# Patient Record
Sex: MALE | Race: WHITE | Employment: OTHER | URBAN - NONMETROPOLITAN AREA
[De-identification: names, ages, dates, MRNs, and addresses within clinical notes are randomized per-mention and may not be internally consistent; named-entity substitution may affect disease eponyms.]

---

## 2017-01-03 ENCOUNTER — OFFICE VISIT (OUTPATIENT)
Dept: NEUROLOGY | Age: 72
End: 2017-01-03

## 2017-01-03 VITALS
BODY MASS INDEX: 44.1 KG/M2 | DIASTOLIC BLOOD PRESSURE: 76 MMHG | WEIGHT: 281 LBS | HEART RATE: 68 BPM | SYSTOLIC BLOOD PRESSURE: 118 MMHG | HEIGHT: 67 IN

## 2017-01-03 DIAGNOSIS — N18.4 CHRONIC KIDNEY DISEASE, STAGE 4 (SEVERE) (HCC): ICD-10-CM

## 2017-01-03 DIAGNOSIS — F32.A ANXIETY AND DEPRESSION: ICD-10-CM

## 2017-01-03 DIAGNOSIS — E11.9 TYPE 2 DIABETES MELLITUS WITHOUT COMPLICATION, WITH LONG-TERM CURRENT USE OF INSULIN (HCC): ICD-10-CM

## 2017-01-03 DIAGNOSIS — F03.90 DEMENTIA WITHOUT BEHAVIORAL DISTURBANCE, UNSPECIFIED DEMENTIA TYPE: ICD-10-CM

## 2017-01-03 DIAGNOSIS — I05.0 SEVERE MITRAL VALVE STENOSIS: ICD-10-CM

## 2017-01-03 DIAGNOSIS — E11.9 DIABETES MELLITUS WITH INSULIN THERAPY (HCC): Chronic | ICD-10-CM

## 2017-01-03 DIAGNOSIS — I35.0 SEVERE AORTIC STENOSIS: ICD-10-CM

## 2017-01-03 DIAGNOSIS — D64.9 ANEMIA, UNSPECIFIED TYPE: ICD-10-CM

## 2017-01-03 DIAGNOSIS — Z79.4 DIABETES MELLITUS WITH INSULIN THERAPY (HCC): Chronic | ICD-10-CM

## 2017-01-03 DIAGNOSIS — E78.2 MIXED HYPERLIPIDEMIA: ICD-10-CM

## 2017-01-03 DIAGNOSIS — I87.2 VENOUS STASIS DERMATITIS, UNSPECIFIED LATERALITY: ICD-10-CM

## 2017-01-03 DIAGNOSIS — R41.3 MEMORY LOSS: ICD-10-CM

## 2017-01-03 DIAGNOSIS — A41.9 SEVERE SEPSIS (HCC): ICD-10-CM

## 2017-01-03 DIAGNOSIS — N40.0 BENIGN PROSTATIC HYPERPLASIA WITHOUT LOWER URINARY TRACT SYMPTOMS, UNSPECIFIED MORPHOLOGY: ICD-10-CM

## 2017-01-03 DIAGNOSIS — R26.2 DIFFICULTY WALKING: ICD-10-CM

## 2017-01-03 DIAGNOSIS — G62.9 PERIPHERAL POLYNEUROPATHY: ICD-10-CM

## 2017-01-03 DIAGNOSIS — Z99.2 DIALYSIS PATIENT (HCC): ICD-10-CM

## 2017-01-03 DIAGNOSIS — S68.012S: ICD-10-CM

## 2017-01-03 DIAGNOSIS — I67.82 CHRONIC CEREBRAL ISCHEMIA: ICD-10-CM

## 2017-01-03 DIAGNOSIS — R65.20 SEVERE SEPSIS (HCC): ICD-10-CM

## 2017-01-03 DIAGNOSIS — N19 UREMIA: ICD-10-CM

## 2017-01-03 DIAGNOSIS — Z99.2 HEMODIALYSIS PATIENT (HCC): Primary | Chronic | ICD-10-CM

## 2017-01-03 DIAGNOSIS — G93.40 ENCEPHALOPATHY: ICD-10-CM

## 2017-01-03 DIAGNOSIS — Z79.4 TYPE 2 DIABETES MELLITUS WITHOUT COMPLICATION, WITH LONG-TERM CURRENT USE OF INSULIN (HCC): ICD-10-CM

## 2017-01-03 DIAGNOSIS — S52.92XS: ICD-10-CM

## 2017-01-03 DIAGNOSIS — F41.9 ANXIETY AND DEPRESSION: ICD-10-CM

## 2017-01-03 DIAGNOSIS — G47.33 OBSTRUCTIVE SLEEP APNEA SYNDROME: ICD-10-CM

## 2017-01-03 DIAGNOSIS — G31.9 DIFFUSE CEREBRAL ATROPHY (HCC): ICD-10-CM

## 2017-01-03 DIAGNOSIS — E66.01 MORBID OBESITY, UNSPECIFIED OBESITY TYPE (HCC): ICD-10-CM

## 2017-01-03 DIAGNOSIS — N19 UREMIA OF RENAL ORIGIN: ICD-10-CM

## 2017-01-03 DIAGNOSIS — I10 ESSENTIAL HYPERTENSION: ICD-10-CM

## 2017-01-03 PROCEDURE — 99205 OFFICE O/P NEW HI 60 MIN: CPT | Performed by: PSYCHIATRY & NEUROLOGY

## 2017-01-03 RX ORDER — TRIAMCINOLONE ACETONIDE 0.25 MG/G
CREAM TOPICAL
COMMUNITY
Start: 2016-11-03 | End: 2017-08-28 | Stop reason: ALTCHOICE

## 2017-01-03 ASSESSMENT — ENCOUNTER SYMPTOMS
CONSTIPATION: 0
EYE PAIN: 0
VOMITING: 0
DIARRHEA: 0
NAUSEA: 0
CHOKING: 0
FACIAL SWELLING: 0
SHORTNESS OF BREATH: 0
BACK PAIN: 0
TROUBLE SWALLOWING: 0
COUGH: 0
SORE THROAT: 0
EYE ITCHING: 0
EYE REDNESS: 0
APNEA: 0
BLOOD IN STOOL: 0
VOICE CHANGE: 0
WHEEZING: 0
COLOR CHANGE: 0
CHEST TIGHTNESS: 0
ABDOMINAL DISTENTION: 0
ABDOMINAL PAIN: 0
EYE DISCHARGE: 0
SINUS PRESSURE: 0
PHOTOPHOBIA: 0

## 2017-01-04 ENCOUNTER — TELEPHONE (OUTPATIENT)
Dept: INTERNAL MEDICINE | Age: 72
End: 2017-01-04

## 2017-01-04 LAB
ABSOLUTE EOS #: 0.5 K/UL (ref 0–0.4)
ABSOLUTE LYMPH #: 2.1 K/UL (ref 1–4.8)
ABSOLUTE MONO #: 0.9 K/UL (ref 0.1–1.2)
ANION GAP SERPL CALCULATED.3IONS-SCNC: 21 MMOL/L (ref 9–17)
BASOPHILS # BLD: 1 % (ref 0–2)
BASOPHILS ABSOLUTE: 0.1 K/UL (ref 0–0.2)
BUN BLDV-MCNC: 47 MG/DL (ref 8–23)
BUN/CREAT BLD: 9 (ref 9–20)
CALCIUM SERPL-MCNC: 9.4 MG/DL (ref 8.6–10.4)
CHLORIDE BLD-SCNC: 93 MMOL/L (ref 98–107)
CO2: 21 MMOL/L (ref 20–31)
CREAT SERPL-MCNC: 5.26 MG/DL (ref 0.7–1.2)
DIFFERENTIAL TYPE: ABNORMAL
EOSINOPHILS RELATIVE PERCENT: 5 % (ref 1–4)
GFR AFRICAN AMERICAN: 13 ML/MIN
GFR NON-AFRICAN AMERICAN: 11 ML/MIN
GFR SERPL CREATININE-BSD FRML MDRD: ABNORMAL ML/MIN/{1.73_M2}
GFR SERPL CREATININE-BSD FRML MDRD: ABNORMAL ML/MIN/{1.73_M2}
GLUCOSE BLD-MCNC: 128 MG/DL (ref 70–99)
HCT VFR BLD CALC: 35 % (ref 41–53)
HEMOGLOBIN: 11.4 G/DL (ref 13.5–17.5)
LYMPHOCYTES # BLD: 21 % (ref 24–44)
MCH RBC QN AUTO: 31.1 PG (ref 26–34)
MCHC RBC AUTO-ENTMCNC: 32.5 G/DL (ref 31–37)
MCV RBC AUTO: 95.6 FL (ref 80–100)
MONOCYTES # BLD: 9 % (ref 1–7)
PDW BLD-RTO: 16.8 % (ref 11–14.5)
PLATELET # BLD: 173 K/UL (ref 140–450)
PLATELET ESTIMATE: ABNORMAL
PMV BLD AUTO: 7.3 FL (ref 6–12)
POTASSIUM SERPL-SCNC: 5.5 MMOL/L (ref 3.7–5.3)
RBC # BLD: 3.66 M/UL (ref 4.5–5.9)
RBC # BLD: ABNORMAL 10*6/UL
SEG NEUTROPHILS: 64 % (ref 36–66)
SEGMENTED NEUTROPHILS ABSOLUTE COUNT: 6.4 K/UL (ref 1.8–7.7)
SODIUM BLD-SCNC: 135 MMOL/L (ref 135–144)
WBC # BLD: 10 K/UL (ref 3.5–11)
WBC # BLD: ABNORMAL 10*3/UL

## 2017-01-16 ENCOUNTER — PROCEDURE VISIT (OUTPATIENT)
Dept: NEUROLOGY | Age: 72
End: 2017-01-16

## 2017-01-16 DIAGNOSIS — G40.409 AKINETIC SEIZURES (HCC): ICD-10-CM

## 2017-01-16 DIAGNOSIS — F03.90 DEMENTIA WITHOUT BEHAVIORAL DISTURBANCE, UNSPECIFIED DEMENTIA TYPE: ICD-10-CM

## 2017-01-16 DIAGNOSIS — R41.3 MEMORY LOSS: ICD-10-CM

## 2017-01-16 DIAGNOSIS — I67.82 CHRONIC CEREBRAL ISCHEMIA: ICD-10-CM

## 2017-01-16 DIAGNOSIS — R56.9 CONVULSIONS, UNSPECIFIED CONVULSION TYPE (HCC): Primary | ICD-10-CM

## 2017-01-16 DIAGNOSIS — R41.0 CONFUSION: ICD-10-CM

## 2017-01-16 DIAGNOSIS — G31.9 DIFFUSE CEREBRAL ATROPHY (HCC): ICD-10-CM

## 2017-01-16 PROCEDURE — 95957 EEG DIGITAL ANALYSIS: CPT | Performed by: PSYCHIATRY & NEUROLOGY

## 2017-01-16 PROCEDURE — 95813 EEG EXTND MNTR 61-119 MIN: CPT | Performed by: PSYCHIATRY & NEUROLOGY

## 2017-01-17 PROBLEM — E11.42 CONTROLLED TYPE 2 DIABETES MELLITUS WITH DIABETIC POLYNEUROPATHY, WITH LONG-TERM CURRENT USE OF INSULIN (HCC): Status: ACTIVE | Noted: 2017-01-03

## 2017-01-17 PROBLEM — N18.6 ESRD (END STAGE RENAL DISEASE) ON DIALYSIS (HCC): Status: ACTIVE | Noted: 2017-01-17

## 2017-01-17 PROBLEM — Z99.2 ESRD (END STAGE RENAL DISEASE) ON DIALYSIS (HCC): Status: ACTIVE | Noted: 2017-01-17

## 2017-01-17 PROBLEM — Z79.4 CONTROLLED TYPE 2 DIABETES MELLITUS WITH DIABETIC POLYNEUROPATHY, WITH LONG-TERM CURRENT USE OF INSULIN (HCC): Status: ACTIVE | Noted: 2017-01-03

## 2017-01-17 PROBLEM — Z79.4 LONG-TERM INSULIN USE (HCC): Status: ACTIVE | Noted: 2017-01-17

## 2017-01-17 PROBLEM — F32.4 MAJOR DEPRESSIVE DISORDER WITH SINGLE EPISODE, IN PARTIAL REMISSION (HCC): Status: ACTIVE | Noted: 2017-01-03

## 2017-01-18 ENCOUNTER — OUTSIDE SERVICES (OUTPATIENT)
Dept: INTERNAL MEDICINE | Age: 72
End: 2017-01-18

## 2017-01-18 DIAGNOSIS — Z79.4 CONTROLLED TYPE 2 DIABETES MELLITUS WITH DIABETIC POLYNEUROPATHY, WITH LONG-TERM CURRENT USE OF INSULIN (HCC): Primary | ICD-10-CM

## 2017-01-18 DIAGNOSIS — I05.0 SEVERE MITRAL VALVE STENOSIS: ICD-10-CM

## 2017-01-18 DIAGNOSIS — I10 ESSENTIAL HYPERTENSION: ICD-10-CM

## 2017-01-18 DIAGNOSIS — N18.9 ANEMIA IN CHRONIC KIDNEY DISEASE (CKD): ICD-10-CM

## 2017-01-18 DIAGNOSIS — Z99.2 ESRD (END STAGE RENAL DISEASE) ON DIALYSIS (HCC): ICD-10-CM

## 2017-01-18 DIAGNOSIS — Z99.2 DIALYSIS PATIENT (HCC): ICD-10-CM

## 2017-01-18 DIAGNOSIS — G47.33 OBSTRUCTIVE SLEEP APNEA SYNDROME: ICD-10-CM

## 2017-01-18 DIAGNOSIS — N18.6 CONTROLLED TYPE 2 DIABETES MELLITUS WITH CHRONIC KIDNEY DISEASE ON CHRONIC DIALYSIS, WITH LONG-TERM CURRENT USE OF INSULIN (HCC): ICD-10-CM

## 2017-01-18 DIAGNOSIS — E11.22 CONTROLLED TYPE 2 DIABETES MELLITUS WITH CHRONIC KIDNEY DISEASE ON CHRONIC DIALYSIS, WITH LONG-TERM CURRENT USE OF INSULIN (HCC): ICD-10-CM

## 2017-01-18 DIAGNOSIS — Z79.4 LONG-TERM INSULIN USE (HCC): ICD-10-CM

## 2017-01-18 DIAGNOSIS — E11.42 CONTROLLED TYPE 2 DIABETES MELLITUS WITH DIABETIC POLYNEUROPATHY, WITH LONG-TERM CURRENT USE OF INSULIN (HCC): Primary | ICD-10-CM

## 2017-01-18 DIAGNOSIS — E66.01 MORBID OBESITY, UNSPECIFIED OBESITY TYPE (HCC): ICD-10-CM

## 2017-01-18 DIAGNOSIS — D63.1 ANEMIA IN CHRONIC KIDNEY DISEASE (CKD): ICD-10-CM

## 2017-01-18 DIAGNOSIS — F32.4 MAJOR DEPRESSIVE DISORDER WITH SINGLE EPISODE, IN PARTIAL REMISSION (HCC): ICD-10-CM

## 2017-01-18 DIAGNOSIS — Z79.4 CONTROLLED TYPE 2 DIABETES MELLITUS WITH CHRONIC KIDNEY DISEASE ON CHRONIC DIALYSIS, WITH LONG-TERM CURRENT USE OF INSULIN (HCC): ICD-10-CM

## 2017-01-18 DIAGNOSIS — N18.6 ESRD (END STAGE RENAL DISEASE) ON DIALYSIS (HCC): ICD-10-CM

## 2017-01-18 DIAGNOSIS — Z99.2 CONTROLLED TYPE 2 DIABETES MELLITUS WITH CHRONIC KIDNEY DISEASE ON CHRONIC DIALYSIS, WITH LONG-TERM CURRENT USE OF INSULIN (HCC): ICD-10-CM

## 2017-01-18 DIAGNOSIS — I35.0 SEVERE AORTIC STENOSIS: ICD-10-CM

## 2017-01-18 DIAGNOSIS — E78.2 MIXED HYPERLIPIDEMIA: ICD-10-CM

## 2017-01-18 PROCEDURE — 99309 SBSQ NF CARE MODERATE MDM 30: CPT | Performed by: INTERNAL MEDICINE

## 2017-01-20 ENCOUNTER — OFFICE VISIT (OUTPATIENT)
Dept: PRIMARY CARE CLINIC | Age: 72
End: 2017-01-20

## 2017-01-20 ENCOUNTER — OFFICE VISIT (OUTPATIENT)
Dept: CARDIOLOGY | Age: 72
End: 2017-01-20

## 2017-01-20 VITALS
OXYGEN SATURATION: 95 % | SYSTOLIC BLOOD PRESSURE: 118 MMHG | WEIGHT: 286 LBS | HEART RATE: 72 BPM | TEMPERATURE: 98.1 F | BODY MASS INDEX: 44.89 KG/M2 | HEIGHT: 67 IN | RESPIRATION RATE: 18 BRPM | DIASTOLIC BLOOD PRESSURE: 60 MMHG

## 2017-01-20 VITALS
HEIGHT: 67 IN | SYSTOLIC BLOOD PRESSURE: 118 MMHG | WEIGHT: 286 LBS | BODY MASS INDEX: 44.89 KG/M2 | HEART RATE: 74 BPM | DIASTOLIC BLOOD PRESSURE: 60 MMHG

## 2017-01-20 DIAGNOSIS — Z95.2 S/P MVR (MITRAL VALVE REPLACEMENT): ICD-10-CM

## 2017-01-20 DIAGNOSIS — L03.115 CELLULITIS OF RIGHT LOWER EXTREMITY: Primary | ICD-10-CM

## 2017-01-20 DIAGNOSIS — Z95.2 S/P AVR: ICD-10-CM

## 2017-01-20 DIAGNOSIS — I25.10 CORONARY ARTERY DISEASE INVOLVING NATIVE CORONARY ARTERY OF NATIVE HEART WITHOUT ANGINA PECTORIS: Primary | ICD-10-CM

## 2017-01-20 PROCEDURE — G8427 DOCREV CUR MEDS BY ELIG CLIN: HCPCS | Performed by: NURSE PRACTITIONER

## 2017-01-20 PROCEDURE — G8427 DOCREV CUR MEDS BY ELIG CLIN: HCPCS | Performed by: INTERNAL MEDICINE

## 2017-01-20 PROCEDURE — 1036F TOBACCO NON-USER: CPT | Performed by: INTERNAL MEDICINE

## 2017-01-20 PROCEDURE — G8599 NO ASA/ANTIPLAT THER USE RNG: HCPCS | Performed by: INTERNAL MEDICINE

## 2017-01-20 PROCEDURE — G8598 ASA/ANTIPLAT THER USED: HCPCS | Performed by: NURSE PRACTITIONER

## 2017-01-20 PROCEDURE — G8419 CALC BMI OUT NRM PARAM NOF/U: HCPCS | Performed by: INTERNAL MEDICINE

## 2017-01-20 PROCEDURE — 3017F COLORECTAL CA SCREEN DOC REV: CPT | Performed by: INTERNAL MEDICINE

## 2017-01-20 PROCEDURE — 3017F COLORECTAL CA SCREEN DOC REV: CPT | Performed by: NURSE PRACTITIONER

## 2017-01-20 PROCEDURE — G8484 FLU IMMUNIZE NO ADMIN: HCPCS | Performed by: NURSE PRACTITIONER

## 2017-01-20 PROCEDURE — 4040F PNEUMOC VAC/ADMIN/RCVD: CPT | Performed by: NURSE PRACTITIONER

## 2017-01-20 PROCEDURE — G8484 FLU IMMUNIZE NO ADMIN: HCPCS | Performed by: INTERNAL MEDICINE

## 2017-01-20 PROCEDURE — 1123F ACP DISCUSS/DSCN MKR DOCD: CPT | Performed by: NURSE PRACTITIONER

## 2017-01-20 PROCEDURE — 1036F TOBACCO NON-USER: CPT | Performed by: NURSE PRACTITIONER

## 2017-01-20 PROCEDURE — 99213 OFFICE O/P EST LOW 20 MIN: CPT | Performed by: NURSE PRACTITIONER

## 2017-01-20 PROCEDURE — 1123F ACP DISCUSS/DSCN MKR DOCD: CPT | Performed by: INTERNAL MEDICINE

## 2017-01-20 PROCEDURE — G8419 CALC BMI OUT NRM PARAM NOF/U: HCPCS | Performed by: NURSE PRACTITIONER

## 2017-01-20 PROCEDURE — 99213 OFFICE O/P EST LOW 20 MIN: CPT | Performed by: INTERNAL MEDICINE

## 2017-01-20 PROCEDURE — 4040F PNEUMOC VAC/ADMIN/RCVD: CPT | Performed by: INTERNAL MEDICINE

## 2017-01-20 RX ORDER — HYDROCODONE BITARTRATE AND ACETAMINOPHEN 5; 325 MG/1; MG/1
1 TABLET ORAL EVERY 6 HOURS PRN
COMMUNITY
End: 2017-02-01

## 2017-01-20 RX ORDER — DOXYCYCLINE HYCLATE 100 MG/1
100 CAPSULE ORAL 2 TIMES DAILY
Qty: 20 CAPSULE | Refills: 0 | Status: SHIPPED | OUTPATIENT
Start: 2017-01-20 | End: 2017-01-30

## 2017-01-21 ASSESSMENT — ENCOUNTER SYMPTOMS
SHORTNESS OF BREATH: 0
VOMITING: 0
EYE PAIN: 0
RHINORRHEA: 0
NAIL CHANGES: 0
SORE THROAT: 0
DIARRHEA: 0
COUGH: 0

## 2017-02-02 ENCOUNTER — OFFICE VISIT (OUTPATIENT)
Dept: VASCULAR SURGERY | Age: 72
End: 2017-02-02

## 2017-02-02 VITALS — HEART RATE: 76 BPM | SYSTOLIC BLOOD PRESSURE: 128 MMHG | DIASTOLIC BLOOD PRESSURE: 78 MMHG

## 2017-02-02 DIAGNOSIS — N18.6 ENCOUNTER REGARDING VASCULAR ACCESS FOR DIALYSIS FOR ESRD (HCC): Primary | ICD-10-CM

## 2017-02-02 DIAGNOSIS — Z99.2 ENCOUNTER REGARDING VASCULAR ACCESS FOR DIALYSIS FOR ESRD (HCC): Primary | ICD-10-CM

## 2017-02-02 PROCEDURE — 99024 POSTOP FOLLOW-UP VISIT: CPT | Performed by: SURGERY

## 2017-02-13 RX ORDER — NYSTATIN 10B UNIT
POWDER (EA) MISCELLANEOUS DAILY
COMMUNITY
Start: 2017-02-13 | End: 2019-03-21 | Stop reason: ALTCHOICE

## 2017-02-15 ENCOUNTER — OFFICE VISIT (OUTPATIENT)
Dept: PODIATRY | Age: 72
End: 2017-02-15

## 2017-02-15 VITALS
SYSTOLIC BLOOD PRESSURE: 124 MMHG | DIASTOLIC BLOOD PRESSURE: 60 MMHG | WEIGHT: 286.2 LBS | BODY MASS INDEX: 44.92 KG/M2 | HEIGHT: 67 IN | HEART RATE: 76 BPM

## 2017-02-15 DIAGNOSIS — M21.371 RIGHT FOOT DROP: ICD-10-CM

## 2017-02-15 DIAGNOSIS — E11.42 CONTROLLED TYPE 2 DIABETES MELLITUS WITH DIABETIC POLYNEUROPATHY, WITH LONG-TERM CURRENT USE OF INSULIN (HCC): Primary | ICD-10-CM

## 2017-02-15 DIAGNOSIS — Z79.4 CONTROLLED TYPE 2 DIABETES MELLITUS WITH DIABETIC POLYNEUROPATHY, WITH LONG-TERM CURRENT USE OF INSULIN (HCC): Primary | ICD-10-CM

## 2017-02-15 DIAGNOSIS — B35.1 DERMATOPHYTOSIS OF NAIL: ICD-10-CM

## 2017-02-15 DIAGNOSIS — M79.2 NEUROPATHIC PAIN: ICD-10-CM

## 2017-02-15 PROCEDURE — G8427 DOCREV CUR MEDS BY ELIG CLIN: HCPCS | Performed by: PODIATRIST

## 2017-02-15 PROCEDURE — G8417 CALC BMI ABV UP PARAM F/U: HCPCS | Performed by: PODIATRIST

## 2017-02-15 PROCEDURE — 1036F TOBACCO NON-USER: CPT | Performed by: PODIATRIST

## 2017-02-15 PROCEDURE — 1123F ACP DISCUSS/DSCN MKR DOCD: CPT | Performed by: PODIATRIST

## 2017-02-15 PROCEDURE — 3044F HG A1C LEVEL LT 7.0%: CPT | Performed by: PODIATRIST

## 2017-02-15 PROCEDURE — G8598 ASA/ANTIPLAT THER USED: HCPCS | Performed by: PODIATRIST

## 2017-02-15 PROCEDURE — 99202 OFFICE O/P NEW SF 15 MIN: CPT | Performed by: PODIATRIST

## 2017-02-15 PROCEDURE — G8484 FLU IMMUNIZE NO ADMIN: HCPCS | Performed by: PODIATRIST

## 2017-02-15 PROCEDURE — 4040F PNEUMOC VAC/ADMIN/RCVD: CPT | Performed by: PODIATRIST

## 2017-02-15 PROCEDURE — 3017F COLORECTAL CA SCREEN DOC REV: CPT | Performed by: PODIATRIST

## 2017-02-15 PROCEDURE — 11721 DEBRIDE NAIL 6 OR MORE: CPT | Performed by: PODIATRIST

## 2017-02-15 RX ORDER — GABAPENTIN 300 MG/1
300 CAPSULE ORAL 3 TIMES DAILY
Qty: 90 CAPSULE | Refills: 0 | Status: SHIPPED | OUTPATIENT
Start: 2017-02-15 | End: 2017-08-28 | Stop reason: ALTCHOICE

## 2017-02-21 PROCEDURE — 99309 SBSQ NF CARE MODERATE MDM 30: CPT | Performed by: INTERNAL MEDICINE

## 2017-02-22 ENCOUNTER — OUTSIDE SERVICES (OUTPATIENT)
Dept: INTERNAL MEDICINE | Age: 72
End: 2017-02-22

## 2017-02-22 DIAGNOSIS — N18.9 ANEMIA IN CHRONIC KIDNEY DISEASE (CKD): ICD-10-CM

## 2017-02-22 DIAGNOSIS — E78.2 MIXED HYPERLIPIDEMIA: ICD-10-CM

## 2017-02-22 DIAGNOSIS — Z99.2 DIALYSIS PATIENT (HCC): ICD-10-CM

## 2017-02-22 DIAGNOSIS — I05.0 SEVERE MITRAL VALVE STENOSIS: ICD-10-CM

## 2017-02-22 DIAGNOSIS — E11.42 CONTROLLED TYPE 2 DIABETES MELLITUS WITH DIABETIC POLYNEUROPATHY, WITH LONG-TERM CURRENT USE OF INSULIN (HCC): ICD-10-CM

## 2017-02-22 DIAGNOSIS — Z99.2 ESRD (END STAGE RENAL DISEASE) ON DIALYSIS (HCC): ICD-10-CM

## 2017-02-22 DIAGNOSIS — F32.4 MAJOR DEPRESSIVE DISORDER WITH SINGLE EPISODE, IN PARTIAL REMISSION (HCC): ICD-10-CM

## 2017-02-22 DIAGNOSIS — D63.1 ANEMIA IN CHRONIC KIDNEY DISEASE (CKD): ICD-10-CM

## 2017-02-22 DIAGNOSIS — Z79.4 LONG-TERM INSULIN USE (HCC): ICD-10-CM

## 2017-02-22 DIAGNOSIS — N18.6 CONTROLLED TYPE 2 DIABETES MELLITUS WITH CHRONIC KIDNEY DISEASE ON CHRONIC DIALYSIS, WITH LONG-TERM CURRENT USE OF INSULIN (HCC): ICD-10-CM

## 2017-02-22 DIAGNOSIS — N18.6 ESRD (END STAGE RENAL DISEASE) ON DIALYSIS (HCC): ICD-10-CM

## 2017-02-22 DIAGNOSIS — Z79.4 CONTROLLED TYPE 2 DIABETES MELLITUS WITH CHRONIC KIDNEY DISEASE ON CHRONIC DIALYSIS, WITH LONG-TERM CURRENT USE OF INSULIN (HCC): ICD-10-CM

## 2017-02-22 DIAGNOSIS — E66.01 MORBID OBESITY, UNSPECIFIED OBESITY TYPE (HCC): ICD-10-CM

## 2017-02-22 DIAGNOSIS — E11.22 CONTROLLED TYPE 2 DIABETES MELLITUS WITH CHRONIC KIDNEY DISEASE ON CHRONIC DIALYSIS, WITH LONG-TERM CURRENT USE OF INSULIN (HCC): ICD-10-CM

## 2017-02-22 DIAGNOSIS — Z79.4 CONTROLLED TYPE 2 DIABETES MELLITUS WITH DIABETIC POLYNEUROPATHY, WITH LONG-TERM CURRENT USE OF INSULIN (HCC): ICD-10-CM

## 2017-02-22 DIAGNOSIS — J06.9 UPPER RESPIRATORY TRACT INFECTION, UNSPECIFIED TYPE: Primary | ICD-10-CM

## 2017-02-22 DIAGNOSIS — Z99.2 CONTROLLED TYPE 2 DIABETES MELLITUS WITH CHRONIC KIDNEY DISEASE ON CHRONIC DIALYSIS, WITH LONG-TERM CURRENT USE OF INSULIN (HCC): ICD-10-CM

## 2017-02-22 DIAGNOSIS — G47.33 OBSTRUCTIVE SLEEP APNEA SYNDROME: ICD-10-CM

## 2017-02-22 DIAGNOSIS — I35.0 SEVERE AORTIC STENOSIS: ICD-10-CM

## 2017-02-22 DIAGNOSIS — I10 ESSENTIAL HYPERTENSION: ICD-10-CM

## 2017-03-07 LAB
ESTIMATED AVERAGE GLUCOSE: 174 MG/DL
HBA1C MFR BLD: 7.7 % (ref 4.8–5.9)

## 2017-03-27 ENCOUNTER — OUTSIDE SERVICES (OUTPATIENT)
Dept: INTERNAL MEDICINE | Age: 72
End: 2017-03-27
Payer: MEDICARE

## 2017-03-27 ENCOUNTER — OFFICE VISIT (OUTPATIENT)
Dept: NEUROLOGY | Age: 72
End: 2017-03-27
Payer: MEDICARE

## 2017-03-27 VITALS
WEIGHT: 286 LBS | SYSTOLIC BLOOD PRESSURE: 140 MMHG | DIASTOLIC BLOOD PRESSURE: 68 MMHG | HEART RATE: 81 BPM | HEIGHT: 67 IN | OXYGEN SATURATION: 95 % | BODY MASS INDEX: 44.89 KG/M2

## 2017-03-27 DIAGNOSIS — Z79.4 CONTROLLED TYPE 2 DIABETES MELLITUS WITH DIABETIC POLYNEUROPATHY, WITH LONG-TERM CURRENT USE OF INSULIN (HCC): ICD-10-CM

## 2017-03-27 DIAGNOSIS — G93.40 ENCEPHALOPATHY: ICD-10-CM

## 2017-03-27 DIAGNOSIS — I05.0 SEVERE MITRAL VALVE STENOSIS: ICD-10-CM

## 2017-03-27 DIAGNOSIS — F32.4 MAJOR DEPRESSIVE DISORDER WITH SINGLE EPISODE, IN PARTIAL REMISSION (HCC): ICD-10-CM

## 2017-03-27 DIAGNOSIS — G62.9 PERIPHERAL POLYNEUROPATHY: ICD-10-CM

## 2017-03-27 DIAGNOSIS — N18.4 CHRONIC KIDNEY DISEASE, STAGE 4 (SEVERE) (HCC): ICD-10-CM

## 2017-03-27 DIAGNOSIS — G47.33 OBSTRUCTIVE SLEEP APNEA SYNDROME: ICD-10-CM

## 2017-03-27 DIAGNOSIS — S68.012S: ICD-10-CM

## 2017-03-27 DIAGNOSIS — Z99.2 ESRD (END STAGE RENAL DISEASE) ON DIALYSIS (HCC): ICD-10-CM

## 2017-03-27 DIAGNOSIS — N18.9 ANEMIA IN CHRONIC KIDNEY DISEASE (CKD): ICD-10-CM

## 2017-03-27 DIAGNOSIS — R26.2 DIFFICULTY WALKING: ICD-10-CM

## 2017-03-27 DIAGNOSIS — I10 ESSENTIAL HYPERTENSION: ICD-10-CM

## 2017-03-27 DIAGNOSIS — E11.42 CONTROLLED TYPE 2 DIABETES MELLITUS WITH DIABETIC POLYNEUROPATHY, WITH LONG-TERM CURRENT USE OF INSULIN (HCC): ICD-10-CM

## 2017-03-27 DIAGNOSIS — R56.9 CONVULSIONS, UNSPECIFIED CONVULSION TYPE (HCC): ICD-10-CM

## 2017-03-27 DIAGNOSIS — E66.01 MORBID OBESITY, UNSPECIFIED OBESITY TYPE (HCC): ICD-10-CM

## 2017-03-27 DIAGNOSIS — E78.2 MIXED HYPERLIPIDEMIA: ICD-10-CM

## 2017-03-27 DIAGNOSIS — D64.9 ANEMIA, UNSPECIFIED TYPE: ICD-10-CM

## 2017-03-27 DIAGNOSIS — F41.9 ANXIETY AND DEPRESSION: ICD-10-CM

## 2017-03-27 DIAGNOSIS — G31.9 DIFFUSE CEREBRAL ATROPHY (HCC): ICD-10-CM

## 2017-03-27 DIAGNOSIS — D63.1 ANEMIA IN CHRONIC KIDNEY DISEASE (CKD): ICD-10-CM

## 2017-03-27 DIAGNOSIS — F32.A ANXIETY AND DEPRESSION: ICD-10-CM

## 2017-03-27 DIAGNOSIS — I35.0 SEVERE AORTIC STENOSIS: ICD-10-CM

## 2017-03-27 DIAGNOSIS — Z79.4 CONTROLLED TYPE 2 DIABETES MELLITUS WITH CHRONIC KIDNEY DISEASE ON CHRONIC DIALYSIS, WITH LONG-TERM CURRENT USE OF INSULIN (HCC): Primary | ICD-10-CM

## 2017-03-27 DIAGNOSIS — N18.6 ESRD (END STAGE RENAL DISEASE) ON DIALYSIS (HCC): ICD-10-CM

## 2017-03-27 DIAGNOSIS — N18.6 CONTROLLED TYPE 2 DIABETES MELLITUS WITH CHRONIC KIDNEY DISEASE ON CHRONIC DIALYSIS, WITH LONG-TERM CURRENT USE OF INSULIN (HCC): Primary | ICD-10-CM

## 2017-03-27 DIAGNOSIS — G40.409 AKINETIC SEIZURES (HCC): ICD-10-CM

## 2017-03-27 DIAGNOSIS — F03.90 DEMENTIA WITHOUT BEHAVIORAL DISTURBANCE, UNSPECIFIED DEMENTIA TYPE: Primary | ICD-10-CM

## 2017-03-27 DIAGNOSIS — Z99.2 CONTROLLED TYPE 2 DIABETES MELLITUS WITH CHRONIC KIDNEY DISEASE ON CHRONIC DIALYSIS, WITH LONG-TERM CURRENT USE OF INSULIN (HCC): Primary | ICD-10-CM

## 2017-03-27 DIAGNOSIS — E11.22 CONTROLLED TYPE 2 DIABETES MELLITUS WITH CHRONIC KIDNEY DISEASE ON CHRONIC DIALYSIS, WITH LONG-TERM CURRENT USE OF INSULIN (HCC): Primary | ICD-10-CM

## 2017-03-27 DIAGNOSIS — E11.9 TYPE 2 DIABETES MELLITUS WITHOUT COMPLICATION, WITH LONG-TERM CURRENT USE OF INSULIN (HCC): ICD-10-CM

## 2017-03-27 DIAGNOSIS — Z99.2 DIALYSIS PATIENT (HCC): ICD-10-CM

## 2017-03-27 DIAGNOSIS — R41.3 MEMORY LOSS: ICD-10-CM

## 2017-03-27 DIAGNOSIS — Z79.4 LONG-TERM INSULIN USE (HCC): ICD-10-CM

## 2017-03-27 DIAGNOSIS — I67.82 CHRONIC CEREBRAL ISCHEMIA: ICD-10-CM

## 2017-03-27 DIAGNOSIS — Z79.4 TYPE 2 DIABETES MELLITUS WITHOUT COMPLICATION, WITH LONG-TERM CURRENT USE OF INSULIN (HCC): ICD-10-CM

## 2017-03-27 PROCEDURE — 1036F TOBACCO NON-USER: CPT | Performed by: PSYCHIATRY & NEUROLOGY

## 2017-03-27 PROCEDURE — G8417 CALC BMI ABV UP PARAM F/U: HCPCS | Performed by: PSYCHIATRY & NEUROLOGY

## 2017-03-27 PROCEDURE — 99214 OFFICE O/P EST MOD 30 MIN: CPT | Performed by: PSYCHIATRY & NEUROLOGY

## 2017-03-27 PROCEDURE — 3045F PR MOST RECENT HEMOGLOBIN A1C LEVEL 7.0-9.0%: CPT | Performed by: PSYCHIATRY & NEUROLOGY

## 2017-03-27 PROCEDURE — G8598 ASA/ANTIPLAT THER USED: HCPCS | Performed by: PSYCHIATRY & NEUROLOGY

## 2017-03-27 PROCEDURE — 1123F ACP DISCUSS/DSCN MKR DOCD: CPT | Performed by: PSYCHIATRY & NEUROLOGY

## 2017-03-27 PROCEDURE — 3017F COLORECTAL CA SCREEN DOC REV: CPT | Performed by: PSYCHIATRY & NEUROLOGY

## 2017-03-27 PROCEDURE — 99308 SBSQ NF CARE LOW MDM 20: CPT | Performed by: INTERNAL MEDICINE

## 2017-03-27 PROCEDURE — 4040F PNEUMOC VAC/ADMIN/RCVD: CPT | Performed by: PSYCHIATRY & NEUROLOGY

## 2017-03-27 PROCEDURE — G8427 DOCREV CUR MEDS BY ELIG CLIN: HCPCS | Performed by: PSYCHIATRY & NEUROLOGY

## 2017-03-27 PROCEDURE — G8484 FLU IMMUNIZE NO ADMIN: HCPCS | Performed by: PSYCHIATRY & NEUROLOGY

## 2017-03-27 ASSESSMENT — ENCOUNTER SYMPTOMS
ABDOMINAL DISTENTION: 0
BACK PAIN: 0
FACIAL SWELLING: 0
COUGH: 0
SHORTNESS OF BREATH: 0
CHEST TIGHTNESS: 0
WHEEZING: 0
PHOTOPHOBIA: 0
DIARRHEA: 0
CHOKING: 0
VOICE CHANGE: 0
CONSTIPATION: 0
EYE ITCHING: 0
EYE PAIN: 0
ABDOMINAL PAIN: 0
COLOR CHANGE: 0
VOMITING: 0
SINUS PRESSURE: 0
EYE REDNESS: 0
NAUSEA: 0
EYE DISCHARGE: 0
TROUBLE SWALLOWING: 0
SORE THROAT: 0
APNEA: 0
BLOOD IN STOOL: 0

## 2017-03-29 ENCOUNTER — OFFICE VISIT (OUTPATIENT)
Dept: PULMONOLOGY | Age: 72
End: 2017-03-29
Payer: MEDICARE

## 2017-03-29 VITALS
OXYGEN SATURATION: 96 % | RESPIRATION RATE: 16 BRPM | BODY MASS INDEX: 44.57 KG/M2 | WEIGHT: 284 LBS | HEIGHT: 67 IN | HEART RATE: 80 BPM | DIASTOLIC BLOOD PRESSURE: 70 MMHG | SYSTOLIC BLOOD PRESSURE: 122 MMHG | TEMPERATURE: 97.7 F

## 2017-03-29 DIAGNOSIS — G47.33 OSA ON CPAP: ICD-10-CM

## 2017-03-29 DIAGNOSIS — N18.6 ESRD (END STAGE RENAL DISEASE) ON DIALYSIS (HCC): ICD-10-CM

## 2017-03-29 DIAGNOSIS — Z99.89 OSA ON CPAP: ICD-10-CM

## 2017-03-29 DIAGNOSIS — E66.01 MORBID OBESITY DUE TO EXCESS CALORIES (HCC): ICD-10-CM

## 2017-03-29 DIAGNOSIS — Z99.2 ESRD (END STAGE RENAL DISEASE) ON DIALYSIS (HCC): ICD-10-CM

## 2017-03-29 DIAGNOSIS — J20.9 ACUTE BRONCHITIS, UNSPECIFIED ORGANISM: Primary | ICD-10-CM

## 2017-03-29 PROCEDURE — G8427 DOCREV CUR MEDS BY ELIG CLIN: HCPCS | Performed by: INTERNAL MEDICINE

## 2017-03-29 PROCEDURE — 4040F PNEUMOC VAC/ADMIN/RCVD: CPT | Performed by: INTERNAL MEDICINE

## 2017-03-29 PROCEDURE — G8598 ASA/ANTIPLAT THER USED: HCPCS | Performed by: INTERNAL MEDICINE

## 2017-03-29 PROCEDURE — 99214 OFFICE O/P EST MOD 30 MIN: CPT | Performed by: INTERNAL MEDICINE

## 2017-03-29 PROCEDURE — G8484 FLU IMMUNIZE NO ADMIN: HCPCS | Performed by: INTERNAL MEDICINE

## 2017-03-29 PROCEDURE — 1123F ACP DISCUSS/DSCN MKR DOCD: CPT | Performed by: INTERNAL MEDICINE

## 2017-03-29 PROCEDURE — 1036F TOBACCO NON-USER: CPT | Performed by: INTERNAL MEDICINE

## 2017-03-29 PROCEDURE — G8417 CALC BMI ABV UP PARAM F/U: HCPCS | Performed by: INTERNAL MEDICINE

## 2017-03-29 PROCEDURE — 3017F COLORECTAL CA SCREEN DOC REV: CPT | Performed by: INTERNAL MEDICINE

## 2017-03-29 RX ORDER — IPRATROPIUM BROMIDE AND ALBUTEROL SULFATE 2.5; .5 MG/3ML; MG/3ML
1 SOLUTION RESPIRATORY (INHALATION) EVERY 6 HOURS PRN
COMMUNITY
End: 2020-03-13 | Stop reason: ALTCHOICE

## 2017-04-03 ENCOUNTER — TELEPHONE (OUTPATIENT)
Dept: PODIATRY | Age: 72
End: 2017-04-03

## 2017-04-06 DIAGNOSIS — R05.3 PERSISTENT COUGH FOR 3 WEEKS OR LONGER: Primary | ICD-10-CM

## 2017-04-26 LAB
ESTIMATED AVERAGE GLUCOSE: 186 MG/DL
HBA1C MFR BLD: 8.1 % (ref 4.8–5.9)
HCT VFR BLD CALC: 33.7 % (ref 41–53)
HEMOGLOBIN: 11.3 G/DL (ref 13.5–17.5)
MCH RBC QN AUTO: 32.6 PG (ref 26–34)
MCHC RBC AUTO-ENTMCNC: 33.4 G/DL (ref 31–37)
MCV RBC AUTO: 97.6 FL (ref 80–100)
PDW BLD-RTO: 17.3 % (ref 11–14.5)
PLATELET # BLD: 154 K/UL (ref 140–450)
PMV BLD AUTO: 7.1 FL (ref 6–12)
RBC # BLD: 3.46 M/UL (ref 4.5–5.9)
WBC # BLD: 9.1 K/UL (ref 3.5–11)

## 2017-04-30 PROCEDURE — 99309 SBSQ NF CARE MODERATE MDM 30: CPT | Performed by: INTERNAL MEDICINE

## 2017-05-04 ENCOUNTER — OUTSIDE SERVICES (OUTPATIENT)
Dept: INTERNAL MEDICINE | Age: 72
End: 2017-05-04
Payer: MEDICARE

## 2017-05-04 ENCOUNTER — TELEPHONE (OUTPATIENT)
Dept: INTERNAL MEDICINE | Age: 72
End: 2017-05-04

## 2017-05-04 DIAGNOSIS — Z99.2 ESRD (END STAGE RENAL DISEASE) ON DIALYSIS (HCC): ICD-10-CM

## 2017-05-04 DIAGNOSIS — E66.01 MORBID OBESITY, UNSPECIFIED OBESITY TYPE (HCC): ICD-10-CM

## 2017-05-04 DIAGNOSIS — Z99.2 CONTROLLED TYPE 2 DIABETES MELLITUS WITH CHRONIC KIDNEY DISEASE ON CHRONIC DIALYSIS, WITH LONG-TERM CURRENT USE OF INSULIN (HCC): Primary | ICD-10-CM

## 2017-05-04 DIAGNOSIS — Z79.4 CONTROLLED TYPE 2 DIABETES MELLITUS WITH DIABETIC POLYNEUROPATHY, WITH LONG-TERM CURRENT USE OF INSULIN (HCC): ICD-10-CM

## 2017-05-04 DIAGNOSIS — N18.6 CONTROLLED TYPE 2 DIABETES MELLITUS WITH CHRONIC KIDNEY DISEASE ON CHRONIC DIALYSIS, WITH LONG-TERM CURRENT USE OF INSULIN (HCC): Primary | ICD-10-CM

## 2017-05-04 DIAGNOSIS — I10 ESSENTIAL HYPERTENSION: ICD-10-CM

## 2017-05-04 DIAGNOSIS — E78.2 MIXED HYPERLIPIDEMIA: ICD-10-CM

## 2017-05-04 DIAGNOSIS — I05.0 SEVERE MITRAL VALVE STENOSIS: ICD-10-CM

## 2017-05-04 DIAGNOSIS — D63.1 ANEMIA IN CHRONIC KIDNEY DISEASE (CKD): ICD-10-CM

## 2017-05-04 DIAGNOSIS — E11.22 CONTROLLED TYPE 2 DIABETES MELLITUS WITH CHRONIC KIDNEY DISEASE ON CHRONIC DIALYSIS, WITH LONG-TERM CURRENT USE OF INSULIN (HCC): Primary | ICD-10-CM

## 2017-05-04 DIAGNOSIS — Z79.4 LONG-TERM INSULIN USE (HCC): ICD-10-CM

## 2017-05-04 DIAGNOSIS — Z99.2 DIALYSIS PATIENT (HCC): ICD-10-CM

## 2017-05-04 DIAGNOSIS — Z79.4 CONTROLLED TYPE 2 DIABETES MELLITUS WITH CHRONIC KIDNEY DISEASE ON CHRONIC DIALYSIS, WITH LONG-TERM CURRENT USE OF INSULIN (HCC): Primary | ICD-10-CM

## 2017-05-04 DIAGNOSIS — N18.6 ESRD (END STAGE RENAL DISEASE) ON DIALYSIS (HCC): ICD-10-CM

## 2017-05-04 DIAGNOSIS — N18.9 ANEMIA IN CHRONIC KIDNEY DISEASE (CKD): ICD-10-CM

## 2017-05-04 DIAGNOSIS — F32.4 MAJOR DEPRESSIVE DISORDER WITH SINGLE EPISODE, IN PARTIAL REMISSION (HCC): ICD-10-CM

## 2017-05-04 DIAGNOSIS — G47.33 OBSTRUCTIVE SLEEP APNEA SYNDROME: ICD-10-CM

## 2017-05-04 DIAGNOSIS — I35.0 SEVERE AORTIC STENOSIS: ICD-10-CM

## 2017-05-04 DIAGNOSIS — E11.42 CONTROLLED TYPE 2 DIABETES MELLITUS WITH DIABETIC POLYNEUROPATHY, WITH LONG-TERM CURRENT USE OF INSULIN (HCC): ICD-10-CM

## 2017-05-08 ENCOUNTER — TELEPHONE (OUTPATIENT)
Dept: INTERNAL MEDICINE | Age: 72
End: 2017-05-08

## 2017-05-08 DIAGNOSIS — G47.33 OSA (OBSTRUCTIVE SLEEP APNEA): Primary | ICD-10-CM

## 2017-05-30 ENCOUNTER — OUTSIDE SERVICES (OUTPATIENT)
Dept: INTERNAL MEDICINE | Age: 72
End: 2017-05-30
Payer: MEDICARE

## 2017-05-30 DIAGNOSIS — R10.11 RIGHT UPPER QUADRANT PAIN: ICD-10-CM

## 2017-05-30 DIAGNOSIS — Z99.2 CONTROLLED TYPE 2 DIABETES MELLITUS WITH CHRONIC KIDNEY DISEASE ON CHRONIC DIALYSIS, WITH LONG-TERM CURRENT USE OF INSULIN (HCC): ICD-10-CM

## 2017-05-30 DIAGNOSIS — N18.6 CONTROLLED TYPE 2 DIABETES MELLITUS WITH CHRONIC KIDNEY DISEASE ON CHRONIC DIALYSIS, WITH LONG-TERM CURRENT USE OF INSULIN (HCC): ICD-10-CM

## 2017-05-30 DIAGNOSIS — E11.22 CONTROLLED TYPE 2 DIABETES MELLITUS WITH CHRONIC KIDNEY DISEASE ON CHRONIC DIALYSIS, WITH LONG-TERM CURRENT USE OF INSULIN (HCC): ICD-10-CM

## 2017-05-30 DIAGNOSIS — E11.42 CONTROLLED TYPE 2 DIABETES MELLITUS WITH DIABETIC POLYNEUROPATHY, WITH LONG-TERM CURRENT USE OF INSULIN (HCC): ICD-10-CM

## 2017-05-30 DIAGNOSIS — I05.0 SEVERE MITRAL VALVE STENOSIS: ICD-10-CM

## 2017-05-30 DIAGNOSIS — G47.33 OBSTRUCTIVE SLEEP APNEA SYNDROME: ICD-10-CM

## 2017-05-30 DIAGNOSIS — R11.0 NAUSEA: Primary | ICD-10-CM

## 2017-05-30 DIAGNOSIS — Z79.4 CONTROLLED TYPE 2 DIABETES MELLITUS WITH DIABETIC POLYNEUROPATHY, WITH LONG-TERM CURRENT USE OF INSULIN (HCC): ICD-10-CM

## 2017-05-30 DIAGNOSIS — I35.0 SEVERE AORTIC STENOSIS: ICD-10-CM

## 2017-05-30 DIAGNOSIS — N18.9 ANEMIA IN CHRONIC KIDNEY DISEASE (CKD): ICD-10-CM

## 2017-05-30 DIAGNOSIS — Z99.2 ESRD (END STAGE RENAL DISEASE) ON DIALYSIS (HCC): ICD-10-CM

## 2017-05-30 DIAGNOSIS — E66.01 MORBID OBESITY, UNSPECIFIED OBESITY TYPE (HCC): ICD-10-CM

## 2017-05-30 DIAGNOSIS — I10 ESSENTIAL HYPERTENSION: ICD-10-CM

## 2017-05-30 DIAGNOSIS — F32.4 MAJOR DEPRESSIVE DISORDER WITH SINGLE EPISODE, IN PARTIAL REMISSION (HCC): ICD-10-CM

## 2017-05-30 DIAGNOSIS — N18.6 ESRD (END STAGE RENAL DISEASE) ON DIALYSIS (HCC): ICD-10-CM

## 2017-05-30 DIAGNOSIS — Z79.4 CONTROLLED TYPE 2 DIABETES MELLITUS WITH CHRONIC KIDNEY DISEASE ON CHRONIC DIALYSIS, WITH LONG-TERM CURRENT USE OF INSULIN (HCC): ICD-10-CM

## 2017-05-30 DIAGNOSIS — D63.1 ANEMIA IN CHRONIC KIDNEY DISEASE (CKD): ICD-10-CM

## 2017-05-30 DIAGNOSIS — Z79.4 LONG-TERM INSULIN USE (HCC): ICD-10-CM

## 2017-05-30 DIAGNOSIS — E78.2 MIXED HYPERLIPIDEMIA: ICD-10-CM

## 2017-05-30 DIAGNOSIS — Z99.2 DIALYSIS PATIENT (HCC): ICD-10-CM

## 2017-05-30 PROCEDURE — 99309 SBSQ NF CARE MODERATE MDM 30: CPT | Performed by: INTERNAL MEDICINE

## 2017-06-02 ENCOUNTER — HOSPITAL ENCOUNTER (OUTPATIENT)
Dept: INTERVENTIONAL RADIOLOGY/VASCULAR | Age: 72
Discharge: HOME | End: 2017-06-02
Payer: MEDICARE

## 2017-06-02 VITALS
BODY MASS INDEX: 43.95 KG/M2 | SYSTOLIC BLOOD PRESSURE: 116 MMHG | RESPIRATION RATE: 18 BRPM | HEIGHT: 67 IN | HEART RATE: 89 BPM | WEIGHT: 280 LBS | TEMPERATURE: 97.3 F | OXYGEN SATURATION: 100 % | DIASTOLIC BLOOD PRESSURE: 64 MMHG

## 2017-06-02 DIAGNOSIS — N18.6 END STAGE RENAL DISEASE (HCC): ICD-10-CM

## 2017-06-02 LAB
INR BLD: 1
PARTIAL THROMBOPLASTIN TIME: 20.5 SEC (ref 21.3–31.3)
PROTHROMBIN TIME: 10.3 SEC (ref 9.4–12.6)

## 2017-06-02 PROCEDURE — 6360000002 HC RX W HCPCS: Performed by: RADIOLOGY

## 2017-06-02 PROCEDURE — C1750 CATH, HEMODIALYSIS,LONG-TERM: HCPCS

## 2017-06-02 PROCEDURE — 7100000010 HC PHASE II RECOVERY - FIRST 15 MIN

## 2017-06-02 PROCEDURE — 2580000003 HC RX 258: Performed by: RADIOLOGY

## 2017-06-02 PROCEDURE — 85610 PROTHROMBIN TIME: CPT

## 2017-06-02 PROCEDURE — 7100000011 HC PHASE II RECOVERY - ADDTL 15 MIN

## 2017-06-02 PROCEDURE — 85730 THROMBOPLASTIN TIME PARTIAL: CPT

## 2017-06-02 PROCEDURE — 36581 REPLACE TUNNELED CV CATH: CPT | Performed by: RADIOLOGY

## 2017-06-02 PROCEDURE — 6360000004 HC RX CONTRAST MEDICATION: Performed by: RADIOLOGY

## 2017-06-02 RX ORDER — SODIUM CHLORIDE 9 MG/ML
INJECTION, SOLUTION INTRAVENOUS CONTINUOUS
Status: DISCONTINUED | OUTPATIENT
Start: 2017-06-02 | End: 2017-06-05 | Stop reason: HOSPADM

## 2017-06-02 RX ADMIN — CEFAZOLIN SODIUM 1 G: 1 INJECTION, SOLUTION INTRAVENOUS at 13:53

## 2017-06-02 RX ADMIN — IOVERSOL 20 ML: 509 INJECTION INTRA-ARTERIAL; INTRAVENOUS at 15:02

## 2017-06-02 RX ADMIN — SODIUM CHLORIDE: 9 INJECTION, SOLUTION INTRAVENOUS at 14:00

## 2017-06-02 RX ADMIN — SODIUM CHLORIDE, PRESERVATIVE FREE 1.6 ML: 5 INJECTION INTRAVENOUS at 15:01

## 2017-06-02 RX ADMIN — SODIUM CHLORIDE, PRESERVATIVE FREE 1.6 ML: 5 INJECTION INTRAVENOUS at 15:02

## 2017-06-02 ASSESSMENT — PAIN - FUNCTIONAL ASSESSMENT
PAIN_FUNCTIONAL_ASSESSMENT: 0-10
PAIN_FUNCTIONAL_ASSESSMENT: 0-10

## 2017-06-02 ASSESSMENT — PAIN DESCRIPTION - LOCATION: LOCATION: CHEST

## 2017-06-02 ASSESSMENT — PAIN SCALES - GENERAL
PAINLEVEL_OUTOF10: 3
PAINLEVEL_OUTOF10: 3

## 2017-06-02 ASSESSMENT — PAIN DESCRIPTION - PAIN TYPE: TYPE: ACUTE PAIN

## 2017-06-02 ASSESSMENT — PAIN DESCRIPTION - ORIENTATION: ORIENTATION: RIGHT

## 2017-06-05 ENCOUNTER — TELEPHONE (OUTPATIENT)
Dept: INTERNAL MEDICINE | Age: 72
End: 2017-06-05

## 2017-06-09 ENCOUNTER — TELEPHONE (OUTPATIENT)
Dept: INTERNAL MEDICINE | Age: 72
End: 2017-06-09

## 2017-06-10 ENCOUNTER — TELEPHONE (OUTPATIENT)
Dept: GENERAL RADIOLOGY | Age: 72
End: 2017-06-10

## 2017-06-10 DIAGNOSIS — R10.11 RUQ PAIN: Primary | ICD-10-CM

## 2017-06-21 ENCOUNTER — TELEPHONE (OUTPATIENT)
Dept: INTERNAL MEDICINE | Age: 72
End: 2017-06-21

## 2017-06-25 PROCEDURE — 99309 SBSQ NF CARE MODERATE MDM 30: CPT | Performed by: INTERNAL MEDICINE

## 2017-06-26 ENCOUNTER — TELEPHONE (OUTPATIENT)
Dept: INTERNAL MEDICINE | Age: 72
End: 2017-06-26

## 2017-06-30 ENCOUNTER — HOSPITAL ENCOUNTER (OUTPATIENT)
Dept: DIALYSIS | Age: 72
Setting detail: DIALYSIS SERIES
Discharge: HOME | End: 2017-06-30
Payer: MEDICARE

## 2017-06-30 ENCOUNTER — HOSPITAL ENCOUNTER (OUTPATIENT)
Dept: INTERVENTIONAL RADIOLOGY/VASCULAR | Age: 72
Discharge: HOME | End: 2017-06-30
Payer: MEDICARE

## 2017-06-30 VITALS
BODY MASS INDEX: 42.44 KG/M2 | TEMPERATURE: 98.9 F | HEART RATE: 99 BPM | WEIGHT: 280 LBS | DIASTOLIC BLOOD PRESSURE: 70 MMHG | HEIGHT: 68 IN | SYSTOLIC BLOOD PRESSURE: 128 MMHG | RESPIRATION RATE: 18 BRPM | OXYGEN SATURATION: 100 %

## 2017-06-30 DIAGNOSIS — N18.6 END STAGE RENAL DISEASE (HCC): ICD-10-CM

## 2017-06-30 LAB
INR BLD: 1
PARTIAL THROMBOPLASTIN TIME: 20.1 SEC (ref 21.3–31.3)
PLATELET # BLD: 223 K/UL (ref 140–450)
POTASSIUM SERPL-SCNC: 6.3 MMOL/L (ref 3.7–5.3)
PROTHROMBIN TIME: 10.6 SEC (ref 9.4–12.6)

## 2017-06-30 PROCEDURE — 90999 UNLISTED DIALYSIS PROCEDURE: CPT

## 2017-06-30 PROCEDURE — 90937 HEMODIALYSIS REPEATED EVAL: CPT

## 2017-06-30 PROCEDURE — 84132 ASSAY OF SERUM POTASSIUM: CPT

## 2017-06-30 PROCEDURE — 2580000003 HC RX 258: Performed by: RADIOLOGY

## 2017-06-30 PROCEDURE — 85610 PROTHROMBIN TIME: CPT

## 2017-06-30 PROCEDURE — 85730 THROMBOPLASTIN TIME PARTIAL: CPT

## 2017-06-30 PROCEDURE — 85049 AUTOMATED PLATELET COUNT: CPT

## 2017-06-30 RX ORDER — 0.9 % SODIUM CHLORIDE 0.9 %
150 INTRAVENOUS SOLUTION INTRAVENOUS PRN
Status: DISCONTINUED | OUTPATIENT
Start: 2017-06-30 | End: 2017-07-01 | Stop reason: HOSPADM

## 2017-06-30 RX ORDER — 0.9 % SODIUM CHLORIDE 0.9 %
250 INTRAVENOUS SOLUTION INTRAVENOUS PRN
Status: DISCONTINUED | OUTPATIENT
Start: 2017-06-30 | End: 2017-07-01 | Stop reason: HOSPADM

## 2017-06-30 RX ORDER — SODIUM CHLORIDE 9 MG/ML
INJECTION, SOLUTION INTRAVENOUS CONTINUOUS
Status: DISCONTINUED | OUTPATIENT
Start: 2017-06-30 | End: 2017-07-03 | Stop reason: HOSPADM

## 2017-06-30 RX ADMIN — SODIUM CHLORIDE: 9 INJECTION, SOLUTION INTRAVENOUS at 10:01

## 2017-06-30 ASSESSMENT — PAIN - FUNCTIONAL ASSESSMENT: PAIN_FUNCTIONAL_ASSESSMENT: 0-10

## 2017-07-07 ENCOUNTER — HOSPITAL ENCOUNTER (OUTPATIENT)
Dept: INTERVENTIONAL RADIOLOGY/VASCULAR | Age: 72
Discharge: HOME | End: 2017-07-07
Payer: MEDICARE

## 2017-07-07 VITALS
OXYGEN SATURATION: 100 % | DIASTOLIC BLOOD PRESSURE: 49 MMHG | HEIGHT: 67 IN | HEART RATE: 81 BPM | SYSTOLIC BLOOD PRESSURE: 134 MMHG | WEIGHT: 284.5 LBS | RESPIRATION RATE: 20 BRPM | TEMPERATURE: 97.7 F | BODY MASS INDEX: 44.65 KG/M2

## 2017-07-07 DIAGNOSIS — N18.6 END STAGE RENAL DISEASE (HCC): ICD-10-CM

## 2017-07-07 LAB
INR BLD: 1
PARTIAL THROMBOPLASTIN TIME: 20.8 SEC (ref 21.3–31.3)
PLATELET # BLD: 210 K/UL (ref 140–450)
POTASSIUM SERPL-SCNC: 5 MMOL/L (ref 3.7–5.3)
PROTHROMBIN TIME: 10.6 SEC (ref 9.4–12.6)

## 2017-07-07 PROCEDURE — 36901 INTRO CATH DIALYSIS CIRCUIT: CPT | Performed by: RADIOLOGY

## 2017-07-07 PROCEDURE — C1894 INTRO/SHEATH, NON-LASER: HCPCS

## 2017-07-07 PROCEDURE — 7100000010 HC PHASE II RECOVERY - FIRST 15 MIN

## 2017-07-07 PROCEDURE — 85610 PROTHROMBIN TIME: CPT

## 2017-07-07 PROCEDURE — 6360000004 HC RX CONTRAST MEDICATION: Performed by: RADIOLOGY

## 2017-07-07 PROCEDURE — 76937 US GUIDE VASCULAR ACCESS: CPT

## 2017-07-07 PROCEDURE — 84132 ASSAY OF SERUM POTASSIUM: CPT

## 2017-07-07 PROCEDURE — 85730 THROMBOPLASTIN TIME PARTIAL: CPT

## 2017-07-07 PROCEDURE — 85049 AUTOMATED PLATELET COUNT: CPT

## 2017-07-07 RX ORDER — SODIUM CHLORIDE 9 MG/ML
INJECTION, SOLUTION INTRAVENOUS CONTINUOUS
Status: DISCONTINUED | OUTPATIENT
Start: 2017-07-07 | End: 2017-07-10 | Stop reason: HOSPADM

## 2017-07-07 RX ADMIN — IOVERSOL 44 ML: 509 INJECTION INTRA-ARTERIAL; INTRAVENOUS at 13:11

## 2017-07-07 ASSESSMENT — PAIN SCALES - GENERAL
PAINLEVEL_OUTOF10: 0
PAINLEVEL_OUTOF10: 0

## 2017-07-07 ASSESSMENT — PAIN - FUNCTIONAL ASSESSMENT: PAIN_FUNCTIONAL_ASSESSMENT: 0-10

## 2017-07-13 ENCOUNTER — OFFICE VISIT (OUTPATIENT)
Dept: CARDIOLOGY | Age: 72
End: 2017-07-13
Payer: MEDICARE

## 2017-07-13 ENCOUNTER — OUTSIDE SERVICES (OUTPATIENT)
Dept: INTERNAL MEDICINE | Age: 72
End: 2017-07-13
Payer: MEDICARE

## 2017-07-13 VITALS
SYSTOLIC BLOOD PRESSURE: 124 MMHG | DIASTOLIC BLOOD PRESSURE: 78 MMHG | WEIGHT: 292.6 LBS | HEIGHT: 67 IN | BODY MASS INDEX: 45.92 KG/M2 | HEART RATE: 86 BPM

## 2017-07-13 DIAGNOSIS — Z79.4 LONG-TERM INSULIN USE (HCC): ICD-10-CM

## 2017-07-13 DIAGNOSIS — F32.4 MAJOR DEPRESSIVE DISORDER WITH SINGLE EPISODE, IN PARTIAL REMISSION (HCC): ICD-10-CM

## 2017-07-13 DIAGNOSIS — E78.2 MIXED HYPERLIPIDEMIA: ICD-10-CM

## 2017-07-13 DIAGNOSIS — I10 ESSENTIAL HYPERTENSION: ICD-10-CM

## 2017-07-13 DIAGNOSIS — G47.33 OBSTRUCTIVE SLEEP APNEA SYNDROME: ICD-10-CM

## 2017-07-13 DIAGNOSIS — I35.0 SEVERE AORTIC STENOSIS: ICD-10-CM

## 2017-07-13 DIAGNOSIS — N18.9 ANEMIA IN CHRONIC KIDNEY DISEASE (CKD): ICD-10-CM

## 2017-07-13 DIAGNOSIS — I10 ESSENTIAL HYPERTENSION: Primary | ICD-10-CM

## 2017-07-13 DIAGNOSIS — D63.1 ANEMIA IN CHRONIC KIDNEY DISEASE (CKD): ICD-10-CM

## 2017-07-13 DIAGNOSIS — Z99.2 DIALYSIS PATIENT (HCC): ICD-10-CM

## 2017-07-13 DIAGNOSIS — N18.6 ESRD (END STAGE RENAL DISEASE) ON DIALYSIS (HCC): ICD-10-CM

## 2017-07-13 DIAGNOSIS — I05.0 SEVERE MITRAL VALVE STENOSIS: ICD-10-CM

## 2017-07-13 DIAGNOSIS — E66.01 MORBID OBESITY, UNSPECIFIED OBESITY TYPE (HCC): ICD-10-CM

## 2017-07-13 DIAGNOSIS — Z99.2 ESRD (END STAGE RENAL DISEASE) ON DIALYSIS (HCC): ICD-10-CM

## 2017-07-13 PROCEDURE — 99213 OFFICE O/P EST LOW 20 MIN: CPT | Performed by: INTERNAL MEDICINE

## 2017-07-13 PROCEDURE — 4040F PNEUMOC VAC/ADMIN/RCVD: CPT | Performed by: INTERNAL MEDICINE

## 2017-07-13 PROCEDURE — 3017F COLORECTAL CA SCREEN DOC REV: CPT | Performed by: INTERNAL MEDICINE

## 2017-07-13 PROCEDURE — G8428 CUR MEDS NOT DOCUMENT: HCPCS | Performed by: INTERNAL MEDICINE

## 2017-07-13 PROCEDURE — G8417 CALC BMI ABV UP PARAM F/U: HCPCS | Performed by: INTERNAL MEDICINE

## 2017-07-13 PROCEDURE — G8598 ASA/ANTIPLAT THER USED: HCPCS | Performed by: INTERNAL MEDICINE

## 2017-07-13 PROCEDURE — 93000 ELECTROCARDIOGRAM COMPLETE: CPT | Performed by: INTERNAL MEDICINE

## 2017-07-13 PROCEDURE — 1036F TOBACCO NON-USER: CPT | Performed by: INTERNAL MEDICINE

## 2017-07-13 PROCEDURE — 1123F ACP DISCUSS/DSCN MKR DOCD: CPT | Performed by: INTERNAL MEDICINE

## 2017-07-24 PROCEDURE — 99309 SBSQ NF CARE MODERATE MDM 30: CPT | Performed by: INTERNAL MEDICINE

## 2017-08-15 ENCOUNTER — OUTSIDE SERVICES (OUTPATIENT)
Dept: INTERNAL MEDICINE | Age: 72
End: 2017-08-15
Payer: MEDICARE

## 2017-08-15 DIAGNOSIS — Z99.2 DIALYSIS PATIENT (HCC): ICD-10-CM

## 2017-08-15 DIAGNOSIS — I05.0 SEVERE MITRAL VALVE STENOSIS: ICD-10-CM

## 2017-08-15 DIAGNOSIS — I10 ESSENTIAL HYPERTENSION: ICD-10-CM

## 2017-08-15 DIAGNOSIS — F32.4 MAJOR DEPRESSIVE DISORDER WITH SINGLE EPISODE, IN PARTIAL REMISSION (HCC): ICD-10-CM

## 2017-08-15 DIAGNOSIS — N18.9 ANEMIA IN CHRONIC KIDNEY DISEASE (CKD): ICD-10-CM

## 2017-08-15 DIAGNOSIS — Z99.2 ESRD (END STAGE RENAL DISEASE) ON DIALYSIS (HCC): ICD-10-CM

## 2017-08-15 DIAGNOSIS — E66.01 MORBID OBESITY, UNSPECIFIED OBESITY TYPE (HCC): ICD-10-CM

## 2017-08-15 DIAGNOSIS — G47.33 OBSTRUCTIVE SLEEP APNEA SYNDROME: ICD-10-CM

## 2017-08-15 DIAGNOSIS — D63.1 ANEMIA IN CHRONIC KIDNEY DISEASE (CKD): ICD-10-CM

## 2017-08-15 DIAGNOSIS — Z79.4 LONG-TERM INSULIN USE (HCC): ICD-10-CM

## 2017-08-15 DIAGNOSIS — I35.0 SEVERE AORTIC STENOSIS: ICD-10-CM

## 2017-08-15 DIAGNOSIS — N18.6 ESRD (END STAGE RENAL DISEASE) ON DIALYSIS (HCC): ICD-10-CM

## 2017-08-15 DIAGNOSIS — E78.2 MIXED HYPERLIPIDEMIA: ICD-10-CM

## 2017-08-20 PROCEDURE — 99309 SBSQ NF CARE MODERATE MDM 30: CPT | Performed by: INTERNAL MEDICINE

## 2017-08-28 RX ORDER — VENLAFAXINE 75 MG/1
75 TABLET ORAL DAILY
COMMUNITY
End: 2018-03-15 | Stop reason: ALTCHOICE

## 2017-08-28 RX ORDER — GUAIFENESIN 100 MG/5ML
200 SYRUP ORAL 4 TIMES DAILY PRN
Status: ON HOLD | COMMUNITY
End: 2019-04-04 | Stop reason: HOSPADM

## 2017-08-28 RX ORDER — LIDOCAINE 50 MG/G
OINTMENT TOPICAL
COMMUNITY
End: 2020-06-16

## 2017-08-29 ENCOUNTER — ANESTHESIA EVENT (OUTPATIENT)
Dept: OPERATING ROOM | Age: 72
End: 2017-08-29
Payer: MEDICARE

## 2017-08-29 ENCOUNTER — HOSPITAL ENCOUNTER (OUTPATIENT)
Age: 72
Setting detail: OUTPATIENT SURGERY
Discharge: NURSING, REHAB OR LONG-TERM CARE | End: 2017-08-29
Attending: SURGERY | Admitting: SURGERY
Payer: MEDICARE

## 2017-08-29 ENCOUNTER — ANESTHESIA (OUTPATIENT)
Dept: OPERATING ROOM | Age: 72
End: 2017-08-29
Payer: MEDICARE

## 2017-08-29 ENCOUNTER — TELEPHONE (OUTPATIENT)
Dept: INTERNAL MEDICINE | Age: 72
End: 2017-08-29

## 2017-08-29 VITALS
WEIGHT: 286 LBS | HEIGHT: 68 IN | HEART RATE: 94 BPM | BODY MASS INDEX: 43.35 KG/M2 | SYSTOLIC BLOOD PRESSURE: 134 MMHG | RESPIRATION RATE: 18 BRPM | OXYGEN SATURATION: 98 % | DIASTOLIC BLOOD PRESSURE: 70 MMHG | TEMPERATURE: 97.7 F

## 2017-08-29 VITALS — DIASTOLIC BLOOD PRESSURE: 64 MMHG | SYSTOLIC BLOOD PRESSURE: 112 MMHG | OXYGEN SATURATION: 95 %

## 2017-08-29 DIAGNOSIS — I48.0 PAROXYSMAL ATRIAL FIBRILLATION (HCC): Primary | ICD-10-CM

## 2017-08-29 LAB
EKG ATRIAL RATE: 326 BPM
EKG Q-T INTERVAL: 400 MS
EKG QRS DURATION: 84 MS
EKG QTC CALCULATION (BAZETT): 492 MS
EKG R AXIS: -24 DEGREES
EKG T AXIS: 55 DEGREES
EKG VENTRICULAR RATE: 91 BPM
GLUCOSE BLD-MCNC: 181 MG/DL (ref 75–110)
GLUCOSE BLD-MCNC: 219 MG/DL (ref 74–100)
POC POTASSIUM: 4.3 MMOL/L (ref 3.5–4.5)

## 2017-08-29 PROCEDURE — 2580000003 HC RX 258: Performed by: ANESTHESIOLOGY

## 2017-08-29 PROCEDURE — 3600000015 HC SURGERY LEVEL 5 ADDTL 15MIN: Performed by: SURGERY

## 2017-08-29 PROCEDURE — 82947 ASSAY GLUCOSE BLOOD QUANT: CPT

## 2017-08-29 PROCEDURE — 2580000003 HC RX 258: Performed by: NURSE ANESTHETIST, CERTIFIED REGISTERED

## 2017-08-29 PROCEDURE — 6360000002 HC RX W HCPCS: Performed by: NURSE ANESTHETIST, CERTIFIED REGISTERED

## 2017-08-29 PROCEDURE — 3700000001 HC ADD 15 MINUTES (ANESTHESIA): Performed by: SURGERY

## 2017-08-29 PROCEDURE — 3600000005 HC SURGERY LEVEL 5 BASE: Performed by: SURGERY

## 2017-08-29 PROCEDURE — 7100000011 HC PHASE II RECOVERY - ADDTL 15 MIN: Performed by: SURGERY

## 2017-08-29 PROCEDURE — 2500000003 HC RX 250 WO HCPCS: Performed by: SURGERY

## 2017-08-29 PROCEDURE — 84132 ASSAY OF SERUM POTASSIUM: CPT

## 2017-08-29 PROCEDURE — 7100000010 HC PHASE II RECOVERY - FIRST 15 MIN: Performed by: SURGERY

## 2017-08-29 PROCEDURE — 3700000000 HC ANESTHESIA ATTENDED CARE: Performed by: SURGERY

## 2017-08-29 PROCEDURE — 2720000010 HC SURG SUPPLY STERILE: Performed by: SURGERY

## 2017-08-29 PROCEDURE — 93005 ELECTROCARDIOGRAM TRACING: CPT

## 2017-08-29 PROCEDURE — 2500000003 HC RX 250 WO HCPCS: Performed by: NURSE ANESTHETIST, CERTIFIED REGISTERED

## 2017-08-29 RX ORDER — LIDOCAINE HYDROCHLORIDE 10 MG/ML
INJECTION, SOLUTION EPIDURAL; INFILTRATION; INTRACAUDAL; PERINEURAL PRN
Status: DISCONTINUED | OUTPATIENT
Start: 2017-08-29 | End: 2017-08-29 | Stop reason: HOSPADM

## 2017-08-29 RX ORDER — LIDOCAINE HYDROCHLORIDE 10 MG/ML
INJECTION, SOLUTION EPIDURAL; INFILTRATION; INTRACAUDAL; PERINEURAL PRN
Status: DISCONTINUED | OUTPATIENT
Start: 2017-08-29 | End: 2017-08-29 | Stop reason: SDUPTHER

## 2017-08-29 RX ORDER — FENTANYL CITRATE 50 UG/ML
INJECTION, SOLUTION INTRAMUSCULAR; INTRAVENOUS PRN
Status: DISCONTINUED | OUTPATIENT
Start: 2017-08-29 | End: 2017-08-29 | Stop reason: SDUPTHER

## 2017-08-29 RX ORDER — SODIUM CHLORIDE 9 MG/ML
INJECTION, SOLUTION INTRAVENOUS CONTINUOUS PRN
Status: DISCONTINUED | OUTPATIENT
Start: 2017-08-29 | End: 2017-08-29 | Stop reason: SDUPTHER

## 2017-08-29 RX ORDER — MIDAZOLAM HYDROCHLORIDE 1 MG/ML
INJECTION INTRAMUSCULAR; INTRAVENOUS PRN
Status: DISCONTINUED | OUTPATIENT
Start: 2017-08-29 | End: 2017-08-29 | Stop reason: SDUPTHER

## 2017-08-29 RX ORDER — WARFARIN SODIUM 5 MG/1
5 TABLET ORAL DAILY
Qty: 30 TABLET | Refills: 3 | OUTPATIENT
Start: 2017-08-29 | End: 2018-07-05 | Stop reason: DRUGHIGH

## 2017-08-29 RX ORDER — WARFARIN SODIUM 5 MG/1
5 TABLET ORAL DAILY
Qty: 30 TABLET | Refills: 3 | OUTPATIENT
Start: 2017-08-29 | End: 2017-08-29

## 2017-08-29 RX ORDER — SODIUM CHLORIDE 9 MG/ML
INJECTION, SOLUTION INTRAVENOUS CONTINUOUS
Status: DISCONTINUED | OUTPATIENT
Start: 2017-08-29 | End: 2017-08-29 | Stop reason: HOSPADM

## 2017-08-29 RX ORDER — PROPOFOL 10 MG/ML
INJECTION, EMULSION INTRAVENOUS CONTINUOUS PRN
Status: DISCONTINUED | OUTPATIENT
Start: 2017-08-29 | End: 2017-08-29 | Stop reason: SDUPTHER

## 2017-08-29 RX ADMIN — PROPOFOL 50 MCG/KG/MIN: 10 INJECTION, EMULSION INTRAVENOUS at 09:02

## 2017-08-29 RX ADMIN — MIDAZOLAM HYDROCHLORIDE 1 MG: 1 INJECTION, SOLUTION INTRAMUSCULAR; INTRAVENOUS at 08:57

## 2017-08-29 RX ADMIN — LIDOCAINE HYDROCHLORIDE 30 MG: 10 INJECTION, SOLUTION EPIDURAL; INFILTRATION; INTRACAUDAL; PERINEURAL at 09:02

## 2017-08-29 RX ADMIN — SODIUM CHLORIDE: 900 INJECTION, SOLUTION INTRAVENOUS at 08:50

## 2017-08-29 RX ADMIN — SODIUM CHLORIDE: 9 INJECTION, SOLUTION INTRAVENOUS at 07:35

## 2017-08-29 RX ADMIN — Medication 900 MG: at 09:03

## 2017-08-29 RX ADMIN — MIDAZOLAM HYDROCHLORIDE 1 MG: 1 INJECTION, SOLUTION INTRAMUSCULAR; INTRAVENOUS at 09:02

## 2017-08-29 RX ADMIN — FENTANYL CITRATE 50 MCG: 50 INJECTION INTRAMUSCULAR; INTRAVENOUS at 08:58

## 2017-08-29 ASSESSMENT — PAIN SCALES - GENERAL
PAINLEVEL_OUTOF10: 0

## 2017-08-29 ASSESSMENT — PAIN - FUNCTIONAL ASSESSMENT: PAIN_FUNCTIONAL_ASSESSMENT: 0-10

## 2017-08-30 ENCOUNTER — HOSPITAL ENCOUNTER (OUTPATIENT)
Age: 72
Setting detail: SPECIMEN
Discharge: HOME | End: 2017-08-30
Payer: MEDICARE

## 2017-08-30 LAB
DATE, STOOL #1: NORMAL
DATE, STOOL #2: NORMAL
DATE, STOOL #3: NORMAL
HEMOCCULT SP1 STL QL: NEGATIVE
HEMOCCULT SP2 STL QL: NORMAL
HEMOCCULT SP3 STL QL: NORMAL
TIME, STOOL #1: 150
TIME, STOOL #2: NORMAL
TIME, STOOL #3: NORMAL

## 2017-08-30 PROCEDURE — G0328 FECAL BLOOD SCRN IMMUNOASSAY: HCPCS

## 2017-08-31 ENCOUNTER — HOSPITAL ENCOUNTER (OUTPATIENT)
Age: 72
Discharge: HOME | End: 2017-08-31
Payer: MEDICARE

## 2017-08-31 ENCOUNTER — ANTI-COAG VISIT (OUTPATIENT)
Dept: INTERNAL MEDICINE | Age: 72
End: 2017-08-31

## 2017-08-31 ENCOUNTER — TELEPHONE (OUTPATIENT)
Dept: INTERNAL MEDICINE | Age: 72
End: 2017-08-31

## 2017-08-31 DIAGNOSIS — I48.91 ATRIAL FIBRILLATION, UNSPECIFIED TYPE (HCC): ICD-10-CM

## 2017-08-31 DIAGNOSIS — I48.91 ATRIAL FIBRILLATION, UNSPECIFIED TYPE (HCC): Primary | ICD-10-CM

## 2017-08-31 LAB
INR BLD: 1.4
PROTHROMBIN TIME: 14.4 SEC (ref 9.4–11.3)

## 2017-08-31 PROCEDURE — 99308 SBSQ NF CARE LOW MDM 20: CPT | Performed by: NURSE PRACTITIONER

## 2017-08-31 PROCEDURE — 85610 PROTHROMBIN TIME: CPT

## 2017-09-02 ENCOUNTER — HOSPITAL ENCOUNTER (OUTPATIENT)
Age: 72
Setting detail: SPECIMEN
Discharge: HOME | End: 2017-09-02
Payer: MEDICARE

## 2017-09-02 DIAGNOSIS — I48.91 ATRIAL FIBRILLATION, UNSPECIFIED TYPE (HCC): ICD-10-CM

## 2017-09-02 LAB
EKG ATRIAL RATE: 68 BPM
EKG P AXIS: 31 DEGREES
EKG P-R INTERVAL: 118 MS
EKG Q-T INTERVAL: 400 MS
EKG QRS DURATION: 88 MS
EKG QTC CALCULATION (BAZETT): 425 MS
EKG R AXIS: 58 DEGREES
EKG T AXIS: 56 DEGREES
EKG VENTRICULAR RATE: 68 BPM
INR BLD: 3.3
PROTHROMBIN TIME: 35.4 SEC (ref 9.4–11.3)

## 2017-09-02 PROCEDURE — 85610 PROTHROMBIN TIME: CPT

## 2017-09-02 PROCEDURE — 36415 COLL VENOUS BLD VENIPUNCTURE: CPT

## 2017-09-05 ENCOUNTER — HOSPITAL ENCOUNTER (OUTPATIENT)
Age: 72
Setting detail: SPECIMEN
Discharge: HOME | End: 2017-09-05
Payer: MEDICARE

## 2017-09-05 ENCOUNTER — ANTI-COAG VISIT (OUTPATIENT)
Dept: INTERNAL MEDICINE | Age: 72
End: 2017-09-05

## 2017-09-05 DIAGNOSIS — I48.91 ATRIAL FIBRILLATION, UNSPECIFIED TYPE (HCC): ICD-10-CM

## 2017-09-05 LAB
INR BLD: 6.9
PROTHROMBIN TIME: 74.2 SEC (ref 9.4–11.3)

## 2017-09-05 PROCEDURE — 36415 COLL VENOUS BLD VENIPUNCTURE: CPT

## 2017-09-05 PROCEDURE — 85610 PROTHROMBIN TIME: CPT

## 2017-09-06 ENCOUNTER — ANTI-COAG VISIT (OUTPATIENT)
Dept: INTERNAL MEDICINE | Age: 72
End: 2017-09-06

## 2017-09-06 ENCOUNTER — HOSPITAL ENCOUNTER (OUTPATIENT)
Age: 72
Setting detail: SPECIMEN
Discharge: HOME | End: 2017-09-06
Payer: MEDICARE

## 2017-09-06 DIAGNOSIS — I48.91 ATRIAL FIBRILLATION, UNSPECIFIED TYPE (HCC): ICD-10-CM

## 2017-09-06 LAB
INR BLD: 5.5
PROTHROMBIN TIME: 58.9 SEC (ref 9.4–11.3)

## 2017-09-06 PROCEDURE — 85610 PROTHROMBIN TIME: CPT

## 2017-09-06 PROCEDURE — 36415 COLL VENOUS BLD VENIPUNCTURE: CPT

## 2017-09-07 ENCOUNTER — TELEPHONE (OUTPATIENT)
Dept: INTERNAL MEDICINE | Age: 72
End: 2017-09-07

## 2017-09-07 ENCOUNTER — HOSPITAL ENCOUNTER (OUTPATIENT)
Age: 72
Setting detail: SPECIMEN
Discharge: HOME | End: 2017-09-07
Payer: MEDICARE

## 2017-09-07 ENCOUNTER — ANTI-COAG VISIT (OUTPATIENT)
Dept: INTERNAL MEDICINE | Age: 72
End: 2017-09-07

## 2017-09-07 LAB
INR BLD: 4.1
PROTHROMBIN TIME: 44.1 SEC (ref 9.4–11.3)

## 2017-09-07 PROCEDURE — 85610 PROTHROMBIN TIME: CPT

## 2017-09-07 PROCEDURE — 36415 COLL VENOUS BLD VENIPUNCTURE: CPT

## 2017-09-08 ENCOUNTER — HOSPITAL ENCOUNTER (OUTPATIENT)
Age: 72
Discharge: HOME | End: 2017-09-08
Payer: MEDICARE

## 2017-09-08 ENCOUNTER — HOSPITAL ENCOUNTER (OUTPATIENT)
Age: 72
Setting detail: SPECIMEN
Discharge: HOME | End: 2017-09-08
Payer: MEDICARE

## 2017-09-08 ENCOUNTER — ANTI-COAG VISIT (OUTPATIENT)
Dept: INTERNAL MEDICINE | Age: 72
End: 2017-09-08

## 2017-09-08 ENCOUNTER — OUTSIDE SERVICES (OUTPATIENT)
Dept: INTERNAL MEDICINE | Age: 72
End: 2017-09-08
Payer: MEDICARE

## 2017-09-08 DIAGNOSIS — I48.91 ATRIAL FIBRILLATION, UNSPECIFIED TYPE (HCC): ICD-10-CM

## 2017-09-08 DIAGNOSIS — R21 RASH: Primary | ICD-10-CM

## 2017-09-08 LAB
INR BLD: 3.1
PROTHROMBIN TIME: 33.4 SEC (ref 9.4–11.3)

## 2017-09-08 PROCEDURE — 85610 PROTHROMBIN TIME: CPT

## 2017-09-08 PROCEDURE — 36415 COLL VENOUS BLD VENIPUNCTURE: CPT

## 2017-09-08 ASSESSMENT — ENCOUNTER SYMPTOMS
ANAL BLEEDING: 0
BACK PAIN: 0
COUGH: 0
COLOR CHANGE: 0
WHEEZING: 0
SHORTNESS OF BREATH: 0
CHEST TIGHTNESS: 0
BLOOD IN STOOL: 0
CHOKING: 0
ABDOMINAL DISTENTION: 0

## 2017-09-11 ENCOUNTER — HOSPITAL ENCOUNTER (OUTPATIENT)
Age: 72
Setting detail: SPECIMEN
Discharge: HOME | End: 2017-09-11
Payer: MEDICARE

## 2017-09-12 ENCOUNTER — HOSPITAL ENCOUNTER (OUTPATIENT)
Age: 72
Setting detail: SPECIMEN
Discharge: HOME | End: 2017-09-12
Payer: MEDICARE

## 2017-09-12 ENCOUNTER — ANTI-COAG VISIT (OUTPATIENT)
Dept: INTERNAL MEDICINE | Age: 72
End: 2017-09-12

## 2017-09-12 DIAGNOSIS — I48.91 ATRIAL FIBRILLATION, UNSPECIFIED TYPE (HCC): ICD-10-CM

## 2017-09-12 LAB
INR BLD: 1.7
PROTHROMBIN TIME: 18.6 SEC (ref 9.4–11.3)

## 2017-09-12 PROCEDURE — 85610 PROTHROMBIN TIME: CPT

## 2017-09-12 PROCEDURE — 36415 COLL VENOUS BLD VENIPUNCTURE: CPT

## 2017-09-14 ENCOUNTER — OFFICE VISIT (OUTPATIENT)
Dept: CARDIOLOGY | Age: 72
End: 2017-09-14
Payer: MEDICARE

## 2017-09-14 VITALS
HEART RATE: 97 BPM | HEIGHT: 68 IN | BODY MASS INDEX: 43.65 KG/M2 | SYSTOLIC BLOOD PRESSURE: 130 MMHG | DIASTOLIC BLOOD PRESSURE: 70 MMHG | WEIGHT: 288 LBS

## 2017-09-14 DIAGNOSIS — I10 ESSENTIAL HYPERTENSION: Primary | ICD-10-CM

## 2017-09-14 PROCEDURE — G8417 CALC BMI ABV UP PARAM F/U: HCPCS | Performed by: INTERNAL MEDICINE

## 2017-09-14 PROCEDURE — 1123F ACP DISCUSS/DSCN MKR DOCD: CPT | Performed by: INTERNAL MEDICINE

## 2017-09-14 PROCEDURE — 93000 ELECTROCARDIOGRAM COMPLETE: CPT | Performed by: INTERNAL MEDICINE

## 2017-09-14 PROCEDURE — G8427 DOCREV CUR MEDS BY ELIG CLIN: HCPCS | Performed by: INTERNAL MEDICINE

## 2017-09-14 PROCEDURE — G8598 ASA/ANTIPLAT THER USED: HCPCS | Performed by: INTERNAL MEDICINE

## 2017-09-14 PROCEDURE — 1036F TOBACCO NON-USER: CPT | Performed by: INTERNAL MEDICINE

## 2017-09-14 PROCEDURE — 3017F COLORECTAL CA SCREEN DOC REV: CPT | Performed by: INTERNAL MEDICINE

## 2017-09-14 PROCEDURE — 4040F PNEUMOC VAC/ADMIN/RCVD: CPT | Performed by: INTERNAL MEDICINE

## 2017-09-14 PROCEDURE — 99213 OFFICE O/P EST LOW 20 MIN: CPT | Performed by: INTERNAL MEDICINE

## 2017-09-15 ENCOUNTER — HOSPITAL ENCOUNTER (OUTPATIENT)
Age: 72
Setting detail: SPECIMEN
Discharge: HOME | End: 2017-09-15
Payer: MEDICARE

## 2017-09-15 ENCOUNTER — ANTI-COAG VISIT (OUTPATIENT)
Dept: INTERNAL MEDICINE | Age: 72
End: 2017-09-15

## 2017-09-15 DIAGNOSIS — I48.91 ATRIAL FIBRILLATION, UNSPECIFIED TYPE (HCC): ICD-10-CM

## 2017-09-15 LAB
INR BLD: 2.3
PROTHROMBIN TIME: 24.2 SEC (ref 9.4–11.3)

## 2017-09-15 PROCEDURE — 85610 PROTHROMBIN TIME: CPT

## 2017-09-18 ENCOUNTER — OUTSIDE SERVICES (OUTPATIENT)
Dept: INTERNAL MEDICINE | Age: 72
End: 2017-09-18
Payer: MEDICARE

## 2017-09-18 DIAGNOSIS — E78.2 MIXED HYPERLIPIDEMIA: ICD-10-CM

## 2017-09-18 DIAGNOSIS — I10 ESSENTIAL HYPERTENSION: ICD-10-CM

## 2017-09-18 DIAGNOSIS — N18.6 ESRD (END STAGE RENAL DISEASE) ON DIALYSIS (HCC): ICD-10-CM

## 2017-09-18 DIAGNOSIS — Z79.4 LONG-TERM INSULIN USE (HCC): ICD-10-CM

## 2017-09-18 DIAGNOSIS — Z99.2 ESRD (END STAGE RENAL DISEASE) ON DIALYSIS (HCC): ICD-10-CM

## 2017-09-18 DIAGNOSIS — G47.33 OBSTRUCTIVE SLEEP APNEA SYNDROME: ICD-10-CM

## 2017-09-18 DIAGNOSIS — I05.0 SEVERE MITRAL VALVE STENOSIS: ICD-10-CM

## 2017-09-18 DIAGNOSIS — Z99.2 DIALYSIS PATIENT (HCC): ICD-10-CM

## 2017-09-18 DIAGNOSIS — F32.4 MAJOR DEPRESSIVE DISORDER WITH SINGLE EPISODE, IN PARTIAL REMISSION (HCC): ICD-10-CM

## 2017-09-18 DIAGNOSIS — E66.01 MORBID OBESITY, UNSPECIFIED OBESITY TYPE (HCC): ICD-10-CM

## 2017-09-18 DIAGNOSIS — I35.0 SEVERE AORTIC STENOSIS: ICD-10-CM

## 2017-09-21 ENCOUNTER — TELEPHONE (OUTPATIENT)
Dept: INTERNAL MEDICINE | Age: 72
End: 2017-09-21

## 2017-09-24 PROCEDURE — 99309 SBSQ NF CARE MODERATE MDM 30: CPT | Performed by: INTERNAL MEDICINE

## 2017-09-26 ENCOUNTER — HOSPITAL ENCOUNTER (OUTPATIENT)
Age: 72
Setting detail: SPECIMEN
Discharge: HOME | End: 2017-09-26
Payer: MEDICARE

## 2017-09-26 ENCOUNTER — ANTI-COAG VISIT (OUTPATIENT)
Dept: INTERNAL MEDICINE | Age: 72
End: 2017-09-26

## 2017-09-26 DIAGNOSIS — I48.20 CHRONIC ATRIAL FIBRILLATION (HCC): ICD-10-CM

## 2017-09-26 DIAGNOSIS — I48.91 ATRIAL FIBRILLATION, UNSPECIFIED TYPE (HCC): ICD-10-CM

## 2017-09-26 LAB
INR BLD: 1.7
PROTHROMBIN TIME: 18.6 SEC (ref 9.4–11.3)

## 2017-09-26 PROCEDURE — 85610 PROTHROMBIN TIME: CPT

## 2017-09-26 PROCEDURE — 36415 COLL VENOUS BLD VENIPUNCTURE: CPT

## 2017-10-03 ENCOUNTER — TELEPHONE (OUTPATIENT)
Dept: INTERNAL MEDICINE | Age: 72
End: 2017-10-03

## 2017-10-05 ENCOUNTER — HOSPITAL ENCOUNTER (OUTPATIENT)
Age: 72
Setting detail: SPECIMEN
Discharge: HOME | End: 2017-10-05
Payer: MEDICARE

## 2017-10-05 ENCOUNTER — ANTI-COAG VISIT (OUTPATIENT)
Dept: INTERNAL MEDICINE | Age: 72
End: 2017-10-05

## 2017-10-05 DIAGNOSIS — I48.91 ATRIAL FIBRILLATION, UNSPECIFIED TYPE (HCC): ICD-10-CM

## 2017-10-05 DIAGNOSIS — I48.20 CHRONIC ATRIAL FIBRILLATION (HCC): ICD-10-CM

## 2017-10-05 LAB
INR BLD: 1.6
PROTHROMBIN TIME: 17.5 SEC (ref 9.4–11.3)

## 2017-10-05 PROCEDURE — 85610 PROTHROMBIN TIME: CPT

## 2017-10-05 PROCEDURE — 36415 COLL VENOUS BLD VENIPUNCTURE: CPT

## 2017-10-05 RX ORDER — ECHINACEA PURPUREA EXTRACT 125 MG
TABLET ORAL
Status: ON HOLD | COMMUNITY
Start: 2016-10-24 | End: 2019-04-04 | Stop reason: HOSPADM

## 2017-10-05 NOTE — MR AVS SNAPSHOT
Your BMI as listed above is considered obese (30 or more). BMI is an estimate of body fat, calculated from your height and weight. The higher your BMI, the greater your risk of heart disease, high blood pressure, type 2 diabetes, stroke, gallstones, arthritis, sleep apnea, and certain cancers. BMI is not perfect. It may overestimate body fat in athletes and people who are more muscular. Even a small weight loss (between 5 and 10 percent of your current weight) by decreasing your calorie intake and becoming more physically active will help lower your risk of developing or worsening diseases associated with obesity. Learn more at: [employer]             Medications and Orders      Your Current Medications Are              glucagon 1 MG injection Inject as directed. sodium chloride (OCEAN) 0.65 % nasal spray by Nasal route    warfarin (COUMADIN) 5 MG tablet Take 1 tablet by mouth daily    lidocaine (XYLOCAINE) 5 % ointment Apply topically three times a week Apply topically as needed. DIALYSIS DAYS    venlafaxine (EFFEXOR) 75 MG tablet Take 75 mg by mouth daily    Amino Acids-Protein Hydrolys (PRO-STAT PO) Take 30 mLs by mouth 2 times daily SUGAR FREE    guaiFENesin (ROBITUSSIN) 100 MG/5ML syrup Take 200 mg by mouth 4 times daily as needed for Cough    ipratropium-albuterol (DUONEB) 0.5-2.5 (3) MG/3ML SOLN nebulizer solution Inhale 1 vial into the lungs every 6 hours as needed     insulin aspart (NOVOLOG) 100 UNIT/ML injection vial Inject into the skin 3 times daily (before meals) Indications: on scale Sliding scale    Nystatin POWD by Does not apply route TID x 10 days (starting 2/13/17) then use PRN    HYDROcodone-acetaminophen (NORCO) 5-325 MG per tablet Take 1 tablet by mouth every 8 hours as needed for Pain .     aspirin 81 MG tablet Take 81 mg by mouth daily    Nutritional Supplements (NEPRO PO) Take 250 mLs by mouth 2 times daily Date Of Birth Sex Race Ethnicity Preferred Language Preferred Written Language    1945 Male White Non-/Non  English English      Problem List as of 10/5/2017  Date Reviewed: 3/27/2017                Anemia in chronic kidney disease (CKD)    Rash    A-fib (HCC)    Uncontrolled type 2 diabetes mellitus with chronic kidney disease on chronic dialysis, with long-term current use of insulin ([address]    Uncontrolled type 2 diabetes mellitus with diabetic polyneuropathy, with long-term current use of insulin (HCC)    Nausea    Right upper quadrant pain    Upper respiratory tract infection    ESRD (end stage renal disease) on dialysis ([address]    Benign prostatic hypertrophy    Long-term insulin use ([address]    Difficulty walking    Dialysis patient ([address]    Dementia    Major depressive disorder with single episode, in partial remission ([address]    Chronic cerebral ischemia    Memory loss    Diffuse cerebral atrophy    Severe aortic stenosis    Severe mitral valve stenosis    Essential hypertension    Mixed hyperlipidemia    Obstructive sleep apnea    Morbid obesity ([address]    Benign prostatic hyperplasia without lower urinary tract symptoms    Stasis dermatitis    Traumatic amputation of thumb      Immunizations as of 10/5/2017     Name Date    Influenza Virus Vaccine 11/18/2013, 10/24/2006    Influenza Whole 10/12/2015    Pneumococcal Polysaccharide ([id number]) 10/12/2015, 11/30/2010    Tdap (Boostrix, Adacel) 10/27/2015      Preventive Care        Date Due    Eye Exam By An Eye Doctor 11/3/2015    Cholesterol Screening 4/27/2016    Diabetic Foot Exam 5/4/2016    Pneumococcal Vaccines (two) for age 72 years & over with: cerebrospinal fluid leaks, cochlear implants, hemoglobinopathies,  asplenia, immunodeficiencies, HIV infection, or chronic renal failure (2 of 2 - PCV13) 10/12/2016    Yearly Flu Vaccine (1) 9/1/2017    Hemoglobin A1C (Test For Long-Term Glucose Control) 4/26/2018 Colon Cancer Stool Test 8/30/2018    Tetanus Combination Vaccine (2 - Td) 10/27/2025            MyChart Signup           Our records indicate that you have declined MyChart signup. October 2017 Details    Sun Mon Tue Wed Thu Fri Sat     1               2               3               4               5      2 mg   See details      6      3 mg         7      2 mg           8      2 mg         9      3 mg         10      2 mg         11      2 mg         12      2 mg         13      3 mg         14      2 mg           15      2 mg         16      3 mg         17               18               19               20               21                 22               23               24               25               26               27               28                 29               30               31                    Date Details   10/05 This INR check       Date of next INR:  10/16/2017         How to take your warfarin dose              To take:  2 mg Take 1 of the 2 mg tablets. To take:  3 mg Take 1 of the 3 mg tablets.

## 2017-10-05 NOTE — PROGRESS NOTES
Notified Altagracia Fontenot at [id number] [facility] with patients INR results and instructions

## 2017-10-09 ENCOUNTER — HOSPITAL ENCOUNTER (OUTPATIENT)
Age: 72
Setting detail: SPECIMEN
Discharge: HOME | End: 2017-10-09
Payer: MEDICARE

## 2017-10-10 ENCOUNTER — HOSPITAL ENCOUNTER (OUTPATIENT)
Age: 72
Setting detail: SPECIMEN
Discharge: HOME | End: 2017-10-10
Payer: MEDICARE

## 2017-10-10 LAB
T4 TOTAL: 3.5 UG/DL (ref 4.5–12)
TSH SERPL DL<=0.05 MIU/L-ACNC: 1.51 MIU/L (ref 0.3–5)

## 2017-10-10 PROCEDURE — 36415 COLL VENOUS BLD VENIPUNCTURE: CPT

## 2017-10-10 PROCEDURE — 84439 ASSAY OF FREE THYROXINE: CPT

## 2017-10-10 PROCEDURE — 84436 ASSAY OF TOTAL THYROXINE: CPT

## 2017-10-10 PROCEDURE — 84443 ASSAY THYROID STIM HORMONE: CPT

## 2017-10-12 LAB — THYROXINE, FREE: 0.62 NG/DL (ref 0.93–1.7)

## 2017-10-16 ENCOUNTER — HOSPITAL ENCOUNTER (OUTPATIENT)
Age: 72
Setting detail: SPECIMEN
Discharge: HOME | End: 2017-10-16
Payer: MEDICARE

## 2017-10-16 ENCOUNTER — ANTI-COAG VISIT (OUTPATIENT)
Dept: INTERNAL MEDICINE | Age: 72
End: 2017-10-16

## 2017-10-16 DIAGNOSIS — I48.20 CHRONIC ATRIAL FIBRILLATION (HCC): ICD-10-CM

## 2017-10-16 DIAGNOSIS — I48.91 ATRIAL FIBRILLATION, UNSPECIFIED TYPE (HCC): ICD-10-CM

## 2017-10-16 LAB
INR BLD: 1.4
PROTHROMBIN TIME: 15.2 SEC (ref 9.4–11.3)

## 2017-10-16 PROCEDURE — 85610 PROTHROMBIN TIME: CPT

## 2017-10-17 ENCOUNTER — ANESTHESIA (OUTPATIENT)
Dept: ENDOSCOPY | Age: 72
End: 2017-10-17
Payer: MEDICARE

## 2017-10-17 ENCOUNTER — ANESTHESIA EVENT (OUTPATIENT)
Dept: ENDOSCOPY | Age: 72
End: 2017-10-17
Payer: MEDICARE

## 2017-10-17 ENCOUNTER — HOSPITAL ENCOUNTER (OUTPATIENT)
Age: 72
Setting detail: OUTPATIENT SURGERY
Discharge: OTHER INSTITUTION | End: 2017-10-17
Attending: INTERNAL MEDICINE | Admitting: INTERNAL MEDICINE
Payer: MEDICARE

## 2017-10-17 VITALS
OXYGEN SATURATION: 97 % | TEMPERATURE: 98.5 F | SYSTOLIC BLOOD PRESSURE: 160 MMHG | RESPIRATION RATE: 11 BRPM | DIASTOLIC BLOOD PRESSURE: 90 MMHG | HEART RATE: 93 BPM

## 2017-10-17 VITALS
SYSTOLIC BLOOD PRESSURE: 126 MMHG | OXYGEN SATURATION: 100 % | RESPIRATION RATE: 23 BRPM | DIASTOLIC BLOOD PRESSURE: 110 MMHG

## 2017-10-17 LAB
GLUCOSE BLD-MCNC: 222 MG/DL (ref 74–100)
POC POTASSIUM: 4.5 MMOL/L (ref 3.5–4.5)

## 2017-10-17 PROCEDURE — 84132 ASSAY OF SERUM POTASSIUM: CPT

## 2017-10-17 PROCEDURE — 88305 TISSUE EXAM BY PATHOLOGIST: CPT

## 2017-10-17 PROCEDURE — 2500000003 HC RX 250 WO HCPCS: Performed by: NURSE ANESTHETIST, CERTIFIED REGISTERED

## 2017-10-17 PROCEDURE — 7100000011 HC PHASE II RECOVERY - ADDTL 15 MIN: Performed by: INTERNAL MEDICINE

## 2017-10-17 PROCEDURE — 76937 US GUIDE VASCULAR ACCESS: CPT

## 2017-10-17 PROCEDURE — 3700000000 HC ANESTHESIA ATTENDED CARE: Performed by: INTERNAL MEDICINE

## 2017-10-17 PROCEDURE — 2580000003 HC RX 258: Performed by: INTERNAL MEDICINE

## 2017-10-17 PROCEDURE — 3609012400 HC EGD TRANSORAL BIOPSY SINGLE/MULTIPLE: Performed by: INTERNAL MEDICINE

## 2017-10-17 PROCEDURE — 82947 ASSAY GLUCOSE BLOOD QUANT: CPT

## 2017-10-17 PROCEDURE — 7100000010 HC PHASE II RECOVERY - FIRST 15 MIN: Performed by: INTERNAL MEDICINE

## 2017-10-17 PROCEDURE — 6360000002 HC RX W HCPCS: Performed by: NURSE ANESTHETIST, CERTIFIED REGISTERED

## 2017-10-17 RX ORDER — SODIUM CHLORIDE 9 MG/ML
INJECTION, SOLUTION INTRAVENOUS CONTINUOUS
Status: DISCONTINUED | OUTPATIENT
Start: 2017-10-17 | End: 2017-10-17 | Stop reason: HOSPADM

## 2017-10-17 RX ORDER — PROPOFOL 10 MG/ML
INJECTION, EMULSION INTRAVENOUS PRN
Status: DISCONTINUED | OUTPATIENT
Start: 2017-10-17 | End: 2017-10-17 | Stop reason: SDUPTHER

## 2017-10-17 RX ORDER — LIDOCAINE HYDROCHLORIDE 10 MG/ML
INJECTION, SOLUTION INFILTRATION; PERINEURAL PRN
Status: DISCONTINUED | OUTPATIENT
Start: 2017-10-17 | End: 2017-10-17 | Stop reason: SDUPTHER

## 2017-10-17 RX ADMIN — LIDOCAINE HYDROCHLORIDE 50 MG: 10 INJECTION, SOLUTION INFILTRATION; PERINEURAL at 09:16

## 2017-10-17 RX ADMIN — PROPOFOL 80 MG: 10 INJECTION, EMULSION INTRAVENOUS at 09:16

## 2017-10-17 RX ADMIN — SODIUM CHLORIDE: 9 INJECTION, SOLUTION INTRAVENOUS at 09:00

## 2017-10-17 ASSESSMENT — PAIN SCALES - GENERAL
PAINLEVEL_OUTOF10: 0

## 2017-10-17 NOTE — H&P
History and Physical    Pt Name: Sharri Ruiz  MRN: [id number]  YOB: 1945  Date of evaluation: 10/17/2017  Primary Care Physician: Jamil Jeong DO  Patient evaluated at the request of  Dr. Cristóbal Rizo    Reason for evaluation: chronic dry heaves   SUBJECTIVE:   History of Chief Complaint:      Rasheed Bello is a 66 y.o. male     Who is scheduled to have  His  First   EGD today  , hx of dry heaves for over one yr , denies GI bleed  And  abdominal pain , quit smoking in 1980.    has been on hemodialysis x 1 yr  , DM x 5 yrs   . He reports the dry heave episodes are unpredictable . Unsure if he has GERD. Past Medical History      has a past medical history of A-fib ([address] Anemia; Arthritis; Bacteremia; Benign prostatic hypertrophy; Chronic kidney disease; Closed fracture of forearm; Controlled type 2 diabetes mellitus with chronic kidney disease on chronic dialysis, with long-term current use of insulin ([address] Decubital ulcer; Dementia; Dialysis patient [facility]); Difficult intravenous access; Difficulty walking; DJD (degenerative joint disease) of knee; Elbow, forearm, and wrist, abrasion or friction burn, without mention of infection; Encephalopathy acute; Encounter regarding vascular access for dialysis for ESRD ([address] ESRD (end stage renal disease) on dialysis ([address] Forgetfulness; H/O transesophageal echocardiography (AMADEO) for monitoring; Hemodialysis patient ([address] Hyperlipidemia; Hypertension; Intercritical gout; Joint pain, knee; Major depressive disorder with single episode, in partial remission ([address] Mobility impaired; Muscle weakness; Obesity; HEATHER on CPAP; Respiratory failure ([address] S/P cardiac cath; Seborrhea; Severe aortic stenosis; Severe mitral valve stenosis; Severe sepsis ([address] Skin rash; Stasis dermatitis; Thyroid disease; Traumatic amputation of thumb; Wears glasses; and Wheelchair bound.     Past Surgical History   has a past surgical history that includes Colonoscopy (09); Knee arthroscopy (Left, 99); Hand surgery (Left,  (x10-12 surgeries)); Arm Surgery (Left, ); Cardiac catheterization; Nasal septum surgery; Sternotomy (3/18/16); Aortic valve replacement (3/18/16); Mitral valve replacement (3/18/16); other surgical history (3/18/16); other surgical history (3/18/16); other surgical history (Right, 2017); other surgical history (Right, 2017); and ligatn angioaccess av fistula (Right, 2017). Medications   Scheduled Meds:  Continuous Infusions:  PRN Meds:. Allergies  is allergic to percocet [oxycodone-acetaminophen] and ampicillin. Family History    family history includes Alzheimer's Disease in his father; Diabetes in his maternal grandmother and mother; High Blood Pressure in his sister; Devoria Madhav in his mother. Family Status   Relation Status    Mother    [facility] Father     Maternal Grandmother     Sister Alive    Maternal Grandfather          Social History  Social History     Social History    Marital status:      Spouse name: Rommel Tavares Number of children: 2    Years of education: N/A     Occupational History    Retired  [employer]     Social History Main Topics    Smoking status: Former Smoker     Packs/day: 2.00     Years: 25.00     Types: Cigarettes     Start date: 3/17/1955     Quit date: 1980    Smokeless tobacco: Never Used    Alcohol use No      Comment: 1-2 drinks per year    Drug use: No    Sexual activity: Not on file     Other Topics Concern    Not on file     Social History Narrative    No narrative on file                 Hobbies:  Watch TV gun smoke     OBJECTIVE:   VITALS:  vitals were not taken for this visit. CONSTITUTIONAL: Alert and oriented times 3, no acute distress and cooperative to examination. friendly and pleasant     SKIN: rash No, bruises easily     HEENT: Head is normocephalic, atraumatic.  EOMI, PERRLA    Oral air way :slightly narrow Yes    NECK: neck supple, no lymphadenopathy noted, trachea midline and straight       2+ carotid, no bruit    LUNGS: Chest expands equally bilaterally upon respiration, no accessory muscle used. Ausculation reveals no adventitious breath sounds. CARDIOVASCULAR: \"Heart sounds are normal.  Regular rate and rhythm without murmur,    ABDOMEN: Bowel sounds are present in all four quadrants  , obese    GENATALIA:Deferred. NEUROLOGIC: \"CN II-XII are grossly intact. EXTREMITIES: Pitting edema:  No,   + thrill and bruit of right arm fistula   Varicose veins: No     Dorsal pedal/posterior tibial pulses palpable: Yes         Strength:  Normal       Patient Active Problem List   Diagnosis    Traumatic amputation of thumb    Essential hypertension    Mixed hyperlipidemia    Obstructive sleep apnea    Morbid obesity ([address]    Benign prostatic hyperplasia without lower urinary tract symptoms    Stasis dermatitis    Severe aortic stenosis    Severe mitral valve stenosis    Anemia in chronic kidney disease (CKD)    Difficulty walking    Dialysis patient ([address]    Dementia    Major depressive disorder with single episode, in partial remission ([address]    Chronic cerebral ischemia    Memory loss    Diffuse cerebral atrophy    ESRD (end stage renal disease) on dialysis (HCC)    Benign prostatic hyperplasia    Long-term insulin use (HCC)    Upper respiratory tract infection    Nausea    Right upper quadrant pain    Uncontrolled type 2 diabetes mellitus with chronic kidney disease on chronic dialysis, with long-term current use of insulin (HCC)    Uncontrolled type 2 diabetes mellitus with diabetic polyneuropathy, with long-term current use of insulin (HCC)    A-fib (HCC)    Rash               IMPRESSIONS:   1.  chronic dry heaves   2.  does not have any pertinent problems on file.     [facility]  Electronically signed 10/17/2017 at 8:05 AM Scheduled for:   EGD

## 2017-10-17 NOTE — OP NOTE
[facility]  EGD    Patient: Eliecer Hays            Date:   10/17/2017  : 1945       Med Rec#: [id number]         PROCEDURE:  EGD with Biopsy    INDICATION:  Nausea and retching. FINDINGS  Normal esophagus  A small hiatal hernia was noted on retroflexion. Moderate antral gastritis. Biopsied. Normal duodenum. Biopsied. PLAN  Await pathology results  PPI QDay    Consider gastric emptying scan      MEDICATIONS:  MAC   COMPLICATIONS: None  Prior to the procedure, the patient's history was reviewed and a directed physical examination was performed. Informed consent was obtained. After adequate sedation was achieved, the scope was inserted into the mouth and advanced to the second portion of the duodenum. As the scope was withdrawn, the anatomy and the appearance of the mucosa was noted.        Janae Degroot M.D.

## 2017-10-17 NOTE — PROGRESS NOTES
DISCHARGE CRITERIA MET INSTRUCTIONS GIVEN PT TRANSPORTED PER M [facility] M TRANSPORT PER WHEEL CHAIR UP WITH ASSIST

## 2017-10-17 NOTE — ANESTHESIA PRE PROCEDURE
Department of Anesthesiology  Preprocedure Note       Name:  Max Trevino   Age:  66 y.o.  :  1945                                          MRN:  [id number]         Date:  10/17/2017      Surgeon: Sarah Sheridan):  Marleny Blair MD    Procedure: Procedure(s):  EGD BIOPSY    Medications prior to admission:   Prior to Admission medications    Medication Sig Start Date End Date Taking? Authorizing Provider   glucagon 1 MG injection Inject as directed. Historical Provider, MD   sodium chloride (OCEAN) 0.65 % nasal spray by Nasal route 10/24/16   Historical Provider, MD   warfarin (COUMADIN) 5 MG tablet Take 1 tablet by mouth daily 17   Shahzad Muhammad MD   lidocaine (XYLOCAINE) 5 % ointment Apply topically three times a week Apply topically as needed. DIALYSIS DAYS    Historical Provider, MD   venlafaxine (EFFEXOR) 75 MG tablet Take 75 mg by mouth daily    Historical Provider, MD   Amino Acids-Protein Hydrolys (PRO-STAT PO) Take 30 mLs by mouth 2 times daily SUGAR FREE    Historical Provider, MD   guaiFENesin (ROBITUSSIN) 100 MG/5ML syrup Take 200 mg by mouth 4 times daily as needed for Cough    Historical Provider, MD   ipratropium-albuterol (DUONEB) 0.5-2.5 (3) MG/3ML SOLN nebulizer solution Inhale 1 vial into the lungs every 6 hours as needed     Historical Provider, MD   insulin aspart (NOVOLOG) 100 UNIT/ML injection vial Inject into the skin 3 times daily (before meals) Indications: on scale Sliding scale    Historical Provider, MD   Nystatin POWD by Does not apply route TID x 10 days (starting 17) then use PRN 17   Historical Provider, MD   HYDROcodone-acetaminophen (NORCO) 5-325 MG per tablet Take 1 tablet by mouth every 8 hours as needed for Pain . Historical Provider, MD   aspirin 81 MG tablet Take 81 mg by mouth daily    Historical Provider, MD   Nutritional Supplements (NEPRO PO) Take 250 mLs by mouth 2 times daily    Historical Provider, MD   Skin Protectants, Misc. into the skin nightly     Historical Provider, MD   ALPRAZolam (XANAX) 0.5 MG tablet Take 0.5 mg by mouth 3 times daily as needed for Anxiety     Historical Provider, MD   acetaminophen (TYLENOL) 325 MG tablet Take 650 mg by mouth every 6 hours as needed for Pain or Fever     Historical Provider, MD   magnesium hydroxide (MILK OF MAGNESIA) 400 MG/5ML suspension Take 30 mLs by mouth daily as needed for Constipation 3/29/16   Po Mason MD   Multiple Vitamins-Minerals (MULTIVITAL PO) Take 1 tablet by mouth daily    Historical Provider, MD       Current medications:    Current Facility-Administered Medications   Medication Dose Route Frequency Provider Last Rate Last Dose    0.9 % sodium chloride infusion   Intravenous Continuous Rohit Lucas MD           Allergies:     Allergies   Allergen Reactions    Percocet [Oxycodone-Acetaminophen] Itching and Rash     Also causes shaking    Ampicillin Rash       Problem List:    Patient Active Problem List   Diagnosis Code    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    Essential hypertension I10    Mixed hyperlipidemia E78.2    Obstructive sleep apnea G47.33    Morbid obesity ([facility] E66.01    Benign prostatic hyperplasia without lower urinary tract symptoms N40.0    Stasis dermatitis I87.2    Severe aortic stenosis I35.0    Severe mitral valve stenosis I05.0    Anemia in chronic kidney disease (CKD) N18.9, D63.1    Difficulty walking R26.2    Dialysis patient ([facility] Z99.2    Dementia F03.90    Major depressive disorder with single episode, in partial remission ([facility] F32.4    Chronic cerebral ischemia I67.82    Memory loss R41.3    Diffuse cerebral atrophy G31.9    ESRD (end stage renal disease) on dialysis (HCC) N18.6, Z99.2    Benign prostatic hyperplasia N40.0    Long-term insulin use ([facility]) Z79.4    Upper respiratory tract infection J06.9    Nausea R11.0    Right upper quadrant pain R10.11    Uncontrolled type 2 diabetes mellitus with chronic kidney disease on chronic dialysis, with long-term current use of insulin ([facility]) E11.22, E11.65, N18.6, Z99.2, Z79.4    Uncontrolled type 2 diabetes mellitus with diabetic polyneuropathy, with long-term current use of insulin ([facility]) E11.42, Z79.4, E11.65    A-fib ([facility]) I48.91    Rash R21       Past Medical History:        Diagnosis Date    A-fib ([facility] 08/29/2017    new onset    Anemia 4/27/2016    Arthritis     Bacteremia 11/11/2016    Benign prostatic hypertrophy     Chronic kidney disease 10/17/2016    Closed fracture of forearm 8/13/2013    Broken lft forearm     Controlled type 2 diabetes mellitus with chronic kidney disease on chronic dialysis, with long-term current use of insulin ([facility]     Decubital ulcer     NON OPEN BUTTOCKS    Dementia     memory problems    Dialysis patient ([facility] 1/3/2017    [facility] [place] [address] [address]    Difficult intravenous access     HAS NEEDED PICC TEAM IN THE PAST    Difficulty walking     WHEELCHAIR BOUND-PIVOTS WITH ASSIST O F 2    DJD (degenerative joint disease) of knee     Elbow, forearm, and wrist, abrasion or friction burn, without mention of infection 8/13/2013    Abrasions lft forearm     Encephalopathy acute 4/27/2016    Encounter regarding vascular access for dialysis for ESRD ([facility] 2/2/2017    ESRD (end stage renal disease) on dialysis ([facility] 1/17/2017    Forgetfulness     HAS SOME SHORT TERM MEMORY LOSS, QUYEN-WIFE IS LEGAL GUARDIAN    H/O transesophageal echocardiography (AMADEO) for monitoring     Hemodialysis patient ([facility] 11/11/2016    [facility].  TUNNELED CATHETER RT UPPER CHEST    Hyperlipidemia 1990    on Meds    Hypertension 1990    on Meds    Intercritical gout     on Meds    Joint pain, knee 01/13/2017    baldo knee synvisc-1 injections    Major depressive disorder with single episode, in partial remission ([facility] 1/3/2017    Mobility impaired     Muscle weakness     GRNERALIZED    Obesity     HEATHER on CPAP     On CPAP-TO BRING DOS    Respiratory failure ([facility] 03/2016    with open heart surgery, trached x 5 months    S/P cardiac cath     Seborrhea     Severe aortic stenosis 3/9/2016    Severe mitral valve stenosis 3/9/2016    Severe sepsis ([facility] 4/3/2016    Skin rash     ABD FOLDS    Stasis dermatitis     Thyroid disease     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 lft thumb     Wears glasses     Wheelchair bound     pivots with 2 assists       Past Surgical History:        Procedure Laterality Date    AORTIC VALVE REPLACEMENT  3/18/16    ARM SURGERY Left 1990    CARDIAC CATHETERIZATION      COLONOSCOPY  11/19/09    HAND SURGERY Left 1990 (x10-12 surgeries)    several surgeries on left arm    KNEE ARTHROSCOPY Left 09/17/99    MITRAL VALVE REPLACEMENT  3/18/16    NASAL SEPTUM SURGERY      OTHER SURGICAL HISTORY  3/18/16    Aortic root Enlargement    OTHER SURGICAL HISTORY  3/18/16    ASD Closure    OTHER SURGICAL HISTORY Right 01/09/2017    AV fistula creation, wrist    OTHER SURGICAL HISTORY Right 08/29/2017    ligation of collateral branch of av fistula right wrist    SC LIGATN ANGIOACCESS AV FISTULA Right 8/29/2017     RIGHT  LOWER ARM AV FISTULA  LIGATION OF AQUIRED BRANCHES X2 performed by Kristyn Hurtado DO at [address] [place] [address]  3/18/16    median       Social History:    Social History   Substance Use Topics    Smoking status: Former Smoker     Packs/day: 2.00     Years: 25.00     Types: Cigarettes     Start date: 3/17/1955     Quit date: 1/1/1980    Smokeless tobacco: Never Used    Alcohol use No      Comment: 1-2 drinks per year                                Counseling given: Not Answered      Vital Signs (Current): There were no vitals filed for this visit.                                            BP Readings from Last 3 Encounters:   09/14/17 130/70   08/29/17 134/70   08/29/17 112/64       NPO Status: BMI:   Wt Readings from Last 3 Encounters:   09/14/17 288 lb (130.6 kg)   08/29/17 286 lb (129.7 kg)   07/13/17 292 lb 9.6 oz (132.7 kg)     There is no height or weight on file to calculate BMI.    CBC:   Lab Results   Component Value Date    WBC 9.1 04/26/2017    RBC 3.46 04/26/2017    HGB 11.3 04/26/2017    HCT 33.7 04/26/2017    MCV 97.6 04/26/2017    RDW 17.3 04/26/2017     07/07/2017       CMP:   Lab Results   Component Value Date     01/28/2017    K 5.0 07/07/2017    CL 89 01/28/2017    CO2 27 01/28/2017    BUN 31 01/28/2017    CREATININE 3.44 01/28/2017    GFRAA 21 01/28/2017    LABGLOM 18 01/28/2017    GLUCOSE 400 01/28/2017    PROT 7.3 11/10/2016    CALCIUM 9.4 01/28/2017    BILITOT 0.30 11/10/2016    ALKPHOS 212 11/10/2016    AST 44 11/10/2016    ALT 49 11/10/2016       POC Tests: No results for input(s): POCGLU, POCNA, POCK, POCCL, POCBUN, POCHEMO, POCHCT in the last 72 hours.     Coags:   Lab Results   Component Value Date    PROTIME 15.2 10/16/2017    INR 1.4 10/16/2017    APTT 20.8 07/07/2017       HCG (If Applicable): No results found for: PREGTESTUR, PREGSERUM, HCG, HCGQUANT     ABGs: No results found for: PHART, PO2ART, WSL2SYR, XLX8VNT, BEART, C8KGKSLF     Type & Screen (If Applicable):  No results found for: [facility]    Anesthesia Evaluation  Patient summary reviewed  Airway: Mallampati: III     Neck ROM: limited  Mouth opening: > = 3 FB Dental:          Pulmonary:   (+) sleep apnea:                             Cardiovascular:  Exercise tolerance: poor (<4 METS),   (+) hypertension:, valvular problems/murmurs:, CABG/stent:, dysrhythmias:,                ROS comment: S/p MVR and AVR     Neuro/Psych:   (+) neuromuscular disease:, psychiatric history:            GI/Hepatic/Renal:   (+) renal disease: ESRD and dialysis,           Endo/Other:    (+) Type II DM, ,                  Abdominal:   (+) obese,         Vascular: Anesthesia Plan      general and MAC     ASA 4       Induction: intravenous. Anesthetic plan and risks discussed with patient. Use of blood products discussed with patient whom. Attending anesthesiologist reviewed and agrees with Pre Eval content      1. Technically a very difficult study. 2. Normal left ventricular dimension with moderate concentric left ventricular hypertrophy. 3. Overall systolic left ventricular function is difficult to access but appears to be normal.  4. Right ventricular function and size appear normal.  5. Bioprosthetic mitral valve. Very hard to comment on the valve due to poor sound transmission. Mean gradient is 8 mm Hg. Trivial mitral regurgitation. 6. Bioprosthetic aortic valve. Very hard to comment of valve due to poor sound transmission. Peak gradient is 9 mm Hg and mean gradient is 5 mm Hg. Trivial aortic regurgitation. 7. No pericardial effusion. Tracy Tony CRNA   10/17/2017      Date of Service:  Anesthesia Update:    Pt seen and examined, chart reviewed (including anesthesia, drug and allergy history). No interval changes to history and examination. (except as noted above). Anesthetic plan, risks, benefits, alternatives, and personnel involved discussed with patient. Patient verbalized an understanding and agrees to proceed. Plan discussed with care team members and agreed upon.     Zenia Hunter MD  Staff Anesthesiologist  8:38 AM    Electronically signed by Zenia Hunter MD on 10/17/2017 at 8:38 AM

## 2017-10-18 LAB — SURGICAL PATHOLOGY REPORT: NORMAL

## 2017-10-23 ENCOUNTER — ANTI-COAG VISIT (OUTPATIENT)
Dept: INTERNAL MEDICINE | Age: 72
End: 2017-10-23

## 2017-10-23 ENCOUNTER — HOSPITAL ENCOUNTER (OUTPATIENT)
Age: 72
Setting detail: SPECIMEN
Discharge: HOME | End: 2017-10-23
Payer: MEDICARE

## 2017-10-23 DIAGNOSIS — I48.20 CHRONIC ATRIAL FIBRILLATION (HCC): ICD-10-CM

## 2017-10-23 DIAGNOSIS — I48.91 ATRIAL FIBRILLATION, UNSPECIFIED TYPE (HCC): ICD-10-CM

## 2017-10-23 LAB
INR BLD: 1.6
PROTHROMBIN TIME: 16.9 SEC (ref 9.4–11.3)

## 2017-10-23 PROCEDURE — 85610 PROTHROMBIN TIME: CPT

## 2017-10-23 PROCEDURE — 36415 COLL VENOUS BLD VENIPUNCTURE: CPT

## 2017-10-23 NOTE — PROGRESS NOTES
Notified Zoie at [address] [facility] with patients INR results and instructions

## 2017-10-25 ENCOUNTER — OFFICE VISIT (OUTPATIENT)
Dept: PULMONOLOGY | Age: 72
End: 2017-10-25
Payer: MEDICARE

## 2017-10-25 VITALS
DIASTOLIC BLOOD PRESSURE: 82 MMHG | HEIGHT: 67 IN | SYSTOLIC BLOOD PRESSURE: 130 MMHG | TEMPERATURE: 98 F | OXYGEN SATURATION: 97 % | WEIGHT: 289.8 LBS | BODY MASS INDEX: 45.48 KG/M2 | RESPIRATION RATE: 16 BRPM | HEART RATE: 96 BPM

## 2017-10-25 DIAGNOSIS — N18.6 ESRD (END STAGE RENAL DISEASE) ON DIALYSIS (HCC): ICD-10-CM

## 2017-10-25 DIAGNOSIS — Z23 NEED FOR PROPHYLACTIC VACCINATION AGAINST STREPTOCOCCUS PNEUMONIAE (PNEUMOCOCCUS): ICD-10-CM

## 2017-10-25 DIAGNOSIS — G47.33 OSA ON CPAP: Primary | ICD-10-CM

## 2017-10-25 DIAGNOSIS — Z99.2 ESRD (END STAGE RENAL DISEASE) ON DIALYSIS (HCC): ICD-10-CM

## 2017-10-25 DIAGNOSIS — Z99.89 OSA ON CPAP: Primary | ICD-10-CM

## 2017-10-25 DIAGNOSIS — E66.01 MORBID OBESITY WITH BODY MASS INDEX OF 45.0-49.9 IN ADULT (HCC): ICD-10-CM

## 2017-10-25 PROCEDURE — G8598 ASA/ANTIPLAT THER USED: HCPCS | Performed by: INTERNAL MEDICINE

## 2017-10-25 PROCEDURE — 99213 OFFICE O/P EST LOW 20 MIN: CPT | Performed by: INTERNAL MEDICINE

## 2017-10-25 PROCEDURE — G8484 FLU IMMUNIZE NO ADMIN: HCPCS | Performed by: INTERNAL MEDICINE

## 2017-10-25 PROCEDURE — G8427 DOCREV CUR MEDS BY ELIG CLIN: HCPCS | Performed by: INTERNAL MEDICINE

## 2017-10-25 PROCEDURE — 3017F COLORECTAL CA SCREEN DOC REV: CPT | Performed by: INTERNAL MEDICINE

## 2017-10-25 PROCEDURE — G8417 CALC BMI ABV UP PARAM F/U: HCPCS | Performed by: INTERNAL MEDICINE

## 2017-10-25 PROCEDURE — 1036F TOBACCO NON-USER: CPT | Performed by: INTERNAL MEDICINE

## 2017-10-25 PROCEDURE — 4040F PNEUMOC VAC/ADMIN/RCVD: CPT | Performed by: INTERNAL MEDICINE

## 2017-10-25 PROCEDURE — 1123F ACP DISCUSS/DSCN MKR DOCD: CPT | Performed by: INTERNAL MEDICINE

## 2017-10-25 NOTE — PROGRESS NOTES
regarding vascular access for dialysis for ESRD ([facility] 2/2/2017    ESRD (end stage renal disease) on dialysis ([facility] [address] [facility] 1/17/2017    Forgetfulness     HAS SOME SHORT TERM MEMORY LOSS, QUYEN-WIFE IS LEGAL GUARDIAN    H/O transesophageal echocardiography (AMADEO) for monitoring     Hemodialysis patient ([facility] [address] 11/11/2016    tues-thurs-sat [place]---FRESEMIUS.  TUNNELED CATHETER RT UPPER CHEST    Hyperlipidemia 1990    on Meds    Hypertension 1990    on Meds    Intercritical gout     on Meds    Joint pain, knee 01/13/2017    baldo knee synvisc-1 injections    Major depressive disorder with single episode, in partial remission ([facility] [address] 1/3/2017    Mobility impaired     Muscle weakness     GRNERALIZED    Obesity     EHATHER on CPAP     On CPAP-TO BRING DOS    Respiratory failure ([address] 03/2016    with open heart surgery, trached x 5 months    S/P cardiac cath     Seborrhea     Severe aortic stenosis 3/9/2016    Severe mitral valve stenosis 3/9/2016    Severe sepsis ([address] 4/3/2016    Skin rash     ABD FOLDS    Stasis dermatitis     Thyroid disease     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 lft thumb     Wears glasses     Wheelchair bound     pivots with 2 assists     Past Surgical History:   Procedure Laterality Date    AORTIC VALVE REPLACEMENT  3/18/16    ARM SURGERY Left 1990    CARDIAC CATHETERIZATION      COLONOSCOPY  11/19/09    HAND SURGERY Left 1990 (x10-12 surgeries)    several surgeries on left arm    KNEE ARTHROSCOPY Left 09/17/99    MITRAL VALVE REPLACEMENT  3/18/16    NASAL SEPTUM SURGERY      OTHER SURGICAL HISTORY  3/18/16    Aortic root Enlargement    OTHER SURGICAL HISTORY  3/18/16    ASD Closure    OTHER SURGICAL HISTORY Right 01/09/2017    AV fistula creation, wrist    OTHER SURGICAL HISTORY Right 08/29/2017    ligation of collateral branch of av fistula right wrist    [facility] EGD TRANSORAL BIOPSY SINGLE/MULTIPLE N/A 10/17/2017    EGD BIOPSY performed by Ernie Bhatti MD at [facility]    MT LIGATN ANGIOACCESS AV FISTULA Right 8/29/2017     RIGHT  LOWER ARM AV FISTULA  LIGATION OF AQUIRED BRANCHES X2 [place]aline Rios [place] [address] [place]    median     Social History   Substance Use Topics    Smoking status: Former Smoker     Packs/day: 2.00     Years: 25.00     Types: Cigarettes     Start date: 3/17/1955     Quit date: 1/1/1980    Smokeless tobacco: Never Used    Alcohol use No      Comment: 1-2 drinks per year     Family History   Problem Relation Age of Onset    Lung Cancer Mother     Diabetes Mother     Alzheimer's Disease Father     Diabetes Maternal Grandmother     High Blood Pressure Sister       Allergies   Allergen Reactions    Percocet [Oxycodone-Acetaminophen] Itching and Rash     Also causes shaking    Ampicillin Rash     Current Outpatient Prescriptions   Medication Sig Dispense Refill    glucagon 1 MG injection Inject as directed.  sodium chloride (OCEAN) 0.65 % nasal spray by Nasal route      warfarin (COUMADIN) 5 MG tablet Take 1 tablet by mouth daily 30 tablet 3    lidocaine (XYLOCAINE) 5 % ointment Apply topically three times a week Apply topically as needed. DIALYSIS DAYS      venlafaxine (EFFEXOR) 75 MG tablet Take 75 mg by mouth daily      Amino Acids-Protein Hydrolys (PRO-STAT PO) Take 30 mLs by mouth 2 times daily SUGAR FREE      guaiFENesin (ROBITUSSIN) 100 MG/5ML syrup Take 200 mg by mouth 4 times daily as needed for Cough      ipratropium-albuterol (DUONEB) 0.5-2.5 (3) MG/3ML SOLN nebulizer solution Inhale 1 vial into the lungs every 6 hours as needed       insulin aspart (NOVOLOG) 100 UNIT/ML injection vial Inject into the skin 3 times daily (before meals) Indications: on scale Sliding scale      Nystatin POWD by Does not apply route TID x 10 days (starting 2/13/17) then use PRN      HYDROcodone-acetaminophen (NORCO) 5-325 MG per tablet Take 1 tablet by mouth every 8 hours as needed for Pain .       6 hours as needed for Pain or Fever       magnesium hydroxide (MILK OF MAGNESIA) 400 MG/5ML suspension Take 30 mLs by mouth daily as needed for Constipation      Multiple Vitamins-Minerals (MULTIVITAL PO) Take 1 tablet by mouth daily       No current facility-administered medications for this visit. Exam  Vitals -    /82 (Site: Right Arm, Position: Sitting, Cuff Size: Large Adult)   Pulse 96   Temp 98 °F (36.7 °C) (Tympanic)   Resp 16   Ht 5' 7\" (1.702 m)   Wt 289 lb 12.8 oz (131.5 kg)   SpO2 97%   BMI 45.39 kg/m²    Body mass index is 45.39 kg/m². Mallampati Score - III (soft palate, base of uvula visible)  Wt Readings from Last 3 Encounters:   10/25/17 289 lb 12.8 oz (131.5 kg)   09/14/17 288 lb (130.6 kg)   08/29/17 286 lb (129.7 kg)       Constitutional awake, alert, and time, place and person. Neck tracheostomy scar. Lungs ventilating all lobes. No rales no rhonchi. Cardiovascular normal S1, S2, systolic murmur appreciated. Abdomen nontender. Bowel sounds are present. Obese pannus. Lower extremities no edema          Assessment  Jaky Dykes is doing well with his PAP machine with good control on the current therapy with significant improvement in clinical symptoms. 1. HEATHER on CPAP  DME Order for CPAP as OP   2. Need for prophylactic vaccination against Streptococcus pneumoniae (pneumococcus)  Pneumococcal conjugate vaccine 13-valent PCV13   3. ESRD (end stage renal disease) on dialysis ([facility] [address]     4. Morbid obesity with body mass index of 45.0-49.9 in adult [facility])     History of tracheostomy post aortic valve replacement  Plan  Continue CPAP therapy. During night and during day while taking naps  Prescription for supplies written.     Return in one year    Electronically signed by     Eliecer Damon MD on 10/25/2017 at 4:08 PM

## 2017-10-29 ENCOUNTER — OUTSIDE SERVICES (OUTPATIENT)
Dept: INTERNAL MEDICINE | Age: 72
End: 2017-10-29
Payer: MEDICARE

## 2017-10-29 DIAGNOSIS — E78.2 MIXED HYPERLIPIDEMIA: ICD-10-CM

## 2017-10-29 DIAGNOSIS — Z99.2 ESRD (END STAGE RENAL DISEASE) ON DIALYSIS (HCC): ICD-10-CM

## 2017-10-29 DIAGNOSIS — Z79.4 LONG-TERM INSULIN USE (HCC): ICD-10-CM

## 2017-10-29 DIAGNOSIS — I35.0 SEVERE AORTIC STENOSIS: ICD-10-CM

## 2017-10-29 DIAGNOSIS — G47.33 OBSTRUCTIVE SLEEP APNEA SYNDROME: ICD-10-CM

## 2017-10-29 DIAGNOSIS — N18.6 ESRD (END STAGE RENAL DISEASE) ON DIALYSIS (HCC): ICD-10-CM

## 2017-10-29 DIAGNOSIS — I10 ESSENTIAL HYPERTENSION: ICD-10-CM

## 2017-10-29 DIAGNOSIS — F32.4 MAJOR DEPRESSIVE DISORDER WITH SINGLE EPISODE, IN PARTIAL REMISSION (HCC): ICD-10-CM

## 2017-10-29 DIAGNOSIS — I05.0 SEVERE MITRAL VALVE STENOSIS: ICD-10-CM

## 2017-10-29 DIAGNOSIS — I48.20 CHRONIC ATRIAL FIBRILLATION (HCC): ICD-10-CM

## 2017-10-29 DIAGNOSIS — E66.01 MORBID OBESITY (HCC): ICD-10-CM

## 2017-10-29 PROCEDURE — 99308 SBSQ NF CARE LOW MDM 20: CPT | Performed by: INTERNAL MEDICINE

## 2017-10-29 NOTE — PROGRESS NOTES
DR. 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 He is continuing to do well with dialysis. He is going to be seeing a gastroenterologist in [place] soon for nausea and vomiting. There are no new complaints at this time. ALLERGIES:   Allergies   Allergen Reactions    Percocet [Oxycodone-Acetaminophen] Itching and Rash     Also causes shaking    Ampicillin Rash       MEDICATIONS: As noted on the [facility], referenced and incorporated herein.     PAST MEDICAL HISTORY:   Past Medical History:   Diagnosis Date    A-fib ([address] 08/29/2017    new onset    Anemia 4/27/2016    Arthritis     Bacteremia 11/11/2016    Benign prostatic hypertrophy     Chronic kidney disease 10/17/2016    Closed fracture of forearm 8/13/2013    Broken lft forearm     Controlled type 2 diabetes mellitus with chronic kidney disease on chronic dialysis, with long-term current use of insulin ([address]     Decubital ulcer     NON OPEN BUTTOCKS    Dementia     memory problems    Dialysis patient ([address] 1/3/2017    Goes Tue, Thurs, Sat in [place]    Difficult intravenous access     HAS NEEDED PICC TEAM IN THE PAST    Difficulty walking     WHEELCHAIR BOUND-PIVOTS WITH ASSIST O F 2    DJD (degenerative joint disease) of knee     Elbow, forearm, and wrist, abrasion or friction burn, without mention of infection 8/13/2013    Abrasions lft forearm     Encephalopathy acute 4/27/2016    Encounter regarding vascular access for dialysis for ESRD ([address] 2/2/2017    ESRD (end stage renal disease) on dialysis ([address] 1/17/2017    Forgetfulness     HAS SOME SHORT TERM MEMORY LOSS, QUYEN-WIFE IS LEGAL GUARDIAN    H/O transesophageal echocardiography (AMADEO) for monitoring     Hemodialysis patient ([facility] [address] 11/11/2016    tues-thurs-[place]---FRESEMIUS.  TUNNELED CATHETER RT UPPER CHEST    Hyperlipidemia 1990    on Meds    Hypertension 1990    on Meds    Intercritical gout     on Meds    Joint pain, knee 01/13/2017    baldo knee synvisc-1 injections    Major depressive disorder with single episode, in partial remission ([facility] [address] 1/3/2017    Mobility impaired     Muscle weakness     GRNERALIZED    Obesity     HEATHER on CPAP     On CPAP-TO BRING DOS    Respiratory failure ([facility] [address] 03/2016    with open heart surgery, trached x 5 months    S/P cardiac cath     Seborrhea     Severe aortic stenosis 3/9/2016    Severe mitral valve stenosis 3/9/2016    Severe sepsis ([facility] [address] 4/3/2016    Skin rash     ABD FOLDS    Stasis dermatitis     Thyroid disease     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 lft thumb     Wears glasses     Wheelchair bound     pivots with 2 assists       PAST SURGICAL HISTORY:   Past Surgical History:   Procedure Laterality Date    AORTIC VALVE REPLACEMENT  3/18/16    ARM SURGERY Left 1990    CARDIAC CATHETERIZATION      COLONOSCOPY  11/19/09    HAND SURGERY Left 1990 (x10-12 surgeries)    several surgeries on left arm    KNEE ARTHROSCOPY Left 09/17/99    MITRAL VALVE REPLACEMENT  3/18/16    NASAL SEPTUM SURGERY      OTHER SURGICAL HISTORY  3/18/16    Aortic root Enlargement    OTHER SURGICAL HISTORY  3/18/16    ASD Closure    OTHER SURGICAL HISTORY Right 01/09/2017    AV fistula creation, wrist    OTHER SURGICAL HISTORY Right 08/29/2017    ligation of collateral branch of av fistula right wrist    [place] EGD TRANSORAL BIOPSY SINGLE/MULTIPLE N/A 10/17/2017    EGD BIOPSY performed by Margaret Urbina MD at [address] ANGIOACCESS AV FISTULA Right 8/29/2017     RIGHT  LOWER ARM AV FISTULA LIGATION OF AQUIRED BRANCHES X2 performed by Max Adams DO at [address]  3/18/16    median       SOCIAL HISTORY:     Tobacco:   History   Smoking Status    Former Smoker    Packs/day: 2.00    Years: 25.00    Types: Cigarettes    Start date: 3/17/1955   Tona Galindo Quit date: 1/1/1980   Smokeless Tobacco    Never Used     Alcohol:   History   Alcohol Use No     Comment: 1-2 drinks per year     Drugs:   History   Drug Use No       FAMILY HISTORY: family history includes Alzheimer's Disease in his father; Diabetes in his maternal grandmother and mother; High Blood Pressure in his sister; Ngoc Gay in his mother. REVIEW OF SYSTEMS:     Please see history of chief complaint above; otherwise no new problems with respect to General, HEENT, Cardiovascular, Respiratory, Gastrointestinal, Genitourinary, Endocrinologic, Musculoskeletal, or Neuropsychiatric complaints. PHYSICAL EXAMINATION:    Vitals: Temp: 97.7 deg F. Pulse: 88. Resp: 16. BP: 112/68. General: A 66 y.o.  male. Alert and oriented to person, place and time. He  does not appear to be in any acute distress. Skin: Skin color, texture, turgor normal. No rashes or lesions. HEENT/Neck: Essentially unremarkable  Lungs: Normal - CTA without rales, rhonchi, or wheezing. Heart: regular rate and rhythm, S1, S2 normal, no murmur, click, rub or gallop No S3 or S4. Abdomen: Obese, soft, non-distended, non-tender, normal active bowel sounds, no masses palpated and no hepatosplenomegaly  Extremities: No clubbing, cyanosis, edema  Neurologic: cranial nerves II-XII are grossly intact    ASSESSMENT:    No diagnosis found. PLAN:    1. Continue current treatment  2. Nursing home record reviewed and updates summarized and entered into electronic record  3. Nursing home blood sugar logs and diabetic medication administration reviewed. No changes. 4. Coumadin management is ongoing.  We are the ones managing his Coumadin, not the cardiologists. 5. See nursing home orders and MAR.       Electronically signed by Genaro Colindres DO on 10/29/2017 at 2:15 PM  Internal Medicine

## 2017-10-30 ENCOUNTER — TELEPHONE (OUTPATIENT)
Dept: INTERNAL MEDICINE | Age: 72
End: 2017-10-30

## 2017-10-30 NOTE — TELEPHONE ENCOUNTER
Per Dr. Rocio Bustos give 15units Novolog or Humalog and recheck BS in one hour.   Notified Debi Pablo at [id number] [facility] with this order

## 2017-11-03 ENCOUNTER — HOSPITAL ENCOUNTER (OUTPATIENT)
Age: 72
Setting detail: SPECIMEN
Discharge: HOME | End: 2017-11-03
Payer: MEDICARE

## 2017-11-03 ENCOUNTER — ANTI-COAG VISIT (OUTPATIENT)
Dept: INTERNAL MEDICINE | Age: 72
End: 2017-11-03

## 2017-11-03 DIAGNOSIS — I48.91 ATRIAL FIBRILLATION, UNSPECIFIED TYPE (HCC): ICD-10-CM

## 2017-11-03 DIAGNOSIS — I48.20 CHRONIC ATRIAL FIBRILLATION (HCC): ICD-10-CM

## 2017-11-03 LAB
INR BLD: 2.5
PROTHROMBIN TIME: 26.5 SEC (ref 9.4–11.3)

## 2017-11-03 PROCEDURE — 85610 PROTHROMBIN TIME: CPT

## 2017-11-03 PROCEDURE — 36415 COLL VENOUS BLD VENIPUNCTURE: CPT

## 2017-11-11 ENCOUNTER — HOSPITAL ENCOUNTER (OUTPATIENT)
Age: 72
Setting detail: SPECIMEN
Discharge: HOME | End: 2017-11-11
Payer: COMMERCIAL

## 2017-11-11 LAB
THYROXINE, FREE: 0.64 NG/DL (ref 0.93–1.7)
TSH SERPL DL<=0.05 MIU/L-ACNC: 1.32 MIU/L (ref 0.3–5)

## 2017-11-11 PROCEDURE — 84439 ASSAY OF FREE THYROXINE: CPT

## 2017-11-11 PROCEDURE — 36415 COLL VENOUS BLD VENIPUNCTURE: CPT

## 2017-11-11 PROCEDURE — 84443 ASSAY THYROID STIM HORMONE: CPT

## 2017-11-17 ENCOUNTER — TELEPHONE (OUTPATIENT)
Dept: INTERNAL MEDICINE | Age: 72
End: 2017-11-17

## 2017-11-17 ENCOUNTER — ANTI-COAG VISIT (OUTPATIENT)
Dept: INTERNAL MEDICINE | Age: 72
End: 2017-11-17

## 2017-11-17 ENCOUNTER — HOSPITAL ENCOUNTER (OUTPATIENT)
Age: 72
Setting detail: SPECIMEN
Discharge: HOME | End: 2017-11-17
Payer: MEDICARE

## 2017-11-17 DIAGNOSIS — I48.91 ATRIAL FIBRILLATION, UNSPECIFIED TYPE (HCC): ICD-10-CM

## 2017-11-17 PROCEDURE — 85610 PROTHROMBIN TIME: CPT

## 2017-11-17 NOTE — TELEPHONE ENCOUNTER
Heparin does not affect prothrombin time (PT), which is what INR is based on. Heparin will not affect INR. They should not change Coumadin or check his INR until the next time he is supposed to have it. Tell Yoandy to change his Synthroid to 50 mcg daily and repeat a TSH and Free T4 in 8 weeks.

## 2017-11-17 NOTE — TELEPHONE ENCOUNTER
Elvira Santizo at the [address] [facility] called and stated that staff at the Dialysis center ran Heparin prior to drawing the INR, she wanted to know if they should keep the same coumadin dosage since his INR will be affected,

## 2017-11-20 ENCOUNTER — OUTSIDE SERVICES (OUTPATIENT)
Dept: INTERNAL MEDICINE | Age: 72
End: 2017-11-20
Payer: MEDICARE

## 2017-11-20 DIAGNOSIS — E66.01 MORBID OBESITY (HCC): ICD-10-CM

## 2017-11-20 DIAGNOSIS — Z99.2 ESRD (END STAGE RENAL DISEASE) ON DIALYSIS (HCC): ICD-10-CM

## 2017-11-20 DIAGNOSIS — F32.4 MAJOR DEPRESSIVE DISORDER WITH SINGLE EPISODE, IN PARTIAL REMISSION (HCC): ICD-10-CM

## 2017-11-20 DIAGNOSIS — Z79.4 LONG-TERM INSULIN USE (HCC): ICD-10-CM

## 2017-11-20 DIAGNOSIS — I10 ESSENTIAL HYPERTENSION: ICD-10-CM

## 2017-11-20 DIAGNOSIS — I05.0 SEVERE MITRAL VALVE STENOSIS: ICD-10-CM

## 2017-11-20 DIAGNOSIS — I35.0 SEVERE AORTIC STENOSIS: ICD-10-CM

## 2017-11-20 DIAGNOSIS — N18.6 ESRD (END STAGE RENAL DISEASE) ON DIALYSIS (HCC): ICD-10-CM

## 2017-11-20 DIAGNOSIS — Z99.2 DIALYSIS PATIENT (HCC): ICD-10-CM

## 2017-11-20 DIAGNOSIS — G47.33 OBSTRUCTIVE SLEEP APNEA SYNDROME: ICD-10-CM

## 2017-11-20 DIAGNOSIS — E78.2 MIXED HYPERLIPIDEMIA: ICD-10-CM

## 2017-11-21 ENCOUNTER — HOSPITAL ENCOUNTER (OUTPATIENT)
Age: 72
Setting detail: SPECIMEN
Discharge: HOME | End: 2017-11-21
Payer: MEDICARE

## 2017-11-21 ENCOUNTER — ANTI-COAG VISIT (OUTPATIENT)
Dept: INTERNAL MEDICINE | Age: 72
End: 2017-11-21

## 2017-11-21 DIAGNOSIS — R10.11 RUQ PAIN: ICD-10-CM

## 2017-11-21 DIAGNOSIS — I48.91 ATRIAL FIBRILLATION, UNSPECIFIED TYPE (HCC): ICD-10-CM

## 2017-11-21 LAB
INR BLD: 3.7
PROTHROMBIN TIME: 39.4 SEC (ref 9.4–11.3)

## 2017-11-21 PROCEDURE — 99309 SBSQ NF CARE MODERATE MDM 30: CPT | Performed by: INTERNAL MEDICINE

## 2017-11-21 PROCEDURE — 36415 COLL VENOUS BLD VENIPUNCTURE: CPT

## 2017-11-21 PROCEDURE — 85610 PROTHROMBIN TIME: CPT

## 2017-11-21 NOTE — PROGRESS NOTES
DR. 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 He is continuing to do well with dialysis. There are no new complaints at this time. ALLERGIES:   Allergies   Allergen Reactions    Percocet [Oxycodone-Acetaminophen] Itching and Rash     Also causes shaking    Ampicillin Rash       MEDICATIONS: As noted on the [facility] MAR, referenced and incorporated herein.     PAST MEDICAL HISTORY:   Past Medical History:   Diagnosis Date    A-fib ([address] 08/29/2017    new onset    Anemia 4/27/2016    Arthritis     Bacteremia 11/11/2016    Benign prostatic hypertrophy     Chronic kidney disease 10/17/2016    Closed fracture of forearm 8/13/2013    Broken lft forearm     Controlled type 2 diabetes mellitus with chronic kidney disease on chronic dialysis, with long-term current use of insulin ([address]     Decubital ulcer     NON OPEN BUTTOCKS    Dementia     memory problems    Dialysis patient ([address] 1/3/2017    Goes Tue, Thurs, Sat in [place]    Difficult intravenous access     HAS NEEDED PICC TEAM IN THE PAST    Difficulty walking     WHEELCHAIR BOUND-PIVOTS WITH ASSIST O F 2    DJD (degenerative joint disease) of knee     Elbow, forearm, and wrist, abrasion or friction burn, without mention of infection 8/13/2013    Abrasions lft forearm     Encephalopathy acute 4/27/2016    Encounter regarding vascular access for dialysis for ESRD ([address] 2/2/2017    ESRD (end stage renal disease) on dialysis ([address] 1/17/2017    Forgetfulness     HAS SOME SHORT TERM MEMORY LOSS, QUYEN-WIFE IS LEGAL GUARDIAN    H/O transesophageal echocardiography (AMADEO) for monitoring     Hemodialysis patient ([facility] [address] 11/11/2016    [place]---FRESEMIUS.  TUNNELED CATHETER RT UPPER CHEST    Hyperlipidemia 1990    on Meds    Hypertension 1990    on Meds    Intercritical gout     on Meds    Joint pain, knee 01/13/2017    baldo knee synvisc-1 injections    Major depressive disorder with single episode, in partial remission ([facility] [address] 1/3/2017    Mobility impaired     Muscle weakness     GRNERALIZED    Obesity     HEATHER on CPAP     On CPAP-TO BRING DOS    Respiratory failure ([facility] [address] 03/2016    with open heart surgery, trached x 5 months    S/P cardiac cath     Seborrhea     Severe aortic stenosis 3/9/2016    Severe mitral valve stenosis 3/9/2016    Severe sepsis ([facility] [facility] 4/3/2016    Skin rash     ABD FOLDS    Stasis dermatitis     Thyroid disease     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 lft thumb     Wears glasses     Wheelchair bound     pivots with 2 assists       PAST SURGICAL HISTORY:   Past Surgical History:   Procedure Laterality Date    AORTIC VALVE REPLACEMENT  3/18/16    ARM SURGERY Left 1990    CARDIAC CATHETERIZATION      COLONOSCOPY  11/19/09    HAND SURGERY Left 1990 (x10-12 surgeries)    several surgeries on left arm    KNEE ARTHROSCOPY Left 09/17/99    MITRAL VALVE REPLACEMENT  3/18/16    NASAL SEPTUM SURGERY      OTHER SURGICAL HISTORY  3/18/16    Aortic root Enlargement    OTHER SURGICAL HISTORY  3/18/16    ASD Closure    OTHER SURGICAL HISTORY Right 01/09/2017    AV fistula creation, wrist    OTHER SURGICAL HISTORY Right 08/29/2017    ligation of collateral branch of av fistula right wrist    MA EGD TRANSORAL BIOPSY SINGLE/MULTIPLE N/A 10/17/2017    EGD BIOPSY performed by Tanika Charles MD at [address] ANGIOACCESS AV FISTULA Right 8/29/2017     RIGHT  LOWER ARM AV FISTULA  LIGATION OF AQUIRED BRANCHES X2 performed by Britni Julian DO at [facility]  3/18/16    median       SOCIAL HISTORY:     Tobacco:   History   Smoking Status    Former Smoker    Packs/day: 2.00    Years: 25.00    Types: Cigarettes    Start date: 3/17/1955   [employer] Quit date: 1/1/1980   Smokeless Tobacco    Never Used     Alcohol:   History   Alcohol Use No     Comment: 1-2 drinks per year     Drugs:   History   Drug Use No       FAMILY HISTORY: family history includes Alzheimer's Disease in his father; Diabetes in his maternal grandmother and mother; High Blood Pressure in his sister; Porfirio Standing in his mother. REVIEW OF SYSTEMS:     Please see history of chief complaint above; otherwise no new problems with respect to General, HEENT, Cardiovascular, Respiratory, Gastrointestinal, Genitourinary, Endocrinologic, Musculoskeletal, or Neuropsychiatric complaints. PHYSICAL EXAMINATION:    Vitals: Temp: 97.8 deg F. Pulse: 72. Resp: 16. BP: 120/72. General: A 66 y.o.  male. Alert and oriented to person, place and time. He  does not appear to be in any acute distress. Skin: Skin color, texture, turgor normal. No rashes or lesions. HEENT/Neck: Essentially unremarkable  Lungs: Normal - CTA without rales, rhonchi, or wheezing. Heart: regular rate and rhythm, S1, S2 normal, no murmur, click, rub or gallop No S3 or S4. Abdomen: Obese, soft, non-distended, non-tender, normal active bowel sounds, no masses palpated and no hepatosplenomegaly  Extremities: No clubbing, cyanosis, edema  Neurologic: cranial nerves II-XII are grossly intact    ASSESSMENT:    1. Uncontrolled type 2 diabetes mellitus with chronic kidney disease on chronic dialysis, with long-term current use of insulin ([address]     2. Uncontrolled type 2 diabetes mellitus with diabetic polyneuropathy, with long-term current use of insulin ([facility]     3. Long-term insulin use ([address]     4. ESRD (end stage renal disease) on dialysis ([facility] [address]     5. Dialysis patient ([facility] [address]     6.  Major depressive disorder with single episode, in partial remission ([facility]     7. Severe aortic stenosis     8. Severe mitral valve stenosis     9. Essential hypertension     10. Mixed hyperlipidemia     11. Obstructive sleep apnea     12. Morbid obesity ([facility]     13. BMI 45.0-49.9, adult ([facility] [address]           PLAN:    1. Continue current treatment  2. Nursing home record reviewed and updates summarized and entered into electronic record  3. Nursing home blood sugar logs and diabetic medication administration reviewed. No changes. 4. See nursing home orders and MAR.       Electronically signed by Montez Whaley DO on 11/20/2017 at 10:54 PM  Internal Medicine

## 2017-11-22 ENCOUNTER — TELEPHONE (OUTPATIENT)
Dept: INTERNAL MEDICINE | Age: 72
End: 2017-11-22

## 2017-11-28 ENCOUNTER — ANTI-COAG VISIT (OUTPATIENT)
Dept: INTERNAL MEDICINE | Age: 72
End: 2017-11-28

## 2017-11-28 ENCOUNTER — HOSPITAL ENCOUNTER (OUTPATIENT)
Age: 72
Setting detail: SPECIMEN
Discharge: HOME | End: 2017-11-28
Payer: MEDICARE

## 2017-11-28 DIAGNOSIS — I48.91 ATRIAL FIBRILLATION, UNSPECIFIED TYPE (HCC): ICD-10-CM

## 2017-11-28 LAB
INR BLD: 3.3
PROTHROMBIN TIME: 34.5 SEC (ref 9.4–11.3)

## 2017-11-28 PROCEDURE — 85610 PROTHROMBIN TIME: CPT

## 2017-11-28 PROCEDURE — 36415 COLL VENOUS BLD VENIPUNCTURE: CPT

## 2017-12-05 ENCOUNTER — HOSPITAL ENCOUNTER (OUTPATIENT)
Age: 72
Setting detail: SPECIMEN
Discharge: HOME | End: 2017-12-05
Payer: MEDICARE

## 2017-12-05 ENCOUNTER — ANTI-COAG VISIT (OUTPATIENT)
Dept: INTERNAL MEDICINE | Age: 72
End: 2017-12-05

## 2017-12-05 DIAGNOSIS — I48.91 ATRIAL FIBRILLATION, UNSPECIFIED TYPE (HCC): ICD-10-CM

## 2017-12-05 DIAGNOSIS — I48.20 CHRONIC ATRIAL FIBRILLATION (HCC): Primary | ICD-10-CM

## 2017-12-05 DIAGNOSIS — I48.20 CHRONIC ATRIAL FIBRILLATION (HCC): ICD-10-CM

## 2017-12-05 LAB
INR BLD: 2.3
PROTHROMBIN TIME: 24.9 SEC (ref 9.4–11.3)

## 2017-12-05 PROCEDURE — 36415 COLL VENOUS BLD VENIPUNCTURE: CPT

## 2017-12-05 PROCEDURE — 85610 PROTHROMBIN TIME: CPT

## 2017-12-05 NOTE — PROGRESS NOTES
Notified Danielle Howell at [facility] with patients INR results and instructions

## 2017-12-12 ENCOUNTER — TELEPHONE (OUTPATIENT)
Dept: INTERNAL MEDICINE | Age: 72
End: 2017-12-12

## 2017-12-13 ENCOUNTER — TELEPHONE (OUTPATIENT)
Dept: INTERNAL MEDICINE | Age: 72
End: 2017-12-13

## 2017-12-15 ENCOUNTER — HOSPITAL ENCOUNTER (OUTPATIENT)
Age: 72
Setting detail: SPECIMEN
Discharge: HOME | End: 2017-12-15
Payer: MEDICARE

## 2017-12-15 LAB
DIRECT EXAM: ABNORMAL
Lab: ABNORMAL
SPECIMEN DESCRIPTION: ABNORMAL
STATUS: ABNORMAL

## 2017-12-15 PROCEDURE — 87804 INFLUENZA ASSAY W/OPTIC: CPT

## 2017-12-17 ENCOUNTER — HOSPITAL ENCOUNTER (OUTPATIENT)
Age: 72
Setting detail: SPECIMEN
Discharge: HOME | End: 2017-12-17
Payer: MEDICARE

## 2017-12-17 LAB
CREAT SERPL-MCNC: 6.19 MG/DL (ref 0.7–1.2)
GFR AFRICAN AMERICAN: 11 ML/MIN
GFR NON-AFRICAN AMERICAN: 9 ML/MIN
GFR SERPL CREATININE-BSD FRML MDRD: ABNORMAL ML/MIN/{1.73_M2}
GFR SERPL CREATININE-BSD FRML MDRD: ABNORMAL ML/MIN/{1.73_M2}

## 2017-12-17 PROCEDURE — 82565 ASSAY OF CREATININE: CPT

## 2017-12-19 ENCOUNTER — ANTI-COAG VISIT (OUTPATIENT)
Dept: INTERNAL MEDICINE | Age: 72
End: 2017-12-19

## 2017-12-19 ENCOUNTER — HOSPITAL ENCOUNTER (OUTPATIENT)
Age: 72
Setting detail: SPECIMEN
Discharge: HOME | End: 2017-12-19
Payer: MEDICARE

## 2017-12-19 DIAGNOSIS — I48.91 ATRIAL FIBRILLATION, UNSPECIFIED TYPE (HCC): ICD-10-CM

## 2017-12-19 LAB
INR BLD: 1.5
PROTHROMBIN TIME: 16.3 SEC (ref 9.4–11.3)

## 2017-12-19 PROCEDURE — 99307 SBSQ NF CARE SF MDM 10: CPT | Performed by: NURSE PRACTITIONER

## 2017-12-19 PROCEDURE — 36415 COLL VENOUS BLD VENIPUNCTURE: CPT

## 2017-12-19 PROCEDURE — 85610 PROTHROMBIN TIME: CPT

## 2017-12-23 ENCOUNTER — OUTSIDE SERVICES (OUTPATIENT)
Dept: INTERNAL MEDICINE | Age: 72
End: 2017-12-23
Payer: MEDICARE

## 2017-12-23 DIAGNOSIS — N18.6 ESRD (END STAGE RENAL DISEASE) ON DIALYSIS (HCC): ICD-10-CM

## 2017-12-23 DIAGNOSIS — I05.0 SEVERE MITRAL VALVE STENOSIS: ICD-10-CM

## 2017-12-23 DIAGNOSIS — E66.01 MORBID OBESITY (HCC): ICD-10-CM

## 2017-12-23 DIAGNOSIS — Z79.4 LONG-TERM INSULIN USE (HCC): ICD-10-CM

## 2017-12-23 DIAGNOSIS — I35.0 SEVERE AORTIC STENOSIS: ICD-10-CM

## 2017-12-23 DIAGNOSIS — Z99.2 ESRD (END STAGE RENAL DISEASE) ON DIALYSIS (HCC): ICD-10-CM

## 2017-12-23 DIAGNOSIS — G47.33 OBSTRUCTIVE SLEEP APNEA SYNDROME: ICD-10-CM

## 2017-12-23 DIAGNOSIS — I10 ESSENTIAL HYPERTENSION: ICD-10-CM

## 2017-12-23 DIAGNOSIS — F32.4 MAJOR DEPRESSIVE DISORDER WITH SINGLE EPISODE, IN PARTIAL REMISSION (HCC): ICD-10-CM

## 2017-12-23 DIAGNOSIS — Z99.2 DIALYSIS PATIENT (HCC): ICD-10-CM

## 2017-12-23 DIAGNOSIS — E78.2 MIXED HYPERLIPIDEMIA: ICD-10-CM

## 2017-12-23 NOTE — PROGRESS NOTES
DR. Rodney Mcgraw - 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 He is continuing to do well with dialysis. There are no new complaints at this time. ALLERGIES:   Allergies   Allergen Reactions    Percocet [Oxycodone-Acetaminophen] Itching and Rash     Also causes shaking    Ampicillin Rash       MEDICATIONS: As noted on the [facility] MAR, referenced and incorporated herein.     PAST MEDICAL HISTORY:   Past Medical History:   Diagnosis Date    A-fib ([address] 08/29/2017    new onset    Anemia 4/27/2016    Arthritis     Bacteremia 11/11/2016    Benign prostatic hypertrophy     Chronic kidney disease 10/17/2016    Closed fracture of forearm 8/13/2013    Broken lft forearm     Controlled type 2 diabetes mellitus with chronic kidney disease on chronic dialysis, with long-term current use of insulin ([address]     Decubital ulcer     NON OPEN BUTTOCKS    Dementia     memory problems    Dialysis patient ([address] 1/3/2017    Goes Tue, Thurs, Sat in [place]    Difficult intravenous access     HAS NEEDED PICC TEAM IN THE PAST    Difficulty walking     WHEELCHAIR BOUND-PIVOTS WITH ASSIST O F 2    DJD (degenerative joint disease) of knee     Elbow, forearm, and wrist, abrasion or friction burn, without mention of infection 8/13/2013    Abrasions lft forearm     Encephalopathy acute 4/27/2016    Encounter regarding vascular access for dialysis for ESRD ([address] 2/2/2017    ESRD (end stage renal disease) on dialysis ([address] 1/17/2017    Forgetfulness     HAS SOME SHORT TERM MEMORY LOSS, QUYEN-WIFE IS LEGAL GUARDIAN    episode, in partial remission ([facility]     7. Severe aortic stenosis     8. Severe mitral valve stenosis     9. Essential hypertension     10. Mixed hyperlipidemia     11. Obstructive sleep apnea     12. Morbid obesity ([facility]     13. BMI 45.0-49.9, adult ([facility]           PLAN:    1. Continue current treatment  2. Nursing home record reviewed and updates summarized and entered into electronic record  3. Nursing home blood sugar logs and diabetic medication administration reviewed. No changes. 4. Discontinue Xanax  5. See nursing home orders and MAR.       Electronically signed by Eric Calero DO on 12/23/2017 at 1:57 PM  Internal Medicine

## 2017-12-26 ENCOUNTER — ANTI-COAG VISIT (OUTPATIENT)
Dept: INTERNAL MEDICINE | Age: 72
End: 2017-12-26

## 2017-12-26 ENCOUNTER — HOSPITAL ENCOUNTER (OUTPATIENT)
Age: 72
Setting detail: SPECIMEN
Discharge: HOME | End: 2017-12-26
Payer: MEDICARE

## 2017-12-26 ENCOUNTER — OUTSIDE SERVICES (OUTPATIENT)
Dept: INTERNAL MEDICINE | Age: 72
End: 2017-12-26
Payer: MEDICARE

## 2017-12-26 DIAGNOSIS — I48.0 PAROXYSMAL ATRIAL FIBRILLATION (HCC): ICD-10-CM

## 2017-12-26 DIAGNOSIS — N18.6 ESRD (END STAGE RENAL DISEASE) ON DIALYSIS (HCC): ICD-10-CM

## 2017-12-26 DIAGNOSIS — I35.0 SEVERE AORTIC STENOSIS: ICD-10-CM

## 2017-12-26 DIAGNOSIS — E66.01 MORBID OBESITY (HCC): ICD-10-CM

## 2017-12-26 DIAGNOSIS — E78.2 MIXED HYPERLIPIDEMIA: ICD-10-CM

## 2017-12-26 DIAGNOSIS — Z99.2 DIALYSIS PATIENT (HCC): ICD-10-CM

## 2017-12-26 DIAGNOSIS — Z79.4 LONG-TERM INSULIN USE (HCC): ICD-10-CM

## 2017-12-26 DIAGNOSIS — I10 ESSENTIAL HYPERTENSION: ICD-10-CM

## 2017-12-26 DIAGNOSIS — F32.4 MAJOR DEPRESSIVE DISORDER WITH SINGLE EPISODE, IN PARTIAL REMISSION (HCC): ICD-10-CM

## 2017-12-26 DIAGNOSIS — I48.20 CHRONIC ATRIAL FIBRILLATION (HCC): ICD-10-CM

## 2017-12-26 DIAGNOSIS — I05.0 SEVERE MITRAL VALVE STENOSIS: ICD-10-CM

## 2017-12-26 DIAGNOSIS — G47.33 OBSTRUCTIVE SLEEP APNEA SYNDROME: ICD-10-CM

## 2017-12-26 DIAGNOSIS — Z99.2 ESRD (END STAGE RENAL DISEASE) ON DIALYSIS (HCC): ICD-10-CM

## 2017-12-26 DIAGNOSIS — I48.91 ATRIAL FIBRILLATION, UNSPECIFIED TYPE (HCC): ICD-10-CM

## 2017-12-26 LAB
INR BLD: 3.3
PROTHROMBIN TIME: 35.2 SEC (ref 9.4–11.3)

## 2017-12-26 PROCEDURE — 85610 PROTHROMBIN TIME: CPT

## 2017-12-26 PROCEDURE — 36415 COLL VENOUS BLD VENIPUNCTURE: CPT

## 2017-12-26 PROCEDURE — 99309 SBSQ NF CARE MODERATE MDM 30: CPT | Performed by: INTERNAL MEDICINE

## 2017-12-27 NOTE — PROGRESS NOTES
DR. 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 He is continuing to do well with dialysis. He will be having some work done on his fistula in the near future. There are no new complaints at this time. ALLERGIES:   Allergies   Allergen Reactions    Percocet [Oxycodone-Acetaminophen] Itching and Rash     Also causes shaking    Ampicillin Rash       MEDICATIONS: As noted on the [facility], referenced and incorporated herein.     PAST MEDICAL HISTORY:   Past Medical History:   Diagnosis Date    A-fib ([address] 08/29/2017    new onset    Anemia 4/27/2016    Arthritis     Bacteremia 11/11/2016    Benign prostatic hypertrophy     Chronic kidney disease 10/17/2016    Closed fracture of forearm 8/13/2013    Broken lft forearm     Controlled type 2 diabetes mellitus with chronic kidney disease on chronic dialysis, with long-term current use of insulin ([address]     Decubital ulcer     NON OPEN BUTTOCKS    Dementia     memory problems    Dialysis patient ([address] 1/3/2017    Goes Tue, Thurs, Sat in [place]    Difficult intravenous access     HAS NEEDED PICC TEAM IN THE PAST    Difficulty walking     WHEELCHAIR BOUND-PIVOTS WITH ASSIST O F 2    DJD (degenerative joint disease) of knee     Elbow, forearm, and wrist, abrasion or friction burn, without mention of infection 8/13/2013    Abrasions lft forearm     Encephalopathy acute 4/27/2016    Encounter regarding vascular access for dialysis for ESRD ([address] 2/2/2017    ESRD (end stage renal disease) on dialysis ([address] 1/17/2017    Forgetfulness patient ([facility]     6. Major depressive disorder with single episode, in partial remission (HCC)     7. Paroxysmal atrial fibrillation ([facility]     8. Severe aortic stenosis     9. Severe mitral valve stenosis     10. Essential hypertension     11. Mixed hyperlipidemia     12. Obstructive sleep apnea     13. Morbid obesity ([facility]     14. BMI 45.0-49.9, adult ([facility]           PLAN:    1. Continue current treatment  2. Nursing home record reviewed and updates summarized and entered into electronic record  3. Nursing home blood sugar logs and diabetic medication administration reviewed. No changes. 4. Coumadin management is ongoing. We are the ones managing his Coumadin. 5. See nursing home orders and MAR.       Electronically signed by Le Mcgregor DO on 12/26/2017 at 8:42 PM  Internal Medicine

## 2017-12-28 ENCOUNTER — ANTI-COAG VISIT (OUTPATIENT)
Dept: INTERNAL MEDICINE | Age: 72
End: 2017-12-28

## 2017-12-29 RX ORDER — SODIUM CHLORIDE 9 MG/ML
INJECTION, SOLUTION INTRAVENOUS CONTINUOUS
Status: CANCELLED | OUTPATIENT
Start: 2017-12-29

## 2017-12-31 ENCOUNTER — OUTSIDE SERVICES (OUTPATIENT)
Dept: INTERNAL MEDICINE | Age: 72
End: 2017-12-31
Payer: MEDICARE

## 2017-12-31 DIAGNOSIS — I87.2 VENOUS STASIS DERMATITIS, UNSPECIFIED LATERALITY: ICD-10-CM

## 2017-12-31 DIAGNOSIS — Z99.2 STAGE 5 CHRONIC KIDNEY DISEASE ON CHRONIC DIALYSIS (HCC): ICD-10-CM

## 2017-12-31 DIAGNOSIS — N18.6 STAGE 5 CHRONIC KIDNEY DISEASE ON CHRONIC DIALYSIS (HCC): ICD-10-CM

## 2017-12-31 DIAGNOSIS — L03.116 CELLULITIS OF LEFT LOWER EXTREMITY: Primary | ICD-10-CM

## 2017-12-31 NOTE — PROGRESS NOTES
QUYEN ALTAMIRANO-WIFE IS LEGAL GUARDIAN    H/O transesophageal echocardiography (AMADEO) for monitoring     Hemodialysis patient ([facility] 11/11/2016    [place]-th[place].  TUNNELED CATHETER RT UPPER CHEST    Hyperlipidemia 1990    on Meds    Hypertension 1990    on Meds    Intercritical gout     on Meds    Joint pain, knee 01/13/2017    baldo knee synvisc-1 injections    Major depressive disorder with single episode, in partial remission ([facility] 1/3/2017    Mobility impaired     Muscle weakness     GRNERALIZED    Obesity     HEATHER on CPAP     On CPAP-TO BRING DOS    Respiratory failure ([facility] 03/2016    with open heart surgery, trached x 5 months    S/P cardiac cath     Seborrhea     Severe aortic stenosis 3/9/2016    Severe mitral valve stenosis 3/9/2016    Severe sepsis ([facility] 4/3/2016    Skin rash     ABD FOLDS    Stasis dermatitis     Thyroid disease     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 lft thumb     Wears glasses     Wheelchair bound     pivots with 2 assists       Past Surgical History:   Procedure Laterality Date    AORTIC VALVE REPLACEMENT  3/18/16    ARM SURGERY Left 1990    CARDIAC CATHETERIZATION      COLONOSCOPY  11/19/09    HAND SURGERY Left 1990 (x10-12 surgeries)    several surgeries on left arm    KNEE ARTHROSCOPY Left 09/17/99    MITRAL VALVE REPLACEMENT  3/18/16    NASAL SEPTUM SURGERY      OTHER SURGICAL HISTORY  3/18/16    Aortic root Enlargement    OTHER SURGICAL HISTORY  3/18/16    ASD Closure    OTHER SURGICAL HISTORY Right 01/09/2017    AV fistula creation, wrist    OTHER SURGICAL HISTORY Right 08/29/2017    ligation of collateral branch of av fistula right wrist    [place] EGD TRANSORAL BIOPSY SINGLE/MULTIPLE N/A 10/17/2017    EGD BIOPSY performed by Kenyetta Doty MD at [address] ANGIOACCESS AV FISTULA Right 8/29/2017     RIGHT  LOWER ARM AV FISTULA  LIGATION OF AQUIRED BRANCHES X2 performed by Nela Paez DO at [facility]    STERNOTOMY  3/18/16    median       Current Outpatient Prescriptions on File Prior to Visit   Medication Sig Dispense Refill    glucagon 1 MG injection Inject as directed.  sodium chloride (OCEAN) 0.65 % nasal spray by Nasal route      warfarin (COUMADIN) 5 MG tablet Take 1 tablet by mouth daily 30 tablet 3    lidocaine (XYLOCAINE) 5 % ointment Apply topically three times a week Apply topically as needed. DIALYSIS DAYS      venlafaxine (EFFEXOR) 75 MG tablet Take 75 mg by mouth daily      Amino Acids-Protein Hydrolys (PRO-STAT PO) Take 30 mLs by mouth 2 times daily SUGAR FREE      guaiFENesin (ROBITUSSIN) 100 MG/5ML syrup Take 200 mg by mouth 4 times daily as needed for Cough      ipratropium-albuterol (DUONEB) 0.5-2.5 (3) MG/3ML SOLN nebulizer solution Inhale 1 vial into the lungs every 6 hours as needed       insulin aspart (NOVOLOG) 100 UNIT/ML injection vial Inject into the skin 3 times daily (before meals) Indications: on scale Sliding scale      Nystatin POWD by Does not apply route TID x 10 days (starting 2/13/17) then use PRN      HYDROcodone-acetaminophen (NORCO) 5-325 MG per tablet Take 1 tablet by mouth every 8 hours as needed for Pain .  aspirin 81 MG tablet Take 81 mg by mouth daily      Nutritional Supplements (NEPRO PO) Take 250 mLs by mouth 2 times daily      Skin Protectants, Misc.  (HYDROCERIN) CREA cream Apply topically nightly      senna (SENOKOT) 8.6 MG tablet Take 1 tablet by mouth nightly      omeprazole (PRILOSEC) 20 MG delayed release capsule Take 20 mg by mouth daily      glucagon 1 MG injection Infuse 1 kit intravenously as needed      donepezil (ARICEPT) 5 MG tablet Take 5 mg by mouth every evening      levothyroxine (SYNTHROID) 25 MCG tablet Take 1 tablet by mouth Daily 30 tablet 2    midodrine (PROAMATINE) 5 MG tablet Take 2 tablets by mouth 3 times daily 540 tablet 3    ondansetron (ZOFRAN ODT) 4 MG disintegrating tablet Take 1 tablet by mouth every

## 2018-01-02 ENCOUNTER — TELEPHONE (OUTPATIENT)
Dept: INTERNAL MEDICINE | Age: 73
End: 2018-01-02

## 2018-01-02 ENCOUNTER — HOSPITAL ENCOUNTER (OUTPATIENT)
Dept: INTERVENTIONAL RADIOLOGY/VASCULAR | Age: 73
Discharge: HOME | End: 2018-01-02
Payer: MEDICARE

## 2018-01-03 ENCOUNTER — ANTI-COAG VISIT (OUTPATIENT)
Dept: INTERNAL MEDICINE | Age: 73
End: 2018-01-03

## 2018-01-05 ENCOUNTER — HOSPITAL ENCOUNTER (OUTPATIENT)
Age: 73
Setting detail: SPECIMEN
Discharge: HOME | End: 2018-01-05
Payer: MEDICARE

## 2018-01-05 ENCOUNTER — TELEPHONE (OUTPATIENT)
Dept: INTERNAL MEDICINE | Age: 73
End: 2018-01-05

## 2018-01-05 DIAGNOSIS — I48.91 ATRIAL FIBRILLATION, UNSPECIFIED TYPE (HCC): ICD-10-CM

## 2018-01-08 ENCOUNTER — HOSPITAL ENCOUNTER (OUTPATIENT)
Age: 73
Setting detail: SPECIMEN
Discharge: HOME | End: 2018-01-08
Payer: MEDICARE

## 2018-01-08 DIAGNOSIS — I48.91 ATRIAL FIBRILLATION, UNSPECIFIED TYPE (HCC): ICD-10-CM

## 2018-01-08 PROCEDURE — 85610 PROTHROMBIN TIME: CPT

## 2018-01-09 ENCOUNTER — HOSPITAL ENCOUNTER (OUTPATIENT)
Age: 73
Setting detail: SPECIMEN
Discharge: HOME | End: 2018-01-09
Payer: MEDICARE

## 2018-01-09 ENCOUNTER — ANTI-COAG VISIT (OUTPATIENT)
Dept: INTERNAL MEDICINE | Age: 73
End: 2018-01-09

## 2018-01-09 DIAGNOSIS — I48.0 PAROXYSMAL ATRIAL FIBRILLATION (HCC): ICD-10-CM

## 2018-01-09 LAB
INR BLD: 1.5
PROTHROMBIN TIME: 15.4 SEC (ref 9.4–11.3)

## 2018-01-12 ENCOUNTER — HOSPITAL ENCOUNTER (OUTPATIENT)
Age: 73
Setting detail: SPECIMEN
Discharge: HOME | End: 2018-01-12
Payer: MEDICARE

## 2018-01-13 ENCOUNTER — HOSPITAL ENCOUNTER (OUTPATIENT)
Age: 73
Setting detail: SPECIMEN
Discharge: HOME | End: 2018-01-13
Payer: MEDICARE

## 2018-01-13 LAB
THYROXINE, FREE: 0.64 NG/DL (ref 0.93–1.7)
TSH SERPL DL<=0.05 MIU/L-ACNC: 1.7 MIU/L (ref 0.3–5)

## 2018-01-13 PROCEDURE — 84439 ASSAY OF FREE THYROXINE: CPT

## 2018-01-13 PROCEDURE — 36415 COLL VENOUS BLD VENIPUNCTURE: CPT

## 2018-01-13 PROCEDURE — 84443 ASSAY THYROID STIM HORMONE: CPT

## 2018-01-16 ENCOUNTER — OUTSIDE SERVICES (OUTPATIENT)
Dept: INTERNAL MEDICINE | Age: 73
End: 2018-01-16
Payer: MEDICARE

## 2018-01-16 DIAGNOSIS — G47.33 OBSTRUCTIVE SLEEP APNEA SYNDROME: ICD-10-CM

## 2018-01-16 DIAGNOSIS — I48.0 PAROXYSMAL ATRIAL FIBRILLATION (HCC): ICD-10-CM

## 2018-01-16 DIAGNOSIS — Z99.2 ESRD (END STAGE RENAL DISEASE) ON DIALYSIS (HCC): ICD-10-CM

## 2018-01-16 DIAGNOSIS — Z99.2 DIALYSIS PATIENT (HCC): ICD-10-CM

## 2018-01-16 DIAGNOSIS — I35.0 SEVERE AORTIC STENOSIS: ICD-10-CM

## 2018-01-16 DIAGNOSIS — Z79.4 LONG-TERM INSULIN USE (HCC): ICD-10-CM

## 2018-01-16 DIAGNOSIS — E66.01 MORBID OBESITY (HCC): ICD-10-CM

## 2018-01-16 DIAGNOSIS — F32.4 MAJOR DEPRESSIVE DISORDER WITH SINGLE EPISODE, IN PARTIAL REMISSION (HCC): ICD-10-CM

## 2018-01-16 DIAGNOSIS — I05.0 SEVERE MITRAL VALVE STENOSIS: ICD-10-CM

## 2018-01-16 DIAGNOSIS — N18.6 ESRD (END STAGE RENAL DISEASE) ON DIALYSIS (HCC): ICD-10-CM

## 2018-01-16 DIAGNOSIS — I10 ESSENTIAL HYPERTENSION: ICD-10-CM

## 2018-01-16 DIAGNOSIS — E78.2 MIXED HYPERLIPIDEMIA: ICD-10-CM

## 2018-01-16 PROCEDURE — 99309 SBSQ NF CARE MODERATE MDM 30: CPT | Performed by: INTERNAL MEDICINE

## 2018-01-18 ENCOUNTER — ANTI-COAG VISIT (OUTPATIENT)
Dept: INTERNAL MEDICINE | Age: 73
End: 2018-01-18

## 2018-01-18 ENCOUNTER — HOSPITAL ENCOUNTER (OUTPATIENT)
Age: 73
Setting detail: SPECIMEN
Discharge: HOME | End: 2018-01-18
Payer: MEDICARE

## 2018-01-18 DIAGNOSIS — I48.0 PAROXYSMAL ATRIAL FIBRILLATION (HCC): Primary | ICD-10-CM

## 2018-01-18 DIAGNOSIS — I48.0 PAROXYSMAL ATRIAL FIBRILLATION (HCC): ICD-10-CM

## 2018-01-18 LAB
INR BLD: 2
PROTHROMBIN TIME: 21.3 SEC (ref 9.4–11.3)

## 2018-01-18 PROCEDURE — 85610 PROTHROMBIN TIME: CPT

## 2018-01-18 PROCEDURE — 36415 COLL VENOUS BLD VENIPUNCTURE: CPT

## 2018-02-01 ENCOUNTER — HOSPITAL ENCOUNTER (OUTPATIENT)
Age: 73
Setting detail: SPECIMEN
Discharge: HOME | End: 2018-02-01
Payer: MEDICARE

## 2018-02-01 ENCOUNTER — ANTI-COAG VISIT (OUTPATIENT)
Dept: INTERNAL MEDICINE | Age: 73
End: 2018-02-01

## 2018-02-01 DIAGNOSIS — I48.0 PAROXYSMAL ATRIAL FIBRILLATION (HCC): ICD-10-CM

## 2018-02-01 DIAGNOSIS — I48.91 ATRIAL FIBRILLATION, UNSPECIFIED TYPE (HCC): ICD-10-CM

## 2018-02-01 LAB
INR BLD: 3
PROTHROMBIN TIME: 31.7 SEC (ref 9.4–11.3)

## 2018-02-01 PROCEDURE — 85610 PROTHROMBIN TIME: CPT

## 2018-02-01 PROCEDURE — 36415 COLL VENOUS BLD VENIPUNCTURE: CPT

## 2018-02-06 ENCOUNTER — TELEPHONE (OUTPATIENT)
Dept: INTERNAL MEDICINE | Age: 73
End: 2018-02-06

## 2018-02-06 NOTE — TELEPHONE ENCOUNTER
Spoke with Parrish Bond at [facility]- all instructions given. She reports that pt is on fluid restrictions and on dialysis. She will not increase his fluids, but will continue to monitor and check BS in 1 hour. She will report if over 400.

## 2018-02-06 NOTE — TELEPHONE ENCOUNTER
Orders provided to nurse Carlotta Mera for 14 units of NovoLog now. Recheck blood sugar in one hour and report.

## 2018-02-06 NOTE — TELEPHONE ENCOUNTER
Tell patient to drink plenty of water now, and avoid further food or any other calorie-rich drinks. Then repeat glucose again in 1 hour. Call on-call doctor if glucose remaining greater than 400  At that point. Also continue to monitor patient closely until glucose less than 400.

## 2018-02-06 NOTE — TELEPHONE ENCOUNTER
Yoandy reported BS now is 545. They gave 14 units of Humalog at 4:10 pm. Pt showing no sx hyperglycemia- in no distress.

## 2018-02-09 ENCOUNTER — TELEPHONE (OUTPATIENT)
Dept: INTERNAL MEDICINE | Age: 73
End: 2018-02-09

## 2018-02-15 ENCOUNTER — ANTI-COAG VISIT (OUTPATIENT)
Dept: INTERNAL MEDICINE | Age: 73
End: 2018-02-15

## 2018-02-15 ENCOUNTER — HOSPITAL ENCOUNTER (OUTPATIENT)
Age: 73
Setting detail: SPECIMEN
Discharge: HOME | End: 2018-02-15
Payer: MEDICARE

## 2018-02-15 DIAGNOSIS — I48.91 ATRIAL FIBRILLATION, UNSPECIFIED TYPE (HCC): ICD-10-CM

## 2018-02-15 DIAGNOSIS — I48.0 PAROXYSMAL ATRIAL FIBRILLATION (HCC): ICD-10-CM

## 2018-02-15 LAB
INR BLD: 2.2
PROTHROMBIN TIME: 23.4 SEC (ref 9.4–11.3)

## 2018-02-15 PROCEDURE — 36415 COLL VENOUS BLD VENIPUNCTURE: CPT

## 2018-02-15 PROCEDURE — 85610 PROTHROMBIN TIME: CPT

## 2018-02-27 PROCEDURE — 99309 SBSQ NF CARE MODERATE MDM 30: CPT | Performed by: INTERNAL MEDICINE

## 2018-03-01 ENCOUNTER — HOSPITAL ENCOUNTER (OUTPATIENT)
Age: 73
Setting detail: SPECIMEN
Discharge: HOME | End: 2018-03-01
Payer: MEDICARE

## 2018-03-01 LAB
ESTIMATED AVERAGE GLUCOSE: 186 MG/DL
HBA1C MFR BLD: 8.1 % (ref 4.8–5.9)

## 2018-03-01 PROCEDURE — 36415 COLL VENOUS BLD VENIPUNCTURE: CPT

## 2018-03-01 PROCEDURE — 83036 HEMOGLOBIN GLYCOSYLATED A1C: CPT

## 2018-03-15 ENCOUNTER — ANTI-COAG VISIT (OUTPATIENT)
Dept: INTERNAL MEDICINE | Age: 73
End: 2018-03-15

## 2018-03-15 ENCOUNTER — OFFICE VISIT (OUTPATIENT)
Dept: CARDIOLOGY | Age: 73
End: 2018-03-15
Payer: MEDICARE

## 2018-03-15 ENCOUNTER — HOSPITAL ENCOUNTER (OUTPATIENT)
Age: 73
Setting detail: SPECIMEN
Discharge: HOME | End: 2018-03-15
Payer: MEDICARE

## 2018-03-15 VITALS
WEIGHT: 281 LBS | SYSTOLIC BLOOD PRESSURE: 132 MMHG | BODY MASS INDEX: 44.1 KG/M2 | HEART RATE: 88 BPM | DIASTOLIC BLOOD PRESSURE: 66 MMHG | HEIGHT: 67 IN

## 2018-03-15 DIAGNOSIS — I48.0 PAROXYSMAL ATRIAL FIBRILLATION (HCC): ICD-10-CM

## 2018-03-15 DIAGNOSIS — I10 ESSENTIAL HYPERTENSION: Primary | ICD-10-CM

## 2018-03-15 DIAGNOSIS — I48.91 ATRIAL FIBRILLATION, UNSPECIFIED TYPE (HCC): ICD-10-CM

## 2018-03-15 LAB
INR BLD: 4.1
PROTHROMBIN TIME: 44.3 SEC (ref 9.4–11.3)

## 2018-03-15 PROCEDURE — 36415 COLL VENOUS BLD VENIPUNCTURE: CPT

## 2018-03-15 PROCEDURE — 93000 ELECTROCARDIOGRAM COMPLETE: CPT | Performed by: INTERNAL MEDICINE

## 2018-03-15 PROCEDURE — 99213 OFFICE O/P EST LOW 20 MIN: CPT | Performed by: INTERNAL MEDICINE

## 2018-03-15 PROCEDURE — 85610 PROTHROMBIN TIME: CPT

## 2018-03-15 RX ORDER — SERTRALINE HYDROCHLORIDE 100 MG/1
100 TABLET, FILM COATED ORAL DAILY
COMMUNITY
End: 2019-08-06 | Stop reason: ALTCHOICE

## 2018-03-15 RX ORDER — LORATADINE 10 MG/1
10 CAPSULE, LIQUID FILLED ORAL
Status: ON HOLD | COMMUNITY
End: 2019-04-04 | Stop reason: HOSPADM

## 2018-03-15 NOTE — PROGRESS NOTES
MD   acetaminophen (TYLENOL) 325 MG tablet Take 650 mg by mouth every 6 hours as needed for Pain or Fever     Historical Provider, MD   magnesium hydroxide (MILK OF MAGNESIA) 400 MG/5ML suspension Take 30 mLs by mouth daily as needed for Constipation 3/29/16   Aleksey Quintero MD   Multiple Vitamins-Minerals (MULTIVITAL PO) Take 1 tablet by mouth daily    Historical Provider, MD       Allergies:  Percocet [oxycodone-acetaminophen] and Ampicillin    Social History:   reports that he quit smoking about 38 years ago. His smoking use included Cigarettes. He started smoking about 63 years ago. He has a 50.00 pack-year smoking history. He has never used smokeless tobacco. He reports that he does not drink alcohol or use drugs. REVIEW OF SYSTEMS:  CONSTITUTIONAL:NEGATIVE  HEENT:NEG  Cardiovascular: No chest pain, Yes dyspnea on exertion, No palpitations. Lower extremity edema: No  RESPIRATORY: BAHENA  GASTROINTESTINAL:  positive for reflux  GENITOURINARY:  negative  INTEGUMENT:  negative  MUSCULOSKELETAL:  positive for  pain  NEUROLOGICAL:  negative    PHYSICAL EXAM:      /66   Pulse 88   Ht 5' 7\" (1.702 m)   Wt 281 lb (127.5 kg)   BMI 44.01 kg/m²    HEENT: PERRL, no cervical lymphadenopathy. No masses palpable. Cardiovascular:  · The apical impulse is not displaced  · Heart  Sounds:RRR, KAITLIN, S4  · Jugular venous pulsation Normal  · The carotid upstroke is NL  · Peripheral pulses are symmetrical and full  Respiratory: Good respiratory effort. On auscultation: clear to auscultation bilaterally  Abdomen:  · No masses or tenderness  · Bowel sounds present  Extremities:  ·  No Cyanosis or Clubbing  ·  Lower extremity edema: No  Skin: Warm and dry    Cardiac data:    EKG: Sinu rhythm  -First degree A-V block   P:QRS - 1:1, Abnormal P axis, H Rate 93   Eufemia = 224  -Left axis -anterior fascicular block.    -Poor R-wave progression -nonspecific -consider old anterior infarct.      ABNORMAL     Labs:     CBC: No

## 2018-03-19 ENCOUNTER — OUTSIDE SERVICES (OUTPATIENT)
Dept: INTERNAL MEDICINE | Age: 73
End: 2018-03-19
Payer: MEDICARE

## 2018-03-19 DIAGNOSIS — N18.6 ESRD (END STAGE RENAL DISEASE) ON DIALYSIS (HCC): ICD-10-CM

## 2018-03-19 DIAGNOSIS — Z99.2 ESRD (END STAGE RENAL DISEASE) ON DIALYSIS (HCC): ICD-10-CM

## 2018-03-19 DIAGNOSIS — I48.0 PAROXYSMAL ATRIAL FIBRILLATION (HCC): ICD-10-CM

## 2018-03-19 DIAGNOSIS — I10 ESSENTIAL HYPERTENSION: ICD-10-CM

## 2018-03-19 DIAGNOSIS — E78.2 MIXED HYPERLIPIDEMIA: ICD-10-CM

## 2018-03-19 DIAGNOSIS — F32.4 MAJOR DEPRESSIVE DISORDER WITH SINGLE EPISODE, IN PARTIAL REMISSION (HCC): ICD-10-CM

## 2018-03-19 DIAGNOSIS — E66.01 MORBID OBESITY (HCC): ICD-10-CM

## 2018-03-19 DIAGNOSIS — Z99.2 DIALYSIS PATIENT (HCC): ICD-10-CM

## 2018-03-19 DIAGNOSIS — F01.50 VASCULAR DEMENTIA WITHOUT BEHAVIORAL DISTURBANCE (HCC): ICD-10-CM

## 2018-03-19 DIAGNOSIS — I35.0 SEVERE AORTIC STENOSIS: ICD-10-CM

## 2018-03-19 DIAGNOSIS — I05.0 SEVERE MITRAL VALVE STENOSIS: ICD-10-CM

## 2018-03-19 DIAGNOSIS — G47.33 OBSTRUCTIVE SLEEP APNEA SYNDROME: ICD-10-CM

## 2018-03-19 DIAGNOSIS — Z79.4 LONG-TERM INSULIN USE (HCC): ICD-10-CM

## 2018-03-22 ENCOUNTER — ANTI-COAG VISIT (OUTPATIENT)
Dept: INTERNAL MEDICINE | Age: 73
End: 2018-03-22

## 2018-03-22 ENCOUNTER — HOSPITAL ENCOUNTER (OUTPATIENT)
Age: 73
Setting detail: SPECIMEN
Discharge: HOME | End: 2018-03-22
Payer: MEDICARE

## 2018-03-22 DIAGNOSIS — I48.0 PAROXYSMAL ATRIAL FIBRILLATION (HCC): ICD-10-CM

## 2018-03-22 DIAGNOSIS — I48.91 ATRIAL FIBRILLATION, UNSPECIFIED TYPE (HCC): ICD-10-CM

## 2018-03-22 LAB
INR BLD: 2.2
PROTHROMBIN TIME: 23.2 SEC (ref 9.4–11.3)

## 2018-03-22 PROCEDURE — 85610 PROTHROMBIN TIME: CPT

## 2018-03-22 PROCEDURE — 36415 COLL VENOUS BLD VENIPUNCTURE: CPT

## 2018-03-27 PROCEDURE — 99309 SBSQ NF CARE MODERATE MDM 30: CPT | Performed by: INTERNAL MEDICINE

## 2018-04-03 ENCOUNTER — HOSPITAL ENCOUNTER (OUTPATIENT)
Age: 73
Setting detail: SPECIMEN
Discharge: HOME | End: 2018-04-03
Payer: MEDICARE

## 2018-04-03 LAB
ESTIMATED AVERAGE GLUCOSE: 171 MG/DL
HBA1C MFR BLD: 7.6 % (ref 4.8–5.9)

## 2018-04-03 PROCEDURE — 36415 COLL VENOUS BLD VENIPUNCTURE: CPT

## 2018-04-03 PROCEDURE — 83036 HEMOGLOBIN GLYCOSYLATED A1C: CPT

## 2018-04-05 ENCOUNTER — HOSPITAL ENCOUNTER (OUTPATIENT)
Age: 73
Setting detail: SPECIMEN
Discharge: HOME | End: 2018-04-05
Payer: MEDICARE

## 2018-04-05 ENCOUNTER — ANTI-COAG VISIT (OUTPATIENT)
Dept: INTERNAL MEDICINE | Age: 73
End: 2018-04-05

## 2018-04-05 DIAGNOSIS — I48.91 ATRIAL FIBRILLATION, UNSPECIFIED TYPE (HCC): ICD-10-CM

## 2018-04-05 DIAGNOSIS — I48.0 PAROXYSMAL ATRIAL FIBRILLATION (HCC): ICD-10-CM

## 2018-04-05 LAB
INR BLD: 1.5
PROTHROMBIN TIME: 16.1 SEC (ref 9.4–11.3)

## 2018-04-05 PROCEDURE — 36415 COLL VENOUS BLD VENIPUNCTURE: CPT

## 2018-04-05 PROCEDURE — 85610 PROTHROMBIN TIME: CPT

## 2018-04-17 ENCOUNTER — HOSPITAL ENCOUNTER (OUTPATIENT)
Age: 73
Setting detail: SPECIMEN
Discharge: HOME | End: 2018-04-17
Payer: MEDICARE

## 2018-04-17 ENCOUNTER — ANTI-COAG VISIT (OUTPATIENT)
Dept: INTERNAL MEDICINE | Age: 73
End: 2018-04-17

## 2018-04-17 DIAGNOSIS — I48.0 PAROXYSMAL ATRIAL FIBRILLATION (HCC): ICD-10-CM

## 2018-04-17 DIAGNOSIS — I48.91 ATRIAL FIBRILLATION, UNSPECIFIED TYPE (HCC): ICD-10-CM

## 2018-04-17 LAB
INR BLD: 2.5
PROTHROMBIN TIME: 26.8 SEC (ref 9.4–11.3)

## 2018-04-17 PROCEDURE — 36415 COLL VENOUS BLD VENIPUNCTURE: CPT

## 2018-04-17 PROCEDURE — 85610 PROTHROMBIN TIME: CPT

## 2018-04-22 ENCOUNTER — OUTSIDE SERVICES (OUTPATIENT)
Dept: INTERNAL MEDICINE | Age: 73
End: 2018-04-22
Payer: MEDICARE

## 2018-04-22 DIAGNOSIS — Z99.2 ESRD (END STAGE RENAL DISEASE) ON DIALYSIS (HCC): ICD-10-CM

## 2018-04-22 DIAGNOSIS — Z79.4 LONG-TERM INSULIN USE (HCC): ICD-10-CM

## 2018-04-22 DIAGNOSIS — E78.2 MIXED HYPERLIPIDEMIA: ICD-10-CM

## 2018-04-22 DIAGNOSIS — F01.50 VASCULAR DEMENTIA WITHOUT BEHAVIORAL DISTURBANCE (HCC): ICD-10-CM

## 2018-04-22 DIAGNOSIS — I35.0 SEVERE AORTIC STENOSIS: ICD-10-CM

## 2018-04-22 DIAGNOSIS — Z99.2 DIALYSIS PATIENT (HCC): ICD-10-CM

## 2018-04-22 DIAGNOSIS — N18.6 ESRD (END STAGE RENAL DISEASE) ON DIALYSIS (HCC): ICD-10-CM

## 2018-04-22 DIAGNOSIS — I48.0 PAROXYSMAL ATRIAL FIBRILLATION (HCC): ICD-10-CM

## 2018-04-22 DIAGNOSIS — F32.4 MAJOR DEPRESSIVE DISORDER WITH SINGLE EPISODE, IN PARTIAL REMISSION (HCC): ICD-10-CM

## 2018-04-22 DIAGNOSIS — I05.0 SEVERE MITRAL VALVE STENOSIS: ICD-10-CM

## 2018-04-22 DIAGNOSIS — G47.33 OBSTRUCTIVE SLEEP APNEA SYNDROME: ICD-10-CM

## 2018-04-22 DIAGNOSIS — I10 ESSENTIAL HYPERTENSION: ICD-10-CM

## 2018-04-22 DIAGNOSIS — E66.01 MORBID OBESITY (HCC): ICD-10-CM

## 2018-04-24 PROCEDURE — 99309 SBSQ NF CARE MODERATE MDM 30: CPT | Performed by: INTERNAL MEDICINE

## 2018-05-03 ENCOUNTER — HOSPITAL ENCOUNTER (OUTPATIENT)
Age: 73
Setting detail: SPECIMEN
Discharge: HOME | End: 2018-05-03
Payer: MEDICARE

## 2018-05-03 LAB
ESTIMATED AVERAGE GLUCOSE: 160 MG/DL
HBA1C MFR BLD: 7.2 % (ref 4.8–5.9)

## 2018-05-03 PROCEDURE — 83036 HEMOGLOBIN GLYCOSYLATED A1C: CPT

## 2018-05-03 PROCEDURE — 36415 COLL VENOUS BLD VENIPUNCTURE: CPT

## 2018-05-04 ENCOUNTER — TELEPHONE (OUTPATIENT)
Dept: INTERNAL MEDICINE | Age: 73
End: 2018-05-04

## 2018-05-08 ENCOUNTER — HOSPITAL ENCOUNTER (OUTPATIENT)
Dept: GENERAL RADIOLOGY | Age: 73
Discharge: HOME | End: 2018-05-10
Payer: MEDICARE

## 2018-05-08 ENCOUNTER — HOSPITAL ENCOUNTER (OUTPATIENT)
Age: 73
Setting detail: SPECIMEN
Discharge: HOME | End: 2018-05-08
Payer: MEDICARE

## 2018-05-08 ENCOUNTER — ANTI-COAG VISIT (OUTPATIENT)
Dept: INTERNAL MEDICINE | Age: 73
End: 2018-05-08

## 2018-05-08 DIAGNOSIS — Z95.828: ICD-10-CM

## 2018-05-08 DIAGNOSIS — I48.0 PAROXYSMAL ATRIAL FIBRILLATION (HCC): ICD-10-CM

## 2018-05-08 DIAGNOSIS — I48.91 ATRIAL FIBRILLATION, UNSPECIFIED TYPE (HCC): ICD-10-CM

## 2018-05-08 LAB
INR BLD: 1.4
PROTHROMBIN TIME: 15.3 SEC (ref 9.4–11.3)

## 2018-05-08 PROCEDURE — 36589 REMOVAL TUNNELED CV CATH: CPT

## 2018-05-08 PROCEDURE — 36415 COLL VENOUS BLD VENIPUNCTURE: CPT

## 2018-05-08 PROCEDURE — 85610 PROTHROMBIN TIME: CPT

## 2018-05-08 PROCEDURE — 2500000003 HC RX 250 WO HCPCS

## 2018-05-10 ENCOUNTER — ANTI-COAG VISIT (OUTPATIENT)
Dept: INTERNAL MEDICINE | Age: 73
End: 2018-05-10
Payer: MEDICARE

## 2018-05-10 ENCOUNTER — HOSPITAL ENCOUNTER (OUTPATIENT)
Age: 73
Setting detail: SPECIMEN
Discharge: HOME | End: 2018-05-10
Payer: MEDICARE

## 2018-05-10 DIAGNOSIS — I48.0 PAROXYSMAL ATRIAL FIBRILLATION (HCC): ICD-10-CM

## 2018-05-10 DIAGNOSIS — Z79.01 MONITORING FOR LONG-TERM ANTICOAGULANT USE: ICD-10-CM

## 2018-05-10 DIAGNOSIS — Z51.81 MONITORING FOR LONG-TERM ANTICOAGULANT USE: ICD-10-CM

## 2018-05-10 DIAGNOSIS — I48.0 PAROXYSMAL ATRIAL FIBRILLATION (HCC): Primary | ICD-10-CM

## 2018-05-10 LAB
INR BLD: 1.1
PROTHROMBIN TIME: 11.9 SEC (ref 9.4–11.3)

## 2018-05-10 PROCEDURE — 85610 PROTHROMBIN TIME: CPT

## 2018-05-10 PROCEDURE — 36415 COLL VENOUS BLD VENIPUNCTURE: CPT

## 2018-05-10 PROCEDURE — 93793 ANTICOAG MGMT PT WARFARIN: CPT | Performed by: INTERNAL MEDICINE

## 2018-05-17 ENCOUNTER — ANTI-COAG VISIT (OUTPATIENT)
Dept: INTERNAL MEDICINE | Age: 73
End: 2018-05-17
Payer: MEDICARE

## 2018-05-17 ENCOUNTER — HOSPITAL ENCOUNTER (OUTPATIENT)
Age: 73
Setting detail: SPECIMEN
Discharge: HOME | End: 2018-05-17
Payer: MEDICARE

## 2018-05-17 DIAGNOSIS — Z79.01 MONITORING FOR LONG-TERM ANTICOAGULANT USE: Primary | ICD-10-CM

## 2018-05-17 DIAGNOSIS — I48.0 PAROXYSMAL ATRIAL FIBRILLATION (HCC): ICD-10-CM

## 2018-05-17 DIAGNOSIS — I48.91 ATRIAL FIBRILLATION, UNSPECIFIED TYPE (HCC): ICD-10-CM

## 2018-05-17 DIAGNOSIS — Z51.81 MONITORING FOR LONG-TERM ANTICOAGULANT USE: Primary | ICD-10-CM

## 2018-05-17 LAB
INR BLD: 1.3
PROTHROMBIN TIME: 14.2 SEC (ref 9.4–11.3)

## 2018-05-17 PROCEDURE — 85610 PROTHROMBIN TIME: CPT

## 2018-05-17 PROCEDURE — 93793 ANTICOAG MGMT PT WARFARIN: CPT | Performed by: INTERNAL MEDICINE

## 2018-05-17 PROCEDURE — 36415 COLL VENOUS BLD VENIPUNCTURE: CPT

## 2018-05-25 ENCOUNTER — ANTI-COAG VISIT (OUTPATIENT)
Dept: INTERNAL MEDICINE | Age: 73
End: 2018-05-25
Payer: MEDICARE

## 2018-05-25 ENCOUNTER — HOSPITAL ENCOUNTER (OUTPATIENT)
Age: 73
Setting detail: SPECIMEN
Discharge: HOME | End: 2018-05-25
Payer: MEDICARE

## 2018-05-25 DIAGNOSIS — I48.20 CHRONIC ATRIAL FIBRILLATION (HCC): ICD-10-CM

## 2018-05-25 DIAGNOSIS — Z79.01 MONITORING FOR LONG-TERM ANTICOAGULANT USE: ICD-10-CM

## 2018-05-25 DIAGNOSIS — Z51.81 MONITORING FOR LONG-TERM ANTICOAGULANT USE: ICD-10-CM

## 2018-05-25 DIAGNOSIS — I48.91 ATRIAL FIBRILLATION, UNSPECIFIED TYPE (HCC): ICD-10-CM

## 2018-05-25 DIAGNOSIS — I48.0 PAROXYSMAL ATRIAL FIBRILLATION (HCC): ICD-10-CM

## 2018-05-25 LAB
INR BLD: 1.5
PROTHROMBIN TIME: 15.5 SEC (ref 9.4–11.3)

## 2018-05-25 PROCEDURE — 85610 PROTHROMBIN TIME: CPT

## 2018-05-25 PROCEDURE — 93793 ANTICOAG MGMT PT WARFARIN: CPT | Performed by: INTERNAL MEDICINE

## 2018-05-29 PROCEDURE — 99307 SBSQ NF CARE SF MDM 10: CPT | Performed by: NURSE PRACTITIONER

## 2018-06-01 ENCOUNTER — HOSPITAL ENCOUNTER (OUTPATIENT)
Age: 73
Setting detail: SPECIMEN
Discharge: HOME | End: 2018-06-01
Payer: MEDICARE

## 2018-06-01 ENCOUNTER — OUTSIDE SERVICES (OUTPATIENT)
Dept: INTERNAL MEDICINE | Age: 73
End: 2018-06-01
Payer: MEDICARE

## 2018-06-01 DIAGNOSIS — N40.0 BENIGN PROSTATIC HYPERPLASIA WITHOUT LOWER URINARY TRACT SYMPTOMS: ICD-10-CM

## 2018-06-01 DIAGNOSIS — E66.01 MORBID OBESITY (HCC): ICD-10-CM

## 2018-06-01 DIAGNOSIS — G47.33 OBSTRUCTIVE SLEEP APNEA SYNDROME: ICD-10-CM

## 2018-06-01 DIAGNOSIS — D63.1 ANEMIA IN CHRONIC KIDNEY DISEASE, ON CHRONIC DIALYSIS (HCC): Primary | ICD-10-CM

## 2018-06-01 DIAGNOSIS — I48.0 PAROXYSMAL ATRIAL FIBRILLATION (HCC): ICD-10-CM

## 2018-06-01 DIAGNOSIS — Z99.2 ANEMIA IN CHRONIC KIDNEY DISEASE, ON CHRONIC DIALYSIS (HCC): Primary | ICD-10-CM

## 2018-06-01 DIAGNOSIS — F01.50 VASCULAR DEMENTIA WITHOUT BEHAVIORAL DISTURBANCE (HCC): ICD-10-CM

## 2018-06-01 DIAGNOSIS — E78.2 MIXED HYPERLIPIDEMIA: ICD-10-CM

## 2018-06-01 DIAGNOSIS — F32.4 MAJOR DEPRESSIVE DISORDER WITH SINGLE EPISODE, IN PARTIAL REMISSION (HCC): ICD-10-CM

## 2018-06-01 DIAGNOSIS — I48.91 ATRIAL FIBRILLATION, UNSPECIFIED TYPE (HCC): ICD-10-CM

## 2018-06-01 DIAGNOSIS — I10 ESSENTIAL HYPERTENSION: ICD-10-CM

## 2018-06-01 DIAGNOSIS — N18.6 ANEMIA IN CHRONIC KIDNEY DISEASE, ON CHRONIC DIALYSIS (HCC): Primary | ICD-10-CM

## 2018-06-01 ASSESSMENT — ENCOUNTER SYMPTOMS
COUGH: 0
ABDOMINAL PAIN: 0
NAUSEA: 0
VOMITING: 0
EYE REDNESS: 0
EYE DISCHARGE: 0
EYE PAIN: 0
CONSTIPATION: 0
DIARRHEA: 0
WHEEZING: 0
BLOOD IN STOOL: 0
SHORTNESS OF BREATH: 0
ORTHOPNEA: 0
SORE THROAT: 0

## 2018-06-05 ENCOUNTER — TELEPHONE (OUTPATIENT)
Dept: INTERNAL MEDICINE | Age: 73
End: 2018-06-05

## 2018-06-05 ENCOUNTER — HOSPITAL ENCOUNTER (OUTPATIENT)
Age: 73
Setting detail: SPECIMEN
Discharge: HOME | End: 2018-06-05
Payer: MEDICARE

## 2018-06-05 ENCOUNTER — ANTI-COAG VISIT (OUTPATIENT)
Dept: INTERNAL MEDICINE | Age: 73
End: 2018-06-05
Payer: MEDICARE

## 2018-06-05 DIAGNOSIS — I48.0 PAROXYSMAL ATRIAL FIBRILLATION (HCC): ICD-10-CM

## 2018-06-05 DIAGNOSIS — Z51.81 MONITORING FOR LONG-TERM ANTICOAGULANT USE: ICD-10-CM

## 2018-06-05 DIAGNOSIS — Z79.01 MONITORING FOR LONG-TERM ANTICOAGULANT USE: ICD-10-CM

## 2018-06-05 LAB
INR BLD: 1.9
PROTHROMBIN TIME: 19.6 SEC (ref 9.4–11.3)

## 2018-06-05 PROCEDURE — 85610 PROTHROMBIN TIME: CPT

## 2018-06-05 PROCEDURE — 36415 COLL VENOUS BLD VENIPUNCTURE: CPT

## 2018-06-05 PROCEDURE — 93793 ANTICOAG MGMT PT WARFARIN: CPT | Performed by: INTERNAL MEDICINE

## 2018-06-12 ENCOUNTER — ANTI-COAG VISIT (OUTPATIENT)
Dept: INTERNAL MEDICINE | Age: 73
End: 2018-06-12
Payer: MEDICARE

## 2018-06-12 ENCOUNTER — HOSPITAL ENCOUNTER (OUTPATIENT)
Age: 73
Setting detail: SPECIMEN
Discharge: HOME | End: 2018-06-12
Payer: MEDICARE

## 2018-06-12 DIAGNOSIS — I48.0 PAROXYSMAL ATRIAL FIBRILLATION (HCC): ICD-10-CM

## 2018-06-12 DIAGNOSIS — Z51.81 ENCOUNTER FOR THERAPEUTIC DRUG MONITORING: ICD-10-CM

## 2018-06-12 LAB
INR BLD: 3
PROTHROMBIN TIME: 29.4 SEC (ref 9.4–11.3)

## 2018-06-12 PROCEDURE — 85610 PROTHROMBIN TIME: CPT

## 2018-06-12 PROCEDURE — 93793 ANTICOAG MGMT PT WARFARIN: CPT | Performed by: NURSE PRACTITIONER

## 2018-06-12 PROCEDURE — 36415 COLL VENOUS BLD VENIPUNCTURE: CPT

## 2018-06-18 ENCOUNTER — TELEPHONE (OUTPATIENT)
Dept: INTERNAL MEDICINE | Age: 73
End: 2018-06-18

## 2018-06-19 ENCOUNTER — ANTI-COAG VISIT (OUTPATIENT)
Dept: INTERNAL MEDICINE | Age: 73
End: 2018-06-19
Payer: MEDICARE

## 2018-06-19 ENCOUNTER — HOSPITAL ENCOUNTER (OUTPATIENT)
Age: 73
Setting detail: SPECIMEN
Discharge: HOME | End: 2018-06-19
Payer: MEDICARE

## 2018-06-19 DIAGNOSIS — I48.0 PAROXYSMAL ATRIAL FIBRILLATION (HCC): ICD-10-CM

## 2018-06-19 DIAGNOSIS — Z51.81 MONITORING FOR LONG-TERM ANTICOAGULANT USE: Primary | ICD-10-CM

## 2018-06-19 DIAGNOSIS — Z79.01 MONITORING FOR LONG-TERM ANTICOAGULANT USE: Primary | ICD-10-CM

## 2018-06-19 LAB
INR BLD: 2.9
PROTHROMBIN TIME: 28.7 SEC (ref 9.4–11.3)

## 2018-06-19 PROCEDURE — 36415 COLL VENOUS BLD VENIPUNCTURE: CPT

## 2018-06-19 PROCEDURE — 85610 PROTHROMBIN TIME: CPT

## 2018-06-19 PROCEDURE — 93793 ANTICOAG MGMT PT WARFARIN: CPT | Performed by: NURSE PRACTITIONER

## 2018-06-26 ENCOUNTER — ANTI-COAG VISIT (OUTPATIENT)
Dept: INTERNAL MEDICINE | Age: 73
End: 2018-06-26
Payer: MEDICARE

## 2018-06-26 ENCOUNTER — HOSPITAL ENCOUNTER (OUTPATIENT)
Age: 73
Setting detail: SPECIMEN
Discharge: HOME | End: 2018-06-26
Payer: MEDICARE

## 2018-06-26 DIAGNOSIS — I48.0 PAROXYSMAL ATRIAL FIBRILLATION (HCC): ICD-10-CM

## 2018-06-26 DIAGNOSIS — Z79.01 MONITORING FOR ANTICOAGULANT USE: ICD-10-CM

## 2018-06-26 DIAGNOSIS — Z51.81 MONITORING FOR ANTICOAGULANT USE: ICD-10-CM

## 2018-06-26 LAB
INR BLD: 2.1
PROTHROMBIN TIME: 21.5 SEC (ref 9.4–11.3)

## 2018-06-26 PROCEDURE — 93793 ANTICOAG MGMT PT WARFARIN: CPT | Performed by: INTERNAL MEDICINE

## 2018-06-26 PROCEDURE — 85610 PROTHROMBIN TIME: CPT

## 2018-06-26 PROCEDURE — 36415 COLL VENOUS BLD VENIPUNCTURE: CPT

## 2018-07-02 ENCOUNTER — HOSPITAL ENCOUNTER (OUTPATIENT)
Age: 73
Setting detail: SPECIMEN
Discharge: HOME | End: 2018-07-02
Payer: MEDICARE

## 2018-07-02 DIAGNOSIS — I48.91 ATRIAL FIBRILLATION, UNSPECIFIED TYPE (HCC): ICD-10-CM

## 2018-07-03 ENCOUNTER — HOSPITAL ENCOUNTER (OUTPATIENT)
Age: 73
Setting detail: SPECIMEN
Discharge: HOME | End: 2018-07-03
Payer: MEDICARE

## 2018-07-03 ENCOUNTER — ANTI-COAG VISIT (OUTPATIENT)
Dept: INTERNAL MEDICINE | Age: 73
End: 2018-07-03
Payer: MEDICARE

## 2018-07-03 DIAGNOSIS — I48.0 PAROXYSMAL ATRIAL FIBRILLATION (HCC): ICD-10-CM

## 2018-07-03 LAB
ESTIMATED AVERAGE GLUCOSE: 137 MG/DL
HBA1C MFR BLD: 6.4 % (ref 4.8–5.9)
INR BLD: 2.3
PROTHROMBIN TIME: 23.1 SEC (ref 9.4–11.3)

## 2018-07-03 PROCEDURE — 36415 COLL VENOUS BLD VENIPUNCTURE: CPT

## 2018-07-03 PROCEDURE — 83036 HEMOGLOBIN GLYCOSYLATED A1C: CPT

## 2018-07-03 PROCEDURE — 85610 PROTHROMBIN TIME: CPT

## 2018-07-03 PROCEDURE — 93793 ANTICOAG MGMT PT WARFARIN: CPT | Performed by: INTERNAL MEDICINE

## 2018-07-05 ENCOUNTER — OFFICE VISIT (OUTPATIENT)
Dept: WOUND CARE | Age: 73
End: 2018-07-05
Payer: MEDICARE

## 2018-07-05 VITALS
HEIGHT: 68 IN | WEIGHT: 277.2 LBS | TEMPERATURE: 98.2 F | BODY MASS INDEX: 42.01 KG/M2 | RESPIRATION RATE: 16 BRPM | DIASTOLIC BLOOD PRESSURE: 57 MMHG | SYSTOLIC BLOOD PRESSURE: 123 MMHG | HEART RATE: 88 BPM

## 2018-07-05 DIAGNOSIS — L89.322 DECUBITUS ULCER OF LEFT BUTTOCK, STAGE 2 (HCC): ICD-10-CM

## 2018-07-05 DIAGNOSIS — L89.312 DECUBITUS ULCER OF RIGHT BUTTOCK, STAGE 2 (HCC): Primary | ICD-10-CM

## 2018-07-05 PROCEDURE — G8427 DOCREV CUR MEDS BY ELIG CLIN: HCPCS | Performed by: SURGERY

## 2018-07-05 PROCEDURE — 1036F TOBACCO NON-USER: CPT | Performed by: SURGERY

## 2018-07-05 PROCEDURE — 1123F ACP DISCUSS/DSCN MKR DOCD: CPT | Performed by: SURGERY

## 2018-07-05 PROCEDURE — 99214 OFFICE O/P EST MOD 30 MIN: CPT | Performed by: SURGERY

## 2018-07-05 PROCEDURE — G8417 CALC BMI ABV UP PARAM F/U: HCPCS | Performed by: SURGERY

## 2018-07-05 PROCEDURE — 3017F COLORECTAL CA SCREEN DOC REV: CPT | Performed by: SURGERY

## 2018-07-05 PROCEDURE — 4040F PNEUMOC VAC/ADMIN/RCVD: CPT | Performed by: SURGERY

## 2018-07-05 RX ORDER — WARFARIN SODIUM 3 MG/1
3 TABLET ORAL DAILY
Status: ON HOLD | COMMUNITY
End: 2019-03-19 | Stop reason: HOSPADM

## 2018-07-05 RX ORDER — WARFARIN SODIUM 2 MG/1
2 TABLET ORAL NIGHTLY
Status: ON HOLD | COMMUNITY
End: 2019-03-19 | Stop reason: HOSPADM

## 2018-07-05 RX ORDER — SEVELAMER CARBONATE 800 MG/1
2 TABLET, FILM COATED ORAL
COMMUNITY
End: 2018-07-23

## 2018-07-05 RX ORDER — SEVELAMER CARBONATE 800 MG/1
1 TABLET, FILM COATED ORAL 3 TIMES DAILY
COMMUNITY
End: 2018-07-23

## 2018-07-05 NOTE — PROGRESS NOTES
 Encephalopathy acute 4/27/2016    Encounter regarding vascular access for dialysis for ESRD ([facility] 2/2/2017    ESRD (end stage renal disease) on dialysis ([facility] 1/17/2017    Forgetfulness     HAS SOME SHORT TERM MEMORY LOSS, QUYEN-WIFE IS LEGAL GUARDIAN    H/O transesophageal echocardiography ([facility]ADEO) for monitoring     Hemodialysis patient ([facility] 11/11/2016    [place]. TUNNELED CATHETER RT UPPER CHEST    Hyperlipidemia 1990    on Meds    Hypertension 1990    on Meds    Intercritical gout     on Meds    Joint pain, knee 01/13/2017    baldo knee synvisc-1 injections    Major depressive disorder with single episode, [facility] 1/3/2017    Mobility impaired     Muscle weakness     GRNERALIZED    Obesity     HEATHER on CPAP     On CPAP-TO BRING DOS    Respiratory failure ([facility] 03/2016    with open heart surgery, trached x 5 months    S/P cardiac cath     Seborrhea     Severe aortic stenosis 3/9/2016    Severe mitral valve stenosis 3/9/2016    Severe sepsis ([facility] 4/3/2016    Skin rash     ABD FOLDS    Stasis dermatitis     Thyroid disease     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 lft thumb     Wears glasses     Wheelchair bound     pivots with 2 assists     Current Outpatient Prescriptions on File Prior to Visit   Medication Sig Dispense Refill    sertraline (ZOLOFT) 100 MG tablet Take 100 mg by mouth daily       loratadine (CLARITIN) 10 MG capsule Take 10 mg by mouth      sodium chloride (OCEAN) 0.65 % nasal spray by Nasal route      lidocaine (XYLOCAINE) 5 % ointment Apply topically three times a week Apply topically as needed.  DIALYSIS DAYS      Amino Acids-Protein Hydrolys (PRO-STAT PO) Take 30 mLs by mouth 2 times daily SUGAR FREE      guaiFENesin (ROBITUSSIN) 100 MG/5ML syrup Take 200 mg by mouth 4 times daily as needed for Cough      ipratropium-albuterol (DUONEB) 0.5-2.5 (3) MG/3ML SOLN nebulizer solution Inhale 1 vial into the lungs every 6 hours as needed       insulin aspart (NOVOLOG) 100 UNIT/ML injection vial Inject into the skin 3 times daily (before meals) Indications: on scale Sliding scale      Nystatin POWD by Does not apply route TID x 10 days (starting 2/13/17) then use PRN      HYDROcodone-acetaminophen (NORCO) 5-325 MG per tablet Take 1 tablet by mouth every 8 hours as needed for Pain .  aspirin 81 MG tablet Take 81 mg by mouth daily      Skin Protectants, Misc. (HYDROCERIN) CREA cream Apply topically nightly      senna (SENOKOT) 8.6 MG tablet Take 1 tablet by mouth nightly      omeprazole (PRILOSEC) 20 MG delayed release capsule Take 20 mg by mouth daily      glucagon 1 MG injection Infuse 1 kit intravenously as needed      donepezil (ARICEPT) 5 MG tablet Take 5 mg by mouth every evening      levothyroxine (SYNTHROID) 25 MCG tablet Take 1 tablet by mouth Daily 30 tablet 2    midodrine (PROAMATINE) 5 MG tablet Take 2 tablets by mouth 3 times daily 540 tablet 3    albuterol (PROVENTIL) (2.5 MG/3ML) 0.083% nebulizer solution Take 3 mLs by nebulization every 8 hours as needed for Wheezing 360 each 3    fluticasone (FLONASE) 50 MCG/ACT nasal spray 2 sprays by Nasal route daily 3 Bottle 3    glucose (GLUTOSE 15) 40 % GEL Take 15 g by mouth as needed (hypoglycemia) 15 gram oral as needed if blood sugar is less than 70 ( for hypoglycemia) 45 g 3    atorvastatin (LIPITOR) 10 MG tablet Take 1 tablet by mouth daily (Patient taking differently: Take 10 mg by mouth nightly ) 90 tablet 3    insulin glargine (LANTUS) 100 UNIT/ML injection vial Inject 40 Units into the skin 2 times daily       ALPRAZolam (XANAX) 0.5 MG tablet Take 0.5 mg by mouth 2 times daily.  Rafal Devoid acetaminophen (TYLENOL) 325 MG tablet Take 650 mg by mouth every 6 hours as needed for Pain or Fever       magnesium hydroxide (MILK OF MAGNESIA) 400 MG/5ML suspension Take 30 mLs by mouth daily as needed for Constipation      Multiple Vitamins-Minerals

## 2018-07-05 NOTE — PATIENT INSTRUCTIONS
Use wheelchair cushion in dialysis chair during dialysis. Cleanse excoriated area on buttocks every shift and prn after every BM. Apply barrier cream to excoriated area every shift and prn after every BM . Follow up in 1 week.

## 2018-07-05 NOTE — PROGRESS NOTES
Yusra [facility] notified of new orders for Ran M[place]ton to have cushion in dialysis chair during dialysis. Office note faxed to [phone].

## 2018-07-06 ENCOUNTER — TELEPHONE (OUTPATIENT)
Dept: WOUND CARE | Age: 73
End: 2018-07-06

## 2018-07-06 NOTE — TELEPHONE ENCOUNTER
Pt's wife calls; multiple questions answered re: [facility] chair cushion or similar products. Reinstruct she may also stop in at [address] such as [employer] or [employer] to buy this product. Pt already has 4\" gel cushion in place in his recliner at the [address] [facility], but this is cumbersome to transport. Sedalia Holstein had no further questions at end of call.

## 2018-07-09 ENCOUNTER — TELEPHONE (OUTPATIENT)
Dept: WOUND CARE | Age: 73
End: 2018-07-09

## 2018-07-12 ENCOUNTER — TELEPHONE (OUTPATIENT)
Dept: WOUND CARE | Age: 73
End: 2018-07-12

## 2018-07-12 ENCOUNTER — OFFICE VISIT (OUTPATIENT)
Dept: WOUND CARE | Age: 73
End: 2018-07-12
Payer: MEDICARE

## 2018-07-12 VITALS
HEART RATE: 72 BPM | SYSTOLIC BLOOD PRESSURE: 118 MMHG | HEIGHT: 68 IN | TEMPERATURE: 98.2 F | DIASTOLIC BLOOD PRESSURE: 68 MMHG

## 2018-07-12 DIAGNOSIS — L89.322 DECUBITUS ULCER OF LEFT BUTTOCK, STAGE 2 (HCC): ICD-10-CM

## 2018-07-12 DIAGNOSIS — S31.109A WOUND OF RIGHT GROIN, INITIAL ENCOUNTER: ICD-10-CM

## 2018-07-12 DIAGNOSIS — L89.312 DECUBITUS ULCER OF RIGHT BUTTOCK, STAGE 2 (HCC): Primary | ICD-10-CM

## 2018-07-12 PROCEDURE — G8427 DOCREV CUR MEDS BY ELIG CLIN: HCPCS | Performed by: SURGERY

## 2018-07-12 PROCEDURE — 4040F PNEUMOC VAC/ADMIN/RCVD: CPT | Performed by: SURGERY

## 2018-07-12 PROCEDURE — 99213 OFFICE O/P EST LOW 20 MIN: CPT | Performed by: SURGERY

## 2018-07-12 PROCEDURE — 3017F COLORECTAL CA SCREEN DOC REV: CPT | Performed by: SURGERY

## 2018-07-12 PROCEDURE — G8417 CALC BMI ABV UP PARAM F/U: HCPCS | Performed by: SURGERY

## 2018-07-12 PROCEDURE — 1123F ACP DISCUSS/DSCN MKR DOCD: CPT | Performed by: SURGERY

## 2018-07-12 PROCEDURE — 1101F PT FALLS ASSESS-DOCD LE1/YR: CPT | Performed by: SURGERY

## 2018-07-12 PROCEDURE — 1036F TOBACCO NON-USER: CPT | Performed by: SURGERY

## 2018-07-12 RX ORDER — DOXYCYCLINE HYCLATE 50 MG/1
50 CAPSULE ORAL 2 TIMES DAILY
COMMUNITY
End: 2018-12-03 | Stop reason: ALTCHOICE

## 2018-07-12 NOTE — PROGRESS NOTES
Since here for wound check of his buttocks. Also complains of a redness on his stomach and a wound in the right groin. The buttock wounds are slightly worse. They're stage II. And there is a little drainage. We've been putting barrier cream on it. Again, he has tried everything at the nursing home from multiple different dressings. It appears the main thing is pressure. There is a question from the wife whether the pressure bed at the nursing home is adequately inflating. There is also concern of the chairs at the dialysis unit. We are trying to get a cushion for those chairs but it is going through a slow insurance process. At this time. We will continue the barrier cream one more week on these area and we will contact the nursing home to work on both of check of the bed or bariatric alternating air mattress and possibly checking with them to get a cushion for him to take 2 dialysis. If it still struggling next week. We will consider a different dressing such as of foam dressing again. We would think of this for possible more protection. The abdomen does not have a wound. There is some blotchy redness or erythema to it. It's kinder warm. It's nonblanching. Not sure if this is more of a burn or irritation or cellulitis. It's very blotchy in nature and therefore not sure if the cellulitis. We went ahead and marked it with permanent marker today. He is also being started on doxycycline 50 mg twice a day for 7 days. We will continue to observe this with the markings and see if it changes. It starts to grow outside the markings, I would come to the ER and have the patient admitted for IV antibiotics. We will also check a CBC to make sure there is no sign of infection. He also has a small wound on the right groin or panniculus area. This is at the crease of his panniculus. It's probably due to moisture slight tear.   They're putting a sheet in there to try to absorb the moisture

## 2018-07-14 NOTE — TELEPHONE ENCOUNTER
Suri,  I know his wife was also concerned and felt strongly that the air mattress was not working properly. Can Yoandy check that issue. Otherwise , not much more we can do.

## 2018-07-17 ENCOUNTER — ANTI-COAG VISIT (OUTPATIENT)
Dept: INTERNAL MEDICINE | Age: 73
End: 2018-07-17
Payer: MEDICARE

## 2018-07-17 ENCOUNTER — HOSPITAL ENCOUNTER (OUTPATIENT)
Age: 73
Setting detail: SPECIMEN
Discharge: HOME | End: 2018-07-17
Payer: MEDICARE

## 2018-07-17 DIAGNOSIS — Z51.81 MONITORING FOR LONG-TERM ANTICOAGULANT USE: ICD-10-CM

## 2018-07-17 DIAGNOSIS — I48.0 PAROXYSMAL ATRIAL FIBRILLATION (HCC): ICD-10-CM

## 2018-07-17 DIAGNOSIS — Z79.01 MONITORING FOR LONG-TERM ANTICOAGULANT USE: ICD-10-CM

## 2018-07-17 LAB
INR BLD: 2.9
PROTHROMBIN TIME: 28.5 SEC (ref 9.4–11.3)

## 2018-07-17 PROCEDURE — 93793 ANTICOAG MGMT PT WARFARIN: CPT | Performed by: INTERNAL MEDICINE

## 2018-07-17 PROCEDURE — 85610 PROTHROMBIN TIME: CPT

## 2018-07-17 PROCEDURE — 36415 COLL VENOUS BLD VENIPUNCTURE: CPT

## 2018-07-19 ENCOUNTER — TELEPHONE (OUTPATIENT)
Dept: WOUND CARE | Age: 73
End: 2018-07-19

## 2018-07-19 ENCOUNTER — OFFICE VISIT (OUTPATIENT)
Dept: WOUND CARE | Age: 73
End: 2018-07-19
Payer: MEDICARE

## 2018-07-19 VITALS — SYSTOLIC BLOOD PRESSURE: 114 MMHG | TEMPERATURE: 98.4 F | DIASTOLIC BLOOD PRESSURE: 50 MMHG | HEART RATE: 82 BPM

## 2018-07-19 DIAGNOSIS — L89.312 DECUBITUS ULCER OF RIGHT BUTTOCK, STAGE 2 (HCC): ICD-10-CM

## 2018-07-19 DIAGNOSIS — L89.322 DECUBITUS ULCER OF LEFT BUTTOCK, STAGE 2 (HCC): Primary | ICD-10-CM

## 2018-07-19 PROCEDURE — 1036F TOBACCO NON-USER: CPT | Performed by: SURGERY

## 2018-07-19 PROCEDURE — 1101F PT FALLS ASSESS-DOCD LE1/YR: CPT | Performed by: SURGERY

## 2018-07-19 PROCEDURE — G8417 CALC BMI ABV UP PARAM F/U: HCPCS | Performed by: SURGERY

## 2018-07-19 PROCEDURE — G8427 DOCREV CUR MEDS BY ELIG CLIN: HCPCS | Performed by: SURGERY

## 2018-07-19 PROCEDURE — 1123F ACP DISCUSS/DSCN MKR DOCD: CPT | Performed by: SURGERY

## 2018-07-19 PROCEDURE — 99212 OFFICE O/P EST SF 10 MIN: CPT | Performed by: SURGERY

## 2018-07-19 PROCEDURE — 4040F PNEUMOC VAC/ADMIN/RCVD: CPT | Performed by: SURGERY

## 2018-07-19 PROCEDURE — 3017F COLORECTAL CA SCREEN DOC REV: CPT | Performed by: SURGERY

## 2018-07-19 NOTE — PROGRESS NOTES
The buttock wounds look slightly better. There is stage I and stage II. There is no drainage today. We think that the majority of the issue is pressure. We were concerned that his air alternating mattress was not working properly. We asked the nursing home. If they would get it checked out and they are doing that. The wife says that the monitor says low flow all the time. She was also worried about the chairs at dialysis. They're very hard final.  She now has a cushion that they got for this and he has used it once. She thinks that is helping. The erythema on the abdomen is gone. He was treated with antibiotics using doxycycline. Plan: We will continue barrier cream.  We will have the air alternating mattress checked at the nursing home. And we will continue the cushion at the dialysis unit for the chair. We will see him in 3 weeks.

## 2018-07-23 RX ORDER — SEVELAMER CARBONATE 800 MG/1
2 TABLET, FILM COATED ORAL
Qty: 270 TABLET | Refills: 3 | Status: SHIPPED | OUTPATIENT
Start: 2018-07-23 | End: 2018-07-25

## 2018-07-23 RX ORDER — SEVELAMER CARBONATE 800 MG/1
1 TABLET, FILM COATED ORAL 3 TIMES DAILY
Qty: 270 TABLET | Refills: 3 | Status: CANCELLED | OUTPATIENT
Start: 2018-07-23

## 2018-07-25 ENCOUNTER — TELEPHONE (OUTPATIENT)
Dept: INTERNAL MEDICINE | Age: 73
End: 2018-07-25

## 2018-07-25 RX ORDER — SEVELAMER CARBONATE 800 MG/1
TABLET, FILM COATED ORAL
Qty: 270 TABLET | Refills: 5 | Status: SHIPPED | OUTPATIENT
Start: 2018-07-25 | End: 2018-07-26 | Stop reason: SDUPTHER

## 2018-07-25 NOTE — TELEPHONE ENCOUNTER
Last appt: 7/25/2018  Next appt: Visit date not found    Patient wife came to [facility] and said patient need a script that says take two tablets three times a day and plus 1 with snacks. Wife said patient has three snacks a day so he should have 9 pills a day total.  The wife was wondering if [facility] could put that on the script to [facility].

## 2018-07-25 NOTE — TELEPHONE ENCOUNTER
Spoke with Peng Sanon at [place], Per the facility [facility], since 3/9/18, pt using Renvela 800mg 2 tabs 3 times a day with meals and additional 1 tab 3 times a day with snack. New rx pended.

## 2018-07-26 ENCOUNTER — OUTSIDE SERVICES (OUTPATIENT)
Dept: FAMILY MEDICINE CLINIC | Age: 73
End: 2018-07-26
Payer: MEDICARE

## 2018-07-26 DIAGNOSIS — I87.2 VENOUS STASIS DERMATITIS, UNSPECIFIED LATERALITY: ICD-10-CM

## 2018-07-26 DIAGNOSIS — Z99.2 ANEMIA IN CHRONIC KIDNEY DISEASE, ON CHRONIC DIALYSIS (HCC): ICD-10-CM

## 2018-07-26 DIAGNOSIS — N18.6 ANEMIA IN CHRONIC KIDNEY DISEASE, ON CHRONIC DIALYSIS (HCC): ICD-10-CM

## 2018-07-26 DIAGNOSIS — N40.0 BENIGN PROSTATIC HYPERPLASIA WITHOUT LOWER URINARY TRACT SYMPTOMS: ICD-10-CM

## 2018-07-26 DIAGNOSIS — G47.33 OBSTRUCTIVE SLEEP APNEA SYNDROME: ICD-10-CM

## 2018-07-26 DIAGNOSIS — I67.82 CHRONIC CEREBRAL ISCHEMIA: ICD-10-CM

## 2018-07-26 DIAGNOSIS — F01.50 VASCULAR DEMENTIA WITHOUT BEHAVIORAL DISTURBANCE (HCC): ICD-10-CM

## 2018-07-26 DIAGNOSIS — D63.1 ANEMIA IN CHRONIC KIDNEY DISEASE, ON CHRONIC DIALYSIS (HCC): ICD-10-CM

## 2018-07-26 DIAGNOSIS — R41.3 MEMORY LOSS: ICD-10-CM

## 2018-07-26 DIAGNOSIS — I10 ESSENTIAL HYPERTENSION: Primary | ICD-10-CM

## 2018-07-26 DIAGNOSIS — I05.0 SEVERE MITRAL VALVE STENOSIS: ICD-10-CM

## 2018-07-26 DIAGNOSIS — R26.2 DIFFICULTY WALKING: ICD-10-CM

## 2018-07-26 DIAGNOSIS — F32.4 MAJOR DEPRESSIVE DISORDER WITH SINGLE EPISODE, IN PARTIAL REMISSION (HCC): ICD-10-CM

## 2018-07-26 DIAGNOSIS — E78.2 MIXED HYPERLIPIDEMIA: ICD-10-CM

## 2018-07-26 DIAGNOSIS — I35.0 SEVERE AORTIC STENOSIS: ICD-10-CM

## 2018-07-26 DIAGNOSIS — Z99.2 DIALYSIS PATIENT (HCC): ICD-10-CM

## 2018-07-26 PROCEDURE — 99308 SBSQ NF CARE LOW MDM 20: CPT | Performed by: FAMILY MEDICINE

## 2018-07-26 NOTE — PROGRESS NOTES
scale    Historical Provider, MD   Nystatin POWD by Does not apply route TID x 10 days (starting 2/13/17) then use PRN 2/13/17   Historical Provider, MD   HYDROcodone-acetaminophen (NORCO) 5-325 MG per tablet Take 1 tablet by mouth every 8 hours as needed for Pain . Historical Provider, MD   aspirin 81 MG tablet Take 81 mg by mouth daily    Historical Provider, MD   Nutritional Supplements (NEPRO PO) Take 250 mLs by mouth 2 times daily    Historical Provider, MD   Skin Protectants, Misc.  (HYDROCERIN) CREA cream Apply topically nightly    Historical Provider, MD   senna (SENOKOT) 8.6 MG tablet Take 1 tablet by mouth nightly    Historical Provider, MD   omeprazole (PRILOSEC) 20 MG delayed release capsule Take 20 mg by mouth daily    Historical Provider, MD   glucagon 1 MG injection Infuse 1 kit intravenously as needed    Historical Provider, MD   donepezil (ARICEPT) 5 MG tablet Take 5 mg by mouth every evening 10/26/16   Historical Provider, MD   levothyroxine (SYNTHROID) 25 MCG tablet Take 1 tablet by mouth Daily 10/26/16   Marbella Cabezas, APRN - CNP   midodrine (PROAMATINE) 5 MG tablet Take 2 tablets by mouth 3 times daily 10/24/16   Octavio Mcarthur DO   ondansetron (ZOFRAN ODT) 4 MG disintegrating tablet Take 1 tablet by mouth every 6 hours as needed for Nausea or Vomiting 10/24/16   Octavio Mcarthur DO   albuterol (PROVENTIL) (2.5 MG/3ML) 0.083% nebulizer solution Take 3 mLs by nebulization every 8 hours as needed for Wheezing 10/24/16   Octavio Mcarthur DO   fluticasone (FLONASE) 50 MCG/ACT nasal spray 2 sprays by Nasal route daily 10/24/16   Octavio Mcarthur DO   glucose (GLUTOSE 15) 40 % GEL Take 15 g by mouth as needed (hypoglycemia) 15 gram oral as needed if blood sugar is less than 70 ( for hypoglycemia) 10/24/16   Octavio Mcarthur DO   atorvastatin (LIPITOR) 10 MG tablet Take 1 tablet by mouth daily  Patient taking differently: Take 10 mg by mouth nightly  10/24/16 Octavio Mcarthur,    insulin glargine (LANTUS) 100 UNIT/ML injection vial Inject 40 Units into the skin 2 times daily     Historical Provider, MD   ALPRAZolam (XANAX) 0.5 MG tablet Take 0.5 mg by mouth 2 times daily. Meri Song     Historical Provider, MD   acetaminophen (TYLENOL) 325 MG tablet Take 650 mg by mouth every 6 hours as needed for Pain or Fever     Historical Provider, MD   magnesium hydroxide (MILK OF MAGNESIA) 400 MG/5ML suspension Take 30 mLs by mouth daily as needed for Constipation 3/29/16   Gi Orta MD   Multiple Vitamins-Minerals (MULTIVITAL PO) Take 1 tablet by mouth daily    Historical Provider, MD     Allergies:  Percocet [oxycodone-acetaminophen] and Ampicillin    Past Medical History:    Past Medical History:   Diagnosis Date    A-fib ([facility] 08/29/2017    new onset    Anemia 4/27/2016    Arthritis     Bacteremia 11/11/2016    Benign prostatic hypertrophy     Chronic kidney disease 10/17/2016    Closed fracture of forearm 8/13/2013    Broken lft forearm     Controlled type 2 diabetes mellitus with chronic kidney disease on chronic dialysis, with long-term current use of insulin ([facility] [address] [facility]     Decubital ulcer     NON OPEN BUTTOCKS    Dementia     memory problems    Dialysis patient ([facility] [address] 1/3/2017    Goes Tue, Thurs, Sat in [place]    Difficult intravenous access     HAS NEEDED PICC TEAM IN THE PAST    Difficulty walking     WHEELCHAIR BOUND-PIVOTS WITH ASSIST O F 2    DJD (degenerative joint disease) of knee     Elbow, forearm, and wrist, abrasion or friction burn, without mention of infection 8/13/2013    Abrasions lft forearm     Encephalopathy acute 4/27/2016    Encounter regarding vascular access for dialysis for ESRD ([facility] [facility] 2/2/2017    ESRD (end stage renal disease) on dialysis ([facility] [facility] 1/17/2017    Forgetfulness     HAS SOME SHORT TERM MEMORY LOSS, QUYEN-WIFE IS LEGAL GUARDIAN    H/O transesophageal echocardiography (AMADEO) for monitoring     Hemodialysis patient ([facility] [address] 11/11/2016    tues[place]th[place]---[facility].  TUNNELED CATHETER RT UPPER CHEST    Hyperlipidemia 1990    on Meds    Hypertension 1990    on Meds    Intercritical gout     on Meds    Joint pain, knee 01/13/2017    baldo knee synvisc-1 injections    Major depressive disorder with single episode, in partial remission ([facility] [facility] 1/3/2017    Mobility impaired     Muscle weakness     GRNERALIZED    Obesity     HEATHER on CPAP     On CPAP-TO BRING DOS    Respiratory failure ([facility] [address] [facility] 03/2016    with open heart surgery, trached x 5 months    S/P cardiac cath     Seborrhea     Severe aortic stenosis 3/9/2016    Severe mitral valve stenosis 3/9/2016    Severe sepsis ([facility] [facility] 4/3/2016    Skin rash     ABD FOLDS    Stasis dermatitis     Thyroid disease     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 lft thumb     Wears glasses     Wheelchair bound     pivots with 2 assists       Past Surgical History:    Past Surgical History:   Procedure Laterality Date    AORTIC VALVE REPLACEMENT  03/18/2016    bioprosthetic    ARM SURGERY Left 1990    CARDIAC CATHETERIZATION      CENTRAL VENOUS CATHETER Right 02/21/2018    DIALYSIS CATHETER Dr Marquis Islas COLONOSCOPY  11/19/09    HAND SURGERY Left 1990 (x10-12 surgeries)    several surgeries on left arm    KNEE ARTHROSCOPY Left 09/17/99    MITRAL VALVE REPLACEMENT  03/18/2016    bioprosthetic     NASAL SEPTUM SURGERY      OTHER SURGICAL HISTORY  3/18/16    Aortic root Enlargement    OTHER SURGICAL HISTORY  3/18/16    ASD Closure    OTHER SURGICAL HISTORY Right 01/09/2017    AV fistula creation, wrist    OTHER SURGICAL HISTORY Right 08/29/2017    ligation of collateral branch of av fistula right wrist    [facility] TRANSORAL BIOPSY SINGLE/MULTIPLE N/A 10/17/2017    EGD BIOPSY performed by Liban Hopper MD at [address] ANGIOACCESS AV FISTULA Right 8/29/2017     RIGHT  LOWER ARM AV FISTULA  LIGATION OF AQUIRED BRANCHES X2

## 2018-07-29 RX ORDER — SEVELAMER CARBONATE 800 MG/1
TABLET, FILM COATED ORAL
Qty: 360 TABLET | Refills: 5 | Status: SHIPPED | OUTPATIENT
Start: 2018-07-29 | End: 2019-04-22 | Stop reason: SDUPTHER

## 2018-07-31 ENCOUNTER — HOSPITAL ENCOUNTER (OUTPATIENT)
Age: 73
Setting detail: SPECIMEN
Discharge: HOME | End: 2018-07-31
Payer: MEDICARE

## 2018-07-31 ENCOUNTER — ANTI-COAG VISIT (OUTPATIENT)
Dept: INTERNAL MEDICINE | Age: 73
End: 2018-07-31
Payer: MEDICARE

## 2018-07-31 DIAGNOSIS — Z51.81 ENCOUNTER FOR THERAPEUTIC DRUG MONITORING: ICD-10-CM

## 2018-07-31 DIAGNOSIS — I48.0 PAROXYSMAL ATRIAL FIBRILLATION (HCC): ICD-10-CM

## 2018-07-31 LAB
INR BLD: 3.3
PROTHROMBIN TIME: 32.8 SEC (ref 9.4–11.3)

## 2018-07-31 PROCEDURE — 36415 COLL VENOUS BLD VENIPUNCTURE: CPT

## 2018-07-31 PROCEDURE — 93793 ANTICOAG MGMT PT WARFARIN: CPT | Performed by: NURSE PRACTITIONER

## 2018-07-31 PROCEDURE — 85610 PROTHROMBIN TIME: CPT

## 2018-07-31 RX ORDER — SEVELAMER CARBONATE 800 MG/1
TABLET, FILM COATED ORAL
Qty: 360 TABLET | Refills: 5 | OUTPATIENT
Start: 2018-07-31

## 2018-08-03 ENCOUNTER — HOSPITAL ENCOUNTER (EMERGENCY)
Age: 73
Discharge: HOME | End: 2018-08-03
Attending: EMERGENCY MEDICINE
Payer: MEDICARE

## 2018-08-03 VITALS
BODY MASS INDEX: 42.58 KG/M2 | WEIGHT: 280 LBS | RESPIRATION RATE: 12 BRPM | DIASTOLIC BLOOD PRESSURE: 51 MMHG | OXYGEN SATURATION: 97 % | SYSTOLIC BLOOD PRESSURE: 124 MMHG | HEART RATE: 85 BPM | TEMPERATURE: 98.3 F

## 2018-08-03 DIAGNOSIS — D63.1 ANEMIA IN CHRONIC KIDNEY DISEASE, ON CHRONIC DIALYSIS (HCC): Primary | ICD-10-CM

## 2018-08-03 DIAGNOSIS — N18.6 ANEMIA IN CHRONIC KIDNEY DISEASE, ON CHRONIC DIALYSIS (HCC): Primary | ICD-10-CM

## 2018-08-03 DIAGNOSIS — Z99.2 ANEMIA IN CHRONIC KIDNEY DISEASE, ON CHRONIC DIALYSIS (HCC): Primary | ICD-10-CM

## 2018-08-03 LAB
ABSOLUTE EOS #: 0.4 K/UL (ref 0–0.4)
ABSOLUTE IMMATURE GRANULOCYTE: ABNORMAL K/UL (ref 0–0.3)
ABSOLUTE LYMPH #: 1.1 K/UL (ref 1–4.8)
ABSOLUTE MONO #: 0.6 K/UL (ref 0.1–1.2)
ANION GAP SERPL CALCULATED.3IONS-SCNC: 10 MMOL/L (ref 9–17)
BASOPHILS # BLD: 1 % (ref 0–2)
BASOPHILS ABSOLUTE: 0.1 K/UL (ref 0–0.2)
BUN BLDV-MCNC: 28 MG/DL (ref 8–23)
BUN/CREAT BLD: 9 (ref 9–20)
CALCIUM SERPL-MCNC: 9.2 MG/DL (ref 8.6–10.4)
CHLORIDE BLD-SCNC: 94 MMOL/L (ref 98–107)
CO2: 37 MMOL/L (ref 20–31)
CREAT SERPL-MCNC: 3.25 MG/DL (ref 0.7–1.2)
DIFFERENTIAL TYPE: ABNORMAL
EOSINOPHILS RELATIVE PERCENT: 4 % (ref 1–8)
GFR AFRICAN AMERICAN: 23 ML/MIN
GFR NON-AFRICAN AMERICAN: 19 ML/MIN
GFR SERPL CREATININE-BSD FRML MDRD: ABNORMAL ML/MIN/{1.73_M2}
GFR SERPL CREATININE-BSD FRML MDRD: ABNORMAL ML/MIN/{1.73_M2}
GLUCOSE BLD-MCNC: 247 MG/DL (ref 70–99)
HCT VFR BLD CALC: 25.4 % (ref 41–53)
HEMOGLOBIN: 8.1 G/DL (ref 13.5–17.5)
IMMATURE GRANULOCYTES: ABNORMAL %
INR BLD: 2.9
LYMPHOCYTES # BLD: 11 % (ref 15–43)
MCH RBC QN AUTO: 29.4 PG (ref 26–34)
MCHC RBC AUTO-ENTMCNC: 31.9 G/DL (ref 31–37)
MCV RBC AUTO: 92 FL (ref 80–100)
MONOCYTES # BLD: 6 % (ref 6–14)
MORPHOLOGY: ABNORMAL
NRBC AUTOMATED: ABNORMAL PER 100 WBC
PARTIAL THROMBOPLASTIN TIME: 49.9 SEC (ref 27–35)
PDW BLD-RTO: 19.3 % (ref 11–14.5)
PLATELET # BLD: 301 K/UL (ref 140–450)
PLATELET ESTIMATE: ABNORMAL
PMV BLD AUTO: 6.2 FL (ref 6–12)
POTASSIUM SERPL-SCNC: 3.8 MMOL/L (ref 3.7–5.3)
PROTHROMBIN TIME: 28.8 SEC (ref 9.4–11.3)
RBC # BLD: 2.77 M/UL (ref 4.5–5.9)
RBC # BLD: ABNORMAL 10*6/UL
SEG NEUTROPHILS: 78 % (ref 44–74)
SEGMENTED NEUTROPHILS ABSOLUTE COUNT: 7.8 K/UL (ref 1.8–7.7)
SODIUM BLD-SCNC: 141 MMOL/L (ref 135–144)
WBC # BLD: 10 K/UL (ref 3.5–11)
WBC # BLD: ABNORMAL 10*3/UL

## 2018-08-03 PROCEDURE — 99284 EMERGENCY DEPT VISIT MOD MDM: CPT

## 2018-08-03 PROCEDURE — P9016 RBC LEUKOCYTES REDUCED: HCPCS

## 2018-08-03 PROCEDURE — 36430 TRANSFUSION BLD/BLD COMPNT: CPT

## 2018-08-03 PROCEDURE — 85730 THROMBOPLASTIN TIME PARTIAL: CPT

## 2018-08-03 PROCEDURE — 86850 RBC ANTIBODY SCREEN: CPT

## 2018-08-03 PROCEDURE — 86900 BLOOD TYPING SEROLOGIC ABO: CPT

## 2018-08-03 PROCEDURE — 85025 COMPLETE CBC W/AUTO DIFF WBC: CPT

## 2018-08-03 PROCEDURE — 86901 BLOOD TYPING SEROLOGIC RH(D): CPT

## 2018-08-03 PROCEDURE — 36415 COLL VENOUS BLD VENIPUNCTURE: CPT

## 2018-08-03 PROCEDURE — 86920 COMPATIBILITY TEST SPIN: CPT

## 2018-08-03 PROCEDURE — 2580000003 HC RX 258: Performed by: EMERGENCY MEDICINE

## 2018-08-03 PROCEDURE — 80048 BASIC METABOLIC PNL TOTAL CA: CPT

## 2018-08-03 PROCEDURE — 85610 PROTHROMBIN TIME: CPT

## 2018-08-03 RX ORDER — 0.9 % SODIUM CHLORIDE 0.9 %
250 INTRAVENOUS SOLUTION INTRAVENOUS ONCE
Status: COMPLETED | OUTPATIENT
Start: 2018-08-03 | End: 2018-08-03

## 2018-08-03 RX ADMIN — SODIUM CHLORIDE 250 ML: 0.9 INJECTION, SOLUTION INTRAVENOUS at 20:15

## 2018-08-03 NOTE — ED PROVIDER NOTES
remission ([facility] [address] Mobility impaired; Muscle weakness; Obesity; HEATHER on CPAP; Respiratory failure ([facility] [address] S/P cardiac cath; Seborrhea; Severe aortic stenosis; Severe mitral valve stenosis; Severe sepsis ([facility] [address] Skin rash; Stasis dermatitis; Thyroid disease; Traumatic amputation of thumb; Wears glasses; and Wheelchair bound. SURGICAL HISTORY      has a past surgical history that includes Colonoscopy (11/19/09); Knee arthroscopy (Left, 09/17/99); Hand surgery (Left, 1990 (x10-12 surgeries)); Arm Surgery (Left, 1990); Cardiac catheterization; Nasal septum surgery; Sternotomy (3/18/16); Aortic valve replacement (03/18/2016); Mitral valve replacement (03/18/2016); other surgical history (3/18/16); other surgical history (3/18/16); other surgical history (Right, 01/09/2017); other surgical history (Right, 08/29/2017); pr ligatn angioaccess av fistula (Right, 8/29/2017); pr egd transoral biopsy single/multiple (N/A, 10/17/2017); and central venous catheter (Right, 02/21/2018). CURRENT MEDICATIONS       Previous Medications    ACETAMINOPHEN (TYLENOL) 325 MG TABLET    Take 650 mg by mouth every 6 hours as needed for Pain or Fever     ALBUTEROL (PROVENTIL) (2.5 MG/3ML) 0.083% NEBULIZER SOLUTION    Take 3 mLs by nebulization every 8 hours as needed for Wheezing    ALPRAZOLAM (XANAX) 0.5 MG TABLET    Take 0.5 mg by mouth 2 times daily.  .    AMINO ACIDS-PROTEIN HYDROLYS (PRO-STAT PO)    Take 30 mLs by mouth 2 times daily SUGAR FREE    ASPIRIN 81 MG TABLET    Take 81 mg by mouth daily    ATORVASTATIN (LIPITOR) 10 MG TABLET    Take 1 tablet by mouth daily    DONEPEZIL (ARICEPT) 5 MG TABLET    Take 5 mg by mouth every evening    DOXYCYCLINE (VIBRAMYCIN) 50 MG CAPSULE    Take 50 mg by mouth 2 times daily    FLUTICASONE (FLONASE) 50 MCG/ACT NASAL SPRAY    2 sprays by Nasal route daily    GLUCAGON 1 MG INJECTION    Infuse 1 kit intravenously as needed    GLUCOSE (GLUTOSE 15) 40 % GEL    Take 15 g by mouth as needed (hypoglycemia) 15 no tenderness. Neurological: He is alert. GCS of 15 with no focal deficits appreciated   Skin: Skin is warm and dry. Psychiatric: He has a normal mood and affect. Nursing note and vitals reviewed.       DIFFERENTIAL DIAGNOSIS/ MDM:     We will check some basic labs    DIAGNOSTIC RESULTS       LABS:  Results for orders placed or performed during the hospital encounter of 37/35/27   Basic Metabolic Panel   Result Value Ref Range    Glucose 247 (H) 70 - 99 mg/dL    BUN 28 (H) 8 - 23 mg/dL    CREATININE 3.25 (H) 0.70 - 1.20 mg/dL    Bun/Cre Ratio 9 9 - 20    Calcium 9.2 8.6 - 10.4 mg/dL    Sodium 141 135 - 144 mmol/L    Potassium 3.8 3.7 - 5.3 mmol/L    Chloride 94 (L) 98 - 107 mmol/L    CO2 37 (H) 20 - 31 mmol/L    Anion Gap 10 9 - 17 mmol/L    GFR Non-African American 19 (L) >60 mL/min    GFR  23 (L) >60 mL/min    GFR Comment          GFR Staging NOT REPORTED    CBC Auto Differential   Result Value Ref Range    WBC 10.0 3.5 - 11.0 k/uL    RBC 2.77 (L) 4.5 - 5.9 m/uL    Hemoglobin 8.1 (L) 13.5 - 17.5 g/dL    Hematocrit 25.4 (L) 41 - 53 %    MCV 92.0 80 - 100 fL    MCH 29.4 26 - 34 pg    MCHC 31.9 31 - 37 g/dL    RDW 19.3 (H) 11.0 - 14.5 %    Platelets 289 608 - 482 k/uL    MPV 6.2 6.0 - 12.0 fL    NRBC Automated NOT REPORTED per 100 WBC    Differential Type NOT REPORTED     Immature Granulocytes NOT REPORTED 0 %    Absolute Immature Granulocyte NOT REPORTED 0.00 - 0.30 k/uL    WBC Morphology NOT REPORTED     RBC Morphology NOT REPORTED     Platelet Estimate NOT REPORTED     Monocytes 6 6 - 14 %    Lymphocytes 11 (L) 15 - 43 %    Seg Neutrophils 78 (H) 44 - 74 %    Eosinophils % 4 1 - 8 %    Basophils 1 0 - 2 %    Absolute Mono # 0.60 0.1 - 1.2 k/uL    Absolute Lymph # 1.10 1.0 - 4.8 k/uL    Segs Absolute 7.80 (H) 1.8 - 7.7 k/uL    Absolute Eos # 0.40 0.0 - 0.4 k/uL    Basophils # 0.10 0.0 - 0.2 k/uL    Morphology Platelet count adequate    Protime-INR   Result Value Ref Range    Protime 28.8 (H) 9.4 - 11.3 sec    INR 2.9    APTT   Result Value Ref Range    PTT 49.9 (H) 27.0 - 35.0 sec           EMERGENCY DEPARTMENT COURSE:   Vitals:    Vitals:    08/03/18 1617   BP: (!) 133/56   Pulse: 83   Resp: 12   Temp: 98 °F (36.7 °C)   TempSrc: Tympanic   SpO2: 98%   Weight: 280 lb (127 kg)     -------------------------  BP: (!) 133/56, Temp: 98 °F (36.7 °C), Pulse: 83, Resp: 12      RE-EVALUATION:  The patient was discussed with our hospitalist, who is requesting that the patient receive 1 unit of packed red blood cells and then be sent back to the nursing home where he can be monitored as an outpatient, so we will go ahead and type and cross for 1 unit of packed red blood cells transfused. The patient and then send him back to the nursing home for outpatient workup of his anemia of chronic disease  At this time the patient is without objective evidence of an acute process requiring hospitalization or inpatient management. They have remained hemodynamically stable throughout their entire ED visit and are stable for discharge with outpatient follow-up. The plan has been discussed in detail and they are aware of the specific conditions for emergent return, as well as the importance of follow-up    I have reviewed the disposition diagnosis with the patient and or their family/guardian. I have answered their questions and given discharge instructions. They voiced understanding of these instructions and did not have any further questions or complaints. CONSULTS:  Did discuss the patient with my hospitalist, who is requesting that we transfuse the patient one unit of packed red blood cells and then send him back to the nursing home where he can be worked up as an outpatient given that the patient is asymptomatic and has actually had an improvement in his hemoglobin with a hemoglobin drawn on the first being 7.5, and today's hemoglobin being 8.1    PROCEDURES:  None    FINAL IMPRESSION      1.  Anemia in chronic kidney disease, on chronic dialysis [facility])          DISPOSITION/PLAN   DISPOSITION        CONDITION ON DISPOSITION:   Stable    PATIENT REFERRED TO:  Kadi Hanson,   [address] [facility]  [phone]    Schedule an appointment as soon as possible for a visit in 3 days        DISCHARGE MEDICATIONS:  New Prescriptions    No medications on file       (Please note that portions of this note were completed with a voice recognition program.  Efforts were made to edit the dictations but occasionally words are mis-transcribed.)    Pierre MD, F.A.C.E.P.   Attending Emergency Medicine Physician       Laurence Aranda MD  08/03/18 2591

## 2018-08-03 NOTE — ED NOTES
Spoke with Elliot Blas RN at [facility] to obtain recent Hgb levels.    7/16-9.7  7/23-8.9  7/30-8.0  8/1-7.5  It was also reported that the pt is taking Mircera (RBC stimulator) 225mcg every 2 weeks      Tawnya Sahu RN  08/03/18 1640

## 2018-08-06 LAB
ABO/RH: NORMAL
ANTIBODY SCREEN: NEGATIVE
ARM BAND NUMBER: NORMAL
BLD PROD TYP BPU: NORMAL
CROSSMATCH RESULT: NORMAL
DISPENSE STATUS BLOOD BANK: NORMAL
EXPIRATION DATE: NORMAL
TRANSFUSION STATUS: NORMAL
UNIT DIVISION: 0
UNIT NUMBER: NORMAL

## 2018-08-07 ENCOUNTER — HOSPITAL ENCOUNTER (OUTPATIENT)
Age: 73
Setting detail: SPECIMEN
Discharge: HOME | End: 2018-08-07
Payer: MEDICARE

## 2018-08-07 ENCOUNTER — ANTI-COAG VISIT (OUTPATIENT)
Dept: INTERNAL MEDICINE | Age: 73
End: 2018-08-07
Payer: MEDICARE

## 2018-08-07 DIAGNOSIS — Z79.01 MONITORING FOR LONG-TERM ANTICOAGULANT USE: ICD-10-CM

## 2018-08-07 DIAGNOSIS — Z51.81 MONITORING FOR LONG-TERM ANTICOAGULANT USE: ICD-10-CM

## 2018-08-07 DIAGNOSIS — I48.0 PAROXYSMAL ATRIAL FIBRILLATION (HCC): ICD-10-CM

## 2018-08-07 LAB
HCT VFR BLD CALC: 24.8 % (ref 41–53)
HEMOGLOBIN: 8.2 G/DL (ref 13.5–17.5)
INR BLD: 2.8
PROTHROMBIN TIME: 27.8 SEC (ref 9.4–11.3)

## 2018-08-07 PROCEDURE — 85610 PROTHROMBIN TIME: CPT

## 2018-08-07 PROCEDURE — 36415 COLL VENOUS BLD VENIPUNCTURE: CPT

## 2018-08-07 PROCEDURE — G0328 FECAL BLOOD SCRN IMMUNOASSAY: HCPCS

## 2018-08-07 PROCEDURE — 93793 ANTICOAG MGMT PT WARFARIN: CPT | Performed by: NURSE PRACTITIONER

## 2018-08-07 PROCEDURE — 85014 HEMATOCRIT: CPT

## 2018-08-07 PROCEDURE — 85018 HEMOGLOBIN: CPT

## 2018-08-08 LAB
DATE, STOOL #1: ABNORMAL
DATE, STOOL #2: ABNORMAL
DATE, STOOL #3: ABNORMAL
HEMOCCULT SP1 STL QL: POSITIVE
HEMOCCULT SP2 STL QL: ABNORMAL
HEMOCCULT SP3 STL QL: ABNORMAL
TIME, STOOL #1: 200
TIME, STOOL #2: ABNORMAL
TIME, STOOL #3: ABNORMAL

## 2018-08-09 ENCOUNTER — OFFICE VISIT (OUTPATIENT)
Dept: WOUND CARE | Age: 73
End: 2018-08-09
Payer: MEDICARE

## 2018-08-09 VITALS
OXYGEN SATURATION: 97 % | TEMPERATURE: 97.7 F | DIASTOLIC BLOOD PRESSURE: 48 MMHG | HEART RATE: 88 BPM | RESPIRATION RATE: 18 BRPM | SYSTOLIC BLOOD PRESSURE: 108 MMHG

## 2018-08-09 DIAGNOSIS — L89.312 DECUBITUS ULCER OF RIGHT BUTTOCK, STAGE 2 (HCC): ICD-10-CM

## 2018-08-09 DIAGNOSIS — L89.322 DECUBITUS ULCER OF LEFT BUTTOCK, STAGE 2 (HCC): Primary | ICD-10-CM

## 2018-08-09 PROCEDURE — G8417 CALC BMI ABV UP PARAM F/U: HCPCS | Performed by: SURGERY

## 2018-08-09 PROCEDURE — 1101F PT FALLS ASSESS-DOCD LE1/YR: CPT | Performed by: SURGERY

## 2018-08-09 PROCEDURE — 1123F ACP DISCUSS/DSCN MKR DOCD: CPT | Performed by: SURGERY

## 2018-08-09 PROCEDURE — 1036F TOBACCO NON-USER: CPT | Performed by: SURGERY

## 2018-08-09 PROCEDURE — G8427 DOCREV CUR MEDS BY ELIG CLIN: HCPCS | Performed by: SURGERY

## 2018-08-09 PROCEDURE — 99212 OFFICE O/P EST SF 10 MIN: CPT | Performed by: SURGERY

## 2018-08-09 PROCEDURE — 3017F COLORECTAL CA SCREEN DOC REV: CPT | Performed by: SURGERY

## 2018-08-09 PROCEDURE — 4040F PNEUMOC VAC/ADMIN/RCVD: CPT | Performed by: SURGERY

## 2018-08-09 NOTE — PATIENT INSTRUCTIONS
Change barrier ointment to a zinc-oxide/calazime mix such as Calmoseptine; apply to buttocks 2x/day and with toileting hygiene. Follow up in wound clinic as needed; no appointment scheduled today. Call clinic for questions or concerns. Continue pressure redistribution cushions. Pt may sit up. Limit seating to meals and recreation. Use pressure redistribution cushion on all seating surfaces.

## 2018-08-10 ENCOUNTER — HOSPITAL ENCOUNTER (OUTPATIENT)
Age: 73
Setting detail: SPECIMEN
Discharge: HOME | End: 2018-08-10
Payer: MEDICARE

## 2018-08-10 LAB
DATE, STOOL #1: 8
DATE, STOOL #2: ABNORMAL
DATE, STOOL #3: ABNORMAL
HEMOCCULT SP1 STL QL: POSITIVE
HEMOCCULT SP2 STL QL: ABNORMAL
HEMOCCULT SP3 STL QL: ABNORMAL
TIME, STOOL #1: 0
TIME, STOOL #2: ABNORMAL
TIME, STOOL #3: ABNORMAL

## 2018-08-10 PROCEDURE — G0328 FECAL BLOOD SCRN IMMUNOASSAY: HCPCS

## 2018-08-10 NOTE — PROGRESS NOTES
Report faxed to [facility] and [address] [place] [address] Marques nicole talked with [facility] about setting up appt with Gen adrian.

## 2018-08-13 ENCOUNTER — OFFICE VISIT (OUTPATIENT)
Dept: UROLOGY | Age: 73
End: 2018-08-13
Payer: MEDICARE

## 2018-08-13 VITALS — OXYGEN SATURATION: 99 % | SYSTOLIC BLOOD PRESSURE: 112 MMHG | HEART RATE: 75 BPM | DIASTOLIC BLOOD PRESSURE: 62 MMHG

## 2018-08-13 DIAGNOSIS — R31.0 GROSS HEMATURIA: Primary | ICD-10-CM

## 2018-08-13 PROCEDURE — 1123F ACP DISCUSS/DSCN MKR DOCD: CPT | Performed by: UROLOGY

## 2018-08-13 PROCEDURE — 1101F PT FALLS ASSESS-DOCD LE1/YR: CPT | Performed by: UROLOGY

## 2018-08-13 PROCEDURE — G8427 DOCREV CUR MEDS BY ELIG CLIN: HCPCS | Performed by: UROLOGY

## 2018-08-13 PROCEDURE — 3017F COLORECTAL CA SCREEN DOC REV: CPT | Performed by: UROLOGY

## 2018-08-13 PROCEDURE — 1036F TOBACCO NON-USER: CPT | Performed by: UROLOGY

## 2018-08-13 PROCEDURE — 4040F PNEUMOC VAC/ADMIN/RCVD: CPT | Performed by: UROLOGY

## 2018-08-13 PROCEDURE — 99204 OFFICE O/P NEW MOD 45 MIN: CPT | Performed by: UROLOGY

## 2018-08-13 PROCEDURE — G8417 CALC BMI ABV UP PARAM F/U: HCPCS | Performed by: UROLOGY

## 2018-08-13 NOTE — PROGRESS NOTES
Recommend further evaluation with chest CT with contrast to assess for other pulmonary nodules. 3. Diastatic median sternotomy defect which may be acute or chronic. 4. Nonspecific, centrally cystic lesion in the region of the right gluteus minimus muscle.  Recommend further evaluation with pre and post-contrast MRI to better assess this lesion. 5. Findings of chronic calcific pancreatitis. 6. Moderate volume ascites. 7. Colonic diverticulosis without diverticulitis. 8. Collapsed gallbladder difficult to evaluate. Previously seen gallstones are not well seen. 9. Simple left renal cysts. PAST MEDICAL, FAMILY AND SOCIAL HISTORY:  Past Medical History:   Diagnosis Date    A-fib ([facility] [address] 08/29/2017    new onset    Anemia 4/27/2016    Arthritis     Bacteremia 11/11/2016    Benign prostatic hypertrophy     Chronic kidney disease 10/17/2016    Closed fracture of forearm 8/13/2013    Broken lft forearm     Controlled type 2 diabetes mellitus with chronic kidney disease on chronic dialysis, with long-term current use of insulin ([address]     Decubital ulcer     NON OPEN BUTTOCKS    Dementia     memory problems    Dialysis patient ([facility] [address] 1/3/2017    Goes [facility], Sat in [place]    Difficult intravenous access     HAS NEEDED PICC TEAM IN THE PAST    Difficulty walking     WHEELCHAIR BOUND-PIVOTS WITH ASSIST O F 2    DJD (degenerative joint disease) of knee     Elbow, forearm, and wrist, abrasion or friction burn, without mention of infection 8/13/2013    Abrasions lft forearm     Encephalopathy acute 4/27/2016    Encounter regarding vascular access for dialysis for ESRD ([facility] [address] 2/2/2017    ESRD (end stage renal disease) on dialysis ([facility] [address] 1/17/2017    Forgetfulness     HAS SOME SHORT TERM MEMORY LOSS, QUYEN-WIFE IS LEGAL GUARDIAN    H/O transesophageal echocardiography (AMADEO) for monitoring     Hemodialysis patient ([address] 11/11/2016    [place].  TUNNELED Orders Placed:  Orders Placed This Encounter   Procedures    CT ABDOMEN PELVIS WO CONTRAST Additional Contrast? None     Standing Status:   Future     Number of Occurrences:   1     Standing Expiration Date:   8/13/2019     Order Specific Question:   Additional Contrast?     Answer:   None     Order Specific Question:   Reason for exam:     Answer:   hematuria            Devyn Zhu MD

## 2018-08-14 ENCOUNTER — ANTI-COAG VISIT (OUTPATIENT)
Dept: INTERNAL MEDICINE | Age: 73
End: 2018-08-14
Payer: MEDICARE

## 2018-08-14 ENCOUNTER — TELEPHONE (OUTPATIENT)
Dept: WOUND CARE | Age: 73
End: 2018-08-14

## 2018-08-14 ENCOUNTER — HOSPITAL ENCOUNTER (OUTPATIENT)
Age: 73
Setting detail: SPECIMEN
Discharge: HOME | End: 2018-08-14
Payer: MEDICARE

## 2018-08-14 DIAGNOSIS — I48.0 PAROXYSMAL ATRIAL FIBRILLATION (HCC): ICD-10-CM

## 2018-08-14 DIAGNOSIS — Z79.01 MONITORING FOR LONG-TERM ANTICOAGULANT USE: ICD-10-CM

## 2018-08-14 DIAGNOSIS — Z51.81 MONITORING FOR LONG-TERM ANTICOAGULANT USE: ICD-10-CM

## 2018-08-14 LAB
INR BLD: 2.1
PROTHROMBIN TIME: 20.7 SEC (ref 9.4–11.3)

## 2018-08-14 PROCEDURE — 93793 ANTICOAG MGMT PT WARFARIN: CPT | Performed by: INTERNAL MEDICINE

## 2018-08-14 PROCEDURE — 36415 COLL VENOUS BLD VENIPUNCTURE: CPT

## 2018-08-14 PROCEDURE — 85610 PROTHROMBIN TIME: CPT

## 2018-08-15 ENCOUNTER — INITIAL CONSULT (OUTPATIENT)
Dept: SURGERY | Age: 73
End: 2018-08-15
Payer: MEDICARE

## 2018-08-15 VITALS
WEIGHT: 280.2 LBS | HEIGHT: 67 IN | HEART RATE: 84 BPM | BODY MASS INDEX: 43.98 KG/M2 | TEMPERATURE: 98.3 F | SYSTOLIC BLOOD PRESSURE: 118 MMHG | DIASTOLIC BLOOD PRESSURE: 58 MMHG

## 2018-08-15 DIAGNOSIS — R19.5 POSITIVE OCCULT STOOL BLOOD TEST: Primary | ICD-10-CM

## 2018-08-15 PROCEDURE — 99214 OFFICE O/P EST MOD 30 MIN: CPT | Performed by: SURGERY

## 2018-08-15 NOTE — LETTER
COLONOSCOPY             Day Before Examination:        Morning:   Mix bowel prep according to directions and refrigerate. Only CLEAR  LIQUIDS are permitted until midnight. NO FOOD THIS DAY!! Clear liquids including any of the following:   Water   Clear broth or bouillon   7-up, Sprite or Ginger ale   Gatorade or Pa-Aid   Popsicles   Coffee or tea (without milk or sweetener)   Plain Jell-o   NOTHING RED OR PURPLE     At 12 noon take two (2) Dulcolax tablets     Evening: Begin drinking prep at 4:00pm. Drink an 8 ounce glass every 10 minutes. It is  best to drink the whole glass rapidly rather than sipping small amounts. Continue  drinking until the bottle is empty. Bowel movements should begin approximately one(1) hour after the first glass of prep. They will continue periodically one (1) to two (2) hours after drinking the first glass. If you experience bloating, cramping or nausea set the prep aside for 30-40 minutes, the start again. This is temporary and will disappear once bowel movements begin. AMBULATORY SURGERY INSTRUCTIONS    - If you should develop a cold, sore throat, cough, fever or other new indication of illness prior to the scheduled surgery, please notify the [address] [place] [address] office as early as possible. - DO NOT EAT OR DRINK ANYTHING AFTER MIDNIGHT THE NIGHT BEFORE YOUR SURGERY. This includes water, tea, juice, cereal, coffee, etc.    - Refrain from smoking the day of your surgery or procedure. - Wear simple loose clothing, which can be easily changed. - Do not wear any make-up, lipstick or nail polish. - Please leave contact lenses at home or bring container for them. - Leave jewelry (including rings) and other valuables at home. - Make arrangements for a family member or friend to drive you to and from the surgery center.  The anesthetic may affect your judgement following

## 2018-08-15 NOTE — PROGRESS NOTES
Subjective   Tracee Su is a 67 y.o. male who presents today for evaluation of recent occult blood positive stool test.  Patient has multiple medical issues and including chronic kidney failure for which she has dialysis dependent. Recently at dialysis when he had his hemoglobin checked it was noted to be in the mid 7 range and due to concern that this was not his baseline he was sent to ER. In the ER he had 2 fecal occult blood tests performed which showed positive on both. Additionally he was noted to have blood in his urine. Patient is on Coumadin but states his INR is usually within acceptable range. Patient was referred to urology for the blood noted in his urine and he was seen by Dr. Joe Jackson. Per notes it looks like he is set up to undergo radiologic evaluation to further ascertain the source of the blood in his urine. Due to the blood in his stool he was referred to general surgery for further evaluation. Patient does report colonoscopy approximately 9-10 years ago which per records showed only diverticulosis. Patient denies any obvious blood in his stool he is noted recently, denies any recent significant unintended weight loss, and reports no abdominal pain. Patient continues to tolerate normal diet. No family history of inflammatory bowel conditions or colon cancer reported.     Past Medical History:   Diagnosis Date    A-fib ([address] 08/29/2017    new onset    Anemia 4/27/2016    Arthritis     Bacteremia 11/11/2016    Benign prostatic hypertrophy     Chronic kidney disease 10/17/2016    Closed fracture of forearm 8/13/2013    Broken lft forearm     Controlled type 2 diabetes mellitus with chronic kidney disease on chronic dialysis, with long-term current use of insulin ([address]     Decubital ulcer     NON OPEN BUTTOCKS    Dementia     memory problems    Dialysis patient ([address] 1/3/2017    Jayden Solorio, Sat in [facility]    Difficult intravenous access     HAS NEEDED PICC TEAM IN THE mouth every evening      levothyroxine (SYNTHROID) 25 MCG tablet Take 1 tablet by mouth Daily (Patient taking differently: Take 50 mcg by mouth Daily ) 30 tablet 2    midodrine (PROAMATINE) 5 MG tablet Take 2 tablets by mouth 3 times daily 540 tablet 3    fluticasone (FLONASE) 50 MCG/ACT nasal spray 2 sprays by Nasal route daily 3 Bottle 3    atorvastatin (LIPITOR) 10 MG tablet Take 1 tablet by mouth daily (Patient taking differently: Take 10 mg by mouth nightly ) 90 tablet 3    insulin glargine (LANTUS) 100 UNIT/ML injection vial Inject 40 Units into the skin 2 times daily       ALPRAZolam (XANAX) 0.5 MG tablet Take 0.5 mg by mouth 2 times daily. [facility] acetaminophen (TYLENOL) 325 MG tablet Take 650 mg by mouth every 6 hours as needed for Pain or Fever       Multiple Vitamins-Minerals (MULTIVITAL PO) Take 1 tablet by mouth daily      doxycycline (VIBRAMYCIN) 50 MG capsule Take 50 mg by mouth 2 times daily      loratadine (CLARITIN) 10 MG capsule Take 10 mg by mouth      guaiFENesin (ROBITUSSIN) 100 MG/5ML syrup Take 200 mg by mouth 4 times daily as needed for Cough      ipratropium-albuterol (DUONEB) 0.5-2.5 (3) MG/3ML SOLN nebulizer solution Inhale 1 vial into the lungs every 6 hours as needed       Nystatin POWD by Does not apply route TID x 10 days (starting 2/13/17) then use PRN      HYDROcodone-acetaminophen (NORCO) 5-325 MG per tablet Take 1 tablet by mouth every 8 hours as needed for Pain .       Nutritional Supplements (NEPRO PO) Take 250 mLs by mouth 2 times daily      senna (SENOKOT) 8.6 MG tablet Take 1 tablet by mouth nightly      glucagon 1 MG injection Infuse 1 kit intravenously as needed      ondansetron (ZOFRAN ODT) 4 MG disintegrating tablet Take 1 tablet by mouth every 6 hours as needed for Nausea or Vomiting 120 tablet 3    albuterol (PROVENTIL) (2.5 MG/3ML) 0.083% nebulizer solution Take 3 mLs by nebulization every 8 hours as needed for Wheezing 360 each 3    glucose (GLUTOSE

## 2018-08-21 ENCOUNTER — ANTI-COAG VISIT (OUTPATIENT)
Dept: INTERNAL MEDICINE | Age: 73
End: 2018-08-21
Payer: MEDICARE

## 2018-08-21 ENCOUNTER — HOSPITAL ENCOUNTER (OUTPATIENT)
Age: 73
Setting detail: SPECIMEN
Discharge: HOME | End: 2018-08-21
Payer: MEDICARE

## 2018-08-21 DIAGNOSIS — I48.0 PAROXYSMAL ATRIAL FIBRILLATION (HCC): ICD-10-CM

## 2018-08-21 DIAGNOSIS — Z51.81 MONITORING FOR LONG-TERM ANTICOAGULANT USE: ICD-10-CM

## 2018-08-21 DIAGNOSIS — Z79.01 MONITORING FOR LONG-TERM ANTICOAGULANT USE: ICD-10-CM

## 2018-08-21 LAB
INR BLD: 2.6
PROTHROMBIN TIME: 26 SEC (ref 9.4–11.3)

## 2018-08-21 PROCEDURE — 36415 COLL VENOUS BLD VENIPUNCTURE: CPT

## 2018-08-21 PROCEDURE — 85610 PROTHROMBIN TIME: CPT

## 2018-08-21 PROCEDURE — 93793 ANTICOAG MGMT PT WARFARIN: CPT | Performed by: INTERNAL MEDICINE

## 2018-08-23 ENCOUNTER — HOSPITAL ENCOUNTER (OUTPATIENT)
Dept: CT IMAGING | Age: 73
Discharge: HOME | End: 2018-08-25
Payer: MEDICARE

## 2018-08-23 DIAGNOSIS — R31.0 GROSS HEMATURIA: ICD-10-CM

## 2018-08-23 PROCEDURE — 74176 CT ABD & PELVIS W/O CONTRAST: CPT

## 2018-08-24 ENCOUNTER — TELEPHONE (OUTPATIENT)
Dept: SURGERY | Age: 73
End: 2018-08-24

## 2018-08-27 NOTE — TELEPHONE ENCOUNTER
Humberto Zuñiga at [facility] notified of new orders per phone. Miko's wife notified per phone also.

## 2018-08-30 ENCOUNTER — OUTSIDE SERVICES (OUTPATIENT)
Dept: INTERNAL MEDICINE | Age: 73
End: 2018-08-30
Payer: MEDICARE

## 2018-08-30 DIAGNOSIS — E78.2 MIXED HYPERLIPIDEMIA: ICD-10-CM

## 2018-08-30 DIAGNOSIS — I10 ESSENTIAL HYPERTENSION: ICD-10-CM

## 2018-08-30 DIAGNOSIS — E66.01 MORBID OBESITY (HCC): ICD-10-CM

## 2018-08-30 DIAGNOSIS — F32.4 MAJOR DEPRESSIVE DISORDER WITH SINGLE EPISODE, IN PARTIAL REMISSION (HCC): ICD-10-CM

## 2018-08-30 DIAGNOSIS — H92.02 LEFT EAR PAIN: ICD-10-CM

## 2018-08-30 DIAGNOSIS — I48.0 PAROXYSMAL ATRIAL FIBRILLATION (HCC): ICD-10-CM

## 2018-08-30 DIAGNOSIS — Z99.2 ESRD (END STAGE RENAL DISEASE) ON DIALYSIS (HCC): Primary | ICD-10-CM

## 2018-08-30 DIAGNOSIS — N18.6 ESRD (END STAGE RENAL DISEASE) ON DIALYSIS (HCC): Primary | ICD-10-CM

## 2018-08-30 DIAGNOSIS — G47.33 OBSTRUCTIVE SLEEP APNEA SYNDROME: ICD-10-CM

## 2018-08-30 DIAGNOSIS — Z79.4 LONG-TERM INSULIN USE (HCC): ICD-10-CM

## 2018-08-30 PROCEDURE — 99308 SBSQ NF CARE LOW MDM 20: CPT | Performed by: NURSE PRACTITIONER

## 2018-08-30 NOTE — PROGRESS NOTES
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 lft thumb     Wears glasses      Wheelchair bound       pivots with 2 assists            Past Surgical History         Past Surgical History:   Procedure Laterality Date    AORTIC VALVE REPLACEMENT   03/18/2016     bioprosthetic    ARM SURGERY Left 1990    CARDIAC CATHETERIZATION        CENTRAL VENOUS CATHETER Right 02/21/2018     DIALYSIS CATHETER Dr Dea Malloy COLONOSCOPY   11/19/09    HAND SURGERY Left 1990 (x10-12 surgeries)     several surgeries on left arm    KNEE ARTHROSCOPY Left 09/17/99    MITRAL VALVE REPLACEMENT   03/18/2016     bioprosthetic     NASAL SEPTUM SURGERY        OTHER SURGICAL HISTORY   3/18/16     Aortic root Enlargement    OTHER SURGICAL HISTORY   3/18/16     ASD Closure    OTHER SURGICAL HISTORY Right 01/09/2017     AV fistula creation, wrist    OTHER SURGICAL HISTORY Right 08/29/2017     ligation of collateral branch of av fistula right wrist    NV EGD TRANSORAL BIOPSY SINGLE/MULTIPLE N/A 10/17/2017     EGD BIOPSY performed by Irma Long MD at [address] ANGIOACCESS AV FISTULA Right 8/29/2017      RIGHT  LOWER ARM AV FISTULA  LIGATION OF AQUIRED BRANCHES X2 performed by Dima Webb DO at [address] [place] [address]   3/18/16     median            Medications as per [facility] Chart Efrain Boyd      Social History   Social History            Social History    Marital status:        Spouse name: Sushil Meade Number of children: 2    Years of education: N/A           Occupational History    Retired  [employer]             Social History Main Topics    Smoking status: Former Smoker       Packs/day: 2.00       Years: 25.00       Types: Cigarettes       Start date: 3/17/1955       Quit date: 1/1/1980   [employer] Smokeless tobacco: Never Used    Alcohol use No         Comment: 1-2 drinks per year    Drug use: No    Sexual activity: Not on file           Other Topics Concern    Not on file          Social History Narrative    No narrative on file            Review of Systems   Constitutional: Negative for chills and fever. HENT: Negative for sore throat. - left ear pain  Eyes: Negative for pain, discharge and redness. Respiratory: Negative for cough, shortness of breath and wheezing. Cardiovascular: Negative for chest pain, orthopnea and leg swelling. Gastrointestinal: Negative for abdominal pain, blood in stool, constipation, diarrhea, nausea and vomiting. Genitourinary: Negative for dysuria and urgency. Musculoskeletal: Negative for myalgias and neck pain. Skin: Negative for rash. Neurological: Negative for dizziness, tremors, seizures and headaches. Psychiatric/Behavioral: Negative for depression and memory loss. The patient is not nervous/anxious.          Physical Exam   Constitutional: He is oriented to person, place, and time and well-developed, well-nourished, and in no distress. No distress. HENT: left ear with modest amount of cerumen- unable to visualize the TM   Head: Normocephalic and atraumatic. Eyes: Right eye exhibits no discharge. Left eye exhibits no discharge. Neck: No tracheal deviation present. Cardiovascular: Normal rate, regular rhythm and normal heart sounds. Exam reveals no gallop and no friction rub. No murmur heard. Fistula right forearm with positive bruit and thrill   Pulmonary/Chest: Effort normal and breath sounds normal. No respiratory distress. He has no wheezes. He has no rales. He exhibits no tenderness. Abdominal: Soft. Bowel sounds are normal. He exhibits no distension. There is no tenderness. There is no rebound. Obese   Musculoskeletal: He exhibits no edema. Neurological: He is alert and oriented to person, place, and time.  No cranial nerve deficit. Skin: Skin is warm and dry. No rash noted. He is not diaphoretic. Chronic discoloration to bilateral lower extremities, pulses palpable   Psychiatric: Mood and affect normal.   Nursing note and vitals reviewed.        Vital Signs: Temperature 98.2°, blood pressure 143/78, pulse 81, respirations 14, SPO2 96% on room air  Assessment:  1. Anemia in chronic kidney disease, on chronic dialysis ([facility] [address]  Stable, continue serial labs through dialysis Center     2. Essential hypertension  Stable, currently off antihypertensive management. On Midrin for orthostasis.     3. Mixed hyperlipidemia  Stable on statin, continue to monitor     4. Obstructive sleep apnea  Continues on CPAP     5. Morbid obesity ([facility] [address]  Continue to work on diet and increase activity, dietitian to follow     6. Benign prostatic hyperplasia without lower urinary tract symptoms  Stable, continue to monitor     7. Vascular dementia without behavioral disturbance  Stable, continues on Aricept     8. Major depressive disorder with single episode, in partial remission (HCC)  Stable, continues on Zoloft      9. 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 Paroxysmal atrial fibrillation (HCC)  Stable, continues on Coumadin with routine follow-ups      11. Left ear pain   debrox twice a day for 4 days then flush     Plan:  As noted above. Follow up for routine visit.   Call sooner with concerns prior      Electronically signed by SHAMEKA Doshi CNP on 8/30/2018 at 5:07 PM

## 2018-09-06 ENCOUNTER — TELEPHONE (OUTPATIENT)
Dept: SURGERY | Age: 73
End: 2018-09-06

## 2018-09-07 NOTE — TELEPHONE ENCOUNTER
Please verify the following with patient:    He continues to take Lantus 40 units BID and Novolog 5 units TID before meals + sliding scale. I am working on his medication adjustments prior to his colonoscopy and want to verify these are the correct doses. Please verify that he has not had a problem with DVT or other thrombotic event when stopping coumadin prior to procedure. If he has had prior problems with this, will consider bridging to lovenox. If patient doses are correct as noted above and has no prior complications, will proceed with clearance for procedure as noted below.

## 2018-09-10 DIAGNOSIS — I48.0 PAROXYSMAL ATRIAL FIBRILLATION (HCC): Primary | ICD-10-CM

## 2018-09-12 ENCOUNTER — ANTI-COAG VISIT (OUTPATIENT)
Dept: INTERNAL MEDICINE | Age: 73
End: 2018-09-12
Payer: MEDICARE

## 2018-09-12 DIAGNOSIS — Z51.81 ENCOUNTER FOR THERAPEUTIC DRUG MONITORING: ICD-10-CM

## 2018-09-12 DIAGNOSIS — I48.0 PAROXYSMAL ATRIAL FIBRILLATION (HCC): ICD-10-CM

## 2018-09-12 LAB — INR BLD: 2.5

## 2018-09-12 PROCEDURE — 93793 ANTICOAG MGMT PT WARFARIN: CPT | Performed by: NURSE PRACTITIONER

## 2018-09-12 NOTE — PROGRESS NOTES
Continue current track and hold coumadin 5 days prior to scope as previously noted by Cait Galeana, CNP

## 2018-09-13 ENCOUNTER — TELEPHONE (OUTPATIENT)
Dept: INTERNAL MEDICINE | Age: 73
End: 2018-09-13

## 2018-09-13 ENCOUNTER — OFFICE VISIT (OUTPATIENT)
Dept: CARDIOLOGY | Age: 73
End: 2018-09-13
Payer: MEDICARE

## 2018-09-13 VITALS
DIASTOLIC BLOOD PRESSURE: 75 MMHG | SYSTOLIC BLOOD PRESSURE: 149 MMHG | WEIGHT: 280 LBS | BODY MASS INDEX: 43.95 KG/M2 | HEART RATE: 78 BPM | HEIGHT: 67 IN

## 2018-09-13 DIAGNOSIS — I48.21 PERMANENT ATRIAL FIBRILLATION (HCC): ICD-10-CM

## 2018-09-13 DIAGNOSIS — I05.0 SEVERE MITRAL VALVE STENOSIS: ICD-10-CM

## 2018-09-13 DIAGNOSIS — I35.0 SEVERE AORTIC STENOSIS: ICD-10-CM

## 2018-09-13 DIAGNOSIS — I10 ESSENTIAL HYPERTENSION: Primary | ICD-10-CM

## 2018-09-13 PROCEDURE — 3017F COLORECTAL CA SCREEN DOC REV: CPT | Performed by: INTERNAL MEDICINE

## 2018-09-13 PROCEDURE — 1123F ACP DISCUSS/DSCN MKR DOCD: CPT | Performed by: INTERNAL MEDICINE

## 2018-09-13 PROCEDURE — 4040F PNEUMOC VAC/ADMIN/RCVD: CPT | Performed by: INTERNAL MEDICINE

## 2018-09-13 PROCEDURE — G8427 DOCREV CUR MEDS BY ELIG CLIN: HCPCS | Performed by: INTERNAL MEDICINE

## 2018-09-13 PROCEDURE — 1101F PT FALLS ASSESS-DOCD LE1/YR: CPT | Performed by: INTERNAL MEDICINE

## 2018-09-13 PROCEDURE — 1036F TOBACCO NON-USER: CPT | Performed by: INTERNAL MEDICINE

## 2018-09-13 PROCEDURE — 99213 OFFICE O/P EST LOW 20 MIN: CPT | Performed by: INTERNAL MEDICINE

## 2018-09-13 PROCEDURE — G8417 CALC BMI ABV UP PARAM F/U: HCPCS | Performed by: INTERNAL MEDICINE

## 2018-09-13 PROCEDURE — 93000 ELECTROCARDIOGRAM COMPLETE: CPT | Performed by: INTERNAL MEDICINE

## 2018-09-13 NOTE — PROGRESS NOTES
15 gram oral as needed if blood sugar is less than 70 ( for hypoglycemia) 10/24/16  Yes Octavio Mcarthur DO   atorvastatin (LIPITOR) 10 MG tablet Take 1 tablet by mouth daily 10/24/16  Yes Octavio Mcarthur DO   insulin glargine (LANTUS) 100 UNIT/ML injection vial Inject 40 Units into the skin 2 times daily    Yes Historical Provider, MD   ALPRAZolam (XANAX) 0.5 MG tablet Take 0.5 mg by mouth 2 times daily. .   Yes Historical Provider, MD   acetaminophen (TYLENOL) 325 MG tablet Take 650 mg by mouth every 6 hours as needed for Pain or Fever    Yes Historical Provider, MD   magnesium hydroxide (MILK OF MAGNESIA) 400 MG/5ML suspension Take 30 mLs by mouth daily as needed for Constipation 3/29/16  Yes Get Sousa MD   Multiple Vitamins-Minerals (MULTIVITAL PO) Take 1 tablet by mouth daily   Yes Historical Provider, MD       Allergies:  Percocet [oxycodone-acetaminophen] and Ampicillin    Social History:   reports that he quit smoking about 38 years ago. His smoking use included Cigarettes. He started smoking about 63 years ago. He has a 50.00 pack-year smoking history. He has never used smokeless tobacco. He reports that he does not drink alcohol or use drugs. REVIEW OF SYSTEMS:  CONSTITUTIONAL:NEGATIVE  HEENT:NEG  Cardiovascular: No chest pain, Yes dyspnea on exertion, No palpitations. Lower extremity edema: No  RESPIRATORY: BAHENA  GASTROINTESTINAL:  positive for reflux  GENITOURINARY:  negative  INTEGUMENT:  negative  MUSCULOSKELETAL:  positive for  pain  NEUROLOGICAL:  negative    PHYSICAL EXAM:      BP (!) 149/75   Pulse 78   Ht 5' 7\" (1.702 m)   Wt 280 lb (127 kg)   BMI 43.85 kg/m²    HEENT: PERRL, no cervical lymphadenopathy. No masses palpable. Cardiovascular:  · The apical impulse is not displaced  · Heart  Sounds:RRR, KAITLIN, S4  · Jugular venous pulsation Normal  · The carotid upstroke is NL  · Peripheral pulses are symmetrical and full  Respiratory: Good respiratory effort.  On auscultation: clear to auscultation bilaterally  Abdomen:  · No masses or tenderness  · Bowel sounds present  Extremities:  ·  No Cyanosis or Clubbing  ·  Lower extremity edema: No  Skin: Warm and dry    Cardiac data:  ECG- likely sinus with 1st AVB, poor R wave progression     Labs:     CBC: No results for input(s): WBC, HGB, HCT, PLT in the last 72 hours. BMP: No results for input(s): NA, K, CO2, BUN, CREATININE, LABGLOM, GLUCOSE in the last 72 hours. PT/INR:   Recent Labs     09/12/18   INR  2.50     FASTING LIPID PANEL:  Lab Results   Component Value Date    HDL 54 04/27/2015    TRIG 100 03/22/2016     LIVER PROFILE:No results for input(s): AST, ALT, LABALBU in the last 72 hours.     IMPRESSION:    -Preop Risk for C scope and possible  procedure  -Hematuria and occult blood positive  -PAF  -S/P AVR, MV REPAIR, closure of ASD/foramen ovale with Dacron patch 3/16    -CKD/ESRD ON HD  -DM II  -MORBID OBESITY  -HEATHER  -A/c with Coumadin  -Had occasional low BP treated with Midodrine    Patient Active Problem List   Diagnosis    Traumatic amputation of thumb    Essential hypertension    Mixed hyperlipidemia    Obstructive sleep apnea    Morbid obesity ([address]    Benign prostatic hyperplasia without lower urinary tract symptoms    Venous stasis dermatitis    Severe aortic stenosis    Severe mitral valve stenosis    Anemia in chronic kidney disease (CKD)    Difficulty walking    Dialysis patient ([address]    Dementia    Major depressive disorder with single episode, in partial remission ([address]    Chronic cerebral ischemia    Memory loss    Diffuse cerebral atrophy    ESRD (end stage renal disease) on dialysis (HCC)    Benign prostatic hyperplasia    Long-term insulin use (HCC)    Upper respiratory tract infection    Nausea    Right upper quadrant pain    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

## 2018-09-13 NOTE — TELEPHONE ENCOUNTER
Patient's wife stopped by office. She wanted to let Swapna Jessica know that patient will not be having the colonoscopy per Dr. Remi Soliz. Dr. Remi Soliz scheduled an echo to be done first. If all is okay with the echo then patient may go ahead with colonoscopy. Wife is unsure if there needs to be any medication changes since he is not having the colonoscopy done. States to just fax any changes to [facility].

## 2018-09-19 ENCOUNTER — HOSPITAL ENCOUNTER (OUTPATIENT)
Dept: NON INVASIVE DIAGNOSTICS | Age: 73
Discharge: HOME | End: 2018-09-19
Payer: MEDICARE

## 2018-09-19 ENCOUNTER — TELEPHONE (OUTPATIENT)
Dept: CARDIOLOGY | Age: 73
End: 2018-09-19

## 2018-09-19 DIAGNOSIS — I05.0 SEVERE MITRAL VALVE STENOSIS: ICD-10-CM

## 2018-09-19 DIAGNOSIS — I10 ESSENTIAL HYPERTENSION: ICD-10-CM

## 2018-09-19 DIAGNOSIS — I35.0 SEVERE AORTIC STENOSIS: ICD-10-CM

## 2018-09-19 DIAGNOSIS — I48.21 PERMANENT ATRIAL FIBRILLATION (HCC): ICD-10-CM

## 2018-09-19 LAB
LV EF: 55 %
LVEF MODALITY: NORMAL

## 2018-09-19 PROCEDURE — 93306 TTE W/DOPPLER COMPLETE: CPT

## 2018-09-20 ENCOUNTER — TELEPHONE (OUTPATIENT)
Dept: SURGERY | Age: 73
End: 2018-09-20

## 2018-09-20 ENCOUNTER — OUTSIDE SERVICES (OUTPATIENT)
Dept: INTERNAL MEDICINE | Age: 73
End: 2018-09-20
Payer: MEDICARE

## 2018-09-20 DIAGNOSIS — Z99.2 DIALYSIS PATIENT (HCC): ICD-10-CM

## 2018-09-20 DIAGNOSIS — I05.0 SEVERE MITRAL VALVE STENOSIS: ICD-10-CM

## 2018-09-20 DIAGNOSIS — E66.01 MORBID OBESITY (HCC): ICD-10-CM

## 2018-09-20 DIAGNOSIS — F01.50 VASCULAR DEMENTIA WITHOUT BEHAVIORAL DISTURBANCE (HCC): ICD-10-CM

## 2018-09-20 DIAGNOSIS — G47.33 OBSTRUCTIVE SLEEP APNEA SYNDROME: ICD-10-CM

## 2018-09-20 DIAGNOSIS — Z99.2 ESRD (END STAGE RENAL DISEASE) ON DIALYSIS (HCC): ICD-10-CM

## 2018-09-20 DIAGNOSIS — Z79.4 LONG-TERM INSULIN USE (HCC): ICD-10-CM

## 2018-09-20 DIAGNOSIS — I48.0 PAROXYSMAL ATRIAL FIBRILLATION (HCC): ICD-10-CM

## 2018-09-20 DIAGNOSIS — I35.0 SEVERE AORTIC STENOSIS: ICD-10-CM

## 2018-09-20 DIAGNOSIS — N18.6 ESRD (END STAGE RENAL DISEASE) ON DIALYSIS (HCC): ICD-10-CM

## 2018-09-20 NOTE — TELEPHONE ENCOUNTER
9/20/2018 patient's wife called stating Dr Van Palomares cleared her  so they are ready to schedule his colonoscopy.   Call Cell # [phone] first or home # [phone] and leave a message

## 2018-09-21 PROCEDURE — 99309 SBSQ NF CARE MODERATE MDM 30: CPT | Performed by: INTERNAL MEDICINE

## 2018-09-21 NOTE — PROGRESS NOTES
DR. Pereira Quiet - [address] [facility] VISIT    DATE OF SERVICE: 4/24/18    NURSING HOME: [facility]    CHIEF COMPLAINT/HISTORY OF CHIEF COMPLAINT: This patient is being seen for ongoing evaluation and management of his diabetes mellitus type 2 with nephropathy and neuropathy, end-stage renal disease on hemodialysis with anemia, depression, severe aortic and mitral stenosis, hypertension, hyperlipidemia, obstructive sleep apnea, and morbid obesity. He is on Coumadin for atrial fibrillation. There are no other complaints at this time. ALLERGIES:   Allergies   Allergen Reactions    Percocet [Oxycodone-Acetaminophen] Itching and Rash     Also causes shaking    Ampicillin Rash       MEDICATIONS: As noted on the [facility], referenced and incorporated herein.     PAST MEDICAL HISTORY:   Past Medical History:   Diagnosis Date    A-fib ([facility] [address] 08/29/2017    new onset    Anemia 4/27/2016    Arthritis     Bacteremia 11/11/2016    Benign prostatic hypertrophy     Chronic kidney disease 10/17/2016    Closed fracture of forearm 8/13/2013    Broken lft forearm     Controlled type 2 diabetes mellitus with chronic kidney disease on chronic dialysis, with long-term current use of insulin ([address]     Decubital ulcer     NON OPEN BUTTOCKS    Dementia     memory problems    Dialysis patient ([address] 1/3/2017    Goes Tue, Thurs, Sat in [place]    Difficult intravenous access     HAS NEEDED PICC TEAM IN THE PAST    Difficulty walking     WHEELCHAIR BOUND-PIVOTS WITH ASSIST O F 2    DJD (degenerative joint disease) of knee     Elbow, forearm, and wrist, abrasion or friction burn, without mention of infection 8/13/2013    Abrasions lft forearm     Encephalopathy acute 4/27/2016    Encounter regarding vascular access for dialysis for ESRD ([facility] [address] 2/2/2017    ESRD (end stage renal disease) on dialysis ([facility] [address] 1/17/2017    Forgetfulness     HAS SOME SHORT TERM MEMORY LOSS, QUYEN-WIFE IS LEGAL GUARDIAN    H/O transesophageal echocardiography (AMADEO) for monitoring     Hemodialysis patient ([facility] [address] 11/11/2016    tues-thurs-sat [place].  TUNNELED CATHETER RT UPPER CHEST    Hyperlipidemia 1990    on Meds    Hypertension 1990    on Meds    Intercritical gout     on Meds    Joint pain, knee 01/13/2017    baldo knee synvisc-1 injections    Major depressive disorder with single episode, in partial remission ([facility] [address] 1/3/2017    Mobility impaired     Muscle weakness     GRNERALIZED    Obesity     HEATHER on CPAP     On CPAP-TO BRING DOS    Respiratory failure ([facility] [address] 03/2016    with open heart surgery, trached x 5 months    S/P cardiac cath     Seborrhea     Severe aortic stenosis 3/9/2016    Severe mitral valve stenosis 3/9/2016    Severe sepsis (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 lft thumb     Wears glasses     Wheelchair bound     pivots with 2 assists       PAST SURGICAL HISTORY:   Past Surgical History:   Procedure Laterality Date    AORTIC VALVE REPLACEMENT  03/18/2016    bioprosthetic    ARM SURGERY Left 1990    CARDIAC CATHETERIZATION      CENTRAL VENOUS CATHETER Right 02/21/2018    DIALYSIS CATHETER Dr Genoveva Chou COLONOSCOPY  11/19/09    HAND SURGERY Left 1990 (x10-12 surgeries)    several surgeries on left arm    KNEE ARTHROSCOPY Left 09/17/99    MITRAL VALVE REPLACEMENT  03/18/2016    bioprosthetic     NASAL SEPTUM SURGERY      OTHER SURGICAL HISTORY  3/18/16    Aortic root Enlargement    OTHER SURGICAL HISTORY  3/18/16    ASD Closure    OTHER SURGICAL HISTORY Right 01/09/2017    AV fistula creation, wrist    OTHER SURGICAL HISTORY Right 08/29/2017    ligation of collateral branch of av fistula right wrist    SC EGD TRANSORAL BIOPSY SINGLE/MULTIPLE N/A 10/17/2017    EGD BIOPSY performed [facility] Thomas Thorne MD [facility]    SC LIGATN ANGIOACCESS AV FISTULA Right 8/29/2017     RIGHT  LOWER ARM AV FISTULA  LIGATION OF AQUIRED BRANCHES X2 performed by Belinda Demarco DO at [address]  3/18/16    median       SOCIAL HISTORY:     Tobacco:   History   Smoking Status    Former Smoker    Packs/day: 2.00    Years: 25.00    Types: Cigarettes    Start date: 3/17/1955   [facility] Quit date: 1/1/1980   Smokeless Tobacco    Never Used     Alcohol:   History   Alcohol Use No     Comment: 1-2 drinks per year     Drugs:   History   Drug Use No       FAMILY HISTORY: family history includes Alzheimer's Disease in his father; Diabetes in his maternal grandmother and mother; High Blood Pressure in his sister; Pedro Humphreys in his mother. REVIEW OF SYSTEMS:     Please see history of chief complaint above; otherwise no new problems with respect to General, HEENT, Cardiovascular, Respiratory, Gastrointestinal, Genitourinary, Endocrinologic, Musculoskeletal, or Neuropsychiatric complaints. PHYSICAL EXAMINATION:    Vitals: Temp: 98.4 deg F. Pulse: 64. Resp: 16. BP: 120/68. General: A 67 y.o.  male. Alert and oriented to person, place and time. He  does not appear to be in any acute distress. Skin: Skin color, texture, turgor normal. No rashes or lesions. HEENT/Neck: Essentially unremarkable  Chest: It was a chest catheter in place  Lungs: Normal - CTA without rales, rhonchi, or wheezing. Heart: regular rate and rhythm, S1, S2 normal, no murmur, click, rub or gallop No S3 or S4. Abdomen: Obese, soft, non-distended, non-tender, normal active bowel sounds, no masses palpated and no hepatosplenomegaly  Extremities: No clubbing, cyanosis, edema  Neurologic: cranial nerves II-XII are grossly intact    ASSESSMENT:     Diagnosis Orders   1. 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 [facility]     2.  Uncontrolled type 2 diabetes mellitus with diabetic polyneuropathy, with long-term current

## 2018-10-02 ENCOUNTER — TELEPHONE (OUTPATIENT)
Dept: SURGERY | Age: 73
End: 2018-10-02

## 2018-10-05 ENCOUNTER — ANTI-COAG VISIT (OUTPATIENT)
Dept: INTERNAL MEDICINE | Age: 73
End: 2018-10-05

## 2018-10-05 DIAGNOSIS — I48.0 PAROXYSMAL ATRIAL FIBRILLATION (HCC): ICD-10-CM

## 2018-10-05 LAB — INR BLD: 2.4

## 2018-10-07 ENCOUNTER — OUTSIDE SERVICES (OUTPATIENT)
Dept: INTERNAL MEDICINE | Age: 73
End: 2018-10-07
Payer: MEDICARE

## 2018-10-07 DIAGNOSIS — N18.6 ESRD (END STAGE RENAL DISEASE) ON DIALYSIS (HCC): ICD-10-CM

## 2018-10-07 DIAGNOSIS — G47.33 OBSTRUCTIVE SLEEP APNEA SYNDROME: ICD-10-CM

## 2018-10-07 DIAGNOSIS — I35.0 SEVERE AORTIC STENOSIS: ICD-10-CM

## 2018-10-07 DIAGNOSIS — Z99.2 ESRD (END STAGE RENAL DISEASE) ON DIALYSIS (HCC): ICD-10-CM

## 2018-10-07 DIAGNOSIS — I05.0 SEVERE MITRAL VALVE STENOSIS: ICD-10-CM

## 2018-10-07 DIAGNOSIS — Z99.2 DIALYSIS PATIENT (HCC): ICD-10-CM

## 2018-10-07 DIAGNOSIS — Z79.4 CONTROLLED TYPE 2 DIABETES MELLITUS WITH DIABETIC POLYNEUROPATHY, WITH LONG-TERM CURRENT USE OF INSULIN (HCC): ICD-10-CM

## 2018-10-07 DIAGNOSIS — E66.01 MORBID OBESITY (HCC): ICD-10-CM

## 2018-10-07 DIAGNOSIS — I48.0 PAROXYSMAL ATRIAL FIBRILLATION (HCC): ICD-10-CM

## 2018-10-07 DIAGNOSIS — E11.42 CONTROLLED TYPE 2 DIABETES MELLITUS WITH DIABETIC POLYNEUROPATHY, WITH LONG-TERM CURRENT USE OF INSULIN (HCC): ICD-10-CM

## 2018-10-07 DIAGNOSIS — E11.22 CONTROLLED TYPE 2 DIABETES MELLITUS WITH CHRONIC KIDNEY DISEASE ON CHRONIC DIALYSIS, WITH LONG-TERM CURRENT USE OF INSULIN (HCC): Primary | ICD-10-CM

## 2018-10-07 DIAGNOSIS — Z79.4 LONG-TERM INSULIN USE (HCC): ICD-10-CM

## 2018-10-07 DIAGNOSIS — Z99.2 CONTROLLED TYPE 2 DIABETES MELLITUS WITH CHRONIC KIDNEY DISEASE ON CHRONIC DIALYSIS, WITH LONG-TERM CURRENT USE OF INSULIN (HCC): Primary | ICD-10-CM

## 2018-10-07 DIAGNOSIS — Z79.4 CONTROLLED TYPE 2 DIABETES MELLITUS WITH CHRONIC KIDNEY DISEASE ON CHRONIC DIALYSIS, WITH LONG-TERM CURRENT USE OF INSULIN (HCC): Primary | ICD-10-CM

## 2018-10-07 DIAGNOSIS — F32.4 MAJOR DEPRESSIVE DISORDER WITH SINGLE EPISODE, IN PARTIAL REMISSION (HCC): ICD-10-CM

## 2018-10-07 DIAGNOSIS — N18.6 CONTROLLED TYPE 2 DIABETES MELLITUS WITH CHRONIC KIDNEY DISEASE ON CHRONIC DIALYSIS, WITH LONG-TERM CURRENT USE OF INSULIN (HCC): Primary | ICD-10-CM

## 2018-10-07 DIAGNOSIS — F01.50 VASCULAR DEMENTIA WITHOUT BEHAVIORAL DISTURBANCE (HCC): ICD-10-CM

## 2018-10-12 ENCOUNTER — ANESTHESIA (OUTPATIENT)
Dept: OPERATING ROOM | Age: 73
End: 2018-10-12
Payer: MEDICARE

## 2018-10-12 ENCOUNTER — ANESTHESIA EVENT (OUTPATIENT)
Dept: OPERATING ROOM | Age: 73
End: 2018-10-12
Payer: MEDICARE

## 2018-10-12 ENCOUNTER — HOSPITAL ENCOUNTER (OUTPATIENT)
Age: 73
Setting detail: OUTPATIENT SURGERY
Discharge: NURSING, REHAB OR LONG-TERM CARE | End: 2018-10-12
Attending: SURGERY | Admitting: SURGERY
Payer: MEDICARE

## 2018-10-12 VITALS — DIASTOLIC BLOOD PRESSURE: 48 MMHG | SYSTOLIC BLOOD PRESSURE: 100 MMHG | OXYGEN SATURATION: 100 %

## 2018-10-12 VITALS
HEART RATE: 76 BPM | TEMPERATURE: 98.2 F | BODY MASS INDEX: 43.85 KG/M2 | DIASTOLIC BLOOD PRESSURE: 71 MMHG | OXYGEN SATURATION: 98 % | RESPIRATION RATE: 18 BRPM | HEIGHT: 67 IN | WEIGHT: 279.4 LBS | SYSTOLIC BLOOD PRESSURE: 163 MMHG

## 2018-10-12 LAB — GLUCOSE BLD-MCNC: 175 MG/DL (ref 75–110)

## 2018-10-12 PROCEDURE — 3700000001 HC ADD 15 MINUTES (ANESTHESIA): Performed by: SURGERY

## 2018-10-12 PROCEDURE — 7100000011 HC PHASE II RECOVERY - ADDTL 15 MIN: Performed by: SURGERY

## 2018-10-12 PROCEDURE — 3700000000 HC ANESTHESIA ATTENDED CARE: Performed by: SURGERY

## 2018-10-12 PROCEDURE — 00811 ANES LWR INTST NDSC NOS: CPT | Performed by: NURSE ANESTHETIST, CERTIFIED REGISTERED

## 2018-10-12 PROCEDURE — 82947 ASSAY GLUCOSE BLOOD QUANT: CPT

## 2018-10-12 PROCEDURE — 6360000002 HC RX W HCPCS: Performed by: NURSE ANESTHETIST, CERTIFIED REGISTERED

## 2018-10-12 PROCEDURE — 45384 COLONOSCOPY W/LESION REMOVAL: CPT | Performed by: SURGERY

## 2018-10-12 PROCEDURE — 2709999900 HC NON-CHARGEABLE SUPPLY: Performed by: SURGERY

## 2018-10-12 PROCEDURE — 2580000003 HC RX 258: Performed by: SURGERY

## 2018-10-12 PROCEDURE — 88305 TISSUE EXAM BY PATHOLOGIST: CPT

## 2018-10-12 PROCEDURE — 7100000010 HC PHASE II RECOVERY - FIRST 15 MIN: Performed by: SURGERY

## 2018-10-12 PROCEDURE — 3609010300 HC COLONOSCOPY W/BIOPSY SINGLE/MULTIPLE: Performed by: SURGERY

## 2018-10-12 RX ORDER — SODIUM CHLORIDE 0.9 % (FLUSH) 0.9 %
10 SYRINGE (ML) INJECTION EVERY 12 HOURS SCHEDULED
Status: DISCONTINUED | OUTPATIENT
Start: 2018-10-12 | End: 2018-10-12 | Stop reason: HOSPADM

## 2018-10-12 RX ORDER — SODIUM CHLORIDE, SODIUM LACTATE, POTASSIUM CHLORIDE, CALCIUM CHLORIDE 600; 310; 30; 20 MG/100ML; MG/100ML; MG/100ML; MG/100ML
INJECTION, SOLUTION INTRAVENOUS CONTINUOUS
Status: DISCONTINUED | OUTPATIENT
Start: 2018-10-12 | End: 2018-10-12 | Stop reason: HOSPADM

## 2018-10-12 RX ORDER — SODIUM CHLORIDE 9 MG/ML
INJECTION, SOLUTION INTRAVENOUS CONTINUOUS
Status: CANCELLED | OUTPATIENT
Start: 2018-10-12

## 2018-10-12 RX ORDER — SODIUM CHLORIDE, SODIUM LACTATE, POTASSIUM CHLORIDE, CALCIUM CHLORIDE 600; 310; 30; 20 MG/100ML; MG/100ML; MG/100ML; MG/100ML
INJECTION, SOLUTION INTRAVENOUS CONTINUOUS
Status: CANCELLED | OUTPATIENT
Start: 2018-10-12

## 2018-10-12 RX ORDER — PROPOFOL 10 MG/ML
INJECTION, EMULSION INTRAVENOUS PRN
Status: DISCONTINUED | OUTPATIENT
Start: 2018-10-12 | End: 2018-10-12 | Stop reason: SDUPTHER

## 2018-10-12 RX ORDER — SODIUM CHLORIDE 0.9 % (FLUSH) 0.9 %
10 SYRINGE (ML) INJECTION PRN
Status: DISCONTINUED | OUTPATIENT
Start: 2018-10-12 | End: 2018-10-12 | Stop reason: HOSPADM

## 2018-10-12 RX ADMIN — SODIUM CHLORIDE, POTASSIUM CHLORIDE, SODIUM LACTATE AND CALCIUM CHLORIDE: 600; 310; 30; 20 INJECTION, SOLUTION INTRAVENOUS at 08:13

## 2018-10-12 RX ADMIN — SODIUM CHLORIDE, POTASSIUM CHLORIDE, SODIUM LACTATE AND CALCIUM CHLORIDE: 600; 310; 30; 20 INJECTION, SOLUTION INTRAVENOUS at 07:51

## 2018-10-12 RX ADMIN — PROPOFOL 420 MG: 10 INJECTION, EMULSION INTRAVENOUS at 08:18

## 2018-10-12 RX ADMIN — PHENYLEPHRINE HYDROCHLORIDE 200 MCG: 10 INJECTION INTRAVENOUS at 08:24

## 2018-10-12 ASSESSMENT — PAIN SCALES - GENERAL
PAINLEVEL_OUTOF10: 0

## 2018-10-12 ASSESSMENT — PAIN - FUNCTIONAL ASSESSMENT: PAIN_FUNCTIONAL_ASSESSMENT: 0-10

## 2018-10-12 NOTE — H&P
Subjective   Justin Walls is a 67 y.o. male who presents today for evaluation of recent occult blood positive stool test.  Patient has multiple medical issues and including chronic kidney failure for which she has dialysis dependent. Recently at dialysis when he had his hemoglobin checked it was noted to be in the mid 7 range and due to concern that this was not his baseline he was sent to ER. In the ER he had 2 fecal occult blood tests performed which showed positive on both. Additionally he was noted to have blood in his urine. Patient is on Coumadin but states his INR is usually within acceptable range. Patient was referred to urology for the blood noted in his urine and he was seen by Dr. Jim Villarreal. Per notes it looks like he is set up to undergo radiologic evaluation to further ascertain the source of the blood in his urine. Due to the blood in his stool he was referred to general surgery for further evaluation. Patient does report colonoscopy approximately 9-10 years ago which per records showed only diverticulosis. Patient denies any obvious blood in his stool he is noted recently, denies any recent significant unintended weight loss, and reports no abdominal pain. Patient continues to tolerate normal diet.   No family history of inflammatory bowel conditions or colon cancer reported.     Past Medical History        Past Medical History:   Diagnosis Date    A-fib ([address] 08/29/2017     new onset    Anemia 4/27/2016    Arthritis      Bacteremia 11/11/2016    Benign prostatic hypertrophy      Chronic kidney disease 10/17/2016    Closed fracture of forearm 8/13/2013     Broken lft forearm     Controlled type 2 diabetes mellitus with chronic kidney disease on chronic dialysis, with long-term current use of insulin ([address]      Decubital ulcer       NON OPEN BUTTOCKS    Dementia       memory problems    Dialysis patient [facility]) 1/3/2017     Jayden Solorio, Sat in [facility] intravenous Protectants, Misc. (HYDROCERIN) CREA cream Apply topically nightly        omeprazole (PRILOSEC) 20 MG delayed release capsule Take 20 mg by mouth daily        donepezil (ARICEPT) 5 MG tablet Take 5 mg by mouth every evening        levothyroxine (SYNTHROID) 25 MCG tablet Take 1 tablet by mouth Daily (Patient taking differently: Take 50 mcg by mouth Daily ) 30 tablet 2    midodrine (PROAMATINE) 5 MG tablet Take 2 tablets by mouth 3 times daily 540 tablet 3    fluticasone (FLONASE) 50 MCG/ACT nasal spray 2 sprays by Nasal route daily 3 Bottle 3    atorvastatin (LIPITOR) 10 MG tablet Take 1 tablet by mouth daily (Patient taking differently: Take 10 mg by mouth nightly ) 90 tablet 3    insulin glargine (LANTUS) 100 UNIT/ML injection vial Inject 40 Units into the skin 2 times daily         ALPRAZolam (XANAX) 0.5 MG tablet Take 0.5 mg by mouth 2 times daily.  .        acetaminophen (TYLENOL) 325 MG tablet Take 650 mg by mouth every 6 hours as needed for Pain or Fever         Multiple Vitamins-Minerals (MULTIVITAL PO) Take 1 tablet by mouth daily        doxycycline (VIBRAMYCIN) 50 MG capsule Take 50 mg by mouth 2 times daily        loratadine (CLARITIN) 10 MG capsule Take 10 mg by mouth        guaiFENesin (ROBITUSSIN) 100 MG/5ML syrup Take 200 mg by mouth 4 times daily as needed for Cough        ipratropium-albuterol (DUONEB) 0.5-2.5 (3) MG/3ML SOLN nebulizer solution Inhale 1 vial into the lungs every 6 hours as needed         Nystatin POWD by Does not apply route TID x 10 days (starting 2/13/17) then use PRN        HYDROcodone-acetaminophen (NORCO) 5-325 MG per tablet Take 1 tablet by mouth every 8 hours as needed for Pain .        Nutritional Supplements (NEPRO PO) Take 250 mLs by mouth 2 times daily        senna (SENOKOT) 8.6 MG tablet Take 1 tablet by mouth nightly        glucagon 1 MG injection Infuse 1 kit intravenously as needed        ondansetron (ZOFRAN ODT) 4 MG disintegrating tablet Take 1 Throat: external ear and ear canal normal bilaterally, oropharynx clear and moist with normal mucous membranes  Neck: neck supple and non tender without mass  Lungs: clear to auscultation without wheezes or rales   Heart: S1S2, no mumurs, RRR  Abdomen: Soft, obese, nontender, bowel sounds present  Extremity: negative  Neuro: CN II-XII grossly intact        Assessment      3  58-year-old male with recent positive fecal occult blood test  2. End-stage renal disease  3. Recent hematuria  4. A. fib-taking warfarin  5. Diverticulosis seen on previous colonoscopy     Plan      1. Due to patient's recent positive fecal occult blood test I believe it would be of benefit to perform colonoscopy to further ascertain source of bleeding. Risks, benefits, and alternatives of procedure explained and questions answered. Written informed consent obtained. 2.  Due to patient's end-stage renal disease and been fluid restricted we will have patient see his nephrologist for evaluation and we will touch base with them to get guidance on an acceptable bowel prep for the patient in order to avoid significant fluid overload or electrolyte derangements. 3.  We'll follow up results of CT to be performed in the next week to look for any obvious source of the bleeding that may be seen on that test.  4.  Will plan for follow-up after colonoscopy based on findings  5. Continue regular follow-up with primary care for medical management     Electronically signed by Francois Veloz DO on 8/15/2018 at 4:02 PM      [address]  History and Physical      Pt Name: Karis Sousa  MRN: [id number]  YOB: 1945  Date of evaluation: 10/12/2018      [x] I have examined the patient and reviewed the H&P/Consult completed above, and there are no changes to the patient or plans. CT report reviewed, no obvious source of bleeding. Proceed with colonoscopy.     Electronically signed by Jose Elias Ontiveros DO on 10/12/2018 at 7:10 AM

## 2018-10-12 NOTE — ANESTHESIA PRE PROCEDURE
Department of Anesthesiology  Preprocedure Note       Name:  Blaire Lakhani   Age:  67 y.o.  :  1945                                          MRN:  [id number]         Date:  10/12/2018      Surgeon: Yonis Zacarias):  Shilo Demarco DO    Procedure: Procedure(s):  COLONOSCOPY (from [facility] his wife Jamil Alejandro will come with him)    Medications prior to admission:   Prior to Admission medications    Medication Sig Start Date End Date Taking? Authorizing Provider   fluticasone (FLONASE) 50 MCG/ACT nasal spray 2 sprays by Nasal route daily 10/24/16  Yes Octavio Mcarthur DO   sevelamer (RENVELA) 800 MG tablet Give 2 tabs po TID with meals and additional 1 tab po TID with snack. Patient taking differently: Give 1 packet twice a day until tablets available. Each packet 2.4 GM 18   SHAMEKA Thomas - CNP   doxycycline (VIBRAMYCIN) 50 MG capsule Take 50 mg by mouth 2 times daily    Historical Provider, MD   Pollen Extracts (PROSTAT PO) Take 30 mLs by mouth three times daily    Historical Provider, MD   warfarin (COUMADIN) 2 MG tablet Take 2 mg by mouth Wednesday, Thursday,Friday,  Saturday and     Historical Provider, MD   warfarin (COUMADIN) 3 MG tablet Take 3 mg by mouth daily Take 3 mg on Monday and Tuesday    Historical Provider, MD   sertraline (ZOLOFT) 100 MG tablet Take 100 mg by mouth daily     Historical Provider, MD   loratadine (CLARITIN) 10 MG capsule Take 10 mg by mouth    Historical Provider, MD   sodium chloride (OCEAN) 0.65 % nasal spray by Nasal route 10/24/16   Historical Provider, MD   lidocaine (XYLOCAINE) 5 % ointment Apply topically three times a week Apply topically as needed.   DAYS    Historical Provider, MD   Amino Acids-Protein Hydrolys (PRO-STAT PO) Take 30 mLs by mouth 2 times daily SUGAR FREE    Historical Provider, MD   guaiFENesin (ROBITUSSIN) 100 MG/5ML syrup Take 200 mg by mouth 4 times daily as needed for Cough    Historical Provider, MD

## 2018-10-12 NOTE — OP NOTE
Operative procedure note    DATE OF PROCEDURE: 10/12/2018    SURGEON: Susan Kemp    ASSISTANT: None    PREOPERATIVE DIAGNOSIS: hemoccult positive stool, history diverticulosis    POSTOPERATIVE DIAGNOSIS: Same, ascending colon polyp, pan diverticulosis    OPERATION: Diagnostic colonoscopy with polypectomy    ANESTHESIA: MAC    ESTIMATED BLOOD LOSS: less than 1cc    COMPLICATIONS: None. SPECIMENS: were obtained    INDICATIONS FOR PROCEDURE:   The patient is a 67y.o. year old male with history of above preop diagnosis. Colonoscopy with possible biopsy or polypectomy was recommend, the risk, benefits, expected outcome, and alternatives to the procedure were explained. Risks included but are not limited to bleeding, infection, respiratory distress, hypotension, and perforation of the colon. The patient understands and is in agreement. PROCEDURE DETAILS:  Patient was brought to the endoscopy suite and placed in the left lateral recumbent position. A time out was completed verifying correct patient, procedure and equipment. Anesthesia was induced using MAC guidelines. A digital rectal exam was performed, no external/internal masses. The colonoscope was inserted into the rectum without difficulty and the scope was advanced to the cecum which was identified by anatomy and by palpation. Bowel prep was poor, there was thick liquid stool throughout the colon. The scope was slowly withdrawn and no abnormalities were noted in the cecum, ascending colon, transverse colon, proximal descending or rectosigmoid colon. The scope was withdrawn to the rectal vault and then retroflexed; no internal masses or hemorrhoids were noted. The scope was then straightened and removed. The patient tolerated the procedure well and was transferred to the recovery unit in stable condition.     Findings:  Cecum/Ascending colon: 5mm polyp taken entirely with cold biopsy forcep, diverticula    Transverse colon: diverticula    Descending/Sigmoid

## 2018-10-15 LAB — SURGICAL PATHOLOGY REPORT: NORMAL

## 2018-10-16 ENCOUNTER — TELEPHONE (OUTPATIENT)
Dept: SURGERY | Age: 73
End: 2018-10-16

## 2018-10-16 LAB — INR BLD: 1.1

## 2018-10-18 ENCOUNTER — ANTI-COAG VISIT (OUTPATIENT)
Dept: INTERNAL MEDICINE | Age: 73
End: 2018-10-18

## 2018-10-23 LAB — INR BLD: 2

## 2018-10-24 ENCOUNTER — ANTI-COAG VISIT (OUTPATIENT)
Dept: INTERNAL MEDICINE | Age: 73
End: 2018-10-24

## 2018-10-24 DIAGNOSIS — Z79.01 MONITORING FOR LONG-TERM ANTICOAGULANT USE: ICD-10-CM

## 2018-10-24 DIAGNOSIS — I48.0 PAROXYSMAL ATRIAL FIBRILLATION (HCC): ICD-10-CM

## 2018-10-24 DIAGNOSIS — Z51.81 MONITORING FOR LONG-TERM ANTICOAGULANT USE: ICD-10-CM

## 2018-10-24 PROCEDURE — 93793 ANTICOAG MGMT PT WARFARIN: CPT | Performed by: INTERNAL MEDICINE

## 2018-10-24 PROCEDURE — 99309 SBSQ NF CARE MODERATE MDM 30: CPT | Performed by: INTERNAL MEDICINE

## 2018-10-25 ENCOUNTER — OUTSIDE SERVICES (OUTPATIENT)
Dept: INTERNAL MEDICINE | Age: 73
End: 2018-10-25
Payer: MEDICARE

## 2018-10-25 DIAGNOSIS — I35.0 SEVERE AORTIC STENOSIS: ICD-10-CM

## 2018-10-25 DIAGNOSIS — Z79.4 CONTROLLED TYPE 2 DIABETES MELLITUS WITH CHRONIC KIDNEY DISEASE ON CHRONIC DIALYSIS, WITH LONG-TERM CURRENT USE OF INSULIN (HCC): Primary | ICD-10-CM

## 2018-10-25 DIAGNOSIS — I48.0 PAROXYSMAL ATRIAL FIBRILLATION (HCC): ICD-10-CM

## 2018-10-25 DIAGNOSIS — Z99.2 CONTROLLED TYPE 2 DIABETES MELLITUS WITH CHRONIC KIDNEY DISEASE ON CHRONIC DIALYSIS, WITH LONG-TERM CURRENT USE OF INSULIN (HCC): Primary | ICD-10-CM

## 2018-10-25 DIAGNOSIS — Z99.2 DIALYSIS PATIENT (HCC): ICD-10-CM

## 2018-10-25 DIAGNOSIS — F32.4 MAJOR DEPRESSIVE DISORDER WITH SINGLE EPISODE, IN PARTIAL REMISSION (HCC): ICD-10-CM

## 2018-10-25 DIAGNOSIS — N18.6 ESRD (END STAGE RENAL DISEASE) ON DIALYSIS (HCC): ICD-10-CM

## 2018-10-25 DIAGNOSIS — E11.42 CONTROLLED TYPE 2 DIABETES MELLITUS WITH DIABETIC POLYNEUROPATHY, WITH LONG-TERM CURRENT USE OF INSULIN (HCC): ICD-10-CM

## 2018-10-25 DIAGNOSIS — E66.01 MORBID OBESITY (HCC): ICD-10-CM

## 2018-10-25 DIAGNOSIS — Z99.2 ESRD (END STAGE RENAL DISEASE) ON DIALYSIS (HCC): ICD-10-CM

## 2018-10-25 DIAGNOSIS — F01.50 VASCULAR DEMENTIA WITHOUT BEHAVIORAL DISTURBANCE (HCC): ICD-10-CM

## 2018-10-25 DIAGNOSIS — I05.0 SEVERE MITRAL VALVE STENOSIS: ICD-10-CM

## 2018-10-25 DIAGNOSIS — Z79.4 CONTROLLED TYPE 2 DIABETES MELLITUS WITH DIABETIC POLYNEUROPATHY, WITH LONG-TERM CURRENT USE OF INSULIN (HCC): ICD-10-CM

## 2018-10-25 DIAGNOSIS — N18.6 CONTROLLED TYPE 2 DIABETES MELLITUS WITH CHRONIC KIDNEY DISEASE ON CHRONIC DIALYSIS, WITH LONG-TERM CURRENT USE OF INSULIN (HCC): Primary | ICD-10-CM

## 2018-10-25 DIAGNOSIS — G47.33 OBSTRUCTIVE SLEEP APNEA SYNDROME: ICD-10-CM

## 2018-10-25 DIAGNOSIS — Z79.4 LONG-TERM INSULIN USE (HCC): ICD-10-CM

## 2018-10-25 DIAGNOSIS — E11.22 CONTROLLED TYPE 2 DIABETES MELLITUS WITH CHRONIC KIDNEY DISEASE ON CHRONIC DIALYSIS, WITH LONG-TERM CURRENT USE OF INSULIN (HCC): Primary | ICD-10-CM

## 2018-10-25 NOTE — PROGRESS NOTES
SOME SHORT TERM MEMORY LOSS, QUYEN-WIFE IS LEGAL GUARDIAN    H/O transesophageal echocardiography (AMADEO) for monitoring     Hemodialysis patient ([facility] 11/11/2016    tues-thurs-sat [place]---FRESEMIUS.  TUNNELED CATHETER RT UPPER CHEST    Hyperlipidemia 1990    on Meds    Hypertension 1990    on Meds    Intercritical gout     on Meds    Joint pain, knee 01/13/2017    baldo knee synvisc-1 injections    Major depressive disorder with single episode, in partial remission ([facility] 1/3/2017    Mobility impaired     Muscle weakness     GRNERALIZED    Obesity     HEATHER on CPAP     On CPAP-TO BRING DOS    Respiratory failure ([facility] 03/2016    with open heart surgery, trached x 5 months    S/P cardiac cath     Seborrhea     Severe aortic stenosis 3/9/2016    Severe mitral valve stenosis 3/9/2016    Severe sepsis ([facility] 4/3/2016    Skin rash     ABD FOLDS    Stasis dermatitis     Thyroid disease     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 lft thumb     Wears glasses     Wheelchair bound     pivots with 2 assists       PAST SURGICAL HISTORY:   Past Surgical History:   Procedure Laterality Date    AORTIC VALVE REPLACEMENT  03/18/2016    bioprosthetic    ARM SURGERY Left 1990    CARDIAC CATHETERIZATION      CENTRAL VENOUS CATHETER Right 02/21/2018    DIALYSIS CATHETER Dr Ellison Friday COLONOSCOPY  11/19/09    COLONOSCOPY N/A 10/12/2018    COLONOSCOPY WITH BIOPSY performed by Mart Valerio DO at [address] (x10-12 surgeries)    several surgeries on left arm    KNEE ARTHROSCOPY Left 09/17/99    MITRAL VALVE REPLACEMENT  03/18/2016    bioprosthetic     NASAL SEPTUM SURGERY      OTHER SURGICAL HISTORY  3/18/16    Aortic root Enlargement    OTHER SURGICAL HISTORY  3/18/16    ASD Closure    OTHER SURGICAL HISTORY Right 01/09/2017    AV fistula creation, wrist    OTHER SURGICAL HISTORY Right 08/29/2017    ligation of collateral branch of av fistula right wrist   

## 2018-10-31 ENCOUNTER — ANTI-COAG VISIT (OUTPATIENT)
Dept: INTERNAL MEDICINE | Age: 73
End: 2018-10-31
Payer: MEDICARE

## 2018-10-31 ENCOUNTER — OFFICE VISIT (OUTPATIENT)
Dept: PULMONOLOGY | Age: 73
End: 2018-10-31
Payer: MEDICARE

## 2018-10-31 VITALS
SYSTOLIC BLOOD PRESSURE: 116 MMHG | DIASTOLIC BLOOD PRESSURE: 64 MMHG | HEIGHT: 67 IN | OXYGEN SATURATION: 97 % | HEART RATE: 75 BPM | BODY MASS INDEX: 43.76 KG/M2

## 2018-10-31 DIAGNOSIS — Z51.81 MONITORING FOR LONG-TERM ANTICOAGULANT USE: ICD-10-CM

## 2018-10-31 DIAGNOSIS — Z99.89 OSA ON CPAP: Primary | ICD-10-CM

## 2018-10-31 DIAGNOSIS — Z79.01 MONITORING FOR LONG-TERM ANTICOAGULANT USE: ICD-10-CM

## 2018-10-31 DIAGNOSIS — G47.33 OSA ON CPAP: Primary | ICD-10-CM

## 2018-10-31 DIAGNOSIS — E66.01 MORBID OBESITY WITH BODY MASS INDEX OF 45.0-49.9 IN ADULT (HCC): ICD-10-CM

## 2018-10-31 DIAGNOSIS — I48.0 PAROXYSMAL ATRIAL FIBRILLATION (HCC): ICD-10-CM

## 2018-10-31 LAB — INR BLD: 2.3

## 2018-10-31 PROCEDURE — 3017F COLORECTAL CA SCREEN DOC REV: CPT | Performed by: INTERNAL MEDICINE

## 2018-10-31 PROCEDURE — 1101F PT FALLS ASSESS-DOCD LE1/YR: CPT | Performed by: INTERNAL MEDICINE

## 2018-10-31 PROCEDURE — 1036F TOBACCO NON-USER: CPT | Performed by: INTERNAL MEDICINE

## 2018-10-31 PROCEDURE — G8482 FLU IMMUNIZE ORDER/ADMIN: HCPCS | Performed by: INTERNAL MEDICINE

## 2018-10-31 PROCEDURE — 93793 ANTICOAG MGMT PT WARFARIN: CPT | Performed by: INTERNAL MEDICINE

## 2018-10-31 PROCEDURE — G8427 DOCREV CUR MEDS BY ELIG CLIN: HCPCS | Performed by: INTERNAL MEDICINE

## 2018-10-31 PROCEDURE — 4040F PNEUMOC VAC/ADMIN/RCVD: CPT | Performed by: INTERNAL MEDICINE

## 2018-10-31 PROCEDURE — 1123F ACP DISCUSS/DSCN MKR DOCD: CPT | Performed by: INTERNAL MEDICINE

## 2018-10-31 PROCEDURE — 99213 OFFICE O/P EST LOW 20 MIN: CPT | Performed by: INTERNAL MEDICINE

## 2018-10-31 PROCEDURE — G8417 CALC BMI ABV UP PARAM F/U: HCPCS | Performed by: INTERNAL MEDICINE

## 2018-10-31 NOTE — PROGRESS NOTES
([facility] [address] 08/29/2017    new onset    Anemia 4/27/2016    Arthritis     Bacteremia 11/11/2016    Benign prostatic hypertrophy     Chronic kidney disease 10/17/2016    Closed fracture of forearm 8/13/2013    Broken lft forearm     Controlled type 2 diabetes mellitus with chronic kidney disease on chronic dialysis, with long-term current use of insulin ([address]     Decubital ulcer     NON OPEN BUTTOCKS    Dementia     memory problems    Dialysis patient ([facility] [address] 1/3/2017    Goes [place], Thurs, Sat in [place]    Difficult intravenous access     HAS NEEDED PICC TEAM IN THE PAST    Difficulty walking     WHEELCHAIR BOUND-PIVOTS WITH ASSIST O F 2    DJD (degenerative joint disease) of knee     Elbow, forearm, and wrist, abrasion or friction burn, without mention of infection 8/13/2013    Abrasions lft forearm     Encephalopathy acute 4/27/2016    Encounter regarding vascular access for dialysis for ESRD ([facility] 2/2/2017    ESRD (end stage renal disease) on dialysis ([facility] [address] 1/17/2017    Forgetfulness     HAS SOME SHORT TERM MEMORY LOSS, QUYEN-WIFE IS LEGAL GUARDIAN    H/O transesophageal echocardiography (AMADEO) for monitoring     Hemodialysis patient ([address] 11/11/2016    tues-thurs-sat [place]---FRESEMIUS.  TUNNELED CATHETER RT UPPER CHEST    Hyperlipidemia 1990    on Meds    Hypertension 1990    on Meds    Intercritical gout     on Meds    Joint pain, knee 01/13/2017    baldo knee synvisc-1 injections    Major depressive disorder with single episode, in partial remission ([facility] 1/3/2017    Mobility impaired     Muscle weakness     GRNERALIZED    Obesity     HEATHER on CPAP     On CPAP-TO BRING DOS    Respiratory failure ([address] 03/2016    with open heart surgery, trached x 5 months    S/P cardiac cath     Seborrhea     Severe aortic stenosis 3/9/2016    Severe mitral valve stenosis 3/9/2016    Severe sepsis ([address] 4/3/2016    Skin rash     ABD FOLDS    Stasis dermatitis     Thyroid disease     Traumatic

## 2018-11-21 ENCOUNTER — ANTI-COAG VISIT (OUTPATIENT)
Dept: INTERNAL MEDICINE | Age: 73
End: 2018-11-21
Payer: MEDICARE

## 2018-11-21 ENCOUNTER — TELEPHONE (OUTPATIENT)
Dept: INTERNAL MEDICINE | Age: 73
End: 2018-11-21

## 2018-11-21 DIAGNOSIS — I35.0 SEVERE AORTIC STENOSIS: ICD-10-CM

## 2018-11-21 DIAGNOSIS — Z79.01 MONITORING FOR LONG-TERM ANTICOAGULANT USE: ICD-10-CM

## 2018-11-21 DIAGNOSIS — I05.0 SEVERE MITRAL VALVE STENOSIS: ICD-10-CM

## 2018-11-21 DIAGNOSIS — I48.0 PAROXYSMAL ATRIAL FIBRILLATION (HCC): ICD-10-CM

## 2018-11-21 DIAGNOSIS — Z51.81 MONITORING FOR LONG-TERM ANTICOAGULANT USE: ICD-10-CM

## 2018-11-21 LAB — INR BLD: 2.6

## 2018-11-21 PROCEDURE — 93793 ANTICOAG MGMT PT WARFARIN: CPT | Performed by: INTERNAL MEDICINE

## 2018-11-29 PROCEDURE — 99309 SBSQ NF CARE MODERATE MDM 30: CPT | Performed by: INTERNAL MEDICINE

## 2018-12-03 ENCOUNTER — HOSPITAL ENCOUNTER (OUTPATIENT)
Dept: CARDIAC CATH/INVASIVE PROCEDURES | Age: 73
Discharge: HOME | End: 2018-12-03
Payer: MEDICARE

## 2018-12-03 VITALS
TEMPERATURE: 98.6 F | SYSTOLIC BLOOD PRESSURE: 142 MMHG | HEART RATE: 76 BPM | WEIGHT: 287.7 LBS | HEIGHT: 67 IN | RESPIRATION RATE: 20 BRPM | BODY MASS INDEX: 45.16 KG/M2 | OXYGEN SATURATION: 97 % | DIASTOLIC BLOOD PRESSURE: 72 MMHG

## 2018-12-03 LAB
GLUCOSE BLD-MCNC: 168 MG/DL (ref 75–110)
GLUCOSE BLD-MCNC: 203 MG/DL (ref 74–100)
POC INR: 2.5
POC POTASSIUM: 3.2 MMOL/L (ref 3.5–4.5)
PROTHROMBIN TIME, POC: 28.3 SEC (ref 10.4–14.2)

## 2018-12-03 PROCEDURE — 76937 US GUIDE VASCULAR ACCESS: CPT

## 2018-12-03 PROCEDURE — 85610 PROTHROMBIN TIME: CPT

## 2018-12-03 PROCEDURE — 82947 ASSAY GLUCOSE BLOOD QUANT: CPT

## 2018-12-03 PROCEDURE — 84132 ASSAY OF SERUM POTASSIUM: CPT

## 2018-12-03 RX ORDER — BUPROPION HYDROCHLORIDE 150 MG/1
150 TABLET ORAL EVERY MORNING
COMMUNITY
End: 2020-06-16

## 2018-12-03 RX ORDER — SODIUM CHLORIDE 9 MG/ML
INJECTION, SOLUTION INTRAVENOUS CONTINUOUS
Status: DISCONTINUED | OUTPATIENT
Start: 2018-12-03 | End: 2018-12-04 | Stop reason: HOSPADM

## 2018-12-03 RX ADMIN — SODIUM CHLORIDE: 9 INJECTION, SOLUTION INTRAVENOUS at 11:11

## 2018-12-03 NOTE — PROGRESS NOTES
Patient admitted, consent signed, all questions answered. Pt ready for procedure. Call light to reach with side rails up 2 of 2. Wife at bedside with patient. Right forearm AV fistula bruit and thrill present.

## 2018-12-06 ENCOUNTER — HOSPITAL ENCOUNTER (OUTPATIENT)
Dept: CARDIAC CATH/INVASIVE PROCEDURES | Age: 73
Discharge: HOME | End: 2018-12-06
Payer: MEDICARE

## 2018-12-06 VITALS
BODY MASS INDEX: 43.95 KG/M2 | SYSTOLIC BLOOD PRESSURE: 148 MMHG | RESPIRATION RATE: 23 BRPM | DIASTOLIC BLOOD PRESSURE: 115 MMHG | TEMPERATURE: 97.7 F | HEIGHT: 67 IN | WEIGHT: 280 LBS | OXYGEN SATURATION: 99 % | HEART RATE: 82 BPM

## 2018-12-06 LAB
GLUCOSE BLD-MCNC: 146 MG/DL (ref 74–100)
POC INR: 1.5
POC POTASSIUM: 2.8 MMOL/L (ref 3.5–4.5)
PROTHROMBIN TIME, POC: 17.4 SEC (ref 10.4–14.2)

## 2018-12-06 PROCEDURE — 6360000002 HC RX W HCPCS

## 2018-12-06 PROCEDURE — C1725 CATH, TRANSLUMIN NON-LASER: HCPCS

## 2018-12-06 PROCEDURE — 6370000000 HC RX 637 (ALT 250 FOR IP)

## 2018-12-06 PROCEDURE — 36902 INTRO CATH DIALYSIS CIRCUIT: CPT

## 2018-12-06 PROCEDURE — 84132 ASSAY OF SERUM POTASSIUM: CPT

## 2018-12-06 PROCEDURE — C1769 GUIDE WIRE: HCPCS

## 2018-12-06 PROCEDURE — 7100000011 HC PHASE II RECOVERY - ADDTL 15 MIN

## 2018-12-06 PROCEDURE — 76937 US GUIDE VASCULAR ACCESS: CPT

## 2018-12-06 PROCEDURE — 2709999900 HC NON-CHARGEABLE SUPPLY

## 2018-12-06 PROCEDURE — 2500000003 HC RX 250 WO HCPCS

## 2018-12-06 PROCEDURE — C1894 INTRO/SHEATH, NON-LASER: HCPCS

## 2018-12-06 PROCEDURE — 7100000010 HC PHASE II RECOVERY - FIRST 15 MIN

## 2018-12-06 PROCEDURE — 82947 ASSAY GLUCOSE BLOOD QUANT: CPT

## 2018-12-06 PROCEDURE — 6360000004 HC RX CONTRAST MEDICATION

## 2018-12-06 PROCEDURE — 85610 PROTHROMBIN TIME: CPT

## 2018-12-06 PROCEDURE — 36005 INJECTION EXT VENOGRAPHY: CPT

## 2018-12-06 RX ORDER — ONDANSETRON 2 MG/ML
4 INJECTION INTRAMUSCULAR; INTRAVENOUS EVERY 8 HOURS PRN
Status: DISCONTINUED | OUTPATIENT
Start: 2018-12-06 | End: 2018-12-07 | Stop reason: HOSPADM

## 2018-12-06 RX ORDER — SODIUM CHLORIDE 9 MG/ML
INJECTION, SOLUTION INTRAVENOUS CONTINUOUS
Status: DISCONTINUED | OUTPATIENT
Start: 2018-12-06 | End: 2018-12-07 | Stop reason: HOSPADM

## 2018-12-06 RX ORDER — POTASSIUM CHLORIDE 20MEQ/15ML
40 LIQUID (ML) ORAL ONCE
Status: COMPLETED | OUTPATIENT
Start: 2018-12-06 | End: 2018-12-06

## 2018-12-06 RX ADMIN — SODIUM CHLORIDE: 9 INJECTION, SOLUTION INTRAVENOUS at 08:51

## 2018-12-06 RX ADMIN — Medication 40 MEQ: at 08:52

## 2018-12-06 NOTE — PROGRESS NOTES
All discharge instructions reviewed, questions answered, paper signed and given copy. Patient discharged per M AND M transportation on cart with wife to follow back to [facility] and belongings. Wife aware of current INR and to follow up with potassium upon arrival back to [facility] with PCP in facility. Transportation team also aware and to monitor prior IV access site for skin tear.

## 2018-12-12 ENCOUNTER — TELEPHONE (OUTPATIENT)
Dept: INTERNAL MEDICINE | Age: 73
End: 2018-12-12

## 2018-12-12 DIAGNOSIS — M15.9 PRIMARY OSTEOARTHRITIS INVOLVING MULTIPLE JOINTS: Primary | ICD-10-CM

## 2018-12-12 DIAGNOSIS — R26.2 DIFFICULTY WALKING: ICD-10-CM

## 2018-12-12 RX ORDER — WHEELCHAIR
EACH MISCELLANEOUS
Qty: 1 EACH | Refills: 0 | Status: SHIPPED | OUTPATIENT
Start: 2018-12-12

## 2018-12-12 NOTE — TELEPHONE ENCOUNTER
Wife came to office- pt is using a w/c at [address] [facility], but it is falling apart. She would like a script (torin ) for a w/c.

## 2018-12-19 ENCOUNTER — OUTSIDE SERVICES (OUTPATIENT)
Dept: INTERNAL MEDICINE | Age: 73
End: 2018-12-19
Payer: MEDICARE

## 2018-12-19 DIAGNOSIS — I35.0 SEVERE AORTIC STENOSIS: ICD-10-CM

## 2018-12-19 DIAGNOSIS — Z99.2 DIALYSIS PATIENT (HCC): ICD-10-CM

## 2018-12-19 DIAGNOSIS — E66.01 MORBID OBESITY (HCC): ICD-10-CM

## 2018-12-19 DIAGNOSIS — Z79.4 CONTROLLED TYPE 2 DIABETES MELLITUS WITH CHRONIC KIDNEY DISEASE ON CHRONIC DIALYSIS, WITH LONG-TERM CURRENT USE OF INSULIN (HCC): Primary | ICD-10-CM

## 2018-12-19 DIAGNOSIS — D63.1 ANEMIA IN CHRONIC KIDNEY DISEASE, ON CHRONIC DIALYSIS (HCC): ICD-10-CM

## 2018-12-19 DIAGNOSIS — E11.42 CONTROLLED TYPE 2 DIABETES MELLITUS WITH DIABETIC POLYNEUROPATHY, WITH LONG-TERM CURRENT USE OF INSULIN (HCC): ICD-10-CM

## 2018-12-19 DIAGNOSIS — N18.6 ANEMIA IN CHRONIC KIDNEY DISEASE, ON CHRONIC DIALYSIS (HCC): ICD-10-CM

## 2018-12-19 DIAGNOSIS — F32.4 MAJOR DEPRESSIVE DISORDER WITH SINGLE EPISODE, IN PARTIAL REMISSION (HCC): ICD-10-CM

## 2018-12-19 DIAGNOSIS — N18.6 CONTROLLED TYPE 2 DIABETES MELLITUS WITH CHRONIC KIDNEY DISEASE ON CHRONIC DIALYSIS, WITH LONG-TERM CURRENT USE OF INSULIN (HCC): Primary | ICD-10-CM

## 2018-12-19 DIAGNOSIS — Z99.2 CONTROLLED TYPE 2 DIABETES MELLITUS WITH CHRONIC KIDNEY DISEASE ON CHRONIC DIALYSIS, WITH LONG-TERM CURRENT USE OF INSULIN (HCC): Primary | ICD-10-CM

## 2018-12-19 DIAGNOSIS — E11.22 CONTROLLED TYPE 2 DIABETES MELLITUS WITH CHRONIC KIDNEY DISEASE ON CHRONIC DIALYSIS, WITH LONG-TERM CURRENT USE OF INSULIN (HCC): Primary | ICD-10-CM

## 2018-12-19 DIAGNOSIS — G47.33 OBSTRUCTIVE SLEEP APNEA SYNDROME: ICD-10-CM

## 2018-12-19 DIAGNOSIS — Z99.2 ANEMIA IN CHRONIC KIDNEY DISEASE, ON CHRONIC DIALYSIS (HCC): ICD-10-CM

## 2018-12-19 DIAGNOSIS — Z79.4 LONG-TERM INSULIN USE (HCC): ICD-10-CM

## 2018-12-19 DIAGNOSIS — Z79.4 CONTROLLED TYPE 2 DIABETES MELLITUS WITH DIABETIC POLYNEUROPATHY, WITH LONG-TERM CURRENT USE OF INSULIN (HCC): ICD-10-CM

## 2018-12-19 DIAGNOSIS — I48.0 PAROXYSMAL ATRIAL FIBRILLATION (HCC): ICD-10-CM

## 2018-12-19 DIAGNOSIS — F01.50 VASCULAR DEMENTIA WITHOUT BEHAVIORAL DISTURBANCE (HCC): ICD-10-CM

## 2018-12-19 DIAGNOSIS — N18.6 ESRD (END STAGE RENAL DISEASE) ON DIALYSIS (HCC): ICD-10-CM

## 2018-12-19 DIAGNOSIS — Z99.2 ESRD (END STAGE RENAL DISEASE) ON DIALYSIS (HCC): ICD-10-CM

## 2018-12-19 DIAGNOSIS — I05.0 SEVERE MITRAL VALVE STENOSIS: ICD-10-CM

## 2018-12-20 NOTE — PROGRESS NOTES
several surgeries on left arm    KNEE ARTHROSCOPY Left 09/17/99    MITRAL VALVE REPLACEMENT  03/18/2016    bioprosthetic     NASAL SEPTUM SURGERY      OTHER SURGICAL HISTORY  3/18/16    Aortic root Enlargement    OTHER SURGICAL HISTORY  3/18/16    ASD Closure    OTHER SURGICAL HISTORY Right 01/09/2017    AV fistula creation, wrist    OTHER SURGICAL HISTORY Right 08/29/2017    ligation of collateral branch of av fistula right wrist    OTHER SURGICAL HISTORY  04/05/2018    FISTULAGRAM WITH DR. Kiki Mckeon    MI EGD TRANSORAL BIOPSY SINGLE/MULTIPLE N/A 10/17/2017    EGD BIOPSY performed by Roberto Marcial MD at [address] ANGIOACCESS AV FISTULA Right 8/29/2017     RIGHT  LOWER ARM AV FISTULA  LIGATION OF AQUIRED BRANCHES X2 performed by Diana Vega DO at [address] [place] [address]  3/18/16    median    VASCULAR SURGERY  12/06/2018    Right arm fistulagram,  PTA cephalic vein stenosis  /  DR Radha Laureano       SOCIAL HISTORY:     Tobacco:   History   Smoking Status    Former Smoker    Packs/day: 2.00    Years: 25.00    Types: Cigarettes    Start date: 3/17/1955   [place] Quit date: 1/1/1980   Smokeless Tobacco    Never Used     Alcohol:   History   Alcohol Use No     Comment: 1-2 drinks per year     Drugs:   History   Drug Use No       FAMILY HISTORY: family history includes Alzheimer's Disease in his father; Diabetes in his maternal grandmother and mother; High Blood Pressure in his sister; Guadalupe Gerardo in his mother. REVIEW OF SYSTEMS:     Please see history of chief complaint above; otherwise no new problems with respect to General, HEENT, Cardiovascular, Respiratory, Gastrointestinal, Genitourinary, Endocrinologic, Musculoskeletal, or Neuropsychiatric complaints. PHYSICAL EXAMINATION:    Vitals: Temp: 97.6 deg F. Pulse: 68. Resp: 16. BP: 108/54. General: A 75 y.o.  male. Alert and oriented to person, place and time. He  does not appear to be in any acute distress.   Skin:

## 2018-12-21 ENCOUNTER — ANTI-COAG VISIT (OUTPATIENT)
Dept: INTERNAL MEDICINE | Age: 73
End: 2018-12-21
Payer: MEDICARE

## 2018-12-21 DIAGNOSIS — Z79.01 MONITORING FOR LONG-TERM ANTICOAGULANT USE: ICD-10-CM

## 2018-12-21 DIAGNOSIS — Z51.81 MONITORING FOR LONG-TERM ANTICOAGULANT USE: ICD-10-CM

## 2018-12-21 DIAGNOSIS — I48.0 PAROXYSMAL ATRIAL FIBRILLATION (HCC): ICD-10-CM

## 2018-12-21 DIAGNOSIS — I05.0 SEVERE MITRAL VALVE STENOSIS: ICD-10-CM

## 2018-12-21 LAB — INR BLD: 1.8

## 2018-12-21 PROCEDURE — 93793 ANTICOAG MGMT PT WARFARIN: CPT | Performed by: INTERNAL MEDICINE

## 2018-12-28 ENCOUNTER — ANTI-COAG VISIT (OUTPATIENT)
Dept: INTERNAL MEDICINE | Age: 73
End: 2018-12-28

## 2018-12-28 DIAGNOSIS — I48.0 PAROXYSMAL ATRIAL FIBRILLATION (HCC): ICD-10-CM

## 2018-12-28 LAB — INR BLD: 2.3

## 2018-12-29 PROCEDURE — 99309 SBSQ NF CARE MODERATE MDM 30: CPT | Performed by: INTERNAL MEDICINE

## 2018-12-31 ENCOUNTER — OUTSIDE SERVICES (OUTPATIENT)
Dept: INTERNAL MEDICINE | Age: 73
End: 2018-12-31
Payer: MEDICARE

## 2018-12-31 DIAGNOSIS — I48.0 PAROXYSMAL ATRIAL FIBRILLATION (HCC): ICD-10-CM

## 2018-12-31 DIAGNOSIS — N18.6 ANEMIA IN CHRONIC KIDNEY DISEASE, ON CHRONIC DIALYSIS (HCC): ICD-10-CM

## 2018-12-31 DIAGNOSIS — Z99.2 CONTROLLED TYPE 2 DIABETES MELLITUS WITH CHRONIC KIDNEY DISEASE ON CHRONIC DIALYSIS, WITH LONG-TERM CURRENT USE OF INSULIN (HCC): Primary | ICD-10-CM

## 2018-12-31 DIAGNOSIS — F32.4 MAJOR DEPRESSIVE DISORDER WITH SINGLE EPISODE, IN PARTIAL REMISSION (HCC): ICD-10-CM

## 2018-12-31 DIAGNOSIS — I35.0 SEVERE AORTIC STENOSIS: ICD-10-CM

## 2018-12-31 DIAGNOSIS — E11.42 CONTROLLED TYPE 2 DIABETES MELLITUS WITH DIABETIC POLYNEUROPATHY, WITH LONG-TERM CURRENT USE OF INSULIN (HCC): ICD-10-CM

## 2018-12-31 DIAGNOSIS — D63.1 ANEMIA IN CHRONIC KIDNEY DISEASE, ON CHRONIC DIALYSIS (HCC): ICD-10-CM

## 2018-12-31 DIAGNOSIS — F01.50 VASCULAR DEMENTIA WITHOUT BEHAVIORAL DISTURBANCE (HCC): ICD-10-CM

## 2018-12-31 DIAGNOSIS — Z79.4 LONG-TERM INSULIN USE (HCC): ICD-10-CM

## 2018-12-31 DIAGNOSIS — I05.0 SEVERE MITRAL VALVE STENOSIS: ICD-10-CM

## 2018-12-31 DIAGNOSIS — E66.01 MORBID OBESITY (HCC): ICD-10-CM

## 2018-12-31 DIAGNOSIS — E11.22 CONTROLLED TYPE 2 DIABETES MELLITUS WITH CHRONIC KIDNEY DISEASE ON CHRONIC DIALYSIS, WITH LONG-TERM CURRENT USE OF INSULIN (HCC): Primary | ICD-10-CM

## 2018-12-31 DIAGNOSIS — G47.33 OBSTRUCTIVE SLEEP APNEA SYNDROME: ICD-10-CM

## 2018-12-31 DIAGNOSIS — N18.6 ESRD (END STAGE RENAL DISEASE) ON DIALYSIS (HCC): ICD-10-CM

## 2018-12-31 DIAGNOSIS — Z99.2 ESRD (END STAGE RENAL DISEASE) ON DIALYSIS (HCC): ICD-10-CM

## 2018-12-31 DIAGNOSIS — Z99.2 DIALYSIS PATIENT (HCC): ICD-10-CM

## 2018-12-31 DIAGNOSIS — N18.6 CONTROLLED TYPE 2 DIABETES MELLITUS WITH CHRONIC KIDNEY DISEASE ON CHRONIC DIALYSIS, WITH LONG-TERM CURRENT USE OF INSULIN (HCC): Primary | ICD-10-CM

## 2018-12-31 DIAGNOSIS — Z79.4 CONTROLLED TYPE 2 DIABETES MELLITUS WITH DIABETIC POLYNEUROPATHY, WITH LONG-TERM CURRENT USE OF INSULIN (HCC): ICD-10-CM

## 2018-12-31 DIAGNOSIS — Z99.2 ANEMIA IN CHRONIC KIDNEY DISEASE, ON CHRONIC DIALYSIS (HCC): ICD-10-CM

## 2018-12-31 DIAGNOSIS — Z79.4 CONTROLLED TYPE 2 DIABETES MELLITUS WITH CHRONIC KIDNEY DISEASE ON CHRONIC DIALYSIS, WITH LONG-TERM CURRENT USE OF INSULIN (HCC): Primary | ICD-10-CM

## 2019-01-01 NOTE — PROGRESS NOTES
 MITRAL VALVE REPLACEMENT  03/18/2016    bioprosthetic     NASAL SEPTUM SURGERY      OTHER SURGICAL HISTORY  3/18/16    Aortic root Enlargement    OTHER SURGICAL HISTORY  3/18/16    ASD Closure    OTHER SURGICAL HISTORY Right 01/09/2017    AV fistula creation, wrist    OTHER SURGICAL HISTORY Right 08/29/2017    ligation of collateral branch of av fistula right wrist    OTHER SURGICAL HISTORY  04/05/2018    FISTULAGRAM WITH DR. Irina GUARDADO EGD TRANSORAL BIOPSY SINGLE/MULTIPLE N/A 10/17/2017    EGD BIOPSY performed by Malena Dodson MD at [address] ANGIOACCESS AV FISTULA Right 8/29/2017     RIGHT  LOWER ARM AV FISTULA  LIGATION OF AQUIRED BRANCHES X2 performed by Ivonne Pradhan DO at [address] [facility]  3/18/16    median    VASCULAR SURGERY  12/06/2018    Right arm fistulagram,  PTA cephalic vein stenosis  /  DR Maria       SOCIAL HISTORY:     Tobacco:   History   Smoking Status    Former Smoker    Packs/day: 2.00    Years: 25.00    Types: Cigarettes    Start date: 3/17/1955   Nando Gum Quit date: 1/1/1980   Smokeless Tobacco    Never Used     Alcohol:   History   Alcohol Use No     Comment: 1-2 drinks per year     Drugs:   History   Drug Use No       FAMILY HISTORY: family history includes Alzheimer's Disease in his father; Diabetes in his maternal grandmother and mother; High Blood Pressure in his sister; Enmanuel Felling in his mother. REVIEW OF SYSTEMS:     Please see history of chief complaint above; otherwise no new problems with respect to General, HEENT, Cardiovascular, Respiratory, Gastrointestinal, Genitourinary, Endocrinologic, Musculoskeletal, or Neuropsychiatric complaints. PHYSICAL EXAMINATION:    Vitals: Temp: 97.9 deg F. Pulse: 74. Resp: 16. BP: 131/71. General: A 75 y.o.  male. Alert and oriented to person, place and time. He  does not appear to be in any acute distress.   Skin: Skin color, texture, turgor normal. No rashes or

## 2019-01-11 ENCOUNTER — ANTI-COAG VISIT (OUTPATIENT)
Dept: INTERNAL MEDICINE | Age: 74
End: 2019-01-11
Payer: MEDICARE

## 2019-01-11 DIAGNOSIS — I05.0 SEVERE MITRAL VALVE STENOSIS: ICD-10-CM

## 2019-01-11 DIAGNOSIS — I48.0 PAROXYSMAL ATRIAL FIBRILLATION (HCC): ICD-10-CM

## 2019-01-11 DIAGNOSIS — Z79.01 MONITORING FOR LONG-TERM ANTICOAGULANT USE: ICD-10-CM

## 2019-01-11 DIAGNOSIS — Z51.81 MONITORING FOR LONG-TERM ANTICOAGULANT USE: ICD-10-CM

## 2019-01-11 LAB — INR BLD: 3.4

## 2019-01-11 PROCEDURE — 93793 ANTICOAG MGMT PT WARFARIN: CPT | Performed by: INTERNAL MEDICINE

## 2019-01-15 ENCOUNTER — HOSPITAL ENCOUNTER (EMERGENCY)
Age: 74
Discharge: NURSING, REHAB OR LONG-TERM CARE | End: 2019-01-16
Attending: EMERGENCY MEDICINE
Payer: MEDICARE

## 2019-01-15 ENCOUNTER — TELEPHONE (OUTPATIENT)
Dept: INTERNAL MEDICINE | Age: 74
End: 2019-01-15

## 2019-01-15 DIAGNOSIS — L02.219 CELLULITIS AND ABSCESS OF TRUNK: Primary | ICD-10-CM

## 2019-01-15 DIAGNOSIS — L03.319 CELLULITIS AND ABSCESS OF TRUNK: Primary | ICD-10-CM

## 2019-01-15 LAB
ABSOLUTE EOS #: 0 K/UL (ref 0–0.4)
ABSOLUTE IMMATURE GRANULOCYTE: ABNORMAL K/UL (ref 0–0.3)
ABSOLUTE LYMPH #: 0.4 K/UL (ref 1–4.8)
ABSOLUTE MONO #: 0.8 K/UL (ref 0.1–1.2)
ANION GAP SERPL CALCULATED.3IONS-SCNC: 21 MMOL/L (ref 9–17)
BASOPHILS # BLD: 0 % (ref 0–2)
BASOPHILS ABSOLUTE: 0 K/UL (ref 0–0.2)
BUN BLDV-MCNC: 70 MG/DL (ref 8–23)
BUN/CREAT BLD: 10 (ref 9–20)
CALCIUM SERPL-MCNC: 9.8 MG/DL (ref 8.6–10.4)
CHLORIDE BLD-SCNC: 87 MMOL/L (ref 98–107)
CO2: 31 MMOL/L (ref 20–31)
CREAT SERPL-MCNC: 7.28 MG/DL (ref 0.7–1.2)
DIFFERENTIAL TYPE: ABNORMAL
EOSINOPHILS RELATIVE PERCENT: 0 % (ref 1–8)
GFR AFRICAN AMERICAN: 9 ML/MIN
GFR NON-AFRICAN AMERICAN: 7 ML/MIN
GFR SERPL CREATININE-BSD FRML MDRD: ABNORMAL ML/MIN/{1.73_M2}
GFR SERPL CREATININE-BSD FRML MDRD: ABNORMAL ML/MIN/{1.73_M2}
GLUCOSE BLD-MCNC: 219 MG/DL (ref 70–99)
HCT VFR BLD CALC: 41.5 % (ref 41–53)
HEMOGLOBIN: 13.3 G/DL (ref 13.5–17.5)
IMMATURE GRANULOCYTES: ABNORMAL %
LYMPHOCYTES # BLD: 2 % (ref 15–43)
MCH RBC QN AUTO: 27.7 PG (ref 26–34)
MCHC RBC AUTO-ENTMCNC: 32 G/DL (ref 31–37)
MCV RBC AUTO: 86.5 FL (ref 80–100)
MONOCYTES # BLD: 4 % (ref 6–14)
NRBC AUTOMATED: ABNORMAL PER 100 WBC
PDW BLD-RTO: 20.3 % (ref 11–14.5)
PLATELET # BLD: 205 K/UL (ref 140–450)
PLATELET ESTIMATE: ABNORMAL
PMV BLD AUTO: 7.4 FL (ref 6–12)
POTASSIUM SERPL-SCNC: 4.1 MMOL/L (ref 3.7–5.3)
RBC # BLD: 4.8 M/UL (ref 4.5–5.9)
RBC # BLD: ABNORMAL 10*6/UL
SEG NEUTROPHILS: 94 % (ref 44–74)
SEGMENTED NEUTROPHILS ABSOLUTE COUNT: 21.1 K/UL (ref 1.8–7.7)
SODIUM BLD-SCNC: 139 MMOL/L (ref 135–144)
WBC # BLD: 22.3 K/UL (ref 3.5–11)
WBC # BLD: ABNORMAL 10*3/UL

## 2019-01-15 PROCEDURE — 87040 BLOOD CULTURE FOR BACTERIA: CPT

## 2019-01-15 PROCEDURE — 6360000002 HC RX W HCPCS: Performed by: EMERGENCY MEDICINE

## 2019-01-15 PROCEDURE — 2580000003 HC RX 258: Performed by: EMERGENCY MEDICINE

## 2019-01-15 PROCEDURE — 99284 EMERGENCY DEPT VISIT MOD MDM: CPT

## 2019-01-15 PROCEDURE — 85025 COMPLETE CBC W/AUTO DIFF WBC: CPT

## 2019-01-15 PROCEDURE — 80048 BASIC METABOLIC PNL TOTAL CA: CPT

## 2019-01-15 PROCEDURE — 96365 THER/PROPH/DIAG IV INF INIT: CPT

## 2019-01-15 RX ADMIN — VANCOMYCIN HYDROCHLORIDE 1000 MG: 1 INJECTION, POWDER, LYOPHILIZED, FOR SOLUTION INTRAVENOUS at 23:30

## 2019-01-16 ENCOUNTER — ANTI-COAG VISIT (OUTPATIENT)
Dept: INTERNAL MEDICINE | Age: 74
End: 2019-01-16
Payer: MEDICARE

## 2019-01-16 VITALS
RESPIRATION RATE: 23 BRPM | TEMPERATURE: 99.3 F | HEART RATE: 93 BPM | DIASTOLIC BLOOD PRESSURE: 63 MMHG | SYSTOLIC BLOOD PRESSURE: 144 MMHG | BODY MASS INDEX: 43.95 KG/M2 | OXYGEN SATURATION: 91 % | HEIGHT: 67 IN | WEIGHT: 280 LBS

## 2019-01-16 DIAGNOSIS — I48.0 PAROXYSMAL ATRIAL FIBRILLATION (HCC): ICD-10-CM

## 2019-01-16 DIAGNOSIS — I05.0 SEVERE MITRAL VALVE STENOSIS: ICD-10-CM

## 2019-01-16 DIAGNOSIS — Z51.81 MONITORING FOR LONG-TERM ANTICOAGULANT USE: ICD-10-CM

## 2019-01-16 DIAGNOSIS — I35.0 SEVERE AORTIC STENOSIS: ICD-10-CM

## 2019-01-16 DIAGNOSIS — Z79.01 MONITORING FOR LONG-TERM ANTICOAGULANT USE: ICD-10-CM

## 2019-01-16 LAB — INR BLD: 1.4

## 2019-01-16 PROCEDURE — 93793 ANTICOAG MGMT PT WARFARIN: CPT | Performed by: INTERNAL MEDICINE

## 2019-01-22 ENCOUNTER — OUTSIDE SERVICES (OUTPATIENT)
Dept: INTERNAL MEDICINE | Age: 74
End: 2019-01-22
Payer: MEDICARE

## 2019-01-22 DIAGNOSIS — L30.9 DERMATITIS: Primary | ICD-10-CM

## 2019-01-22 LAB
CULTURE: NORMAL
Lab: NORMAL
SPECIMEN DESCRIPTION: NORMAL
STATUS: NORMAL

## 2019-01-22 PROCEDURE — 99308 SBSQ NF CARE LOW MDM 20: CPT | Performed by: NURSE PRACTITIONER

## 2019-01-23 ENCOUNTER — ANTI-COAG VISIT (OUTPATIENT)
Dept: INTERNAL MEDICINE | Age: 74
End: 2019-01-23
Payer: MEDICARE

## 2019-01-23 DIAGNOSIS — Z79.01 MONITORING FOR LONG-TERM ANTICOAGULANT USE: ICD-10-CM

## 2019-01-23 DIAGNOSIS — I05.0 SEVERE MITRAL VALVE STENOSIS: ICD-10-CM

## 2019-01-23 DIAGNOSIS — Z51.81 MONITORING FOR LONG-TERM ANTICOAGULANT USE: ICD-10-CM

## 2019-01-23 DIAGNOSIS — I48.0 PAROXYSMAL ATRIAL FIBRILLATION (HCC): ICD-10-CM

## 2019-01-23 LAB — INR BLD: 2.5

## 2019-01-23 PROCEDURE — 93793 ANTICOAG MGMT PT WARFARIN: CPT | Performed by: INTERNAL MEDICINE

## 2019-01-26 PROCEDURE — 99309 SBSQ NF CARE MODERATE MDM 30: CPT | Performed by: INTERNAL MEDICINE

## 2019-01-29 ENCOUNTER — OUTSIDE SERVICES (OUTPATIENT)
Dept: INTERNAL MEDICINE | Age: 74
End: 2019-01-29
Payer: MEDICARE

## 2019-01-29 DIAGNOSIS — Z99.2 CONTROLLED TYPE 2 DIABETES MELLITUS WITH CHRONIC KIDNEY DISEASE ON CHRONIC DIALYSIS, WITH LONG-TERM CURRENT USE OF INSULIN (HCC): Primary | ICD-10-CM

## 2019-01-29 DIAGNOSIS — Z79.4 LONG-TERM INSULIN USE (HCC): ICD-10-CM

## 2019-01-29 DIAGNOSIS — D63.1 ANEMIA IN CHRONIC KIDNEY DISEASE, ON CHRONIC DIALYSIS (HCC): ICD-10-CM

## 2019-01-29 DIAGNOSIS — N18.6 CONTROLLED TYPE 2 DIABETES MELLITUS WITH CHRONIC KIDNEY DISEASE ON CHRONIC DIALYSIS, WITH LONG-TERM CURRENT USE OF INSULIN (HCC): Primary | ICD-10-CM

## 2019-01-29 DIAGNOSIS — Z99.2 ESRD (END STAGE RENAL DISEASE) ON DIALYSIS (HCC): ICD-10-CM

## 2019-01-29 DIAGNOSIS — F32.4 MAJOR DEPRESSIVE DISORDER WITH SINGLE EPISODE, IN PARTIAL REMISSION (HCC): ICD-10-CM

## 2019-01-29 DIAGNOSIS — F01.50 VASCULAR DEMENTIA WITHOUT BEHAVIORAL DISTURBANCE (HCC): ICD-10-CM

## 2019-01-29 DIAGNOSIS — E11.42 CONTROLLED TYPE 2 DIABETES MELLITUS WITH DIABETIC POLYNEUROPATHY, WITH LONG-TERM CURRENT USE OF INSULIN (HCC): ICD-10-CM

## 2019-01-29 DIAGNOSIS — I48.0 PAROXYSMAL ATRIAL FIBRILLATION (HCC): ICD-10-CM

## 2019-01-29 DIAGNOSIS — I35.0 SEVERE AORTIC STENOSIS: ICD-10-CM

## 2019-01-29 DIAGNOSIS — Z79.4 CONTROLLED TYPE 2 DIABETES MELLITUS WITH DIABETIC POLYNEUROPATHY, WITH LONG-TERM CURRENT USE OF INSULIN (HCC): ICD-10-CM

## 2019-01-29 DIAGNOSIS — N18.6 ANEMIA IN CHRONIC KIDNEY DISEASE, ON CHRONIC DIALYSIS (HCC): ICD-10-CM

## 2019-01-29 DIAGNOSIS — Z79.4 CONTROLLED TYPE 2 DIABETES MELLITUS WITH CHRONIC KIDNEY DISEASE ON CHRONIC DIALYSIS, WITH LONG-TERM CURRENT USE OF INSULIN (HCC): Primary | ICD-10-CM

## 2019-01-29 DIAGNOSIS — N18.6 ESRD (END STAGE RENAL DISEASE) ON DIALYSIS (HCC): ICD-10-CM

## 2019-01-29 DIAGNOSIS — E66.01 MORBID OBESITY (HCC): ICD-10-CM

## 2019-01-29 DIAGNOSIS — I05.0 SEVERE MITRAL VALVE STENOSIS: ICD-10-CM

## 2019-01-29 DIAGNOSIS — Z99.2 ANEMIA IN CHRONIC KIDNEY DISEASE, ON CHRONIC DIALYSIS (HCC): ICD-10-CM

## 2019-01-29 DIAGNOSIS — E11.22 CONTROLLED TYPE 2 DIABETES MELLITUS WITH CHRONIC KIDNEY DISEASE ON CHRONIC DIALYSIS, WITH LONG-TERM CURRENT USE OF INSULIN (HCC): Primary | ICD-10-CM

## 2019-01-29 DIAGNOSIS — G47.33 OBSTRUCTIVE SLEEP APNEA SYNDROME: ICD-10-CM

## 2019-01-29 DIAGNOSIS — Z99.2 DIALYSIS PATIENT (HCC): ICD-10-CM

## 2019-02-06 ENCOUNTER — ANTI-COAG VISIT (OUTPATIENT)
Dept: INTERNAL MEDICINE | Age: 74
End: 2019-02-06
Payer: MEDICARE

## 2019-02-06 DIAGNOSIS — Z79.01 MONITORING FOR LONG-TERM ANTICOAGULANT USE: ICD-10-CM

## 2019-02-06 DIAGNOSIS — I35.0 SEVERE AORTIC STENOSIS: ICD-10-CM

## 2019-02-06 DIAGNOSIS — I48.0 PAROXYSMAL ATRIAL FIBRILLATION (HCC): ICD-10-CM

## 2019-02-06 DIAGNOSIS — Z51.81 MONITORING FOR LONG-TERM ANTICOAGULANT USE: ICD-10-CM

## 2019-02-06 LAB — INR BLD: 3.4

## 2019-02-06 PROCEDURE — 93793 ANTICOAG MGMT PT WARFARIN: CPT | Performed by: INTERNAL MEDICINE

## 2019-02-11 ENCOUNTER — ANTI-COAG VISIT (OUTPATIENT)
Dept: INTERNAL MEDICINE | Age: 74
End: 2019-02-11
Payer: MEDICARE

## 2019-02-11 DIAGNOSIS — Z79.01 MONITORING FOR LONG-TERM ANTICOAGULANT USE: ICD-10-CM

## 2019-02-11 DIAGNOSIS — I48.0 PAROXYSMAL ATRIAL FIBRILLATION (HCC): ICD-10-CM

## 2019-02-11 DIAGNOSIS — I35.0 SEVERE AORTIC STENOSIS: ICD-10-CM

## 2019-02-11 DIAGNOSIS — Z51.81 MONITORING FOR LONG-TERM ANTICOAGULANT USE: ICD-10-CM

## 2019-02-11 DIAGNOSIS — I05.0 SEVERE MITRAL VALVE STENOSIS: ICD-10-CM

## 2019-02-11 LAB — INR BLD: 1.2

## 2019-02-11 PROCEDURE — 93793 ANTICOAG MGMT PT WARFARIN: CPT | Performed by: INTERNAL MEDICINE

## 2019-02-19 ENCOUNTER — ANTI-COAG VISIT (OUTPATIENT)
Dept: INTERNAL MEDICINE | Age: 74
End: 2019-02-19
Payer: MEDICARE

## 2019-02-19 DIAGNOSIS — Z51.81 ENCOUNTER FOR THERAPEUTIC DRUG MONITORING: ICD-10-CM

## 2019-02-19 DIAGNOSIS — I48.0 PAROXYSMAL ATRIAL FIBRILLATION (HCC): ICD-10-CM

## 2019-02-19 LAB — INR BLD: 1.6

## 2019-02-19 PROCEDURE — 99309 SBSQ NF CARE MODERATE MDM 30: CPT | Performed by: INTERNAL MEDICINE

## 2019-02-19 PROCEDURE — 93793 ANTICOAG MGMT PT WARFARIN: CPT | Performed by: NURSE PRACTITIONER

## 2019-02-23 ENCOUNTER — OUTSIDE SERVICES (OUTPATIENT)
Dept: INTERNAL MEDICINE | Age: 74
End: 2019-02-23
Payer: MEDICARE

## 2019-02-23 DIAGNOSIS — I05.0 SEVERE MITRAL VALVE STENOSIS: ICD-10-CM

## 2019-02-23 DIAGNOSIS — N18.6 ESRD (END STAGE RENAL DISEASE) ON DIALYSIS (HCC): ICD-10-CM

## 2019-02-23 DIAGNOSIS — I48.0 PAROXYSMAL ATRIAL FIBRILLATION (HCC): ICD-10-CM

## 2019-02-23 DIAGNOSIS — E11.42 CONTROLLED TYPE 2 DIABETES MELLITUS WITH DIABETIC POLYNEUROPATHY, WITH LONG-TERM CURRENT USE OF INSULIN (HCC): ICD-10-CM

## 2019-02-23 DIAGNOSIS — D63.1 ANEMIA IN CHRONIC KIDNEY DISEASE, ON CHRONIC DIALYSIS (HCC): ICD-10-CM

## 2019-02-23 DIAGNOSIS — E78.2 MIXED HYPERLIPIDEMIA: ICD-10-CM

## 2019-02-23 DIAGNOSIS — Z99.2 ANEMIA IN CHRONIC KIDNEY DISEASE, ON CHRONIC DIALYSIS (HCC): ICD-10-CM

## 2019-02-23 DIAGNOSIS — Z79.4 CONTROLLED TYPE 2 DIABETES MELLITUS WITH CHRONIC KIDNEY DISEASE ON CHRONIC DIALYSIS, WITH LONG-TERM CURRENT USE OF INSULIN (HCC): Primary | ICD-10-CM

## 2019-02-23 DIAGNOSIS — Z99.2 DIALYSIS PATIENT (HCC): ICD-10-CM

## 2019-02-23 DIAGNOSIS — G47.33 OBSTRUCTIVE SLEEP APNEA SYNDROME: ICD-10-CM

## 2019-02-23 DIAGNOSIS — E66.01 MORBID OBESITY (HCC): ICD-10-CM

## 2019-02-23 DIAGNOSIS — Z99.2 CONTROLLED TYPE 2 DIABETES MELLITUS WITH CHRONIC KIDNEY DISEASE ON CHRONIC DIALYSIS, WITH LONG-TERM CURRENT USE OF INSULIN (HCC): Primary | ICD-10-CM

## 2019-02-23 DIAGNOSIS — Z79.4 CONTROLLED TYPE 2 DIABETES MELLITUS WITH DIABETIC POLYNEUROPATHY, WITH LONG-TERM CURRENT USE OF INSULIN (HCC): ICD-10-CM

## 2019-02-23 DIAGNOSIS — Z79.4 LONG-TERM INSULIN USE (HCC): ICD-10-CM

## 2019-02-23 DIAGNOSIS — N18.6 CONTROLLED TYPE 2 DIABETES MELLITUS WITH CHRONIC KIDNEY DISEASE ON CHRONIC DIALYSIS, WITH LONG-TERM CURRENT USE OF INSULIN (HCC): Primary | ICD-10-CM

## 2019-02-23 DIAGNOSIS — N18.6 ANEMIA IN CHRONIC KIDNEY DISEASE, ON CHRONIC DIALYSIS (HCC): ICD-10-CM

## 2019-02-23 DIAGNOSIS — E11.22 CONTROLLED TYPE 2 DIABETES MELLITUS WITH CHRONIC KIDNEY DISEASE ON CHRONIC DIALYSIS, WITH LONG-TERM CURRENT USE OF INSULIN (HCC): Primary | ICD-10-CM

## 2019-02-23 DIAGNOSIS — F01.50 VASCULAR DEMENTIA WITHOUT BEHAVIORAL DISTURBANCE (HCC): ICD-10-CM

## 2019-02-23 DIAGNOSIS — Z99.2 ESRD (END STAGE RENAL DISEASE) ON DIALYSIS (HCC): ICD-10-CM

## 2019-02-23 DIAGNOSIS — F32.4 MAJOR DEPRESSIVE DISORDER WITH SINGLE EPISODE, IN PARTIAL REMISSION (HCC): ICD-10-CM

## 2019-02-23 DIAGNOSIS — I35.0 SEVERE AORTIC STENOSIS: ICD-10-CM

## 2019-02-23 DIAGNOSIS — I10 ESSENTIAL HYPERTENSION: ICD-10-CM

## 2019-02-26 ENCOUNTER — ANTI-COAG VISIT (OUTPATIENT)
Dept: INTERNAL MEDICINE | Age: 74
End: 2019-02-26
Payer: MEDICARE

## 2019-02-26 DIAGNOSIS — I48.0 PAROXYSMAL ATRIAL FIBRILLATION (HCC): ICD-10-CM

## 2019-02-26 DIAGNOSIS — Z51.81 MONITORING FOR LONG-TERM ANTICOAGULANT USE: ICD-10-CM

## 2019-02-26 DIAGNOSIS — Z79.01 MONITORING FOR LONG-TERM ANTICOAGULANT USE: ICD-10-CM

## 2019-02-26 LAB — INR BLD: 3.9

## 2019-02-26 PROCEDURE — 93793 ANTICOAG MGMT PT WARFARIN: CPT | Performed by: NURSE PRACTITIONER

## 2019-03-05 ENCOUNTER — ANTI-COAG VISIT (OUTPATIENT)
Dept: INTERNAL MEDICINE | Age: 74
End: 2019-03-05
Payer: MEDICARE

## 2019-03-05 DIAGNOSIS — I48.0 PAROXYSMAL ATRIAL FIBRILLATION (HCC): ICD-10-CM

## 2019-03-05 LAB — INR BLD: 4.2

## 2019-03-05 PROCEDURE — 93793 ANTICOAG MGMT PT WARFARIN: CPT | Performed by: INTERNAL MEDICINE

## 2019-03-07 ENCOUNTER — ANTI-COAG VISIT (OUTPATIENT)
Dept: INTERNAL MEDICINE | Age: 74
End: 2019-03-07
Payer: MEDICARE

## 2019-03-07 ENCOUNTER — OUTSIDE SERVICES (OUTPATIENT)
Dept: INTERNAL MEDICINE | Age: 74
End: 2019-03-07
Payer: MEDICARE

## 2019-03-07 DIAGNOSIS — T14.8XXA BRUISE: Primary | ICD-10-CM

## 2019-03-07 DIAGNOSIS — I48.0 PAROXYSMAL ATRIAL FIBRILLATION (HCC): ICD-10-CM

## 2019-03-07 LAB — INR BLD: 2.7

## 2019-03-07 PROCEDURE — 93793 ANTICOAG MGMT PT WARFARIN: CPT | Performed by: INTERNAL MEDICINE

## 2019-03-07 PROCEDURE — 99307 SBSQ NF CARE SF MDM 10: CPT | Performed by: NURSE PRACTITIONER

## 2019-03-08 ENCOUNTER — APPOINTMENT (OUTPATIENT)
Dept: GENERAL RADIOLOGY | Age: 74
DRG: 570 | End: 2019-03-08
Payer: MEDICARE

## 2019-03-08 ENCOUNTER — HOSPITAL ENCOUNTER (EMERGENCY)
Age: 74
Discharge: ANOTHER HOSPITAL | End: 2019-03-08
Attending: EMERGENCY MEDICINE
Payer: MEDICARE

## 2019-03-08 ENCOUNTER — APPOINTMENT (OUTPATIENT)
Dept: GENERAL RADIOLOGY | Age: 74
End: 2019-03-08
Payer: MEDICARE

## 2019-03-08 ENCOUNTER — HOSPITAL ENCOUNTER (INPATIENT)
Age: 74
LOS: 11 days | Discharge: NURSING, REHAB OR LONG-TERM CARE | DRG: 570 | End: 2019-03-19
Attending: EMERGENCY MEDICINE | Admitting: SURGERY
Payer: MEDICARE

## 2019-03-08 ENCOUNTER — ANESTHESIA (OUTPATIENT)
Dept: OPERATING ROOM | Age: 74
DRG: 570 | End: 2019-03-08
Payer: MEDICARE

## 2019-03-08 ENCOUNTER — ANESTHESIA EVENT (OUTPATIENT)
Dept: OPERATING ROOM | Age: 74
DRG: 570 | End: 2019-03-08
Payer: MEDICARE

## 2019-03-08 VITALS
RESPIRATION RATE: 10 BRPM | SYSTOLIC BLOOD PRESSURE: 92 MMHG | DIASTOLIC BLOOD PRESSURE: 44 MMHG | OXYGEN SATURATION: 100 %

## 2019-03-08 VITALS
TEMPERATURE: 97.3 F | DIASTOLIC BLOOD PRESSURE: 53 MMHG | HEART RATE: 99 BPM | SYSTOLIC BLOOD PRESSURE: 96 MMHG | OXYGEN SATURATION: 98 % | WEIGHT: 280 LBS | BODY MASS INDEX: 43.85 KG/M2 | RESPIRATION RATE: 18 BRPM

## 2019-03-08 DIAGNOSIS — S80.11XA HEMATOMA OF RIGHT LOWER EXTREMITY, INITIAL ENCOUNTER: Primary | ICD-10-CM

## 2019-03-08 DIAGNOSIS — F32.4 MAJOR DEPRESSIVE DISORDER WITH SINGLE EPISODE, IN PARTIAL REMISSION (HCC): ICD-10-CM

## 2019-03-08 DIAGNOSIS — N18.6 ESRD (END STAGE RENAL DISEASE) ON DIALYSIS (HCC): ICD-10-CM

## 2019-03-08 DIAGNOSIS — Z99.2 ESRD (END STAGE RENAL DISEASE) ON DIALYSIS (HCC): ICD-10-CM

## 2019-03-08 DIAGNOSIS — S80.11XD HEMATOMA OF RIGHT LOWER EXTREMITY, SUBSEQUENT ENCOUNTER: ICD-10-CM

## 2019-03-08 DIAGNOSIS — S81.811A LACERATION OF RIGHT LOWER EXTREMITY, INITIAL ENCOUNTER: Primary | ICD-10-CM

## 2019-03-08 DIAGNOSIS — S80.11XA HEMATOMA OF RIGHT LOWER EXTREMITY, INITIAL ENCOUNTER: ICD-10-CM

## 2019-03-08 LAB
ABSOLUTE EOS #: 0 K/UL (ref 0–0.4)
ABSOLUTE EOS #: 0.5 K/UL (ref 0–0.4)
ABSOLUTE IMMATURE GRANULOCYTE: 0 K/UL (ref 0–0.3)
ABSOLUTE IMMATURE GRANULOCYTE: ABNORMAL K/UL (ref 0–0.3)
ABSOLUTE LYMPH #: 1.21 K/UL (ref 1–4.8)
ABSOLUTE LYMPH #: 1.4 K/UL (ref 1–4.8)
ABSOLUTE MONO #: 0 K/UL (ref 0.1–0.8)
ABSOLUTE MONO #: 0.9 K/UL (ref 0.1–1.2)
ALLEN TEST: ABNORMAL
ANION GAP SERPL CALCULATED.3IONS-SCNC: 18 MMOL/L (ref 9–17)
ANION GAP SERPL CALCULATED.3IONS-SCNC: 19 MMOL/L (ref 9–17)
BASOPHILS # BLD: 0 % (ref 0–2)
BASOPHILS # BLD: 1 % (ref 0–2)
BASOPHILS ABSOLUTE: 0 K/UL (ref 0–0.2)
BASOPHILS ABSOLUTE: 0.1 K/UL (ref 0–0.2)
BUN BLDV-MCNC: 86 MG/DL (ref 8–23)
BUN BLDV-MCNC: 87 MG/DL (ref 8–23)
BUN/CREAT BLD: 13 (ref 9–20)
BUN/CREAT BLD: ABNORMAL (ref 9–20)
CALCIUM SERPL-MCNC: 8.8 MG/DL (ref 8.6–10.4)
CALCIUM SERPL-MCNC: 9.3 MG/DL (ref 8.6–10.4)
CHLORIDE BLD-SCNC: 93 MMOL/L (ref 98–107)
CHLORIDE BLD-SCNC: 96 MMOL/L (ref 98–107)
CO2: 22 MMOL/L (ref 20–31)
CO2: 28 MMOL/L (ref 20–31)
CREAT SERPL-MCNC: 6.37 MG/DL (ref 0.7–1.2)
CREAT SERPL-MCNC: 6.65 MG/DL (ref 0.7–1.2)
DIFFERENTIAL TYPE: ABNORMAL
DIFFERENTIAL TYPE: ABNORMAL
EOSINOPHILS RELATIVE PERCENT: 0 % (ref 1–4)
EOSINOPHILS RELATIVE PERCENT: 3 % (ref 1–8)
FIO2: 30
GFR AFRICAN AMERICAN: 10 ML/MIN
GFR AFRICAN AMERICAN: 10 ML/MIN
GFR NON-AFRICAN AMERICAN: 8 ML/MIN
GFR NON-AFRICAN AMERICAN: 9 ML/MIN
GFR SERPL CREATININE-BSD FRML MDRD: ABNORMAL ML/MIN/{1.73_M2}
GLUCOSE BLD-MCNC: 201 MG/DL (ref 70–99)
GLUCOSE BLD-MCNC: 229 MG/DL (ref 75–110)
GLUCOSE BLD-MCNC: 270 MG/DL (ref 70–99)
GLUCOSE BLD-MCNC: 273 MG/DL (ref 75–110)
HCT VFR BLD CALC: 21.3 % (ref 40.7–50.3)
HCT VFR BLD CALC: 28.4 % (ref 40.7–50.3)
HCT VFR BLD CALC: 31.4 % (ref 41–53)
HEMOGLOBIN: 6.5 G/DL (ref 13–17)
HEMOGLOBIN: 9 G/DL (ref 13–17)
HEMOGLOBIN: 9.9 G/DL (ref 13.5–17.5)
IMMATURE GRANULOCYTES: 0 %
IMMATURE GRANULOCYTES: ABNORMAL %
INR BLD: 1.9
INR BLD: 1.9
LYMPHOCYTES # BLD: 5 % (ref 24–44)
LYMPHOCYTES # BLD: 8 % (ref 15–43)
MCH RBC QN AUTO: 28.7 PG (ref 26–34)
MCH RBC QN AUTO: 29.9 PG (ref 25.2–33.5)
MCHC RBC AUTO-ENTMCNC: 31.5 G/DL (ref 31–37)
MCHC RBC AUTO-ENTMCNC: 31.7 G/DL (ref 28.4–34.8)
MCV RBC AUTO: 91.1 FL (ref 80–100)
MCV RBC AUTO: 94.4 FL (ref 82.6–102.9)
MODE: ABNORMAL
MONOCYTES # BLD: 0 % (ref 1–7)
MONOCYTES # BLD: 5 % (ref 6–14)
MORPHOLOGY: ABNORMAL
NEGATIVE BASE EXCESS, ART: 2 (ref 0–2)
NRBC AUTOMATED: 0 PER 100 WBC
NRBC AUTOMATED: ABNORMAL PER 100 WBC
O2 DEVICE/FLOW/%: ABNORMAL
PARTIAL THROMBOPLASTIN TIME: 30.6 SEC (ref 20.5–30.5)
PARTIAL THROMBOPLASTIN TIME: 40.4 SEC (ref 27–35)
PATIENT TEMP: ABNORMAL
PDW BLD-RTO: 17.2 % (ref 11.8–14.4)
PDW BLD-RTO: 18.3 % (ref 11–14.5)
PLATELET # BLD: 272 K/UL (ref 138–453)
PLATELET # BLD: 277 K/UL (ref 140–450)
PLATELET ESTIMATE: ABNORMAL
PLATELET ESTIMATE: ABNORMAL
PMV BLD AUTO: 6.8 FL (ref 6–12)
PMV BLD AUTO: 9.5 FL (ref 8.1–13.5)
POC HCO3: 25.2 MMOL/L (ref 21–28)
POC O2 SATURATION: 97 % (ref 94–98)
POC PCO2 TEMP: ABNORMAL MM HG
POC PCO2: 55 MM HG (ref 35–48)
POC PH TEMP: ABNORMAL
POC PH: 7.27 (ref 7.35–7.45)
POC PO2 TEMP: ABNORMAL MM HG
POC PO2: 102.6 MM HG (ref 83–108)
POSITIVE BASE EXCESS, ART: ABNORMAL (ref 0–3)
POTASSIUM SERPL-SCNC: 3.9 MMOL/L (ref 3.7–5.3)
POTASSIUM SERPL-SCNC: 4.5 MMOL/L (ref 3.7–5.3)
PROTHROMBIN TIME: 18.8 SEC (ref 9–12)
PROTHROMBIN TIME: 18.9 SEC (ref 9.4–11.3)
RBC # BLD: 3.01 M/UL (ref 4.21–5.77)
RBC # BLD: 3.45 M/UL (ref 4.5–5.9)
RBC # BLD: ABNORMAL 10*6/UL
RBC # BLD: ABNORMAL 10*6/UL
SAMPLE SITE: ABNORMAL
SEG NEUTROPHILS: 83 % (ref 44–74)
SEG NEUTROPHILS: 95 % (ref 36–66)
SEGMENTED NEUTROPHILS ABSOLUTE COUNT: 14 K/UL (ref 1.8–7.7)
SEGMENTED NEUTROPHILS ABSOLUTE COUNT: 22.89 K/UL (ref 1.8–7.7)
SODIUM BLD-SCNC: 137 MMOL/L (ref 135–144)
SODIUM BLD-SCNC: 139 MMOL/L (ref 135–144)
TCO2 (CALC), ART: 27 MMOL/L (ref 22–29)
WBC # BLD: 16.8 K/UL (ref 3.5–11)
WBC # BLD: 24.1 K/UL (ref 3.5–11.3)
WBC # BLD: ABNORMAL 10*3/UL
WBC # BLD: ABNORMAL 10*3/UL

## 2019-03-08 PROCEDURE — 94770 HC ETCO2 MONITOR DAILY: CPT

## 2019-03-08 PROCEDURE — 36430 TRANSFUSION BLD/BLD COMPNT: CPT

## 2019-03-08 PROCEDURE — 96367 TX/PROPH/DG ADDL SEQ IV INF: CPT

## 2019-03-08 PROCEDURE — 99222 1ST HOSP IP/OBS MODERATE 55: CPT | Performed by: SURGERY

## 2019-03-08 PROCEDURE — 85730 THROMBOPLASTIN TIME PARTIAL: CPT

## 2019-03-08 PROCEDURE — 86901 BLOOD TYPING SEROLOGIC RH(D): CPT

## 2019-03-08 PROCEDURE — 06HM33Z INSERTION OF INFUSION DEVICE INTO RIGHT FEMORAL VEIN, PERCUTANEOUS APPROACH: ICD-10-PCS | Performed by: SURGERY

## 2019-03-08 PROCEDURE — 6370000000 HC RX 637 (ALT 250 FOR IP): Performed by: STUDENT IN AN ORGANIZED HEALTH CARE EDUCATION/TRAINING PROGRAM

## 2019-03-08 PROCEDURE — 96365 THER/PROPH/DIAG IV INF INIT: CPT

## 2019-03-08 PROCEDURE — 86927 PLASMA FRESH FROZEN: CPT

## 2019-03-08 PROCEDURE — 80048 BASIC METABOLIC PNL TOTAL CA: CPT

## 2019-03-08 PROCEDURE — 2580000003 HC RX 258: Performed by: SPECIALIST

## 2019-03-08 PROCEDURE — 86900 BLOOD TYPING SEROLOGIC ABO: CPT

## 2019-03-08 PROCEDURE — 2580000003 HC RX 258: Performed by: STUDENT IN AN ORGANIZED HEALTH CARE EDUCATION/TRAINING PROGRAM

## 2019-03-08 PROCEDURE — 6360000002 HC RX W HCPCS: Performed by: STUDENT IN AN ORGANIZED HEALTH CARE EDUCATION/TRAINING PROGRAM

## 2019-03-08 PROCEDURE — 2709999900 HC NON-CHARGEABLE SUPPLY: Performed by: SURGERY

## 2019-03-08 PROCEDURE — 2000000000 HC ICU R&B

## 2019-03-08 PROCEDURE — 6360000002 HC RX W HCPCS: Performed by: SPECIALIST

## 2019-03-08 PROCEDURE — 85025 COMPLETE CBC W/AUTO DIFF WBC: CPT

## 2019-03-08 PROCEDURE — 86850 RBC ANTIBODY SCREEN: CPT

## 2019-03-08 PROCEDURE — 85014 HEMATOCRIT: CPT

## 2019-03-08 PROCEDURE — 82803 BLOOD GASES ANY COMBINATION: CPT

## 2019-03-08 PROCEDURE — 36415 COLL VENOUS BLD VENIPUNCTURE: CPT

## 2019-03-08 PROCEDURE — 99284 EMERGENCY DEPT VISIT MOD MDM: CPT

## 2019-03-08 PROCEDURE — P9016 RBC LEUKOCYTES REDUCED: HCPCS

## 2019-03-08 PROCEDURE — 99285 EMERGENCY DEPT VISIT HI MDM: CPT

## 2019-03-08 PROCEDURE — 82947 ASSAY GLUCOSE BLOOD QUANT: CPT

## 2019-03-08 PROCEDURE — 3600000013 HC SURGERY LEVEL 3 ADDTL 15MIN: Performed by: SURGERY

## 2019-03-08 PROCEDURE — 2780000010 HC IMPLANT OTHER: Performed by: SURGERY

## 2019-03-08 PROCEDURE — 96366 THER/PROPH/DIAG IV INF ADDON: CPT

## 2019-03-08 PROCEDURE — 2500000003 HC RX 250 WO HCPCS: Performed by: SPECIALIST

## 2019-03-08 PROCEDURE — 2580000003 HC RX 258: Performed by: EMERGENCY MEDICINE

## 2019-03-08 PROCEDURE — 93005 ELECTROCARDIOGRAM TRACING: CPT

## 2019-03-08 PROCEDURE — 85018 HEMOGLOBIN: CPT

## 2019-03-08 PROCEDURE — 02HV33Z INSERTION OF INFUSION DEVICE INTO SUPERIOR VENA CAVA, PERCUTANEOUS APPROACH: ICD-10-PCS | Performed by: EMERGENCY MEDICINE

## 2019-03-08 PROCEDURE — 6360000002 HC RX W HCPCS: Performed by: EMERGENCY MEDICINE

## 2019-03-08 PROCEDURE — 71045 X-RAY EXAM CHEST 1 VIEW: CPT

## 2019-03-08 PROCEDURE — 94762 N-INVAS EAR/PLS OXIMTRY CONT: CPT

## 2019-03-08 PROCEDURE — 6360000002 HC RX W HCPCS

## 2019-03-08 PROCEDURE — 2500000003 HC RX 250 WO HCPCS: Performed by: EMERGENCY MEDICINE

## 2019-03-08 PROCEDURE — 94002 VENT MGMT INPAT INIT DAY: CPT

## 2019-03-08 PROCEDURE — 73590 X-RAY EXAM OF LOWER LEG: CPT

## 2019-03-08 PROCEDURE — 3600000003 HC SURGERY LEVEL 3 BASE: Performed by: SURGERY

## 2019-03-08 PROCEDURE — 96375 TX/PRO/DX INJ NEW DRUG ADDON: CPT

## 2019-03-08 PROCEDURE — 86920 COMPATIBILITY TEST SPIN: CPT

## 2019-03-08 PROCEDURE — 3700000001 HC ADD 15 MINUTES (ANESTHESIA): Performed by: SURGERY

## 2019-03-08 PROCEDURE — 96376 TX/PRO/DX INJ SAME DRUG ADON: CPT

## 2019-03-08 PROCEDURE — P9017 PLASMA 1 DONOR FRZ W/IN 8 HR: HCPCS

## 2019-03-08 PROCEDURE — 3700000000 HC ANESTHESIA ATTENDED CARE: Performed by: SURGERY

## 2019-03-08 PROCEDURE — 85610 PROTHROMBIN TIME: CPT

## 2019-03-08 PROCEDURE — 0JBN0ZZ EXCISION OF RIGHT LOWER LEG SUBCUTANEOUS TISSUE AND FASCIA, OPEN APPROACH: ICD-10-PCS | Performed by: SURGERY

## 2019-03-08 PROCEDURE — 2700000000 HC OXYGEN THERAPY PER DAY

## 2019-03-08 PROCEDURE — 2580000003 HC RX 258: Performed by: SURGERY

## 2019-03-08 PROCEDURE — 5A1945Z RESPIRATORY VENTILATION, 24-96 CONSECUTIVE HOURS: ICD-10-PCS | Performed by: SURGERY

## 2019-03-08 PROCEDURE — 36556 INSERT NON-TUNNEL CV CATH: CPT

## 2019-03-08 PROCEDURE — 6370000000 HC RX 637 (ALT 250 FOR IP): Performed by: EMERGENCY MEDICINE

## 2019-03-08 PROCEDURE — 0Y990ZZ DRAINAGE OF RIGHT LOWER EXTREMITY, OPEN APPROACH: ICD-10-PCS | Performed by: SURGERY

## 2019-03-08 RX ORDER — LIDOCAINE HYDROCHLORIDE 10 MG/ML
INJECTION, SOLUTION INFILTRATION; PERINEURAL PRN
Status: DISCONTINUED | OUTPATIENT
Start: 2019-03-08 | End: 2019-03-08 | Stop reason: SDUPTHER

## 2019-03-08 RX ORDER — LEVOTHYROXINE SODIUM 0.03 MG/1
25 TABLET ORAL DAILY
Status: DISCONTINUED | OUTPATIENT
Start: 2019-03-09 | End: 2019-03-19 | Stop reason: HOSPADM

## 2019-03-08 RX ORDER — 0.9 % SODIUM CHLORIDE 0.9 %
1000 INTRAVENOUS SOLUTION INTRAVENOUS ONCE
Status: COMPLETED | OUTPATIENT
Start: 2019-03-08 | End: 2019-03-08

## 2019-03-08 RX ORDER — VANCOMYCIN HYDROCHLORIDE 1 G/200ML
1000 INJECTION, SOLUTION INTRAVENOUS ONCE
Status: COMPLETED | OUTPATIENT
Start: 2019-03-08 | End: 2019-03-08

## 2019-03-08 RX ORDER — MAGNESIUM HYDROXIDE 1200 MG/15ML
LIQUID ORAL CONTINUOUS PRN
Status: COMPLETED | OUTPATIENT
Start: 2019-03-08 | End: 2019-03-08

## 2019-03-08 RX ORDER — ONDANSETRON 2 MG/ML
4 INJECTION INTRAMUSCULAR; INTRAVENOUS ONCE
Status: DISCONTINUED | OUTPATIENT
Start: 2019-03-08 | End: 2019-03-08

## 2019-03-08 RX ORDER — FENTANYL CITRATE 50 UG/ML
INJECTION, SOLUTION INTRAMUSCULAR; INTRAVENOUS PRN
Status: DISCONTINUED | OUTPATIENT
Start: 2019-03-08 | End: 2019-03-08 | Stop reason: SDUPTHER

## 2019-03-08 RX ORDER — PROMETHAZINE HYDROCHLORIDE 25 MG/ML
12.5 INJECTION, SOLUTION INTRAMUSCULAR; INTRAVENOUS ONCE
Status: COMPLETED | OUTPATIENT
Start: 2019-03-08 | End: 2019-03-08

## 2019-03-08 RX ORDER — DONEPEZIL HYDROCHLORIDE 5 MG/1
5 TABLET, FILM COATED ORAL EVERY EVENING
Status: DISCONTINUED | OUTPATIENT
Start: 2019-03-08 | End: 2019-03-19 | Stop reason: HOSPADM

## 2019-03-08 RX ORDER — ONDANSETRON 2 MG/ML
4 INJECTION INTRAMUSCULAR; INTRAVENOUS ONCE
Status: COMPLETED | OUTPATIENT
Start: 2019-03-08 | End: 2019-03-08

## 2019-03-08 RX ORDER — DEXTROSE MONOHYDRATE 50 MG/ML
100 INJECTION, SOLUTION INTRAVENOUS PRN
Status: DISCONTINUED | OUTPATIENT
Start: 2019-03-08 | End: 2019-03-19 | Stop reason: HOSPADM

## 2019-03-08 RX ORDER — FENTANYL CITRATE 50 UG/ML
25 INJECTION, SOLUTION INTRAMUSCULAR; INTRAVENOUS ONCE
Status: COMPLETED | OUTPATIENT
Start: 2019-03-08 | End: 2019-03-08

## 2019-03-08 RX ORDER — MAGNESIUM SULFATE 1 G/100ML
1 INJECTION INTRAVENOUS ONCE
Status: COMPLETED | OUTPATIENT
Start: 2019-03-08 | End: 2019-03-08

## 2019-03-08 RX ORDER — 0.9 % SODIUM CHLORIDE 0.9 %
250 INTRAVENOUS SOLUTION INTRAVENOUS ONCE
Status: COMPLETED | OUTPATIENT
Start: 2019-03-08 | End: 2019-03-08

## 2019-03-08 RX ORDER — CLINDAMYCIN PHOSPHATE 600 MG/50ML
600 INJECTION INTRAVENOUS ONCE
Status: COMPLETED | OUTPATIENT
Start: 2019-03-08 | End: 2019-03-08

## 2019-03-08 RX ORDER — DEXTROSE MONOHYDRATE 25 G/50ML
12.5 INJECTION, SOLUTION INTRAVENOUS PRN
Status: DISCONTINUED | OUTPATIENT
Start: 2019-03-08 | End: 2019-03-11

## 2019-03-08 RX ORDER — ONDANSETRON 4 MG/1
4 TABLET, ORALLY DISINTEGRATING ORAL ONCE
Status: COMPLETED | OUTPATIENT
Start: 2019-03-08 | End: 2019-03-08

## 2019-03-08 RX ORDER — ACETAMINOPHEN 325 MG/1
650 TABLET ORAL 4 TIMES DAILY
Status: DISCONTINUED | OUTPATIENT
Start: 2019-03-08 | End: 2019-03-19 | Stop reason: HOSPADM

## 2019-03-08 RX ORDER — ALPRAZOLAM 0.5 MG/1
0.5 TABLET ORAL 2 TIMES DAILY
Status: DISCONTINUED | OUTPATIENT
Start: 2019-03-08 | End: 2019-03-12

## 2019-03-08 RX ORDER — SODIUM CHLORIDE, SODIUM LACTATE, POTASSIUM CHLORIDE, CALCIUM CHLORIDE 600; 310; 30; 20 MG/100ML; MG/100ML; MG/100ML; MG/100ML
INJECTION, SOLUTION INTRAVENOUS CONTINUOUS
Status: DISCONTINUED | OUTPATIENT
Start: 2019-03-08 | End: 2019-03-11

## 2019-03-08 RX ORDER — SODIUM CHLORIDE 0.9 % (FLUSH) 0.9 %
10 SYRINGE (ML) INJECTION EVERY 12 HOURS SCHEDULED
Status: DISCONTINUED | OUTPATIENT
Start: 2019-03-08 | End: 2019-03-19 | Stop reason: HOSPADM

## 2019-03-08 RX ORDER — MIDAZOLAM HYDROCHLORIDE 1 MG/ML
INJECTION INTRAMUSCULAR; INTRAVENOUS PRN
Status: DISCONTINUED | OUTPATIENT
Start: 2019-03-08 | End: 2019-03-08 | Stop reason: SDUPTHER

## 2019-03-08 RX ORDER — SODIUM CHLORIDE 0.9 % (FLUSH) 0.9 %
10 SYRINGE (ML) INJECTION PRN
Status: DISCONTINUED | OUTPATIENT
Start: 2019-03-08 | End: 2019-03-19 | Stop reason: HOSPADM

## 2019-03-08 RX ORDER — CETIRIZINE HYDROCHLORIDE 10 MG/1
5 TABLET ORAL DAILY
Status: DISCONTINUED | OUTPATIENT
Start: 2019-03-08 | End: 2019-03-19 | Stop reason: HOSPADM

## 2019-03-08 RX ORDER — PROPOFOL 10 MG/ML
INJECTION, EMULSION INTRAVENOUS PRN
Status: DISCONTINUED | OUTPATIENT
Start: 2019-03-08 | End: 2019-03-08 | Stop reason: SDUPTHER

## 2019-03-08 RX ORDER — FLUTICASONE PROPIONATE 50 MCG
2 SPRAY, SUSPENSION (ML) NASAL DAILY
Status: DISCONTINUED | OUTPATIENT
Start: 2019-03-08 | End: 2019-03-19 | Stop reason: HOSPADM

## 2019-03-08 RX ORDER — ALBUTEROL SULFATE 2.5 MG/3ML
2.5 SOLUTION RESPIRATORY (INHALATION) EVERY 8 HOURS PRN
Status: DISCONTINUED | OUTPATIENT
Start: 2019-03-08 | End: 2019-03-19 | Stop reason: HOSPADM

## 2019-03-08 RX ORDER — ONDANSETRON 2 MG/ML
4 INJECTION INTRAMUSCULAR; INTRAVENOUS EVERY 6 HOURS PRN
Status: DISCONTINUED | OUTPATIENT
Start: 2019-03-08 | End: 2019-03-11

## 2019-03-08 RX ORDER — FENTANYL CITRATE 50 UG/ML
25 INJECTION, SOLUTION INTRAMUSCULAR; INTRAVENOUS
Status: DISCONTINUED | OUTPATIENT
Start: 2019-03-08 | End: 2019-03-08

## 2019-03-08 RX ORDER — DESMOPRESSIN ACETATE 4 UG/ML
38 INJECTION, SOLUTION INTRAVENOUS; SUBCUTANEOUS ONCE
Status: DISCONTINUED | OUTPATIENT
Start: 2019-03-08 | End: 2019-03-08

## 2019-03-08 RX ORDER — ACETAMINOPHEN 325 MG/1
650 TABLET ORAL EVERY 4 HOURS PRN
Status: DISCONTINUED | OUTPATIENT
Start: 2019-03-08 | End: 2019-03-19 | Stop reason: HOSPADM

## 2019-03-08 RX ORDER — OMEPRAZOLE 20 MG/1
20 CAPSULE, DELAYED RELEASE ORAL DAILY
Status: DISCONTINUED | OUTPATIENT
Start: 2019-03-08 | End: 2019-03-09

## 2019-03-08 RX ORDER — 0.9 % SODIUM CHLORIDE 0.9 %
250 INTRAVENOUS SOLUTION INTRAVENOUS ONCE
Status: DISCONTINUED | OUTPATIENT
Start: 2019-03-08 | End: 2019-03-11

## 2019-03-08 RX ORDER — PROPOFOL 10 MG/ML
INJECTION, EMULSION INTRAVENOUS
Status: COMPLETED
Start: 2019-03-08 | End: 2019-03-08

## 2019-03-08 RX ORDER — SEVELAMER CARBONATE 800 MG/1
800 TABLET, FILM COATED ORAL
Status: DISCONTINUED | OUTPATIENT
Start: 2019-03-08 | End: 2019-03-19 | Stop reason: HOSPADM

## 2019-03-08 RX ORDER — CLINDAMYCIN PHOSPHATE 900 MG/50ML
900 INJECTION INTRAVENOUS EVERY 8 HOURS
Status: COMPLETED | OUTPATIENT
Start: 2019-03-09 | End: 2019-03-10

## 2019-03-08 RX ORDER — BUPROPION HYDROCHLORIDE 150 MG/1
150 TABLET ORAL EVERY MORNING
Status: DISCONTINUED | OUTPATIENT
Start: 2019-03-09 | End: 2019-03-19 | Stop reason: HOSPADM

## 2019-03-08 RX ORDER — SODIUM CHLORIDE 9 MG/ML
INJECTION, SOLUTION INTRAVENOUS CONTINUOUS PRN
Status: DISCONTINUED | OUTPATIENT
Start: 2019-03-08 | End: 2019-03-08 | Stop reason: SDUPTHER

## 2019-03-08 RX ORDER — MIDODRINE HYDROCHLORIDE 5 MG/1
10 TABLET ORAL 3 TIMES DAILY
Status: DISCONTINUED | OUTPATIENT
Start: 2019-03-08 | End: 2019-03-19 | Stop reason: HOSPADM

## 2019-03-08 RX ORDER — FENTANYL CITRATE 50 UG/ML
25 INJECTION, SOLUTION INTRAMUSCULAR; INTRAVENOUS
Status: DISCONTINUED | OUTPATIENT
Start: 2019-03-08 | End: 2019-03-09

## 2019-03-08 RX ORDER — ROCURONIUM BROMIDE 10 MG/ML
INJECTION, SOLUTION INTRAVENOUS PRN
Status: DISCONTINUED | OUTPATIENT
Start: 2019-03-08 | End: 2019-03-08 | Stop reason: SDUPTHER

## 2019-03-08 RX ORDER — ONDANSETRON 2 MG/ML
INJECTION INTRAMUSCULAR; INTRAVENOUS
Status: COMPLETED
Start: 2019-03-08 | End: 2019-03-08

## 2019-03-08 RX ORDER — FENTANYL CITRATE 50 UG/ML
25 INJECTION, SOLUTION INTRAMUSCULAR; INTRAVENOUS ONCE
Status: DISCONTINUED | OUTPATIENT
Start: 2019-03-08 | End: 2019-03-11

## 2019-03-08 RX ORDER — ATORVASTATIN CALCIUM 10 MG/1
10 TABLET, FILM COATED ORAL DAILY
Status: DISCONTINUED | OUTPATIENT
Start: 2019-03-08 | End: 2019-03-19 | Stop reason: HOSPADM

## 2019-03-08 RX ORDER — CLINDAMYCIN PHOSPHATE 150 MG/ML
INJECTION, SOLUTION INTRAVENOUS PRN
Status: DISCONTINUED | OUTPATIENT
Start: 2019-03-08 | End: 2019-03-08 | Stop reason: SDUPTHER

## 2019-03-08 RX ORDER — HYDROCODONE BITARTRATE AND ACETAMINOPHEN 5; 325 MG/1; MG/1
1 TABLET ORAL EVERY 6 HOURS PRN
Status: DISCONTINUED | OUTPATIENT
Start: 2019-03-08 | End: 2019-03-09

## 2019-03-08 RX ORDER — NICOTINE POLACRILEX 4 MG
15 LOZENGE BUCCAL PRN
Status: DISCONTINUED | OUTPATIENT
Start: 2019-03-08 | End: 2019-03-11

## 2019-03-08 RX ORDER — GUAIFENESIN 100 MG/5ML
200 SOLUTION ORAL 4 TIMES DAILY PRN
Status: DISCONTINUED | OUTPATIENT
Start: 2019-03-08 | End: 2019-03-19 | Stop reason: HOSPADM

## 2019-03-08 RX ORDER — PROPOFOL 10 MG/ML
10 INJECTION, EMULSION INTRAVENOUS
Status: DISCONTINUED | OUTPATIENT
Start: 2019-03-08 | End: 2019-03-08

## 2019-03-08 RX ORDER — ACETAMINOPHEN 500 MG
1000 TABLET ORAL ONCE
Status: COMPLETED | OUTPATIENT
Start: 2019-03-08 | End: 2019-03-08

## 2019-03-08 RX ADMIN — FENTANYL CITRATE 25 MCG: 50 INJECTION, SOLUTION INTRAMUSCULAR; INTRAVENOUS at 14:17

## 2019-03-08 RX ADMIN — ONDANSETRON 4 MG: 2 INJECTION INTRAMUSCULAR; INTRAVENOUS at 13:30

## 2019-03-08 RX ADMIN — MAGNESIUM SULFATE HEPTAHYDRATE 1 G: 1 INJECTION, SOLUTION INTRAVENOUS at 15:01

## 2019-03-08 RX ADMIN — CLINDAMYCIN PHOSPHATE 600 MG: 150 INJECTION, SOLUTION INTRAMUSCULAR; INTRAVENOUS at 17:17

## 2019-03-08 RX ADMIN — PROMETHAZINE HYDROCHLORIDE 12.5 MG: 25 INJECTION INTRAMUSCULAR; INTRAVENOUS at 15:00

## 2019-03-08 RX ADMIN — SODIUM CHLORIDE: 9 INJECTION, SOLUTION INTRAVENOUS at 16:57

## 2019-03-08 RX ADMIN — PHENYLEPHRINE HYDROCHLORIDE 200 MCG: 10 INJECTION INTRAVENOUS at 17:17

## 2019-03-08 RX ADMIN — ATORVASTATIN CALCIUM 10 MG: 10 TABLET, FILM COATED ORAL at 20:57

## 2019-03-08 RX ADMIN — FENTANYL CITRATE 25 MCG: 50 INJECTION INTRAMUSCULAR; INTRAVENOUS at 23:17

## 2019-03-08 RX ADMIN — PHENYLEPHRINE HYDROCHLORIDE 200 MCG: 10 INJECTION INTRAVENOUS at 17:24

## 2019-03-08 RX ADMIN — Medication 10 ML: at 20:58

## 2019-03-08 RX ADMIN — ONDANSETRON 4 MG: 4 TABLET, ORALLY DISINTEGRATING ORAL at 06:47

## 2019-03-08 RX ADMIN — MIDODRINE HYDROCHLORIDE 10 MG: 5 TABLET ORAL at 20:58

## 2019-03-08 RX ADMIN — DESMOPRESSIN ACETATE 200 MCG: 4 SOLUTION INTRAVENOUS at 17:26

## 2019-03-08 RX ADMIN — SODIUM CHLORIDE 1000 ML: 9 INJECTION, SOLUTION INTRAVENOUS at 10:51

## 2019-03-08 RX ADMIN — PHENYLEPHRINE HYDROCHLORIDE 200 MCG: 10 INJECTION INTRAVENOUS at 17:36

## 2019-03-08 RX ADMIN — Medication 10 ML: at 20:59

## 2019-03-08 RX ADMIN — PROPOFOL 20 MCG/KG/MIN: 10 INJECTION, EMULSION INTRAVENOUS at 19:15

## 2019-03-08 RX ADMIN — SODIUM CHLORIDE 250 ML: 9 INJECTION, SOLUTION INTRAVENOUS at 19:00

## 2019-03-08 RX ADMIN — ONDANSETRON 4 MG: 2 INJECTION INTRAMUSCULAR; INTRAVENOUS at 08:31

## 2019-03-08 RX ADMIN — SODIUM CHLORIDE 1000 ML: 9 INJECTION, SOLUTION INTRAVENOUS at 13:04

## 2019-03-08 RX ADMIN — MIDAZOLAM HYDROCHLORIDE 2 MG: 1 INJECTION, SOLUTION INTRAMUSCULAR; INTRAVENOUS at 18:10

## 2019-03-08 RX ADMIN — ACETAMINOPHEN 1000 MG: 500 TABLET ORAL at 07:13

## 2019-03-08 RX ADMIN — CLINDAMYCIN PHOSPHATE 600 MG: 600 INJECTION, SOLUTION INTRAVENOUS at 07:21

## 2019-03-08 RX ADMIN — DONEPEZIL HYDROCHLORIDE 5 MG: 5 TABLET, FILM COATED ORAL at 20:57

## 2019-03-08 RX ADMIN — CEFEPIME 2 G: 2 INJECTION, POWDER, FOR SOLUTION INTRAVENOUS at 13:04

## 2019-03-08 RX ADMIN — LIDOCAINE HYDROCHLORIDE 50 MG: 10 INJECTION, SOLUTION INFILTRATION; PERINEURAL at 17:04

## 2019-03-08 RX ADMIN — MAGNESIUM SULFATE HEPTAHYDRATE 1 G: 1 INJECTION, SOLUTION INTRAVENOUS at 16:34

## 2019-03-08 RX ADMIN — PHENYLEPHRINE HYDROCHLORIDE 200 MCG: 10 INJECTION INTRAVENOUS at 17:55

## 2019-03-08 RX ADMIN — FENTANYL CITRATE 100 MCG: 50 INJECTION, SOLUTION INTRAMUSCULAR; INTRAVENOUS at 18:10

## 2019-03-08 RX ADMIN — FENTANYL CITRATE 25 MCG: 50 INJECTION INTRAMUSCULAR; INTRAVENOUS at 15:10

## 2019-03-08 RX ADMIN — ACETAMINOPHEN 650 MG: 325 TABLET ORAL at 20:58

## 2019-03-08 RX ADMIN — FLUTICASONE PROPIONATE 2 SPRAY: 50 SPRAY, METERED NASAL at 20:58

## 2019-03-08 RX ADMIN — CETIRIZINE HYDROCHLORIDE 5 MG: 10 TABLET ORAL at 20:57

## 2019-03-08 RX ADMIN — PHENYLEPHRINE HYDROCHLORIDE 75 MCG/MIN: 10 INJECTION INTRAVENOUS at 17:33

## 2019-03-08 RX ADMIN — ROCURONIUM BROMIDE 50 MG: 10 INJECTION INTRAVENOUS at 17:04

## 2019-03-08 RX ADMIN — FENTANYL CITRATE 150 MCG: 50 INJECTION, SOLUTION INTRAMUSCULAR; INTRAVENOUS at 17:04

## 2019-03-08 RX ADMIN — FENTANYL CITRATE 25 MCG: 50 INJECTION INTRAMUSCULAR; INTRAVENOUS at 23:18

## 2019-03-08 RX ADMIN — SODIUM CHLORIDE, POTASSIUM CHLORIDE, SODIUM LACTATE AND CALCIUM CHLORIDE: 600; 310; 30; 20 INJECTION, SOLUTION INTRAVENOUS at 20:56

## 2019-03-08 RX ADMIN — PHENYLEPHRINE HYDROCHLORIDE 200 MCG: 10 INJECTION INTRAVENOUS at 17:00

## 2019-03-08 RX ADMIN — PROPOFOL 200 MG: 10 INJECTION, EMULSION INTRAVENOUS at 17:04

## 2019-03-08 RX ADMIN — VANCOMYCIN HYDROCHLORIDE 1000 MG: 1 INJECTION, SOLUTION INTRAVENOUS at 13:52

## 2019-03-08 RX ADMIN — ONDANSETRON HYDROCHLORIDE 4 MG: 2 INJECTION, SOLUTION INTRAMUSCULAR; INTRAVENOUS at 13:30

## 2019-03-08 RX ADMIN — ALPRAZOLAM 0.5 MG: 0.5 TABLET ORAL at 21:07

## 2019-03-08 RX ADMIN — INSULIN LISPRO 3 UNITS: 100 INJECTION, SOLUTION INTRAVENOUS; SUBCUTANEOUS at 21:35

## 2019-03-08 ASSESSMENT — PULMONARY FUNCTION TESTS
PIF_VALUE: 1
PIF_VALUE: 27
PIF_VALUE: 25
PIF_VALUE: 25
PIF_VALUE: 1
PIF_VALUE: 2
PIF_VALUE: 25
PIF_VALUE: 26
PIF_VALUE: 26
PIF_VALUE: 1
PIF_VALUE: 1
PIF_VALUE: 25
PIF_VALUE: 23
PIF_VALUE: 25
PIF_VALUE: 3
PIF_VALUE: 25
PIF_VALUE: 23
PIF_VALUE: 25
PIF_VALUE: 24
PIF_VALUE: 26
PIF_VALUE: 26
PIF_VALUE: 23
PIF_VALUE: 34
PIF_VALUE: 25
PIF_VALUE: 23
PIF_VALUE: 24
PIF_VALUE: 24
PIF_VALUE: 26
PIF_VALUE: 25
PIF_VALUE: 47
PIF_VALUE: 25
PIF_VALUE: 24
PIF_VALUE: 23
PIF_VALUE: 23
PIF_VALUE: 25
PIF_VALUE: 25
PIF_VALUE: 27
PIF_VALUE: 25
PIF_VALUE: 25
PIF_VALUE: 24
PIF_VALUE: 23
PIF_VALUE: 0
PIF_VALUE: 25
PIF_VALUE: 25
PIF_VALUE: 53
PIF_VALUE: 23
PIF_VALUE: 25
PIF_VALUE: 25
PIF_VALUE: 26
PIF_VALUE: 22
PIF_VALUE: 26
PIF_VALUE: 25
PIF_VALUE: 26
PIF_VALUE: 25
PIF_VALUE: 25
PIF_VALUE: 24
PIF_VALUE: 25
PIF_VALUE: 26
PIF_VALUE: 26
PIF_VALUE: 1
PIF_VALUE: 26
PIF_VALUE: 25
PIF_VALUE: 25
PIF_VALUE: 24
PIF_VALUE: 23
PIF_VALUE: 25

## 2019-03-08 ASSESSMENT — ENCOUNTER SYMPTOMS
SHORTNESS OF BREATH: 0
COLOR CHANGE: 1
VOMITING: 0
ABDOMINAL PAIN: 0
SHORTNESS OF BREATH: 0
CHEST TIGHTNESS: 0
COLOR CHANGE: 1
ABDOMINAL PAIN: 0
NAUSEA: 0

## 2019-03-08 ASSESSMENT — PAIN DESCRIPTION - ONSET: ONSET: ON-GOING

## 2019-03-08 ASSESSMENT — PAIN DESCRIPTION - ORIENTATION
ORIENTATION: RIGHT
ORIENTATION: RIGHT

## 2019-03-08 ASSESSMENT — PAIN DESCRIPTION - LOCATION
LOCATION: TIBIA
LOCATION: LEG

## 2019-03-08 ASSESSMENT — PAIN SCALES - GENERAL
PAINLEVEL_OUTOF10: 5
PAINLEVEL_OUTOF10: 4

## 2019-03-08 ASSESSMENT — PAIN DESCRIPTION - FREQUENCY: FREQUENCY: CONTINUOUS

## 2019-03-08 ASSESSMENT — PAIN DESCRIPTION - PAIN TYPE
TYPE: ACUTE PAIN
TYPE: ACUTE PAIN

## 2019-03-08 ASSESSMENT — PAIN DESCRIPTION - DESCRIPTORS: DESCRIPTORS: ACHING

## 2019-03-08 NOTE — ED PROVIDER NOTES
[address] [facility]     Emergency Department     Faculty Attestation    I performed a history and physical examination of the patient and discussed management with the resident. I reviewed the residents note and agree with the documented findings and plan of care. Any areas of disagreement are noted on the chart. I was personally present for the key portions of any procedures. I have documented in the chart those procedures where I was not present during the key portions. I have reviewed the emergency nurses triage note. I agree with the chief complaint, past medical history, past surgical history, allergies, medications, social and family history as documented unless otherwise noted below. For Physician Assistant/ Nurse Practitioner cases/documentation I have personally evaluated this patient and have completed at least one if not all key elements of the E/M (history, physical exam, and MDM). Additional findings are as noted.       Primary Care Physician:  Marbella August DO    CHIEF COMPLAINT       Chief Complaint   Patient presents with    Leg Injury     right lower leg hematoma, sensation intact, right foot cool with pulse present       RECENT VITALS:   Temp: 97.6 °F (36.4 °C),  Pulse: 98, Resp: 22, BP: 100/75    LABS:  Labs Reviewed   CBC WITH AUTO DIFFERENTIAL - Abnormal; Notable for the following components:       Result Value    WBC 24.1 (*)     RBC 3.01 (*)     Hemoglobin 9.0 (*)     Hematocrit 28.4 (*)     RDW 17.2 (*)     Seg Neutrophils 95 (*)     Lymphocytes 5 (*)     Monocytes 0 (*)     Eosinophils % 0 (*)     Segs Absolute 22.89 (*)     Absolute Mono # 0.00 (*)     All other components within normal limits   BASIC METABOLIC PANEL - Abnormal; Notable for the following components:    Glucose 270 (*)     BUN 87 (*)     CREATININE 6.37 (*)     Chloride 96 (*)     Anion Gap 19 (*)     GFR Non- 9 (*)     GFR  10 (*)     All other components within normal limits   PROTIME-INR - Abnormal; Notable for the following components:    Protime 18.8 (*)     All other components within normal limits   APTT - Abnormal; Notable for the following components:    PTT 30.6 (*)     All other components within normal limits   TYPE AND SCREEN         PERTINENT ATTENDING PHYSICIAN COMMENTS:    Patient transferred from [address] [place] [address] Road after developing a hematoma to his right lower leg that his symptoms ruptured causing bleeding he is a dialysis patient this potassium was normal at [facility] his INR was just slightly elevated he had been on Coumadin but his INR was 2 he really has no complaints on arrival here he did have some transient hypotension and transport plan is to type and cross have orthopedics evaluate also we'll get plain x-rays of the leg normal sinus rhythm with a sinus arrhythmia rate of 90 bpm, AL interval 268 ms QRS duration 90 ms QT corrected is slightly long at 513 ms axis is -44 giving the left axis deviation with no acute ST or T wave changes seen     Jozef Oneal MD, [facility]  Attending Emergency  Physician              Michelle Bailey MD  03/08/19 [id number]

## 2019-03-08 NOTE — ED PROVIDER NOTES
HISTORY: trauma TECHNOLOGIST PROVIDED HISTORY: trauma Ordering Physician Provided Reason for Exam: Hematoma to the right lower tib/fib that is bleeding a lot, pt is unsure what happened to his leg, he is on blood thinners Acuity: Acute Type of Exam: Initial FINDINGS: Normal alignment at the knee and ankle. Osteoarthrosis in the knee. Osteopenia. No clear evidence for acute fracture. Large masslike soft tissue swelling along the anterolateral aspect of the lower leg about 20 cm in craniocaudal length and 5 cm in AP dimension. In the setting of trauma this would be most compatible with a hematoma but the pre-existing mass would be difficult to exclude on radiograph. Vascular calcifications noted. 1. No acute fracture or dislocation. Degenerative changes in the knee and ankle. 2. Large homogeneous masslike soft tissue swelling about 20 cm in length in the anterolateral lower leg most compatible with hematoma. Underlying mass would be difficult to exclude on radiographs. Xr Chest Portable    Result Date: 3/8/2019  EXAMINATION: SINGLE XRAY VIEW OF THE CHEST 3/8/2019 11:14 am COMPARISON: November 10, 2016 HISTORY: ORDERING SYSTEM PROVIDED HISTORY: leukocytosis TECHNOLOGIST PROVIDED HISTORY: leukocytosis FINDINGS: Cardiac silhouette enlargement is again noted. The patient is status post median sternotomy. No consolidation, pneumothorax or evidence for edema. No significant effusion identified. No acute osseous abnormality appreciated. No acute airspace disease identified. RECENT VITALS:     Temp: 97.6 °F (36.4 °C),  Pulse: 84, Resp: 22, BP: (!) 83/39, SpO2: 100 %    This patient is a 75 y.o. Male with laceration to the right lower extremity with expanding hematoma. Bleeding is uncontrolled. Hemoglobin has decreased from 9-6.5. Patient seen and evaluated by the trauma team.  Plan is for a warrant for further evaluation and management. Patient is hypotensive.   Plan is for transfusion with PRBCs, FFP and platelets. OUTSTANDING TASKS / RECOMMENDATIONS:    1. Going to OR; admitted to Trauma     FINAL IMPRESSION:     1.  Hematoma of right lower extremity, initial encounter        DISPOSITION:         DISPOSITION:  []  Discharge   []  Transfer -    [x]  Admission -  Trauma   []  Against Medical Advice   []  Eloped   FOLLOW-UP: Vicente Winston DO  [facility]  [phone]           DISCHARGE MEDICATIONS: New Prescriptions    No medications on file           Ren Hatchet, MD  Emergency Medicine Resident  [address] Marija Garza MD  Resident  03/08/19 [id number]

## 2019-03-08 NOTE — ED NOTES
Dr. Veronica Ruiz with trauma at bedside to place central line while awaiting [address] [address] [facility], RN  03/08/19 [id number]

## 2019-03-08 NOTE — ED NOTES
Lab called with critical creatinine of 6.37, Dr. Antoine Liz notified of critical value.      Rick Mandel RN  03/08/19 [id number]

## 2019-03-08 NOTE — ED NOTES
Pt stating the Zofran helped some, but not much. Pt resting in bed with wife at bedside.       Tejinder Marcos, RN  03/08/19 4845

## 2019-03-08 NOTE — ED NOTES
Pt resting in stretcher with eyes closed. RR even and unlabored. No distress noted. H&H repeat draw sent down to lab via tube system.      Virginie Jimenez RN  03/08/19 [id number]

## 2019-03-08 NOTE — ED NOTES
Dialysis called and asked if pt could come down for dialysis. Awaiting trauma to update writer, pt and family of the plan.       Oliva Scott RN  03/08/19 2756

## 2019-03-08 NOTE — OP NOTE
OPERATIVE NOTE    PATIENT: Vera Nye    MRN: [id number]    DATE OF PROCEDURE: 3/8/2019     SURGEON: Dr. Marques Wylie    ASSISTANT: Jarad Mcginnis, PGY-2    PREOPERATIVE DIAGNOSIS: Right lower extremity hematoma    POSTOPERATIVE DIAGNOSIS: Same     OPERATION: Exploration of right lower extremity wound, evacuation of right lower extremity hematoma, placement of right femoral vein central line, placement of negative pressure wound VAC therapy      Sharp excicsional  debridement with scalpel  ANESTHESIA: General    ESTIMATED BLOOD LOSS:  50 mL    FLUIDS: 2 PRBC, 1 FFP, 1 L crystalloid    COMPLICATIONS: None     SPECIMENS:  Was Not Obtained     HISTORY: The patient is a 75y.o. year old male with history of falling out of his wheelchair 2 days prior. Patient reported to the emergency department today due to expansion of his right lower extremity hematoma. Patient has a past cardiac history requiring him to take Coumadin and antiplatelet therapy. The patient became hypotensive in the emergency department requiring transfusion of 1 unit of packed red blood cells. The decision was made to take the patient to the operating room and evacuate the hematoma and explore the wound for any other bleeding  Risks, benefits, expected outcome, and alternatives to the procedure were explained and all questions answered. Patient and his wife understand, his wife signed a consent for procedure. PROCEDURE: The patient was brought to the operating room and placed on the operating table in a supine position. The patient then underwent general anesthesia with endotracheal intubation. A right femoral vein central venous line was then placed prior to beginning the operation (this was dictated in a separate operative note.)  A time out was performed to confirm patient, procedure, location, and allergies. The patient was then positioned, prepped in sterile fashion and draped. The patient's right lower extremity wound was then explored.   The

## 2019-03-08 NOTE — ED TRIAGE NOTES
Pt lives at [facility] and was due for dialysis today. Upon arrival to dialysis center, dialysis nurses noticed seeping wound to RLE and transferred him to [facility] who then transferred to [address] [place] [address]. [facility]. At [facility], pt's wheelchair broke two days ago and scraped right lower leg, apparently happened again yesterday and hematoma grew from dime sized to covering majority of right calf today. Hematoma either ruptured en route to or at dialysis, unknown exact time of rupture. Upon arrival, hematoma is ruptured with tear to lateral side of RLE. Chux pad placed under leg. Right foot cool to touch, but pulses are present. Unknown what baseline is, but pt is hard of hearing. PTA pt received Zofran and clindamycin. PTA pt's BP was in the 67J/50T systolic, baseline is 49V per EMS. Responds appropriately when spoken to loudly. Wife at bedside. Pt given call light, will continue to monitor.

## 2019-03-08 NOTE — FLOWSHEET NOTE
[facility]     Emergency/Trauma Note    PATIENT NAME: Alli Adams    Shift date: 3/8/2019  Shift day: Friday   Shift # 1    Room # 25/25   Name: Alli Adams            Age: 75 y.o. Gender: male          Yazdanism: Restorationism   Place of Episcopal:     Trauma/Incident type: Adult Trauma Consult  Admit Date & Time: 3/8/2019 10:24 AM  TRAUMA NAME: no        PATIENT/EVENT DESCRIPTION:  Alli Adams is a 75 y.o. male who arrived via EMS from home after a fall. Pt was alert but had been sick to his stomach Pt to be admitted to 25/25. SPIRITUAL ASSESSMENT/INTERVENTION:      provided listening presence, words of comfort and prayer. pt and wife was with him in the room they seemed a little worried but coping I assured them he was in a good place for care and the staff will take good care of him and that I will continue to pray for him. PATIENT BELONGINGS:  With patient    ANY BELONGINGS OF SIGNIFICANT VALUE NOTED:  no    REGISTRATION STAFF NOTIFIED? YES      WHAT IS YOUR SPIRITUAL CARE PLAN FOR THIS PATIENT?:   Chaplains remain available for spiritual or emotional support as needed. 03/08/19 1343   Encounter Summary   Services provided to: Patient and family together   Referral/Consult From: Multi-disciplinary team   Support System Family members   Continue Visiting   ([id number])   Complexity of Encounter Moderate   Length of Encounter 30 minutes   Crisis   Type Trauma   Assessment Grieving; Anxious   Intervention Nurtured hope;[place]   Outcome Expressed gratitude       Electronically signed by Mitchell Lugo on 3/8/2019 at 1:44 PM.  [facility]  [phone]

## 2019-03-08 NOTE — ED NOTES
Dr. David Haq with trauma at bedside to re-evaluate pt. Pt will have central line placed then taken to OR. 1L NaCl infusing wo.       Bay Tirado RN  03/08/19 [id number]

## 2019-03-08 NOTE — PROGRESS NOTES
Division of Vascular Surgery          Vascular Consult      Name: Edgardo Hummel  MRN: [id number]       Physician Requesting Consult:  Dr. Genesis Demarco    Reason for Consult:   RLE hematoma     Chief Complaint:     I fell and hurt my right leg     History of Present Illness:      Edgardo Hummel is a 75 y.o.  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 The patient states he only had mild bruising initially and his wife reports that over the past couple days the swelling to the RLE has increased until they realized he would need to go to the ER for an evaluation this morning. The patient was seen at the [facility] ER and transferred to [facility] ER for surgical evaluation. X-rays negative for any fracture in the extremity and orthopedic surgery was consulted initially. Orthopedic surgery reviewed the imaging and then recommended calling vascular surgery for an evaluation. The patient reports he can move his foot and has sensation in the foot. He does endorse some pain but states it is controlled. In the ER he was AAOx3, hemodynamically stable and vascular surgery called to evaluate the leg and foot.    Past Medical History:     Past Medical History:   Diagnosis Date    Anemia in chronic kidney disease (CKD) 4/27/2016    Arthritis     Bacteremia 11/11/2016    Benign prostatic hyperplasia without lower urinary tract symptoms     BMI 40.0-44.9, adult ([address] 3/19/2018    Cellulitis of left lower extremity     Chronic cerebral ischemia 1/3/2017    Closed fracture of forearm 8/13/2013    Broken lft forearm     Controlled type 2 diabetes mellitus with chronic kidney disease on chronic dialysis, with long-term current use of insulin ([address]     Controlled type 2 diabetes mellitus with diabetic polyneuropathy, with long-term current use of insulin ([address] 10/7/2018  Decubital ulcer     NON OPEN BUTTOCKS    Dementia     memory problems    Dialysis patient ([address] 1/3/2017    Goes Tue, [place], Sat in [place] Difficult intravenous access     HAS NEEDED PICC TEAM IN THE PAST    Difficulty walking     WHEELCHAIR BOUND-PIVOTS WITH ASSIST O F 2    DJD (degenerative joint disease) of knee     Elbow, forearm, and wrist, abrasion or friction burn, without mention of infection 8/13/2013    Abrasions lft forearm     Encephalopathy acute 4/27/2016    Encounter regarding vascular access for dialysis for ESRD ([address] 2/2/2017    ESRD (end stage renal disease) on dialysis ([facility] 1/17/2017    Forgetfulness     HAS SOME SHORT TERM MEMORY LOSS, QUYEN-WIFE IS LEGAL GUARDIAN    H/O transesophageal echocardiography ([place]) for monitoring     Hemodialysis patient ([address] 11/11/2016    tues-thurs-sat [place]---[facility].  TUNNELED CATHETER RT UPPER CHEST    Hyperlipidemia 1990    on Meds    Hypertension 1990    on Meds    Intercritical gout     on Meds    Joint pain, knee 01/13/2017    baldo knee synvisc-1 injections    Long-term insulin use ([address] 1/17/2017    Major depressive disorder with single episode, in partial remission ([address] 1/3/2017    Mobility impaired     Morbid obesity ([address]     Muscle weakness     GRNERALIZED    Obesity     Obstructive sleep apnea     HEATHER on CPAP     On CPAP-TO BRING DOS    Paroxysmal atrial fibrillation ([address] 9/6/2017    Respiratory failure ([address] 03/2016    with open heart surgery, trached x 5 months    S/P cardiac cath     Seborrhea     Severe aortic stenosis 3/9/2016    Severe mitral valve stenosis 3/9/2016    Severe sepsis ([address] 4/3/2016    Skin rash     ABD FOLDS    Stasis dermatitis     Thyroid disease     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 lft thumb     Venous stasis dermatitis     Wears glasses     Wheelchair bound     pivots with 2 assists        Past Surgical History:     Past Surgical History:   Procedure Laterality Date    AORTIC VALVE REPLACEMENT  03/18/2016    bioprosthetic    ARM SURGERY Left 1990    CARDIAC CATHETERIZATION      CENTRAL VENOUS CATHETER Right 02/21/2018    DIALYSIS CATHETER Dr Ryan Benito    COLONOSCOPY  11/19/09    COLONOSCOPY N/A 10/12/2018    COLONOSCOPY WITH BIOPSY performed by Garcia Andrade DO at [address] [facility] (x10-12 surgeries)    several surgeries on left arm    KNEE ARTHROSCOPY Left 09/17/99    MITRAL VALVE REPLACEMENT  03/18/2016    bioprosthetic     NASAL SEPTUM SURGERY      OTHER SURGICAL HISTORY  3/18/16    Aortic root Enlargement    OTHER SURGICAL HISTORY  3/18/16    ASD Closure    OTHER SURGICAL HISTORY Right 01/09/2017    AV fistula creation, wrist    OTHER SURGICAL HISTORY Right 08/29/2017    ligation of collateral branch of av fistula right wrist    OTHER SURGICAL HISTORY  04/05/2018    FISTULAGRAM WITH DR. Aisha Saravia    AL EGD TRANSORAL BIOPSY SINGLE/MULTIPLE N/A 10/17/2017    EGD BIOPSY performed by Zoran Decker MD at [address] ANGIOACCESS AV FISTULA Right 8/29/2017     RIGHT  LOWER ARM AV FISTULA  LIGATION OF AQUIRED BRANCHES X2 performed by Rommel Mera DO at [address] [place] [address]  3/18/16    median    VASCULAR SURGERY  12/06/2018    Right arm fistulagram,  PTA cephalic vein stenosis  /  DR Magnolia Cotsello        Medications Prior to Admission:       Prior to Admission medications    Medication Sig Start Date End Date Taking? Authorizing Provider   Misc. Devices [facility]) MISC Use as directed 12/12/18   Octavio Mcarthur DO   buPROPion (WELLBUTRIN XL) 150 MG extended release tablet Take 150 mg by mouth every morning    Historical Provider, MD   sevelamer (RENVELA) 800 MG tablet Give 2 tabs po TID with meals and additional 1 tab po TID with snack.   Patient taking differently: No sig reported 7/29/18   SHAMEKA Gr - CNP   Pollen Extracts (PROSTAT PO) Take 30 mLs by mouth three times daily Historical Provider, MD   warfarin (COUMADIN) 2 MG tablet Take 2 mg by mouth nightly Take 1 tablet by mouth at bedtime    Historical Provider, MD   warfarin (COUMADIN) 3 MG tablet Take 3 mg by mouth daily Take 3 mg on Monday and Tuesday    Historical Provider, MD   sertraline (ZOLOFT) 100 MG tablet Take 100 mg by mouth daily     Historical Provider, MD   loratadine (CLARITIN) 10 MG capsule Take 10 mg by mouth    Historical Provider, MD   sodium chloride (OCEAN) 0.65 % nasal spray by Nasal route 10/24/16   Historical Provider, MD   lidocaine (XYLOCAINE) 5 % ointment Apply topically three times a week Apply topically as needed. DIALYSIS DAYS    Historical Provider, MD   Amino Acids-Protein Hydrolys (PRO-STAT PO) Take 30 mLs by mouth 3 times daily SUGAR FREE     Historical Provider, MD   guaiFENesin (ROBITUSSIN) 100 MG/5ML syrup Take 200 mg by mouth 4 times daily as needed for Cough    Historical Provider, MD   ipratropium-albuterol (DUONEB) 0.5-2.5 (3) MG/3ML SOLN nebulizer solution Inhale 1 vial into the lungs every 6 hours as needed     Historical Provider, MD   insulin aspart (NOVOLOG) 100 UNIT/ML injection vial Inject 5 Units into the skin 3 times daily (before meals) Sliding scale    Historical Provider, MD   Nystatin POWD by Does not apply route TID x 10 days (starting 2/13/17) then use PRN 2/13/17   Historical Provider, MD   HYDROcodone-acetaminophen (NORCO) 5-325 MG per tablet Take 1 tablet by mouth every 8 hours as needed for Pain . Historical Provider, MD   aspirin 81 MG tablet Take 81 mg by mouth daily    Historical Provider, MD   Skin Protectants, Misc.  (HYDROCERIN) CREA cream Apply topically nightly    Historical Provider, MD   senna (SENOKOT) 8.6 MG tablet Take 1 tablet by mouth nightly    Historical Provider, MD   omeprazole (PRILOSEC) 20 MG delayed release capsule Take 20 mg by mouth daily    Historical Provider, MD   glucagon 1 MG injection Infuse 1 kit intravenously as needed    Historical Provider, MD   donepezil (ARICEPT) 5 MG tablet Take 5 mg by mouth every evening 10/26/16   Historical Provider, MD   levothyroxine (SYNTHROID) 25 MCG tablet Take 1 tablet by mouth Daily  Patient taking differently: Take 50 mcg by mouth Daily  10/26/16   SHAMEKA Vega - CNP   midodrine (PROAMATINE) 5 MG tablet Take 2 tablets by mouth 3 times daily 10/24/16   Octavio Mcarthur DO   ondansetron (ZOFRAN ODT) 4 MG disintegrating tablet Take 1 tablet by mouth every 6 hours as needed for Nausea or Vomiting 10/24/16   Octavio Mcarthur DO   albuterol (PROVENTIL) (2.5 MG/3ML) 0.083% nebulizer solution Take 3 mLs by nebulization every 8 hours as needed for Wheezing 10/24/16   Octavio Mcarthur DO   fluticasone (FLONASE) 50 MCG/ACT nasal spray 2 sprays by Nasal route daily 10/24/16   Octavio Mcarthur DO   glucose (GLUTOSE 15) 40 % GEL Take 15 g by mouth as needed (hypoglycemia) 15 gram oral as needed if blood sugar is less than 70 ( for hypoglycemia) 10/24/16   Octavio Mcarthur DO   atorvastatin (LIPITOR) 10 MG tablet Take 1 tablet by mouth daily 10/24/16   Octavio Mcarthur DO   insulin glargine (LANTUS) 100 UNIT/ML injection vial Inject 40 Units into the skin 2 times daily     Historical Provider, MD   ALPRAZolam (XANAX) 0.5 MG tablet Take 0.5 mg by mouth 2 times daily. Jennifer Ramirez Historical Provider, MD   acetaminophen (TYLENOL) 325 MG tablet Take 650 mg by mouth every 6 hours as needed for Pain or Fever     Historical Provider, MD   magnesium hydroxide (MILK OF MAGNESIA) 400 MG/5ML suspension Take 30 mLs by mouth daily as needed for Constipation 3/29/16   Mau Crater, MD   Multiple Vitamins-Minerals (MULTIVITAL PO) Take 1 tablet by mouth daily    Historical Provider, MD        Allergies:       Percocet [oxycodone-acetaminophen] and Ampicillin    Social History:     Tobacco:    reports that he quit smoking about 39 years ago. His smoking use included cigarettes.  He started smoking about 64 years ago. He has a 50.00 pack-year smoking history. He has never used smokeless tobacco.  Alcohol:      reports that he does not drink alcohol. Drug Use:  reports that he does not use drugs. Family History:     Family History   Problem Relation Age of Onset    Lung Cancer Mother     Diabetes Mother     Alzheimer's Disease Father     Diabetes Maternal Grandmother     High Blood Pressure Sister        Review of Systems:     Positive and Negative as described in HPI    Constitutional:  negative for  fevers, chills  HEENT:  negative for vision or hearing changes,   Respiratory:  negative for shortness of breath, cough, or congestion  Cardiovascular:  Reports hx of aortic root enlargement, AVR, MVR and on coumadin therapy.    Gastrointestinal:  negative for nausea, vomiting  Genitourinary:  negative for frequency, dysuria  Integument:  Reports severe bruising and skin color changes to the RLE due to injury; reports bloody clear drainage from RLE  Chest/Breast:  No painful inspiration or expiration, no rib sternal pain  Musculoskeletal:  Reports significant RLE tenderness, swelling and pain   Neurological:  negative for headaches, dizziness, lightheadedness  Lymphatics: no lymphadenopathy or painful masses  Behavior/Psych:  negative for depression and anxiety    Physical Exam:     Vitals:  BP (!) 101/55   Pulse 93   Temp 97.6 °F (36.4 °C) (Oral)   Resp 22   SpO2 100%     General appearance - alert, well appearing and in no acute distress  Mental status - oriented to person, place and time with normal affect  Head - normocephalic and atraumatic  Eyes - pupils equal and reactive, extraocular eye movements intact, conjunctiva clear  Ears - hearing appears to be intact  Nose - no drainage noted  Mouth - mucous membranes moist  Neck - supple, trachea midline   Chest - no respiratory distress, no audible wheezing  Heart - +S1/S2  Abdomen - soft, non-tender, non-distended  Neurological - debridement or wound vac placement to trauma team.  5. RUE AVF appears to be functioning well. Okay to use as needed for dialysis during admission. 6. Patient seen and examined with Dr. Naun Parrish. No additional interventions from the vascular service. Vascular to sign off at this time.        Electronically signed by Allan Roper DO on 3/8/2019 at 2:34 PM

## 2019-03-08 NOTE — ED NOTES
Pt resting on cot, alert, no distress noted, wife at bedside. Awaiting word from vascular regarding plan of care. Pt denies any further needs, will continue to monitor.       Namrata Carl RN  03/08/19 [id number]

## 2019-03-08 NOTE — ED NOTES
Pt actively vomiting at this time. Pt vomiting food product. 4 mg zofran given IVP per verbal order.       Rod Restrepo RN  03/08/19 [id number]

## 2019-03-08 NOTE — ED NOTES
Ortho at bedside and states this would be a vascular case.      Mariela Williamson RN  03/08/19 7934

## 2019-03-08 NOTE — ED NOTES
Dialysis notified of pt change in status. Pt will need to go to ICU and be dialyzed in the unit when pt is more hemodynamically stable.       Raul Marquez RN  03/08/19 [id number]

## 2019-03-08 NOTE — ED NOTES
Vascular applying pressure dressing, wrapping lower extremity.       Bonilla Sánchez RN  03/08/19 7296

## 2019-03-08 NOTE — H&P
TRAUMA HISTORY AND PHYSICAL EXAMINATION    PATIENT NAME: Roxi Lopez  YOB: 1945  MEDICAL RECORD NO. [id number]   DATE: 3/8/2019  PRIMARY CARE PHYSICIAN: ALEJANDRA REYES DO  PATIENT EVALUATED AT THE REQUEST OF : Susan Dupont for     ACTIVATION   []Trauma Alert     [] Trauma Priority     [x]Trauma Consult. IMPRESSION:     Patient Active Problem List   Diagnosis    Traumatic amputation of thumb    Essential hypertension    Mixed hyperlipidemia    Obstructive sleep apnea    Morbid obesity ([address]    Benign prostatic hyperplasia without lower urinary tract symptoms    Venous stasis dermatitis    Severe aortic stenosis    Severe mitral valve stenosis    Anemia in chronic kidney disease, on chronic [facility] ([facility]    Dialysis patient ([address]    Dementia    Major depressive disorder with single episode, in partial remission ([address]    Chronic cerebral ischemia    ESRD (end stage renal disease) on dialysis ([facility])    Long-term insulin use ([facility])    Paroxysmal atrial fibrillation ([facility])    BMI 40.0-44.9, adult ([address]    Controlled type 2 diabetes mellitus with chronic kidney disease on chronic dialysis, with long-term current use of insulin ([facility])    Controlled type 2 diabetes mellitus with diabetic polyneuropathy, with long-term current use of insulin ([facility]    Dermatitis    Bruising       MEDICAL DECISION MAKING AND PLAN:       1. Admit to: step-down  2. Neuro:  1. Pain management- tylenol Q4 change   2. Hx of dementia--take precautions to avoid delirium  3. CV  1. Hx of pAF, CHF s/p AVR, MVR   NSR with long QTc ecg in ED--treated with mag in ED   LVEF 55% in 9/2018   Cardiology consulted for cardiac clearance/optimization for surgery/anticoagulation recs   Midodrine   4. Pulm  1. Saturating well on room air  2. Monitor SpO2  5. GI/Nutrition  1. Actively vomiting in room--Rx with zofran and phenergan by ED--caution with long QTc  6. Renal/lytes  1. MWF HD patient  2.  Missed HD 3/8/2019   Approximate Injury Time: 5 days ago        Transport mode:   [x]Ambulance      [] Helicopter     []Car       [] Other  Referring Hospital: [address] [place] [address], (e.g., home, farm, industry, street)  Specific Details of Location (e.g., bedroom, kitchen, garage): SNF  Type of Residence (if occurred in home setting) (e.g., apartment, mobile home, single family home): SNF    MECHANISM OF INJURY    Either slip from wheelchair or scrape while lifting    HISTORY:     Alli Adams is a 75 y.o. male with past medical history of end-stage renal disease, mitral valve repair, aortic valve repair, CHF with diastolic dysfunction, type 2 diabetes who is on Coumadin that presented to the Emergency Department following  transfer from [facility] for expanding right lower extremity hematoma. Per patient and his wife he was on his way to dialysis and ambulate today when he had splitting of the skin and bleeding. Patient had reportedly had a low impact fall from wheelchair after the seat fell out on Sunday experienced this scrape to the right lower extremity either during fall or well being picked up by staff at skilled nursing facility. In the days after that incident he had a gradually expanding hematoma form. After the skin split today he was brought to [place] [id number] where radiography confirmed no fracture and he was transferred to [facility] after being started on IV fluids and IV antibiotics for suspicion of infection related to hematoma. Workup in [facility] emergency Department found leukocytosis with white blood cell count of 24, mild hypotension treated with fluid (patient has history of hypertension requiring Midodrine). During his stay bleeding recurred when dressing was examined    In ED pt had n/v that was treated with zofran.      Pt reports RLE pain, but is able to move, reports n/v.  Pt denies h/a, neck pain, vision change, SOB,chest pain, abdominal pain, diarrhea, constipation, knee pain, hip pain, UE pain, denies paresthesias in foot or LE. Loss of Consciousness []No   []Yes Duration(min)       [] Unknown     MEDICATIONS:   []  None     []  Information not available due to exam limitations documented above  Prior to Admission medications    Medication Sig Start Date End Date Taking? Authorizing Provider   Misc. Devices [facility]) MISC Use as directed 12/12/18   Octavio Mcarthur,    buPROPion (WELLBUTRIN XL) 150 MG extended release tablet Take 150 mg by mouth every morning    Historical Provider, MD   sevelamer (RENVELA) 800 MG tablet Give 2 tabs po TID with meals and additional 1 tab po TID with snack. Patient taking differently: No sig reported 7/29/18   SHAMEKA Bass - CNP   Pollen Extracts (PROSTAT PO) Take 30 mLs by mouth three times daily    Historical Provider, MD   warfarin (COUMADIN) 2 MG tablet Take 2 mg by mouth nightly Take 1 tablet by mouth at bedtime    Historical Provider, MD   warfarin (COUMADIN) 3 MG tablet Take 3 mg by mouth daily Take 3 mg on Monday and Tuesday    Historical Provider, MD   sertraline (ZOLOFT) 100 MG tablet Take 100 mg by mouth daily     Historical Provider, MD   loratadine (CLARITIN) 10 MG capsule Take 10 mg by mouth    Historical Provider, MD   sodium chloride (OCEAN) 0.65 % nasal spray by Nasal route 10/24/16   Historical Provider, MD   lidocaine (XYLOCAINE) 5 % ointment Apply topically three times a week Apply topically as needed.  DIALYSIS DAYS    Historical Provider, MD   Amino Acids-Protein Hydrolys (PRO-STAT PO) Take 30 mLs by mouth 3 times daily SUGAR FREE     Historical Provider, MD   guaiFENesin (ROBITUSSIN) 100 MG/5ML syrup Take 200 mg by mouth 4 times daily as needed for Cough    Historical Provider, MD   ipratropium-albuterol (DUONEB) 0.5-2.5 (3) MG/3ML SOLN nebulizer solution Inhale 1 vial into the lungs every 6 hours as needed     Historical Provider, MD   insulin aspart (NOVOLOG) 100 UNIT/ML injection vial Inject 5 Units into the skin 3 times daily (before meals) Sliding scale    Historical Provider, MD   Nystatin POWD by Does not apply route TID x 10 days (starting 2/13/17) then use PRN 2/13/17   Historical Provider, MD   HYDROcodone-acetaminophen (NORCO) 5-325 MG per tablet Take 1 tablet by mouth every 8 hours as needed for Pain . Historical Provider, MD   aspirin 81 MG tablet Take 81 mg by mouth daily    Historical Provider, MD   Skin Protectants, Misc.  (HYDROCERIN) CREA cream Apply topically nightly    Historical Provider, MD   senna (SENOKOT) 8.6 MG tablet Take 1 tablet by mouth nightly    Historical Provider, MD   omeprazole (PRILOSEC) 20 MG delayed release capsule Take 20 mg by mouth daily    Historical Provider, MD   glucagon 1 MG injection Infuse 1 kit intravenously as needed    Historical Provider, MD   donepezil (ARICEPT) 5 MG tablet Take 5 mg by mouth every evening 10/26/16   Historical Provider, MD   levothyroxine (SYNTHROID) 25 MCG tablet Take 1 tablet by mouth Daily  Patient taking differently: Take 50 mcg by mouth Daily  10/26/16   SHAMEKA Pratt - CNP   midodrine (PROAMATINE) 5 MG tablet Take 2 tablets by mouth 3 times daily 10/24/16   Octavio Mcarthur DO   ondansetron (ZOFRAN ODT) 4 MG disintegrating tablet Take 1 tablet by mouth every 6 hours as needed for Nausea or Vomiting 10/24/16   Octavio Mcarthur DO   albuterol (PROVENTIL) (2.5 MG/3ML) 0.083% nebulizer solution Take 3 mLs by nebulization every 8 hours as needed for Wheezing 10/24/16   Octavio Mcarthur, DO   fluticasone (FLONASE) 50 MCG/ACT nasal spray 2 sprays by Nasal route daily 10/24/16   Octavio Mcarthur DO   glucose (GLUTOSE 15) 40 % GEL Take 15 g by mouth as needed (hypoglycemia) 15 gram oral as needed if blood sugar is less than 70 ( for hypoglycemia) 10/24/16   Octavio Mcarthur DO   atorvastatin (LIPITOR) 10 MG tablet Take 1 tablet by mouth daily 10/24/16   Octavio Mcarthur, DO   insulin [facility]), Major depressive disorder with single episode, in partial remission ([facility] Mobility impaired, Morbid obesity ([facility] Muscle weakness, Obesity, Obstructive sleep apnea, HEATHER on CPAP, Paroxysmal atrial fibrillation ([facility] Respiratory failure ([facility] S/P cardiac cath, Seborrhea, Severe aortic stenosis, Severe mitral valve stenosis, Severe sepsis (HCC), Skin rash, Stasis dermatitis, Thyroid disease, Traumatic amputation of thumb, Venous stasis dermatitis, Wears glasses, and Wheelchair bound. has a past surgical history that includes Colonoscopy (11/19/09); Knee arthroscopy (Left, 09/17/99); Hand surgery (Left, 1990 (x10-12 surgeries)); Arm Surgery (Left, 1990); Cardiac catheterization; Nasal septum surgery; Sternotomy (3/18/16); Aortic valve replacement (03/18/2016); Mitral valve replacement (03/18/2016); other surgical history (3/18/16); other surgical history (3/18/16); other surgical history (Right, 01/09/2017); other surgical history (Right, 08/29/2017); pr ligatn angioaccess av fistula (Right, 8/29/2017); pr egd transoral biopsy single/multiple (N/A, 10/17/2017); central venous catheter (Right, 02/21/2018); Colonoscopy (N/A, 10/12/2018); other surgical history (04/05/2018); and vascular surgery (12/06/2018). FAMILY HISTORY   []   Information not available due to exam limitations documented above    family history includes Alzheimer's Disease in his father; Diabetes in his maternal grandmother and mother; High Blood Pressure in his sister; Rigoberto Carvajal in his mother. SOCIAL HISTORY  []   Information not available due to exam limitations documented above     reports that he quit smoking about 39 years ago. His smoking use included cigarettes. He started smoking about 64 years ago. He has a 50.00 pack-year smoking history. He has never used smokeless tobacco.   reports that he does not drink alcohol. reports that he does not use drugs.     PERTINENT SYSTEMIC REVIEW:    []   Information not available due to exam limitations documented above    Pertinent items are noted in HPI. PHYSICAL EXAMINATION:     GLASCOW COMA SCALE  NEUROMUSCULAR BLOCKADE PRIOR TO ARRIVAL     [x]No        []Yes      Variable  Score   Variable  Score  Eye opening [x]Spontaneous 4 Verbal  [x]Oriented  5     []To voice  3   []Confused  4    []To pain  2   []Inapp words  3    []None  1   []Incomp words 2       []None  1   Motor   [x]Obeys  6    []Localizes pain 5    []Withdraws(pain) 4    []Flexion(pain) 3  []Extension(pain) 2    []None  1     GCS Total = 15    PHYSICAL EXAMINATION    VITAL SIGNS:   Vitals:    03/08/19 1332   BP: 100/75   Pulse: 98   Resp:    Temp:    SpO2: 100%       General Appearance: alert and oriented to person, place and time, sleeping, and vomiting into a bucket  Skin and extremities: see pictures in chart, pt with ecchymosis, large, open hematoma on lateral lef R calve . Foot is cold distal to wound, but palpable PT DP pulses  Head: normocephalic and atraumatic  Eyes: pupils equal, round, and reactive to light, extraocular eye movements intact, conjunctivae normal  Neck: supple and non-tender without mass,   Pulmonary/Chest: clear to auscultation bilaterally- no wheezes, rales or rhonchi, normal air movement, no respiratory distress   Abdomen: soft, non-tender, non-distended, normal bowel sounds, no masses or organomegaly  Musculoskeletal: normal range of motion, no joint swelling, deformity or tenderness  Neurologic: reflexes normal and symmetric, no cranial nerve deficit,  coordination and speech normal             RADIOLOGY    PLAIN FILMS  Xr Tibia Fibula Right (2 Views)    Result Date: 3/8/2019  EXAMINATION: 4 XRAY VIEWS OF THE RIGHT TIBIA AND FIBULA 3/8/2019 11:14 am COMPARISON: Radiographs today at 6:44 a.m.. HISTORY: ORDERING SYSTEM PROVIDED HISTORY: soft tissue injury TECHNOLOGIST PROVIDED HISTORY: soft tissue injury FINDINGS: Osteopenia. Prominent soft tissue swelling in the anterior lower leg is again noted.   No subcutaneous gas or foreign body. No underlying fracture identified. Advanced degenerative change in the medial compartment of the knee with varus angulation is noted. Soft tissue swelling in the anterior lower leg without underlying fracture identified. Xr Tibia Fibula Right (2 Views)    Result Date: 3/8/2019  EXAMINATION: 2 XRAY VIEWS OF THE RIGHT TIBIA AND FIBULA 3/8/2019 6:41 am COMPARISON: None. HISTORY: ORDERING SYSTEM PROVIDED HISTORY: trauma TECHNOLOGIST PROVIDED HISTORY: trauma Ordering Physician Provided Reason for Exam: Hematoma to the right lower tib/fib that is bleeding a lot, pt is unsure what happened to his leg, he is on blood thinners Acuity: Acute Type of Exam: Initial FINDINGS: Normal alignment at the knee and ankle. Osteoarthrosis in the knee. Osteopenia. No clear evidence for acute fracture. Large masslike soft tissue swelling along the anterolateral aspect of the lower leg about 20 cm in craniocaudal length and 5 cm in AP dimension. In the setting of trauma this would be most compatible with a hematoma but the pre-existing mass would be difficult to exclude on radiograph. Vascular calcifications noted. 1. No acute fracture or dislocation. Degenerative changes in the knee and ankle. 2. Large homogeneous masslike soft tissue swelling about 20 cm in length in the anterolateral lower leg most compatible with hematoma. Underlying mass would be difficult to exclude on radiographs. Xr Chest Portable    Result Date: 3/8/2019  EXAMINATION: SINGLE XRAY VIEW OF THE CHEST 3/8/2019 11:14 am COMPARISON: November 10, 2016 HISTORY: ORDERING SYSTEM PROVIDED HISTORY: leukocytosis TECHNOLOGIST PROVIDED HISTORY: leukocytosis FINDINGS: Cardiac silhouette enlargement is again noted. The patient is status post median sternotomy. No consolidation, pneumothorax or evidence for edema. No significant effusion identified. No acute osseous abnormality appreciated.      No acute airspace disease

## 2019-03-08 NOTE — ED NOTES
Pt is pale, diaphoretic, bleeding through his bandage. Pt states he does not feel well. POC blood sugar checked, resident updated on results, awaiting further orders.       Brent Archer RN  03/08/19 3438

## 2019-03-08 NOTE — OP NOTE
complication was evident. All sponge, instrument and needle counts were correct at the completion of the procedure. Kenisha Sánchez  3/8/2019, 6:17 PM    I was present for the above procedure performed by the resident/NP and agree with the indications.   AYOM

## 2019-03-08 NOTE — BRIEF OP NOTE
Brief Postoperative Note  ______________________________________________________________    Patient: Katherine Celaya  YOB: 1945  MRN: [id number]  Date of Procedure: 3/8/2019    Pre-Op Diagnosis: HEMATOMA RIGHT LOWER EXTREMITY    Post-Op Diagnosis: Same       Procedure(s):  LOWER EXTREMITY RIGHT LOWER EXTREMITY WOUND EXPLORATION,Evacuation of hematoma, Placement of negative wound vac therapy , placement of Right femoral vein central line     Anesthesia: General    Surgeon(s):  Kiko Ontiveros MD    Assistant: Ashley Holland, PGY-2     Estimated Blood Loss (mL): 20 mL    Fluids: 2 PRBC, 1  FFP, 1 L crystalloid     Complications: None    Specimens:   * No specimens in log *    Implants:  * No implants in log *      Drains:   Negative Pressure Wound Therapy Leg Anterior; Lower;Right (Active)       NG/OG/NJ/NE Tube Nasogastric 18 fr Right nostril (Active)       Urethral Catheter Non-latex 18 fr (Active)       Findings:  Exploration of right lower extremity wound, evacuation of resolution of the hematoma, Jadyn placed on wound placement of negative pressure wound VAC therapy    Frederick Black DO  Date: 3/8/2019  Time: 6:13 PM

## 2019-03-08 NOTE — ED PROVIDER NOTES
access for dialysis for ESRD [facility]), ESRD (end stage renal disease) on dialysis ([facility] [facility] Forgetfulness, H/O transesophageal echocardiography ([facility]) for monitoring, Hemodialysis patient ([facility] [address] Hyperlipidemia, Hypertension, Intercritical gout, Joint pain, knee, Long-term insulin use ([facility] [address] Major depressive disorder with single episode, in partial remission ([facility] [address] [facility] Mobility impaired, Morbid obesity ([facility] [address] Muscle weakness, Obesity, Obstructive sleep apnea, HEATHER on CPAP, Paroxysmal atrial fibrillation ([address] Respiratory failure ([address] S/P cardiac cath, Seborrhea, Severe aortic stenosis, Severe mitral valve stenosis, Severe sepsis (HCC), Skin rash, Stasis dermatitis, Thyroid disease, Traumatic amputation of thumb, Venous stasis dermatitis, Wears glasses, and Wheelchair bound. has a past surgical history that includes Colonoscopy (11/19/09); Knee arthroscopy (Left, 09/17/99); Hand surgery (Left, 1990 (x10-12 surgeries)); Arm Surgery (Left, 1990); Cardiac catheterization; Nasal septum surgery; Sternotomy (3/18/16); Aortic valve replacement (03/18/2016); Mitral valve replacement (03/18/2016); other surgical history (3/18/16); other surgical history (3/18/16); other surgical history (Right, 01/09/2017); other surgical history (Right, 08/29/2017); pr ligatn angioaccess av fistula (Right, 8/29/2017); pr egd transoral biopsy single/multiple (N/A, 10/17/2017); central venous catheter (Right, 02/21/2018); Colonoscopy (N/A, 10/12/2018); other surgical history (04/05/2018); and vascular surgery (12/06/2018).     Social History     Socioeconomic History    Marital status:      Spouse name: Juan Francisco Machuca Number of children: 2    Years of education: Not on file    Highest education level: Not on file   Occupational History    Occupation: Retired      Employer: [address] resource strain: Not on file    Food insecurity:     Worry: Not on file     Inability: Not on file  Transportation needs:     Medical: Not on file     Non-medical: Not on file   Tobacco Use    Smoking status: Former Smoker     Packs/day: 2.00     Years: 25.00     Pack years: 50.00     Types: Cigarettes     Start date: 3/17/1955     Last attempt to quit: 1980     Years since quittin.2    Smokeless tobacco: Never Used   Substance and Sexual Activity    Alcohol use: No     Alcohol/week: 0.0 oz     Comment: 1-2 drinks per year    Drug use: No    Sexual activity: Never   Lifestyle    Physical activity:     Days per week: Not on file     Minutes per session: Not on file    Stress: Not on file   Relationships    Social connections:     Talks on phone: Not on file     Gets together: Not on file     Attends Denominational service: Not on file     Active member of club or organization: Not on file     Attends meetings of clubs or organizations: Not on file     Relationship status: Not on file    Intimate partner violence:     Fear of current or ex partner: Not on file     Emotionally abused: Not on file     Physically abused: Not on file     Forced sexual activity: Not on file   Other Topics Concern    Not on file   Social History Narrative    Not on file       Family History   Problem Relation Age of Onset    Lung Cancer Mother     Diabetes Mother     Alzheimer's Disease Father     Diabetes Maternal Grandmother     High Blood Pressure Sister        Allergies:  Percocet [oxycodone-acetaminophen] and Ampicillin    Home Medications:  Prior to Admission medications    Medication Sig Start Date End Date Taking? Authorizing Provider   Misc. Devices [facility]) MISC Use as directed 18   Octavio Mcarthur,    buPROPion (WELLBUTRIN XL) 150 MG extended release tablet Take 150 mg by mouth every morning    Historical Provider, MD   sevelamer (RENVELA) 800 MG tablet Give 2 tabs po TID with meals and additional 1 tab po TID with snack.   Patient taking differently: No sig reported 18   Alfonso Estrada Provider, MD   glucagon 1 MG injection Infuse 1 kit intravenously as needed    Historical Provider, MD   donepezil (ARICEPT) 5 MG tablet Take 5 mg by mouth every evening 10/26/16   Historical Provider, MD   levothyroxine (SYNTHROID) 25 MCG tablet Take 1 tablet by mouth Daily  Patient taking differently: Take 50 mcg by mouth Daily  10/26/16   SHAMEKA Raymundo CNP   midodrine (PROAMATINE) 5 MG tablet Take 2 tablets by mouth 3 times daily 10/24/16   Octavio Mcarthur, DO   ondansetron (ZOFRAN ODT) 4 MG disintegrating tablet Take 1 tablet by mouth every 6 hours as needed for Nausea or Vomiting 10/24/16   Octavio Mcarthur, DO   albuterol (PROVENTIL) (2.5 MG/3ML) 0.083% nebulizer solution Take 3 mLs by nebulization every 8 hours as needed for Wheezing 10/24/16   Octavio Mcarthur, DO   fluticasone (FLONASE) 50 MCG/ACT nasal spray 2 sprays by Nasal route daily 10/24/16   Octavio Mcarthur, DO   glucose (GLUTOSE 15) 40 % GEL Take 15 g by mouth as needed (hypoglycemia) 15 gram oral as needed if blood sugar is less than 70 ( for hypoglycemia) 10/24/16   Octavio Mcarthur DO   atorvastatin (LIPITOR) 10 MG tablet Take 1 tablet by mouth daily 10/24/16   Octavio Mcarthur, DO   insulin glargine (LANTUS) 100 UNIT/ML injection vial Inject 40 Units into the skin 2 times daily     Historical Provider, MD   ALPRAZolam (XANAX) 0.5 MG tablet Take 0.5 mg by mouth 2 times daily. Velora Sis     Historical Provider, MD   acetaminophen (TYLENOL) 325 MG tablet Take 650 mg by mouth every 6 hours as needed for Pain or Fever     Historical Provider, MD   magnesium hydroxide (MILK OF MAGNESIA) 400 MG/5ML suspension Take 30 mLs by mouth daily as needed for Constipation 3/29/16   Fuad Baker MD   Multiple Vitamins-Minerals (MULTIVITAL PO) Take 1 tablet by mouth daily    Historical Provider, MD       REVIEW OF SYSTEMS    (2-9 systems for level 4, 10 or more for level 5)      Review of Systems extremity however this essentially extremity is slightly cooler than the opposite. Lymphadenopathy:     He has no cervical adenopathy. Neurological: He is alert and oriented to person, place, and time. No cranial nerve deficit. He exhibits normal muscle tone. Coordination normal.   Skin: Skin is warm and dry. No rash noted. He is not diaphoretic. No erythema. Psychiatric: He has a normal mood and affect. His behavior is normal. Judgment normal.   Nursing note and vitals reviewed.       DIFFERENTIAL  DIAGNOSIS     PLAN (LABS / IMAGING / EKG):  Orders Placed This Encounter   Procedures    XR TIBIA FIBULA RIGHT (2 VIEWS)    XR CHEST PORTABLE    CBC WITH AUTO DIFFERENTIAL    Basic Metabolic Panel    Protime-INR    APTT    Inpatient consult to Orthopedic Surgery    Inpatient consult to Vascular Surgery    Inpatient consult to Nephrology    Inpatient consult to Trauma Surgery    EKG 12 Lead    TYPE AND SCREEN       MEDICATIONS ORDERED:  Orders Placed This Encounter   Medications    0.9 % sodium chloride bolus    DISCONTD: Tetanus-Diphth-Acell Pertussis (BOOSTRIX) injection 0.5 mL    DISCONTD: ceFAZolin (ANCEF) 1 g in dextrose 5 % 50 mL IVPB (mini-bag)    0.9 % sodium chloride bolus    vancomycin (VANCOCIN) 1000 mg in dextrose 5% 200 mL IVPB    cefepime (MAXIPIME) 2 g IVPB minibag       DIAGNOSTIC RESULTS / EMERGENCY DEPARTMENT COURSE / MDM     LABS:  Results for orders placed or performed during the hospital encounter of 03/08/19   CBC WITH AUTO DIFFERENTIAL   Result Value Ref Range    WBC 24.1 (H) 3.5 - 11.3 k/uL    RBC 3.01 (L) 4.21 - 5.77 m/uL    Hemoglobin 9.0 (L) 13.0 - 17.0 g/dL    Hematocrit 28.4 (L) 40.7 - 50.3 %    MCV 94.4 82.6 - 102.9 fL    MCH 29.9 25.2 - 33.5 pg    MCHC 31.7 28.4 - 34.8 g/dL    RDW 17.2 (H) 11.8 - 14.4 %    Platelets 780 527 - 427 k/uL    MPV 9.5 8.1 - 13.5 fL    NRBC Automated 0.0 0.0 per 100 WBC    Differential Type NOT REPORTED     WBC Morphology NOT REPORTED     RBC Morphology NOT REPORTED     Platelet Estimate NOT REPORTED     Immature Granulocytes 0 0 %    Seg Neutrophils 95 (H) 36 - 66 %    Lymphocytes 5 (L) 24 - 44 %    Monocytes 0 (L) 1 - 7 %    Eosinophils % 0 (L) 1 - 4 %    Basophils 0 0 - 2 %    Absolute Immature Granulocyte 0.00 0.00 - 0.30 k/uL    Segs Absolute 22.89 (H) 1.8 - 7.7 k/uL    Absolute Lymph # 1.21 1.0 - 4.8 k/uL    Absolute Mono # 0.00 (L) 0.1 - 0.8 k/uL    Absolute Eos # 0.00 0.0 - 0.4 k/uL    Basophils # 0.00 0.0 - 0.2 k/uL    Morphology ANISOCYTOSIS PRESENT    Basic Metabolic Panel   Result Value Ref Range    Glucose 270 (H) 70 - 99 mg/dL    BUN 87 (H) 8 - 23 mg/dL    CREATININE 6.37 (HH) 0.70 - 1.20 mg/dL    Bun/Cre Ratio NOT REPORTED 9 - 20    Calcium 8.8 8.6 - 10.4 mg/dL    Sodium 137 135 - 144 mmol/L    Potassium 4.5 3.7 - 5.3 mmol/L    Chloride 96 (L) 98 - 107 mmol/L    CO2 22 20 - 31 mmol/L    Anion Gap 19 (H) 9 - 17 mmol/L    GFR Non-African American 9 (L) >60 mL/min    GFR  10 (L) >60 mL/min    GFR Comment          GFR Staging NOT REPORTED    Protime-INR   Result Value Ref Range    Protime 18.8 (H) 9.0 - 12.0 sec    INR 1.9    APTT   Result Value Ref Range    PTT 30.6 (H) 20.5 - 30.5 sec   TYPE AND SCREEN   Result Value Ref Range    Expiration Date 03/11/2019     Arm Band Number BE [id number]     ABO/Rh A POSITIVE     Antibody Screen NEGATIVE        IMPRESSION: 18-year-old male presents with right lower extremity hematoma has been expanding relatively quickly. Patient had a fall couple days ago. He is on Coumadin. Transfer from [address] [place] [address] is to have orthopedic surgery evaluate, type and screen, labs and close monitoring is patient is tachycardic and borderline hypotensive. RADIOLOGY:  Xr Tibia Fibula Right (2 Views)    Result Date: 3/8/2019  EXAMINATION: 4 XRAY VIEWS OF THE RIGHT TIBIA AND FIBULA 3/8/2019 11:14 am COMPARISON: Radiographs today at 6:44 a.m..  HISTORY: ORDERING SYSTEM PROVIDED HISTORY: soft tissue injury TECHNOLOGIST PROVIDED HISTORY: soft tissue injury FINDINGS: Osteopenia. Prominent soft tissue swelling in the anterior lower leg is again noted. No subcutaneous gas or foreign body. No underlying fracture identified. Advanced degenerative change in the medial compartment of the knee with varus angulation is noted. Soft tissue swelling in the anterior lower leg without underlying fracture identified. Xr Tibia Fibula Right (2 Views)    Result Date: 3/8/2019  EXAMINATION: 2 XRAY VIEWS OF THE RIGHT TIBIA AND FIBULA 3/8/2019 6:41 am COMPARISON: None. HISTORY: ORDERING SYSTEM PROVIDED HISTORY: trauma TECHNOLOGIST PROVIDED HISTORY: trauma Ordering Physician Provided Reason for Exam: Hematoma to the right lower tib/fib that is bleeding a lot, pt is unsure what happened to his leg, he is on blood thinners Acuity: Acute Type of Exam: Initial FINDINGS: Normal alignment at the knee and ankle. Osteoarthrosis in the knee. Osteopenia. No clear evidence for acute fracture. Large masslike soft tissue swelling along the anterolateral aspect of the lower leg about 20 cm in craniocaudal length and 5 cm in AP dimension. In the setting of trauma this would be most compatible with a hematoma but the pre-existing mass would be difficult to exclude on radiograph. Vascular calcifications noted. 1. No acute fracture or dislocation. Degenerative changes in the knee and ankle. 2. Large homogeneous masslike soft tissue swelling about 20 cm in length in the anterolateral lower leg most compatible with hematoma. Underlying mass would be difficult to exclude on radiographs. Xr Chest Portable    Result Date: 3/8/2019  EXAMINATION: SINGLE XRAY VIEW OF THE CHEST 3/8/2019 11:14 am COMPARISON: November 10, 2016 HISTORY: ORDERING SYSTEM PROVIDED HISTORY: leukocytosis TECHNOLOGIST PROVIDED HISTORY: leukocytosis FINDINGS: Cardiac silhouette enlargement is again noted. The patient is status post median sternotomy.   No consolidation, pneumothorax or evidence for edema. No significant effusion identified. No acute osseous abnormality appreciated. No acute airspace disease identified. EKG  None    All EKG's are interpreted by the Emergency Department Physician who either signs or Co-signs this chart in the absence of a cardiologist.    EMERGENCY DEPARTMENT COURSE:  Orthopedic surgery has evaluated and request vascular surgery consult. Patient will be given a dose of antbiotics. WBC 24. Afebrile. No source identified. Vascular surgery has evaluated and request trauma surgery. Trauma surgery to admit. PROCEDURES:  None    CONSULTS:  IP CONSULT TO ORTHOPEDIC SURGERY  IP CONSULT TO VASCULAR SURGERY  IP CONSULT TO NEPHROLOGY  IP CONSULT TO TRAUMA SURGERY    CRITICAL CARE:  21 minutes CC time. FINAL IMPRESSION      1. Hematoma of right lower extremity, initial encounter          DISPOSITION / PLAN     DISPOSITION Decision To Admit 03/08/2019 01:13:58 PM      PATIENT REFERRED TO:  No follow-up provider specified. DISCHARGE MEDICATIONS:  New Prescriptions    No medications on file       Meli Coats DO  Emergency Medicine Resident    (Please note that portions of thisnote were completed with a voice recognition program.  Efforts were made to edit the dictations but occasionally words are mis-transcribed. )        Meli Coats DO  Resident  03/08/19 [id number]

## 2019-03-09 ENCOUNTER — APPOINTMENT (OUTPATIENT)
Dept: GENERAL RADIOLOGY | Age: 74
DRG: 570 | End: 2019-03-09
Payer: MEDICARE

## 2019-03-09 LAB
ABSOLUTE EOS #: 0 K/UL (ref 0–0.4)
ABSOLUTE EOS #: 0 K/UL (ref 0–0.4)
ABSOLUTE IMMATURE GRANULOCYTE: 0 K/UL (ref 0–0.3)
ABSOLUTE IMMATURE GRANULOCYTE: 0.23 K/UL (ref 0–0.3)
ABSOLUTE LYMPH #: 0.46 K/UL (ref 1–4.8)
ABSOLUTE LYMPH #: 1.94 K/UL (ref 1–4.8)
ABSOLUTE MONO #: 0.69 K/UL (ref 0.1–0.8)
ABSOLUTE MONO #: 1.73 K/UL (ref 0.1–0.8)
ALLEN TEST: ABNORMAL
ANION GAP SERPL CALCULATED.3IONS-SCNC: 16 MMOL/L (ref 9–17)
ANION GAP SERPL CALCULATED.3IONS-SCNC: 17 MMOL/L (ref 9–17)
BASOPHILS # BLD: 0 % (ref 0–2)
BASOPHILS # BLD: 0 % (ref 0–2)
BASOPHILS ABSOLUTE: 0 K/UL (ref 0–0.2)
BASOPHILS ABSOLUTE: 0 K/UL (ref 0–0.2)
BLD PROD TYP BPU: NORMAL
BUN BLDV-MCNC: 86 MG/DL (ref 8–23)
BUN BLDV-MCNC: 87 MG/DL (ref 8–23)
BUN/CREAT BLD: ABNORMAL (ref 9–20)
BUN/CREAT BLD: ABNORMAL (ref 9–20)
CALCIUM SERPL-MCNC: 7.9 MG/DL (ref 8.6–10.4)
CALCIUM SERPL-MCNC: 8.2 MG/DL (ref 8.6–10.4)
CHLORIDE BLD-SCNC: 100 MMOL/L (ref 98–107)
CHLORIDE BLD-SCNC: 99 MMOL/L (ref 98–107)
CO2: 21 MMOL/L (ref 20–31)
CO2: 22 MMOL/L (ref 20–31)
CREAT SERPL-MCNC: 6.55 MG/DL (ref 0.7–1.2)
CREAT SERPL-MCNC: 6.72 MG/DL (ref 0.7–1.2)
DIFFERENTIAL TYPE: ABNORMAL
DIFFERENTIAL TYPE: ABNORMAL
DISPENSE STATUS BLOOD BANK: NORMAL
EKG ATRIAL RATE: 98 BPM
EKG P-R INTERVAL: 168 MS
EKG Q-T INTERVAL: 402 MS
EKG QRS DURATION: 90 MS
EKG QTC CALCULATION (BAZETT): 513 MS
EKG R AXIS: -44 DEGREES
EKG T AXIS: 45 DEGREES
EKG VENTRICULAR RATE: 98 BPM
EOSINOPHILS RELATIVE PERCENT: 0 % (ref 1–4)
EOSINOPHILS RELATIVE PERCENT: 0 % (ref 1–4)
ESTIMATED AVERAGE GLUCOSE: 120 MG/DL
FIO2: 30
GFR AFRICAN AMERICAN: 10 ML/MIN
GFR AFRICAN AMERICAN: 10 ML/MIN
GFR NON-AFRICAN AMERICAN: 8 ML/MIN
GFR NON-AFRICAN AMERICAN: 8 ML/MIN
GFR SERPL CREATININE-BSD FRML MDRD: ABNORMAL ML/MIN/{1.73_M2}
GLUCOSE BLD-MCNC: 127 MG/DL (ref 75–110)
GLUCOSE BLD-MCNC: 135 MG/DL (ref 75–110)
GLUCOSE BLD-MCNC: 142 MG/DL (ref 75–110)
GLUCOSE BLD-MCNC: 147 MG/DL (ref 75–110)
GLUCOSE BLD-MCNC: 150 MG/DL (ref 75–110)
GLUCOSE BLD-MCNC: 151 MG/DL (ref 75–110)
GLUCOSE BLD-MCNC: 152 MG/DL (ref 75–110)
GLUCOSE BLD-MCNC: 157 MG/DL (ref 75–110)
GLUCOSE BLD-MCNC: 159 MG/DL (ref 75–110)
GLUCOSE BLD-MCNC: 164 MG/DL (ref 75–110)
GLUCOSE BLD-MCNC: 172 MG/DL (ref 75–110)
GLUCOSE BLD-MCNC: 172 MG/DL (ref 75–110)
GLUCOSE BLD-MCNC: 186 MG/DL (ref 75–110)
GLUCOSE BLD-MCNC: 206 MG/DL (ref 75–110)
GLUCOSE BLD-MCNC: 248 MG/DL (ref 75–110)
GLUCOSE BLD-MCNC: 267 MG/DL (ref 75–110)
GLUCOSE BLD-MCNC: 272 MG/DL (ref 70–99)
GLUCOSE BLD-MCNC: 294 MG/DL (ref 70–99)
HBA1C MFR BLD: 5.8 % (ref 4–6)
HCT VFR BLD CALC: 24.9 % (ref 40.7–50.3)
HCT VFR BLD CALC: 26.1 % (ref 40.7–50.3)
HEMOGLOBIN: 8.3 G/DL (ref 13–17)
HEMOGLOBIN: 8.6 G/DL (ref 13–17)
IMMATURE GRANULOCYTES: 0 %
IMMATURE GRANULOCYTES: 1 %
LYMPHOCYTES # BLD: 2 % (ref 24–44)
LYMPHOCYTES # BLD: 9 % (ref 24–44)
MCH RBC QN AUTO: 30.4 PG (ref 25.2–33.5)
MCH RBC QN AUTO: 30.5 PG (ref 25.2–33.5)
MCHC RBC AUTO-ENTMCNC: 33 G/DL (ref 28.4–34.8)
MCHC RBC AUTO-ENTMCNC: 33.3 G/DL (ref 28.4–34.8)
MCV RBC AUTO: 91.5 FL (ref 82.6–102.9)
MCV RBC AUTO: 92.2 FL (ref 82.6–102.9)
MODE: ABNORMAL
MONOCYTES # BLD: 3 % (ref 1–7)
MONOCYTES # BLD: 8 % (ref 1–7)
MORPHOLOGY: ABNORMAL
MORPHOLOGY: ABNORMAL
NEGATIVE BASE EXCESS, ART: ABNORMAL (ref 0–2)
NRBC AUTOMATED: 0 PER 100 WBC
NRBC AUTOMATED: 0 PER 100 WBC
O2 DEVICE/FLOW/%: ABNORMAL
PATIENT TEMP: ABNORMAL
PDW BLD-RTO: 15.9 % (ref 11.8–14.4)
PDW BLD-RTO: 16.5 % (ref 11.8–14.4)
PLATELET # BLD: 146 K/UL (ref 138–453)
PLATELET # BLD: 175 K/UL (ref 138–453)
PLATELET ESTIMATE: ABNORMAL
PLATELET ESTIMATE: ABNORMAL
PMV BLD AUTO: 9.2 FL (ref 8.1–13.5)
PMV BLD AUTO: 9.8 FL (ref 8.1–13.5)
POC HCO3: 26 MMOL/L (ref 21–28)
POC O2 SATURATION: 98 % (ref 94–98)
POC PCO2 TEMP: ABNORMAL MM HG
POC PCO2: 48.8 MM HG (ref 35–48)
POC PH TEMP: ABNORMAL
POC PH: 7.33 (ref 7.35–7.45)
POC PO2 TEMP: ABNORMAL MM HG
POC PO2: 104.7 MM HG (ref 83–108)
POSITIVE BASE EXCESS, ART: 0 (ref 0–3)
POTASSIUM SERPL-SCNC: 4.7 MMOL/L (ref 3.7–5.3)
POTASSIUM SERPL-SCNC: 4.9 MMOL/L (ref 3.7–5.3)
RBC # BLD: 2.72 M/UL (ref 4.21–5.77)
RBC # BLD: 2.83 M/UL (ref 4.21–5.77)
RBC # BLD: ABNORMAL 10*6/UL
RBC # BLD: ABNORMAL 10*6/UL
SAMPLE SITE: ABNORMAL
SEG NEUTROPHILS: 83 % (ref 36–66)
SEG NEUTROPHILS: 94 % (ref 36–66)
SEGMENTED NEUTROPHILS ABSOLUTE COUNT: 17.93 K/UL (ref 1.8–7.7)
SEGMENTED NEUTROPHILS ABSOLUTE COUNT: 21.72 K/UL (ref 1.8–7.7)
SODIUM BLD-SCNC: 136 MMOL/L (ref 135–144)
SODIUM BLD-SCNC: 139 MMOL/L (ref 135–144)
TCO2 (CALC), ART: 28 MMOL/L (ref 22–29)
TRANSFUSION STATUS: NORMAL
UNIT DIVISION: 0
UNIT NUMBER: NORMAL
WBC # BLD: 21.6 K/UL (ref 3.5–11.3)
WBC # BLD: 23.1 K/UL (ref 3.5–11.3)
WBC # BLD: ABNORMAL 10*3/UL
WBC # BLD: ABNORMAL 10*3/UL

## 2019-03-09 PROCEDURE — 80048 BASIC METABOLIC PNL TOTAL CA: CPT

## 2019-03-09 PROCEDURE — 2700000000 HC OXYGEN THERAPY PER DAY

## 2019-03-09 PROCEDURE — 5A1D70Z PERFORMANCE OF URINARY FILTRATION, INTERMITTENT, LESS THAN 6 HOURS PER DAY: ICD-10-PCS | Performed by: INTERNAL MEDICINE

## 2019-03-09 PROCEDURE — 94762 N-INVAS EAR/PLS OXIMTRY CONT: CPT

## 2019-03-09 PROCEDURE — 82803 BLOOD GASES ANY COMBINATION: CPT

## 2019-03-09 PROCEDURE — 2580000003 HC RX 258: Performed by: STUDENT IN AN ORGANIZED HEALTH CARE EDUCATION/TRAINING PROGRAM

## 2019-03-09 PROCEDURE — 36620 INSERTION CATHETER ARTERY: CPT

## 2019-03-09 PROCEDURE — 6360000002 HC RX W HCPCS: Performed by: STUDENT IN AN ORGANIZED HEALTH CARE EDUCATION/TRAINING PROGRAM

## 2019-03-09 PROCEDURE — P9046 ALBUMIN (HUMAN), 25%, 20 ML: HCPCS | Performed by: INTERNAL MEDICINE

## 2019-03-09 PROCEDURE — 83036 HEMOGLOBIN GLYCOSYLATED A1C: CPT

## 2019-03-09 PROCEDURE — 2500000003 HC RX 250 WO HCPCS: Performed by: STUDENT IN AN ORGANIZED HEALTH CARE EDUCATION/TRAINING PROGRAM

## 2019-03-09 PROCEDURE — 2000000000 HC ICU R&B

## 2019-03-09 PROCEDURE — 6370000000 HC RX 637 (ALT 250 FOR IP): Performed by: STUDENT IN AN ORGANIZED HEALTH CARE EDUCATION/TRAINING PROGRAM

## 2019-03-09 PROCEDURE — 90935 HEMODIALYSIS ONE EVALUATION: CPT

## 2019-03-09 PROCEDURE — 94003 VENT MGMT INPAT SUBQ DAY: CPT

## 2019-03-09 PROCEDURE — 82947 ASSAY GLUCOSE BLOOD QUANT: CPT

## 2019-03-09 PROCEDURE — 94770 HC ETCO2 MONITOR DAILY: CPT

## 2019-03-09 PROCEDURE — 71045 X-RAY EXAM CHEST 1 VIEW: CPT

## 2019-03-09 PROCEDURE — 90937 HEMODIALYSIS REPEATED EVAL: CPT

## 2019-03-09 PROCEDURE — C9113 INJ PANTOPRAZOLE SODIUM, VIA: HCPCS | Performed by: STUDENT IN AN ORGANIZED HEALTH CARE EDUCATION/TRAINING PROGRAM

## 2019-03-09 PROCEDURE — 85025 COMPLETE CBC W/AUTO DIFF WBC: CPT

## 2019-03-09 PROCEDURE — 6360000002 HC RX W HCPCS: Performed by: INTERNAL MEDICINE

## 2019-03-09 RX ORDER — DEXTROSE MONOHYDRATE 25 G/50ML
12.5 INJECTION, SOLUTION INTRAVENOUS PRN
Status: DISCONTINUED | OUTPATIENT
Start: 2019-03-09 | End: 2019-03-11

## 2019-03-09 RX ORDER — GABAPENTIN 300 MG/1
300 CAPSULE ORAL 2 TIMES DAILY
Status: DISCONTINUED | OUTPATIENT
Start: 2019-03-09 | End: 2019-03-11

## 2019-03-09 RX ORDER — HEPARIN SODIUM 5000 [USP'U]/ML
5000 INJECTION, SOLUTION INTRAVENOUS; SUBCUTANEOUS EVERY 8 HOURS SCHEDULED
Status: DISCONTINUED | OUTPATIENT
Start: 2019-03-09 | End: 2019-03-11

## 2019-03-09 RX ORDER — NICOTINE POLACRILEX 4 MG
15 LOZENGE BUCCAL PRN
Status: DISCONTINUED | OUTPATIENT
Start: 2019-03-09 | End: 2019-03-19 | Stop reason: HOSPADM

## 2019-03-09 RX ORDER — 0.9 % SODIUM CHLORIDE 0.9 %
150 INTRAVENOUS SOLUTION INTRAVENOUS PRN
Status: DISCONTINUED | OUTPATIENT
Start: 2019-03-09 | End: 2019-03-11

## 2019-03-09 RX ORDER — DEXTROSE MONOHYDRATE 50 MG/ML
100 INJECTION, SOLUTION INTRAVENOUS PRN
Status: DISCONTINUED | OUTPATIENT
Start: 2019-03-09 | End: 2019-03-11

## 2019-03-09 RX ORDER — ALBUMIN (HUMAN) 12.5 G/50ML
25 SOLUTION INTRAVENOUS ONCE
Status: COMPLETED | OUTPATIENT
Start: 2019-03-09 | End: 2019-03-09

## 2019-03-09 RX ORDER — CYCLOBENZAPRINE HCL 5 MG
5 TABLET ORAL 3 TIMES DAILY
Status: DISCONTINUED | OUTPATIENT
Start: 2019-03-09 | End: 2019-03-12

## 2019-03-09 RX ORDER — FENTANYL CITRATE 50 UG/ML
50 INJECTION, SOLUTION INTRAMUSCULAR; INTRAVENOUS
Status: DISCONTINUED | OUTPATIENT
Start: 2019-03-09 | End: 2019-03-11

## 2019-03-09 RX ORDER — PANTOPRAZOLE SODIUM 40 MG/10ML
40 INJECTION, POWDER, LYOPHILIZED, FOR SOLUTION INTRAVENOUS DAILY
Status: DISCONTINUED | OUTPATIENT
Start: 2019-03-09 | End: 2019-03-09

## 2019-03-09 RX ORDER — 0.9 % SODIUM CHLORIDE 0.9 %
10 VIAL (ML) INJECTION DAILY
Status: DISCONTINUED | OUTPATIENT
Start: 2019-03-09 | End: 2019-03-09

## 2019-03-09 RX ORDER — 0.9 % SODIUM CHLORIDE 0.9 %
250 INTRAVENOUS SOLUTION INTRAVENOUS PRN
Status: DISCONTINUED | OUTPATIENT
Start: 2019-03-09 | End: 2019-03-11

## 2019-03-09 RX ADMIN — FENTANYL CITRATE 50 MCG: 50 INJECTION INTRAMUSCULAR; INTRAVENOUS at 06:25

## 2019-03-09 RX ADMIN — FENTANYL CITRATE 50 MCG: 50 INJECTION INTRAMUSCULAR; INTRAVENOUS at 02:10

## 2019-03-09 RX ADMIN — HEPARIN SODIUM 5000 UNITS: 5000 INJECTION INTRAVENOUS; SUBCUTANEOUS at 22:24

## 2019-03-09 RX ADMIN — MIDODRINE HYDROCHLORIDE 10 MG: 5 TABLET ORAL at 20:51

## 2019-03-09 RX ADMIN — Medication 10 ML: at 11:28

## 2019-03-09 RX ADMIN — IBUPROFEN 400 MG: 100 SUSPENSION ORAL at 12:53

## 2019-03-09 RX ADMIN — Medication 10 ML: at 20:51

## 2019-03-09 RX ADMIN — ACETAMINOPHEN 650 MG: 325 TABLET ORAL at 16:19

## 2019-03-09 RX ADMIN — ATORVASTATIN CALCIUM 10 MG: 10 TABLET, FILM COATED ORAL at 08:20

## 2019-03-09 RX ADMIN — SODIUM CHLORIDE 5.64 UNITS/HR: 9 INJECTION, SOLUTION INTRAVENOUS at 08:02

## 2019-03-09 RX ADMIN — ALPRAZOLAM 0.5 MG: 0.5 TABLET ORAL at 20:51

## 2019-03-09 RX ADMIN — FLUTICASONE PROPIONATE 2 SPRAY: 50 SPRAY, METERED NASAL at 08:36

## 2019-03-09 RX ADMIN — DONEPEZIL HYDROCHLORIDE 5 MG: 5 TABLET, FILM COATED ORAL at 16:19

## 2019-03-09 RX ADMIN — VASOPRESSIN 0.04 UNITS/MIN: 20 INJECTION INTRAVENOUS at 17:23

## 2019-03-09 RX ADMIN — CETIRIZINE HYDROCHLORIDE 5 MG: 10 TABLET ORAL at 08:21

## 2019-03-09 RX ADMIN — ALBUMIN (HUMAN) 25 G: 0.25 INJECTION, SOLUTION INTRAVENOUS at 11:41

## 2019-03-09 RX ADMIN — FENTANYL CITRATE 50 MCG: 50 INJECTION INTRAMUSCULAR; INTRAVENOUS at 08:34

## 2019-03-09 RX ADMIN — DEXMEDETOMIDINE HYDROCHLORIDE 0.5 MCG/KG/HR: 100 INJECTION, SOLUTION INTRAVENOUS at 22:33

## 2019-03-09 RX ADMIN — SODIUM CHLORIDE, POTASSIUM CHLORIDE, SODIUM LACTATE AND CALCIUM CHLORIDE: 600; 310; 30; 20 INJECTION, SOLUTION INTRAVENOUS at 16:16

## 2019-03-09 RX ADMIN — HEPARIN SODIUM 5000 UNITS: 5000 INJECTION INTRAVENOUS; SUBCUTANEOUS at 14:14

## 2019-03-09 RX ADMIN — ACETAMINOPHEN 650 MG: 325 TABLET ORAL at 12:52

## 2019-03-09 RX ADMIN — IBUPROFEN 400 MG: 100 SUSPENSION ORAL at 16:18

## 2019-03-09 RX ADMIN — SODIUM CHLORIDE 4 UNITS/HR: 9 INJECTION, SOLUTION INTRAVENOUS at 22:23

## 2019-03-09 RX ADMIN — ACETAMINOPHEN 650 MG: 325 TABLET ORAL at 20:50

## 2019-03-09 RX ADMIN — LEVOTHYROXINE SODIUM 25 MCG: 25 TABLET ORAL at 08:21

## 2019-03-09 RX ADMIN — Medication 10 ML: at 08:37

## 2019-03-09 RX ADMIN — PANTOPRAZOLE SODIUM 40 MG: 40 INJECTION, POWDER, FOR SOLUTION INTRAVENOUS at 11:28

## 2019-03-09 RX ADMIN — FENTANYL CITRATE 100 MCG: 50 INJECTION INTRAMUSCULAR; INTRAVENOUS at 00:42

## 2019-03-09 RX ADMIN — GABAPENTIN 300 MG: 300 CAPSULE ORAL at 20:50

## 2019-03-09 RX ADMIN — VASOPRESSIN 0.04 UNITS/MIN: 20 INJECTION INTRAVENOUS at 10:02

## 2019-03-09 RX ADMIN — ACETAMINOPHEN 650 MG: 325 TABLET ORAL at 08:21

## 2019-03-09 RX ADMIN — ALPRAZOLAM 0.5 MG: 0.5 TABLET ORAL at 08:30

## 2019-03-09 RX ADMIN — CLINDAMYCIN PHOSPHATE 900 MG: 900 INJECTION, SOLUTION INTRAVENOUS at 16:19

## 2019-03-09 RX ADMIN — CYCLOBENZAPRINE HYDROCHLORIDE 5 MG: 5 TABLET, FILM COATED ORAL at 11:27

## 2019-03-09 RX ADMIN — DEXMEDETOMIDINE HYDROCHLORIDE 0.4 MCG/KG/HR: 100 INJECTION, SOLUTION INTRAVENOUS at 10:05

## 2019-03-09 RX ADMIN — IBUPROFEN 400 MG: 100 SUSPENSION ORAL at 20:51

## 2019-03-09 RX ADMIN — CYCLOBENZAPRINE HYDROCHLORIDE 5 MG: 5 TABLET, FILM COATED ORAL at 20:50

## 2019-03-09 RX ADMIN — GABAPENTIN 300 MG: 300 CAPSULE ORAL at 11:27

## 2019-03-09 RX ADMIN — MIDODRINE HYDROCHLORIDE 10 MG: 5 TABLET ORAL at 08:21

## 2019-03-09 RX ADMIN — CLINDAMYCIN PHOSPHATE 900 MG: 900 INJECTION, SOLUTION INTRAVENOUS at 01:14

## 2019-03-09 RX ADMIN — CLINDAMYCIN PHOSPHATE 900 MG: 900 INJECTION, SOLUTION INTRAVENOUS at 08:20

## 2019-03-09 RX ADMIN — DEXMEDETOMIDINE HYDROCHLORIDE 0.5 MCG/KG/HR: 100 INJECTION, SOLUTION INTRAVENOUS at 16:16

## 2019-03-09 RX ADMIN — MIDODRINE HYDROCHLORIDE 10 MG: 5 TABLET ORAL at 14:41

## 2019-03-09 RX ADMIN — DEXMEDETOMIDINE HYDROCHLORIDE 0.2 MCG/KG/HR: 100 INJECTION, SOLUTION INTRAVENOUS at 00:52

## 2019-03-09 RX ADMIN — CYCLOBENZAPRINE HYDROCHLORIDE 5 MG: 5 TABLET, FILM COATED ORAL at 12:53

## 2019-03-09 ASSESSMENT — PULMONARY FUNCTION TESTS
PIF_VALUE: 19
PIF_VALUE: 21
PIF_VALUE: 21
PIF_VALUE: 24
PIF_VALUE: 21
PIF_VALUE: 19
PIF_VALUE: 21
PIF_VALUE: 25

## 2019-03-09 ASSESSMENT — PAIN SCALES - GENERAL
PAINLEVEL_OUTOF10: 0
PAINLEVEL_OUTOF10: 0
PAINLEVEL_OUTOF10: 4
PAINLEVEL_OUTOF10: 0
PAINLEVEL_OUTOF10: 4
PAINLEVEL_OUTOF10: 0

## 2019-03-09 NOTE — CONSULTS
Nephrology ESRD Consult Note    Reason for Consult:  End stage renal disease, fluid and electrolyte management  Requesting Physician:  Hospitalist    Chief Complaint:  right leg injury after bleeding after trauma  History Obtained From:  electronic medical record    History of Present Illness: This is a 75 y.o. male with end stage renal disease on hemodialysis [id number] who presents with right leg injury. He apparently slid/thaw of his wheelchair and some bruising over the right leg and the number was a blood blister. There was some bleeding when the ambulance came to take the patint to dialysis. The patient was then brought to [facility] emergency room and subsequent transfer to [place] [address]. He apparently has hypotension and tachycardia on transport. He has a history of anemia of chronic kidney disease, ESRD, type 2 diabetes mellitus, long-term insulin use, obesity with body mass index of 40-44.9, some dementia, degenerative joint disease, decubitus ulcer, gout, essential hypertension, hyperlipidemia. He also has obstructive sleep apnea and does use CPAP, but hs versus light her fibrillation, previous carry catheterization with history of severe aortic stenosis, severe mitral valve stenosis and history of thyroid disease and a history of sepsis. He is wheelchair-bound. Past surgical history is reviewed. The patient was taken to surgery and had exploration of the right lower extremity evacuation of right lower extremity hematoma and placement of a right femoral vein central line and placement of a negative pressure wound VAC. There is word the patient may be taken back to surgery tomorrow, 3/10/19. He is awake and alert on the ventilator. He is seen and examined on hemodialysis. His blood pressures have been quite variable. He is on vasopressors.   He is apparently about 8 kg above his outpatient estimated dry weight as estimated by the bed scale. We're trying for about 2.5 kg of fluid removal today. We tried initially for more but the patient suffered more low blood pressure episodes. The patient's medications are reviewed. The labs are reviewed. Labs and bath and fluid removal are reviewed with the dialysis nurse at the bedside in the ICU today while the patient is seen on dialysis. His dialysis access is reviewed. Her allergies are reviewed. He is  and has 2 children. He is retired. He is a former smoker.         Past Medical History:        Diagnosis Date    Anemia in chronic kidney disease (CKD) 4/27/2016    Arthritis     Bacteremia 11/11/2016    Benign prostatic hyperplasia without lower urinary tract symptoms     BMI 40.0-44.9, adult ([address] 3/19/2018    Cellulitis of left lower extremity     Chronic cerebral ischemia 1/3/2017    Closed fracture of forearm 8/13/2013    Broken lft forearm     Controlled type 2 diabetes mellitus with chronic kidney disease on chronic dialysis, with long-term current use of insulin ([address]     Controlled type 2 diabetes mellitus with diabetic polyneuropathy, with long-term current use of insulin ([address] 10/7/2018    Decubital ulcer     NON OPEN BUTTOCKS    Dementia     memory problems    Dialysis patient ([address] 1/3/2017    Goes Tue, Thurs, Sat in [place]    Difficult intravenous access     HAS NEEDED PICC TEAM IN THE PAST    Difficulty walking     WHEELCHAIR BOUND-PIVOTS WITH ASSIST O F 2    DJD (degenerative joint disease) of knee     Elbow, forearm, and wrist, abrasion or friction burn, without mention of infection 8/13/2013    Abrasions lft forearm     Encephalopathy acute 4/27/2016    Encounter regarding vascular access for dialysis for ESRD ([address] 2/2/2017    ESRD (end stage renal disease) on dialysis ([address] 1/17/2017    Forgetfulness     HAS SOME SHORT TERM MEMORY LOSS, QUYEN-WIFE IS LEGAL GUARDIAN    H/O transesophageal echocardiography ([facility]) for monitoring     Hemodialysis patient ([facility] 11/11/2016    tues-thurs-sat [place]---FRESEMIUS. TUNNELED CATHETER RT UPPER CHEST    Hyperlipidemia 1990    on Meds    Hypertension 1990    on Meds    Intercritical gout     on Meds    Joint pain, knee 01/13/2017    baldo knee synvisc-1 injections    Long-term insulin use ([address] 1/17/2017    Major depressive disorder with single episode, in partial remission ([address] 1/3/2017    Mobility impaired     Morbid obesity ([address]     Muscle weakness     GRNERALIZED    Obesity     Obstructive sleep apnea     HEATHER on CPAP     On CPAP-TO BRING DOS    Paroxysmal atrial fibrillation ([address] 9/6/2017    Respiratory failure ([address] 03/2016    with open heart surgery, trached x 5 months    S/P cardiac cath     Seborrhea     Severe aortic stenosis 3/9/2016    Severe mitral valve stenosis 3/9/2016    Severe sepsis ([address] 4/3/2016    Skin rash     ABD FOLDS    Stasis dermatitis     Thyroid disease     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 lft thumb     Venous stasis dermatitis     Wears glasses     Wheelchair bound     pivots with 2 assists       Access:   As previous    Past Surgical History:        Procedure Laterality Date    AORTIC VALVE REPLACEMENT  03/18/2016    bioprosthetic    ARM SURGERY Left 1990    CARDIAC CATHETERIZATION      CENTRAL VENOUS CATHETER Right 02/21/2018    DIALYSIS CATHETER Dr Conchita Moulton    COLONOSCOPY  11/19/09    COLONOSCOPY N/A 10/12/2018    COLONOSCOPY WITH BIOPSY performed by Maura Rodriguez DO at [address] (x10-12 surgeries)    several surgeries on left arm    KNEE ARTHROSCOPY Left 09/17/99    MITRAL VALVE REPLACEMENT  03/18/2016    bioprosthetic     NASAL SEPTUM SURGERY      OTHER SURGICAL HISTORY  3/18/16    Aortic root Enlargement    OTHER SURGICAL HISTORY  3/18/16    ASD Closure    OTHER SURGICAL HISTORY Right 01/09/2017    AV fistula creation, wrist    OTHER Daily   midodrine (PROAMATINE) tablet 10 mg TID   sevelamer (RENVELA) tablet 800 mg TID WC   sodium chloride flush 0.9 % injection 10 mL 2 times per day   sodium chloride flush 0.9 % injection 10 mL PRN   acetaminophen (TYLENOL) tablet 650 mg Q4H PRN   magnesium hydroxide (MILK OF MAGNESIA) 400 MG/5ML suspension 30 mL Daily PRN   ondansetron (ZOFRAN) injection 4 mg Q6H PRN   0.9 % sodium chloride bolus Once   lactated ringers infusion Continuous   clindamycin (CLEOCIN) 900 mg in dextrose 5 % 50 mL IVPB Q8H   glucose (GLUTOSE) 40 % oral gel 15 g PRN   dextrose 50 % solution 12.5 g PRN   glucagon (rDNA) injection 1 mg PRN   dextrose 5 % solution PRN   fentaNYL (SUBLIMAZE) injection 25 mcg Once   dexmedetomidine (PRECEDEX) 400 mcg in sodium chloride 0.9 % 100 mL infusion Continuous       Allergies:  Percocet [oxycodone-acetaminophen] and Ampicillin    Social History:    Social History     Socioeconomic History    Marital status:      Spouse name: Suzie Lee Number of children: 2    Years of education: Not on file    Highest education level: Not on file   Occupational History    Occupation: Retired      Employer: [employer] resource strain: Not on file    Food insecurity:     Worry: Not on file     Inability: Not on file    Transportation needs:     Medical: Not on file     Non-medical: Not on file   Tobacco Use    Smoking status: Former Smoker     Packs/day: 2.00     Years: 25.00     Pack years: 50.00     Types: Cigarettes     Start date: 3/17/1955     Last attempt to quit: 1980     Years since quittin.2    Smokeless tobacco: Never Used   Substance and Sexual Activity    Alcohol use: No     Alcohol/week: 0.0 oz     Comment: 1-2 drinks per year    Drug use: No    Sexual activity: Never   Lifestyle    Physical activity:     Days per week: Not on file     Minutes per session: Not on file    Stress: Not on file   Relationships    laterally without any wheezes or rales or rhonchi  Cardiovascular: irregular rhythm with no rubs, positive systolic murmur, EKG shows a sinus arrhythmia  Abdomen: obese and soft and non tender and not distended with active bowel sounds  Extremities: hematoma over the distal right lower extremity with wound VAC in place    Labs:  PTH:  No results found for: PTH  abs:   CBC: Recent Labs     03/08/19  1044 03/08/19  1537 03/08/19  2352 03/09/19  0431   WBC 24.1*  --  21.6* 23.1*   RBC 3.01*  --  2.83* 2.72*   HGB 9.0* 6.5* 8.6* 8.3*   HCT 28.4* 21.3* 26.1* 24.9*   MCV 94.4  --  92.2 91.5   MCH 29.9  --  30.4 30.5   MCHC 31.7  --  33.0 33.3   RDW 17.2*  --  15.9* 16.5*     --  146 175   MPV 9.5  --  9.8 9.2      BMP: Recent Labs     03/08/19  1044 03/08/19  2352 03/09/19  0431    136 139   K 4.5 4.9 4.7   CL 96* 99 100   CO2 22 21 22   BUN 87* 86* 87*   CREATININE 6.37* 6.55* 6.72*   GLUCOSE 270* 272* 294*   CALCIUM 8.8 7.9* 8.2*        Phosphorus:  No results for input(s): PHOS in the last 72 hours. Magnesium: No results for input(s): MG in the last 72 hours. Albumin: No results for input(s): LABALBU in the last 72 hours. Assessment:  1. ESRD with the patient usually on a Monday and Wednesday and Friday dialysis and getting dialysis today because of missed treatment on Friday, 3/8/19. He dialyzes at [facility]  2. A degree of shock with the patient requiring vasopressors with wide variations in blood pressure  3. Right distal lower extremity hematoma with a wound VAC in place after exploration, postoperative day #1   4. Anemia of chronic disease and blood loss  5. Leukocytosis  6. Significant azotemia    7. Total body volume overload with difficulty removing the appropriate amount of fluid with dialysis today  8. Yellowish hue to the patient's skin  9. Ventilator dependent and acute respiratory failure      Plan:  1. Dialysis [id number]  2.  Wean vasopressors as

## 2019-03-09 NOTE — PLAN OF CARE
Problem: OXYGENATION/RESPIRATORY FUNCTION  Goal: Patient will maintain patent airway  Outcome: Ongoing     Problem: OXYGENATION/RESPIRATORY FUNCTION  Goal: Patient will achieve/maintain normal respiratory rate/effort  Description  Respiratory rate and effort will be within normal limits for the patient  Outcome: Ongoing     Problem: MECHANICAL VENTILATION  Goal: Patient will maintain patent airway  Outcome: Ongoing     Problem: MECHANICAL VENTILATION  Goal: Oral health is maintained or improved  Outcome: Ongoing     Problem: MECHANICAL VENTILATION  Goal: ET tube will be managed safely  Outcome: Ongoing     Problem: MECHANICAL VENTILATION  Goal: Ability to express needs and understand communication  Outcome: Ongoing     Problem: MECHANICAL VENTILATION  Goal: Mobility/activity is maintained at optimum level for patient  Outcome: Ongoing     Problem: SKIN INTEGRITY  Goal: Skin integrity is maintained or improved  Outcome: Ongoing

## 2019-03-09 NOTE — CARE COORDINATION
Case Management Initial Discharge Plan  Roxi Lopez,             Met with:patient to discuss discharge plans. Information verified: address, contacts, phone number, , insurance Yes  PCP: Mary Alice Luna DO  Date of last visit:     Insurance Provider:  St    Discharge Planning    Living Arrangements:  Spouse/Significant Other   Support Systems:       Home has 0 stories  0 stairs to climb to get into front door, 0stairs to climb to reach second floor  Location of bedroom/bathroom in home 0    Patient able to perform ADL's:Dependent    Current Services (outpatient & in home)   DME equipment:   DME provider:     Pharmacy: [facility]   Potential Assistance Purchasing Medications:  No  Does patient want to participate in local refill/ meds to beds program?  No    Potential Assistance Needed:       Patient agreeable to home care: No  [place] of choice provided:  n/a    Prior SNF/Rehab Placement and Facility: [facility]  Agreeable to SNF/Rehab: No  [place] of choice provided: n/a   Evaluation: n/a    Expected Discharge date:     Patient expects to be discharged to:  Return to [address] [place] [address]  Follow Up Appointment: Best Day/ Time:      Transportation provider:   Transportation arrangements needed for discharge: Yes    Readmission Risk              Risk of Unplanned Readmission:        25             Does patient have a readmission risk score greater than 14?: Yes  If yes, follow-up appointment must be made within 7 days of discharge. Discharge Plan: Return to OR on  for large lower leg hematoma, likely return to [facility]-referral sent, intubated, following as POC develops.           Electronically signed by Krystal Ruiz RN on 3/9/19 at 4:37 PM            '

## 2019-03-09 NOTE — PROGRESS NOTES
POST OP NOTE    SUBJECTIVE  Pt s/p lower extremity wound exploration, evacuation of hematoma, placement of negative wound vac therapy, placement of right femoral vein central line. .     OBJECTIVE  VITALS:  BP (!) 112/50   Pulse 79   Temp 96.4 °F (35.8 °C) (Axillary)   Resp 12   Wt 279 lb 15.8 oz (127 kg)   SpO2 100%   BMI 43.85 kg/m²         GENERAL:  Intubated, sedated, arousable  CARDIOVASCULAR:  regular rate and rhythm   LUNGS:  CTA Bilaterally  ABDOMEN:   Abdomen soft, non-tender, non-distended  INCISION: wound vac in place to RLE with sanguinous drainage to suction    ASSESSMENT  1. POD# 0 s/p lower extremity wound exploration, evacuation of hematoma, placement of negative wound vac therapy, placement of right femoral vein central line    PLAN  1. Pain management-Precedex, Fentanyl PRN  2. DVT proph-pneumatic compression devices  3. GI proph-Prilosec  4.  Remain intubated      Coretta Vasques  Trauma/Surgery Service  3/9/2019 at 2:11 AM

## 2019-03-09 NOTE — PROGRESS NOTES
Nutrition Assessment    Type and Reason for Visit: Initial(Vent Check)    Nutrition Recommendations:   - As able, start Tube Feedings of Nepro (renal with carb steady) current goal rate 25 mL/hr = 1080 kcal, 49 gm pro per day. Suggest goal rate of 45 mL/hr = 1944 kcal and 87 gm pro per day. - Monitor TF tolerance/adequacy of intakes - modify as needed to meet estimated needs. - Monitor labs and plan of care. Nutrition Assessment: Pt currently intubated and sedtaed. Tube feedings to be started per RN. Pt with ESRD on dialysis - being completed at time of visit. Insulin drip currently running. Labs reviewed. Malnutrition Assessment:  · Malnutrition Status: Insufficient data  · Context: (Acute on Chronic)  · Findings of the 6 clinical characteristics of malnutrition (Minimum of 2 out of 6 clinical characteristics is required to make the diagnosis of moderate or severe Protein Calorie Malnutrition based on AND/ASPEN Guidelines):  1. Energy Intake-Unable to assess   2. Weight Loss-Unable to assess  3. Fat Loss-No significant subcutaneous fat loss  4. Muscle Loss-No significant muscle mass loss  5. Fluid Accumulation-(Moderate fluid accumulation per RN documentation), Extremities    Nutrition Risk Level: High    Nutrient Needs:  · Estimated Daily Total Kcal: 8963-5897 kcal/day  · Estimated Daily Protein (g): 100-120 gm pro/day    Nutrition Diagnosis:   · Problem: Inadequate oral intake  · Etiology: related to Impaired respiratory function-inability to consume food     Signs and symptoms:  as evidenced by NPO status due to medical condition, Intubation(need for nutrition support - EN)    Objective Information:  · Nutrition-Focused Physical Findings: Hypoactive bowel sounds. +2 BLE edema.   · Wound Type: Deep Tissue Injury, Pressure Ulcer, Stage I(to left coccyx, scrotum, and left thigh)  · Current Nutrition Therapies:  · Oral Diet Orders: NPO   · Anthropometric Measures:  · Ht: (5'7\" from EHR review) · Current Body Wt: 289 lb 11 oz (131.4 kg)  · Admission Body Wt: 289 lb 11 oz (131.4 kg)  · Ideal Body Wt: 147 lb 14.9 oz (67.1 kg), % Ideal Body 197%  · BMI Classification: BMI > or equal to 40.0 Obese Class III    Nutrition Interventions:   Continue NPO, Start Tube Feeding - Suggest Nepro (renal with carb steady) to run at 25 mL/hr currently. Suggest goal rate of 45 mL/hr. Continued Inpatient Monitoring, Education Not Indicated    Nutrition Evaluation:   · Evaluation: Goals set   · Goals: Meet % of estimated need with nutrition support/oral diet.     · Monitoring: Nutrition Progression, TF Intake, TF Tolerance, Skin Integrity, Wound Healing, I&O, Weight, Pertinent Labs, Monitor Hemodynamic Status, Monitor Bowel Function    Electronically signed by Sidra Shone, RD, LD on 3/9/19 at 12:42 PM    Contact Number: [phone]

## 2019-03-09 NOTE — PLAN OF CARE
Problem: Restraint Use - Nonviolent/Non-Self-Destructive Behavior:  Goal: Absence of restraint-related injury  Description  Absence of restraint-related injury  3/9/2019 1222 by Satya Freedman RN  Outcome: Met This Shift  3/9/2019 0257 by Renetta Rothman RN  Outcome: Ongoing     Problem: OXYGENATION/RESPIRATORY FUNCTION  Goal: Patient will maintain patent airway  3/9/2019 1222 by Satya Freedman RN  Outcome: Met This Shift  3/9/2019 0257 by Renetta Rothman RN  Outcome: Ongoing  Goal: Patient will achieve/maintain normal respiratory rate/effort  Description  Respiratory rate and effort will be within normal limits for the patient  3/9/2019 1222 by Satya Freedman RN  Outcome: Met This Shift  3/9/2019 0257 by Renetta Rothman RN  Outcome: Ongoing     Problem: MECHANICAL VENTILATION  Goal: Patient will maintain patent airway  3/9/2019 1222 by Satya Freedman RN  Outcome: Met This Shift  3/9/2019 0257 by Renetta Rothman RN  Outcome: Ongoing  Goal: Oral health is maintained or improved  3/9/2019 1222 by Satya Freedman RN  Outcome: Met This Shift  3/9/2019 0257 by Renetta Rothman RN  Outcome: Ongoing  Goal: ET tube will be managed safely  3/9/2019 1222 by Satya Freedman RN  Outcome: Met This Shift  3/9/2019 0257 by Renetta Rothman RN  Outcome: Ongoing  Goal: Ability to express needs and understand communication  3/9/2019 1222 by Satya Freedman RN  Outcome: Met This Shift  3/9/2019 0257 by Renetta Rothman RN  Outcome: Ongoing  Goal: Mobility/activity is maintained at optimum level for patient  3/9/2019 1222 by Satya Freedman RN  Outcome: Met This Shift  3/9/2019 0257 by Renetta Rothman RN  Outcome: Ongoing     Problem: SKIN INTEGRITY  Goal: Skin integrity is maintained or improved  3/9/2019 1222 by Satya Freedman RN  Outcome: Met This Shift  3/9/2019 0257 by Renetta Rothman RN  Outcome: Ongoing     Problem: Confusion - Acute:  Goal: Absence of continued neurological deterioration signs and symptoms  Description  Absence of continued neurological deterioration signs and symptoms  3/9/2019 1222 by Satya Freedman RN  Outcome: Met This Shift  3/9/2019 0257 by Renetta Rothman RN  Outcome: Ongoing  Goal: Mental status will be restored to baseline  Description  Mental status will be restored to baseline  3/9/2019 1222 by Satya Freedman RN  Outcome: Met This Shift  3/9/2019 0257 by Renetta Rothman RN  Outcome: Ongoing     Problem: Discharge Planning:  Goal: Ability to perform activities of daily living will improve  Description  Ability to perform activities of daily living will improve  3/9/2019 1222 by Satya Freedman RN  Outcome: Met This Shift  3/9/2019 0257 by Renetta Rothman RN  Outcome: Ongoing  Goal: Participates in care planning  Description  Participates in care planning  3/9/2019 1222 by Satya Freedman RN  Outcome: Met This Shift  3/9/2019 0257 by Renetta Rothman RN  Outcome: Ongoing     Problem: Injury - Risk of, Physical Injury:  Goal: Absence of physical injury  Description  Absence of physical injury  3/9/2019 1222 by Satya Freedman RN  Outcome: Met This Shift  3/9/2019 0257 by Renetta Rothman RN  Outcome: Ongoing  Goal: Will remain free from falls  Description  Will remain free from falls  3/9/2019 1222 by Satya Freedman RN  Outcome: Met This Shift  3/9/2019 0257 by Renetta Rothman RN  Outcome: Ongoing     Problem: Mood - Altered:  Goal: Mood stable  Description  Mood stable  3/9/2019 1222 by Satya Freedman RN  Outcome: Met This Shift  3/9/2019 0257 by Renetta Rothman RN  Outcome: Ongoing  Goal: Absence of abusive behavior  Description  Absence of abusive behavior  3/9/2019 1222 by Satya Freedman RN  Outcome: Met This Shift  3/9/2019 0257 by Renetta Rothman RN  Outcome: Ongoing  Goal: Verbalizations of feeling emotionally comfortable while being cared for will increase  Description  Verbalizations of feeling emotionally comfortable while being cared for will increase  3/9/2019 1222 by Vicenta Cooks, RN  Outcome: Met This Shift  3/9/2019 0257 by Marco Clark RN  Outcome: Ongoing     Problem: Psychomotor Activity - Altered:  Goal: Absence of psychomotor disturbance signs and symptoms  Description  Absence of psychomotor disturbance signs and symptoms  3/9/2019 1222 by Vicenta Cooks, RN  Outcome: Met This Shift  3/9/2019 0257 by Marco Clark RN  Outcome: Ongoing     Problem: Sensory Perception - Impaired:  Goal: Demonstrations of improved sensory functioning will increase  Description  Demonstrations of improved sensory functioning will increase  3/9/2019 1222 by Vicenta Cooks, RN  Outcome: Met This Shift  3/9/2019 0257 by Marco Clark RN  Outcome: Ongoing  Goal: Decrease in sensory misperception frequency  Description  Decrease in sensory misperception frequency  3/9/2019 1222 by Vicenta Cooks, RN  Outcome: Met This Shift  3/9/2019 0257 by Marco Clark RN  Outcome: Ongoing  Goal: Able to refrain from responding to false sensory perceptions  Description  Able to refrain from responding to false sensory perceptions  3/9/2019 1222 by Vicenta Cooks, RN  Outcome: Met This Shift  3/9/2019 0257 by Marco Clark RN  Outcome: Ongoing  Goal: Demonstrates accurate environmental perceptions  Description  Demonstrates accurate environmental perceptions  3/9/2019 1222 by Vicenta Cooks, RN  Outcome: Met This Shift  3/9/2019 0257 by Marco Clark RN  Outcome: Ongoing  Goal: Able to distinguish between reality-based and nonreality-based thinking  Description  Able to distinguish between reality-based and nonreality-based thinking  3/9/2019 1222 by Vicenta Cooks, RN  Outcome: Met This Shift  3/9/2019 0257 by Marco Clark RN  Outcome: Ongoing  Goal: Able to interrupt nonreality-based thinking  Description  Able to interrupt nonreality-based thinking  3/9/2019 1222 by Jh Castro RN  Outcome: Met This Shift  3/9/2019 0257 by Katherine Garibay RN  Outcome: Ongoing     Problem: Sleep Pattern Disturbance:  Goal: Appears well-rested  Description  Appears well-rested  3/9/2019 1222 by Jh Castro RN  Outcome: Met This Shift  3/9/2019 0257 by Katherine Garibay RN  Outcome: Ongoing     Problem: Risk for Impaired Skin Integrity  Goal: Tissue integrity - skin and mucous membranes  Description  Structural intactness and normal physiological function of skin and  mucous membranes.   Outcome: Met This Shift     Problem: Falls - Risk of:  Goal: Absence of physical injury  Description  Absence of physical injury  3/9/2019 1222 by Jh Castro RN  Outcome: Met This Shift  3/9/2019 0257 by Katherine Garibay RN  Outcome: Ongoing  Goal: Will remain free from falls  Description  Will remain free from falls  3/9/2019 1222 by Jh Castro RN  Outcome: Met This Shift  3/9/2019 0257 by Katherine Garibay RN  Outcome: Ongoing

## 2019-03-09 NOTE — PROGRESS NOTES
ICU PROGRESS NOTE        PATIENT NAME: Lamar Collins  MEDICAL RECORD NO. [id number]  DATE: 3/9/2019    PRIMARY CARE PHYSICIAN: Lizy Ghotra, DO    HD: # 1    ASSESSMENT    Patient Active Problem List   Diagnosis    Traumatic amputation of thumb    Essential hypertension    Mixed hyperlipidemia    Obstructive sleep apnea    [facility]bid obesity ([facility]    Benign prostatic hyperplasia without lower urinary tract symptoms    Venous stasis dermatitis    Severe aortic stenosis    Severe mitral valve stenosis    Anemia in chronic kidney disease, on chronic dialysis ([facility]    Dialysis patient ([facility]    Dementia    Major depressive disorder with single episode, in partial remission ([facility]    Chronic cerebral ischemia    ESRD (end stage renal disease) on dialysis (HCC)    Long-term insulin use (HCC)    Paroxysmal atrial fibrillation (HCC)    BMI 40.0-44.9, adult (HCC)    Controlled type 2 diabetes mellitus with chronic kidney disease on chronic dialysis, with long-term current use of insulin (HCC)    Controlled type 2 diabetes mellitus with diabetic polyneuropathy, with long-term current use of insulin (HCC)    Dermatitis    Bruising    Hematoma of right lower extremity    Hematoma of leg, right, initial encounter       MEDICAL DECISION MAKING AND PLAN  1. Neuro:  1. Intubated, sedated  2. Propofol for sedation  3. Pain control- Flexeril, Tylenol, Ibuprofen, Gabapentin. 4. Follows commands, moving all extremities   2. CV  1. Cardiology following, pt with 2 prosthetic valves. Cardiology recs appreciated  2. Pt on levophed at 10 mcg, will add vasopressin, will wean levophed as tolerated. 3. Started on hepatin 5,000 units tid. 4. Hx a fib, preserved EF  3. Pulm  1. Intubated, sedated  2. Vent settings: PRVC- FiO2- 30%, RR-14, TV-570, PEEP-5, P/F ratio- 346  3. Pt will remain intubated until OR on 3/10, will try to extubate after OR. 4. GI/Nutrition  1.  Tube feeds start, will advance toward 26.1* 24.9*   MCV 94.4  --  92.2 91.5     --  146 175     BMP:   Recent Labs     03/08/19  1044 03/08/19  2352 03/09/19  0431    136 139   K 4.5 4.9 4.7   CL 96* 99 100   CO2 22 21 22   BUN 87* 86* 87*   CREATININE 6.37* 6.55* 6.72*   GLUCOSE 270* 272* 294*         Dallin Rucker, DO  3/9/19, 12:47 PM     I personally evaluated the patient and directed the medical decision making with Resident/JORGITO after the physical/radiologic exam and laboratory values were reviewed and confirmed. ANM      Patient  Remains critical    Post op respiratory failure, improving with decreasing support. Peep minimal, will plan for extubation post op 3/10    CV: hypotension, sedation related, intravascular volume is optimized per SVV, started levophed to achieve MAP, added vasopressin to aid in affect and possibility of decreased vasopressin release in his deconditioned stated. Titrate levo to attain map of 60-65    Heme: resuciated with 3 u prbc 2 ffp, and ddavp for uremic blood loss. No signs of bleeding, wound sat. No additional products needed    Renal: Failure on dialysis. GI: start TF at 25 hold for OR    ID: [place] for wound, will stop 48 hours after next OR if wound is healthy    Neuro: multimodal therapy for PADS    Endo: Insulin gtt for BG >200, will satrt long acting after patient on carb controlled diet(tf or regular)    Lines : L fem TLC placed sterile in OR, [place] and tunneled dialysis cath.   Will discuss with team untilization of TLC after OR 3/10    Total CC time 40 min

## 2019-03-09 NOTE — PROGRESS NOTES
Physical Therapy  DATE: 3/9/2019    NAME: Teresa Oneil  MRN: [id number]   : 1945    Patient not seen this date for Physical Therapy due to:  [] Blood transfusion in progress  [x] Hemodialysis  []  Patient Declined  [] Spine Precautions   [] Strict Bedrest  [] Surgery/ Procedure  [] Testing      [x] Other Intubated. [] PT being discontinued at this time. Patient independent. No further needs. [] PT being discontinued at this time as the patient has been transferred to palliative care. No further needs.     Rex Elizabeth, PT

## 2019-03-09 NOTE — CONSULTS
[facility]    Consult / H&P               Today's Date: 3/9/2019  Patient Name: Nash Cesar  Date of admission: 3/8/2019 10:24 AM  Patient's age: 75 y.o., 1945  Admission Dx: Hematoma of leg, right, initial encounter [S80.11XA]  Hematoma of leg, right, initial encounter [S80.11XA]    Reason for Consult:  Cardiac evaluation    Requesting Physician: Chinmay Welsh MD    CHIEF COMPLAINT:  Fall and hematoma    History Obtained From:  electronic medical record    HISTORY OF PRESENT ILLNESS:      The patient is a 75 y.o.  male who is admitted to the hospital for fall and right lower extremity hematoma. @ patient with H/O ESRD on HD and Bioprosthetic Mitral and Aortic Valve along with patch repair of ASD in 2016 by Dr Suyapa Sage with preserved EF of 55% on 9/18 with Paroxysmal A fib on AC with coumadin and non obstructive coronaries except for OM had fall on Sunday and ended up having large hematoma on right lower extremity and admitted to hospital and underwent [address] on 3/8    @ pt is intubated and sedated.  He is on low dose levophed yesterday  INR on admisison 2.7.    @ on my exam, pt is intubated and remains in SR      Past Medical History:   has a past medical history of Anemia in chronic kidney disease (CKD), Arthritis, Bacteremia, Benign prostatic hyperplasia without lower urinary tract symptoms, BMI 40.0-44.9, adult ([address] Cellulitis of left lower extremity, Chronic cerebral ischemia, Closed fracture of forearm, Controlled type 2 diabetes mellitus with chronic kidney disease on chronic dialysis, with long-term current use of insulin ([address] Controlled type 2 diabetes mellitus with diabetic polyneuropathy, with long-term current use of insulin ([address] Decubital ulcer, Dementia, Dialysis patient ([address] Difficult intravenous access, Difficulty walking, DJD (degenerative joint disease) of knee, Elbow, forearm, and wrist, abrasion or friction burn, without mention of infection, Encephalopathy acute, Encounter regarding vascular access for dialysis for ESRD ([facility] ESRD (end stage renal disease) on dialysis ([facility] [address] Forgetfulness, H/O transesophageal echocardiography ([facility]) for monitoring, Hemodialysis patient ([address] Hyperlipidemia, Hypertension, Intercritical gout, Joint pain, knee, Long-term insulin use ([facility] [address] Major depressive disorder with single episode, in partial remission ([address] Mobility impaired, Morbid obesity ([address] Muscle weakness, Obesity, Obstructive sleep apnea, HEATHER on CPAP, Paroxysmal atrial fibrillation ([address] Respiratory failure ([address] S/P cardiac cath, Seborrhea, Severe aortic stenosis, Severe mitral valve stenosis, Severe sepsis (HCC), Skin rash, Stasis dermatitis, Thyroid disease, Traumatic amputation of thumb, Venous stasis dermatitis, Wears glasses, and Wheelchair bound. Past Surgical History:   has a past surgical history that includes Colonoscopy (11/19/09); Knee arthroscopy (Left, 09/17/99); Hand surgery (Left, 1990 (x10-12 surgeries)); Arm Surgery (Left, 1990); Cardiac catheterization; Nasal septum surgery; Sternotomy (3/18/16); Aortic valve replacement (03/18/2016); Mitral valve replacement (03/18/2016); other surgical history (3/18/16); other surgical history (3/18/16); other surgical history (Right, 01/09/2017); other surgical history (Right, 08/29/2017); pr ligatn angioaccess av fistula (Right, 8/29/2017); pr egd transoral biopsy single/multiple (N/A, 10/17/2017); central venous catheter (Right, 02/21/2018); Colonoscopy (N/A, 10/12/2018); other surgical history (04/05/2018); and vascular surgery (12/06/2018). Home Medications:    Prior to Admission medications    Medication Sig Start Date End Date Taking? Authorizing Provider   Misc.  Devices [facility]) MISC Use as directed 12/12/18   Octavio Mcarthur, DO   buPROPion (WELLBUTRIN XL) 150 MG extended release tablet Take 150 mg by mouth every morning    Historical Provider, MD   sevelamer (RENVELA) 800 MG tablet Give 2 tabs po TID with meals and additional 1 tab po TID with snack. Patient taking differently: No sig reported 7/29/18   SHAMEKA Hampton CNP   Pollen Extracts (PROSTAT PO) Take 30 mLs by mouth three times daily    Historical Provider, MD   warfarin (COUMADIN) 2 MG tablet Take 2 mg by mouth nightly Take 1 tablet by mouth at bedtime    Historical Provider, MD   warfarin (COUMADIN) 3 MG tablet Take 3 mg by mouth daily Take 3 mg on Monday and Tuesday    Historical Provider, MD   sertraline (ZOLOFT) 100 MG tablet Take 100 mg by mouth daily     Historical Provider, MD   loratadine (CLARITIN) 10 MG capsule Take 10 mg by mouth    Historical Provider, MD   sodium chloride (OCEAN) 0.65 % nasal spray by Nasal route 10/24/16   Historical Provider, MD   lidocaine (XYLOCAINE) 5 % ointment Apply topically three times a week Apply topically as needed. DIALYSIS DAYS    Historical Provider, MD   Amino Acids-Protein Hydrolys (PRO-STAT PO) Take 30 mLs by mouth 3 times daily SUGAR FREE     Historical Provider, MD   guaiFENesin (ROBITUSSIN) 100 MG/5ML syrup Take 200 mg by mouth 4 times daily as needed for Cough    Historical Provider, MD   ipratropium-albuterol (DUONEB) 0.5-2.5 (3) MG/3ML SOLN nebulizer solution Inhale 1 vial into the lungs every 6 hours as needed     Historical Provider, MD   insulin aspart (NOVOLOG) 100 UNIT/ML injection vial Inject 5 Units into the skin 3 times daily (before meals) Sliding scale    Historical Provider, MD   Nystatin POWD by Does not apply route TID x 10 days (starting 2/13/17) then use PRN 2/13/17   Historical Provider, MD   HYDROcodone-acetaminophen (NORCO) 5-325 MG per tablet Take 1 tablet by mouth every 8 hours as needed for Pain . Historical Provider, MD   aspirin 81 MG tablet Take 81 mg by mouth daily    Historical Provider, MD   Skin Protectants, Misc.  (HYDROCERIN) CREA cream Apply topically nightly Historical Provider, MD   senna (SENOKOT) 8.6 MG tablet Take 1 tablet by mouth nightly    Historical Provider, MD   omeprazole (PRILOSEC) 20 MG delayed release capsule Take 20 mg by mouth daily    Historical Provider, MD   glucagon 1 MG injection Infuse 1 kit intravenously as needed    Historical Provider, MD   donepezil (ARICEPT) 5 MG tablet Take 5 mg by mouth every evening 10/26/16   Historical Provider, MD   levothyroxine (SYNTHROID) 25 MCG tablet Take 1 tablet by mouth Daily  Patient taking differently: Take 50 mcg by mouth Daily  10/26/16   SHAMEKA Bush CNP   midodrine (PROAMATINE) 5 MG tablet Take 2 tablets by mouth 3 times daily 10/24/16   Octavio Mcarthur DO   ondansetron (ZOFRAN ODT) 4 MG disintegrating tablet Take 1 tablet by mouth every 6 hours as needed for Nausea or Vomiting 10/24/16   Octavio Mcarthur DO   albuterol (PROVENTIL) (2.5 MG/3ML) 0.083% nebulizer solution Take 3 mLs by nebulization every 8 hours as needed for Wheezing 10/24/16   Octavio Mcarthur DO   fluticasone (FLONASE) 50 MCG/ACT nasal spray 2 sprays by Nasal route daily 10/24/16   Octavio Mcarthur DO   glucose (GLUTOSE 15) 40 % GEL Take 15 g by mouth as needed (hypoglycemia) 15 gram oral as needed if blood sugar is less than 70 ( for hypoglycemia) 10/24/16   Octavio Mcarthur DO   atorvastatin (LIPITOR) 10 MG tablet Take 1 tablet by mouth daily 10/24/16   Octavio Mcarthur DO   insulin glargine (LANTUS) 100 UNIT/ML injection vial Inject 40 Units into the skin 2 times daily     Historical Provider, MD   ALPRAZolam (XANAX) 0.5 MG tablet Take 0.5 mg by mouth 2 times daily. Fred Keith     Historical Provider, MD   acetaminophen (TYLENOL) 325 MG tablet Take 650 mg by mouth every 6 hours as needed for Pain or Fever     Historical Provider, MD   magnesium hydroxide (MILK OF MAGNESIA) 400 MG/5ML suspension Take 30 mLs by mouth daily as needed for Constipation 3/29/16   Rashaad Newman MD Multiple Vitamins-Minerals (MULTIVITAL PO) Take 1 tablet by mouth daily    Historical Provider, MD      Current Facility-Administered Medications: fentaNYL (SUBLIMAZE) injection 50 mcg, 50 mcg, Intravenous, Q1H PRN  norepinephrine (LEVOPHED) 16 mg in dextrose 5% 250 mL infusion, 2 mcg/min, Intravenous, Continuous  glucose (GLUTOSE) 40 % oral gel 15 g, 15 g, Oral, PRN  dextrose 50 % solution 12.5 g, 12.5 g, Intravenous, PRN  glucagon (rDNA) injection 1 mg, 1 mg, Intramuscular, PRN  dextrose 5 % solution, 100 mL/hr, Intravenous, PRN  insulin regular (HUMULIN R;NOVOLIN R) 100 Units in sodium chloride 0.9 % 100 mL infusion, 1 Units/hr, Intravenous, Continuous  vasopressin 20 Units in dextrose 5 % 100 mL infusion, 0.04 Units/min, Intravenous, Continuous  ibuprofen (ADVIL;MOTRIN) 100 MG/5ML suspension 400 mg, 400 mg, Oral, 4x Daily  gabapentin (NEURONTIN) capsule 300 mg, 300 mg, Oral, BID  heparin (porcine) injection 5,000 Units, 5,000 Units, Subcutaneous, 3 times per day  pantoprazole (PROTONIX) injection 40 mg, 40 mg, Intravenous, Daily **AND** sodium chloride (PF) 0.9 % injection 10 mL, 10 mL, Intravenous, Daily  cyclobenzaprine (FLEXERIL) tablet 5 mg, 5 mg, Oral, TID  0.9 % sodium chloride bolus, 250 mL, Intravenous, PRN  0.9 % sodium chloride bolus, 150 mL, Intravenous, PRN  acetaminophen (TYLENOL) tablet 650 mg, 650 mg, Oral, 4x Daily  albuterol (PROVENTIL) nebulizer solution 2.5 mg, 2.5 mg, Nebulization, Q8H PRN  ALPRAZolam (XANAX) tablet 0.5 mg, 0.5 mg, Oral, BID  atorvastatin (LIPITOR) tablet 10 mg, 10 mg, Oral, Daily  buPROPion (WELLBUTRIN XL) extended release tablet 150 mg, 150 mg, Oral, QAM  donepezil (ARICEPT) tablet 5 mg, 5 mg, Oral, QPM  fluticasone (FLONASE) 50 MCG/ACT nasal spray 2 spray, 2 spray, Nasal, Daily  guaiFENesin (ROBITUSSIN) 100 MG/5ML oral solution 200 mg, 200 mg, Oral, 4x Daily PRN  levothyroxine (SYNTHROID) tablet 25 mcg, 25 mcg, Oral, Daily  cetirizine (ZYRTEC) tablet 5 mg, 5 mg, Oral, intubated  HEENT: PERRL, no cervical lymphadenopathy. No masses palpable. Normal oral mucosa  Respiratory:  · Mechanical ventilation  Cardiovascular:  · The apical impulse is not displaced  · Heart tones are crisp and normal. regular S1 and S2. Abdomen:   · No masses or tenderness  · Bowel sounds present  Extremities:  ·  Right lower extremity wound vac in place  Neurological:  · Sedated     DATA:    Diagnostics:    EKG:     ECHO  9/19/18  Left ventricle is normal in size Global left ventricular systolic function  is normal Estimated ejection fraction is 55 % . Mild concentric left ventricular hypertrophy. Grade I (mild) left ventricular diastolic dysfunction. Bi-atrial enlargement. Right ventricular dilatation with normal systolic function. Bioprosthetic aortic valve replacement. Peak instantaneous gradient 20 mmHg and mean gradient 11 mmHg. No aortic insufficiency. Bioprosthetic mitral valve replacement. Mean gradient is 7 mm Hg. No mitral regurgitation. No pericardial effusion seen. 2/16    CATH  LMCA: Normal 0% stenosis.     LAD: Mild irregularities 10-20%.     LCx: Mild irregularities 10-20%. OM 1 has ostial 90% stenosis     RCA: Mild irregularities 10-20%. Labs:     CBC:   Recent Labs     03/08/19  2352 03/09/19  0431   WBC 21.6* 23.1*   HGB 8.6* 8.3*   HCT 26.1* 24.9*    175     BMP:   Recent Labs     03/08/19 2352 03/09/19  0431    139   K 4.9 4.7   CO2 21 22   BUN 86* 87*   CREATININE 6.55* 6.72*   LABGLOM 8* 8*   GLUCOSE 272* 294*     BNP: No results for input(s): BNP in the last 72 hours. PT/INR:   Recent Labs     03/08/19  0629 03/08/19  1044   PROTIME 18.9* 18.8*   INR 1.9 1.9     APTT:  Recent Labs     03/08/19  0629 03/08/19  1044   APTT 40.4* 30.6*     CARDIAC ENZYMES:No results for input(s): CKTOTAL, CKMB, CKMBINDEX, TROPONINI in the last 72 hours.   FASTING LIPID PANEL:  Lab Results   Component Value Date    HDL 54 04/27/2015    TRIG 100 03/22/2016     LIVER cardiology fellow/resident. I have seen and examined the patient and the key elements of all parts of the encounter have been performed by me.   I agree with the assessment, plan and orders as documented by the fellow.   Natty Ceja [id number]  [address] [facility], [place]  [phone]

## 2019-03-09 NOTE — PROGRESS NOTES
Dialysis Post Treatment Note  Patient tolerated treatment well. Question if  pre weight accurate total fluid removed 2400ml.   Vitals:    03/09/19 1315   BP: (!) 138/53   Pulse: 80   Resp:    Temp: 98.1 °F (36.7 °C)   SpO2:      Pre-Weight = 132.4  Post-weight = Weight: 281 lb 4.9 oz (127.6 kg)  Total Liters Processed = Total Liters Processed (l/min): 95 l/min  Rinseback Volume (mL) = Rinseback Volume (ml): 370 ml  Net Removal (mL) = 2400  Length of treatment=240

## 2019-03-10 ENCOUNTER — ANESTHESIA (OUTPATIENT)
Dept: OPERATING ROOM | Age: 74
DRG: 570 | End: 2019-03-10
Payer: MEDICARE

## 2019-03-10 ENCOUNTER — ANESTHESIA EVENT (OUTPATIENT)
Dept: OPERATING ROOM | Age: 74
DRG: 570 | End: 2019-03-10
Payer: MEDICARE

## 2019-03-10 VITALS — TEMPERATURE: 96.8 F | RESPIRATION RATE: 14 BRPM | OXYGEN SATURATION: 100 %

## 2019-03-10 LAB
ABSOLUTE EOS #: 0.09 K/UL (ref 0–0.44)
ABSOLUTE IMMATURE GRANULOCYTE: 0.07 K/UL (ref 0–0.3)
ABSOLUTE LYMPH #: 0.95 K/UL (ref 1.1–3.7)
ABSOLUTE MONO #: 0.58 K/UL (ref 0.1–1.2)
ALBUMIN SERPL-MCNC: 2.8 G/DL (ref 3.5–5.2)
ALBUMIN/GLOBULIN RATIO: 1.4 (ref 1–2.5)
ALLEN TEST: ABNORMAL
ALP BLD-CCNC: 67 U/L (ref 40–129)
ALT SERPL-CCNC: 12 U/L (ref 5–41)
ANION GAP SERPL CALCULATED.3IONS-SCNC: 12 MMOL/L (ref 9–17)
AST SERPL-CCNC: 14 U/L
BASOPHILS # BLD: 1 % (ref 0–2)
BASOPHILS ABSOLUTE: 0.04 K/UL (ref 0–0.2)
BILIRUB SERPL-MCNC: 0.27 MG/DL (ref 0.3–1.2)
BILIRUBIN DIRECT: 0.14 MG/DL
BILIRUBIN, INDIRECT: 0.13 MG/DL (ref 0–1)
BUN BLDV-MCNC: 43 MG/DL (ref 8–23)
CALCIUM SERPL-MCNC: 8 MG/DL (ref 8.6–10.4)
CHLORIDE BLD-SCNC: 99 MMOL/L (ref 98–107)
CO2: 26 MMOL/L (ref 20–31)
CREAT SERPL-MCNC: 4.21 MG/DL (ref 0.7–1.2)
DIFFERENTIAL TYPE: ABNORMAL
EOSINOPHILS RELATIVE PERCENT: 1 % (ref 1–4)
FIO2: 30
GFR AFRICAN AMERICAN: 17 ML/MIN
GFR NON-AFRICAN AMERICAN: 14 ML/MIN
GFR SERPL CREATININE-BSD FRML MDRD: ABNORMAL ML/MIN/{1.73_M2}
GFR SERPL CREATININE-BSD FRML MDRD: ABNORMAL ML/MIN/{1.73_M2}
GLUCOSE BLD-MCNC: 110 MG/DL (ref 75–110)
GLUCOSE BLD-MCNC: 126 MG/DL (ref 75–110)
GLUCOSE BLD-MCNC: 133 MG/DL (ref 75–110)
GLUCOSE BLD-MCNC: 133 MG/DL (ref 75–110)
GLUCOSE BLD-MCNC: 134 MG/DL (ref 75–110)
GLUCOSE BLD-MCNC: 138 MG/DL (ref 75–110)
GLUCOSE BLD-MCNC: 142 MG/DL (ref 75–110)
GLUCOSE BLD-MCNC: 147 MG/DL (ref 75–110)
GLUCOSE BLD-MCNC: 150 MG/DL (ref 75–110)
GLUCOSE BLD-MCNC: 155 MG/DL (ref 75–110)
GLUCOSE BLD-MCNC: 158 MG/DL (ref 75–110)
GLUCOSE BLD-MCNC: 158 MG/DL (ref 75–110)
GLUCOSE BLD-MCNC: 161 MG/DL (ref 75–110)
GLUCOSE BLD-MCNC: 167 MG/DL (ref 75–110)
GLUCOSE BLD-MCNC: 172 MG/DL (ref 70–99)
HCT VFR BLD CALC: 19.8 % (ref 40.7–50.3)
HCT VFR BLD CALC: 20.3 % (ref 40.7–50.3)
HEMOGLOBIN: 6.7 G/DL (ref 13–17)
HEMOGLOBIN: 6.8 G/DL (ref 13–17)
IMMATURE GRANULOCYTES: 1 %
LACTIC ACID, WHOLE BLOOD: 0.6 MMOL/L (ref 0.7–2.1)
LACTIC ACID, WHOLE BLOOD: 1.5 MMOL/L (ref 0.7–2.1)
LYMPHOCYTES # BLD: 14 % (ref 24–43)
MAGNESIUM: 1.7 MG/DL (ref 1.6–2.6)
MCH RBC QN AUTO: 31 PG (ref 25.2–33.5)
MCHC RBC AUTO-ENTMCNC: 33.8 G/DL (ref 28.4–34.8)
MCV RBC AUTO: 91.7 FL (ref 82.6–102.9)
MODE: ABNORMAL
MONOCYTES # BLD: 8 % (ref 3–12)
NEGATIVE BASE EXCESS, ART: ABNORMAL (ref 0–2)
NRBC AUTOMATED: 0 PER 100 WBC
O2 DEVICE/FLOW/%: ABNORMAL
PATIENT TEMP: ABNORMAL
PDW BLD-RTO: 16.5 % (ref 11.8–14.4)
PHOSPHORUS: 3.3 MG/DL (ref 2.5–4.5)
PLATELET # BLD: 84 K/UL (ref 138–453)
PLATELET ESTIMATE: ABNORMAL
PMV BLD AUTO: 10.1 FL (ref 8.1–13.5)
POC HCO3: 30.6 MMOL/L (ref 21–28)
POC LACTIC ACID: 1.46 MMOL/L (ref 0.56–1.39)
POC O2 SATURATION: 99 % (ref 94–98)
POC PCO2 TEMP: ABNORMAL MM HG
POC PCO2: 34.3 MM HG (ref 35–48)
POC PH TEMP: ABNORMAL
POC PH: 7.56 (ref 7.35–7.45)
POC PO2 TEMP: ABNORMAL MM HG
POC PO2: 127.3 MM HG (ref 83–108)
POSITIVE BASE EXCESS, ART: 8 (ref 0–3)
POTASSIUM SERPL-SCNC: 3.6 MMOL/L (ref 3.7–5.3)
RBC # BLD: 2.16 M/UL (ref 4.21–5.77)
RBC # BLD: ABNORMAL 10*6/UL
SAMPLE SITE: ABNORMAL
SEG NEUTROPHILS: 75 % (ref 36–65)
SEGMENTED NEUTROPHILS ABSOLUTE COUNT: 5.21 K/UL (ref 1.5–8.1)
SODIUM BLD-SCNC: 137 MMOL/L (ref 135–144)
TCO2 (CALC), ART: 32 MMOL/L (ref 22–29)
TOTAL PROTEIN: 4.8 G/DL (ref 6.4–8.3)
TSH SERPL DL<=0.05 MIU/L-ACNC: 0.6 MIU/L (ref 0.3–5)
WBC # BLD: 6.9 K/UL (ref 3.5–11.3)
WBC # BLD: ABNORMAL 10*3/UL

## 2019-03-10 PROCEDURE — 6370000000 HC RX 637 (ALT 250 FOR IP): Performed by: STUDENT IN AN ORGANIZED HEALTH CARE EDUCATION/TRAINING PROGRAM

## 2019-03-10 PROCEDURE — 85014 HEMATOCRIT: CPT

## 2019-03-10 PROCEDURE — 2580000003 HC RX 258: Performed by: SURGERY

## 2019-03-10 PROCEDURE — 85018 HEMOGLOBIN: CPT

## 2019-03-10 PROCEDURE — 93005 ELECTROCARDIOGRAM TRACING: CPT

## 2019-03-10 PROCEDURE — 2000000000 HC ICU R&B

## 2019-03-10 PROCEDURE — 2500000003 HC RX 250 WO HCPCS: Performed by: STUDENT IN AN ORGANIZED HEALTH CARE EDUCATION/TRAINING PROGRAM

## 2019-03-10 PROCEDURE — 3600000003 HC SURGERY LEVEL 3 BASE: Performed by: SURGERY

## 2019-03-10 PROCEDURE — 94003 VENT MGMT INPAT SUBQ DAY: CPT

## 2019-03-10 PROCEDURE — 6360000002 HC RX W HCPCS: Performed by: STUDENT IN AN ORGANIZED HEALTH CARE EDUCATION/TRAINING PROGRAM

## 2019-03-10 PROCEDURE — 2780000010 HC IMPLANT OTHER: Performed by: SURGERY

## 2019-03-10 PROCEDURE — 94681 O2 UPTK CO2 OUTP % O2 XTRC: CPT

## 2019-03-10 PROCEDURE — 83605 ASSAY OF LACTIC ACID: CPT

## 2019-03-10 PROCEDURE — 84443 ASSAY THYROID STIM HORMONE: CPT

## 2019-03-10 PROCEDURE — 2580000003 HC RX 258: Performed by: NURSE ANESTHETIST, CERTIFIED REGISTERED

## 2019-03-10 PROCEDURE — 6360000002 HC RX W HCPCS: Performed by: NURSE ANESTHETIST, CERTIFIED REGISTERED

## 2019-03-10 PROCEDURE — 84100 ASSAY OF PHOSPHORUS: CPT

## 2019-03-10 PROCEDURE — 82803 BLOOD GASES ANY COMBINATION: CPT

## 2019-03-10 PROCEDURE — 82248 BILIRUBIN DIRECT: CPT

## 2019-03-10 PROCEDURE — 86900 BLOOD TYPING SEROLOGIC ABO: CPT

## 2019-03-10 PROCEDURE — 2580000003 HC RX 258: Performed by: STUDENT IN AN ORGANIZED HEALTH CARE EDUCATION/TRAINING PROGRAM

## 2019-03-10 PROCEDURE — 85025 COMPLETE CBC W/AUTO DIFF WBC: CPT

## 2019-03-10 PROCEDURE — 2500000003 HC RX 250 WO HCPCS: Performed by: NURSE ANESTHETIST, CERTIFIED REGISTERED

## 2019-03-10 PROCEDURE — 3700000001 HC ADD 15 MINUTES (ANESTHESIA): Performed by: SURGERY

## 2019-03-10 PROCEDURE — 2700000000 HC OXYGEN THERAPY PER DAY

## 2019-03-10 PROCEDURE — 0JBN0ZZ EXCISION OF RIGHT LOWER LEG SUBCUTANEOUS TISSUE AND FASCIA, OPEN APPROACH: ICD-10-PCS | Performed by: SURGERY

## 2019-03-10 PROCEDURE — 36430 TRANSFUSION BLD/BLD COMPNT: CPT

## 2019-03-10 PROCEDURE — 2709999900 HC NON-CHARGEABLE SUPPLY: Performed by: SURGERY

## 2019-03-10 PROCEDURE — 80053 COMPREHEN METABOLIC PANEL: CPT

## 2019-03-10 PROCEDURE — P9016 RBC LEUKOCYTES REDUCED: HCPCS

## 2019-03-10 PROCEDURE — 83735 ASSAY OF MAGNESIUM: CPT

## 2019-03-10 PROCEDURE — 82947 ASSAY GLUCOSE BLOOD QUANT: CPT

## 2019-03-10 PROCEDURE — 94762 N-INVAS EAR/PLS OXIMTRY CONT: CPT

## 2019-03-10 PROCEDURE — 3700000000 HC ANESTHESIA ATTENDED CARE: Performed by: SURGERY

## 2019-03-10 PROCEDURE — 3600000013 HC SURGERY LEVEL 3 ADDTL 15MIN: Performed by: SURGERY

## 2019-03-10 RX ORDER — MAGNESIUM HYDROXIDE 1200 MG/15ML
LIQUID ORAL
Status: COMPLETED
Start: 2019-03-10 | End: 2019-03-11

## 2019-03-10 RX ORDER — FENTANYL CITRATE 50 UG/ML
INJECTION, SOLUTION INTRAMUSCULAR; INTRAVENOUS PRN
Status: DISCONTINUED | OUTPATIENT
Start: 2019-03-10 | End: 2019-03-10 | Stop reason: SDUPTHER

## 2019-03-10 RX ORDER — PROPOFOL 10 MG/ML
INJECTION, EMULSION INTRAVENOUS PRN
Status: DISCONTINUED | OUTPATIENT
Start: 2019-03-10 | End: 2019-03-10 | Stop reason: SDUPTHER

## 2019-03-10 RX ORDER — SODIUM CHLORIDE, SODIUM LACTATE, POTASSIUM CHLORIDE, CALCIUM CHLORIDE 600; 310; 30; 20 MG/100ML; MG/100ML; MG/100ML; MG/100ML
INJECTION, SOLUTION INTRAVENOUS CONTINUOUS PRN
Status: DISCONTINUED | OUTPATIENT
Start: 2019-03-10 | End: 2019-03-10 | Stop reason: SDUPTHER

## 2019-03-10 RX ORDER — GLYCOPYRROLATE 1 MG/5 ML
SYRINGE (ML) INTRAVENOUS PRN
Status: DISCONTINUED | OUTPATIENT
Start: 2019-03-10 | End: 2019-03-10 | Stop reason: SDUPTHER

## 2019-03-10 RX ORDER — MAGNESIUM SULFATE 1 G/100ML
1 INJECTION INTRAVENOUS
Status: DISPENSED | OUTPATIENT
Start: 2019-03-10 | End: 2019-03-10

## 2019-03-10 RX ORDER — 0.9 % SODIUM CHLORIDE 0.9 %
250 INTRAVENOUS SOLUTION INTRAVENOUS ONCE
Status: COMPLETED | OUTPATIENT
Start: 2019-03-10 | End: 2019-03-10

## 2019-03-10 RX ORDER — MAGNESIUM SULFATE 1 G/100ML
2 INJECTION INTRAVENOUS
Status: ACTIVE | OUTPATIENT
Start: 2019-03-10 | End: 2019-03-10

## 2019-03-10 RX ORDER — MAGNESIUM HYDROXIDE 1200 MG/15ML
LIQUID ORAL CONTINUOUS PRN
Status: COMPLETED | OUTPATIENT
Start: 2019-03-10 | End: 2019-03-10

## 2019-03-10 RX ADMIN — IBUPROFEN 400 MG: 100 SUSPENSION ORAL at 17:34

## 2019-03-10 RX ADMIN — CLINDAMYCIN PHOSPHATE 900 MG: 900 INJECTION, SOLUTION INTRAVENOUS at 09:55

## 2019-03-10 RX ADMIN — SODIUM CHLORIDE, POTASSIUM CHLORIDE, SODIUM LACTATE AND CALCIUM CHLORIDE: 600; 310; 30; 20 INJECTION, SOLUTION INTRAVENOUS at 10:35

## 2019-03-10 RX ADMIN — ALPRAZOLAM 0.5 MG: 0.5 TABLET ORAL at 20:58

## 2019-03-10 RX ADMIN — ACETAMINOPHEN 650 MG: 325 TABLET ORAL at 17:36

## 2019-03-10 RX ADMIN — CLINDAMYCIN PHOSPHATE 900 MG: 900 INJECTION, SOLUTION INTRAVENOUS at 15:55

## 2019-03-10 RX ADMIN — FENTANYL CITRATE 50 MCG: 50 INJECTION INTRAMUSCULAR; INTRAVENOUS at 22:56

## 2019-03-10 RX ADMIN — CYCLOBENZAPRINE HYDROCHLORIDE 5 MG: 5 TABLET, FILM COATED ORAL at 13:04

## 2019-03-10 RX ADMIN — CYCLOBENZAPRINE HYDROCHLORIDE 5 MG: 5 TABLET, FILM COATED ORAL at 20:49

## 2019-03-10 RX ADMIN — ACETAMINOPHEN 650 MG: 325 TABLET ORAL at 20:49

## 2019-03-10 RX ADMIN — SEVELAMER CARBONATE 800 MG: 800 TABLET, FILM COATED ORAL at 17:35

## 2019-03-10 RX ADMIN — VASOPRESSIN 0.02 UNITS/MIN: 20 INJECTION INTRAVENOUS at 05:43

## 2019-03-10 RX ADMIN — MAGNESIUM SULFATE HEPTAHYDRATE 2 G: 1 INJECTION, SOLUTION INTRAVENOUS at 12:18

## 2019-03-10 RX ADMIN — CLINDAMYCIN PHOSPHATE 900 MG: 900 INJECTION, SOLUTION INTRAVENOUS at 00:50

## 2019-03-10 RX ADMIN — Medication 0.4 MG: at 10:49

## 2019-03-10 RX ADMIN — HEPARIN SODIUM 5000 UNITS: 5000 INJECTION INTRAVENOUS; SUBCUTANEOUS at 22:57

## 2019-03-10 RX ADMIN — FLUTICASONE PROPIONATE 2 SPRAY: 50 SPRAY, METERED NASAL at 09:54

## 2019-03-10 RX ADMIN — Medication 10 ML: at 20:50

## 2019-03-10 RX ADMIN — MIDODRINE HYDROCHLORIDE 10 MG: 5 TABLET ORAL at 13:04

## 2019-03-10 RX ADMIN — IBUPROFEN 400 MG: 100 SUSPENSION ORAL at 12:15

## 2019-03-10 RX ADMIN — DONEPEZIL HYDROCHLORIDE 5 MG: 5 TABLET, FILM COATED ORAL at 17:37

## 2019-03-10 RX ADMIN — GABAPENTIN 300 MG: 300 CAPSULE ORAL at 20:50

## 2019-03-10 RX ADMIN — Medication 0.2 MG: at 11:01

## 2019-03-10 RX ADMIN — SODIUM CHLORIDE 250 ML: 9 INJECTION, SOLUTION INTRAVENOUS at 08:51

## 2019-03-10 RX ADMIN — PROPOFOL 100 MG: 10 INJECTION, EMULSION INTRAVENOUS at 10:55

## 2019-03-10 RX ADMIN — Medication 10 ML: at 21:04

## 2019-03-10 RX ADMIN — HEPARIN SODIUM 5000 UNITS: 5000 INJECTION INTRAVENOUS; SUBCUTANEOUS at 14:40

## 2019-03-10 RX ADMIN — MIDODRINE HYDROCHLORIDE 10 MG: 5 TABLET ORAL at 20:49

## 2019-03-10 RX ADMIN — ACETAMINOPHEN 650 MG: 325 TABLET ORAL at 12:15

## 2019-03-10 RX ADMIN — FENTANYL CITRATE 50 MCG: 50 INJECTION INTRAMUSCULAR; INTRAVENOUS at 09:47

## 2019-03-10 RX ADMIN — IBUPROFEN 400 MG: 100 SUSPENSION ORAL at 20:49

## 2019-03-10 RX ADMIN — HEPARIN SODIUM 5000 UNITS: 5000 INJECTION INTRAVENOUS; SUBCUTANEOUS at 06:29

## 2019-03-10 RX ADMIN — FENTANYL CITRATE 50 MCG: 50 INJECTION INTRAMUSCULAR; INTRAVENOUS at 11:00

## 2019-03-10 RX ADMIN — FENTANYL CITRATE 50 MCG: 50 INJECTION INTRAMUSCULAR; INTRAVENOUS at 10:56

## 2019-03-10 RX ADMIN — PROPOFOL 100 MG: 10 INJECTION, EMULSION INTRAVENOUS at 10:57

## 2019-03-10 ASSESSMENT — ENCOUNTER SYMPTOMS: COLOR CHANGE: 1

## 2019-03-10 ASSESSMENT — PULMONARY FUNCTION TESTS
PIF_VALUE: 28
PIF_VALUE: 2
PIF_VALUE: 22
PIF_VALUE: 25
PIF_VALUE: 25
PIF_VALUE: 3
PIF_VALUE: 23
PIF_VALUE: 25
PIF_VALUE: 24
PIF_VALUE: 25
PIF_VALUE: 23
PIF_VALUE: 24
PIF_VALUE: 21
PIF_VALUE: 23
PIF_VALUE: 0
PIF_VALUE: 23
PIF_VALUE: 24
PIF_VALUE: 24
PIF_VALUE: 26
PIF_VALUE: 26
PIF_VALUE: 24
PIF_VALUE: 25
PIF_VALUE: 0
PIF_VALUE: 24
PIF_VALUE: 26
PIF_VALUE: 27
PIF_VALUE: 24
PIF_VALUE: 27
PIF_VALUE: 26
PIF_VALUE: 27
PIF_VALUE: 26
PIF_VALUE: 24
PIF_VALUE: 25
PIF_VALUE: 25
PIF_VALUE: 27
PIF_VALUE: 25
PIF_VALUE: 0
PIF_VALUE: 23
PIF_VALUE: 27
PIF_VALUE: 24
PIF_VALUE: 0
PIF_VALUE: 3
PIF_VALUE: 25
PIF_VALUE: 23
PIF_VALUE: 25
PIF_VALUE: 2
PIF_VALUE: 24
PIF_VALUE: 25
PIF_VALUE: 23
PIF_VALUE: 24
PIF_VALUE: 25
PIF_VALUE: 25
PIF_VALUE: 24
PIF_VALUE: 22
PIF_VALUE: 3
PIF_VALUE: 13
PIF_VALUE: 25
PIF_VALUE: 24
PIF_VALUE: 24
PIF_VALUE: 27
PIF_VALUE: 25
PIF_VALUE: 26

## 2019-03-10 ASSESSMENT — PAIN SCALES - GENERAL
PAINLEVEL_OUTOF10: 0
PAINLEVEL_OUTOF10: 3
PAINLEVEL_OUTOF10: 0
PAINLEVEL_OUTOF10: 8
PAINLEVEL_OUTOF10: 4
PAINLEVEL_OUTOF10: 8
PAINLEVEL_OUTOF10: 0
PAINLEVEL_OUTOF10: 3

## 2019-03-10 NOTE — PROGRESS NOTES
ICU PROGRESS NOTE        PATIENT NAME: Evette Jean  MEDICAL RECORD NO. [id number]  DATE: 3/10/2019    PRIMARY CARE PHYSICIAN: Reed Barnes DO    HD: # 2    ASSESSMENT    Patient Active Problem List   Diagnosis    Traumatic amputation of thumb    Essential hypertension    Mixed hyperlipidemia    Obstructive sleep apnea    Morbid [facility] ([facility]    Benign prostatic hyperplasia without lower urinary tract symptoms    Venous stasis dermatitis    Severe aortic stenosis    Severe mitral valve stenosis    Anemia in chronic kidney disease, on chronic dialysis ([facility]    Dialysis patient ([facility]    Dementia    Major depressive disorder with single episode, in partial remission (HCC)    Chronic cerebral ischemia    ESRD (end stage renal disease) on dialysis (HCC)    Long-term insulin use (HCC)    Paroxysmal atrial fibrillation (HCC)    BMI 40.0-44.9, adult (HCC)    Controlled type 2 diabetes mellitus with chronic kidney disease on chronic dialysis, with long-term current use of insulin (HCC)    Controlled type 2 diabetes mellitus with diabetic polyneuropathy, with long-term current use of insulin (HCC)    Dermatitis    Bruising    Hematoma of right lower extremity    Hematoma of leg, right, initial encounter       MEDICAL DECISION MAKING AND PLAN  1. Neuro:  1. Intubated, sedated, will wean to extubate after operating room  2. Precedex likely causing bradycardia, will change sedation  3. Pain control- Flexeril, Tylenol, Ibuprofen, Gabapentin. 4. Follows commands, moving all extremities   2. CV  1. Cardiology following, pt with 2 prosthetic valves. Cardiology recs appreciated  2. Pt on levophed at 1 mcg, 0.4 mcg vasopressin, will wean levophed as tolerated. 3. Started on hepatin 5,000 units tid. 4. Hx a fib, preserved EF  3. Pulm  1. Intubated, sedated  2. Vent settings: PRVC- FiO2- 30%, RR-14, TV-570, PEEP-5, P/F ratio- 423  3. ABG-7.558, PCO2-34.3, PO2 127.3, bicarb-30.6  4.  Will try to wean to extubate after operating room today  4. GI/Nutrition  1. Will start tube feeds via NG tube, advanced towards goal tolerated  5. Renal/lytes  1. ESRD on dialysis, dialysis on 3/9, nephrology following   6. Heme  1. Hemoglobin stable- 6.7 today, we will order one unit PRBC  2. Total Product- 4 PRBC, 2 FFP  8. Endocrine        1. Insulin drip ,will continue until tube feeds at goal   7. Musculoskeletal  1. Moving all extremities   8. Skin  1. Wound vac exchanged in operating room today  2. Will consult plastic surgery next week for wound evaluation for possible closure. 9. Micro  1. Clindamycin 900 mg q 8 hrs, started 3/8/2019, we will continue 48 hours after OR today  2. WBC 6.9 today   10. Family/dispo  1. Remain in the ICU today  11. Lines  1. Right femoral vein line              SUBJECTIVE    Shey Chicas was seen and examined at the bedside this morning. Patient afebrile overnight. Patient did become bradycardic over the last 12 hours, this is likely due to the Precedex. Patient with hemoglobin of 6.7 this morning, will give 1 unit of packed red blood cells prior to the operating room. Patient underwent dialysis yesterday. Patient currently intubated and sedated on a Precedex drip.       OBJECTIVE  VITALS: Temp: Temp: 98.1 °F (36.7 °C)Temp  Av.3 °F (36.3 °C)  Min: 96.1 °F (35.6 °C)  Max: 98.2 °F (08.5 °C) BP Systolic (05LIB), MPV:938 , Min:39 , KGP:213   Diastolic (03NUU), NTF:57, Min:22, Max:61   Pulse Pulse  Av.2  Min: 44  Max: 85 Resp Resp  Av.3  Min: 0  Max: 24 Pulse ox SpO2  Av %  Min: 99 %  Max: 100 %    CONSTITUTIONAL: Alert, awake, eyes open, follows all commands  HEENT: Neck supple, trachea midline  LUNGS: Clear to auscultation bilaterally  CV: Bradycardia, otherwise normal rhythm  GI: Abdomen soft, nontender, nondistended, no peritoneal signs  MUSCULOSKELETAL: Moving all extremities, RLE wound VAC in place was no sign of leak  NEUROLOGIC: Intubated, sedated, Alert, follows all commands  SKIN: Wound VAC overlying right lower extremity wound no sign of infection overlying wound or drainage              LAB:  CBC:   Recent Labs     03/08/19 2352 03/09/19  0431 03/10/19  0410   WBC 21.6* 23.1* 6.9   HGB 8.6* 8.3* 6.7*   HCT 26.1* 24.9* 19.8*   MCV 92.2 91.5 91.7    175 84*     BMP:   Recent Labs     03/08/19 2352 03/09/19  0431 03/10/19  0410    139 137   K 4.9 4.7 3.6*   CL 99 100 99   CO2 21 22 26   BUN 86* 87* 43*   CREATININE 6.55* 6.72* 4.21*   GLUCOSE 272* 294* 172*         Ana Baptiste DO  3/10/19, 11:46 AM     I personally evaluated the patient and directed the medical decision making with Resident/JORGITO after the physical/radiologic exam and laboratory values were reviewed and confirmed. ANM    Weaned and extubated post op. Has anemia of chronic dz with hgb of 6.8 no signs of hd compromise.   Will hold off transfusion until dialysis in am.    Will eval for transfer to step down in AM

## 2019-03-10 NOTE — PROGRESS NOTES
Renal Progress Note    Patient :  William Lobo; 75 y.o. MRN# [id number]  Location:  0125/0125-01  Attending:  Phil Flores MD  Admit Date:  3/8/2019   Hospital Day: 2    Subjective   Patient was seen and examined. Intubated, follows commands and able to communicate with closed ended questions. Became bradycardic overnight secondary to precedex gtt. Continues on pressor vasopressin 0.2 mcg, insuline gtt, and LR at 50 mL/hr. Recheck  Hbg was 6.8 and trauma is aware and awaiting labs. HD yesterday after surgery removed 2.4 L 4.8 kg over 4 hours. Estimated dry weight around 119 kg and weight today is 127.6 kg, still over dry wt 8.6 kg. Azotemia significant and BUN reduced to 43 this am. Creatinine improved 6.72 to 4.21. Hypokalemia noted at 3.6 this am. I&O reviewed + 4.5L since admission. Return to OR today for washout right LE with wound vac exchange this am. Trauma mentions to try and wean of ventilator this afternoon, continue on Clindamycin q 8h for another 48 hours s/p OR today, and plan to consult plastic surgery next week for wound evaluation and possible closure.     Objective     VS: BP (!) 96/32   Pulse 53   Temp 95.9 °F (35.5 °C) (Axillary)   Resp 18   Wt 285 lb 11.5 oz (129.6 kg)   SpO2 100%   BMI 44.75 kg/m²   MAXIMUM TEMPERATURE OVER 24HRS:  Temp (24hrs), Av °F (36.1 °C), Min:95.9 °F (35.5 °C), Max:98.1 °F (36.7 °C)    24HR BLOOD PRESSURE RANGE:  Systolic (09SIR), QOF:07 , Min:39 , YIZ:038   ; Diastolic (66YUT), HIS:04, Min:22, Max:49    24HR INTAKE/OUTPUT:      Intake/Output Summary (Last 24 hours) at 3/10/2019 1427  Last data filed at 3/10/2019 1216  Gross per 24 hour   Intake 3178.17 ml   Output 520 ml   Net 2658.17 ml     WEIGHT :  Patient Vitals for the past 96 hrs (Last 3 readings):   Weight   03/10/19 0545 285 lb 11.5 oz (129.6 kg)   19 1315 281 lb 4.9 oz (127.6 kg)   19 0900 289 lb 11 oz (131.4 kg)       Current Medications:     Scheduled Meds:    sodium chloride 250 mL Intravenous Once    magnesium sulfate  2 g Intravenous Q1H    ibuprofen  400 mg Oral 4x Daily    gabapentin  300 mg Oral BID    heparin (porcine)  5,000 Units Subcutaneous 3 times per day    cyclobenzaprine  5 mg Oral TID    lansoprazole  30 mg Oral QAM AC    acetaminophen  650 mg Oral 4x Daily    ALPRAZolam  0.5 mg Oral BID    atorvastatin  10 mg Oral Daily    buPROPion  150 mg Oral QAM    donepezil  5 mg Oral QPM    fluticasone  2 spray Nasal Daily    levothyroxine  25 mcg Oral Daily    cetirizine  5 mg Oral Daily    midodrine  10 mg Oral TID    sevelamer  800 mg Oral TID WC    sodium chloride flush  10 mL Intravenous 2 times per day    sodium chloride  250 mL Intravenous Once    clindamycin (CLEOCIN) IV  900 mg Intravenous Q8H    fentanNYL  25 mcg Intravenous Once     Continuous Infusions:    norepinephrine Stopped (03/10/19 0810)    dextrose      insulin (HUMAN R) non-weight based infusion 0.5 Units/hr (03/10/19 1308)    vasopressin (Septic Shock) infusion 0.02 Units/min (03/10/19 1217)    lactated ringers 50 mL/hr at 03/09/19 1616    dextrose      dexmedetomidine (PRECEDEX) IV infusion Stopped (03/10/19 0930)       Physical Examination:     General: well-developed and well nourished, in no acute distress, obese, on the ventilator and Awake, alert  Eyes: conjunctivae pale   ENT: no obvious thrush, oral endotracheal tube in place   Neck: no JVD, midline trachea, no accessory muscle use   Pulmonary: good bilateral air entry and clear to auscultation anteriorly and laterally without any wheezes or rales or rhonchi  Cardiovascular: No rubs, positive systolic murmur, EKG shows a sinus bradycardia first degree block. EKG confirmed today.   Abdomen: obese and soft and non tender and not distended with active bowel sounds  Extremities: hematoma over the distal right lower extremity with wound VAC in place  Skin: large hematoma over the right lower extremity along the pretibial area with a wound VAC in place, draining serosanguineous. Labs:       Recent Labs     03/08/19 2352 03/09/19  0431 03/10/19  0410 03/10/19  1218   WBC 21.6* 23.1* 6.9  --    RBC 2.83* 2.72* 2.16*  --    HGB 8.6* 8.3* 6.7* 6.8*   HCT 26.1* 24.9* 19.8* 20.3*   MCV 92.2 91.5 91.7  --    MCH 30.4 30.5 31.0  --    MCHC 33.0 33.3 33.8  --    RDW 15.9* 16.5* 16.5*  --     175 84*  --    MPV 9.8 9.2 10.1  --       BMP:   Recent Labs     03/08/19  2352 03/09/19  0431 03/10/19  0410    139 137   K 4.9 4.7 3.6*   CL 99 100 99   CO2 21 22 26   BUN 86* 87* 43*   CREATININE 6.55* 6.72* 4.21*   GLUCOSE 272* 294* 172*   CALCIUM 7.9* 8.2* 8.0*      Phosphorus:     Recent Labs     03/10/19  0410   PHOS 3.3     Magnesium:    Recent Labs     03/10/19  0410   MG 1.7     Albumin:    Recent Labs     03/10/19  0410   LABALBU 2.8*       03/10/19 0319     POC pH 7.350 - 7.450 7.558 High     POC pCO2 35.0 - 48.0 mm Hg 34.3 Low     POC PO2 83.0 - 108.0 mm Hg 127.3 High     POC HCO3 21.0 - 28.0 mmol/L 30.6 High     TCO2 (calc), Art 22.0 - 29.0 mmol/L 32 High     Negative Base Excess, Art 0.0 - 2.0 NOT REPORTED    Positive Base Excess, Art 0.0 - 3.0 8 High     POC O2 SAT 94.0 - 98.0 % 99 High             Urinalysis/Chemistries:      Lab Results   Component Value Date    NITRU NEGATIVE 04/27/2016    COLORU DELORES 04/27/2016    PHUR 5.0 04/27/2016    WBCUA TOO NUMEROUS TO COUNT 04/27/2016    RBCUA 10 TO 20 04/27/2016    MUCUS NOT REPORTED 04/27/2016    TRICHOMONAS NOT REPORTED 04/27/2016    YEAST NOT REPORTED 04/27/2016    BACTERIA MANY 04/27/2016    SPECGRAV 1.015 04/27/2016    LEUKOCYTESUR LARGE 04/27/2016    UROBILINOGEN Normal 04/27/2016    BILIRUBINUR NEGATIVE 04/27/2016    GLUCOSEU NEGATIVE 04/27/2016    KETUA NEGATIVE 04/27/2016    AMORPHOUS 2+ 04/27/2016       Radiology:     CXR: Reviewed. Assessment:     1. ESRD with the patient usually on a Monday and Wednesday and Friday at [facility].    2. A degree of shock with the patient requiring vasopressors with wide variations in blood pressure. Off levophed. 3. Right distal lower extremity hematoma with a wound VAC in place after exploration, postoperative day #2. OR this morning for debridement and wound vac change. 4. Anemia of chronic disease and blood loss - Given 1 PRBC prior to surgery was 6.7 this am. Recheck 6.8. Trauma aware. 5. Leukocytosis - Remains on Clindamycin for next 48 hours q8h  6. Significant azotemia - BUN 43 decreased from 87 with HD yesterday. 7. Total body volume overload secondary to missing dialysis on Friday  8. Yellowish hue to the patient's skin  9. Ventilator dependent and acute respiratory failure - Attempting to wean. Vent settings: PRVC- FiO2- 30%, RR-14, TV-570, PEEP-5, P/F ratio- 423 and ABG-7.558, PCO2-34.3, PO2 127.3, bicarb-30.6    Plan:   1. Dialysis [facility] as per schedule. Next dialysis tomorrow 3/11/19.  2. Wean vasopressors as tolerated. 3. Hbg still 6.8 upon recheck. Agree with transfusion per Trauma. Monitor closely. 4. Labs in am  5. Discontinue IVF? Nutrition   Renal Diet/TF      SHAMEKA Valenzuela- CNP  [facility]    Attending Physician Statement  I have discussed the care of José Miguel Hunt, including pertinent history and exam findings with the CNP. I have reviewed the key elements of all parts of the encounter with the CNP. I have seen and examined the patient with the CNP. I agree with the assessment and plan and status of the problem list as documented. Addiitionally I recommend Continue gentle intravenous hydration for now. The patient has not extubated. The patient will tentatively be scheduled for his usual dialysis treatment tomorrow, Monday, 3/11/19. Continue to monitor lab data as ordered.     Mauricio Daniel MD  Nephrology Attending Physician  [facility]

## 2019-03-10 NOTE — BRIEF OP NOTE
Brief Postoperative Note  ______________________________________________________________    Patient: Wesley Tiwari  YOB: 1945  MRN: [id number]  Date of Procedure: 3/10/2019    Pre-Op Diagnosis: right leg wound    Post-Op Diagnosis: Same       Procedure(s):  WASHOUT RIGHT LOWER EXTREMITY WITH WOUND VAC EXCHANGE    Anesthesia: Anesthesia type not filed in the log. Surgeon(s):  Christi Sweet MD    Assistant: Lorna Tamayo, PGY-2    Estimated Blood Loss (mL): 20 mL    Fluids: 400 mL crystalloid     Complications: None    Specimens:   * No specimens in log *    Implants:  * No implants in log *      Drains:   Negative Pressure Wound Therapy Leg Anterior; Lower;Right (Active)   Wound Type Acute/Traumatic 3/10/2019  8:00 AM   Dressing Type Black foam 3/10/2019  8:00 AM   Cycle Continuous 3/10/2019  8:00 AM   Target Pressure (mmHg) 125 3/10/2019  8:00 AM   Canister changed?  Yes 3/10/2019  8:00 AM   Dressing Status New drainage 3/10/2019  8:00 AM   Drainage Amount Moderate 3/10/2019  8:00 AM   Drainage Description Sanguinous 3/10/2019  8:00 AM   Output (ml) 150 ml 3/10/2019  6:30 AM   Wound Assessment WILMAR 3/10/2019  8:00 AM       NG/OG/NJ/NE Tube Nasogastric 18 fr Right nostril (Active)   Surrounding Skin Intact 3/8/2019  6:35 PM   Securement device No 3/9/2019  4:30 PM   Status Suction-low intermittent 3/10/2019  8:00 AM   Placement Verified by External Catheter Length;by Gastric Contents 3/10/2019  8:00 AM   NG/OG/NJ/NE External Measurement (cm) 68 cm 3/10/2019  8:00 AM   Drainage Appearance Tan 3/10/2019  8:00 AM   Tube Feeding Renal Formula 3/10/2019 12:00 AM   Tube Feeding Status Stopped 3/10/2019  3:00 AM   Rate/Schedule 25 mL/hr 3/10/2019 12:00 AM   Tube Feeding Intake (mL) 33 ml 3/10/2019  3:00 AM   Free Water Flush (mL) 70 mL 3/9/2019  4:32 PM   Residual Volume (ml) 360 ml 3/9/2019  8:00 PM   Output (mL) 150 ml 3/10/2019  4:00 AM       Rectal Tube With balloon (Active)   Stool Appearance Loose 3/10/2019  8:00 AM   Stool Color Brown 3/10/2019  8:00 AM   Stool Amount Medium 3/9/2019  4:30 PM   Rectal Tube Output 100 ml 3/9/2019  7:14 PM       [REMOVED] Gastrostomy/Enterostomy LUQ (Removed)   Drainage Appearance None 5/12/2016 12:00 PM   Catheter Position (cm marking) 4 cm 5/10/2016  8:00 AM   Site Description Reddened;Scabbed 5/12/2016 12:00 PM   Drain Status Clamped 5/12/2016 12:00 PM   Surrounding Skin Reddened;Dry 5/12/2016 12:00 PM   Dressing Status Clean;Dry; Intact 5/12/2016 12:00 PM   Dressing Type Split gauze 5/12/2016 12:00 PM   Dressing Change Due 05/11/16 5/10/2016  4:00 PM   Tube Feeding Renal Formula 5/12/2016  8:00 AM   Rate/Schedule 50 mL/hr 5/12/2016  8:00 AM   Tube Feeding Intake (mL) 526 ml 5/12/2016  6:00 AM   Free Water Flush (mL) 60 mL 5/11/2016  8:00 PM   Output (mL) 0 ml 5/7/2016  8:00 PM   Tube Placement Verified Yes 5/10/2016  4:00 PM   Residual Volume (ml) 0 ml 5/12/2016  4:00 AM       [REMOVED] Urethral Catheter Double-lumen (Removed)   Catheter Indications Need for fluid management in critically ill patients in a critical care setting not able to be managed by other means such as BSC with hat, bedpan, urinal, condom catheter, or short term intermittent urethral catherization 5/4/2016  4:00 AM   Securement Device Date Changed 04/29/16 4/29/2016  4:00 PM   Site Assessment Swelling; No urethral drainage 5/4/2016  4:00 AM   Urine Color Tea 5/4/2016  4:00 AM   Urine Appearance Cloudy 5/4/2016  4:00 AM   Urine Odor Malodorous 5/3/2016  4:00 PM   Output (mL) 30 mL 5/4/2016  4:00 AM   Provider Notified to Remove Yes 5/3/2016  4:00 PM       [REMOVED] Urethral Catheter Non-latex 18 fr (Removed)   Catheter Indications Need for fluid management in critically ill patients in a critical care setting not able to be managed by other means such as BSC with hat, bedpan, urinal, condom catheter, or short term intermittent urethral catherization 3/8/2019  8:00 PM   Site Assessment No urethral drainage

## 2019-03-10 NOTE — OP NOTE
OPERATIVE NOTE    PATIENT: Virginia Ye    MRN: [id number]    DATE OF PROCEDURE: 3/10/2019     SURGEON: Dr. Imani Ramos    ASSISTANT: Giovani Preston, PGY-2    PREOPERATIVE DIAGNOSIS: Right lower extremity wound s/p debridement with evacuation of hematoma    POSTOPERATIVE DIAGNOSIS: Same     OPERATION: Washout of right lower extremity wound,Takedown of wound VAC, Excisional debridement of right lower extremity wound     ANESTHESIA: General    ESTIMATED BLOOD LOSS: 20 mL    COMPLICATIONS: None     FLUIDS: 400 mL Crystalloid     SPECIMENS:  Was Not Obtained     HISTORY: The patient is a 75y.o. year old male with history of right lower extremity wound due to falling out of his wheelchair. Patient is currently postop day 2 status post evacuation of right lower extremity hematoma with debridement of Prilosec 20 wound. Patient had a wound VAC placement operating room after debridement. The patient is being taken back to the operating room today for further debridement and washout of wound with placement of new wound VAC. Risks, benefits, expected outcome, and alternatives to the procedure were explained and all questions answered. Patient and his wife understand and his wife signed a consent for procedure. PROCEDURE: The patient was brought to the operating room and placed on the operating table in a supine position. A time out was performed to confirm patient, procedure, location, and allergies. General anesthesia was given, and the patient was then positioned, prepped in sterile fashion and draped. The patient's right lower extremity was prepped with chlorhexidine and the wound VAC was taken down in a sterile fashion. All the staples and the black sponge wound VAC material were removed from the wound. The right location and the wound was then irrigated with normal saline and mechanically debrided using a lap sponge. All bleeding was controlled with Bovie cautery.   A small area of skin on the medial aspect of the right lower extremity wound was excised using Bovie cautery, this area measured 3 x 2 cm. Overall the wound measured 20 x 14 cm. The wound was then dried, all hemostasis was achieved. Two bottles of Jadyn then applied to the wound and Xeroform gauze was placed over lying the wound. A black sponge for the wound VAC was then cut accordingly to the size of the wound, this was then applied overlying the wound. The wound VAC was then placed on top of the wound in a sterile fashion. The VAC was then connected to suction, the suction was placed to 125 mmHg, the suction showed good seal with no leak. At the end of the case all sponge, needle, and management counts are correct. The patient tolerated the procedure well and will be left intubated and taken back to the surgical ICU. Dr. Janan Gilford was present for the entire case.     Electronically signed by Savita Hutton DO on 3/10/2019 at 11:39 AM

## 2019-03-10 NOTE — PLAN OF CARE
Problem: OXYGENATION/RESPIRATORY FUNCTION  Goal: Patient will maintain patent airway  3/9/2019 2016 by Claudia Hernandez RCP  Outcome: Ongoing     Problem: OXYGENATION/RESPIRATORY FUNCTION  Goal: Patient will achieve/maintain normal respiratory rate/effort  Description  Respiratory rate and effort will be within normal limits for the patient  3/9/2019 2016 by Claudia Hernandez RCP  Outcome: Ongoing     Problem: MECHANICAL VENTILATION  Goal: Patient will maintain patent airway  3/9/2019 2016 by Claudia Hernandez RCP  Outcome: Ongoing     Problem: MECHANICAL VENTILATION  Goal: Oral health is maintained or improved  3/9/2019 2016 by Claudia Hernandez RCP  Outcome: Ongoing     Problem: MECHANICAL VENTILATION  Goal: ET tube will be managed safely  3/9/2019 2016 by Claudia Hernandez RCP  Outcome: Ongoing     Problem: MECHANICAL VENTILATION  Goal: Ability to express needs and understand communication  3/9/2019 2016 by Claudia Hernandez RCP  Outcome: Ongoing     Problem: MECHANICAL VENTILATION  Goal: Mobility/activity is maintained at optimum level for patient  3/9/2019 2016 by Claudia Hernandez RCP  Outcome: Ongoing     Problem: SKIN INTEGRITY  Goal: Skin integrity is maintained or improved  3/9/2019 2016 by Claudia Hernandez RCP  Outcome: Ongoing

## 2019-03-10 NOTE — CARE COORDINATION
OR today at 1000 for right lower leg hematoma, return to  [facility]-referral sent

## 2019-03-10 NOTE — PLAN OF CARE
Problem: OXYGENATION/RESPIRATORY FUNCTION  Goal: Patient will maintain patent airway  3/10/2019 1608 by Vicenta Cooks, RN  Outcome: Met This Shift  3/10/2019 0317 by Marco Clark RN  Outcome: Ongoing  Goal: Patient will achieve/maintain normal respiratory rate/effort  Description  Respiratory rate and effort will be within normal limits for the patient  3/10/2019 1608 by Vicenta Cooks, RN  Outcome: Met This Shift  3/10/2019 0317 by Marco Clark RN  Outcome: Ongoing     Problem: MECHANICAL VENTILATION  Goal: Patient will maintain patent airway  3/10/2019 1608 by Vicenta Cooks, RN  Outcome: Met This Shift  3/10/2019 0317 by Marco Clark RN  Outcome: Ongoing  Goal: Oral health is maintained or improved  3/10/2019 1608 by Vicenta Cooks, RN  Outcome: Met This Shift  3/10/2019 0317 by Marco Clark RN  Outcome: Ongoing  Goal: ET tube will be managed safely  3/10/2019 1608 by Vicenta Cooks, RN  Outcome: Met This Shift  3/10/2019 0317 by Marco Clark RN  Outcome: Ongoing  Goal: Ability to express needs and understand communication  3/10/2019 1608 by Vicenta Cooks, RN  Outcome: Met This Shift  3/10/2019 0317 by Marco Clark RN  Outcome: Ongoing  Goal: Mobility/activity is maintained at optimum level for patient  3/10/2019 1608 by Vicenta Cooks, RN  Outcome: Met This Shift  3/10/2019 0317 by Marco Clark RN  Outcome: Ongoing     Problem: SKIN INTEGRITY  Goal: Skin integrity is maintained or improved  3/10/2019 1608 by Vicenta Cooks, RN  Outcome: Met This Shift  3/10/2019 0317 by Marco Clark RN  Outcome: Ongoing     Problem: Confusion - Acute:  Goal: Absence of continued neurological deterioration signs and symptoms  Description  Absence of continued neurological deterioration signs and symptoms  3/10/2019 1608 by Vicenta Cooks, RN  Outcome: Met This Shift  3/10/2019 0317 by Marco Clark RN  Outcome: Ongoing  Goal: Mental status will be RN  Outcome: Met This Shift  3/10/2019 0317 by Irving Back RN  Outcome: Ongoing     Problem: Risk for Impaired Skin Integrity  Goal: Tissue integrity - skin and mucous membranes  Description  Structural intactness and normal physiological function of skin and  mucous membranes.   3/10/2019 1608 by Reyes Barber, RN  Outcome: Met This Shift  3/10/2019 0317 by Irving Back RN  Outcome: Ongoing     Problem: Falls - Risk of:  Goal: Absence of physical injury  Description  Absence of physical injury  3/10/2019 1608 by Reyes Barber, RN  Outcome: Met This Shift  3/10/2019 0317 by Irving Back RN  Outcome: Ongoing  Goal: Will remain free from falls  Description  Will remain free from falls  3/10/2019 1608 by Reyes Barber, RN  Outcome: Met This Shift  3/10/2019 0317 by Irving Back RN  Outcome: Ongoing     Problem: Nutrition  Goal: Optimal nutrition therapy  3/10/2019 1608 by Reyes Barber, RN  Outcome: Met This Shift  3/10/2019 0317 by Irving Back RN  Outcome: Ongoing

## 2019-03-10 NOTE — PROGRESS NOTES
POST OP NOTE    SUBJECTIVE  Pt s/p washout RLE with wound vac exchange. OBJECTIVE  VITALS:  BP (!) 109/41   Pulse 71   Temp 93.6 °F (34.2 °C) (Rectal)   Resp (!) 31   Wt 285 lb 11.5 oz (129.6 kg)   SpO2 100%   BMI 44.75 kg/m²         GENERAL:  awake and alert. No acute distress  CARDIOVASCULAR:  regular rate and rhythm on monitor  LUNGS:  CTA Bilaterally  ABDOMEN:   Obese abdomen, soft, non-tender, non-distended, no signs of peritonitis  INCISION: wound vac in place to RLE without signs of infection overlying wound    ASSESSMENT  1. POD# 0 s/p washout of RLE with wound vac exchange    PLAN  1. Pain management- Flexeril, Tylenol, Ibuprofen, Gabapentin  2. Heparin 5, 000 units TID  3. Tube feeds via NG tube, advance towards goal as tolerated  4. Pt extubated after OR - continue to monitor resp status  5. ESRD on dialysis, next dialysis 3/11, nephrology following  6. Hgb 6.8 after 1uPRBC today  7. Consult plastic surgery next week for wound evaluation for possible closure  8.  Clindamycin x 2 more days      [facility]  Trauma/Surgery Service  3/10/2019 at 6:53 PM
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
## 2019-03-11 PROBLEM — D62 ACUTE BLOOD LOSS ANEMIA: Status: ACTIVE | Noted: 2019-03-11

## 2019-03-11 LAB
ABSOLUTE EOS #: 0.33 K/UL (ref 0–0.44)
ABSOLUTE IMMATURE GRANULOCYTE: 0.12 K/UL (ref 0–0.3)
ABSOLUTE LYMPH #: 0.75 K/UL (ref 1.1–3.7)
ABSOLUTE MONO #: 0.84 K/UL (ref 0.1–1.2)
ANION GAP SERPL CALCULATED.3IONS-SCNC: 14 MMOL/L (ref 9–17)
BASOPHILS # BLD: 0 % (ref 0–2)
BASOPHILS ABSOLUTE: 0.03 K/UL (ref 0–0.2)
BUN BLDV-MCNC: 53 MG/DL (ref 8–23)
BUN/CREAT BLD: ABNORMAL (ref 9–20)
CALCIUM SERPL-MCNC: 7.6 MG/DL (ref 8.6–10.4)
CHLORIDE BLD-SCNC: 97 MMOL/L (ref 98–107)
CO2: 24 MMOL/L (ref 20–31)
CREAT SERPL-MCNC: 5.37 MG/DL (ref 0.7–1.2)
DIFFERENTIAL TYPE: ABNORMAL
EKG ATRIAL RATE: 47 BPM
EKG P-R INTERVAL: 308 MS
EKG Q-T INTERVAL: 594 MS
EKG QRS DURATION: 92 MS
EKG QTC CALCULATION (BAZETT): 525 MS
EKG R AXIS: -32 DEGREES
EKG T AXIS: 9 DEGREES
EKG VENTRICULAR RATE: 47 BPM
EOSINOPHILS RELATIVE PERCENT: 2 % (ref 1–4)
GFR AFRICAN AMERICAN: 13 ML/MIN
GFR NON-AFRICAN AMERICAN: 11 ML/MIN
GFR SERPL CREATININE-BSD FRML MDRD: ABNORMAL ML/MIN/{1.73_M2}
GFR SERPL CREATININE-BSD FRML MDRD: ABNORMAL ML/MIN/{1.73_M2}
GLUCOSE BLD-MCNC: 165 MG/DL (ref 75–110)
GLUCOSE BLD-MCNC: 186 MG/DL (ref 75–110)
GLUCOSE BLD-MCNC: 189 MG/DL (ref 75–110)
GLUCOSE BLD-MCNC: 194 MG/DL (ref 75–110)
GLUCOSE BLD-MCNC: 197 MG/DL (ref 75–110)
GLUCOSE BLD-MCNC: 205 MG/DL (ref 70–99)
HCT VFR BLD CALC: 18.3 % (ref 40.7–50.3)
HCT VFR BLD CALC: 20.6 % (ref 40.7–50.3)
HEMOGLOBIN: 6 G/DL (ref 13–17)
HEMOGLOBIN: 6.9 G/DL (ref 13–17)
IMMATURE GRANULOCYTES: 1 %
INR BLD: 3.3
LYMPHOCYTES # BLD: 5 % (ref 24–43)
MCH RBC QN AUTO: 30.8 PG (ref 25.2–33.5)
MCHC RBC AUTO-ENTMCNC: 33.5 G/DL (ref 28.4–34.8)
MCV RBC AUTO: 92 FL (ref 82.6–102.9)
MONOCYTES # BLD: 6 % (ref 3–12)
NRBC AUTOMATED: 0.5 PER 100 WBC
PDW BLD-RTO: 15.8 % (ref 11.8–14.4)
PLATELET # BLD: 136 K/UL (ref 138–453)
PLATELET ESTIMATE: ABNORMAL
PMV BLD AUTO: 9.8 FL (ref 8.1–13.5)
POTASSIUM SERPL-SCNC: 4.3 MMOL/L (ref 3.7–5.3)
PROTHROMBIN TIME: 31.8 SEC (ref 9–12)
RBC # BLD: 2.24 M/UL (ref 4.21–5.77)
RBC # BLD: ABNORMAL 10*6/UL
SEG NEUTROPHILS: 86 % (ref 36–65)
SEGMENTED NEUTROPHILS ABSOLUTE COUNT: 12.26 K/UL (ref 1.5–8.1)
SODIUM BLD-SCNC: 135 MMOL/L (ref 135–144)
WBC # BLD: 14.3 K/UL (ref 3.5–11.3)
WBC # BLD: ABNORMAL 10*3/UL

## 2019-03-11 PROCEDURE — 6370000000 HC RX 637 (ALT 250 FOR IP): Performed by: STUDENT IN AN ORGANIZED HEALTH CARE EDUCATION/TRAINING PROGRAM

## 2019-03-11 PROCEDURE — 94762 N-INVAS EAR/PLS OXIMTRY CONT: CPT

## 2019-03-11 PROCEDURE — 85025 COMPLETE CBC W/AUTO DIFF WBC: CPT

## 2019-03-11 PROCEDURE — 36430 TRANSFUSION BLD/BLD COMPNT: CPT

## 2019-03-11 PROCEDURE — 85610 PROTHROMBIN TIME: CPT

## 2019-03-11 PROCEDURE — P9016 RBC LEUKOCYTES REDUCED: HCPCS

## 2019-03-11 PROCEDURE — 90935 HEMODIALYSIS ONE EVALUATION: CPT

## 2019-03-11 PROCEDURE — 97166 OT EVAL MOD COMPLEX 45 MIN: CPT

## 2019-03-11 PROCEDURE — P9046 ALBUMIN (HUMAN), 25%, 20 ML: HCPCS | Performed by: INTERNAL MEDICINE

## 2019-03-11 PROCEDURE — 6360000002 HC RX W HCPCS: Performed by: STUDENT IN AN ORGANIZED HEALTH CARE EDUCATION/TRAINING PROGRAM

## 2019-03-11 PROCEDURE — 36415 COLL VENOUS BLD VENIPUNCTURE: CPT

## 2019-03-11 PROCEDURE — 6360000002 HC RX W HCPCS: Performed by: INTERNAL MEDICINE

## 2019-03-11 PROCEDURE — 97535 SELF CARE MNGMENT TRAINING: CPT

## 2019-03-11 PROCEDURE — 6370000000 HC RX 637 (ALT 250 FOR IP): Performed by: NURSE PRACTITIONER

## 2019-03-11 PROCEDURE — 6370000000 HC RX 637 (ALT 250 FOR IP): Performed by: SURGERY

## 2019-03-11 PROCEDURE — 2580000003 HC RX 258: Performed by: STUDENT IN AN ORGANIZED HEALTH CARE EDUCATION/TRAINING PROGRAM

## 2019-03-11 PROCEDURE — 85014 HEMATOCRIT: CPT

## 2019-03-11 PROCEDURE — 85018 HEMOGLOBIN: CPT

## 2019-03-11 PROCEDURE — 2580000003 HC RX 258

## 2019-03-11 PROCEDURE — 99211 OFF/OP EST MAY X REQ PHY/QHP: CPT

## 2019-03-11 PROCEDURE — 82947 ASSAY GLUCOSE BLOOD QUANT: CPT

## 2019-03-11 PROCEDURE — 1200000000 HC SEMI PRIVATE

## 2019-03-11 PROCEDURE — 90937 HEMODIALYSIS REPEATED EVAL: CPT

## 2019-03-11 PROCEDURE — 80048 BASIC METABOLIC PNL TOTAL CA: CPT

## 2019-03-11 PROCEDURE — 86900 BLOOD TYPING SEROLOGIC ABO: CPT

## 2019-03-11 RX ORDER — 0.9 % SODIUM CHLORIDE 0.9 %
250 INTRAVENOUS SOLUTION INTRAVENOUS ONCE
Status: DISCONTINUED | OUTPATIENT
Start: 2019-03-11 | End: 2019-03-11

## 2019-03-11 RX ORDER — WARFARIN SODIUM 1 MG/1
1 TABLET ORAL ONCE
Status: COMPLETED | OUTPATIENT
Start: 2019-03-11 | End: 2019-03-11

## 2019-03-11 RX ORDER — WARFARIN SODIUM 2 MG/1
2 TABLET ORAL
Status: DISCONTINUED | OUTPATIENT
Start: 2019-03-12 | End: 2019-03-12

## 2019-03-11 RX ORDER — WARFARIN SODIUM 3 MG/1
3 TABLET ORAL
Status: DISCONTINUED | OUTPATIENT
Start: 2019-03-12 | End: 2019-03-12

## 2019-03-11 RX ORDER — ALBUMIN (HUMAN) 12.5 G/50ML
25 SOLUTION INTRAVENOUS
Status: COMPLETED | OUTPATIENT
Start: 2019-03-11 | End: 2019-03-11

## 2019-03-11 RX ORDER — WARFARIN SODIUM 3 MG/1
3 TABLET ORAL
Status: DISCONTINUED | OUTPATIENT
Start: 2019-03-11 | End: 2019-03-11

## 2019-03-11 RX ORDER — 0.9 % SODIUM CHLORIDE 0.9 %
250 INTRAVENOUS SOLUTION INTRAVENOUS ONCE
Status: COMPLETED | OUTPATIENT
Start: 2019-03-11 | End: 2019-03-11

## 2019-03-11 RX ORDER — GABAPENTIN 300 MG/1
300 CAPSULE ORAL DAILY
Status: DISCONTINUED | OUTPATIENT
Start: 2019-03-12 | End: 2019-03-19 | Stop reason: HOSPADM

## 2019-03-11 RX ADMIN — GABAPENTIN 300 MG: 300 CAPSULE ORAL at 09:04

## 2019-03-11 RX ADMIN — ATORVASTATIN CALCIUM 10 MG: 10 TABLET, FILM COATED ORAL at 09:04

## 2019-03-11 RX ADMIN — LEVOTHYROXINE SODIUM 25 MCG: 25 TABLET ORAL at 09:05

## 2019-03-11 RX ADMIN — Medication 10 ML: at 09:06

## 2019-03-11 RX ADMIN — INSULIN LISPRO 2 UNITS: 100 INJECTION, SOLUTION INTRAVENOUS; SUBCUTANEOUS at 21:36

## 2019-03-11 RX ADMIN — SODIUM CHLORIDE, POTASSIUM CHLORIDE, SODIUM LACTATE AND CALCIUM CHLORIDE: 600; 310; 30; 20 INJECTION, SOLUTION INTRAVENOUS at 00:22

## 2019-03-11 RX ADMIN — ACETAMINOPHEN 650 MG: 325 TABLET ORAL at 21:34

## 2019-03-11 RX ADMIN — ALPRAZOLAM 0.5 MG: 0.5 TABLET ORAL at 09:12

## 2019-03-11 RX ADMIN — IBUPROFEN 400 MG: 100 SUSPENSION ORAL at 17:06

## 2019-03-11 RX ADMIN — FLUTICASONE PROPIONATE 2 SPRAY: 50 SPRAY, METERED NASAL at 09:05

## 2019-03-11 RX ADMIN — IBUPROFEN 400 MG: 100 SUSPENSION ORAL at 12:45

## 2019-03-11 RX ADMIN — FENTANYL CITRATE 50 MCG: 50 INJECTION INTRAMUSCULAR; INTRAVENOUS at 00:21

## 2019-03-11 RX ADMIN — MIDODRINE HYDROCHLORIDE 10 MG: 5 TABLET ORAL at 14:37

## 2019-03-11 RX ADMIN — INSULIN LISPRO 6 UNITS: 100 INJECTION, SOLUTION INTRAVENOUS; SUBCUTANEOUS at 06:15

## 2019-03-11 RX ADMIN — IBUPROFEN 400 MG: 100 SUSPENSION ORAL at 21:35

## 2019-03-11 RX ADMIN — ACETAMINOPHEN 650 MG: 325 TABLET ORAL at 17:06

## 2019-03-11 RX ADMIN — SEVELAMER CARBONATE 800 MG: 800 TABLET, FILM COATED ORAL at 12:40

## 2019-03-11 RX ADMIN — SODIUM CHLORIDE 1000 ML: 900 IRRIGANT IRRIGATION at 00:22

## 2019-03-11 RX ADMIN — MIDODRINE HYDROCHLORIDE 10 MG: 5 TABLET ORAL at 21:35

## 2019-03-11 RX ADMIN — INSULIN LISPRO 3 UNITS: 100 INJECTION, SOLUTION INTRAVENOUS; SUBCUTANEOUS at 09:28

## 2019-03-11 RX ADMIN — ACETAMINOPHEN 650 MG: 325 TABLET ORAL at 09:05

## 2019-03-11 RX ADMIN — SODIUM CHLORIDE 100 ML: 9 INJECTION, SOLUTION INTRAVENOUS at 02:24

## 2019-03-11 RX ADMIN — ALPRAZOLAM 0.5 MG: 0.5 TABLET ORAL at 21:34

## 2019-03-11 RX ADMIN — IBUPROFEN 400 MG: 100 SUSPENSION ORAL at 09:05

## 2019-03-11 RX ADMIN — INSULIN LISPRO 3 UNITS: 100 INJECTION, SOLUTION INTRAVENOUS; SUBCUTANEOUS at 01:14

## 2019-03-11 RX ADMIN — ALBUMIN (HUMAN) 25 G: 0.25 INJECTION, SOLUTION INTRAVENOUS at 09:32

## 2019-03-11 RX ADMIN — INSULIN LISPRO 3 UNITS: 100 INJECTION, SOLUTION INTRAVENOUS; SUBCUTANEOUS at 17:10

## 2019-03-11 RX ADMIN — FENTANYL CITRATE 50 MCG: 50 INJECTION INTRAMUSCULAR; INTRAVENOUS at 01:27

## 2019-03-11 RX ADMIN — SEVELAMER CARBONATE 800 MG: 800 TABLET, FILM COATED ORAL at 09:05

## 2019-03-11 RX ADMIN — CYCLOBENZAPRINE HYDROCHLORIDE 5 MG: 5 TABLET, FILM COATED ORAL at 09:04

## 2019-03-11 RX ADMIN — CETIRIZINE HYDROCHLORIDE 5 MG: 10 TABLET ORAL at 09:04

## 2019-03-11 RX ADMIN — DONEPEZIL HYDROCHLORIDE 5 MG: 5 TABLET, FILM COATED ORAL at 18:02

## 2019-03-11 RX ADMIN — WARFARIN SODIUM 1 MG: 1 TABLET ORAL at 18:02

## 2019-03-11 RX ADMIN — BUPROPION HYDROCHLORIDE 150 MG: 150 TABLET, EXTENDED RELEASE ORAL at 09:04

## 2019-03-11 RX ADMIN — MIDODRINE HYDROCHLORIDE 10 MG: 5 TABLET ORAL at 09:04

## 2019-03-11 RX ADMIN — ACETAMINOPHEN 650 MG: 325 TABLET ORAL at 12:40

## 2019-03-11 RX ADMIN — LANSOPRAZOLE 30 MG: 30 TABLET, ORALLY DISINTEGRATING, DELAYED RELEASE ORAL at 09:05

## 2019-03-11 RX ADMIN — SEVELAMER CARBONATE 800 MG: 800 TABLET, FILM COATED ORAL at 17:05

## 2019-03-11 RX ADMIN — CYCLOBENZAPRINE HYDROCHLORIDE 5 MG: 5 TABLET, FILM COATED ORAL at 21:34

## 2019-03-11 RX ADMIN — CYCLOBENZAPRINE HYDROCHLORIDE 5 MG: 5 TABLET, FILM COATED ORAL at 14:37

## 2019-03-11 ASSESSMENT — PAIN DESCRIPTION - PAIN TYPE
TYPE: ACUTE PAIN;SURGICAL PAIN
TYPE: ACUTE PAIN;SURGICAL PAIN

## 2019-03-11 ASSESSMENT — PAIN SCALES - GENERAL
PAINLEVEL_OUTOF10: 4
PAINLEVEL_OUTOF10: 5
PAINLEVEL_OUTOF10: 9
PAINLEVEL_OUTOF10: 4
PAINLEVEL_OUTOF10: 2
PAINLEVEL_OUTOF10: 4
PAINLEVEL_OUTOF10: 3

## 2019-03-11 ASSESSMENT — PAIN DESCRIPTION - ORIENTATION
ORIENTATION: RIGHT
ORIENTATION: RIGHT

## 2019-03-11 ASSESSMENT — PAIN DESCRIPTION - LOCATION
LOCATION: LEG
LOCATION: LEG

## 2019-03-11 NOTE — PROGRESS NOTES
Pt went to OR yesterday.   Had sinus bradycardia with long QT  Ordered mag replacement for the pt  Repeat EkG tomorrow  Avoid QT prolongation, BB,CCB

## 2019-03-11 NOTE — PROGRESS NOTES
[facility]   Progress Note                   Date:   3/11/2019  Patient name: Primo Mac  Date of admission:  3/8/2019 10:24 AM  MRN:   [id number]  YOB: 1945  PCP: Gabby Cordero DO    Reason for Admission:     Subjective:       Clinical Changes / Abnormalities: in bed, on dialysis. Hgb is still low 6.9. Medications:   Scheduled Meds:   sodium chloride  250 mL Intravenous Once    sodium chloride  250 mL Intravenous Once    [START ON 3/12/2019] gabapentin  300 mg Oral Daily    insulin lispro  0-18 Units Subcutaneous Q4H    ibuprofen  400 mg Oral 4x Daily    heparin (porcine)  5,000 Units Subcutaneous 3 times per day    cyclobenzaprine  5 mg Oral TID    lansoprazole  30 mg Oral QAM AC    acetaminophen  650 mg Oral 4x Daily    ALPRAZolam  0.5 mg Oral BID    atorvastatin  10 mg Oral Daily    buPROPion  150 mg Oral QAM    donepezil  5 mg Oral QPM    fluticasone  2 spray Nasal Daily    levothyroxine  25 mcg Oral Daily    cetirizine  5 mg Oral Daily    midodrine  10 mg Oral TID    sevelamer  800 mg Oral TID WC    sodium chloride flush  10 mL Intravenous 2 times per day    sodium chloride  250 mL Intravenous Once    fentanNYL  25 mcg Intravenous Once     Continuous Infusions:   dextrose      lactated ringers 50 mL/hr at 03/11/19 0022    dextrose       CBC:   Recent Labs     03/09/19  0431 03/10/19  0410 03/10/19  1218 03/11/19  0113 03/11/19  0434   WBC 23.1* 6.9  --   --  14.3*   HGB 8.3* 6.7* 6.8* 6.0* 6.9*    84*  --   --  136*     BMP:    Recent Labs     03/09/19  0431 03/10/19  0410 03/11/19  0433    137 135   K 4.7 3.6* 4.3    99 97*   CO2 22 26 24   BUN 87* 43* 53*   CREATININE 6.72* 4.21* 5.37*   GLUCOSE 294* 172* 205*     Hepatic:   Recent Labs     03/10/19  0410   AST 14   ALT 12   BILITOT 0.27*   ALKPHOS 67     Troponin: No results for input(s): TROPHS in the last 72 hours.   BNP: No results for input(s): BNP in the last 72 hours. Lipids: No results for input(s): CHOL, HDL in the last 72 hours. Invalid input(s): LDLCALCU  INR:   Recent Labs     03/08/19  1044   INR 1.9       Objective:   Vitals: BP (!) 135/44 Comment: per ART line  Pulse 94   Temp 97.8 °F (36.6 °C) (Oral)   Resp 18   Wt 287 lb 11.2 oz (130.5 kg)   SpO2 100%   BMI 45.06 kg/m²   General appearance: alert and cooperative with exam  HEENT: Head: Normocephalic, no lesions, without obvious abnormality. Neck: no adenopathy, no carotid bruit, no JVD, supple, symmetrical, trachea midline and thyroid not enlarged, symmetric, no tenderness/mass/nodules  Lungs: clear to auscultation bilaterally  Heart: regular rate and rhythm, S1, S2 normal, no murmur, click, rub or gallop  Abdomen: soft, non-tender; bowel sounds normal; no masses,  no organomegaly  Extremities: drain in place on right LE     EKG:   Results for orders placed or performed during the hospital encounter of 03/08/19   EKG 12 Lead   Sinus bradycardia with 1st degree A-V block  Left axis deviation  Prolonged QT  Abnormal ECG       ECHO:   Results for orders placed or performed during the hospital encounter of 09/19/18   ECHO Complete 2D W Doppler W Color    Summary  Left ventricle is normal in size Global left ventricular systolic function  is normal Estimated ejection fraction is 55 % . Mild concentric left ventricular hypertrophy. Grade I (mild) left ventricular diastolic dysfunction. Bi-atrial enlargement. Right ventricular dilatation with normal systolic function. Bioprosthetic aortic valve replacement. Peak instantaneous gradient 20 mmHg and mean gradient 11 mmHg. No aortic insufficiency. Bioprosthetic mitral valve replacement. Mean gradient is 7 mm Hg. No mitral regurgitation. No pericardial effusion seen.          Results for orders placed during the hospital encounter of 02/29/16   Echocardiogram Transesophageal Adult (AMADEO)    The study was performed by the Cardiologist, the Cardiology fellow, and the  Sonographer. The study was performed under conscious sedation. Normal left ventricle size and function with an estimated EF > 55%. Calcified and thickened aortic valve with evidence of severe stenosis;  Mild-moderate aortic insufficiency. Mitral annular calcification and leaflet thickening with evidence of  moderate-severe stenosis; Mild mitral regurgitation. No definite evidence of any thrombi, shunts, or vegetations is seen. No significant pericardial effusion is seen. Assessment / Acute Cardiac Problems:     1 Hematoma of right lower extremity sec to fall and on being [facility] with coumadin  2 H/O Bio prosthetic Mitral and Aortic Valve( 2016)  3 Preserved EF  4 ASD repair with Dacron patch 2016  5 Paroxysmal A fib ([id number]) 3 on AC with coumadin  6 ESRD ON HD  7 T 2 DM  8 COPD    Plan of Treatment:   - hold off on [facility] for now  - Hgb 6.9 today  - had recent Echo 9/18  [facility] when ok with surgery- we may need to hold off on [facility] even after discharge     D/w patient    Thank you for allowing me to participate in the care of this patient, please do not hesitate to call if you have any questions. Ben Briones DO, [facility], [address] [facility]  [facility]  [phone]

## 2019-03-11 NOTE — PROGRESS NOTES
Dialysis Post Treatment Note  Patient tolerated treatment well. Denies complaints at this time. Pt alert and oriented. No difficulties noted.    Vitals:    03/11/19 1300   BP: (!) 129/50   Pulse: 85   Resp: 19   Temp:    SpO2: 97%     Pre-Weight = 130.5kg  Post-weight = Weight: 283 lb 15.2 oz (128.8 kg)  Total Liters Processed = Total Liters Processed (l/min): 81.6 l/min  Rinseback Volume (mL) = Rinseback Volume (ml): 330 ml  Net Removal (mL) = 1270 mL  Length of treatment=3.5 hours

## 2019-03-11 NOTE — CONSULTS
Plastics Consult - Brad    Re: wound left leg, S/P hematoma and trauma. Patient well known to me from previous left hand trauma. Patient reports hematoma of leg. Unknown etiology. Bumped leg and broke open. Admitted and debrided by trauma. Now with VAC dressing in place. PAST MEDICAL HISTORY      Past Medical History        Past Medical History:   Diagnosis Date    Anemia in chronic kidney disease (CKD) 4/27/2016    Arthritis      Bacteremia 11/11/2016    Benign prostatic hyperplasia without lower urinary tract symptoms      BMI 40.0-44.9, adult ([address] 3/19/2018    Cellulitis of left lower extremity      Chronic cerebral ischemia 1/3/2017    Closed fracture of forearm 8/13/2013     Broken lft forearm     Controlled type 2 diabetes mellitus with chronic kidney disease on chronic dialysis, with long-term current use of insulin ([address]      Controlled type 2 diabetes mellitus with diabetic polyneuropathy, with long-term current use of insulin ([address] 10/7/2018    Decubital ulcer       NON OPEN BUTTOCKS    Dementia       memory problems    Dialysis patient ([address] 1/3/2017     Goes [facility], Sat in [place] Difficult intravenous access       HAS NEEDED PICC TEAM IN THE PAST    Difficulty walking       WHEELCHAIR BOUND-PIVOTS WITH ASSIST O F 2    DJD (degenerative joint disease) of knee      Elbow, forearm, and wrist, abrasion or friction burn, without mention of infection 8/13/2013     Abrasions lft forearm     Encephalopathy acute 4/27/2016    Encounter regarding vascular access for dialysis for ESRD ([address] 2/2/2017    ESRD (end stage renal disease) on dialysis ([address] 1/17/2017    Forgetfulness       HAS SOME SHORT TERM MEMORY LOSS, QUYEN-WIFE IS LEGAL GUARDIAN    H/O transesophageal echocardiography ([facility]ADEO) for monitoring      Hemodialysis patient ([address] 11/11/2016     [place].  TUNNELED CATHETER RT UPPER CHEST    Hyperlipidemia 1990     on Meds     Lung Cancer Mother      Diabetes Mother      Alzheimer's Disease Father      Diabetes Maternal Grandmother      High Blood Pressure Sister                 SOCIAL HISTORY        Social History   Social History            Socioeconomic History    Marital status:        Spouse name: Shira Kc Number of children: 2    Years of education: None    Highest education level: None   Occupational History    Occupation: Retired        Employer: [address] resource strain: None    Food insecurity:       Worry: None       Inability: None    Transportation needs:       Medical: None       Non-medical: None   Tobacco Use    Smoking status: Former Smoker       Packs/day: 2.00       Years: 25.00       Pack years: 50.00       Types: Cigarettes       Start date: 3/17/1955       Last attempt to quit: 1980       Years since quittin.2    Smokeless tobacco: Never Used   Substance and Sexual Activity    Alcohol use: No       Alcohol/week: 0.0 oz       Comment: 1-2 drinks per year    Drug use: No    Sexual activity: Never   Lifestyle    Physical activity:       Days per week: None       Minutes per session: None    Stress: None   Relationships    Social connections:       Talks on phone: None       Gets together: None       Attends Buddhist service: None       Active member of club or organization: None       Attends meetings of clubs or organizations: None       Relationship status: None    Intimate partner violence:       Fear of current or ex partner: None       Emotionally abused: None       Physically abused: None       Forced sexual activity: None   Other Topics Concern    None   Social History Narrative    None           Exam:    Patient alert & oriented, cooperative. NAD. HEENT: NC/AT, PERRL, EOMI. Mucous membranes pink and moist.  Chest: Breathing nonlabored. Abd: benign. Ext: old amputations left hand/digits.  VAC dressing in

## 2019-03-11 NOTE — PROGRESS NOTES
Speech Language Pathology  [facility]  Speech Language Pathology    Date: 3/11/2019  Patient Name: Venita Masterson  YOB: 1945   AGE: 75 y.o. MRN: [id number]        Patient Not Available for Speech Therapy     Due to:  [] Testing  [x] Hemodialysis  [] Cancelled by RN  [] Surgery   [] Intubation/Sedation/Pain Medication  [] Medical instability  [] Other: Will attempt evaluation in afternoon if time allows. Next scheduled treatment: 3/11 in PM, 3/12    Completed by: Completed by Ej Bautista,  Clinician  Co-signed by: Lucina Hitchcock A.CCC/SLP

## 2019-03-11 NOTE — PROGRESS NOTES
Occupational Therapy   Occupational Therapy Initial Assessment  Date: 3/11/2019   Patient Name: Aniya Hopper  MRN: [id number]     : 1945    Chief Complaint   Patient presents with    Leg Injury     right lower leg hematoma, sensation intact, right foot cool with pulse present     Date of Service: 3/11/2019    Discharge Recommendations:  Subacute/Skilled Nursing Facility(pt was at [facility], pt now needs Occupational Therapy in facility. Pt has wound vac.)  OT Equipment Recommendations  Equipment Needed: No    Assessment   Performance deficits / Impairments: Decreased cognition;Decreased functional mobility ; Decreased ADL status; Decreased endurance;Decreased balance;Decreased strength  Assessment: Pt would benefit from [facility]' acute care OT to address deficits in FM, endurance, balance, cognition, and strength in BUE to inc participation and safety in ADLs  Prognosis: Good  Decision Making: Medium Complexity  Patient Education: POC, discharge, safety  REQUIRES OT FOLLOW UP: Yes  Activity Tolerance  Activity Tolerance: Patient Tolerated treatment well  Safety Devices  Safety Devices in place: Yes  Type of devices: Patient at risk for falls; Left in bed;Nurse notified;Call light within reach  Restraints  Initially in place: No           Patient Diagnosis(es): The encounter diagnosis was Hematoma of right lower extremity, initial encounter.      has a past medical history of Anemia in chronic kidney disease (CKD), Arthritis, Bacteremia, Benign prostatic hyperplasia without lower urinary tract symptoms, BMI 40.0-44.9, adult ([address] Cellulitis of left lower extremity, Chronic cerebral ischemia, Closed fracture of forearm, Controlled type 2 diabetes mellitus with chronic kidney disease on chronic dialysis, with long-term current use of insulin ([address] Controlled type 2 diabetes mellitus with diabetic polyneuropathy, with long-term current use of insulin ([address] Decubital ulcer, Dementia, Dialysis patient [facility]), Difficult intravenous access, Difficulty walking, DJD (degenerative joint disease) of knee, Elbow, forearm, and wrist, abrasion or friction burn, without mention of infection, Encephalopathy acute, Encounter regarding vascular access for dialysis for ESRD ([address] ESRD (end stage renal disease) on dialysis ([address] Forgetfulness, H/O transesophageal echocardiography (AMADEO) for monitoring, Hemodialysis patient ([address] Hyperlipidemia, Hypertension, Intercritical gout, Joint pain, knee, Long-term insulin use ([address] Major depressive disorder with single episode, in partial remission ([address] Mobility impaired, Morbid obesity ([address] Muscle weakness, Obesity, Obstructive sleep apnea, HEATHER on CPAP, Paroxysmal atrial fibrillation ([address] Respiratory failure ([address] S/P cardiac cath, Seborrhea, Severe aortic stenosis, Severe mitral valve stenosis, Severe sepsis (HCC), Skin rash, Stasis dermatitis, Thyroid disease, Traumatic amputation of thumb, Venous stasis dermatitis, Wears glasses, and Wheelchair bound. has a past surgical history that includes Colonoscopy (11/19/09); Knee arthroscopy (Left, 09/17/99); Hand surgery (Left, 1990 (x10-12 surgeries)); Arm Surgery (Left, 1990); Cardiac catheterization; Nasal septum surgery; Sternotomy (3/18/16); Aortic valve replacement (03/18/2016); Mitral valve replacement (03/18/2016); other surgical history (3/18/16); other surgical history (3/18/16); other surgical history (Right, 01/09/2017); other surgical history (Right, 08/29/2017); pr ligatn angioaccess av fistula (Right, 8/29/2017); pr egd transoral biopsy single/multiple (N/A, 10/17/2017); central venous catheter (Right, 02/21/2018); Colonoscopy (N/A, 10/12/2018); other surgical history (04/05/2018); vascular surgery (12/06/2018); EXPLORATION OF WOUND OF EXTREMITY (Right, 3/10/2019); and EXPLORATION OF WOUND OF EXTREMITY (Right, 3/8/2019).            Restrictions  Restrictions/Precautions  Restrictions/Precautions: Up as Tolerated, Fall Risk, General Precautions  Required Braces or Orthoses?: No  Position Activity Restriction  Other position/activity restrictions: Wound vac on R leg. 6.9 HGB- pt chronically anemic, asymptomatic, recieved a unit of blood today per RN. Subjective   General  Patient assessed for rehabilitation services?: Yes  Family / Caregiver Present: No  Pain Assessment  Pre Treatment Pain Screening  Comments / Details: Pt reports pain inc in R LE w/ inc activity     Social/Functional History  Social/Functional History  Lives With: Other (comment)(ECF)  Type of Home: Facility  Home Layout: One level  Home Access: Level entry  Bathroom Shower/Tub: Walk-in shower  Bathroom Toilet: Handicap height  Bathroom Equipment: Hand-held shower  Home Equipment: Wheelchair-manual(hilario morataya )  Receives Help From: Other (comment)(staff at [facility])  ADL Assistance: Needs assistance(pt reports needing assist w/ bathing, dressing, and set up)  Homemaking Assistance: Needs assistance  Meal Prep: Total  Laundry: Total  Vacuuming: Total  Cleaning: Total  Gardening: Unable to assess (comment)  Yard Work: Unable to assess (comment)  Driving: Total  Shopping: Total  Homemaking Responsibilities: No  Ambulation Assistance: Needs assistance(Pt reports intermittently propelling self in w/c)  Transfer Assistance: Needs assistance(Pt reports completing standing and take a few steps to the toilet for toilet transfers, pt reports using hilario lyft at baseline)  Active : No  Patient's  Info: Ambulette or family  IADL Comments: Return to [facility]  Additional Comments: Pt questionable historian, reporting hasn't stood since 2014, but also reports doing stand step transfers to toilet. Pt reports being at [address] [facility] while wife lives alone at home.         Objective   Vision: Impaired  Vision Exceptions: Wears glasses for distance  Hearing: Exceptions to WFL(hard of hearing)  Hearing Exceptions: No hearing aid    Orientation  Overall Orientation Status: Within Functional Limits     Balance  Sitting Balance: Moderate assistance(sitting EOB, mod/ min A with vc)  Standing Balance: Unable to assess(comment)(d/t poor sitting balance)     ADL  Feeding: Setup;Stand by assistance  Grooming: Setup; Moderate assistance(sitting EOB, pt combed hair. Mod/ Min A provided for sitting balance during activity)  UE Bathing: Setup; Moderate assistance  LE Bathing: Setup;Maximum assistance  UE Dressing: Setup; Moderate assistance  LE Dressing: Setup;Maximum assistance  Toileting: Dependent/Total(fecal mangement system in place )  Additional Comments: Pt supine in bed on arrival. Pt assisted in bed mobility and sat EOB for  activity and grooming activity as documented above. Inc effort for bed mobility. Inc time to moniter BP. Unable to complete func transfers at this time d/t poor sitting balance. Pt retired supine in bed with call light in reach, bed alarm on, and RN notified. Tone RUE  RUE Tone: Normotonic  Tone LUE  LUE Tone: Normotonic  Coordination  Movements Are Fluid And Coordinated: No  Coordination and Movement description: Tremors;Decreased speed;Right UE;Left UE     Bed mobility  Rolling to Left: Moderate assistance  Rolling to Right: Moderate assistance  Supine to Sit: Maximum assistance(Max x2)  Sit to Supine: Maximum assistance(Max x2)  Scooting: Maximal assistance  Comment: Inc effort for bed mobility         Cognition  Overall Cognitive Status: Exceptions  Arousal/Alertness: Delayed responses to stimuli  Following Commands:  Follows one step commands consistently  Problem Solving: Assistance required to generate solutions  Insights: Fully aware of deficits  Initiation: Requires cues for some  Sequencing: Requires cues for some        Sensation  Overall Sensation Status: WFL(Pt denies numbness/ tingling)        LUE AROM (degrees)  LUE AROM : WFL  Left Hand AROM (degrees)  Left Hand AROM: WFL  RUE AROM (degrees)  RUE AROM : WFL  Right Hand AROM

## 2019-03-11 NOTE — PROGRESS NOTES
Nutrition Assessment    Type and Reason for Visit: Reassess    Nutrition Recommendations: Start oral diet, Start Standard ONS with meals. Will monitor tolerance to diet, po intake, and labs. Nutrition Assessment: Pt extubated. TF just stopped. Plan to start oral diet as pt passed swallow study. MD request for Boost oral nutrition supplements TID. Malnutrition Assessment:  · Malnutrition Status: Insufficient data    Nutrition Risk Level: Moderate    Nutrient Needs:  · Estimated Daily Total Kcal: 4945-2949 kcal/day   · Estimated Daily Protein (g): 100-120 gm pro/day    Nutrition Diagnosis:   · Problem: Inadequate oral intake  · Etiology: related to recent extubation   Signs and symptoms:  as evidenced by NPO status due to medical condition    Objective Information:  · Wound Type: Deep Tissue Injury, Pressure Ulcer, Stage I(to left coccyx, scrotum, and left thigh)  · Current Nutrition Therapies:  · Oral Diet Orders: NPO   · Tube Feeding Orders: None -Renal formula discontinued today  · Anthropometric Measures:  · Ht: 5' 6.93\" (170 cm)   · Current Body Wt: 287 lb 11.2 oz (130.5 kg)  · Admission Body Wt: 289 lb 11 oz (131.4 kg)  · Ideal Body Wt: 147 lb 14.9 oz (67.1 kg), % Ideal Body 194%  · BMI Classification: BMI > or equal to 40.0 Obese Class III    Nutrition Interventions:   Start oral diet, Start ONS  Continued Inpatient Monitoring, Education Not Indicated    Nutrition Evaluation:   · Evaluation: Progressing toward goals   · Goals: Meet % of estimated need with nutrition support/oral diet.     · Monitoring: Meal Intake, Supplement Intake, Diet Tolerance, Weight, Pertinent Labs      Electronically signed by Franca Hunter RD, LD on 3/11/19 at 12:55 PM    Contact Number: [phone]

## 2019-03-11 NOTE — CARE COORDINATION
[address] Transitions   3/11/2019    Patient: Teresa Oneil Patient : 1945   MRN: [id number]  Reason for Admission: Hematoma of leg, right, initial encounter [S80.11XA]  Hematoma of leg, right, initial encounter [S80.11XA]  RARS: Readmission Risk Score: 27      Patient seen working with therapy team at time of attempted visit. Review of chart completed.  for unit not available at time of attempted visit to discuss plan of care. Per CM notes, plan is for return to [facility]. Will attempt to meet with patient/family at a later time/date.        Readmission Risk  Patient Active Problem List   Diagnosis    Traumatic amputation of thumb    Essential hypertension    Mixed hyperlipidemia    Obstructive sleep apnea    Morbid obesity ([address]    Benign prostatic hyperplasia without lower urinary tract symptoms    Venous stasis dermatitis    Severe aortic stenosis    Severe mitral valve stenosis    Anemia in chronic kidney disease, on chronic dialysis ([address]    Dialysis patient ([address]    Dementia    Major depressive disorder with single episode, in partial remission ([address]    Chronic cerebral ischemia    ESRD (end stage renal disease) on dialysis (HCC)    Long-term insulin use (HCC)    Paroxysmal atrial fibrillation (HCC)    BMI 40.0-44.9, adult (HCC)    Controlled type 2 diabetes mellitus with chronic kidney disease on chronic dialysis, with long-term current use of insulin (HCC)    Controlled type 2 diabetes mellitus with diabetic polyneuropathy, with long-term current use of insulin (HCC)    Dermatitis    Bruising    Hematoma of right lower extremity    Hematoma of leg, right, initial encounter    Acute blood loss anemia    Accidental fall from wheelchair       Inpatient Summary  Care Transitions Summary    Care Transitions Inpatient Review  Medication Review  Are you able to afford your medications?:  Yes  How often do you have difficulty taking your medications?:  I always take them as prescribed. Housing Review  Who do you live with?:  Other Caregiver  Are you an active caregiver in your home?:  No  Social Support  Durable Medical Equipment  Patient DME:  Wheelchair  Functional Review  Hearing and Vision  Visual Impairment:  Visual impairment (Glasses/contacts)  Hearing Impairment:  Hard of hearing  Care Transitions Interventions         Follow Up  Future Appointments   Date Time Provider Kaity Venus   3/14/2019 11:00 AM Yasmeen Ferrer MD [facility]   10/30/2019  1:00 PM Victorino Zeng MD [facility]       Health Maintenance  There are no preventive care reminders to display for this patient.     Paulo Carbajal MA

## 2019-03-11 NOTE — PROGRESS NOTES
ICU PROGRESS NOTE        PATIENT NAME: Nacho Landry RECORD NO. [id number]  DATE: 3/11/2019    PRIMARY CARE PHYSICIAN: Marbella August,     HD: # 3    ASSESSMENT    Patient Active Problem List   Diagnosis    Traumatic amputation of thumb    Essential hypertension    Mixed hyperlipidemia    Obstructive sleep apnea    Morbid obesity [facility]    Benign prostatic hyperplasia without lower urinary tract symptoms    Venous stasis dermatitis    Severe aortic stenosis    Severe mitral valve stenosis    Anemia in chronic kidney disease, on chronic dialysis ([facility] [facility]    Dialysis patient ([facility] [address] [facility]    Dementia    Major depressive disorder with single episode, in partial remission ([facility] [address] [facility]    Chronic cerebral ischemia    ESRD (end stage renal disease) on dialysis ([facility]    Long-term insulin use ([facility])    Paroxysmal atrial fibrillation ([facility])    BMI 40.0-44.9, adult ([facility])    Controlled type 2 diabetes mellitus with chronic kidney disease on chronic dialysis, with long-term current use of insulin ([facility])    Controlled type 2 diabetes mellitus with diabetic polyneuropathy, with long-term current use of insulin ([facility])    Dermatitis    Bruising    Hematoma of right lower extremity    Hematoma of leg, right, initial encounter    Acute blood loss anemia       MEDICAL DECISION MAKING AND PLAN  1. Neuro:  1. Extubated- 3/10, A&O x 3  2. Pain control- Flexeril, Tylenol, Ibuprofen, Gabapentin. 3. Follows commands, moving all extremities   2. CV  1. Cardiology following, pt with 2 prosthetic valves, afib. Cardiology recs appreciated  2. Will restart coumadin as scheduled from home dose, pharmacy to dose. 3. Low concern for re- bleed from lower extremity. 3. Pulm  1. Extubated 3/10/19.   2. Saturations fine on room air   4. GI/Nutrition  1. Will remove NG tube today and start oral clear liquid diet as the pt has passed a bedside swallow study. 5. Renal/lytes  1.  ESRD on dialysis, dialysis on 3/9, nephrology following   2. Dialysis today, will give one unit of PRBC today with dialysis as his hemoglobin is 6.9, pt also has anemia of chronic disease. 6. Heme  1. Hemoglobin stable- 6.7 today, we will order one unit PRBC  2. Total Product- 6 PRBC, 2 FFP  8.  Endocrine        1. Insulin drip off, transitioned to SS insulin        2. Blood Glucose well controlled. 7. Musculoskeletal  1. Moving all extremities   8. Skin  1. Wound vac exchanged in operating room 3/10/2019 and at bedside 3/10/19 due to small amount of leakage around vac. 2. Plastic surgery consult today for wound evaluation for possible closure.   9. Micro   1. Clindamycin 900 mg q 8 hrs, started 3/8/2019, last dose given 3/10.   2. WBC 6.9 today   10. Family/dispo  1. Transfer out of ICU today, will discuss with medicine team to take over as primary for medical management as there are no other surgical needs at this time. 11. Lines  1. Right femoral vein line can be removed today when adequate IV access has been obtained.        Edgardo0 Silvia Zepeda was seen and examined at bedside this morning. No acute events overnight. Afebrile. Extubated on 3/10/2019. Patient alert and oriented sitting up in bed. Patient with no complaints at this time. Pain well controlled.       OBJECTIVE  VITALS: Temp: Temp: 97.9 °F (36.6 °C)Temp  Av.5 °F (36.4 °C)  Min: 93.6 °F (34.2 °C)  Max: 98.8 °F (85.2 °C) BP Systolic (33JGL), PPD:860 , Min:104 , RXO:071   Diastolic (11QDG), ZKF:59, Min:41, Max:118   Pulse Pulse  Av.5  Min: 48  Max: 102 Resp Resp  Av.7  Min: 13  Max: 31 Pulse ox SpO2  Av.6 %  Min: 98 %  Max: 100 %    CONSTITUTIONAL: Alert, oriented, following all commands  HEENT: neck supple, trachea midline  LUNGS: Clear to auscultation bilaterally, no rales or wheezes  CV: regular rate and rhythm, S1, S2  GI: Abdomen soft, nontender, nondistended, no peritoneal signs  MUSCULOSKELETAL: Moving all extremities, no joint pain  NEUROLOGIC: Cranial nerves II through XII intact, following all commands, alert and oriented ×3  SKIN: Right lower extremity wound VAC intact, no sign of leak or accumulation of blood under the VAC, no erythema, minimal pain to palpation of the right lower extremity        LAB:  CBC:   Recent Labs     03/09/19  0431 03/10/19  0410 03/10/19  1218 03/11/19  0113 03/11/19  0434   WBC 23.1* 6.9  --   --  14.3*   HGB 8.3* 6.7* 6.8* 6.0* 6.9*   HCT 24.9* 19.8* 20.3* 18.3* 20.6*   MCV 91.5 91.7  --   --  92.0    84*  --   --  136*     BMP:   Recent Labs     03/09/19  0431 03/10/19  0410 03/11/19  0433    137 135   K 4.7 3.6* 4.3    99 97*   CO2 22 26 24   BUN 87* 43* 53*   CREATININE 6.72* 4.21* 5.37*   GLUCOSE 294* 172* 205*       Pradeep Saenz DO  3/11/19, 12:55 PM     I personally evaluated the patient and directed the medical decision making with Resident/JORGITO after the physical/radiologic exam and laboratory values were reviewed and confirmed.  [place]

## 2019-03-11 NOTE — PROGRESS NOTES
Nephrology Progress Note        Subjective: patient evaluated on dialysis. Pt is stable on dialysis. Access cannulated without problems. No new issues overnite. Stable hemodynamics. AV fistula works well. Drainage to right shin hematoma continues. Insensible losses high. Hemodynamically stable although sensitive to ultrafiltration. Still about 3-4 kg over his admission weight. Antibiotics continue. Objective:  CURRENT TEMPERATURE:  Temp: 97.8 °F (36.6 °C)  MAXIMUM TEMPERATURE OVER 24HRS:  Temp (24hrs), Av.3 °F (36.3 °C), Min:93.6 °F (34.2 °C), Max:98.8 °F (37.1 °C)    CURRENT RESPIRATORY RATE:  Resp: 18  CURRENT PULSE:  Pulse: 94  CURRENT BLOOD PRESSURE:  BP: (!) 135/44(per ART line)  24HR BLOOD PRESSURE RANGE:  Systolic (07YTR), YQB:318 , Min:104 , UPF:236   ; Diastolic (08WPC), FBO:38, Min:41, Max:118    24HR INTAKE/OUTPUT:      Intake/Output Summary (Last 24 hours) at 3/11/2019 1027  Last data filed at 3/11/2019 0934  Gross per 24 hour   Intake 2425.6 ml   Output 1370 ml   Net 1055.6 ml     Patient Vitals for the past 96 hrs (Last 3 readings):   Weight   19 0800 287 lb 11.2 oz (130.5 kg)   19 0555 288 lb 2.3 oz (130.7 kg)   03/10/19 0545 285 lb 11.5 oz (129.6 kg)         Physical Exam:  General appearance: Sedated and intubated on the ventilator  Skin: warm and dry, no rash or erythema  Eyes: conjunctivae normal and sclera anicteric  ENT:no thrush no pharyngeal congestion   Neck:  No JVD, No Thyromegaly  Pulmonary: clear to auscultation and no wheezing or rhonchi   Cardiovascular: normal S1 and S2. NO rubs and NO murmur.  No S3 OR S4   Abdomen: soft nontender, bowel sounds present, no organomegaly,  no ascites   Extremities: no cyanosis, clubbing or edema     Access:  previous  Right AV fistula    Current Medications:      0.9 % sodium chloride bolus Once   0.9 % sodium chloride bolus Once   [START ON 3/12/2019] gabapentin (NEURONTIN) capsule 300 mg Daily   insulin lispro (HUMALOG) 9. 2 10.1  --   --  9.8      BMP: Recent Labs     03/09/19  0431 03/10/19  0410 03/11/19  0433    137 135   K 4.7 3.6* 4.3    99 97*   CO2 22 26 24   BUN 87* 43* 53*   CREATININE 6.72* 4.21* 5.37*   GLUCOSE 294* 172* 205*   CALCIUM 8.2* 8.0* 7.6*        Phosphorus:    Recent Labs     03/10/19  0410   PHOS 3.3     Magnesium:   Recent Labs     03/10/19  0410   MG 1.7     Albumin:   Recent Labs     03/10/19  0410   LABALBU 2.8*       Dialysis bath: Dialysis Bath  K+ (Potassium): 3  Ca+ (Calcium): 3  Na+ (Sodium): 140  HCO3 (Bicarb): 35    Radiology:  Reviewed as available. Assessment:  1 ESRD on hemodialysis Monday Wednesday Friday at [facility] unit:dialysis bath reviewed with nurse. 2.HTN: Blood pressure stable  3 right thigh hematoma status post evacuation with wound VAC  4 EDEMA : Minimal  5. ANEMIA OF CHRONIC DISEASE:   6.SECONDARY HYPERPARATHYROIDISM:     Plan:  1. Wt removal and dialysis orders reviewed. 2.  Change Neurontin to 300 daily, next dialysis on Wednesday  3. Cont pt on aranesp and zemplar per protocol. 4. Follow up labs ordered. Please do not hesitate to call with questions.     Electronically signed by Anil Yo MD on 3/11/2019 at 10:27 AM

## 2019-03-11 NOTE — PROGRESS NOTES
Intercritical gout     on Meds    Joint pain, knee 01/13/2017    baldo knee synvisc-1 injections    Long-term insulin use ([facility] [address] 1/17/2017    Major depressive disorder with single episode, in partial remission ([facility] [address] 1/3/2017    Mobility impaired     Morbid obesity (HCC)     Muscle weakness     GRNERALIZED    Obesity     Obstructive sleep apnea     HEATHER on CPAP     On CPAP-TO BRING DOS    Paroxysmal atrial fibrillation ([facility] [address] 9/6/2017    Respiratory failure ([facility] [address] 03/2016    with open heart surgery, trached x 5 months    S/P cardiac cath     Seborrhea     Severe aortic stenosis 3/9/2016    Severe mitral valve stenosis 3/9/2016    Severe sepsis (HCC) 4/3/2016    Skin rash     ABD FOLDS    Stasis dermatitis     Thyroid disease     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 lft thumb     Venous stasis dermatitis     Wears glasses     Wheelchair bound     pivots with 2 assists                No results for input(s): INR in the last 72 hours. Recent Labs     03/09/19  0431 03/10/19  0410 03/10/19  1218 03/11/19  0113 03/11/19  0434   HGB 8.3* 6.7* 6.8* 6.0* 6.9*   HCT 24.9* 19.8* 20.3* 18.3* 20.6*    84*  --   --  136*       Current warfarin drug-drug interactions: none      Date             INR        Dose   3/11/2019        N/a         None given    Note: Will restart home regimen of 3 mg on Monday and Wednesday and 2 mg on all other days. Daily PT/INR while inpatient. Thank you for the consult. Will continue to follow.   Cecy Bowers [facility]  3/11/2019  1:17 PM

## 2019-03-11 NOTE — FLOWSHEET NOTE
03/11/19 0915   Swallow Screening   Is the patient able to remain alert for testing? Yes   Was the Patient Eating a Modified Diet Prior to being Admitted? No   Is there an Existing PEG or Abdominal Feeding Tube? No   Does the patient have a Head of Bed [facility]) restriction <30°? No   Is there a Tracheostomy Tube Present?  No   3 oz Water Swallow Screen Pass

## 2019-03-12 ENCOUNTER — APPOINTMENT (OUTPATIENT)
Dept: CT IMAGING | Age: 74
DRG: 570 | End: 2019-03-12
Payer: MEDICARE

## 2019-03-12 LAB
ABO/RH: NORMAL
ABSOLUTE EOS #: 0 K/UL (ref 0–0.4)
ABSOLUTE IMMATURE GRANULOCYTE: 0.94 K/UL (ref 0–0.3)
ABSOLUTE LYMPH #: 0.63 K/UL (ref 1–4.8)
ABSOLUTE MONO #: 1.57 K/UL (ref 0.1–0.8)
ABSOLUTE RETIC #: 0.09 M/UL (ref 0.03–0.08)
ALBUMIN SERPL-MCNC: 2.6 G/DL (ref 3.5–5.2)
ALBUMIN/GLOBULIN RATIO: 1.4 (ref 1–2.5)
ALP BLD-CCNC: 70 U/L (ref 40–129)
ALT SERPL-CCNC: 334 U/L (ref 5–41)
ANION GAP SERPL CALCULATED.3IONS-SCNC: 29 MMOL/L (ref 9–17)
ANTIBODY SCREEN: NEGATIVE
ARM BAND NUMBER: NORMAL
AST SERPL-CCNC: 369 U/L
BASOPHILS # BLD: 0 % (ref 0–2)
BASOPHILS ABSOLUTE: 0 K/UL (ref 0–0.2)
BILIRUB SERPL-MCNC: 0.39 MG/DL (ref 0.3–1.2)
BLD PROD TYP BPU: NORMAL
BLOOD BANK SPECIMEN: NORMAL
BUN BLDV-MCNC: 36 MG/DL (ref 8–23)
BUN/CREAT BLD: ABNORMAL (ref 9–20)
CALCIUM SERPL-MCNC: 7.9 MG/DL (ref 8.6–10.4)
CHLORIDE BLD-SCNC: 96 MMOL/L (ref 98–107)
CO2: 11 MMOL/L (ref 20–31)
CREAT SERPL-MCNC: 5.02 MG/DL (ref 0.7–1.2)
CROSSMATCH RESULT: NORMAL
DIFFERENTIAL TYPE: ABNORMAL
DISPENSE STATUS BLOOD BANK: NORMAL
EOSINOPHILS RELATIVE PERCENT: 0 % (ref 1–4)
EXPIRATION DATE: NORMAL
FERRITIN: ABNORMAL UG/L (ref 30–400)
FOLATE: 11.9 NG/ML
GFR AFRICAN AMERICAN: 14 ML/MIN
GFR NON-AFRICAN AMERICAN: 11 ML/MIN
GFR SERPL CREATININE-BSD FRML MDRD: ABNORMAL ML/MIN/{1.73_M2}
GFR SERPL CREATININE-BSD FRML MDRD: ABNORMAL ML/MIN/{1.73_M2}
GLUCOSE BLD-MCNC: 169 MG/DL (ref 75–110)
GLUCOSE BLD-MCNC: 207 MG/DL (ref 70–99)
GLUCOSE BLD-MCNC: 235 MG/DL (ref 75–110)
GLUCOSE BLD-MCNC: 248 MG/DL (ref 75–110)
GLUCOSE BLD-MCNC: 256 MG/DL (ref 75–110)
HAPTOGLOBIN: 71 MG/DL (ref 30–200)
HCT VFR BLD CALC: 17.5 % (ref 40.7–50.3)
HCT VFR BLD CALC: 20.7 % (ref 40.7–50.3)
HEMOGLOBIN: 5.4 G/DL (ref 13–17)
HEMOGLOBIN: 6.7 G/DL (ref 13–17)
IMMATURE GRANULOCYTES: 3 %
IMMATURE RETIC FRACT: 51.6 % (ref 2.7–18.3)
INR BLD: 3.4
INR BLD: 4.4
IRON SATURATION: ABNORMAL % (ref 20–55)
IRON: 173 UG/DL (ref 59–158)
LACTATE DEHYDROGENASE: 779 U/L (ref 135–225)
LACTIC ACID, WHOLE BLOOD: 13.8 MMOL/L (ref 0.7–2.1)
LYMPHOCYTES # BLD: 2 % (ref 24–44)
MAGNESIUM: 2.1 MG/DL (ref 1.6–2.6)
MCH RBC QN AUTO: 31.4 PG (ref 25.2–33.5)
MCHC RBC AUTO-ENTMCNC: 30.9 G/DL (ref 28.4–34.8)
MCV RBC AUTO: 101.7 FL (ref 82.6–102.9)
MONOCYTES # BLD: 5 % (ref 1–7)
MORPHOLOGY: ABNORMAL
NRBC AUTOMATED: 4 PER 100 WBC
NUCLEATED RED BLOOD CELLS: 4 PER 100 WBC
PARTIAL THROMBOPLASTIN TIME: 39.4 SEC (ref 20.5–30.5)
PDW BLD-RTO: 16.2 % (ref 11.8–14.4)
PLATELET # BLD: 151 K/UL (ref 138–453)
PLATELET ESTIMATE: ABNORMAL
PMV BLD AUTO: 10 FL (ref 8.1–13.5)
POTASSIUM SERPL-SCNC: 5.3 MMOL/L (ref 3.7–5.3)
PROTHROMBIN TIME: 32.5 SEC (ref 9–12)
PROTHROMBIN TIME: 41.7 SEC (ref 9–12)
RBC # BLD: 1.72 M/UL (ref 4.21–5.77)
RBC # BLD: ABNORMAL 10*6/UL
RETIC %: 5.5 % (ref 0.5–1.9)
RETIC HEMOGLOBIN: 36.9 PG (ref 28.2–35.7)
SEG NEUTROPHILS: 90 % (ref 36–66)
SEGMENTED NEUTROPHILS ABSOLUTE COUNT: 28.26 K/UL (ref 1.8–7.7)
SODIUM BLD-SCNC: 136 MMOL/L (ref 135–144)
TOTAL IRON BINDING CAPACITY: ABNORMAL UG/DL (ref 250–450)
TOTAL PROTEIN: 4.4 G/DL (ref 6.4–8.3)
TRANSFUSION STATUS: NORMAL
UNIT DIVISION: 0
UNIT NUMBER: NORMAL
UNSATURATED IRON BINDING CAPACITY: <17 UG/DL (ref 112–347)
VITAMIN B-12: 1651 PG/ML (ref 232–1245)
WBC # BLD: 31.4 K/UL (ref 3.5–11.3)
WBC # BLD: ABNORMAL 10*3/UL

## 2019-03-12 PROCEDURE — 36415 COLL VENOUS BLD VENIPUNCTURE: CPT

## 2019-03-12 PROCEDURE — 99233 SBSQ HOSP IP/OBS HIGH 50: CPT | Performed by: INTERNAL MEDICINE

## 2019-03-12 PROCEDURE — 82607 VITAMIN B-12: CPT

## 2019-03-12 PROCEDURE — 6370000000 HC RX 637 (ALT 250 FOR IP): Performed by: INTERNAL MEDICINE

## 2019-03-12 PROCEDURE — 83735 ASSAY OF MAGNESIUM: CPT

## 2019-03-12 PROCEDURE — 82728 ASSAY OF FERRITIN: CPT

## 2019-03-12 PROCEDURE — 6370000000 HC RX 637 (ALT 250 FOR IP): Performed by: STUDENT IN AN ORGANIZED HEALTH CARE EDUCATION/TRAINING PROGRAM

## 2019-03-12 PROCEDURE — 6370000000 HC RX 637 (ALT 250 FOR IP): Performed by: NURSE PRACTITIONER

## 2019-03-12 PROCEDURE — 86927 PLASMA FRESH FROZEN: CPT

## 2019-03-12 PROCEDURE — 86850 RBC ANTIBODY SCREEN: CPT

## 2019-03-12 PROCEDURE — 87449 NOS EACH ORGANISM AG IA: CPT

## 2019-03-12 PROCEDURE — 36430 TRANSFUSION BLD/BLD COMPNT: CPT

## 2019-03-12 PROCEDURE — 74176 CT ABD & PELVIS W/O CONTRAST: CPT

## 2019-03-12 PROCEDURE — 80053 COMPREHEN METABOLIC PANEL: CPT

## 2019-03-12 PROCEDURE — 86900 BLOOD TYPING SEROLOGIC ABO: CPT

## 2019-03-12 PROCEDURE — 87324 CLOSTRIDIUM AG IA: CPT

## 2019-03-12 PROCEDURE — 2580000003 HC RX 258: Performed by: STUDENT IN AN ORGANIZED HEALTH CARE EDUCATION/TRAINING PROGRAM

## 2019-03-12 PROCEDURE — 6360000002 HC RX W HCPCS

## 2019-03-12 PROCEDURE — 82746 ASSAY OF FOLIC ACID SERUM: CPT

## 2019-03-12 PROCEDURE — 85018 HEMOGLOBIN: CPT

## 2019-03-12 PROCEDURE — 86901 BLOOD TYPING SEROLOGIC RH(D): CPT

## 2019-03-12 PROCEDURE — P9016 RBC LEUKOCYTES REDUCED: HCPCS

## 2019-03-12 PROCEDURE — 83615 LACTATE (LD) (LDH) ENZYME: CPT

## 2019-03-12 PROCEDURE — 83605 ASSAY OF LACTIC ACID: CPT

## 2019-03-12 PROCEDURE — 2000000000 HC ICU R&B

## 2019-03-12 PROCEDURE — 99291 CRITICAL CARE FIRST HOUR: CPT | Performed by: INTERNAL MEDICINE

## 2019-03-12 PROCEDURE — 83550 IRON BINDING TEST: CPT

## 2019-03-12 PROCEDURE — 82947 ASSAY GLUCOSE BLOOD QUANT: CPT

## 2019-03-12 PROCEDURE — 6360000002 HC RX W HCPCS: Performed by: STUDENT IN AN ORGANIZED HEALTH CARE EDUCATION/TRAINING PROGRAM

## 2019-03-12 PROCEDURE — 85045 AUTOMATED RETICULOCYTE COUNT: CPT

## 2019-03-12 PROCEDURE — 83010 ASSAY OF HAPTOGLOBIN QUANT: CPT

## 2019-03-12 PROCEDURE — 83540 ASSAY OF IRON: CPT

## 2019-03-12 PROCEDURE — 36592 COLLECT BLOOD FROM PICC: CPT

## 2019-03-12 PROCEDURE — P9017 PLASMA 1 DONOR FRZ W/IN 8 HR: HCPCS

## 2019-03-12 PROCEDURE — 86920 COMPATIBILITY TEST SPIN: CPT

## 2019-03-12 PROCEDURE — 85610 PROTHROMBIN TIME: CPT

## 2019-03-12 PROCEDURE — 2500000003 HC RX 250 WO HCPCS: Performed by: EMERGENCY MEDICINE

## 2019-03-12 PROCEDURE — 85730 THROMBOPLASTIN TIME PARTIAL: CPT

## 2019-03-12 PROCEDURE — 76937 US GUIDE VASCULAR ACCESS: CPT

## 2019-03-12 PROCEDURE — 85025 COMPLETE CBC W/AUTO DIFF WBC: CPT

## 2019-03-12 PROCEDURE — 85014 HEMATOCRIT: CPT

## 2019-03-12 RX ORDER — INSULIN GLARGINE 100 [IU]/ML
10 INJECTION, SOLUTION SUBCUTANEOUS DAILY
Status: DISCONTINUED | OUTPATIENT
Start: 2019-03-12 | End: 2019-03-14

## 2019-03-12 RX ORDER — VANCOMYCIN HYDROCHLORIDE 125 MG/1
125 CAPSULE ORAL 4 TIMES DAILY
Status: DISCONTINUED | OUTPATIENT
Start: 2019-03-12 | End: 2019-03-12

## 2019-03-12 RX ORDER — SODIUM CHLORIDE 0.9 % (FLUSH) 0.9 %
10 SYRINGE (ML) INJECTION EVERY 12 HOURS SCHEDULED
Status: DISCONTINUED | OUTPATIENT
Start: 2019-03-12 | End: 2019-03-19 | Stop reason: HOSPADM

## 2019-03-12 RX ORDER — 0.9 % SODIUM CHLORIDE 0.9 %
2000 INTRAVENOUS SOLUTION INTRAVENOUS ONCE
Status: COMPLETED | OUTPATIENT
Start: 2019-03-12 | End: 2019-03-12

## 2019-03-12 RX ORDER — 0.9 % SODIUM CHLORIDE 0.9 %
250 INTRAVENOUS SOLUTION INTRAVENOUS ONCE
Status: DISCONTINUED | OUTPATIENT
Start: 2019-03-12 | End: 2019-03-13

## 2019-03-12 RX ORDER — SODIUM CHLORIDE 0.9 % (FLUSH) 0.9 %
10 SYRINGE (ML) INJECTION PRN
Status: DISCONTINUED | OUTPATIENT
Start: 2019-03-12 | End: 2019-03-19 | Stop reason: HOSPADM

## 2019-03-12 RX ORDER — VANCOMYCIN HYDROCHLORIDE 1 G/200ML
1000 INJECTION, SOLUTION INTRAVENOUS
Status: DISCONTINUED | OUTPATIENT
Start: 2019-03-13 | End: 2019-03-13

## 2019-03-12 RX ORDER — 0.9 % SODIUM CHLORIDE 0.9 %
250 INTRAVENOUS SOLUTION INTRAVENOUS ONCE
Status: DISCONTINUED | OUTPATIENT
Start: 2019-03-12 | End: 2019-03-12

## 2019-03-12 RX ORDER — MAGNESIUM SULFATE 1 G/100ML
1 INJECTION INTRAVENOUS PRN
Status: DISCONTINUED | OUTPATIENT
Start: 2019-03-12 | End: 2019-03-17

## 2019-03-12 RX ORDER — LIDOCAINE HYDROCHLORIDE 10 MG/ML
5 INJECTION, SOLUTION EPIDURAL; INFILTRATION; INTRACAUDAL; PERINEURAL ONCE
Status: DISCONTINUED | OUTPATIENT
Start: 2019-03-12 | End: 2019-03-19 | Stop reason: HOSPADM

## 2019-03-12 RX ORDER — VANCOMYCIN HYDROCHLORIDE 250 MG/1
250 CAPSULE ORAL 4 TIMES DAILY
Status: DISCONTINUED | OUTPATIENT
Start: 2019-03-12 | End: 2019-03-12

## 2019-03-12 RX ORDER — ONDANSETRON 2 MG/ML
4 INJECTION INTRAMUSCULAR; INTRAVENOUS EVERY 6 HOURS PRN
Status: DISCONTINUED | OUTPATIENT
Start: 2019-03-12 | End: 2019-03-19 | Stop reason: HOSPADM

## 2019-03-12 RX ORDER — ONDANSETRON 2 MG/ML
INJECTION INTRAMUSCULAR; INTRAVENOUS
Status: COMPLETED
Start: 2019-03-12 | End: 2019-03-12

## 2019-03-12 RX ADMIN — GABAPENTIN 300 MG: 300 CAPSULE ORAL at 08:51

## 2019-03-12 RX ADMIN — SODIUM CHLORIDE 1000 ML: 9 INJECTION, SOLUTION INTRAVENOUS at 10:54

## 2019-03-12 RX ADMIN — FLUTICASONE PROPIONATE 2 SPRAY: 50 SPRAY, METERED NASAL at 08:49

## 2019-03-12 RX ADMIN — BUPROPION HYDROCHLORIDE 150 MG: 150 TABLET, EXTENDED RELEASE ORAL at 08:52

## 2019-03-12 RX ADMIN — Medication 10 ML: at 08:54

## 2019-03-12 RX ADMIN — ONDANSETRON 4 MG: 2 INJECTION INTRAMUSCULAR; INTRAVENOUS at 18:00

## 2019-03-12 RX ADMIN — VANCOMYCIN HYDROCHLORIDE 2000 MG: 1 INJECTION, POWDER, LYOPHILIZED, FOR SOLUTION INTRAVENOUS at 22:14

## 2019-03-12 RX ADMIN — LEVOTHYROXINE SODIUM 25 MCG: 25 TABLET ORAL at 08:52

## 2019-03-12 RX ADMIN — INSULIN GLARGINE 10 UNITS: 100 INJECTION, SOLUTION SUBCUTANEOUS at 22:15

## 2019-03-12 RX ADMIN — ATORVASTATIN CALCIUM 10 MG: 10 TABLET, FILM COATED ORAL at 08:50

## 2019-03-12 RX ADMIN — INSULIN LISPRO 9 UNITS: 100 INJECTION, SOLUTION INTRAVENOUS; SUBCUTANEOUS at 06:56

## 2019-03-12 RX ADMIN — INSULIN LISPRO 6 UNITS: 100 INJECTION, SOLUTION INTRAVENOUS; SUBCUTANEOUS at 18:07

## 2019-03-12 RX ADMIN — ACETAMINOPHEN 650 MG: 325 TABLET ORAL at 08:53

## 2019-03-12 RX ADMIN — INSULIN LISPRO 2 UNITS: 100 INJECTION, SOLUTION INTRAVENOUS; SUBCUTANEOUS at 21:42

## 2019-03-12 RX ADMIN — Medication 5 MCG/MIN: at 19:49

## 2019-03-12 RX ADMIN — CYCLOBENZAPRINE HYDROCHLORIDE 5 MG: 5 TABLET, FILM COATED ORAL at 08:51

## 2019-03-12 RX ADMIN — LANSOPRAZOLE 30 MG: 30 TABLET, ORALLY DISINTEGRATING, DELAYED RELEASE ORAL at 08:52

## 2019-03-12 RX ADMIN — CETIRIZINE HYDROCHLORIDE 5 MG: 10 TABLET ORAL at 08:51

## 2019-03-12 RX ADMIN — MIDODRINE HYDROCHLORIDE 10 MG: 5 TABLET ORAL at 08:50

## 2019-03-12 RX ADMIN — SEVELAMER CARBONATE 800 MG: 800 TABLET, FILM COATED ORAL at 08:52

## 2019-03-12 ASSESSMENT — PAIN DESCRIPTION - PROGRESSION
CLINICAL_PROGRESSION: GRADUALLY IMPROVING

## 2019-03-12 ASSESSMENT — PAIN DESCRIPTION - LOCATION: LOCATION: LEG

## 2019-03-12 ASSESSMENT — PULMONARY FUNCTION TESTS: PIF_VALUE: 32

## 2019-03-12 ASSESSMENT — PAIN DESCRIPTION - PAIN TYPE: TYPE: SURGICAL PAIN

## 2019-03-12 ASSESSMENT — PAIN SCALES - GENERAL
PAINLEVEL_OUTOF10: 0
PAINLEVEL_OUTOF10: 3

## 2019-03-12 ASSESSMENT — PAIN DESCRIPTION - ONSET: ONSET: ON-GOING

## 2019-03-12 ASSESSMENT — PAIN DESCRIPTION - FREQUENCY: FREQUENCY: INTERMITTENT

## 2019-03-12 ASSESSMENT — PAIN DESCRIPTION - DESCRIPTORS: DESCRIPTORS: ACHING

## 2019-03-12 ASSESSMENT — PAIN DESCRIPTION - ORIENTATION: ORIENTATION: RIGHT

## 2019-03-12 NOTE — H&P
Internal Medicine - History and Physical Examination    Patient's name:  Lonny Closs  Medical Record Number: [id number]  Patient's account/billing number: [de-identified]  Patient's YOB: 1945  Age: 75 y.o. Date of Admission: 3/8/2019 10:24 AM  Primary Care Physician: Cuca Estevez DO    Code Status: Full Code    Chief complaint: Slip from a wheel chair    HISTORY OF PRESENT ILLNESS:   History was obtained from chart review and the patient. Lonny Closs is a 75 y.o. male with past medical history of end-stage renal disease, mitral valve repair, aortic valve repair, CHF with diastolic dysfunction, type 2 diabetes who is on Coumadin that presented to the Emergency Department following  transfer from [facility] for expanding right lower extremity hematoma. Per patient and his wife he was on his way to dialysis and ambulate today when he had splitting of the skin and bleeding. Patient had reportedly had a low impact fall from wheelchair after the seat fell out on Sunday experienced this scrape to the right lower extremity either during fall or well being picked up by staff at skilled nursing facility. In the days after that incident he had a gradually expanding hematoma form. After the skin split today he was brought to [place] [address] where radiography confirmed no fracture and he was transferred to [facility] after being started on IV fluids and IV antibiotics for suspicion of infection related to hematoma.      Workup in [facility] emergency Department found leukocytosis with white blood cell count of 24, mild hypotension treated with fluid (patient has history of hypertension requiring Midodrine). During his stay bleeding recurred when dressing was examined. He underwent lower extremity wound exploration, evacuation of hematoma and placement of wound vac the next day. He was placed on clindamycin afterwards.     Cardiology was consulted for cardiac evaluation and they recommended weaning off levophed since it can lead to arrhythmia. He was intubated as well. Next day, washout of lower extremity was performed with exchange of wound vac. Plastic surgery consulted for wound evaluation for possible closure. As per plastic surgery, the wound is not ready for grafting at this time. Will require continue VAC therapy until a good bed of granulation tissue forms. Then will be suitable for skin graft. Flap not indicated.       Past Medical History:        Diagnosis Date    Anemia in chronic kidney disease (CKD) 4/27/2016    Arthritis     Bacteremia 11/11/2016    Benign prostatic hyperplasia without lower urinary tract symptoms     BMI 40.0-44.9, adult ([address] 3/19/2018    Cellulitis of left lower extremity     Chronic cerebral ischemia 1/3/2017    Closed fracture of forearm 8/13/2013    Broken lft forearm     Controlled type 2 diabetes mellitus with chronic kidney disease on chronic dialysis, with long-term current use of insulin ([address]     Controlled type 2 diabetes mellitus with diabetic polyneuropathy, with long-term current use of insulin ([address] 10/7/2018    Decubital ulcer     NON OPEN BUTTOCKS    Dementia     memory problems    Dialysis patient ([address] 1/3/2017    [facility], Sat in [place]    Difficult intravenous access     HAS NEEDED PICC TEAM IN THE PAST    Difficulty walking     WHEELCHAIR BOUND-PIVOTS WITH ASSIST O F 2    DJD (degenerative joint disease) of knee     Elbow, forearm, and wrist, abrasion or friction burn, without mention of infection 8/13/2013    Abrasions lft forearm     Encephalopathy acute 4/27/2016    Encounter regarding vascular access for dialysis for ESRD ([address] 2/2/2017    ESRD (end stage renal disease) on dialysis ([address] 1/17/2017    Forgetfulness     HAS SOME SHORT TERM MEMORY LOSS, QUYEN-WIFE IS LEGAL GUARDIAN    H/O transesophageal echocardiography (AMADEO) for monitoring     Hemodialysis patient ([address] 11/11/2016    tues-thurs-sat [place]---FRESEMIUS.  TUNNELED CATHETER RT UPPER CHEST    Hyperlipidemia 1990    on Meds    Hypertension 1990    on Meds    Intercritical gout     on Meds    Joint pain, knee 01/13/2017    baldo knee synvisc-1 injections    Long-term insulin use ([address] 1/17/2017    Major depressive disorder with single episode, in partial remission ([address] 1/3/2017    Mobility impaired     Morbid obesity ([address]     Muscle weakness     GRNERALIZED    Obesity     Obstructive sleep apnea     HEATHER on CPAP     On CPAP-TO BRING DOS    Paroxysmal atrial fibrillation ([address] 9/6/2017    Respiratory failure ([address] 03/2016    with open heart surgery, trached x 5 months    S/P cardiac cath     Seborrhea     Severe aortic stenosis 3/9/2016    Severe mitral valve stenosis 3/9/2016    Severe sepsis ([address] 4/3/2016    Skin rash     ABD FOLDS    Stasis dermatitis     Thyroid disease     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 lft thumb     Venous stasis dermatitis     Wears glasses     Wheelchair bound     pivots with 2 assists       Past Surgical History:        Procedure Laterality Date    AORTIC VALVE REPLACEMENT  03/18/2016    bioprosthetic    ARM SURGERY Left 1990    CARDIAC CATHETERIZATION      CENTRAL VENOUS CATHETER Right 02/21/2018    DIALYSIS CATHETER Dr Margie Mayorga COLONOSCOPY  11/19/09    COLONOSCOPY N/A 10/12/2018    COLONOSCOPY WITH BIOPSY performed by Angel Johnson DO at [address] [place] [address] Right 3/10/2019    WASHOUT RIGHT LOWER EXTREMITY WITH WOUND VAC EXCHANGE performed by Fitz Bush MD at [address] [place] [address] Right 3/8/2019    RIGHT LOWER EXTREMITY EXPLORATION, HEMATOMA EVACUATION, WOUND VAC PLACEMENT performed by Fitz Bush MD at [address] (x10-12 surgeries)    several surgeries on left arm    KNEE ARTHROSCOPY Left 09/17/99    MITRAL VALVE REPLACEMENT  03/18/2016    bioprosthetic     NASAL SEPTUM SURGERY      OTHER SURGICAL HISTORY  3/18/16    Aortic root Enlargement    OTHER SURGICAL HISTORY  3/18/16    ASD Closure    OTHER SURGICAL HISTORY Right 01/09/2017    AV fistula creation, wrist    OTHER SURGICAL HISTORY Right 08/29/2017    ligation of collateral branch of av fistula right wrist    OTHER SURGICAL HISTORY  04/05/2018    FISTULAGRAM WITH DR. Olayinka Queen    [place] EGD TRANSORAL BIOPSY SINGLE/MULTIPLE N/A 10/17/2017    EGD BIOPSY performed by Ela Norwood MD at [address] ANGIOACCESS AV FISTULA Right 8/29/2017     RIGHT  LOWER ARM AV FISTULA  LIGATION OF AQUIRED BRANCHES X2 performed [facility] Joaqu[facility] [address] [facility]  3/18/16    median    VASCULAR SURGERY  12/06/2018    Right arm fistulagram,  PTA cephalic vein stenosis  /  DR Jhonatan Medicine       Allergies: Allergies   Allergen Reactions    Percocet [Oxycodone-Acetaminophen] Itching and Rash     Also causes shaking    Ampicillin Rash         Home Meds:   Prior to Admission medications    Medication Sig Start Date End Date Taking? Authorizing Provider   Misc. Devices [facility]) MISC Use as directed 12/12/18   Octavio Mcarthur DO   buPROPion (WELLBUTRIN XL) 150 MG extended release tablet Take 150 mg by mouth every morning    Historical Provider, MD   sevelamer (RENVELA) 800 MG tablet Give 2 tabs po TID with meals and additional 1 tab po TID with snack.   Patient taking differently: No sig reported 7/29/18   Matt Bolus, APRN - CNP   Pollen Extracts (PROSTAT PO) Take 30 mLs by mouth three times daily    Historical Provider, MD   warfarin (COUMADIN) 2 MG tablet Take 2 mg by mouth nightly Take 1 tablet by mouth at bedtime    Historical Provider, MD   warfarin (COUMADIN) 3 MG tablet Take 3 mg by mouth daily Take 3 mg on Monday and Tuesday    Historical Provider, MD   sertraline (ZOLOFT) 100 MG tablet Take 100 mg by mouth daily     Historical Provider, MD   loratadine (CLARITIN) 10 MG capsule Take 10 mg by (ZOFRAN ODT) 4 MG disintegrating tablet Take 1 tablet by mouth every 6 hours as needed for Nausea or Vomiting 10/24/16   Octavio Mcarthur, DO   albuterol (PROVENTIL) (2.5 MG/3ML) 0.083% nebulizer solution Take 3 mLs by nebulization every 8 hours as needed for Wheezing 10/24/16   Octavio Mcarthur, DO   fluticasone (FLONASE) 50 MCG/ACT nasal spray 2 sprays by Nasal route daily 10/24/16   Octavio Mcarthur, DO   glucose (GLUTOSE 15) 40 % GEL Take 15 g by mouth as needed (hypoglycemia) 15 gram oral as needed if blood sugar is less than 70 ( for hypoglycemia) 10/24/16   Octavio Mcarthur, DO   atorvastatin (LIPITOR) 10 MG tablet Take 1 tablet by mouth daily 10/24/16   Octavio Mcarthur, DO   insulin glargine (LANTUS) 100 UNIT/ML injection vial Inject 40 Units into the skin 2 times daily     Historical Provider, MD   ALPRAZolam (XANAX) 0.5 MG tablet Take 0.5 mg by mouth 2 times daily. Smith Gunn Historical Provider, MD   acetaminophen (TYLENOL) 325 MG tablet Take 650 mg by mouth every 6 hours as needed for Pain or Fever     Historical Provider, MD   magnesium hydroxide (MILK OF MAGNESIA) 400 MG/5ML suspension Take 30 mLs by mouth daily as needed for Constipation 3/29/16   Maura Buerger, MD   Multiple Vitamins-Minerals (MULTIVITAL PO) Take 1 tablet by mouth daily    Historical Provider, MD       Social History:   TOBACCO:   reports that he quit smoking about 39 years ago. His smoking use included cigarettes. He started smoking about 64 years ago. He has a 50.00 pack-year smoking history. He has never used smokeless tobacco.  ETOH:   reports that he does not drink alcohol. DRUGS:  reports that he does not use drugs.   OCCUPATION:      Family History:       Problem Relation Age of Onset    Lung Cancer Mother     Diabetes Mother     Alzheimer's Disease Father     Diabetes Maternal Grandmother     High Blood Pressure Sister          REVIEW OF SYSTEMS (ROS):    General ROS: negative  ENT ROS: current use of insulin ([facility]    Controlled type 2 diabetes mellitus with diabetic polyneuropathy, with long-term current use of insulin (HCC)    Dermatitis    Bruising    Hematoma of right lower extremity    Hematoma of leg, right, initial encounter    Acute blood loss anemia    Accidental fall from wheelchair     1. Acute Blood Loss anemia  2. ESRD  3. BPH  4. DM Type 2 with an HbA1c of 5.8 in March 2019  5. Essential Hypertension  6. Hematoma with wound in the RLE  7. HEATHER  8. Atrial Fibrillation    Additional assessment/ Plan:    · Extubated- 3/10, A&O x 3  · Pain control- Flexeril, Tylenol, Ibuprofen, Gabapentin. · Cardiology following, pt with 2 prosthetic valves, afib. Cardiology recs appreciated  · Patient was on Coumadin by the Trauma team despite an INR of 3.3. Will discontinue Coumadin at this time  · Start oral feeds and advance as tolerated  · ESRD on dialysis, dialysis on 3/9, nephrology following   · Hemoglobin - 6.9 today, Will continue to replace  ·       Insulin drip off, transitioned to SS insulin  ·       Blood Glucose well controlled. · Wound vac exchanged in operating room 3/10/2019 and at bedside 3/10/19 due to small amount of leakage around vac. · Plastic surgery consult today for wound evaluation for possible closure.   · Clindamycin 900 mg q 8 hrs, started 3/8/2019, last dose given 3/10. · VBY 3.4 MWXGT     The primary Internal medicine team assigned to the patient will be following up the patient on the floor. The above mentioned assessment and plan will be reviewed by the Internal medicine attending, and can be changed or modified accordingly by the attending physician. Jm Coelho MD  PGY-1, Department of Internal Medicine  [facility], [facility]  3/12/2019 7:16 AM    Attending Physician Statement  I have discussed the care of Roxi Lopez, including pertinent history and exam findings with the resident.  I have reviewed the key elements of all parts of the encounter with the resident. I have seen and examined the patient with the resident and the key elements of all parts of the encounter have been performed by me. Active Problems:    Anemia in chronic kidney disease, on chronic dialysis [facility])    Traumatic amputation of thumb    Essential hypertension    Mixed hyperlipidemia    Obstructive sleep apnea    Morbid obesity ([facility]    Benign prostatic hyperplasia without lower urinary tract symptoms    Dialysis patient ([facility]    ESRD (end stage renal disease) on dialysis ([facility])    Paroxysmal atrial fibrillation ([facility]    Controlled type 2 diabetes mellitus with chronic kidney disease on chronic dialysis, with long-term current use of insulin ([facility]    Controlled type 2 diabetes mellitus with diabetic polyneuropathy, with long-term current use of insulin ([facility])    Hematoma of right lower extremity    Hematoma of leg, right, initial encounter    Acute blood loss anemia    Accidental fall from wheelchair  Resolved Problems:    * No resolved hospital problems. *    Assessment/Plan  Patient admitted for right lower leg hematoma after fall. He has wound VAC now. He is on anticoagulation for atrial fibrillation. He also has bioprosthetic aortic and mitral Gwendolynn Sit. His INR is 4.4. He has received 6 blood transfusions since admission and his hemoglobin continues to drop daily to 6.9. He is hypotensive. He also has groin hematoma. He has been having diarrhea, duration is unknown. Suspicion of C. diff. We will start oral vancomycin. End-stage renal disease on hemodialysis. Aitohubiqhi-ujcbczrtbwjadi-lhl to anemia , C. diff . he is on Midodrin, we'll arrange for PRBC transfusion. Hypothyroidism-TSH is stable. Cnakry-nqdinyjqrxuavf-jricqn of chronic disease, acute blood loss, will get anemia workup   CT abdomen to rule out retroperitoneal hematoma and groin hematoma. He may need pressors. We'll consult critical care team for transfer to ICU.   FFP to reverse anticoagulation. No anticoagulation until his hemoglobin stabilizes.     Emile Hendrickson MD MD  Attending and Faculty Internal Medicine  [facility]  Internal Medicine [address] [facility]   3/12/19

## 2019-03-12 NOTE — PROGRESS NOTES
Pt status- spoke with crit care resident about decent MAP   Not going to start levo for now with map  58 and pt is ESRD. Will talk to cc again if MAP gets lower.

## 2019-03-12 NOTE — H&P
Critical Care - History and Physical Examination    Patient's name:  Nahun Rivera  Medical Record Number: [id number]  Patient's account/billing number: [de-identified]  Patient's YOB: 1945  Age: 75 y.o. Date of Admission: 3/8/2019 10:24 AM  Reason of ICU admission:   Date of History and Physical Examination: 3/12/2019      Primary Care Physician: Beth Glover DO  Attending Physician:    Code Status: Full Code    Chief complaint: AMS    Reason for ICU admission: Hypotension, AMS      History Of Present Illness:   History was obtained from chart review and the patient. Nahun Rivera is a 75 y. o. with past medical history of end-stage renal disease, mitral valve repair, aortic valve repair, CHF with diastolic dysfunction, type 2 diabetes who is on Coumadin that presented to the Emergency Department following  transfer from [facility] for expanding right lower extremity hematoma. Per patient and his wife he was on his way to dialysis and ambulate today when he had splitting of the skin and bleeding. Patient had reportedly had a low impact fall from wheelchair after the seat fell out on Sunday experienced this scrape to the right lower extremity either during fall or well being picked up by staff at skilled nursing facility. In the days after that incident he had a gradually expanding hematoma form. After the skin split today he was brought to [place] [address] where radiography confirmed no fracture and he was transferred to [facility] after being started on IV fluids and IV antibiotics for suspicion of infection related to hematoma.      Workup in [facility] emergency Department found leukocytosis with white blood cell count of 24, mild hypotension treated with fluid (patient has history of hypertension requiring Midodrine).  During his stay bleeding recurred when dressing was examined     Patient was admitted under trauma surgery and went to the OR on 3/9 and 3/10 for hematoma (CLARITIN) 10 MG capsule Take 10 mg by mouth    Historical Provider, MD   sodium chloride (OCEAN) 0.65 % nasal spray by Nasal route 10/24/16   Historical Provider, MD   lidocaine (XYLOCAINE) 5 % ointment Apply topically three times a week Apply topically as needed. DIALYSIS DAYS    Historical Provider, MD   Amino Acids-Protein Hydrolys (PRO-STAT PO) Take 30 mLs by mouth 3 times daily SUGAR FREE     Historical Provider, MD   guaiFENesin (ROBITUSSIN) 100 MG/5ML syrup Take 200 mg by mouth 4 times daily as needed for Cough    Historical Provider, MD   ipratropium-albuterol (DUONEB) 0.5-2.5 (3) MG/3ML SOLN nebulizer solution Inhale 1 vial into the lungs every 6 hours as needed     Historical Provider, MD   insulin aspart (NOVOLOG) 100 UNIT/ML injection vial Inject 5 Units into the skin 3 times daily (before meals) Sliding scale    Historical Provider, MD   Nystatin POWD by Does not apply route TID x 10 days (starting 2/13/17) then use PRN 2/13/17   Historical Provider, MD   HYDROcodone-acetaminophen (NORCO) 5-325 MG per tablet Take 1 tablet by mouth every 8 hours as needed for Pain . Historical Provider, MD   aspirin 81 MG tablet Take 81 mg by mouth daily    Historical Provider, MD   Skin Protectants, Misc.  (HYDROCERIN) CREA cream Apply topically nightly    Historical Provider, MD   senna (SENOKOT) 8.6 MG tablet Take 1 tablet by mouth nightly    Historical Provider, MD   omeprazole (PRILOSEC) 20 MG delayed release capsule Take 20 mg by mouth daily    Historical Provider, MD   glucagon 1 MG injection Infuse 1 kit intravenously as needed    Historical Provider, MD   donepezil (ARICEPT) 5 MG tablet Take 5 mg by mouth every evening 10/26/16   Historical Provider, MD   levothyroxine (SYNTHROID) 25 MCG tablet Take 1 tablet by mouth Daily  Patient taking differently: Take 50 mcg by mouth Daily  10/26/16   SHAMEKA Hurtado - CNP   midodrine (PROAMATINE) 5 MG tablet Take 2 tablets by mouth 3 times daily 10/24/16 (ROS):  Unable to obtain 2/2 patient clinical status      Physical Exam:    Vitals: BP (!) 91/42   Pulse 90   Temp 97.3 °F (36.3 °C) (Oral)   Resp 16   Ht 5' 6.93\" (1.7 m)   Wt 283 lb 15.2 oz (128.8 kg)   SpO2 100%   BMI 44.57 kg/m²     Body weight:   Wt Readings from Last 3 Encounters:   03/11/19 283 lb 15.2 oz (128.8 kg)   03/08/19 280 lb (127 kg)   01/15/19 280 lb (127 kg)       Body Mass Index : Body mass index is 44.57 kg/m². PHYSICAL EXAMINATION :  Constitutional: Alert and Oriented x1, morbid obesity  EENT: PERRLA, EOMI, sclera clear, anicteric, oropharynx clear, no lesions, neck supple with midline trachea. Neck: Supple, symmetrical, trachea midline, no adenopathy, thyroid symmetric, no jvd skin normal  Respiratory: clear to auscultation, no wheezes or rales and unlabored breathing. No intercostal tenderness  Cardiovascular: regular rate and rhythm, normal S1, S2, no murmur noted and 2+ pulses throughout  Abdomen: soft, nontender, nondistended, no masses or organomegaly  Neurological: Moves all 4 extremities  Extremities:  peripheral pulses normal, no pedal edema, no clubbing or cyanosis, large R groin hematoma, large R lateral leg wound with wound Vac with Bright red blood    Laboratory findings:-    CBC:   Recent Labs     03/11/19  0434   WBC 14.3*   HGB 6.9*   *     BMP:    Recent Labs     03/10/19  0410 03/11/19  0433    135   K 3.6* 4.3   CL 99 97*   CO2 26 24   BUN 43* 53*   CREATININE 4.21* 5.37*   GLUCOSE 172* 205*     S. Calcium:  Recent Labs     03/11/19  0433   CALCIUM 7.6*     S. Ionized Calcium:No results for input(s): IONCA in the last 72 hours. S. Magnesium:  Recent Labs     03/10/19  0410   MG 1.7     S. Phosphorus:  Recent Labs     03/10/19  0410   PHOS 3.3     S. Glucose:  Recent Labs     03/11/19 2030 03/12/19  0637 03/12/19  1034   POCGLU 189* 256* 248*     Glycosylated hemoglobin A1C: No results for input(s): LABA1C in the last 72 hours.   INR:   Recent view.     No significant interval change. Xr Chest Portable    Result Date: 3/8/2019  EXAMINATION: SINGLE XRAY VIEW OF THE CHEST 3/8/2019 8:49 pm COMPARISON: 9 hours ago HISTORY: ORDERING SYSTEM PROVIDED HISTORY: ETT verification TECHNOLOGIST PROVIDED HISTORY: ETT verification FINDINGS: ET tube 35 mm above the jose. Endotracheal tube in the stomach. Sternotomy wires noted. Prosthetic heart valve noted. Moderate cardiomegaly. Mediastinum unremarkable. Bony thorax intact. New life support apparatus as above. Xr Chest Portable    Result Date: 3/8/2019  EXAMINATION: SINGLE XRAY VIEW OF THE CHEST 3/8/2019 11:14 am COMPARISON: November 10, 2016 HISTORY: ORDERING SYSTEM PROVIDED HISTORY: leukocytosis TECHNOLOGIST PROVIDED HISTORY: leukocytosis FINDINGS: Cardiac silhouette enlargement is again noted. The patient is status post median sternotomy. No consolidation, pneumothorax or evidence for edema. No significant effusion identified. No acute osseous abnormality appreciated. No acute airspace disease identified. Last Echocardiogram findings:   Left ventricle is normal in size Global left ventricular systolic function  is normal Estimated ejection fraction is 55 % . Mild concentric left ventricular hypertrophy. Grade I (mild) left ventricular diastolic dysfunction. Bi-atrial enlargement. Right ventricular dilatation with normal systolic function. Bioprosthetic aortic valve replacement. Peak instantaneous gradient 20 mmHg and mean gradient 11 mmHg. No aortic insufficiency. Bioprosthetic mitral valve replacement. Mean gradient is 7 mm Hg. No mitral regurgitation. No pericardial effusion seen.        Assessment and Plan     Patient Active Problem List   Diagnosis    Traumatic amputation of thumb    Essential hypertension    Mixed hyperlipidemia    Obstructive sleep apnea    Morbid obesity ([address]    Benign prostatic hyperplasia without lower urinary tract symptoms    Venous stasis dermatitis    Severe aortic stenosis    Severe mitral valve stenosis    Anemia in chronic kidney disease, on chronic dialysis ([address]    Dialysis patient ([address]    Dementia    Major depressive disorder with single episode, in partial remission ([address]    Chronic cerebral ischemia    ESRD (end stage renal disease) on dialysis ([facility]    Long-term insulin use ([facility])    Paroxysmal atrial fibrillation ([facility])    BMI 40.0-44.9, adult ([facility])    Controlled type 2 diabetes mellitus with chronic kidney disease on chronic dialysis, with long-term current use of insulin ([facility])    Controlled type 2 diabetes mellitus with diabetic polyneuropathy, with long-term current use of insulin ([facility])    Dermatitis    Bruising    Hematoma of right lower extremity    Hematoma of leg, right, initial encounter    Acute blood loss anemia    Accidental fall from wheelchair     Additional assessment:  · Diarrhea  · Acute confusion  · Anemia of undetermined cause    Patient last blood work was 3/11 AM with a stable low hemoglobin of 6.9. No blood work toady was able to be obtained but patient become hypotensive and confused. Possible do to bleeding vs septic cause.      Plan:  - Will transfuse 2u PRBC and 2u FFP  - Septic workup ordered  - Left groin central line placed  - start Levophed for MAP >65  - trend troponin  - Nephrology for dialysis  - cardio for recs  - Trauma surgery for wound evaluation  - Hold off on Coumadin  - oral vanco started by previous team  - CT abd to R/O retroperitoneal bleed    Jagdish Abreu DO  Internal Medicine PGY3  3/12/2019, 12:53 PM

## 2019-03-12 NOTE — PROGRESS NOTES
Physical Therapy  DATE: 3/12/2019    NAME: Janie Snyder  MRN: [id number]   : 1945    Patient not seen this date for Physical Therapy due to:  [] Blood transfusion in progress  [] Hemodialysis  []  Patient Declined  [] Spine Precautions   [] Strict Bedrest  [] Surgery/ Procedure  [] Testing      [x] Other    Cancel   Low BP    79/39        [] PT being discontinued at this time. Patient independent. No further needs. [] PT being discontinued at this time as the patient has been transferred to palliative care. No further needs.     Den Flor, PT

## 2019-03-12 NOTE — PROGRESS NOTES
Dr Babin Curd rounded and informed of bld pressure 91/42  Drowsiness.  right groin that's eccymotic red, and firm to touch order to follow

## 2019-03-12 NOTE — PROGRESS NOTES
Speech Language Pathology  [facility] 150  Speech Language Pathology    COGNITIVE ASSESSMENT    NO LOC or CHI- ST TO DEFER AT THIS TIME      Date: 3/12/2019  Patient Name: William Lobo  YOB: 1945   AGE: 75 y.o. MRN: [id number]        PT NOT SEEN FOR COGNITIVE ASSESSMENT AS NO LOC OR CHI IS DOCUMENTED. ST TO DEFER AT THIS TIME. Freedom Magana M.A.  CCC-SLP  3/12/2019  11:26 AM

## 2019-03-12 NOTE — PLAN OF CARE
Problem: Injury - Risk of, Physical Injury:  Goal: Absence of physical injury  Description  Absence of physical injury  Outcome: Met This Shift  Goal: Will remain free from falls  Description  Will remain free from falls  Outcome: Met This Shift     Problem: Falls - Risk of:  Goal: Absence of physical injury  Description  Absence of physical injury  Outcome: Met This Shift  Goal: Will remain free from falls  Description  Will remain free from falls  Outcome: Met This Shift

## 2019-03-12 NOTE — PROGRESS NOTES
Occupational Therapy    Occupational Therapy Not Seen Note    DATE: 3/12/2019  Name: Peterson Mcpherson  : 1945  MRN: [id number]    Patient not available for Occupational Therapy due to: Other: Pt being transferred to ICU d/t low BP.         Electronically signed by KAI Allen on 3/12/2019 at 1:11 PM

## 2019-03-12 NOTE — PROGRESS NOTES
[facility]  Internal Medicine Residency Program  Inpatient Daily Progress Note  ______________________________________________________________________________    Patient: Tuyet Duarte  YOB: 1945   MRN: [id number]    Acct: [de-identified]     Admit date: 3/8/2019  Today's date: 03/12/19  Number of days in the hospital: 4  Expected Discharge Date: (tbd)    Admitting Diagnosis: <principal problem not specified>    Subjective:   Patient seen and examined at bedside. Alert and oriented. Was eating and drinking fine this AM during first encounter. Vitals were stable. Later during the day, his blood pressure drooped into 70s/40s. Patient had poor peripheral access. His RLE looked like bleeding arterial blood. Patient was not attentive and was not responding to commands. Objective:   Vital Sign:  BP (!) 91/42   Pulse 90   Temp 97.3 °F (36.3 °C) (Oral)   Resp 16   Ht 5' 6.93\" (1.7 m)   Wt 283 lb 15.2 oz (128.8 kg)   SpO2 100%   BMI 44.57 kg/m²       Physical Exam:  General appearance:   alert,  Mental Status: alert, oriented to person, place, and time  Neurologic:  alert, oriented, normal speech, no focal findings or movement disorder noted  Lungs:  clear to auscultation, no wheezes, rales or rhonchi, symmetric air entry  Heart[de-identified] normal rate, regular rhythm, normal S1, S2, no murmurs, rubs, clicks or gallops  Abdomen:  soft, nontender, nondistended, no masses or organomegaly. Bruise noted in the right groin. Suspected hematoma  Extremities: peripheral pulses normal, no pedal edema, no clubbing or cyanosis. RLE with wound vac in place.  Oozing bright red blood  Skin: normal coloration and turgor, no rashes, no suspicious skin lesions noted    Medications:  Scheduled Medications   sodium chloride  250 mL Intravenous Once    sodium chloride  250 mL Intravenous Once    lidocaine 1 % injection  5 mL Intradermal Once    sodium chloride flush  10 mL Intravenous 2 times per day    insulin glargine  10 Units Subcutaneous Daily    vancomycin  125 mg Oral 4 times per day    gabapentin  300 mg Oral Daily    insulin lispro  0-18 Units Subcutaneous TID WC    insulin lispro  0-9 Units Subcutaneous Nightly    lansoprazole  30 mg Oral QAM AC    acetaminophen  650 mg Oral 4x Daily    atorvastatin  10 mg Oral Daily    buPROPion  150 mg Oral QAM    donepezil  5 mg Oral QPM    fluticasone  2 spray Nasal Daily    levothyroxine  25 mcg Oral Daily    cetirizine  5 mg Oral Daily    midodrine  10 mg Oral TID    sevelamer  800 mg Oral TID WC    sodium chloride flush  10 mL Intravenous 2 times per day       PRN Medications  magnesium sulfate 1 g PRN   sodium chloride flush 10 mL PRN   glucose 15 g PRN   albuterol 2.5 mg Q8H PRN   guaiFENesin 200 mg 4x Daily PRN   sodium chloride flush 10 mL PRN   acetaminophen 650 mg Q4H PRN   magnesium hydroxide 30 mL Daily PRN   glucagon (rDNA) 1 mg PRN   dextrose 100 mL/hr PRN       Diagnostic Labs and Imaging:  CBC:  Recent Labs     03/10/19  0410 03/10/19  1218 03/11/19  0113 03/11/19  0434   WBC 6.9  --   --  14.3*   HGB 6.7* 6.8* 6.0* 6.9*   PLT 84*  --   --  136*     BMP: Recent Labs     03/10/19  0410 03/11/19  0433    135   K 3.6* 4.3   CL 99 97*   CO2 26 24   BUN 43* 53*   CREATININE 4.21* 5.37*   GLUCOSE 172* 205*     Hepatic: Recent Labs     03/10/19  0410   AST 14   ALT 12   BILITOT 0.27*   ALKPHOS 67       Assessment and Plan:     Assessment:  1. Acute Blood Loss anemia  2. ESRD  3. BPH  4. DM Type 2 with an HbA1c of 5.8 in March 2019  5. Essential Hypertension  6. Hematoma with wound in the RLE  7. HEATHER  8. Atrial Fibrillation    Plan:   Considering patient's vitals and Hemoglobin of 6.9 this morning, 2 units of PRBC and FFP each were ordered. IV team and PICC line were consulted on stat basis to put in a PICC line or peripheral access. As per the PICC line, patient's with ESRD are not eligible for PICC Line. Critical care was consulted on STAT basis. Spoke with Critical Care Residents Dr. Mateo Servin and Dr. Peggy Frank and updated both of them regarding the patient's condition. Transfer to ICU was put in but the patient was on waiting list as there were no beds available in the ICU at that time. I asked the nurse to inform both the Trauma team regarding the patient's condition and request to examine the patient at bedside as well as the Cardiology team regarding reversal of INR of 3.3. Myself and my attending Dr. Alex Pickens discussed patient's condition at the bedside with Trauma resident Dr. Alondra Amin and [address] Resident [address]. Peggy Frank. The plan was to transfer the patient to ICU as soon as the bed is available plus the need for a central line. Additional recommendations from medicine team included     · Start of Vancomycin for the suspicion of patient having C diff. · Use of midodrine to sustain SBP  · 2 PRBC as well as FFP as discussed above  · 2L Bolus of IV NS  · CT abdomen to rule out retroperitoneal hematoma as well as access the extent of colitis  · Pain control- Flexeril, Tylenol, Ibuprofen, Gabapentin  · Not to start patient on any anticoagulant in this setting  · Continue ESRD management. Nephrology on board. Appreciate recommendations  · Continue SS Insulin for glycemic control  · Follow plastic surgery recommendations for possible wound closure. Patient not a suitable candidate at this time as per Plastic Surgery evaluation. Rosalie Rodriguez MD  PGY-1, Department of Internal Medicine  [address] [place], [place]  3/12/2019 4:47 PM      Attending Physician Statement  I have discussed the care of Ryann Leo, including pertinent history and exam findings with the resident. I have reviewed the key elements of all parts of the encounter with the resident.  I have seen and examined the patient with the resident and the key elements of all parts of the encounter have been performed by me.        Jorge Fernandes MD MD  Attending and Faculty Internal Medicine  [facility]  Internal Medicine [address] [place] [address], S..   3/12/19

## 2019-03-13 PROBLEM — T79.4XXA TRAUMATIC HEMORRHAGIC SHOCK (HCC): Status: ACTIVE | Noted: 2019-03-13

## 2019-03-13 LAB
ABSOLUTE EOS #: 0.26 K/UL (ref 0–0.4)
ABSOLUTE IMMATURE GRANULOCYTE: 1.05 K/UL (ref 0–0.3)
ABSOLUTE LYMPH #: 1.58 K/UL (ref 1–4.8)
ABSOLUTE MONO #: 1.05 K/UL (ref 0.1–0.8)
ANION GAP SERPL CALCULATED.3IONS-SCNC: 19 MMOL/L (ref 9–17)
BASOPHILS # BLD: 1 % (ref 0–2)
BASOPHILS ABSOLUTE: 0.26 K/UL (ref 0–0.2)
BLD PROD TYP BPU: NORMAL
BUN BLDV-MCNC: 45 MG/DL (ref 8–23)
BUN/CREAT BLD: ABNORMAL (ref 9–20)
C DIFF AG + TOXIN: NORMAL
CALCIUM SERPL-MCNC: 7.7 MG/DL (ref 8.6–10.4)
CHLORIDE BLD-SCNC: 96 MMOL/L (ref 98–107)
CO2: 18 MMOL/L (ref 20–31)
CREAT SERPL-MCNC: 5.17 MG/DL (ref 0.7–1.2)
DIFFERENTIAL TYPE: ABNORMAL
DISPENSE STATUS BLOOD BANK: NORMAL
EOSINOPHILS RELATIVE PERCENT: 1 % (ref 1–4)
GFR AFRICAN AMERICAN: 13 ML/MIN
GFR NON-AFRICAN AMERICAN: 11 ML/MIN
GFR SERPL CREATININE-BSD FRML MDRD: ABNORMAL ML/MIN/{1.73_M2}
GFR SERPL CREATININE-BSD FRML MDRD: ABNORMAL ML/MIN/{1.73_M2}
GLUCOSE BLD-MCNC: 166 MG/DL (ref 75–110)
GLUCOSE BLD-MCNC: 209 MG/DL (ref 75–110)
GLUCOSE BLD-MCNC: 263 MG/DL (ref 75–110)
GLUCOSE BLD-MCNC: 269 MG/DL (ref 75–110)
GLUCOSE BLD-MCNC: 307 MG/DL (ref 70–99)
HCT VFR BLD CALC: 18.5 % (ref 40.7–50.3)
HCT VFR BLD CALC: 20.4 % (ref 40.7–50.3)
HCT VFR BLD CALC: 20.6 % (ref 40.7–50.3)
HCT VFR BLD CALC: 22.5 % (ref 40.7–50.3)
HEMOGLOBIN: 6.5 G/DL (ref 13–17)
HEMOGLOBIN: 6.7 G/DL (ref 13–17)
HEMOGLOBIN: 7 G/DL (ref 13–17)
HEMOGLOBIN: 7.6 G/DL (ref 13–17)
IMMATURE GRANULOCYTES: 4 %
INR BLD: 3
LACTIC ACID, WHOLE BLOOD: 2.1 MMOL/L (ref 0.7–2.1)
LACTIC ACID, WHOLE BLOOD: 2.4 MMOL/L (ref 0.7–2.1)
LYMPHOCYTES # BLD: 6 % (ref 24–44)
MAGNESIUM: 1.8 MG/DL (ref 1.6–2.6)
MCH RBC QN AUTO: 31.1 PG (ref 25.2–33.5)
MCHC RBC AUTO-ENTMCNC: 33.8 G/DL (ref 28.4–34.8)
MCV RBC AUTO: 92.2 FL (ref 82.6–102.9)
MONOCYTES # BLD: 4 % (ref 1–7)
MORPHOLOGY: ABNORMAL
MORPHOLOGY: ABNORMAL
NRBC AUTOMATED: 4.8 PER 100 WBC
NUCLEATED RED BLOOD CELLS: 3 PER 100 WBC
PDW BLD-RTO: 16.1 % (ref 11.8–14.4)
PLATELET # BLD: 122 K/UL (ref 138–453)
PLATELET ESTIMATE: ABNORMAL
PMV BLD AUTO: 10 FL (ref 8.1–13.5)
POTASSIUM SERPL-SCNC: 4.5 MMOL/L (ref 3.7–5.3)
PROTHROMBIN TIME: 29.2 SEC (ref 9–12)
RBC # BLD: 2.44 M/UL (ref 4.21–5.77)
RBC # BLD: ABNORMAL 10*6/UL
SEG NEUTROPHILS: 84 % (ref 36–66)
SEGMENTED NEUTROPHILS ABSOLUTE COUNT: 22.1 K/UL (ref 1.8–7.7)
SODIUM BLD-SCNC: 133 MMOL/L (ref 135–144)
SPECIMEN DESCRIPTION: NORMAL
SURGICAL PATHOLOGY REPORT: NORMAL
TRANSFUSION STATUS: NORMAL
UNIT DIVISION: 0
UNIT NUMBER: NORMAL
WBC # BLD: 26.3 K/UL (ref 3.5–11.3)
WBC # BLD: ABNORMAL 10*3/UL

## 2019-03-13 PROCEDURE — 99291 CRITICAL CARE FIRST HOUR: CPT | Performed by: INTERNAL MEDICINE

## 2019-03-13 PROCEDURE — 85025 COMPLETE CBC W/AUTO DIFF WBC: CPT

## 2019-03-13 PROCEDURE — 85014 HEMATOCRIT: CPT

## 2019-03-13 PROCEDURE — P9046 ALBUMIN (HUMAN), 25%, 20 ML: HCPCS | Performed by: INTERNAL MEDICINE

## 2019-03-13 PROCEDURE — 85018 HEMOGLOBIN: CPT

## 2019-03-13 PROCEDURE — 80048 BASIC METABOLIC PNL TOTAL CA: CPT

## 2019-03-13 PROCEDURE — P9017 PLASMA 1 DONOR FRZ W/IN 8 HR: HCPCS

## 2019-03-13 PROCEDURE — 6360000002 HC RX W HCPCS: Performed by: STUDENT IN AN ORGANIZED HEALTH CARE EDUCATION/TRAINING PROGRAM

## 2019-03-13 PROCEDURE — 6370000000 HC RX 637 (ALT 250 FOR IP): Performed by: STUDENT IN AN ORGANIZED HEALTH CARE EDUCATION/TRAINING PROGRAM

## 2019-03-13 PROCEDURE — 82947 ASSAY GLUCOSE BLOOD QUANT: CPT

## 2019-03-13 PROCEDURE — 83735 ASSAY OF MAGNESIUM: CPT

## 2019-03-13 PROCEDURE — 6370000000 HC RX 637 (ALT 250 FOR IP): Performed by: NURSE PRACTITIONER

## 2019-03-13 PROCEDURE — 86900 BLOOD TYPING SEROLOGIC ABO: CPT

## 2019-03-13 PROCEDURE — 90937 HEMODIALYSIS REPEATED EVAL: CPT

## 2019-03-13 PROCEDURE — 83605 ASSAY OF LACTIC ACID: CPT

## 2019-03-13 PROCEDURE — 90935 HEMODIALYSIS ONE EVALUATION: CPT

## 2019-03-13 PROCEDURE — 2580000003 HC RX 258: Performed by: STUDENT IN AN ORGANIZED HEALTH CARE EDUCATION/TRAINING PROGRAM

## 2019-03-13 PROCEDURE — 2500000003 HC RX 250 WO HCPCS: Performed by: STUDENT IN AN ORGANIZED HEALTH CARE EDUCATION/TRAINING PROGRAM

## 2019-03-13 PROCEDURE — 99211 OFF/OP EST MAY X REQ PHY/QHP: CPT

## 2019-03-13 PROCEDURE — 86927 PLASMA FRESH FROZEN: CPT

## 2019-03-13 PROCEDURE — 6370000000 HC RX 637 (ALT 250 FOR IP): Performed by: INTERNAL MEDICINE

## 2019-03-13 PROCEDURE — 36430 TRANSFUSION BLD/BLD COMPNT: CPT

## 2019-03-13 PROCEDURE — P9016 RBC LEUKOCYTES REDUCED: HCPCS

## 2019-03-13 PROCEDURE — 2580000003 HC RX 258

## 2019-03-13 PROCEDURE — 2000000000 HC ICU R&B

## 2019-03-13 PROCEDURE — 85610 PROTHROMBIN TIME: CPT

## 2019-03-13 PROCEDURE — 6360000002 HC RX W HCPCS: Performed by: INTERNAL MEDICINE

## 2019-03-13 RX ORDER — 0.9 % SODIUM CHLORIDE 0.9 %
100 INTRAVENOUS SOLUTION INTRAVENOUS ONCE
Status: COMPLETED | OUTPATIENT
Start: 2019-03-13 | End: 2019-03-14

## 2019-03-13 RX ORDER — 0.9 % SODIUM CHLORIDE 0.9 %
250 INTRAVENOUS SOLUTION INTRAVENOUS PRN
Status: DISCONTINUED | OUTPATIENT
Start: 2019-03-13 | End: 2019-03-19 | Stop reason: HOSPADM

## 2019-03-13 RX ORDER — 0.9 % SODIUM CHLORIDE 0.9 %
250 INTRAVENOUS SOLUTION INTRAVENOUS ONCE
Status: COMPLETED | OUTPATIENT
Start: 2019-03-13 | End: 2019-03-13

## 2019-03-13 RX ORDER — 0.9 % SODIUM CHLORIDE 0.9 %
250 INTRAVENOUS SOLUTION INTRAVENOUS ONCE
Status: DISCONTINUED | OUTPATIENT
Start: 2019-03-13 | End: 2019-03-19 | Stop reason: HOSPADM

## 2019-03-13 RX ORDER — FENTANYL CITRATE 50 UG/ML
25 INJECTION, SOLUTION INTRAMUSCULAR; INTRAVENOUS
Status: DISCONTINUED | OUTPATIENT
Start: 2019-03-13 | End: 2019-03-19 | Stop reason: HOSPADM

## 2019-03-13 RX ORDER — MAGNESIUM HYDROXIDE 1200 MG/15ML
LIQUID ORAL
Status: COMPLETED
Start: 2019-03-13 | End: 2019-03-13

## 2019-03-13 RX ORDER — VANCOMYCIN HYDROCHLORIDE 1 G/200ML
1000 INJECTION, SOLUTION INTRAVENOUS
Status: DISCONTINUED | OUTPATIENT
Start: 2019-03-13 | End: 2019-03-14

## 2019-03-13 RX ORDER — ALBUMIN (HUMAN) 12.5 G/50ML
25 SOLUTION INTRAVENOUS ONCE
Status: COMPLETED | OUTPATIENT
Start: 2019-03-13 | End: 2019-03-13

## 2019-03-13 RX ORDER — 0.9 % SODIUM CHLORIDE 0.9 %
250 INTRAVENOUS SOLUTION INTRAVENOUS ONCE
Status: COMPLETED | OUTPATIENT
Start: 2019-03-14 | End: 2019-03-14

## 2019-03-13 RX ORDER — 0.9 % SODIUM CHLORIDE 0.9 %
150 INTRAVENOUS SOLUTION INTRAVENOUS PRN
Status: DISCONTINUED | OUTPATIENT
Start: 2019-03-13 | End: 2019-03-19 | Stop reason: HOSPADM

## 2019-03-13 RX ADMIN — FENTANYL CITRATE 25 MCG: 50 INJECTION INTRAMUSCULAR; INTRAVENOUS at 12:27

## 2019-03-13 RX ADMIN — ALBUMIN (HUMAN) 25 G: 0.25 INJECTION, SOLUTION INTRAVENOUS at 09:25

## 2019-03-13 RX ADMIN — INSULIN LISPRO 3 UNITS: 100 INJECTION, SOLUTION INTRAVENOUS; SUBCUTANEOUS at 12:19

## 2019-03-13 RX ADMIN — SODIUM CHLORIDE 100 ML: 9 INJECTION, SOLUTION INTRAVENOUS at 14:50

## 2019-03-13 RX ADMIN — SODIUM CHLORIDE 1000 ML: 900 IRRIGANT IRRIGATION at 13:00

## 2019-03-13 RX ADMIN — INSULIN LISPRO 5 UNITS: 100 INJECTION, SOLUTION INTRAVENOUS; SUBCUTANEOUS at 20:41

## 2019-03-13 RX ADMIN — VANCOMYCIN HYDROCHLORIDE 1000 MG: 1 INJECTION, SOLUTION INTRAVENOUS at 15:49

## 2019-03-13 RX ADMIN — INSULIN GLARGINE 10 UNITS: 100 INJECTION, SOLUTION SUBCUTANEOUS at 09:29

## 2019-03-13 RX ADMIN — SODIUM CHLORIDE 250 ML: 9 INJECTION, SOLUTION INTRAVENOUS at 00:30

## 2019-03-13 RX ADMIN — FENTANYL CITRATE 25 MCG: 50 INJECTION INTRAMUSCULAR; INTRAVENOUS at 23:03

## 2019-03-13 RX ADMIN — Medication 10 ML: at 20:43

## 2019-03-13 RX ADMIN — METRONIDAZOLE 500 MG: 500 INJECTION, SOLUTION INTRAVENOUS at 20:48

## 2019-03-13 RX ADMIN — INSULIN LISPRO 9 UNITS: 100 INJECTION, SOLUTION INTRAVENOUS; SUBCUTANEOUS at 09:30

## 2019-03-13 RX ADMIN — Medication 10 ML: at 09:33

## 2019-03-13 RX ADMIN — INSULIN LISPRO 6 UNITS: 100 INJECTION, SOLUTION INTRAVENOUS; SUBCUTANEOUS at 17:05

## 2019-03-13 RX ADMIN — ONDANSETRON 4 MG: 2 INJECTION INTRAMUSCULAR; INTRAVENOUS at 09:32

## 2019-03-13 RX ADMIN — MIDODRINE HYDROCHLORIDE 10 MG: 5 TABLET ORAL at 20:39

## 2019-03-13 RX ADMIN — DESMOPRESSIN ACETATE 20 MCG: 4 SOLUTION INTRAVENOUS at 13:28

## 2019-03-13 RX ADMIN — METRONIDAZOLE 500 MG: 500 INJECTION, SOLUTION INTRAVENOUS at 14:51

## 2019-03-13 RX ADMIN — Medication 10 ML: at 20:44

## 2019-03-13 RX ADMIN — FENTANYL CITRATE 25 MCG: 50 INJECTION INTRAMUSCULAR; INTRAVENOUS at 21:51

## 2019-03-13 RX ADMIN — FLUTICASONE PROPIONATE 2 SPRAY: 50 SPRAY, METERED NASAL at 12:18

## 2019-03-13 ASSESSMENT — PAIN SCALES - GENERAL
PAINLEVEL_OUTOF10: 0
PAINLEVEL_OUTOF10: 2
PAINLEVEL_OUTOF10: 0
PAINLEVEL_OUTOF10: 0
PAINLEVEL_OUTOF10: 2

## 2019-03-13 NOTE — CONSULTS
joint disease) of knee, Elbow, forearm, and wrist, abrasion or friction burn, without mention of infection, Encephalopathy acute, Encounter regarding vascular access for dialysis for ESRD ([facility] [address] ESRD (end stage renal disease) on dialysis ([address] Forgetfulness, H/O transesophageal echocardiography ([facility]) for monitoring, Hemodialysis patient ([address] Hyperlipidemia, Hypertension, Intercritical gout, Joint pain, knee, Long-term insulin use ([address] Major depressive disorder with single episode, in partial remission ([address] Mobility impaired, Morbid obesity ([address] Muscle weakness, Obesity, Obstructive sleep apnea, HEATHER on CPAP, Paroxysmal atrial fibrillation ([address] Respiratory failure ([address] S/P cardiac cath, Seborrhea, Severe aortic stenosis, Severe mitral valve stenosis, Severe sepsis (HCC), Skin rash, Stasis dermatitis, Thyroid disease, Traumatic amputation of thumb, Venous stasis dermatitis, Wears glasses, and Wheelchair bound. Past Surgical History:   has a past surgical history that includes Colonoscopy (11/19/09); Knee arthroscopy (Left, 09/17/99); Hand surgery (Left, 1990 (x10-12 surgeries)); Arm Surgery (Left, 1990); Cardiac catheterization; Nasal septum surgery; Sternotomy (3/18/16); Aortic valve replacement (03/18/2016); Mitral valve replacement (03/18/2016); other surgical history (3/18/16); other surgical history (3/18/16); other surgical history (Right, 01/09/2017); other surgical history (Right, 08/29/2017); pr ligatn angioaccess av fistula (Right, 8/29/2017); pr egd transoral biopsy single/multiple (N/A, 10/17/2017); central venous catheter (Right, 02/21/2018); Colonoscopy (N/A, 10/12/2018); other surgical history (04/05/2018); vascular surgery (12/06/2018); EXPLORATION OF WOUND OF EXTREMITY (Right, 3/10/2019); and EXPLORATION OF WOUND OF EXTREMITY (Right, 3/8/2019). Home Medications:    Prior to Admission medications    Medication Sig Start Date End Date Taking? Authorizing Provider   Misc.  Devices [facility] Use as directed 12/12/18   Octavio Mcarthur DO   buPROPion (WELLBUTRIN XL) 150 MG extended release tablet Take 150 mg by mouth every morning    Historical Provider, MD   sevelamer (RENVELA) 800 MG tablet Give 2 tabs po TID with meals and additional 1 tab po TID with snack. Patient taking differently: No sig reported 7/29/18   SHAMEKA Field - CNP   Pollen Extracts (PROSTAT PO) Take 30 mLs by mouth three times daily    Historical Provider, MD   warfarin (COUMADIN) 2 MG tablet Take 2 mg by mouth nightly Take 1 tablet by mouth at bedtime    Historical Provider, MD   warfarin (COUMADIN) 3 MG tablet Take 3 mg by mouth daily Take 3 mg on Monday and Tuesday    Historical Provider, MD   sertraline (ZOLOFT) 100 MG tablet Take 100 mg by mouth daily     Historical Provider, MD   loratadine (CLARITIN) 10 MG capsule Take 10 mg by mouth    Historical Provider, MD   sodium chloride (OCEAN) 0.65 % nasal spray by Nasal route 10/24/16   Historical Provider, MD   lidocaine (XYLOCAINE) 5 % ointment Apply topically three times a week Apply topically as needed. DIALYSIS DAYS    Historical Provider, MD   Amino Acids-Protein Hydrolys (PRO-STAT PO) Take 30 mLs by mouth 3 times daily SUGAR FREE     Historical Provider, MD   guaiFENesin (ROBITUSSIN) 100 MG/5ML syrup Take 200 mg by mouth 4 times daily as needed for Cough    Historical Provider, MD   ipratropium-albuterol (DUONEB) 0.5-2.5 (3) MG/3ML SOLN nebulizer solution Inhale 1 vial into the lungs every 6 hours as needed     Historical Provider, MD   insulin aspart (NOVOLOG) 100 UNIT/ML injection vial Inject 5 Units into the skin 3 times daily (before meals) Sliding scale    Historical Provider, MD   Nystatin POWD by Does not apply route TID x 10 days (starting 2/13/17) then use PRN 2/13/17   Historical Provider, MD   HYDROcodone-acetaminophen (NORCO) 5-325 MG per tablet Take 1 tablet by mouth every 8 hours as needed for Pain .     Historical Provider, MD aspirin 81 MG tablet Take 81 mg by mouth daily    Historical Provider, MD   Skin Protectants, Misc. (HYDROCERIN) CREA cream Apply topically nightly    Historical Provider, MD   senna (SENOKOT) 8.6 MG tablet Take 1 tablet by mouth nightly    Historical Provider, MD   omeprazole (PRILOSEC) 20 MG delayed release capsule Take 20 mg by mouth daily    Historical Provider, MD   glucagon 1 MG injection Infuse 1 kit intravenously as needed    Historical Provider, MD   donepezil (ARICEPT) 5 MG tablet Take 5 mg by mouth every evening 10/26/16   Historical Provider, MD   levothyroxine (SYNTHROID) 25 MCG tablet Take 1 tablet by mouth Daily  Patient taking differently: Take 50 mcg by mouth Daily  10/26/16   SHAMEKA Carcamo CNP   midodrine (PROAMATINE) 5 MG tablet Take 2 tablets by mouth 3 times daily 10/24/16   Octavio Mcarthur DO   ondansetron (ZOFRAN ODT) 4 MG disintegrating tablet Take 1 tablet by mouth every 6 hours as needed for Nausea or Vomiting 10/24/16   Octavio Mcarthur DO   albuterol (PROVENTIL) (2.5 MG/3ML) 0.083% nebulizer solution Take 3 mLs by nebulization every 8 hours as needed for Wheezing 10/24/16   Octavio Mcarthur, DO   fluticasone (FLONASE) 50 MCG/ACT nasal spray 2 sprays by Nasal route daily 10/24/16   Octavio Mcarthur DO   glucose (GLUTOSE 15) 40 % GEL Take 15 g by mouth as needed (hypoglycemia) 15 gram oral as needed if blood sugar is less than 70 ( for hypoglycemia) 10/24/16   Octavio Mcarthur DO   atorvastatin (LIPITOR) 10 MG tablet Take 1 tablet by mouth daily 10/24/16   Octavio Mcarthur DO   insulin glargine (LANTUS) 100 UNIT/ML injection vial Inject 40 Units into the skin 2 times daily     Historical Provider, MD   ALPRAZolam (XANAX) 0.5 MG tablet Take 0.5 mg by mouth 2 times daily. Smith Gunn     Historical Provider, MD   acetaminophen (TYLENOL) 325 MG tablet Take 650 mg by mouth every 6 hours as needed for Pain or Fever     Historical Provider, MD magnesium hydroxide (MILK OF MAGNESIA) 400 MG/5ML suspension Take 30 mLs by mouth daily as needed for Constipation 3/29/16   Mau Bianchi MD   Multiple Vitamins-Minerals (MULTIVITAL PO) Take 1 tablet by mouth daily    Historical Provider, MD       Allergies:  Percocet [oxycodone-acetaminophen] and Ampicillin    Social History:   reports that he quit smoking about 39 years ago. His smoking use included cigarettes. He started smoking about 64 years ago. He has a 50.00 pack-year smoking history. He has never used smokeless tobacco. He reports that he does not drink alcohol or use drugs. Family History: family history includes Alzheimer's Disease in his father; Diabetes in his maternal grandmother and mother; High Blood Pressure in his sister; Lucendia Fears in his mother. No h/o sudden cardiac death. No for premature CAD    REVIEW OF SYSTEMS:    · Constitutional: there has been no unanticipated weight loss. There's been No change in energy level, No change in activity level. · Eyes: No visual changes or diplopia. No scleral icterus. · ENT: No Headaches  · Cardiovascular: Remaining as above  · Respiratory: No previous pulmonary problems, No cough  · Gastrointestinal: No abdominal pain. No change in bowel or bladder habits. · Genitourinary: No dysuria, trouble voiding, or hematuria. · Musculoskeletal:  No gait disturbance, No weakness or joint complaints. · Integumentary: No rash or pruritis. · Neurological: No headache, diplopia, change in muscle strength, numbness or tingling. No change in gait, balance, coordination, mood, affect, memory, mentation, behavior. · Psychiatric: No anxiety, or depression. · Endocrine: No temperature intolerance. No excessive thirst, fluid intake, or urination. No tremor. · Hematologic/Lymphatic: No abnormal bruising or bleeding, blood clots or swollen lymph nodes. · Allergic/Immunologic: No nasal congestion or hives.       PHYSICAL EXAM:      BP (!) 103/39 Comment: Simultaneous filing. User may not have seen previous data. Pulse 99 Comment: Simultaneous filing. User may not have seen previous data. Temp 97.7 °F (36.5 °C)   Resp 24   Ht 5' 6.93\" (1.7 m)   Wt 208 lb 12.4 oz (94.7 kg)   SpO2 93%   BMI 32.77 kg/m²      Intake/Output Summary (Last 24 hours) at 3/13/2019 1037  Last data filed at 3/13/2019 0600  Gross per 24 hour   Intake 1204 ml   Output 600 ml   Net 604 ml     Constitutional and General Appearance:   · Alert, cooperative, no distress and appears stated age  HEENT:   · RPERRL, no cervical lymphadenopathy. No masses palpable. Normal oral mucosa. Neck circumference is prominent  ·   Respiratory:  · Normal excursion and expansion without use of accessory muscles  · Resp Auscultation: Good respiratory effort. No for increased work of breathing. On auscultation: clear to auscultation bilaterally  Cardiovascular:  · The apical impulse is not displaced  · Heart tones are crisp and normal. regular S1 and S2.  · Jugular venous pulsation Normal  · The carotid upstroke is normal in amplitude and contour without delay or bruit  · Peripheral pulses are symmetrical and full     Abdomen:   · No masses or tenderness  · Bowel sounds present  Extremities:  ·  No Cyanosis or Clubbing  ·  Lower extremity has hematoma with active bleeding. Wound vac in place  ·  Skin: Warm and dry  Neurological:  · Alert and oriented. · Moves all extremities well  · No abnormalities of mood, affect, memory, mentation, or behavior are noted    DATA:    Diagnostics:    EKG:    EKG Interpretation  Sinus bradycardia with 1st degree A-V block  Left axis deviation  Prolonged QT  Abnormal ECG  When compared with ECG of 08-MAR-2019 10:34,  RI interval has increased  Vent.  rate has decreased BY  51 BPM  Nonspecific T wave abnormality now evident in Inferior leads  Nonspecific T wave abnormality now evident in Anterior leads    ECHO: 9/2018    Left ventricle is normal in size Global left ventricular systolic function  is normal Estimated ejection fraction is 55 % . Mild concentric left ventricular hypertrophy. Grade I (mild) left ventricular diastolic dysfunction. Bi-atrial enlargement. Right ventricular dilatation with normal systolic function. Bioprosthetic aortic valve replacement. Peak instantaneous gradient 20 mmHg and mean gradient 11 mmHg. No aortic insufficiency. Bioprosthetic mitral valve replacement. Mean gradient is 7 mm Hg. No mitral regurgitation. No pericardial effusion seen  Labs:     CBC:   Recent Labs     03/12/19  1853 03/12/19  2230 03/13/19  0632   WBC 31.4*  --  26.3*   HGB 5.4* 6.7* 7.6*   HCT 17.5* 20.7* 22.5*     --  122*     BMP:   Recent Labs     03/12/19  1853 03/13/19  0632    133*   K 5.3 4.5   CO2 11* 18*   BUN 36* 45*   CREATININE 5.02* 5.17*   LABGLOM 11* 11*   GLUCOSE 207* 307*     BNP: No results for input(s): BNP in the last 72 hours. PT/INR:   Recent Labs     03/12/19  1141 03/12/19  1853   PROTIME 41.7* 32.5*   INR 4.4 3.4     APTT:  Recent Labs     03/12/19  1654   APTT 39.4*     CARDIAC ENZYMES:No results for input(s): CKTOTAL, CKMB, CKMBINDEX, TROPONINI in the last 72 hours. Invalid input(s):  TROPONINT  FASTING LIPID PANEL:  Lab Results   Component Value Date    HDL 54 04/27/2015    TRIG 100 03/22/2016     LIVER PROFILE:  Recent Labs     03/12/19  1853   *   *   LABALBU 2.6*         IMPRESSION:    1. Paroxysmal atrial fibrillation [id number] Score ~3(HTN,DM and Age). HASBLED score 4  2. Hematoma of right lower extremity due to accidental fall from wheelchair  3.  Traumatic hemorrhagic shock     Acute blood loss anemia  Anemia in chronic kidney disease, on chronic dialysis   Essential hypertension  Mixed hyperlipidemia  Obstructive sleep apnea  Morbid obesity    Benign prostatic hyperplasia without lower urinary tract symptoms  ESRD (end stage renal disease) on dialysis (HCC)  Type 2 diabetes mellitus RECOMMENDATIONS:  1. Hold anticoagulation for now. Transfuse to keep>7Hb: Reason is safety issue, even risk of stroke still high. 2. When hematoma resolve and hemoglobin is stable, will switch to New Oral non vitamin K antagonist. (Pradaxa in specific for reversal reason)    Aisha Lamar MD PGY 3  Internal Medicine Resident    [facility]   11:39 AM  3/13/19           I have reviewed the case with resident / fellow  I have examined the patient personally  Patient agree with treatment plan, correction innotes was made as appropriate, and discussed final arrangement based on  my evaluation and exam.    Note was modified based on my own assessment and treatment.     Brijesh Lopez MD  [facility]

## 2019-03-13 NOTE — PROGRESS NOTES
Pharmacy Note  Vancomycin Consult    Elly Caro is a 75 y.o. male started on Vancomycin for elevated WBC /  possible sepsis; consult received from Dr. Maribel Bermudez to manage therapy. Also receiving the following antibiotics: none.     Patient Active Problem List   Diagnosis    Traumatic amputation of thumb    Essential hypertension    Mixed hyperlipidemia    Obstructive sleep apnea    Morbid obesity ([facility] [address]    Benign prostatic hyperplasia without lower urinary tract symptoms    Venous stasis dermatitis    Severe aortic stenosis    Severe mitral valve stenosis    Anemia in chronic kidney disease, on chronic dialysis ([facility] [address]    [facility]    Dementia    Major depressive disorder with single episode, in partial remission ([facility] [address]    Chronic cerebral ischemia    ESRD (end stage renal disease) on dialysis ([facility])    Long-term insulin use ([facility])    Paroxysmal atrial fibrillation ([facility])    BMI 40.0-44.9, adult ([facility] [address]    Controlled type 2 diabetes mellitus with chronic kidney disease on chronic dialysis, with long-term current use of insulin ([facility]    Controlled type 2 diabetes mellitus with diabetic polyneuropathy, with long-term current use of insulin ([facility])    Dermatitis    Bruising    Hematoma of right lower extremity    Hematoma of leg, right, initial encounter    Acute blood loss anemia    Accidental fall from wheelchair       Allergies:  Percocet [oxycodone-acetaminophen] and Ampicillin     Temp max: 97.9    Recent Labs     03/11/19  0433 03/12/19  1853   BUN 53* 36*       Recent Labs     03/11/19  0433 03/12/19  1853   CREATININE 5.37* 5.02*       Recent Labs     03/11/19  0434 03/12/19  1853   WBC 14.3* 31.4*         Intake/Output Summary (Last 24 hours) at 3/12/2019 2118  Last data filed at 3/12/2019 1800  Gross per 24 hour   Intake 300 ml   Output 650 ml   Net -350 ml       Culture Date      Source                       Results  none    Ht Readings from Last 1 Encounters:   03/11/19 5' 6.93\" (1.7 m) Wt Readings from Last 1 Encounters:   03/11/19 283 lb 15.2 oz (128.8 kg)         Body mass index is 44.57 kg/m². Estimated Creatinine Clearance: 17 mL/min (A) (based on SCr of 5.02 mg/dL [facility])). Goal Trough Level: 15-20 mcg/mL    Assessment/Plan:  Will initiate vancomycin 2000 mg IV for one dose followed by 1000 mg IV every Mon, Wed, and Fri following hemodialysis. Timing of trough level will be determined based on culture results, renal function, and clinical response. Thank you for the consult. Will continue to follow.     Vikki [facility]  3/12/2019 9:20 PM

## 2019-03-13 NOTE — CARE COORDINATION
Pt transferred back to ICU-low HGB, hypotension and transfused with PRBC's. Continue plan for return to [address] [place] [address] when stable.

## 2019-03-13 NOTE — PROGRESS NOTES
Nutrition Assessment    Type and Reason for Visit: Reassess    Nutrition Recommendations: Restart oral diet/supplements as able. Suggest Carb Control, LON diet and Diabetic oral nutrition supplements TID. Will continue to follow/monitor plans. Nutrition Assessment: Pt transfered back to ICU last night. Pt currently NPO. Pt was on oral diet x approx 1 day. Malnutrition Assessment:  · Malnutrition Status: Insufficient data    Nutrition Risk Level: Moderate    Nutrient Needs:  · Estimated Daily Total Kcal: 7579-3368 kcal/day   · Estimated Daily Protein (g): 100-120 gm pro/day    Nutrition Diagnosis:   · Problem: Inadequate oral intake  · Etiology: related to current medical condition   Signs and symptoms:  as evidenced by NPO status due to medical condition    Objective Information:  · Wound Type: Deep Tissue Injury, Pressure Ulcer, Stage I(to left coccyx, scrotum, and left thigh)  · Current Nutrition Therapies:  · Oral Diet Orders: NPO   · Anthropometric Measures:  · Ht: 5' 6.93\" (170 cm)   · Current Body Wt: 297 lb 9.9 oz (135 kg)  · Admission Body Wt: 289 lb 11 oz (131.4 kg)  · Ideal Body Wt: 147 lb 14.9 oz (67.1 kg), % Ideal Body 197% (adm/ideal)  · BMI Classification: BMI > or equal to 40.0 Obese Class III    Nutrition Interventions:   Restart oral diet/supplements as able. Suggest Carb Control, LON diet and Diabetic oral nutrition supplements. Continued Inpatient Monitoring, Education Not Indicated    Nutrition Evaluation:   · Evaluation: Progressing toward goals   · Goals: Meet % of estimated need with nutrition support/oral diet.     · Monitoring: Nutrition Progression, Weight, Pertinent Labs      Electronically signed by Selvin Staley RD, LD on 3/13/19 at 1:07 PM    Contact Number: [phone]

## 2019-03-13 NOTE — PROGRESS NOTES
Dialysis Post Treatment Note  Patient tolerated treatment well. Denies complaints at time of discharge.    Vitals:    03/13/19 1230   BP: (!) 149/38   Pulse: 99   Resp: 19   Temp:    SpO2:      Pre-Weight = 94.7 kg  Post-weight = Weight: 297 lb 9.9 oz (135 kg)  Total Liters Processed = Total Liters Processed (l/min): 92.4 l/min  Rinseback Volume (mL) = Rinseback Volume (ml): 350 ml  Net Removal (mL) = @[id number]  Length of treatment= 210

## 2019-03-13 NOTE — PROGRESS NOTES
[facility] Continence Nurse  Consult Note       NAME:  Primo Mac  MEDICAL RECORD NUMBER:  [id number]  AGE: 75 y.o. GENDER: male  : 1945  TODAY'S DATE:  3/13/2019    Subjective:     Reason for WOCN Evaluation and Assessment: NPWT dressing change M-W-F to the right lower leg wound.        Primo Mac is a 75 y.o. male referred by:   [x] Physician  [] Nursing  [] Other:     Wound Identification:  Wound Type: traumatic and surgical  Contributing Factors: edema, chronic pressure, decreased mobility, shear force, obesity, decreased tissue oxygenation and anticoagulation therapy     INR 3.4 last resulted 3/12/19         PAST MEDICAL HISTORY        Diagnosis Date    Anemia in chronic kidney disease (CKD) 2016    Arthritis     Bacteremia 2016    Benign prostatic hyperplasia without lower urinary tract symptoms     BMI 40.0-44.9, adult ([address] 3/19/2018    Cellulitis of left lower extremity     Chronic cerebral ischemia 1/3/2017    Closed fracture of forearm 2013    Broken lft forearm     Controlled type 2 diabetes mellitus with chronic kidney disease on chronic dialysis, with long-term current use of insulin ([address]     Controlled type 2 diabetes mellitus with diabetic polyneuropathy, with long-term current use of insulin ([address] 10/7/2018    Decubital ulcer     NON OPEN BUTTOCKS    Dementia     memory problems    Dialysis patient ([address] 1/3/2017    Goes Tue, Thurs, Sat in [place]    Difficult intravenous access     HAS NEEDED PICC TEAM IN THE PAST    Difficulty walking     WHEELCHAIR BOUND-PIVOTS WITH ASSIST O F 2    DJD (degenerative joint disease) of knee     Elbow, forearm, and wrist, abrasion or friction burn, without mention of infection 2013    Abrasions lft forearm     Encephalopathy acute 2016    Encounter regarding vascular access for dialysis for ESRD ([facility] 2017    ESRD (end stage renal disease) on dialysis ([address] 2017    Forgetfulness HAS SOME SHORT TERM MEMORY LOSS, QUYEN-WIFE IS LEGAL GUARDIAN    H/O transesophageal echocardiography ([facility]) for monitoring     Hemodialysis patient ([facility] 11/11/2016    tues-thurs-sat [place]---FRESEMIUS.  TUNNELED CATHETER RT UPPER CHEST    Hyperlipidemia 1990    on Meds    Hypertension 1990    on Meds    Intercritical gout     on Meds    Joint pain, knee 01/13/2017    baldo knee synvisc-1 injections    Long-term insulin use ([address] 1/17/2017    Major depressive disorder with single episode, in partial remission ([address] 1/3/2017    Mobility impaired     Morbid obesity ([address]     Muscle weakness     GRNERALIZED    Obesity     Obstructive sleep apnea     HEATHER on CPAP     On CPAP-TO BRING DOS    Paroxysmal atrial fibrillation ([address] 9/6/2017    Respiratory failure ([address] 03/2016    with open heart surgery, trached x 5 months    S/P cardiac cath     Seborrhea     Severe aortic stenosis 3/9/2016    Severe mitral valve stenosis 3/9/2016    Severe sepsis ([address] 4/3/2016    Skin rash     ABD FOLDS    Stasis dermatitis     Thyroid disease     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 lft thumb     Venous stasis dermatitis     Wears glasses     Wheelchair bound     pivots with 2 assists       PAST SURGICAL HISTORY    Past Surgical History:   Procedure Laterality Date    AORTIC VALVE REPLACEMENT  03/18/2016    bioprosthetic    ARM SURGERY Left 1990    CARDIAC CATHETERIZATION      CENTRAL VENOUS CATHETER Right 02/21/2018    DIALYSIS CATHETER Dr Randeen Hashimoto COLONOSCOPY  11/19/09    COLONOSCOPY N/A 10/12/2018    COLONOSCOPY WITH BIOPSY performed by Naima Aviles DO at [address] [place] [address] Right 3/10/2019    WASHOUT RIGHT LOWER EXTREMITY WITH WOUND VAC EXCHANGE performed by Argentina Rayo MD at [address] [place] [address] Right 3/8/2019    RIGHT LOWER EXTREMITY EXPLORATION, HEMATOMA EVACUATION, WOUND VAC PLACEMENT performed by Argentina Rayo MD at [address] (x10-12 surgeries)    several surgeries on left arm    KNEE ARTHROSCOPY Left 99    MITRAL VALVE REPLACEMENT  2016    bioprosthetic     NASAL SEPTUM SURGERY      OTHER SURGICAL HISTORY  3/18/16    Aortic root Enlargement    OTHER SURGICAL HISTORY  3/18/16    ASD Closure    OTHER SURGICAL HISTORY Right 2017    AV fistula creation, wrist    OTHER SURGICAL HISTORY Right 2017    ligation of collateral branch of av fistula right wrist    OTHER SURGICAL HISTORY  2018    FISTULAGRAM WITH DR. Nasima Dubois    CO EGD TRANSORAL BIOPSY SINGLE/MULTIPLE N/A 10/17/2017    EGD BIOPSY performed by Idania Ferris MD at [facility]    CO [address] ANGIOACCESS AV FISTULA Right 2017     RIGHT  LOWER ARM AV FISTULA  LIGATION OF AQUIRED BRANCHES X2 performed by Ramirez Azar DO at [address]  3/18/16    median    VASCULAR SURGERY  2018    Right arm fistulagram,  PTA cephalic vein stenosis  /  DR Yennifer Baxter       FAMILY HISTORY    Family History   Problem Relation Age of Onset    Lung Cancer Mother     Diabetes Mother     Alzheimer's Disease Father     Diabetes Maternal Grandmother     High Blood Pressure Sister        SOCIAL HISTORY    Social History     Tobacco Use    Smoking status: Former Smoker     Packs/day: 2.00     Years: 25.00     Pack years: 50.00     Types: Cigarettes     Start date: 3/17/1955     Last attempt to quit: 1980     Years since quittin.2    Smokeless tobacco: Never Used   Substance Use Topics    Alcohol use: No     Alcohol/week: 0.0 oz     Comment: 1-2 drinks per year    Drug use: No       ALLERGIES    Allergies   Allergen Reactions    Percocet [Oxycodone-Acetaminophen] Itching and Rash     Also causes shaking    Ampicillin Rash           Objective:      BP (!) 120/34   Pulse 98   Temp 97.9 °F (36.6 °C)   Resp 25   Ht 5' 6.93\" (1.7 m)   Wt 208 lb 12.4 oz (94.7 kg)   SpO2 100%   BMI 32.77 Ecchymosis; Induration;Denuded   Closure None   Drainage Amount Moderate   Drainage Description Sanguinous   Odor None   Dressing/Treatment Moist to moist;ABD; Ace Wrap   Dressing Changed Changed/New   Dressing Status Changed   Dressing Change Due 03/13/19     Machine tuned off, dressing disconnected from the cannister. Lifted drape from posterior leg and cut with scissors to release the circumferential drape application. Gently peeled drape from the paolo-wound. The medial lower leg has a hematoma present with denuded skin, deep purple/ maroon tissue. The black foam was lifted from the wound bed, an additional piece of black foam was found near 1 o'clock. Two pieces of a meshed dressing material were removed from the wound bed. Noted paolo-wound skin edge bulging. Gently palpated beneath the skin edge and found nine pieces of meshed gauze from roughly 9 o'clock to 4 o'clock. One piece of presumed fluff was removed from beneath the skin at 9 o'clock. The skin level from 5-9 is adhered to the wound bed. Unable to lift to examine for any further material. Continued paolo-wound skin edge bulging between 12-1 o'clock. Gelatinous hematoma felt. Beyond that another piece of meshed material was able to be grasped and removed with gentle traction. Unable to clearly determine if all meshed/gauze material has been successfully removed. Discussed concern with trauma coordinator and a perfect serve message sent to the surgical resident who last changed the NPWT to seek input/guidance. Plan:     Plan of Care:   Saline moistened fluffs under Sofsorb pads. Secured with roll gauze and ACE wrap. Cannot resume NPWT until bleeding controlled. Specialty Bed Required : Yes   [x] Low Air Loss   [x] Pressure Redistribution  [] Fluid Immersion  [x] Bariatric  [] Total Pressure Relief  [] Other:     Discharge Plan:  TBD    Patient/Caregiver Teaching:  Patient pleasant throughout. He did express pain.  He did nod in understanding what actions were taken.  Unclear if he truly understands plan.   [] Indicates understanding       [] Needs reinforcement  [x] Unsuccessful      [] Verbal Understanding  [] Demonstrated understanding       [] No evidence of learning  [] Refused teaching         [] N/A       Electronically signed by Poly Lane RN, CWON on 3/13/2019 at 12:13 PM

## 2019-03-13 NOTE — PROGRESS NOTES
Nephrology Progress Note        Subjective: patient evaluated on dialysis. Pt is stable on dialysis. Access cannulated without problems. Patient was transferred back to the ICU because of significant hypotension. He also had evidence of blood loss anemia. With his hypotension he also had altered mental status. Did require vasopressors in form of norepinephrine. His blood pressures are a little bit more stable this morning. He is definitely more conversant. Objective:  CURRENT TEMPERATURE:  Temp: 97.7 °F (36.5 °C)  MAXIMUM TEMPERATURE OVER 24HRS:  Temp (24hrs), Av.7 °F (36.5 °C), Min:97.5 °F (36.4 °C), Max:98.2 °F (36.8 °C)    CURRENT RESPIRATORY RATE:  Resp: 30  CURRENT PULSE:  Pulse: 98  CURRENT BLOOD PRESSURE:  BP: (!) 117/45  24HR BLOOD PRESSURE RANGE:  Systolic (32YTE), LJO:683 , Min:79 , ADA:396   ; Diastolic (80VQV), PSR:09, Min:16, Max:89    24HR INTAKE/OUTPUT:      Intake/Output Summary (Last 24 hours) at 3/13/2019 1101  Last data filed at 3/13/2019 0600  Gross per 24 hour   Intake 1204 ml   Output 600 ml   Net 604 ml     Patient Vitals for the past 96 hrs (Last 3 readings):   Weight   19 0825 208 lb 12.4 oz (94.7 kg)   19 1209 283 lb 15.2 oz (128.8 kg)   19 1021 287 lb 11.2 oz (130.5 kg)         Physical Exam:  General appearance: Awake, in no acute distress  Skin: warm and dry, no rash or erythema  Eyes: conjunctivae pale and sclera anicteric  ENT:no thrush no pharyngeal congestion   Neck:  No JVD, No Thyromegaly  Pulmonary: clear to auscultation and no wheezing or rhonchi   Cardiovascular: normal S1 and S2. NO rubs and NO murmur. No S3 OR S4   Abdomen: soft nontender, bowel sounds present, no organomegaly,  no ascites   Extremities: no cyanosis, clubbing + right lower extremity edema with also has a wound VAC over the right shin area.     Access:  previous  Right AV fistula    Current Medications:      0.9 % sodium chloride bolus Once   0.9 % sodium chloride bolus PRN   0.9 % sodium chloride bolus PRN   0.9 % sodium chloride bolus Once   magnesium sulfate 1 g in dextrose 5% 100 mL IVPB PRN   0.9 % sodium chloride bolus Once   0.9 % sodium chloride bolus Once   lidocaine PF 1 % injection 5 mL Once   sodium chloride flush 0.9 % injection 10 mL 2 times per day   sodium chloride flush 0.9 % injection 10 mL PRN   insulin glargine (LANTUS) injection vial 10 Units Daily   iohexol (OMNIPAQUE 240) injection 50 mL ONCE PRN   norepinephrine (LEVOPHED) 16 mg in dextrose 5% 250 mL infusion Continuous   ondansetron (ZOFRAN) injection 4 mg Q6H PRN   vancomycin (VANCOCIN) 1000 mg in dextrose 5% 200 mL IVPB Once per day on Mon Wed Fri   vancomycin (VANCOCIN) intermittent dosing (placeholder) RX Placeholder   gabapentin (NEURONTIN) capsule 300 mg Daily   insulin lispro (HUMALOG) injection vial 0-18 Units TID WC   insulin lispro (HUMALOG) injection vial 0-9 Units Nightly   glucose (GLUTOSE) 40 % oral gel 15 g PRN   lansoprazole (PREVACID SOLUTAB) disintegrating tablet 30 mg QAM AC   acetaminophen (TYLENOL) tablet 650 mg 4x Daily   albuterol (PROVENTIL) nebulizer solution 2.5 mg Q8H PRN   atorvastatin (LIPITOR) tablet 10 mg Daily   buPROPion (WELLBUTRIN XL) extended release tablet 150 mg QAM   donepezil (ARICEPT) tablet 5 mg QPM   fluticasone (FLONASE) 50 MCG/ACT nasal spray 2 spray Daily   guaiFENesin (ROBITUSSIN) 100 MG/5ML oral solution 200 mg 4x Daily PRN   levothyroxine (SYNTHROID) tablet 25 mcg Daily   cetirizine (ZYRTEC) tablet 5 mg Daily   midodrine (PROAMATINE) tablet 10 mg TID   sevelamer (RENVELA) tablet 800 mg TID WC   sodium chloride flush 0.9 % injection 10 mL 2 times per day   sodium chloride flush 0.9 % injection 10 mL PRN   acetaminophen (TYLENOL) tablet 650 mg Q4H PRN   magnesium hydroxide (MILK OF MAGNESIA) 400 MG/5ML suspension 30 mL Daily PRN   glucagon (rDNA) injection 1 mg PRN   dextrose 5 % solution PRN     Labs:   CBC: Recent Labs     03/11/19  0434 03/12/19  1853 03/12/19  1384 03/13/19  0632   WBC 14.3* 31.4*  --  26.3*   RBC 2.24* 1.72*  --  2.44*   HGB 6.9* 5.4* 6.7* 7.6*   HCT 20.6* 17.5* 20.7* 22.5*   * 151  --  122*   MPV 9.8 10.0  --  10.0      BMP: Recent Labs     03/11/19  0433 03/12/19 1853 03/13/19  0632    136 133*   K 4.3 5.3 4.5   CL 97* 96* 96*   CO2 24 11* 18*   BUN 53* 36* 45*   CREATININE 5.37* 5.02* 5.17*   GLUCOSE 205* 207* 307*   CALCIUM 7.6* 7.9* 7.7*        Phosphorus:  No results for input(s): PHOS in the last 72 hours. Magnesium:   Recent Labs     03/12/19 1853 03/13/19  0632   MG 2.1 1.8     Albumin:   Recent Labs     03/12/19 1853   LABALBU 2.6*       Dialysis bath: Dialysis Bath  K+ (Potassium): 2  Ca+ (Calcium): 3  Na+ (Sodium): 140  HCO3 (Bicarb): 35    Radiology:  Reviewed as available. Assessment:  1 ESRD:dialysis bath reviewed with nurse. 2.HTN: Patient with episode of hypotension yesterday. Likely hypovolemic from blood loss. Blood pressures still little on the low side although overall better. He is still on low-dose Levophed. 3 patient with right lower extremity hematoma status post wound VAC placement  4 EDEMA :  5.ANEMIA OF CHRONIC DISEASE:   6.SECONDARY HYPERPARATHYROIDISM:     Plan:  1. Wt removal and dialysis orders reviewed. 2.  Continue current meds. Patient is to receive Aranesp. He is also been ordered to have serial hemoglobin checks. When necessary blood transfusion. 3. Cont  zemplar per protocol. 4. Follow up labs ordered. Please do not hesitate to call with questions.     Electronically signed by Ken Spencer MD on 3/13/2019 at 11:01 AM

## 2019-03-13 NOTE — PROGRESS NOTES
INTENSIVE CARE UNIT  Resident Physician Progress Note    Fátima - Teresa Oneil  Date of Admission -  3/8/2019 10:24 AM  Date of Evaluation -  3/13/2019  Room and Bed Number -  0121/0121-01   Hospital Day - 5      SUBJECTIVE:     OVERNIGHT EVENTS:     Patient Transfused another 2u PRBC for low hemoglobin overnight. Patient started on Levophed through central line. Patient mentating well, AOx4 on eval in the AM. Patient with no new complaints.     AWAKE & FOLLOWING COMMANDS:  [] No   [x] Yes    SECRETIONS Amount:  [] Small [] Moderate  [] Large  [x] None  Color:     [] White [] Colored  [] Bloody    SEDATION:  RAAS Score:  [] Propofol gtt  [] Versed gtt  [] Ativan gtt   [x] No Sedation    PARALYZED:  [x] No    [] Yes    VASOPRESSORS:  [] No    [x] Yes  [x] Levophed [] Dopamine [] Vasopressin  [] Dobutamine [] Phenylephrine [] Epinephrine      OBJECTIVE:     VITAL SIGNS:  BP (!) 149/38   Pulse 99   Temp 97.7 °F (36.5 °C) (Axillary)   Resp 19   Ht 5' 6.93\" (1.7 m)   Wt 208 lb 12.4 oz (94.7 kg)   SpO2 100%   BMI 32.77 kg/m²   Tmax over 24 hours:  Temp (24hrs), Av.8 °F (36.6 °C), Min:97.5 °F (36.4 °C), Max:98.2 °F (36.8 °C)      Patient Vitals for the past 8 hrs:   BP Temp Temp src Pulse Resp SpO2 Weight   19 1230 (!) 149/38 -- -- 99 19 -- --   19 1215 (!) 129/41 -- -- -- 21 100 % --   19 1200 (!) 140/107 97.7 °F (36.5 °C) Axillary 103 19 100 % --   19 1145 130/67 -- -- 98 17 100 % --   19 1130 (!) 120/34 -- -- 98 25 100 % --   19 1127 (!) 113/34 97.9 °F (36.6 °C) -- 97 19 -- --   19 1100 (!) 114/33 -- -- 98 28 100 % --   19 1045 (!) 117/45 -- -- 98 30 -- --   19 1030 (!) 93/44 -- -- 101 18 -- --   19 1015 (!) 116/40 -- -- 98 20 -- --   19 1000 (!) 103/39 -- -- 99 24 93 % --   19 0945 (!) 134/49 -- -- 94 20 -- --   19 0930 (!) 103/49 -- -- 97 22 -- --   19 0915 (!) 79/35 -- -- 97 22 -- --   19 0900 (!) 96/39 -- -- 98 20 96 % --   03/13/19 0845 96/73 -- -- 97 20 -- --   03/13/19 0835 (!) 114/40 -- -- -- -- -- --   03/13/19 0830 (!) 114/40 -- -- 97 20 -- --   03/13/19 0825 (!) 137/44 97.7 °F (36.5 °C) -- -- 19 96 % 208 lb 12.4 oz (94.7 kg)   03/13/19 0815 (!) 119/36 -- -- 94 20 96 % --   03/13/19 0800 (!) 116/35 98.2 °F (36.8 °C) Oral 94 20 98 % --   03/13/19 0745 (!) 137/44 -- -- 100 24 -- --   03/13/19 0730 (!) 128/51 -- -- 98 19 -- --   03/13/19 0715 (!) 148/50 -- -- 99 19 -- --   03/13/19 0700 (!) 143/42 -- -- 98 17 100 % --   03/13/19 0645 (!) 151/42 -- -- 100 17 100 % --   03/13/19 0630 (!) 147/48 -- -- 97 23 100 % --   03/13/19 0615 110/89 -- -- 97 20 98 % --   03/13/19 0600 (!) 125/40 -- -- 95 19 98 % --   03/13/19 0545 (!) 141/36 -- -- 94 20 98 % --   03/13/19 0530 (!) 151/33 -- -- 93 19 99 % --   03/13/19 0515 (!) 150/43 -- -- 102 20 100 % --   03/13/19 0500 (!) 126/43 -- -- 99 19 99 % --         Intake/Output Summary (Last 24 hours) at 3/13/2019 1259  Last data filed at 3/13/2019 1138  Gross per 24 hour   Intake 1494 ml   Output 650 ml   Net 844 ml     Date 03/13/19 0000 - 03/13/19 2359   Shift 0895-7422 6516-8402 8321-2099 24 Hour Total   INTAKE   I.V.(mL/kg) 56(0.4)   56(0.6)   Blood(mL/kg) 249(1.9) 290(3.1)  539(5.7)   IV Piggyback(mL/kg) 325(2.5)   325(3.4)   Shift Total(mL/kg) 630(4.9) 290(3.1)  920(9.7)   OUTPUT   Drains(mL/kg) 200(1.6) 50(0.5)  250(2.6)   Stool(mL/kg) 50(0.4) 0(0)  50(0.5)   Shift Total(mL/kg) 250(1.9) 50(0.5)  300(3.2)   Weight (kg) 128.8 94.7 94.7 94.7     Wt Readings from Last 3 Encounters:   03/13/19 208 lb 12.4 oz (94.7 kg)   03/08/19 280 lb (127 kg)   01/15/19 280 lb (127 kg)     Body mass index is 32.77 kg/m². PHYSICAL EXAM:  Constitutional: Alert and Oriented x4, morbid obesity  EENT: PERRLA, EOMI, sclera clear, anicteric, oropharynx clear, no lesions, neck supple with midline trachea.   Neck: Supple, symmetrical, trachea midline, no adenopathy, thyroid symmetric, no jvd skin normal  Respiratory: clear to auscultation, no wheezes or rales and unlabored breathing.  No intercostal tenderness  Cardiovascular: regular rate and rhythm, normal S1, S2, no murmur noted and 2+ pulses throughout  Abdomen: soft, nontender, nondistended, no masses or organomegaly  Neurological: Moves all 4 extremities, sensation intact  Extremities:  peripheral pulses normal, 2+ pedal edema, no clubbing or cyanosis, large R groin hematoma, large R lateral leg wound, wound Vac removed, wet to dry gauze with oozing blood    MEDICATIONS:  Scheduled Meds:   sodium chloride  250 mL Intravenous Once    sodium chloride  250 mL Intravenous Once    sodium chloride  100 mL Intravenous Once    sodium chloride  250 mL Intravenous Once    sodium chloride  250 mL Intravenous Once    lidocaine 1 % injection  5 mL Intradermal Once    sodium chloride flush  10 mL Intravenous 2 times per day    insulin glargine  10 Units Subcutaneous Daily    vancomycin  1,000 mg Intravenous Once per day on Mon Wed Fri    vancomycin (VANCOCIN) intermittent dosing (placeholder)   Other RX Placeholder    gabapentin  300 mg Oral Daily    insulin lispro  0-18 Units Subcutaneous TID WC    insulin lispro  0-9 Units Subcutaneous Nightly    lansoprazole  30 mg Oral QAM AC    acetaminophen  650 mg Oral 4x Daily    atorvastatin  10 mg Oral Daily    buPROPion  150 mg Oral QAM    donepezil  5 mg Oral QPM    fluticasone  2 spray Nasal Daily    levothyroxine  25 mcg Oral Daily    cetirizine  5 mg Oral Daily    midodrine  10 mg Oral TID    sevelamer  800 mg Oral TID WC    sodium chloride flush  10 mL Intravenous 2 times per day     Continuous Infusions:   sodium chloride      norepinephrine 6 mcg/min (03/13/19 0800)    dextrose       PRN Meds:     sodium chloride 250 mL PRN   sodium chloride 150 mL PRN   fentanNYL 25 mcg Q1H PRN   magnesium sulfate 1 g PRN   sodium chloride flush 10 mL PRN   iohexol 50 mL ONCE PRN   ondansetron 4 mg Q6H PRN   glucose 15 g PRN   albuterol 2.5 mg Q8H PRN   guaiFENesin 200 mg 4x Daily PRN   sodium chloride flush 10 mL PRN   acetaminophen 650 mg Q4H PRN   magnesium hydroxide 30 mL Daily PRN   glucagon (rDNA) 1 mg PRN   dextrose 100 mL/hr PRN       SUPPORT DEVICES: [] Ventilator [] BIPAP  [] Nasal Cannula [x] Room Air    VENT SETTINGS (Comprehensive) (if applicable):  Vent Information  $Ventilation: $Subsequent Day  Ventilator Started: Yes  Ventilator Stopped: Yes  Vent Type: Servo i  Vent Mode: PRVC  Vt Ordered: 450 mL  Rate Set: 18 bmp  FiO2 : 100 %  Sensitivity: 5  PEEP/CPAP: 5  I Time/ I Time %: 0.9 s  Humidification Source: HME  Additional Respiratory  Assessments  Pulse: 99  Resp: 19  SpO2: 100 %  End Tidal CO2: 34 (%)  Position: Semi-Soria's  Humidification Source: HME  Oral Care Completed?: Yes  Oral Care: Mouth moisturizer, Mouth suctioned, Suction toothette  Subglottic Suction Done?: Yes    ABGs:   Lab Results   Component Value Date    EWB6ORP 32 03/10/2019    FIO2 30.0 03/10/2019     DATA:  Complete Blood Count: Recent Labs     03/11/19 0434 03/12/19 1853 03/12/19  2230 03/13/19  0632 03/13/19  1220   WBC 14.3* 31.4*  --  26.3*  --    RBC 2.24* 1.72*  --  2.44*  --    HGB 6.9* 5.4* 6.7* 7.6* 6.5*   HCT 20.6* 17.5* 20.7* 22.5* 18.5*   MCV 92.0 101.7  --  92.2  --    MCH 30.8 31.4  --  31.1  --    MCHC 33.5 30.9  --  33.8  --    RDW 15.8* 16.2*  --  16.1*  --    * 151  --  122*  --    MPV 9.8 10.0  --  10.0  --         Last 3 Blood Glucose:   Recent Labs     03/11/19 0433 03/12/19  1853 03/13/19  0632   GLUCOSE 205* 207* 307*        PT/INR:    Lab Results   Component Value Date    PROTIME 32.5 03/12/2019    PROTIME 17.4 12/06/2018    INR 3.4 03/12/2019     PTT:    Lab Results   Component Value Date    APTT 39.4 03/12/2019       Comprehensive Metabolic Profile:   Recent Labs     03/11/19  0433 03/12/19  1853 03/13/19  0632    136 133*   K 4.3 5.3 4.5   CL 97* 96* 96*   CO2 24 11* 18*   BUN 53* 36* 45*   CREATININE 5.37* 5.02* 5.17*   GLUCOSE 205* 207* 307*   CALCIUM 7.6* 7.9* 7.7*   PROT  --  4.4*  --    LABALBU  --  2.6*  --    BILITOT  --  0.39  --    ALKPHOS  --  70  --    AST  --  369*  --    ALT  --  334*  --       Magnesium:   Lab Results   Component Value Date    MG 1.8 03/13/2019    MG 2.1 03/12/2019    MG 1.7 03/10/2019     Phosphorus:   Lab Results   Component Value Date    PHOS 3.3 03/10/2019    PHOS 1.8 11/11/2016    PHOS 3.9 04/11/2016     Ionized Calcium:   Lab Results   Component Value Date    CAION 4.80 04/07/2016    CAION 4.82 04/06/2016    CAION 4.69 04/06/2016        Urinalysis: Lab Results   Component Value Date    NITRU NEGATIVE 04/27/2016    COLORU DELORES 04/27/2016    PHUR 5.0 04/27/2016    WBCUA TOO NUMEROUS TO COUNT 04/27/2016    RBCUA 10 TO 20 04/27/2016    MUCUS NOT REPORTED 04/27/2016    TRICHOMONAS NOT REPORTED 04/27/2016    YEAST NOT REPORTED 04/27/2016    BACTERIA MANY 04/27/2016    SPECGRAV 1.015 04/27/2016    LEUKOCYTESUR LARGE 04/27/2016    UROBILINOGEN Normal 04/27/2016    BILIRUBINUR NEGATIVE 04/27/2016    GLUCOSEU NEGATIVE 04/27/2016    KETUA NEGATIVE 04/27/2016    AMORPHOUS 2+ 04/27/2016       HgBA1c:    Lab Results   Component Value Date    LABA1C 5.8 03/09/2019     TSH:    Lab Results   Component Value Date    TSH 0.60 03/10/2019     Lactic Acid:   Lab Results   Component Value Date    LACTA 1.7 11/10/2016    LACTA NOT REPORTED 04/27/2016    LACTA NOT REPORTED 04/04/2016      Troponin: No results for input(s): TROPONINI in the last 72 hours.     ASSESSMENT:     Patient Active Problem List    Diagnosis Date Noted    Tra[facility] hemorrhagic shock ([facility] 03/13/2019     Priority: High    Anemia in chronic kidney disease, on chronic dialysis ([facility] 04/27/2016     Priority: High    Acute blood loss anemia 03/11/2019    Accidental fall from wheelchair 03/11/2019    Hematoma of right lower extremity 03/08/2019    Hematoma of leg, right, initial encounter 03/08/2019    Bruising     Dermatitis     Controlled type 2 diabetes mellitus with chronic kidney disease on chronic dialysis, with long-term current use of insulin ([address] 10/07/2018    Controlled type 2 diabetes mellitus with diabetic polyneuropathy, with long-term current use of insulin ([address] 10/07/2018    BMI 40.0-44.9, adult ([address] 03/19/2018    Paroxysmal atrial fibrillation ([address] 09/06/2017    ESRD (end stage renal disease) on dialysis ([address] 01/17/2017    Long-term insulin use ([address] 01/17/2017    Dialysis patient ([address] 01/03/2017    Dementia 01/03/2017    Major depressive disorder with single episode, in partial remission ([address] 01/03/2017    Chronic cerebral ischemia 01/03/2017    Severe aortic stenosis 03/09/2016    Severe mitral valve stenosis 03/09/2016    Essential hypertension     Mixed hyperlipidemia     Obstructive sleep apnea     Morbid obesity ([address]     Benign prostatic hyperplasia without lower urinary tract symptoms     Venous stasis dermatitis     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  Heparin Subcut  [x] EPC Cuffs    NUTRITION:  [x] NPO [] Tube Feeding (Specify: ) [] TPN  [] PO    HOME MEDS RECONCILED: [] No  [x] Yes    CONSULTATION NEEDED:  [x] No  [] Yes    FAMILY UPDATED:    [] No  [x] Yes    TRANSFER OUT OF ICU:   [x] No  [] Yes      Plan:  - R leg wound   - Wound Vac removed, wound nurse with wet to dry dressing changes   - Trauma following  - Acute Blood loss anemia   - Patient transfused 2 unit FFP and 4 unit PRBC in past 24 hours   - Will give DDAVP for continued falls in hemoglobin and persistent R leg wound bleeding   - Continue to transfuse for hemoglobins <7  - ESRD   - Patient with hypotension during dialysis today (3/13), was given FFP during  - ID   - Patient with persistet diarrhea and CT abd cannot exclude colitis   - Will continue Vanco and add Flagyl   - Lactic acidosis, continue to trend  - CV   - Patient with wide pulse pressure and hx of valve replacements, will order ECHO for  evaluation for aortic insufficiency    - Cardiology following, hold all anticoagulation   - Continue levophed for Systolic BP >023      Berkley Lombard, DO  Internal Medicine PGY 3  3/13/2019 12:59 PM

## 2019-03-13 NOTE — PLAN OF CARE
Problem: OXYGENATION/RESPIRATORY FUNCTION  Goal: Patient will maintain patent airway  3/13/2019 1828 by Danilo Quiñones RN  Outcome: Ongoing     Problem: SKIN INTEGRITY  Goal: Skin integrity is maintained or improved  3/13/2019 1958 by Cricket Grande RN  Outcome: Ongoing  3/13/2019 1828 by Danilo Quiñones RN  Outcome: Ongoing     Problem: Confusion - Acute:  Goal: Absence of continued neurological deterioration signs and symptoms  Description  Absence of continued neurological deterioration signs and symptoms  3/13/2019 1958 by Cricket Grande RN  Outcome: Ongoing  3/13/2019 1828 by Danilo Quiñones RN  Outcome: Ongoing  Goal: Mental status will be restored to baseline  Description  Mental status will be restored to baseline  3/13/2019 1958 by Cricket Grande RN  Outcome: Ongoing  3/13/2019 1828 by Danilo Quiñones RN  Outcome: Ongoing     Problem: Discharge Planning:  Goal: Ability to perform activities of daily living will improve  Description  Ability to perform activities of daily living will improve  Outcome: Ongoing  Goal: Participates in care planning  Description  Participates in care planning  Outcome: Ongoing     Problem: Injury - Risk of, Physical Injury:  Goal: Absence of physical injury  Description  Absence of physical injury  Outcome: Ongoing  Goal: Will remain free from falls  Description  Will remain free from falls  Outcome: Ongoing     Problem: Sensory Perception - Impaired:  Goal: Demonstrations of improved sensory functioning will increase  Description  Demonstrations of improved sensory functioning will increase  Outcome: Ongoing  Goal: Decrease in sensory misperception frequency  Description  Decrease in sensory misperception frequency  Outcome: Ongoing  Goal: Able to refrain from responding to false sensory perceptions  Description  Able to refrain from responding to false sensory perceptions  Outcome: Ongoing  Goal: Demonstrates accurate environmental perceptions  Description  Demonstrates accurate environmental perceptions  Outcome: Ongoing  Goal: Able to distinguish between reality-based and nonreality-based thinking  Description  Able to distinguish between reality-based and nonreality-based thinking  Outcome: Ongoing  Goal: Able to interrupt nonreality-based thinking  Description  Able to interrupt nonreality-based thinking  Outcome: Ongoing     Problem: Sleep Pattern Disturbance:  Goal: Appears well-rested  Description  Appears well-rested  Outcome: Ongoing     Problem: Risk for Impaired Skin Integrity  Goal: Tissue integrity - skin and mucous membranes  Description  Structural intactness and normal physiological function of skin and  mucous membranes.   Outcome: Ongoing     Problem: Falls - Risk of:  Goal: Absence of physical injury  Description  Absence of physical injury  Outcome: Ongoing  Goal: Will remain free from falls  Description  Will remain free from falls  Outcome: Ongoing     Problem: Nutrition  Goal: Optimal nutrition therapy  Outcome: Ongoing     Problem: Pain:  Goal: Pain level will decrease  Description  Pain level will decrease  3/13/2019 1958 by Cricket Grande RN  Outcome: Ongoing  3/13/2019 1828 by Danilo Quiñones RN  Outcome: Ongoing  Goal: Control of acute pain  Description  Control of acute pain  3/13/2019 1958 by Cricket Grande RN  Outcome: Ongoing  3/13/2019 1828 by Danilo Quiñones RN  Outcome: Ongoing  Goal: Control of chronic pain  Description  Control of chronic pain  Outcome: Ongoing

## 2019-03-14 LAB
ABSOLUTE EOS #: 0 K/UL (ref 0–0.4)
ABSOLUTE IMMATURE GRANULOCYTE: 0.87 K/UL (ref 0–0.3)
ABSOLUTE LYMPH #: 0.29 K/UL (ref 1–4.8)
ABSOLUTE MONO #: 0.29 K/UL (ref 0.1–0.8)
ANION GAP SERPL CALCULATED.3IONS-SCNC: 25 MMOL/L (ref 9–17)
BASOPHILS # BLD: 0 % (ref 0–2)
BASOPHILS ABSOLUTE: 0 K/UL (ref 0–0.2)
BLD PROD TYP BPU: NORMAL
BUN BLDV-MCNC: 34 MG/DL (ref 8–23)
BUN/CREAT BLD: ABNORMAL (ref 9–20)
CALCIUM SERPL-MCNC: 8.4 MG/DL (ref 8.6–10.4)
CHLORIDE BLD-SCNC: 99 MMOL/L (ref 98–107)
CO2: 14 MMOL/L (ref 20–31)
CREAT SERPL-MCNC: 4.53 MG/DL (ref 0.7–1.2)
DIFFERENTIAL TYPE: ABNORMAL
DISPENSE STATUS BLOOD BANK: NORMAL
EOSINOPHILS RELATIVE PERCENT: 0 % (ref 1–4)
GFR AFRICAN AMERICAN: 15 ML/MIN
GFR NON-AFRICAN AMERICAN: 13 ML/MIN
GFR SERPL CREATININE-BSD FRML MDRD: ABNORMAL ML/MIN/{1.73_M2}
GFR SERPL CREATININE-BSD FRML MDRD: ABNORMAL ML/MIN/{1.73_M2}
GLUCOSE BLD-MCNC: 272 MG/DL (ref 75–110)
GLUCOSE BLD-MCNC: 273 MG/DL (ref 75–110)
GLUCOSE BLD-MCNC: 289 MG/DL (ref 75–110)
GLUCOSE BLD-MCNC: 296 MG/DL (ref 75–110)
GLUCOSE BLD-MCNC: 307 MG/DL (ref 70–99)
HCT VFR BLD CALC: 22.8 % (ref 40.7–50.3)
HCT VFR BLD CALC: 24.8 % (ref 40.7–50.3)
HEMOGLOBIN: 7.4 G/DL (ref 13–17)
HEMOGLOBIN: 8.1 G/DL (ref 13–17)
IMMATURE GRANULOCYTES: 6 %
INR BLD: 4.4
LV EF: 63 %
LVEF MODALITY: NORMAL
LYMPHOCYTES # BLD: 2 % (ref 24–44)
MCH RBC QN AUTO: 31.2 PG (ref 25.2–33.5)
MCHC RBC AUTO-ENTMCNC: 32.7 G/DL (ref 28.4–34.8)
MCV RBC AUTO: 95.4 FL (ref 82.6–102.9)
MONOCYTES # BLD: 2 % (ref 1–7)
MORPHOLOGY: ABNORMAL
NRBC AUTOMATED: 8.2 PER 100 WBC
NUCLEATED RED BLOOD CELLS: 8 PER 100 WBC
PATHOLOGIST REVIEW: NORMAL
PDW BLD-RTO: 16.4 % (ref 11.8–14.4)
PLATELET # BLD: 68 K/UL (ref 138–453)
PLATELET ESTIMATE: ABNORMAL
PMV BLD AUTO: 10.2 FL (ref 8.1–13.5)
POTASSIUM SERPL-SCNC: 4 MMOL/L (ref 3.7–5.3)
PROTHROMBIN TIME: 41.3 SEC (ref 9–12)
RBC # BLD: 2.6 M/UL (ref 4.21–5.77)
RBC # BLD: ABNORMAL 10*6/UL
SEG NEUTROPHILS: 90 % (ref 36–66)
SEGMENTED NEUTROPHILS ABSOLUTE COUNT: 13.05 K/UL (ref 1.8–7.7)
SODIUM BLD-SCNC: 138 MMOL/L (ref 135–144)
TRANSFUSION STATUS: NORMAL
UNIT DIVISION: 0
UNIT NUMBER: NORMAL
WBC # BLD: 14.5 K/UL (ref 3.5–11.3)
WBC # BLD: ABNORMAL 10*3/UL

## 2019-03-14 PROCEDURE — 82947 ASSAY GLUCOSE BLOOD QUANT: CPT

## 2019-03-14 PROCEDURE — 6370000000 HC RX 637 (ALT 250 FOR IP): Performed by: NURSE PRACTITIONER

## 2019-03-14 PROCEDURE — 97161 PT EVAL LOW COMPLEX 20 MIN: CPT

## 2019-03-14 PROCEDURE — 6370000000 HC RX 637 (ALT 250 FOR IP): Performed by: STUDENT IN AN ORGANIZED HEALTH CARE EDUCATION/TRAINING PROGRAM

## 2019-03-14 PROCEDURE — 2500000003 HC RX 250 WO HCPCS: Performed by: STUDENT IN AN ORGANIZED HEALTH CARE EDUCATION/TRAINING PROGRAM

## 2019-03-14 PROCEDURE — 85025 COMPLETE CBC W/AUTO DIFF WBC: CPT

## 2019-03-14 PROCEDURE — 2580000003 HC RX 258: Performed by: STUDENT IN AN ORGANIZED HEALTH CARE EDUCATION/TRAINING PROGRAM

## 2019-03-14 PROCEDURE — 6370000000 HC RX 637 (ALT 250 FOR IP): Performed by: INTERNAL MEDICINE

## 2019-03-14 PROCEDURE — 86927 PLASMA FRESH FROZEN: CPT

## 2019-03-14 PROCEDURE — 93306 TTE W/DOPPLER COMPLETE: CPT

## 2019-03-14 PROCEDURE — 80048 BASIC METABOLIC PNL TOTAL CA: CPT

## 2019-03-14 PROCEDURE — 2580000003 HC RX 258

## 2019-03-14 PROCEDURE — 2060000000 HC ICU INTERMEDIATE R&B

## 2019-03-14 PROCEDURE — 36430 TRANSFUSION BLD/BLD COMPNT: CPT

## 2019-03-14 PROCEDURE — 99291 CRITICAL CARE FIRST HOUR: CPT | Performed by: INTERNAL MEDICINE

## 2019-03-14 PROCEDURE — P9016 RBC LEUKOCYTES REDUCED: HCPCS

## 2019-03-14 PROCEDURE — 86900 BLOOD TYPING SEROLOGIC ABO: CPT

## 2019-03-14 PROCEDURE — P9017 PLASMA 1 DONOR FRZ W/IN 8 HR: HCPCS

## 2019-03-14 PROCEDURE — 85018 HEMOGLOBIN: CPT

## 2019-03-14 PROCEDURE — 85014 HEMATOCRIT: CPT

## 2019-03-14 PROCEDURE — 85610 PROTHROMBIN TIME: CPT

## 2019-03-14 RX ORDER — MAGNESIUM HYDROXIDE 1200 MG/15ML
LIQUID ORAL
Status: COMPLETED
Start: 2019-03-14 | End: 2019-03-14

## 2019-03-14 RX ORDER — PHYTONADIONE 5 MG/1
5 TABLET ORAL ONCE
Status: COMPLETED | OUTPATIENT
Start: 2019-03-14 | End: 2019-03-14

## 2019-03-14 RX ORDER — INSULIN GLARGINE 100 [IU]/ML
15 INJECTION, SOLUTION SUBCUTANEOUS 2 TIMES DAILY
Status: DISCONTINUED | OUTPATIENT
Start: 2019-03-14 | End: 2019-03-15

## 2019-03-14 RX ADMIN — INSULIN GLARGINE 15 UNITS: 100 INJECTION, SOLUTION SUBCUTANEOUS at 11:05

## 2019-03-14 RX ADMIN — LEVOTHYROXINE SODIUM 25 MCG: 25 TABLET ORAL at 09:00

## 2019-03-14 RX ADMIN — BUPROPION HYDROCHLORIDE 150 MG: 150 TABLET, EXTENDED RELEASE ORAL at 09:00

## 2019-03-14 RX ADMIN — Medication 10 ML: at 09:02

## 2019-03-14 RX ADMIN — METRONIDAZOLE 500 MG: 500 INJECTION, SOLUTION INTRAVENOUS at 22:40

## 2019-03-14 RX ADMIN — SEVELAMER CARBONATE 800 MG: 800 TABLET, FILM COATED ORAL at 09:00

## 2019-03-14 RX ADMIN — METRONIDAZOLE 500 MG: 500 INJECTION, SOLUTION INTRAVENOUS at 05:54

## 2019-03-14 RX ADMIN — DONEPEZIL HYDROCHLORIDE 5 MG: 5 TABLET, FILM COATED ORAL at 18:42

## 2019-03-14 RX ADMIN — LANSOPRAZOLE 30 MG: 30 TABLET, ORALLY DISINTEGRATING, DELAYED RELEASE ORAL at 09:00

## 2019-03-14 RX ADMIN — SODIUM CHLORIDE 1000 ML: 900 IRRIGANT IRRIGATION at 16:16

## 2019-03-14 RX ADMIN — INSULIN LISPRO 5 UNITS: 100 INJECTION, SOLUTION INTRAVENOUS; SUBCUTANEOUS at 20:35

## 2019-03-14 RX ADMIN — INSULIN GLARGINE 15 UNITS: 100 INJECTION, SOLUTION SUBCUTANEOUS at 21:24

## 2019-03-14 RX ADMIN — FLUTICASONE PROPIONATE 2 SPRAY: 50 SPRAY, METERED NASAL at 09:01

## 2019-03-14 RX ADMIN — INSULIN LISPRO 9 UNITS: 100 INJECTION, SOLUTION INTRAVENOUS; SUBCUTANEOUS at 09:18

## 2019-03-14 RX ADMIN — ATORVASTATIN CALCIUM 10 MG: 10 TABLET, FILM COATED ORAL at 09:00

## 2019-03-14 RX ADMIN — GABAPENTIN 300 MG: 300 CAPSULE ORAL at 09:00

## 2019-03-14 RX ADMIN — METRONIDAZOLE 500 MG: 500 INJECTION, SOLUTION INTRAVENOUS at 14:06

## 2019-03-14 RX ADMIN — SEVELAMER CARBONATE 800 MG: 800 TABLET, FILM COATED ORAL at 15:57

## 2019-03-14 RX ADMIN — INSULIN LISPRO 9 UNITS: 100 INJECTION, SOLUTION INTRAVENOUS; SUBCUTANEOUS at 18:49

## 2019-03-14 RX ADMIN — INSULIN LISPRO 9 UNITS: 100 INJECTION, SOLUTION INTRAVENOUS; SUBCUTANEOUS at 11:48

## 2019-03-14 RX ADMIN — CETIRIZINE HYDROCHLORIDE 5 MG: 10 TABLET ORAL at 09:00

## 2019-03-14 RX ADMIN — PHYTONADIONE 5 MG: 5 TABLET ORAL at 15:53

## 2019-03-14 RX ADMIN — SODIUM CHLORIDE 250 ML: 9 INJECTION, SOLUTION INTRAVENOUS at 05:54

## 2019-03-14 ASSESSMENT — PAIN DESCRIPTION - ORIENTATION: ORIENTATION: RIGHT;LEFT

## 2019-03-14 ASSESSMENT — PAIN DESCRIPTION - LOCATION: LOCATION: KNEE

## 2019-03-14 ASSESSMENT — PAIN SCALES - GENERAL
PAINLEVEL_OUTOF10: 0
PAINLEVEL_OUTOF10: 0

## 2019-03-14 ASSESSMENT — PAIN SCALES - WONG BAKER: WONGBAKER_NUMERICALRESPONSE: 4

## 2019-03-14 ASSESSMENT — PAIN DESCRIPTION - PAIN TYPE: TYPE: CHRONIC PAIN

## 2019-03-14 NOTE — PROGRESS NOTES
Critical care team - Resident sign-out to medicine service      Date and time: 3/15/2019 7:04 PM  Patient's name:  Rita Francisco Record Number: [id number]  Patient's account/billing number: [de-identified]  Patient's YOB: 1945  Age: 75 y.o. Date of Admission: 3/8/2019 10:24 AM  Length of stay during current admission: 7    Primary Care Physician: Frankie Cortez DO    Code Status: DNR-CCA    Mode of physician to physician communication:        [x] Via telephone   [] In person     Date and time of sign-out: 3/15/2019 7:04 PM    Accepting Internal Medicine resident: Dr. Bhakti Anderson    Accepting Medicine team: IM Team 2    Accepting team's attending: Dr. Sophia Meza    Patient's current ICU Bed:  121     Patient's assigned bed on floor:  401        [] Med-Surg Monitored [x] Step-down       [] Psychiatry ICU       [] Psych floor     Reason for ICU admission:     Hypotension, AMS    ICU course summary:     Elly Caro is a 75 y. o. with past medical history of end-stage renal disease, mitral valve repair, aortic valve repair, CHF with diastolic dysfunction, type 2 diabetes who is on Coumadin that presented to the Emergency Department following  transfer from [address] [place] [address] for expanding right lower extremity hematoma.  Per patient and his wife he was on his way to dialysis and ambulate today when he had splitting of the skin and bleeding.  Patient had reportedly had a low impact fall from wheelchair after the seat fell out on Sunday experienced this scrape to the right lower extremity either during fall or well being picked up by staff at skilled nursing facility.  In the days after that incident he had a gradually expanding hematoma form.  After the skin split today he was brought to [place] [address] where radiography confirmed no fracture and he was transferred to [facility] after being started on IV fluids and IV antibiotics for suspicion of infection related to hematoma.      Workup in [facility] Department found leukocytosis with white blood cell count of 24, mild hypotension treated with fluid (patient has history of hypertension requiring Midodrine).  During his stay bleeding recurred when dressing was examined     Patient was admitted under trauma surgery and went to the OR on 3/9 and 3/10 for hematoma evacuation     Patient extubated on 3/10 in MICU, but did require pressor support. Patient was weaned of pressors and restarted on Coumadin and transferred to the Floor on 3/11 under medical service. Patients last dialysis was 3/11.     Patient was seen on 3/12 AM by internal medical team and patient was hypotensive and confused with hemoglobin 6.9 with a lot of blood in wound vac and hematoma to R groin. At that time they consulted critical care for further workup and evaluation. Patient needed pressor support in the ICU. Received total of 5 units PRBC and 2 units FFP in the last 2 days. Wound vac was removed. Patient off pressor since yesterday. Vitamin K added today. Patient mentation improved. Ok to transfer oout of ICU.     Procedures during patient's ICU stay:     Removal of Wound vac    Current Vitals:     BP (!) 121/46   Pulse 93   Temp 98.2 °F (36.8 °C) (Oral)   Resp 17   Ht 5' 6.93\" (1.7 m)   Wt (!) 300 lb 14.9 oz (136.5 kg)   SpO2 98%   BMI 47.23 kg/m²       Cultures:       Blood cultures:      [] None drawn      [x] Negative             []  Positive (Details:  )  Urine Culture:        [] None drawn      [x] Negative             []  Positive (Details:  )  Sputum Culture:   [] None drawn       [x] Negative             []  Positive (Details:  )       Consults:     1. trauma surg    Assessment:     Patient Active Problem List    Diagnosis Date Noted    Traumatic hemorrhagic shock ([facility] 03/13/2019     Priority: High    Anemia in chronic kidney disease, on chronic dialysis ([facility] 04/27/2016     Priority: High    Acute blood loss anemia 03/11/2019    Accidental fall from wheelchair 03/11/2019    Hematoma of right lower extremity 03/08/2019    Hematoma of leg, right, initial encounter 03/08/2019    Bruising     Dermatitis     Controlled type 2 diabetes mellitus with chronic kidney disease on chronic dialysis, with long-term current use of insulin ([address] 10/07/2018    Controlled type 2 diabetes mellitus with diabetic polyneuropathy, with long-term current use of insulin ([address] 10/07/2018    BMI 40.0-44.9, adult ([address] 03/19/2018    Paroxysmal atrial fibrillation ([address] 09/06/2017    ESRD (end stage renal disease) on dialysis ([address] 01/17/2017    Long-term insulin use ([address] 01/17/2017    Dialysis patient ([address] 01/03/2017    Dementia 01/03/2017    Major depressive disorder with single episode, in partial remission ([address] 01/03/2017    Chronic cerebral ischemia 01/03/2017    Severe aortic stenosis 03/09/2016    Severe mitral valve stenosis 03/09/2016    Essential hypertension     Mixed hyperlipidemia     Obstructive sleep apnea     Morbid obesity ([address]     Benign prostatic hyperplasia without lower urinary tract symptoms     Venous stasis dermatitis     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 Will give one unit of FFP today as INR 4.4.              -  DDAVP was given yesterday for continued falls in hemoglobin and persistent R leg wound bleeding              - Continue to transfuse for hemoglobins <7  - ESRD              - Patient with hypotension during dialysis yesterday (3/13), was given FFP during  - ID              - Diarrhea improved. 100 ml in FMS. CT abd cannot exclude colitis              - Will continue Vanco and Flagyl              - Lactic acidosis - resolved.   - CV              - Patient with wide pulse pressure and hx of valve replacements, follow with ECHO for       evaluation for aortic insufficiency               - Cardiology following, hold all anticoagulation              - Continue levophed for Systolic BP >257       Above mentioned assessment and plan was discussed by me with the admitting medicine resident. The medicine team assigned to the patient by medicine admitting resident will be following up the patient from now onwards on the floor.      Bravo Pike DO  PGY - 3  Department of Internal Medicine/ Critical care  [facility])             3/15/2019, 7:04 PM

## 2019-03-14 NOTE — CARE COORDINATION
Attempt to deliver nHPredict tool. Patient seen at bedside. Tool not delivered at this time. Attempt to locate and collaborate with Case Management for unit. CM not available. Call to number for unit , LM informing of nHPredict and plan for delivery. Request for return call to complete collaboration prior to delivery.  Contact numbers provided of [phone] and [phone]

## 2019-03-14 NOTE — PROGRESS NOTES
Subjective: patient evaluated . Pt received dialysis yesterday . No Access problems reported . No new issues overnite. Stable hemodynamics. Overnight he did require one additional unit of blood transfusion because his hemoglobin dropped again. His wound VAC was removed and he currently has a Ace bandage on his right leg. He is not on any pressors anymore at this time with stable blood pressures    Objective:  CURRENT TEMPERATURE:  Temp: 98.2 °F (36.8 °C)  MAXIMUM TEMPERATURE OVER 24HRS:  Temp (24hrs), Av.7 °F (36.5 °C), Min:97.2 °F (36.2 °C), Max:98.2 °F (36.8 °C)    CURRENT RESPIRATORY RATE:  Resp: 20  CURRENT PULSE:  Pulse: 92  CURRENT BLOOD PRESSURE:  BP: (!) 168/66  24HR BLOOD PRESSURE RANGE:  Systolic (38YZZ), EFRAIN:860 , Min:100 , SAS:246   ; Diastolic (51TKM), FHM:58, Min:28, Max:107    24HR INTAKE/OUTPUT:      Intake/Output Summary (Last 24 hours) at 3/14/2019 1100  Last data filed at 3/13/2019 1800  Gross per 24 hour   Intake 2902 ml   Output 1410 ml   Net 1492 ml     Patient Vitals for the past 96 hrs (Last 3 readings):   Weight   19 1210 297 lb 9.9 oz (135 kg)   19 0825 208 lb 12.4 oz (94.7 kg)   19 1209 283 lb 15.2 oz (128.8 kg)         Physical Exam:  General appearance:Awake, alert, in no acute distress  Skin: warm and dry, no rash or erythema  Eyes: conjunctivae normal and sclera anicteric  ENT:no thrush no pharyngeal congestion   Neck:  No JVD, No Thyromegaly  Pulmonary: clear to auscultation and no wheezing or rhonchi   Cardiovascular: normal S1 and S2. NO rubs and NO murmur.  No S3 OR S4   Abdomen: soft nontender, bowel sounds present, no organomegaly,  no ascites   Extremities: no cyanosis, clubbing + edema     Access:  previous  Right AV fistula    Current Medications:      insulin glargine (LANTUS) injection vial 15 Units BID   phytonadione (VITAMIN K) tablet 5 mg Once   0.9 % sodium chloride bolus PRN   0.9 % sodium chloride bolus PRN   0.9 % sodium chloride bolus Once --  26.3*  --   --   --  14.5*   RBC 1.72*  --  2.44*  --   --   --  2.60*   HGB 5.4*   < > 7.6*   < > 7.0* 6.7* 8.1*   HCT 17.5*   < > 22.5*   < > 20.6* 20.4* 24.8*     --  122*  --   --   --  68*   MPV 10.0  --  10.0  --   --   --  10.2    < > = values in this interval not displayed. BMP: Recent Labs     03/12/19 1853 03/13/19  0632 03/14/19  0744    133* 138   K 5.3 4.5 4.0   CL 96* 96* 99   CO2 11* 18* 14*   BUN 36* 45* 34*   CREATININE 5.02* 5.17* 4.53*   GLUCOSE 207* 307* 307*   CALCIUM 7.9* 7.7* 8.4*        Phosphorus:  No results for input(s): PHOS in the last 72 hours. Magnesium:   Recent Labs     03/12/19 1853 03/13/19  0632   MG 2.1 1.8     Albumin:   Recent Labs     03/12/19 1853   LABALBU 2.6*       Radiology:  Reviewed as available. Assessment:  1 ESRD:dialysis Ofgmsv-Nnmoyxbca-Sfohxs. 2.HTN: Blood pressures are definitely more stable. He is not on any vasopressors anymore. 3 DM:  4 EDEMA :  5.  Right lower extremity hematoma. Wound VAC removed. Currently has a pressure /Ace wrap dressing in place. Plan:  1. Hemodialysis tomorrow , without heparin   2. Continue to monitor hemoglobin levels and transfuse as necessary. 3. Follow up labs ordered. 4.  From renal standpoint, he could be transferred to stepdown      Please do not hesitate to call with questions.     Electronically signed by Rox Sterling MD on 3/14/2019 at 11:00 AM

## 2019-03-14 NOTE — PROGRESS NOTES
INTENSIVE CARE UNIT  Resident Physician Progress Note    Patient - Yogesh Keenan  Date of Admission -  3/8/2019 10:24 AM  Date of Evaluation -  3/14/2019  Room and Bed Number -  0121/0121-01   Hospital Day - 6      SUBJECTIVE:     OVERNIGHT EVENTS:     Patient received 1 units PRBC for low hemoglobin overnight. Off levophed since yesterday. Tolerated dialysis well. Hb 8.1 this morning. Patient with no new complaints.     AWAKE & FOLLOWING COMMANDS:  [] No   [x] Yes    SECRETIONS Amount:  [] Small [] Moderate  [] Large  [x] None  Color:     [] White [] Colored  [] Bloody    SEDATION:  RAAS Score:  [] Propofol gtt  [] Versed gtt  [] Ativan gtt   [x] No Sedation    PARALYZED:  [x] No    [] Yes    VASOPRESSORS:  [x] No    [] Yes  [] Levophed [] Dopamine [] Vasopressin  [] Dobutamine [] Phenylephrine [] Epinephrine      OBJECTIVE:     VITAL SIGNS:  BP (!) 149/46   Pulse 87   Temp 97.3 °F (36.3 °C) (Axillary)   Resp 18   Ht 5' 6.93\" (1.7 m)   Wt 297 lb 9.9 oz (135 kg)   SpO2 99%   BMI 46.71 kg/m²   Tmax over 24 hours:  Temp (24hrs), Av.7 °F (36.5 °C), Min:97.2 °F (36.2 °C), Max:98.2 °F (36.8 °C)      Patient Vitals for the past 8 hrs:   BP Temp Temp src Pulse Resp SpO2   19 0700 (!) 149/46 -- -- 87 18 99 %   19 0600 (!) 145/43 -- -- 88 24 97 %   19 0500 (!) 143/44 -- -- 90 18 100 %   19 0400 (!) 119/45 -- -- 87 17 99 %   19 0300 (!) 135/38 -- -- 90 20 97 %   19 0245 -- -- -- 89 -- 98 %   19 0230 (!) 144/35 97.3 °F (36.3 °C) Axillary 92 20 98 %   19 0215 (!) 149/44 -- -- 91 -- 99 %   19 0200 (!) 162/47 97.5 °F (36.4 °C) Axillary 91 19 99 %   19 0145 (!) 167/45 97.7 °F (36.5 °C) Axillary 90 20 99 %   19 0130 (!) 142/40 97.5 °F (36.4 °C) Axillary 89 20 100 %   19 0115 (!) 127/39 97.7 °F (36.5 °C) Axillary 89 18 98 %   19 0100 (!) 100/32 97.5 °F (36.4 °C) Axillary -- 19 97 %         Intake/Output Summary (Last 24 hours) at 3/14/2019 4331  Last data filed at 3/13/2019 1800  Gross per 24 hour   Intake 2902 ml   Output 1410 ml   Net 1492 ml       Wt Readings from Last 3 Encounters:   03/13/19 297 lb 9.9 oz (135 kg)   03/08/19 280 lb (127 kg)   01/15/19 280 lb (127 kg)     Body mass index is 46.71 kg/m². PHYSICAL EXAM:  Constitutional: Alert and Oriented x4, morbid obesity  EENT: PERRLA, EOMI, sclera clear, anicteric, oropharynx clear, no lesions, neck supple with midline trachea. Neck: Supple, symmetrical, trachea midline, no adenopathy, thyroid symmetric, no jvd skin normal  Respiratory: clear to auscultation, no wheezes or rales and unlabored breathing.  No intercostal tenderness  Cardiovascular: regular rate and rhythm, normal S1, S2, no murmur noted and 2+ pulses throughout  Abdomen: soft, nontender, nondistended, no masses or organomegaly  Neurological: Moves all 4 extremities, sensation intact  Extremities:  peripheral pulses normal, 2+ pedal edema, no clubbing or cyanosis, large R groin hematoma, large R lateral leg wound, wound Vac removed, wet to dry gauze with oozing blood    MEDICATIONS:  Scheduled Meds:   insulin glargine  15 Units Subcutaneous BID    sodium chloride  250 mL Intravenous Once    metroNIDAZOLE  500 mg Intravenous Q8H    vancomycin  1,000 mg Intravenous Once per day on Mon Wed Fri    sodium chloride  250 mL Intravenous Once    lidocaine 1 % injection  5 mL Intradermal Once    sodium chloride flush  10 mL Intravenous 2 times per day    vancomycin (VANCOCIN) intermittent dosing (placeholder)   Other RX Placeholder    gabapentin  300 mg Oral Daily    insulin lispro  0-18 Units Subcutaneous TID WC    insulin lispro  0-9 Units Subcutaneous Nightly    lansoprazole  30 mg Oral QAM AC    acetaminophen  650 mg Oral 4x Daily    atorvastatin  10 mg Oral Daily    buPROPion  150 mg Oral QAM    donepezil  5 mg Oral QPM    fluticasone  2 spray Nasal Daily    levothyroxine  25 mcg Oral Daily    cetirizine  5 mg Oral Daily    midodrine  10 mg Oral TID    sevelamer  800 mg Oral TID     sodium chloride flush  10 mL Intravenous 2 times per day     Continuous Infusions:   norepinephrine Stopped (03/13/19 1346)    dextrose       PRN Meds:     sodium chloride 250 mL PRN   sodium chloride 150 mL PRN   fentanNYL 25 mcg Q1H PRN   magnesium sulfate 1 g PRN   sodium chloride flush 10 mL PRN   iohexol 50 mL ONCE PRN   ondansetron 4 mg Q6H PRN   glucose 15 g PRN   albuterol 2.5 mg Q8H PRN   guaiFENesin 200 mg 4x Daily PRN   sodium chloride flush 10 mL PRN   acetaminophen 650 mg Q4H PRN   magnesium hydroxide 30 mL Daily PRN   glucagon (rDNA) 1 mg PRN   dextrose 100 mL/hr PRN       SUPPORT DEVICES: [] Ventilator [] BIPAP  [] Nasal Cannula [x] Room Air    VENT SETTINGS (Comprehensive) (if applicable):  Vent Information  $Ventilation: $Subsequent Day  Ventilator Started: Yes  Ventilator Stopped: Yes  Vent Type: Servo i  Vent Mode: PRVC  Vt Ordered: 450 mL  Rate Set: 18 bmp  FiO2 : 100 %  Sensitivity: 5  PEEP/CPAP: 5  I Time/ I Time %: 0.9 s  Humidification Source: HME  Additional Respiratory  Assessments  Pulse: 87  Resp: 18  SpO2: 99 %  End Tidal CO2: 34 (%)  Position: Semi-Soria's  Humidification Source: HME  Oral Care Completed?: Yes  Oral Care: Mouth moisturizer, Mouth suctioned, Suction toothette  Subglottic Suction Done?: Yes    ABGs:   Lab Results   Component Value Date    DTD6BJE 32 03/10/2019    FIO2 30.0 03/10/2019     DATA:  Complete Blood Count:   Recent Labs     03/12/19  1853  03/13/19  0632  03/13/19  1502 03/13/19  2311 03/14/19  0744   WBC 31.4*  --  26.3*  --   --   --  14.5*   RBC 1.72*  --  2.44*  --   --   --  2.60*   HGB 5.4*   < > 7.6*   < > 7.0* 6.7* 8.1*   HCT 17.5*   < > 22.5*   < > 20.6* 20.4* 24.8*   .7  --  92.2  --   --   --  95.4   MCH 31.4  --  31.1  --   --   --  31.2   MCHC 30.9  --  33.8  --   --   --  32.7   RDW 16.2*  --  16.1*  --   --   --  16.4*     --  122*  --   --   --  68* MPV 10.0  --  10.0  --   --   --  10.2    < > = values in this interval not displayed.         Last 3 Blood Glucose:   Recent Labs     03/12/19 1853 03/13/19  0632 03/14/19  0744   GLUCOSE 207* 307* 307*        PT/INR:    Lab Results   Component Value Date    PROTIME 41.3 03/14/2019    PROTIME 17.4 12/06/2018    INR 4.4 03/14/2019     PTT:    Lab Results   Component Value Date    APTT 39.4 03/12/2019       Comprehensive Metabolic Profile:   Recent Labs     03/12/19 1853 03/13/19 0632 03/14/19  0744    133* 138   K 5.3 4.5 4.0   CL 96* 96* 99   CO2 11* 18* 14*   BUN 36* 45* 34*   CREATININE 5.02* 5.17* 4.53*   GLUCOSE 207* 307* 307*   CALCIUM 7.9* 7.7* 8.4*   PROT 4.4*  --   --    LABALBU 2.6*  --   --    BILITOT 0.39  --   --    ALKPHOS 70  --   --    *  --   --    *  --   --       Magnesium:   Lab Results   Component Value Date    MG 1.8 03/13/2019    MG 2.1 03/12/2019    MG 1.7 03/10/2019     Phosphorus:   Lab Results   Component Value Date    PHOS 3.3 03/10/2019    PHOS 1.8 11/11/2016    PHOS 3.9 04/11/2016     Ionized Calcium:   Lab Results   Component Value Date    CAION 4.80 04/07/2016    CAION 4.82 04/06/2016    CAION 4.69 04/06/2016        Urinalysis:   Lab Results   Component Value Date    NITRU NEGATIVE 04/27/2016    COLORU DELORES 04/27/2016    PHUR 5.0 04/27/2016    WBCUA TOO NUMEROUS TO COUNT 04/27/2016    RBCUA 10 TO 20 04/27/2016    MUCUS NOT REPORTED 04/27/2016    TRICHOMONAS NOT REPORTED 04/27/2016    YEAST NOT REPORTED 04/27/2016    BACTERIA MANY 04/27/2016    SPECGRAV 1.015 04/27/2016    LEUKOCYTESUR LARGE 04/27/2016    UROBILINOGEN Normal 04/27/2016    BILIRUBINUR NEGATIVE 04/27/2016    GLUCOSEU NEGATIVE 04/27/2016    KETUA NEGATIVE 04/27/2016    AMORPHOUS 2+ 04/27/2016       HgBA1c:    Lab Results   Component Value Date    LABA1C 5.8 03/09/2019     TSH:    Lab Results   Component Value Date    TSH 0.60 03/10/2019     Lactic Acid:   Lab Results   Component Value Date LACTA 1.7 11/10/2016    LACTA NOT REPORTED 04/27/2016    LACTA NOT REPORTED 04/04/2016      Troponin: No results for input(s): TROPONINI in the last 72 hours. ASSESSMENT:     Patient Active Problem List    Diagnosis Date Noted    Traumatic hemorrhagic shock ([facility] [address] 03/13/2019     Priority: High    Anemia in chronic kidney disease, on chronic dialysis ([facility] [address] 04/27/2016     Priority: High    Acute blood loss anemia 03/11/2019    Accidental fall from wheelchair 03/11/2019    Hematoma of right lower extremity 03/08/2019    Hematoma of leg, right, initial encounter 03/08/2019    Bruising     Dermatitis     Controlled type 2 diabetes mellitus with chronic kidney disease on chronic dialysis, with long-term current use of insulin ([address] 10/07/2018    Controlled type 2 diabetes mellitus with diabetic polyneuropathy, with long-term current use of insulin ([address] 10/07/2018    BMI 40.0-44.9, adult ([facility] [address] 03/19/2018    Paroxysmal atrial fibrillation ([address] 09/06/2017    ESRD (end stage renal disease) on dialysis ([address] 01/17/2017    Long-term insulin use ([address] 01/17/2017    Dialysis patient ([address] 01/03/2017    Dementia 01/03/2017    Major depressive disorder with single episode, in partial remission ([address] 01/03/2017    Chronic cerebral ischemia 01/03/2017    Severe aortic stenosis 03/09/2016    Severe mitral valve stenosis 03/09/2016    Essential hypertension     Mixed hyperlipidemia     Obstructive sleep apnea     Morbid obesity ([address]     Benign prostatic hyperplasia without lower urinary tract symptoms     Venous stasis dermatitis     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  Heparin Subcut  [x] EPC Cuffs    NUTRITION:  [x] NPO [] Tube Feeding (Specify: ) [] TPN  [] PO    HOME MEDS RECONCILED: [] No  [x] Yes    CONSULTATION NEEDED:  [x] No  [] Yes    FAMILY UPDATED:    [] No  [x] Yes    TRANSFER OUT OF ICU:   [x] No  [] Yes      Plan:  - R leg wound   - Wound Vac removed, wound nurse with wet to dry dressing changes   - Trauma following  - Acute Blood loss anemia   - Patient transfused 1 units PRBC overnight. Will give one unit of FFP today as INR 4.4.   -  DDAVP was given yesterday for continued falls in hemoglobin and persistent R leg wound bleeding   - Continue to transfuse for hemoglobins <7  - ESRD   - Patient with hypotension during dialysis yesterday (3/13), was given FFP during  - ID   - Diarrhea improved. 100 ml in FMS. CT abd cannot exclude colitis   - Will continue Vanco and Flagyl   - Lactic acidosis - resolved.   - CV   - Patient with wide pulse pressure and hx of valve replacements, follow with ECHO for  evaluation for aortic insufficiency    - Cardiology following, hold all anticoagulation   - Continue levophed for Systolic BP >043      Leila Lara, DO  Internal Medicine PGY 3  3/14/2019 8:49 AM

## 2019-03-14 NOTE — PROGRESS NOTES
Physical Therapy    Facility/Department: [address] MICU  Initial Assessment    NAME: Noris Ignacio  : 1945  MRN: [id number]    Date of SThe patient is a 75 y.o.  male who is admitted to the hospital for right leg hematoma secondary to fall in setting of supra therapeutic INR    ervice: 3/14/2019    Discharge Recommendations:  Subacute/Skilled Nursing Facility        Assessment   Body structures, Functions, Activity limitations: Decreased functional mobility ; Decreased endurance;Decreased strength  Assessment: Pt very limited with mobility at baseline. Pt to return to facility with continued PT>  Specific instructions for Next Treatment: Kranthi lift to chair if pt able to tolerate  Prognosis: Fair  Decision Making: Low Complexity  Patient Education: PT POC  REQUIRES PT FOLLOW UP: Yes  Activity Tolerance  Activity Tolerance: Patient limited by pain; Patient limited by endurance       Patient Diagnosis(es): The encounter diagnosis was Hematoma of right lower extremity, initial encounter.      has a past medical history of Anemia in chronic kidney disease (CKD), Arthritis, Bacteremia, Benign prostatic hyperplasia without lower urinary tract symptoms, BMI 40.0-44.9, adult ([address] Cellulitis of left lower extremity, Chronic cerebral ischemia, Closed fracture of forearm, Controlled type 2 diabetes mellitus with chronic kidney disease on chronic dialysis, with long-term current use of insulin ([address] Controlled type 2 diabetes mellitus with diabetic polyneuropathy, with long-term current use of insulin ([address] Decubital ulcer, Dementia, Dialysis patient ([address] Difficult intravenous access, Difficulty walking, DJD (degenerative joint disease) of knee, Elbow, forearm, and wrist, abrasion or friction burn, without mention of infection, Encephalopathy acute, Encounter regarding vascular access for dialysis for ESRD ([address] ESRD (end stage renal disease) on dialysis ([address] Forgetfulness, H/O transesophageal echocardiography (AMADEO) for monitoring, Hemodialysis patient ([facility] [address] Hyperlipidemia, Hypertension, Intercritical gout, Joint pain, knee, Long-term insulin use ([address] Major depressive disorder with single episode, in partial remission ([address] Mobility impaired, Morbid obesity ([address] Muscle weakness, Obesity, Obstructive sleep apnea, HEATHER on CPAP, Paroxysmal atrial fibrillation ([address] Respiratory failure ([address] S/P cardiac cath, Seborrhea, Severe aortic stenosis, Severe mitral valve stenosis, Severe sepsis (HCC), Skin rash, Stasis dermatitis, Thyroid disease, Traumatic amputation of thumb, Venous stasis dermatitis, Wears glasses, and Wheelchair bound. has a past surgical history that includes Colonoscopy (11/19/09); Knee arthroscopy (Left, 09/17/99); Hand surgery (Left, 1990 (x10-12 surgeries)); Arm Surgery (Left, 1990); Cardiac catheterization; Nasal septum surgery; Sternotomy (3/18/16); Aortic valve replacement (03/18/2016); Mitral valve replacement (03/18/2016); other surgical history (3/18/16); other surgical history (3/18/16); other surgical history (Right, 01/09/2017); other surgical history (Right, 08/29/2017); pr ligatn angioaccess av fistula (Right, 8/29/2017); pr egd transoral biopsy single/multiple (N/A, 10/17/2017); central venous catheter (Right, 02/21/2018); Colonoscopy (N/A, 10/12/2018); other surgical history (04/05/2018); vascular surgery (12/06/2018); EXPLORATION OF WOUND OF EXTREMITY (Right, 3/10/2019); and EXPLORATION OF WOUND OF EXTREMITY (Right, 3/8/2019). Restrictions  Restrictions/Precautions  Restrictions/Precautions: Up as Tolerated, Fall Risk, General Precautions  Required Braces or Orthoses?: No  Position Activity Restriction  Other position/activity restrictions: Pt in bariatric bed.   Vision/Hearing  Vision: Within Functional Limits  Hearing: Exceptions to [facility]  Hearing Exceptions: Hard of hearing/hearing concerns     Subjective  General  Patient assessed for rehabilitation services?: Yes  Family / Caregiver Present: No  Follows Commands: Impaired  Pain Screening  Patient Currently in Pain: Yes  Pain Assessment  Pain Assessment: Faces  Sweet-Baker Pain Rating: Hurts little more  Pain Type: Chronic pain  Pain Location: Knee  Pain Orientation: Right;Left  Vital Signs  Patient Currently in Pain: Yes       Orientation  Orientation  Overall Orientation Status: Within Functional Limits  Social/Functional History  Social/Functional History  Lives With: ECF  Type of Home: Facility  Home Layout: One level  Home Access: Level entry  Bathroom Shower/Tub: Walk-in shower  Bathroom Toilet: Handicap height  Bathroom Equipment: Hand-held shower  Home Equipment: [address] Help From: Personal care attendant  ADL Assistance: Needs assistance  Ambulation Assistance: Non Ambulatory  Transfer Assistance: Needs assistance. Estrella Lift  Active : No  IADL Comments: Return to [employer]  Additional Comments: Pt lives at facility. Poor historian. Pt is estrella lifted to WC. Pt states he propels WC himself at times. Pt does recieve PT at facility. Objective  PROM RLE (degrees)  RLE General PROM: Unable to assess. Pt resistive to movement  PROM LLE (degrees)  LLE General PROM: Unable to assess. Pt resisitive to movement.   AROM RUE (degrees)  RUE AROM : WFL  AROM LUE (degrees)  LUE AROM : WFL  Strength RLE  Comment: N/T  Strength LLE  Comment: N/T  Strength RUE  Strength RUE: WFL  Strength LUE  Strength LUE: WFL  Motor Control  Gross Motor?: WFL  Plan   Plan  Times per week: 3-5x/ week  Specific instructions for Next Treatment: Kranthi lift to chair if pt able to tolerate  Current Treatment Recommendations: Strengthening, Functional Mobility Training, Endurance Training  Safety Devices  Type of devices: Left in bed, Nurse notified, Call light within reach               AM-PAC Score     AM-PAC Inpatient Mobility without Stair Climbing Raw Score : 5  AM-PAC Inpatient without Stair Climbing T-Scale Score : 23.59  Mobility Inpatient CMS 0-100% Score: 100  Mobility Inpatient without Stair CMS G-Code Modifier : CN       Goals  Short term goals  Time Frame for Short term goals: 14 visits  Short term goal 1: Pt to tolerate 30 minutes of activity to improve endurance. Short term goal 2: Pt to be lifted to [facility] with francia lift. Pt to propel  ft SBA. Patient Goals   Patient goals : Get some water.        Therapy Time   Individual Concurrent Group Co-treatment   Time In 0845         Time Out 0901         Minutes [facility], PT

## 2019-03-14 NOTE — PLAN OF CARE
Problem: OXYGENATION/RESPIRATORY FUNCTION  Goal: Patient will maintain patent airway  Outcome: Ongoing  Goal: Patient will achieve/maintain normal respiratory rate/effort  Description  Respiratory rate and effort will be within normal limits for the patient  Outcome: Ongoing     Problem: SKIN INTEGRITY  Goal: Skin integrity is maintained or improved  Outcome: Ongoing     Problem: Confusion - Acute:  Goal: Absence of continued neurological deterioration signs and symptoms  Description  Absence of continued neurological deterioration signs and symptoms  Outcome: Ongoing  Goal: Mental status will be restored to baseline  Description  Mental status will be restored to baseline  Outcome: Ongoing     Problem: Discharge Planning:  Goal: Ability to perform activities of daily living will improve  Description  Ability to perform activities of daily living will improve  Outcome: Ongoing  Goal: Participates in care planning  Description  Participates in care planning  Outcome: Ongoing     Problem: Injury - Risk of, Physical Injury:  Goal: Absence of physical injury  Description  Absence of physical injury  Outcome: Ongoing  Goal: Will remain free from falls  Description  Will remain free from falls  Outcome: Ongoing     Problem: Mood - Altered:  Goal: Mood stable  Description  Mood stable  Outcome: Ongoing  Goal: Absence of abusive behavior  Description  Absence of abusive behavior  Outcome: Ongoing  Goal: Verbalizations of feeling emotionally comfortable while being cared for will increase  Description  Verbalizations of feeling emotionally comfortable while being cared for will increase  Outcome: Ongoing     Problem: Psychomotor Activity - Altered:  Goal: Absence of psychomotor disturbance signs and symptoms  Description  Absence of psychomotor disturbance signs and symptoms  Outcome: Ongoing     Problem: Sensory Perception - Impaired:  Goal: Demonstrations of improved sensory functioning will increase  Description  Demonstrations of improved sensory functioning will increase  Outcome: Ongoing  Goal: Decrease in sensory misperception frequency  Description  Decrease in sensory misperception frequency  Outcome: Ongoing  Goal: Able to refrain from responding to false sensory perceptions  Description  Able to refrain from responding to false sensory perceptions  Outcome: Ongoing  Goal: Demonstrates accurate environmental perceptions  Description  Demonstrates accurate environmental perceptions  Outcome: Ongoing  Goal: Able to distinguish between reality-based and nonreality-based thinking  Description  Able to distinguish between reality-based and nonreality-based thinking  Outcome: Ongoing  Goal: Able to interrupt nonreality-based thinking  Description  Able to interrupt nonreality-based thinking  Outcome: Ongoing     Problem: Sleep Pattern Disturbance:  Goal: Appears well-rested  Description  Appears well-rested  Outcome: Ongoing     Problem: Risk for Impaired Skin Integrity  Goal: Tissue integrity - skin and mucous membranes  Description  Structural intactness and normal physiological function of skin and  mucous membranes.   Outcome: Ongoing     Problem: Falls - Risk of:  Goal: Absence of physical injury  Description  Absence of physical injury  Outcome: Ongoing  Goal: Will remain free from falls  Description  Will remain free from falls  Outcome: Ongoing     Problem: Nutrition  Goal: Optimal nutrition therapy  Outcome: Ongoing     Problem: Pain:  Goal: Pain level will decrease  Description  Pain level will decrease  Outcome: Ongoing  Goal: Control of acute pain  Description  Control of acute pain  Outcome: Ongoing  Goal: Control of chronic pain  Description  Control of chronic pain  Outcome: Ongoing

## 2019-03-14 NOTE — CARE COORDINATION
[facility] pt [facility]2 [facility]. He is private pay. No precert needed. They are able to accept at discharge.

## 2019-03-15 LAB
ABO/RH: NORMAL
ABSOLUTE EOS #: 0 K/UL (ref 0–0.4)
ABSOLUTE IMMATURE GRANULOCYTE: 0.51 K/UL (ref 0–0.3)
ABSOLUTE LYMPH #: 0.64 K/UL (ref 1–4.8)
ABSOLUTE MONO #: 1.14 K/UL (ref 0.1–0.8)
ANION GAP SERPL CALCULATED.3IONS-SCNC: 20 MMOL/L (ref 9–17)
ANTIBODY SCREEN: NEGATIVE
ARM BAND NUMBER: NORMAL
BASOPHILS # BLD: 0 % (ref 0–2)
BASOPHILS ABSOLUTE: 0 K/UL (ref 0–0.2)
BLD PROD TYP BPU: NORMAL
BUN BLDV-MCNC: 46 MG/DL (ref 8–23)
BUN/CREAT BLD: ABNORMAL (ref 9–20)
CALCIUM SERPL-MCNC: 8.2 MG/DL (ref 8.6–10.4)
CHLORIDE BLD-SCNC: 94 MMOL/L (ref 98–107)
CO2: 19 MMOL/L (ref 20–31)
CREAT SERPL-MCNC: 5.33 MG/DL (ref 0.7–1.2)
CROSSMATCH RESULT: NORMAL
DIFFERENTIAL TYPE: ABNORMAL
DISPENSE STATUS BLOOD BANK: NORMAL
EOSINOPHILS RELATIVE PERCENT: 0 % (ref 1–4)
EXPIRATION DATE: NORMAL
GFR AFRICAN AMERICAN: 13 ML/MIN
GFR NON-AFRICAN AMERICAN: 11 ML/MIN
GFR SERPL CREATININE-BSD FRML MDRD: ABNORMAL ML/MIN/{1.73_M2}
GFR SERPL CREATININE-BSD FRML MDRD: ABNORMAL ML/MIN/{1.73_M2}
GLUCOSE BLD-MCNC: 195 MG/DL (ref 75–110)
GLUCOSE BLD-MCNC: 257 MG/DL (ref 75–110)
GLUCOSE BLD-MCNC: 258 MG/DL (ref 75–110)
GLUCOSE BLD-MCNC: 266 MG/DL (ref 70–99)
GLUCOSE BLD-MCNC: 278 MG/DL (ref 75–110)
HCT VFR BLD CALC: 24.3 % (ref 40.7–50.3)
HEMOGLOBIN: 8.1 G/DL (ref 13–17)
IMMATURE GRANULOCYTES: 4 %
INR BLD: 3.2
LYMPHOCYTES # BLD: 5 % (ref 24–44)
MAGNESIUM: 1.8 MG/DL (ref 1.6–2.6)
MCH RBC QN AUTO: 31.6 PG (ref 25.2–33.5)
MCHC RBC AUTO-ENTMCNC: 33.3 G/DL (ref 28.4–34.8)
MCV RBC AUTO: 94.9 FL (ref 82.6–102.9)
MONOCYTES # BLD: 9 % (ref 1–7)
MORPHOLOGY: ABNORMAL
MORPHOLOGY: ABNORMAL
NRBC AUTOMATED: 8.2 PER 100 WBC
NUCLEATED RED BLOOD CELLS: 11 PER 100 WBC
PDW BLD-RTO: 17.2 % (ref 11.8–14.4)
PLATELET # BLD: 55 K/UL (ref 138–453)
PLATELET ESTIMATE: ABNORMAL
PMV BLD AUTO: 9.8 FL (ref 8.1–13.5)
POTASSIUM SERPL-SCNC: 3.3 MMOL/L (ref 3.7–5.3)
PROTHROMBIN TIME: 30.9 SEC (ref 9–12)
RBC # BLD: 2.56 M/UL (ref 4.21–5.77)
RBC # BLD: ABNORMAL 10*6/UL
SEG NEUTROPHILS: 82 % (ref 36–66)
SEGMENTED NEUTROPHILS ABSOLUTE COUNT: 10.41 K/UL (ref 1.8–7.7)
SODIUM BLD-SCNC: 133 MMOL/L (ref 135–144)
TRANSFUSION STATUS: NORMAL
UNIT DIVISION: 0
UNIT NUMBER: NORMAL
WBC # BLD: 12.7 K/UL (ref 3.5–11.3)
WBC # BLD: ABNORMAL 10*3/UL

## 2019-03-15 PROCEDURE — 6370000000 HC RX 637 (ALT 250 FOR IP): Performed by: STUDENT IN AN ORGANIZED HEALTH CARE EDUCATION/TRAINING PROGRAM

## 2019-03-15 PROCEDURE — 94762 N-INVAS EAR/PLS OXIMTRY CONT: CPT

## 2019-03-15 PROCEDURE — 2580000003 HC RX 258: Performed by: STUDENT IN AN ORGANIZED HEALTH CARE EDUCATION/TRAINING PROGRAM

## 2019-03-15 PROCEDURE — 82947 ASSAY GLUCOSE BLOOD QUANT: CPT

## 2019-03-15 PROCEDURE — 2500000003 HC RX 250 WO HCPCS: Performed by: STUDENT IN AN ORGANIZED HEALTH CARE EDUCATION/TRAINING PROGRAM

## 2019-03-15 PROCEDURE — 2060000000 HC ICU INTERMEDIATE R&B

## 2019-03-15 PROCEDURE — 99233 SBSQ HOSP IP/OBS HIGH 50: CPT | Performed by: INTERNAL MEDICINE

## 2019-03-15 PROCEDURE — 80048 BASIC METABOLIC PNL TOTAL CA: CPT

## 2019-03-15 PROCEDURE — 85610 PROTHROMBIN TIME: CPT

## 2019-03-15 PROCEDURE — 83735 ASSAY OF MAGNESIUM: CPT

## 2019-03-15 PROCEDURE — 6370000000 HC RX 637 (ALT 250 FOR IP): Performed by: NURSE PRACTITIONER

## 2019-03-15 PROCEDURE — 6370000000 HC RX 637 (ALT 250 FOR IP): Performed by: INTERNAL MEDICINE

## 2019-03-15 PROCEDURE — 85025 COMPLETE CBC W/AUTO DIFF WBC: CPT

## 2019-03-15 RX ORDER — INSULIN GLARGINE 100 [IU]/ML
20 INJECTION, SOLUTION SUBCUTANEOUS 2 TIMES DAILY
Status: DISCONTINUED | OUTPATIENT
Start: 2019-03-15 | End: 2019-03-16

## 2019-03-15 RX ADMIN — METRONIDAZOLE 500 MG: 500 INJECTION, SOLUTION INTRAVENOUS at 23:35

## 2019-03-15 RX ADMIN — METRONIDAZOLE 500 MG: 500 INJECTION, SOLUTION INTRAVENOUS at 15:10

## 2019-03-15 RX ADMIN — ATORVASTATIN CALCIUM 10 MG: 10 TABLET, FILM COATED ORAL at 08:38

## 2019-03-15 RX ADMIN — INSULIN LISPRO 9 UNITS: 100 INJECTION, SOLUTION INTRAVENOUS; SUBCUTANEOUS at 08:41

## 2019-03-15 RX ADMIN — INSULIN LISPRO 3 UNITS: 100 INJECTION, SOLUTION INTRAVENOUS; SUBCUTANEOUS at 14:58

## 2019-03-15 RX ADMIN — Medication 10 ML: at 21:00

## 2019-03-15 RX ADMIN — INSULIN GLARGINE 20 UNITS: 100 INJECTION, SOLUTION SUBCUTANEOUS at 14:55

## 2019-03-15 RX ADMIN — INSULIN LISPRO 5 UNITS: 100 INJECTION, SOLUTION INTRAVENOUS; SUBCUTANEOUS at 22:24

## 2019-03-15 RX ADMIN — CETIRIZINE HYDROCHLORIDE 5 MG: 10 TABLET ORAL at 08:32

## 2019-03-15 RX ADMIN — LANSOPRAZOLE 30 MG: 30 TABLET, ORALLY DISINTEGRATING, DELAYED RELEASE ORAL at 08:33

## 2019-03-15 RX ADMIN — FLUTICASONE PROPIONATE 2 SPRAY: 50 SPRAY, METERED NASAL at 08:33

## 2019-03-15 RX ADMIN — ACETAMINOPHEN 650 MG: 325 TABLET ORAL at 17:12

## 2019-03-15 RX ADMIN — Medication 10 ML: at 08:38

## 2019-03-15 RX ADMIN — GABAPENTIN 300 MG: 300 CAPSULE ORAL at 08:33

## 2019-03-15 RX ADMIN — ACETAMINOPHEN 650 MG: 325 TABLET ORAL at 22:23

## 2019-03-15 RX ADMIN — BUPROPION HYDROCHLORIDE 150 MG: 150 TABLET, EXTENDED RELEASE ORAL at 08:31

## 2019-03-15 RX ADMIN — INSULIN LISPRO 9 UNITS: 100 INJECTION, SOLUTION INTRAVENOUS; SUBCUTANEOUS at 17:25

## 2019-03-15 RX ADMIN — ACETAMINOPHEN 650 MG: 325 TABLET ORAL at 08:29

## 2019-03-15 RX ADMIN — DONEPEZIL HYDROCHLORIDE 5 MG: 5 TABLET, FILM COATED ORAL at 17:12

## 2019-03-15 RX ADMIN — LEVOTHYROXINE SODIUM 25 MCG: 25 TABLET ORAL at 08:34

## 2019-03-15 RX ADMIN — METRONIDAZOLE 500 MG: 500 INJECTION, SOLUTION INTRAVENOUS at 05:28

## 2019-03-15 ASSESSMENT — PAIN SCALES - GENERAL
PAINLEVEL_OUTOF10: 0
PAINLEVEL_OUTOF10: 4
PAINLEVEL_OUTOF10: 5
PAINLEVEL_OUTOF10: 4
PAINLEVEL_OUTOF10: 3

## 2019-03-15 ASSESSMENT — PAIN DESCRIPTION - PAIN TYPE
TYPE: CHRONIC PAIN
TYPE: CHRONIC PAIN

## 2019-03-15 ASSESSMENT — PAIN DESCRIPTION - ONSET
ONSET: ON-GOING
ONSET: ON-GOING

## 2019-03-15 ASSESSMENT — PAIN DESCRIPTION - PROGRESSION
CLINICAL_PROGRESSION: NOT CHANGED
CLINICAL_PROGRESSION: NOT CHANGED

## 2019-03-15 ASSESSMENT — PAIN DESCRIPTION - LOCATION
LOCATION: KNEE
LOCATION: KNEE

## 2019-03-15 ASSESSMENT — PAIN DESCRIPTION - ORIENTATION
ORIENTATION: RIGHT
ORIENTATION: RIGHT

## 2019-03-15 ASSESSMENT — PAIN DESCRIPTION - DESCRIPTORS
DESCRIPTORS: ACHING
DESCRIPTORS: ACHING

## 2019-03-15 ASSESSMENT — PAIN DESCRIPTION - FREQUENCY
FREQUENCY: INTERMITTENT
FREQUENCY: INTERMITTENT

## 2019-03-15 NOTE — PROGRESS NOTES
Nutrition Assessment    Type and Reason for Visit: Reassess    Nutrition Recommendations: Continue CL diet at this time- monitor po tolerance/intake. Monitor for diet advancement vs need for nutrition support. Nutrition Assessment: Pt currently off unit for dialysis. RN reports pt is currently on CL diet- no coughing with liquids/pills this morning, but took pt a while to swallow. RN reports possible need for swallow evaluation. Malnutrition Assessment:  · Malnutrition Status: Insufficient data    Nutrition Risk Level: Moderate    Nutrient Needs:  · Estimated Daily Total Kcal: 3507-0658 kcal/day   · Estimated Daily Protein (g): 100-120 gm pro/day    Nutrition Diagnosis:   · Problem: Inadequate oral intake  · Etiology: related to recent diet advancement    Signs and symptoms:  as evidenced by sips of clears so far    Objective Information:  · Wound Type: Deep Tissue Injury, Pressure Ulcer, Stage I(to left coccyx, scrotum, and left thigh)  · Current Nutrition Therapies:  · Oral Diet Orders: Clear Liquid   · Oral Diet intake: 1-25%  · Oral Nutrition Supplement (ONS) Orders: None  · Anthropometric Measures:  · Ht: 5' 6.93\" (170 cm)   · Current Body Wt: 303 lb 12.7 oz (137.8 kg)  · Admission Body Wt: 289 lb 11 oz (131.4 kg)  · Ideal Body Wt: 147 lb 14.9 oz (67.1 kg), % Ideal Body 197% (adm/ideal)  · BMI Classification: BMI > or equal to 40.0 Obese Class III    Nutrition Interventions:   Continue current diet. Monitor tolerance to diet/ for diet advancement. Continued Inpatient Monitoring, Education Not Indicated    Nutrition Evaluation:   · Evaluation: Progressing toward goals   · Goals: Meet % of estimated need with nutrition support/oral diet.     · Monitoring: Meal Intake, Diet Tolerance, Chewing/Swallowing, Nutrition Progression, Weight, Pertinent Labs, Wound Healing      Electronically signed by Remi Elizabeth RD, LD on 3/15/19 at 11:25 AM    Contact Number: [phone]

## 2019-03-15 NOTE — PROGRESS NOTES
Dialysis Post Treatment Note  Patient tolerated treatment well. Denies complaints at time of discharge. Vitals:    03/15/19 1303   BP: 138/62   Pulse: 93   Resp:    Temp: 97.9 °F (36.6 °C)   SpO2:      Pre-Weight = 137.8kg  Post- Weight: (!) 300 lb 14.9 oz (136.5 kg)   Total Liters Processed (l/min): 83.5 l/min   Rinseback Volume (ml): 370 ml  Net Removal (mL) = 2950  Length of treatment=3.5 hours    Pt. tolerated tx. remained hemodynamically stable t/o tx. No complaints at this time.

## 2019-03-15 NOTE — PROGRESS NOTES
Nephrology Progress Note        Subjective: patient evaluated on dialysis. Pt is stable on dialysis. Access cannulated without problems. No new issues overnite. Stable hemodynamics. He is currently not receiving any pressors. He has a Ace dressing over his right lower extremity. His hemoglobin has been stable. This morning is at 8.1  No fever. No nausea vomiting overnight. Objective:  CURRENT TEMPERATURE:  Temp: 97.5 °F (36.4 °C)  MAXIMUM TEMPERATURE OVER 24HRS:  Temp (24hrs), Av.2 °F (36.2 °C), Min:96.3 °F (35.7 °C), Max:97.9 °F (36.6 °C)    CURRENT RESPIRATORY RATE:  Resp: 21  CURRENT PULSE:  Pulse: 81  CURRENT BLOOD PRESSURE:  BP: (!) 163/57  24HR BLOOD PRESSURE RANGE:  Systolic (43EJF), CCW:799 , Min:106 , IHJ:411   ; Diastolic (43VCK), :34, Min:35, Max:86    24HR INTAKE/OUTPUT:      Intake/Output Summary (Last 24 hours) at 3/15/2019 0912  Last data filed at 3/14/2019 1215  Gross per 24 hour   Intake 355 ml   Output 100 ml   Net 255 ml     Patient Vitals for the past 96 hrs (Last 3 readings):   Weight   19 1210 297 lb 9.9 oz (135 kg)   19 0825 208 lb 12.4 oz (94.7 kg)   19 1209 283 lb 15.2 oz (128.8 kg)         Physical Exam:  General appearance:Awake, alert, in no acute distress  Skin: warm and dry, no rash or erythema  Eyes: conjunctivae normal and sclera anicteric  ENT:no thrush no pharyngeal congestion   Neck:  No JVD, No Thyromegaly  Pulmonary: clear to auscultation and no wheezing or rhonchi   Cardiovascular: normal S1 and S2. NO rubs and NO murmur.  No S3 OR S4   Abdomen: soft nontender, bowel sounds present, no organomegaly,  no ascites   Extremities: no cyanosis, clubbing + edema     Access:  previous  Right AV fistula    Current Medications:      insulin glargine (LANTUS) injection vial 20 Units BID   0.9 % sodium chloride bolus PRN   0.9 % sodium chloride bolus PRN   0.9 % sodium chloride bolus Once   fentaNYL (SUBLIMAZE) injection 25 mcg Q1H PRN   metronidazole (FLAGYL) 500 mg in NaCl 100 mL IVPB premix Q8H   magnesium sulfate 1 g in dextrose 5% 100 mL IVPB PRN   lidocaine PF 1 % injection 5 mL Once   sodium chloride flush 0.9 % injection 10 mL 2 times per day   sodium chloride flush 0.9 % injection 10 mL PRN   iohexol (OMNIPAQUE 240) injection 50 mL ONCE PRN   ondansetron (ZOFRAN) injection 4 mg Q6H PRN   gabapentin (NEURONTIN) capsule 300 mg Daily   insulin lispro (HUMALOG) injection vial 0-18 Units TID WC   insulin lispro (HUMALOG) injection vial 0-9 Units Nightly   glucose (GLUTOSE) 40 % oral gel 15 g PRN   lansoprazole (PREVACID SOLUTAB) disintegrating tablet 30 mg QAM AC   acetaminophen (TYLENOL) tablet 650 mg 4x Daily   albuterol (PROVENTIL) nebulizer solution 2.5 mg Q8H PRN   atorvastatin (LIPITOR) tablet 10 mg Daily   buPROPion (WELLBUTRIN XL) extended release tablet 150 mg QAM   donepezil (ARICEPT) tablet 5 mg QPM   fluticasone (FLONASE) 50 MCG/ACT nasal spray 2 spray Daily   guaiFENesin (ROBITUSSIN) 100 MG/5ML oral solution 200 mg 4x Daily PRN   levothyroxine (SYNTHROID) tablet 25 mcg Daily   cetirizine (ZYRTEC) tablet 5 mg Daily   midodrine (PROAMATINE) tablet 10 mg TID   sevelamer (RENVELA) tablet 800 mg TID    sodium chloride flush 0.9 % injection 10 mL 2 times per day   sodium chloride flush 0.9 % injection 10 mL PRN   acetaminophen (TYLENOL) tablet 650 mg Q4H PRN   magnesium hydroxide (MILK OF MAGNESIA) 400 MG/5ML suspension 30 mL Daily PRN   glucagon (rDNA) injection 1 mg PRN   dextrose 5 % solution PRN     Labs:   CBC: Recent Labs     03/13/19  0632  03/14/19  0744 03/14/19  2028 03/15/19  0527   WBC 26.3*  --  14.5*  --  12.7*   RBC 2.44*  --  2.60*  --  2.56*   HGB 7.6*   < > 8.1* 7.4* 8.1*   HCT 22.5*   < > 24.8* 22.8* 24.3*   *  --  68*  --  55*   MPV 10.0  --  10.2  --  9.8    < > = values in this interval not displayed.       BMP: Recent Labs     03/13/19  0632 03/14/19  0744 03/15/19  0527   * 138 133*   K 4.5 4.0 3.3*   CL 96* 99 94*

## 2019-03-15 NOTE — PROGRESS NOTES
Pt status- spoke with crit care resident  Dr Myriam Rosa. . Pt has left fem . TLC as well as 2 left upper arm PIVs. Pt has INR that is 3.3 and trending downward. .  Pt developed small/medium hematoma the last time a triple lumen was pulled. Crit care wants to wait one more day til INR improves. .before pulling triple lumen  . This plan was conveyed to 4a nurse. Still waiting for on call medicine team to accept taking over care of Imani Ignacio. ..

## 2019-03-15 NOTE — PROGRESS NOTES
Plastics - [facility]    Patient seen and examined. Awake and alert, NAD. Swelling of right leg decreased. Dressing changed:  Areas of dried blood yet. Early granulation tissue seen. Dark skin edge inferomedial. I would leave this to separate on its own. Vascularity looks good. Plan:    Continue with BID damp to damp dressings. Do not allow to dry out. No further surgical debridement indicated. Allow to granulate, and for devitalize tissue to auto-debride with dressings. When the wound has good granulation base, will plan to bring him back for skin graft closure. Following. Please call me for any questions.  ([phone])

## 2019-03-15 NOTE — PROGRESS NOTES
Dressing change - Dr Chitra Cruz in about 4 pm to take down  Right calf dressing and update family extensively. Wet to wet saline dressing placed, covered with 4by 4s and ace wrap  Pt tolerated well .  Twice a day dressing

## 2019-03-15 NOTE — PROGRESS NOTES
Occupational Therapy    Occupational Therapy Not Seen Note    DATE: 3/15/2019  Name: Ryann Leo  : 1945  MRN: [id number]    Patient not available for Occupational Therapy due to:    Hemodialysis @0835am    Next Scheduled Treatment: 3-15-19 after 1300pm or 3/18/19    Electronically signed by KAI Byers on 3/15/2019 at 9:03 AM

## 2019-03-15 NOTE — PROGRESS NOTES
Physical Therapy  DATE: 3/15/2019    NAME: Shey Chicas  MRN: [id number]   : 1945    Patient not seen this date for Physical Therapy due to:  [] Blood transfusion in progress  [x] Hemodialysis  []  Patient Declined  [] Spine Precautions   [] Strict Bedrest  [] Surgery/ Procedure  [] Testing      [] Other        [] PT being discontinued at this time. Patient independent. No further needs. [] PT being discontinued at this time as the patient has been transferred to palliative care. No further needs.     Bevely Rise, PTA

## 2019-03-15 NOTE — PROGRESS NOTES
Pt to room 401 from MICU. Report given by Jamaica Baker RN. Pt alert and oriented x4. Bed in lowest position, call light within reach. Will continue to monitor.    Electronically signed by Conway Carrel, RN on 3/15/2019 at 7:52 PM

## 2019-03-16 LAB
ALBUMIN SERPL-MCNC: 2.6 G/DL (ref 3.5–5.2)
ALBUMIN/GLOBULIN RATIO: 1.4 (ref 1–2.5)
ALP BLD-CCNC: 99 U/L (ref 40–129)
ALT SERPL-CCNC: 386 U/L (ref 5–41)
AST SERPL-CCNC: 91 U/L
BILIRUB SERPL-MCNC: 0.66 MG/DL (ref 0.3–1.2)
BILIRUBIN DIRECT: 0.24 MG/DL
BILIRUBIN, INDIRECT: 0.42 MG/DL (ref 0–1)
GLOBULIN: ABNORMAL G/DL (ref 1.5–3.8)
GLUCOSE BLD-MCNC: 167 MG/DL (ref 75–110)
GLUCOSE BLD-MCNC: 262 MG/DL (ref 75–110)
GLUCOSE BLD-MCNC: 272 MG/DL (ref 75–110)
GLUCOSE BLD-MCNC: 294 MG/DL (ref 75–110)
TOTAL PROTEIN: 4.5 G/DL (ref 6.4–8.3)

## 2019-03-16 PROCEDURE — 94760 N-INVAS EAR/PLS OXIMETRY 1: CPT

## 2019-03-16 PROCEDURE — 6370000000 HC RX 637 (ALT 250 FOR IP): Performed by: STUDENT IN AN ORGANIZED HEALTH CARE EDUCATION/TRAINING PROGRAM

## 2019-03-16 PROCEDURE — 2580000003 HC RX 258: Performed by: STUDENT IN AN ORGANIZED HEALTH CARE EDUCATION/TRAINING PROGRAM

## 2019-03-16 PROCEDURE — 80076 HEPATIC FUNCTION PANEL: CPT

## 2019-03-16 PROCEDURE — 36415 COLL VENOUS BLD VENIPUNCTURE: CPT

## 2019-03-16 PROCEDURE — 92610 EVALUATE SWALLOWING FUNCTION: CPT

## 2019-03-16 PROCEDURE — 6360000002 HC RX W HCPCS: Performed by: STUDENT IN AN ORGANIZED HEALTH CARE EDUCATION/TRAINING PROGRAM

## 2019-03-16 PROCEDURE — 6370000000 HC RX 637 (ALT 250 FOR IP): Performed by: INTERNAL MEDICINE

## 2019-03-16 PROCEDURE — 2060000000 HC ICU INTERMEDIATE R&B

## 2019-03-16 PROCEDURE — 2500000003 HC RX 250 WO HCPCS: Performed by: STUDENT IN AN ORGANIZED HEALTH CARE EDUCATION/TRAINING PROGRAM

## 2019-03-16 PROCEDURE — 6370000000 HC RX 637 (ALT 250 FOR IP): Performed by: NURSE PRACTITIONER

## 2019-03-16 PROCEDURE — 99233 SBSQ HOSP IP/OBS HIGH 50: CPT | Performed by: INTERNAL MEDICINE

## 2019-03-16 PROCEDURE — 82947 ASSAY GLUCOSE BLOOD QUANT: CPT

## 2019-03-16 RX ORDER — MAGNESIUM HYDROXIDE 1200 MG/15ML
LIQUID ORAL
Status: DISPENSED
Start: 2019-03-16 | End: 2019-03-17

## 2019-03-16 RX ORDER — INSULIN GLARGINE 100 [IU]/ML
25 INJECTION, SOLUTION SUBCUTANEOUS 2 TIMES DAILY
Status: DISCONTINUED | OUTPATIENT
Start: 2019-03-16 | End: 2019-03-19 | Stop reason: HOSPADM

## 2019-03-16 RX ADMIN — INSULIN LISPRO 5 UNITS: 100 INJECTION, SOLUTION INTRAVENOUS; SUBCUTANEOUS at 19:52

## 2019-03-16 RX ADMIN — ACETAMINOPHEN 650 MG: 325 TABLET ORAL at 07:50

## 2019-03-16 RX ADMIN — INSULIN GLARGINE 25 UNITS: 100 INJECTION, SOLUTION SUBCUTANEOUS at 09:06

## 2019-03-16 RX ADMIN — METRONIDAZOLE 500 MG: 500 INJECTION, SOLUTION INTRAVENOUS at 06:20

## 2019-03-16 RX ADMIN — INSULIN LISPRO 3 UNITS: 100 INJECTION, SOLUTION INTRAVENOUS; SUBCUTANEOUS at 07:58

## 2019-03-16 RX ADMIN — LANSOPRAZOLE 30 MG: 30 TABLET, ORALLY DISINTEGRATING, DELAYED RELEASE ORAL at 07:51

## 2019-03-16 RX ADMIN — FENTANYL CITRATE 25 MCG: 50 INJECTION INTRAMUSCULAR; INTRAVENOUS at 15:19

## 2019-03-16 RX ADMIN — SEVELAMER CARBONATE 800 MG: 800 TABLET, FILM COATED ORAL at 13:08

## 2019-03-16 RX ADMIN — METRONIDAZOLE 500 MG: 500 INJECTION, SOLUTION INTRAVENOUS at 13:56

## 2019-03-16 RX ADMIN — ACETAMINOPHEN 650 MG: 325 TABLET ORAL at 19:51

## 2019-03-16 RX ADMIN — INSULIN LISPRO 9 UNITS: 100 INJECTION, SOLUTION INTRAVENOUS; SUBCUTANEOUS at 17:42

## 2019-03-16 RX ADMIN — ATORVASTATIN CALCIUM 10 MG: 10 TABLET, FILM COATED ORAL at 07:50

## 2019-03-16 RX ADMIN — DONEPEZIL HYDROCHLORIDE 5 MG: 5 TABLET, FILM COATED ORAL at 17:43

## 2019-03-16 RX ADMIN — Medication 10 ML: at 20:08

## 2019-03-16 RX ADMIN — LEVOTHYROXINE SODIUM 25 MCG: 25 TABLET ORAL at 07:50

## 2019-03-16 RX ADMIN — INSULIN LISPRO 9 UNITS: 100 INJECTION, SOLUTION INTRAVENOUS; SUBCUTANEOUS at 13:08

## 2019-03-16 RX ADMIN — MIDODRINE HYDROCHLORIDE 10 MG: 5 TABLET ORAL at 07:50

## 2019-03-16 RX ADMIN — INSULIN GLARGINE 20 UNITS: 100 INJECTION, SOLUTION SUBCUTANEOUS at 00:55

## 2019-03-16 RX ADMIN — SEVELAMER CARBONATE 800 MG: 800 TABLET, FILM COATED ORAL at 07:50

## 2019-03-16 RX ADMIN — ACETAMINOPHEN 650 MG: 325 TABLET ORAL at 13:11

## 2019-03-16 RX ADMIN — GABAPENTIN 300 MG: 300 CAPSULE ORAL at 07:50

## 2019-03-16 RX ADMIN — SEVELAMER CARBONATE 800 MG: 800 TABLET, FILM COATED ORAL at 17:43

## 2019-03-16 RX ADMIN — CETIRIZINE HYDROCHLORIDE 5 MG: 10 TABLET ORAL at 07:51

## 2019-03-16 RX ADMIN — BUPROPION HYDROCHLORIDE 150 MG: 150 TABLET, EXTENDED RELEASE ORAL at 07:50

## 2019-03-16 RX ADMIN — MIDODRINE HYDROCHLORIDE 10 MG: 5 TABLET ORAL at 13:56

## 2019-03-16 RX ADMIN — ACETAMINOPHEN 650 MG: 325 TABLET ORAL at 17:43

## 2019-03-16 RX ADMIN — INSULIN GLARGINE 25 UNITS: 100 INJECTION, SOLUTION SUBCUTANEOUS at 19:58

## 2019-03-16 RX ADMIN — METRONIDAZOLE 500 MG: 500 INJECTION, SOLUTION INTRAVENOUS at 23:11

## 2019-03-16 ASSESSMENT — PAIN SCALES - GENERAL
PAINLEVEL_OUTOF10: 4
PAINLEVEL_OUTOF10: 3

## 2019-03-16 ASSESSMENT — PAIN DESCRIPTION - PAIN TYPE: TYPE: CHRONIC PAIN

## 2019-03-16 NOTE — PLAN OF CARE
Problem: Falls - Risk of:  Goal: Will remain free from falls  Description  Will remain free from falls  Note:   Pt remains free of falls at this time. Bed locked in lowest position, siderails x2, call light in reach. Non-skid footwear applied. Encouraged pt to call for assistance as needed for safety. Falling star posted outside of room. Will continue to monitor.

## 2019-03-16 NOTE — PROGRESS NOTES
Speech Language Pathology  Facility/Department: [facility] 4A STEPDOWN   BEDSIDE SWALLOW EVALUATION    NAME: Sandra Burrows  : 1945  MRN: [id number]    ADMISSION DATE: 3/8/2019     ADMITTING DIAGNOSIS: has Traumatic amputation of thumb; Essential hypertension; Mixed hyperlipidemia; Obstructive sleep apnea; Morbid obesity ([address] Benign prostatic hyperplasia without lower urinary tract symptoms; Venous stasis dermatitis; Severe aortic stenosis; Severe mitral valve stenosis; Anemia in chronic kidney disease, on chronic dialysis ([address] Dialysis patient [facility]); Dementia; Major depressive disorder with single episode, in partial remission ([address] Chronic cerebral ischemia; ESRD (end stage renal disease) on dialysis ([address] Long-term insulin use ([address] Paroxysmal atrial fibrillation ([address] BMI 40.0-44.9, adult ([address] Controlled type 2 diabetes mellitus with chronic kidney disease on chronic dialysis, with long-term current use of insulin ([address] Controlled type 2 diabetes mellitus with diabetic polyneuropathy, with long-term current use of insulin ([address] Dermatitis; Bruising; Hematoma of right lower extremity; Hematoma of leg, right, initial encounter; Acute blood loss anemia; Accidental fall from wheelchair; and Traumatic hemorrhagic shock ([address] on their problem list.     ONSET DATE: 2019    Recent Chest Xray/CT of Chest: ( & 2019  No acute airspace disease identified. Date of Eval: 3/16/2019  Evaluating Therapist: Kimo Dodson    Current Diet level: Full Clear Liquids         Primary Complaint   Layton Farley is a 74 yo man with ESRD who presented to ED with right leg injury (slid out of his w/c 5 days prior to admission). He developed some bruising and a \"blood blister\".  On his way to dialysis, there was significant bleeding, pt was [facility] to [facility].     He was transferred to [facility] on 3/8/19 where he underwent a wound exploration, evacuation of hematoma, placement of negative wound vac therapy & right femoral vein central line          IMPRESSIONS  No overt clinical s/s of aspiration on dry solids, moist solids, pureed and multiple sequential swallows of thin liquid          RECOMMENDATIONS  1) Pt appears able to safely swallow Regular Diet and Thin Liquids    2) Further ST not warranted                Pain:  Pain Assessment  Patient Currently in Pain: Yes  Pain Assessment: 0-10  Sweet-Baker Pain Rating: Hurts little more  Pain Level: 4  Pain Type: Chronic pain  Pain Location: Knee  Pain Orientation: Right  Pain Descriptors: Aching  Pain Frequency: Intermittent  Clinical Progression: Not changed  Patient's Stated Pain Goal: No pain  Pain Intervention(s): Emotional support, Environmental changes  Response to Pain Intervention: Patient Satisfied  Multiple Pain Sites: No  RASS Score (Ventilated): Alert and calm  Pain Assessment/FLACC  Pain Rating: FLACC (rest) - Face: occasional grimace or frown, withdrawn, disinterested  Pain Rating: FLACC (rest) - Legs: normal position or relaxed  Pain Rating: FLACC (rest) - Activity: lying quietly, normal position, moves easily  Pain Rating: FLACC (rest) - Cry: no cry (awake or asleep)  Pain Rating: FLACC (rest) - Consolability: content, relaxed  Score: FLACC (rest): 1    Reason for Referral  Isidra Fonseca was referred for a bedside swallow evaluation to assess the efficiency of his swallow function, identify signs and symptoms of aspiration and make recommendations regarding safe dietary consistencies, effective compensatory strategies, and safe eating environment. Impression  Dysphagia Diagnosis: Swallow function appears grossly intact  Dysphagia Outcome Severity Scale: Level 6: Within functional limits/Modified independence     Treatment Plan  Requires SLP Intervention: No     D/C Recommendations:  To be determined       Recommended Diet and Intervention  Diet Solids Recommendation: Regular  Liquid Consistency Recommendation: Thin  Recommended Form of Meds: Whole with water       Oral Motor Deficits  Oral/Motor  Oral Motor:  Within functional limits    Oral Phase Dysfunction  Oral Phase  Oral Phase: WFL     Indicators of Pharyngeal Phase Dysfunction   Pharyngeal Phase  Pharyngeal Phase: WFL    Prognosis  Prognosis  Prognosis for safe diet advancement: good  Individuals consulted  Consulted and agree with results and recommendations: Patient;RN    Education  Patient Education Response: Demonstrated understanding;Needs reinforcement        Therapy Time  SLP Individual Minutes  Time In: 0900  Time Out: 0935  Minutes: [address], SLP  3/16/2019 9:35 AM

## 2019-03-16 NOTE — PROGRESS NOTES
Renal Progress Note    Patient :  José Miguel Hunt; 75 y.o. MRN# [id number]  Location:  [facility]/8636-  Attending:  Cat Coelho MD  Admit Date:  3/8/2019   Hospital Day: 8    Subjective   Following for ESRD MWF in [place] admitted with leg injury. No dyspnea.  Hb stable, stable hemodynamics    Objective     VS: /70   Pulse 83   Temp 97.4 °F (36.3 °C) (Oral)   Resp 19   Ht 5' 6.93\" (1.7 m)   Wt (!) 300 lb 14.9 oz (136.5 kg)   SpO2 96%   BMI 47.23 kg/m²   MAXIMUM TEMPERATURE OVER 24HRS:  Temp (24hrs), Av.8 °F (36.6 °C), Min:97.4 °F (36.3 °C), Max:98.2 °F (36.8 °C)    24HR BLOOD PRESSURE RANGE:  Systolic (72TQE), XOV:409 , Min:121 , TAT:834   ; Diastolic (06FFB), INZ:72, Min:41, Max:70    24HR INTAKE/OUTPUT:      Intake/Output Summary (Last 24 hours) at 3/16/2019 1039  Last data filed at 3/15/2019 1400  Gross per 24 hour   Intake 360 ml   Output 3420 ml   Net -3060 ml     WEIGHT :  Patient Vitals for the past 96 hrs (Last 3 readings):   Weight   03/15/19 1303 (!) 300 lb 14.9 oz (136.5 kg)   03/15/19 0916 (!) 303 lb 12.7 oz (137.8 kg)   19 1210 297 lb 9.9 oz (135 kg)       Current Medications:     Scheduled Meds:    insulin glargine  25 Units Subcutaneous BID    sodium chloride  250 mL Intravenous Once    metroNIDAZOLE  500 mg Intravenous Q8H    lidocaine 1 % injection  5 mL Intradermal Once    sodium chloride flush  10 mL Intravenous 2 times per day    gabapentin  300 mg Oral Daily    insulin lispro  0-18 Units Subcutaneous TID WC    insulin lispro  0-9 Units Subcutaneous Nightly    lansoprazole  30 mg Oral QAM AC    acetaminophen  650 mg Oral 4x Daily    atorvastatin  10 mg Oral Daily    buPROPion  150 mg Oral QAM    donepezil  5 mg Oral QPM    fluticasone  2 spray Nasal Daily    levothyroxine  25 mcg Oral Daily    cetirizine  5 mg Oral Daily    midodrine  10 mg Oral TID    sevelamer  800 mg Oral TID     sodium chloride flush  10 mL Intravenous 2 times per day Continuous Infusions:    dextrose         Physical Examination:     General:  AAO x 3, speaking in full sentences, no accessory muscle use. Morbidly obese  Chest:   Bilateral vesicular breath sounds, no rales or wheezes. Cardiac:  S1 S2 RR, no murmurs, gallops or rubs, JVP not raised. Abdomen: Soft, non-tender, non distended, BS audible. SKIN:  No rashes, good skin turgor. Extremities:  Generalized edema, no clubbing, No cyanosis  Neuro:  AAO x 3, No FND. Right AVF + thrill and bruit    Labs:       Recent Labs     03/14/19  0744 03/14/19  2028 03/15/19  0527   WBC 14.5*  --  12.7*   RBC 2.60*  --  2.56*   HGB 8.1* 7.4* 8.1*   HCT 24.8* 22.8* 24.3*   MCV 95.4  --  94.9   MCH 31.2  --  31.6   MCHC 32.7  --  33.3   RDW 16.4*  --  17.2*   PLT 68*  --  55*   MPV 10.2  --  9.8      BMP:   Recent Labs     03/14/19  0744 03/15/19  0527    133*   K 4.0 3.3*   CL 99 94*   CO2 14* 19*   BUN 34* 46*   CREATININE 4.53* 5.33*   GLUCOSE 307* 266*   CALCIUM 8.4* 8.2*   Magnesium:    Recent Labs     03/15/19  0527   MG 1.8       Urinalysis/Chemistries:      Lab Results   Component Value Date    NITRU NEGATIVE 04/27/2016    COLORU DELORES 04/27/2016    PHUR 5.0 04/27/2016    WBCUA TOO NUMEROUS TO COUNT 04/27/2016    RBCUA 10 TO 20 04/27/2016    MUCUS NOT REPORTED 04/27/2016    TRICHOMONAS NOT REPORTED 04/27/2016    YEAST NOT REPORTED 04/27/2016    BACTERIA MANY 04/27/2016    SPECGRAV 1.015 04/27/2016    LEUKOCYTESUR LARGE 04/27/2016    UROBILINOGEN Normal 04/27/2016    BILIRUBINUR NEGATIVE 04/27/2016    GLUCOSEU NEGATIVE 04/27/2016    KETUA NEGATIVE 04/27/2016    AMORPHOUS 2+ 04/27/2016       Radiology:     CXR:   No recent  Assessment:     1. ESRD on Hemodialysis MWF at [facility] via tight AVF. Admitted with right leg injury  2. Anemia of chronic disease  3. DM  4. Hypertension  5. Right lower extremity hematoma: Wound Vac removed  6. Chronic diastolic heart failure  7. Hx of AVR, and MVR  8.  Acute diarrheal ds, empric Rx with flagyl    Plan:   1. HD as per schedule on monday. 2. Strict Input and Output, Daily weigh and document in the chart. 3. Low Potassium, Low phosphorus and low salt diet. Fluids to be restricted to 1500ml/day. 4. IV Aranesp/Epogen for anemia of chronic disease with HD weekly. 5. IV Zemplar per protocol for secondary hyperparathyroidism with HD thrice a week. 6, ok for d/c     Nutrition   Renal Diet/TF      J Carlos Antonio CNP  [facility]. Attending Physician Statement  I have discussed the care of Aniya Hopper, including pertinent history and exam findings with the resident/fellow. I have reviewed the key elements of all parts of the encounter with the resident/fellow. I have seen and examined the patient with the resident/fellow. I agree with the assessment and plan and status of the problem list as documented.       .  Electronically signed by Radha Gates MD on 3/16/2019 at 6:18 PM

## 2019-03-16 NOTE — PROGRESS NOTES
INTERNAL MEDICINE                                                                             ICU PATIENT TRANSFER NOTE        Patient:  Katherine Marroquin  YOB: 1945  MRN: [id number]     Acct: [de-identified]     Admit date: 3/8/2019    Code Status:-  Full code     Reason for ICU Admission:-       SUPPORT DEVICES: [] Ventilator [] BIPAP  [] Nasal Cannula [x] Room Air    Consultations:- [x] Cardiology [x] Nephrology  [] Hemo onco  [] GI                               [] ID [] ENT  [] Rheum [] Endo   []Physiotherapy                                 Others:-      NUTRITION:  [] NPO [] Tube Feeding (Specify: ) [] TPN  [x] PO    Central Lines:- [] No   [x] Yes           If yes - Days/Date of Insertion. Left Groin central line 3/16/2019      Pt seen and Chart reviewed. ICU COURSE :-     Katherine Marroquin is a 75 y. o. with past medical history of end-stage renal disease, mitral valve repair, aortic valve repair, CHF with diastolic dysfunction, type 2 diabetes who is on Coumadin that presented to the Emergency Department following  transfer from [address] [place] [address] for expanding right lower extremity hematoma.  Per patient and his wife he was on his way to dialysis and ambulate today when he had splitting of the skin and bleeding.  Patient had reportedly had a low impact fall from wheelchair after the seat fell out on Sunday experienced this scrape to the right lower extremity either during fall or well being picked up by staff at skilled nursing facility.  In the days after that incident he had a gradually expanding hematoma form.  After the skin split today he was brought to [facility] where radiography confirmed no fracture and he was transferred to [place] [address] after being started on IV fluids and IV antibiotics for suspicion of infection related to hematoma.    Workup in [facility] emergency Department found leukocytosis with white blood cell count of 24, mild hypotension treated with fluid (patient has history of hypertension requiring Midodrine).  During his stay bleeding recurred when dressing was examined     Patient was admitted under trauma surgery and went to the OR on 3/9 and 3/10 for hematoma evacuation     Patient extubated on 3/10 in MICU, but did require pressor support. Patient was weaned of pressors and restarted on Coumadin and transferred to the Floor on 3/11 under medical service. Patients last dialysis was 3/11.     Patient was seen on 3/12 AM by internal medical team and patient was hypotensive and confused with hemoglobin 6.9 with a lot of blood in wound vac and hematoma to R groin. At that time they consulted critical care for further workup and evaluation.     Patient needed pressor support in the ICU. Received total of 5 units PRBC and 2 units FFP in the last 2 days. Wound vac was removed. Patient off pressor since yesterday. Vitamin K added today. Patient mentation improved. Ok to transfer oout of ICU. Physical Exam:   Vitals: BP (!) 121/46   Pulse 93   Temp 98.2 °F (36.8 °C) (Oral)   Resp 17   Ht 5' 6.93\" (1.7 m)   Wt (!) 300 lb 14.9 oz (136.5 kg)   SpO2 98%   BMI 47.23 kg/m²   24 hour intake/output:    Intake/Output Summary (Last 24 hours) at 3/16/2019 0024  Last data filed at 3/15/2019 1400  Gross per 24 hour   Intake 360 ml   Output 3420 ml   Net -3060 ml     Last 3 weights: Wt Readings from Last 3 Encounters:   03/15/19 (!) 300 lb 14.9 oz (136.5 kg)   03/08/19 280 lb (127 kg)   01/15/19 280 lb (127 kg)       Constitutional: Alert and Oriented x4, morbid obesity  EENT: PERRLA, EOMI, sclera clear, anicteric, oropharynx clear, no lesions, neck supple with midline trachea.   Neck: Supple, symmetrical, trachea midline, no adenopathy, thyroid symmetric, no jvd skin normal  Respiratory: clear to auscultation, no wheezes or rales and Labs     03/15/19  0527   MG 1.8     Phosphorus:No results for input(s): PHOS in the last 72 hours. BNP:No results for input(s): BNP in the last 72 hours. Glucose:  Recent Labs     03/15/19  1447 03/15/19  1720 03/15/19  2108   POCGLU 195* 278* 258*     HgbA1C: No results for input(s): LABA1C in the last 72 hours. INR:   Recent Labs     03/15/19  0527   INR 3.2     Thyroid:   Lab Results   Component Value Date    TSH 0.60 03/10/2019      Assessment:  Patient Active Problem List   Diagnosis    Traumatic amputation of thumb    Essential hypertension    Mixed hyperlipidemia    Obstructive sleep apnea    [facility]bid [facility]    Benign prostatic hyperplasia without lower urinary tract symptoms    Venous stasis dermatitis    Severe aortic stenosis    Severe mitral valve stenosis    Anemia in chronic kidney disease, on chronic dialysis ([address]    Dialysis patient ([facility]    Dementia    Major depressive disorder with single episode, in partial remission ([facility]    Chronic cerebral ischemia    ESRD (end stage renal disease) on dialysis ([facility]    Long-term insulin use (HCC)    Paroxysmal atrial fibrillation ([facility]    BMI 40.0-44.9, adult ([address]    Controlled type 2 diabetes mellitus with chronic kidney disease on chronic dialysis, with long-term current use of insulin (HCC)    Controlled type 2 diabetes mellitus with diabetic polyneuropathy, with long-term current use of insulin (HCC)    Dermatitis    Bruising    Hematoma of right lower extremity    Hematoma of leg, right, initial encounter    Acute blood loss anemia    Accidental fall from wheelchair    Traumatic hemorrhagic shock ([facility]      - R leg wound              - Wound Vac removed, wound nurse with wet to dry dressing changes              - Trauma following  - Acute Blood loss anemia              - Patient transfused 1 units PRBC overnight.  Will give one unit of FFP today as INR 4.4.              -  DDAVP was given yesterday for continued falls in hemoglobin and persistent R leg wound bleeding              - Continue to transfuse for hemoglobins <7  - ESRD              - Patient with hypotension during dialysis yesterday (3/13), was given FFP during  - ID              - Diarrhea improved. 100 ml in FMS. CT abd cannot exclude colitis              - Will continue Vanco and Flagyl              - Lactic acidosis - resolved. - CV              - Patient with wide pulse pressure and hx of valve replacements, follow with ECHO for       evaluation for aortic insufficiency               - Cardiology following, hold all anticoagulation              - Continue levophed for Systolic BP >330      Gabriela Lezama MD  PGY-1, Department of Internal Medicine  [address] [place] [address]  3/16/2019 12:33 AM      Attending Physician Statement  I have discussed the care of Aleksandrynda Paci, including pertinent history and exam findings with the resident. I have reviewed the key elements of all parts of the encounter with the resident. I have seen and examined the patient with the resident and the key elements of all parts of the encounter have been performed by me.     Principal Problem:    Acute blood loss anemia  Active Problems:    Anemia in chronic kidney disease, on chronic dialysis (HCC)    Traumatic hemorrhagic shock (HCC)    Traumatic amputation of thumb    Essential hypertension    Mixed hyperlipidemia    Obstructive sleep apnea    Morbid obesity ([address]    Benign prostatic hyperplasia without lower urinary tract symptoms    Dialysis patient ([address]    ESRD (end stage renal disease) on dialysis (HCC)    Paroxysmal atrial fibrillation ([address]    Controlled type 2 diabetes mellitus with chronic kidney disease on chronic dialysis, with long-term current use of insulin ([address]    Controlled type 2 diabetes mellitus with diabetic polyneuropathy, with long-term current use of insulin (HCC)    Hematoma of right lower extremity    Hematoma of leg, right, initial encounter    Accidental fall from wheelchair  Resolved Problems:    * No resolved hospital problems. *    Assessment/Plan  Monitor H&H.   Hemodialysis per nephrology  Monitor INR   Encourage oral intake   [place] as needed     Zaria Schuler MD  Attending and Faculty Internal Medicine  [address] [facility]  Internal Medicine [address] [place] [address]   3/16/19

## 2019-03-17 LAB
ABSOLUTE EOS #: 0.11 K/UL (ref 0–0.4)
ABSOLUTE IMMATURE GRANULOCYTE: 0.22 K/UL (ref 0–0.3)
ABSOLUTE LYMPH #: 1.21 K/UL (ref 1–4.8)
ABSOLUTE MONO #: 0.33 K/UL (ref 0.1–0.8)
ANION GAP SERPL CALCULATED.3IONS-SCNC: 14 MMOL/L (ref 9–17)
ANION GAP SERPL CALCULATED.3IONS-SCNC: 15 MMOL/L (ref 9–17)
BASOPHILS # BLD: 0 % (ref 0–2)
BASOPHILS ABSOLUTE: 0 K/UL (ref 0–0.2)
BUN BLDV-MCNC: 37 MG/DL (ref 8–23)
BUN BLDV-MCNC: 41 MG/DL (ref 8–23)
BUN/CREAT BLD: ABNORMAL (ref 9–20)
BUN/CREAT BLD: ABNORMAL (ref 9–20)
CALCIUM SERPL-MCNC: 7.9 MG/DL (ref 8.6–10.4)
CALCIUM SERPL-MCNC: 7.9 MG/DL (ref 8.6–10.4)
CHLORIDE BLD-SCNC: 95 MMOL/L (ref 98–107)
CHLORIDE BLD-SCNC: 96 MMOL/L (ref 98–107)
CO2: 20 MMOL/L (ref 20–31)
CO2: 23 MMOL/L (ref 20–31)
CREAT SERPL-MCNC: 4.84 MG/DL (ref 0.7–1.2)
CREAT SERPL-MCNC: 4.89 MG/DL (ref 0.7–1.2)
DIFFERENTIAL TYPE: ABNORMAL
EOSINOPHILS RELATIVE PERCENT: 1 % (ref 1–4)
GFR AFRICAN AMERICAN: 14 ML/MIN
GFR AFRICAN AMERICAN: 14 ML/MIN
GFR NON-AFRICAN AMERICAN: 12 ML/MIN
GFR NON-AFRICAN AMERICAN: 12 ML/MIN
GFR SERPL CREATININE-BSD FRML MDRD: ABNORMAL ML/MIN/{1.73_M2}
GLUCOSE BLD-MCNC: 186 MG/DL (ref 75–110)
GLUCOSE BLD-MCNC: 190 MG/DL (ref 75–110)
GLUCOSE BLD-MCNC: 193 MG/DL (ref 75–110)
GLUCOSE BLD-MCNC: 209 MG/DL (ref 70–99)
GLUCOSE BLD-MCNC: 218 MG/DL (ref 75–110)
GLUCOSE BLD-MCNC: 224 MG/DL (ref 70–99)
HCT VFR BLD CALC: 28.2 % (ref 40.7–50.3)
HEMOGLOBIN: 8.8 G/DL (ref 13–17)
IMMATURE GRANULOCYTES: 2 %
INR BLD: 1.1
LYMPHOCYTES # BLD: 11 % (ref 24–44)
MAGNESIUM: 1.7 MG/DL (ref 1.6–2.6)
MCH RBC QN AUTO: 31.5 PG (ref 25.2–33.5)
MCHC RBC AUTO-ENTMCNC: 31.2 G/DL (ref 28.4–34.8)
MCV RBC AUTO: 101.1 FL (ref 82.6–102.9)
MONOCYTES # BLD: 3 % (ref 1–7)
MORPHOLOGY: ABNORMAL
MORPHOLOGY: ABNORMAL
NRBC AUTOMATED: 1.5 PER 100 WBC
NUCLEATED RED BLOOD CELLS: 1 PER 100 WBC
PDW BLD-RTO: 21.1 % (ref 11.8–14.4)
PLATELET # BLD: 60 K/UL (ref 138–453)
PLATELET ESTIMATE: ABNORMAL
PMV BLD AUTO: 10.9 FL (ref 8.1–13.5)
POTASSIUM SERPL-SCNC: 2.8 MMOL/L (ref 3.7–5.3)
POTASSIUM SERPL-SCNC: 3.7 MMOL/L (ref 3.7–5.3)
PROTHROMBIN TIME: 11.8 SEC (ref 9–12)
RBC # BLD: 2.79 M/UL (ref 4.21–5.77)
RBC # BLD: ABNORMAL 10*6/UL
SEG NEUTROPHILS: 83 % (ref 36–66)
SEGMENTED NEUTROPHILS ABSOLUTE COUNT: 9.13 K/UL (ref 1.8–7.7)
SODIUM BLD-SCNC: 131 MMOL/L (ref 135–144)
SODIUM BLD-SCNC: 132 MMOL/L (ref 135–144)
WBC # BLD: 11 K/UL (ref 3.5–11.3)
WBC # BLD: ABNORMAL 10*3/UL

## 2019-03-17 PROCEDURE — 6370000000 HC RX 637 (ALT 250 FOR IP): Performed by: STUDENT IN AN ORGANIZED HEALTH CARE EDUCATION/TRAINING PROGRAM

## 2019-03-17 PROCEDURE — 2580000003 HC RX 258: Performed by: STUDENT IN AN ORGANIZED HEALTH CARE EDUCATION/TRAINING PROGRAM

## 2019-03-17 PROCEDURE — 85025 COMPLETE CBC W/AUTO DIFF WBC: CPT

## 2019-03-17 PROCEDURE — 6370000000 HC RX 637 (ALT 250 FOR IP): Performed by: INTERNAL MEDICINE

## 2019-03-17 PROCEDURE — 83735 ASSAY OF MAGNESIUM: CPT

## 2019-03-17 PROCEDURE — 6370000000 HC RX 637 (ALT 250 FOR IP): Performed by: NURSE PRACTITIONER

## 2019-03-17 PROCEDURE — 99232 SBSQ HOSP IP/OBS MODERATE 35: CPT | Performed by: INTERNAL MEDICINE

## 2019-03-17 PROCEDURE — 82947 ASSAY GLUCOSE BLOOD QUANT: CPT

## 2019-03-17 PROCEDURE — 36415 COLL VENOUS BLD VENIPUNCTURE: CPT

## 2019-03-17 PROCEDURE — 2500000003 HC RX 250 WO HCPCS: Performed by: STUDENT IN AN ORGANIZED HEALTH CARE EDUCATION/TRAINING PROGRAM

## 2019-03-17 PROCEDURE — 6360000002 HC RX W HCPCS: Performed by: STUDENT IN AN ORGANIZED HEALTH CARE EDUCATION/TRAINING PROGRAM

## 2019-03-17 PROCEDURE — 80048 BASIC METABOLIC PNL TOTAL CA: CPT

## 2019-03-17 PROCEDURE — 85610 PROTHROMBIN TIME: CPT

## 2019-03-17 PROCEDURE — 2060000000 HC ICU INTERMEDIATE R&B

## 2019-03-17 PROCEDURE — 94760 N-INVAS EAR/PLS OXIMETRY 1: CPT

## 2019-03-17 RX ORDER — POTASSIUM CHLORIDE 20 MEQ/1
40 TABLET, EXTENDED RELEASE ORAL PRN
Status: DISCONTINUED | OUTPATIENT
Start: 2019-03-17 | End: 2019-03-19 | Stop reason: HOSPADM

## 2019-03-17 RX ORDER — MAGNESIUM SULFATE 1 G/100ML
1 INJECTION INTRAVENOUS PRN
Status: DISCONTINUED | OUTPATIENT
Start: 2019-03-17 | End: 2019-03-19 | Stop reason: HOSPADM

## 2019-03-17 RX ORDER — POTASSIUM CHLORIDE 20MEQ/15ML
20 LIQUID (ML) ORAL 3 TIMES DAILY
Status: DISCONTINUED | OUTPATIENT
Start: 2019-03-17 | End: 2019-03-19 | Stop reason: HOSPADM

## 2019-03-17 RX ORDER — POTASSIUM CHLORIDE 20MEQ/15ML
40 LIQUID (ML) ORAL PRN
Status: DISCONTINUED | OUTPATIENT
Start: 2019-03-17 | End: 2019-03-19 | Stop reason: HOSPADM

## 2019-03-17 RX ORDER — POTASSIUM CHLORIDE 20 MEQ/1
40 TABLET, EXTENDED RELEASE ORAL 2 TIMES DAILY WITH MEALS
Status: COMPLETED | OUTPATIENT
Start: 2019-03-17 | End: 2019-03-17

## 2019-03-17 RX ORDER — POTASSIUM CHLORIDE 7.45 MG/ML
10 INJECTION INTRAVENOUS PRN
Status: DISCONTINUED | OUTPATIENT
Start: 2019-03-17 | End: 2019-03-19 | Stop reason: HOSPADM

## 2019-03-17 RX ADMIN — INSULIN GLARGINE 25 UNITS: 100 INJECTION, SOLUTION SUBCUTANEOUS at 20:09

## 2019-03-17 RX ADMIN — ACETAMINOPHEN 650 MG: 325 TABLET ORAL at 08:10

## 2019-03-17 RX ADMIN — POTASSIUM CHLORIDE 10 MEQ: 7.46 INJECTION, SOLUTION INTRAVENOUS at 12:52

## 2019-03-17 RX ADMIN — LANSOPRAZOLE 30 MG: 30 TABLET, ORALLY DISINTEGRATING, DELAYED RELEASE ORAL at 06:20

## 2019-03-17 RX ADMIN — POTASSIUM CHLORIDE 10 MEQ: 7.46 INJECTION, SOLUTION INTRAVENOUS at 11:50

## 2019-03-17 RX ADMIN — DONEPEZIL HYDROCHLORIDE 5 MG: 5 TABLET, FILM COATED ORAL at 18:06

## 2019-03-17 RX ADMIN — INSULIN LISPRO 3 UNITS: 100 INJECTION, SOLUTION INTRAVENOUS; SUBCUTANEOUS at 08:16

## 2019-03-17 RX ADMIN — INSULIN LISPRO 2 UNITS: 100 INJECTION, SOLUTION INTRAVENOUS; SUBCUTANEOUS at 20:09

## 2019-03-17 RX ADMIN — Medication 10 ML: at 08:09

## 2019-03-17 RX ADMIN — POTASSIUM CHLORIDE 40 MEQ: 1500 TABLET, EXTENDED RELEASE ORAL at 16:34

## 2019-03-17 RX ADMIN — POTASSIUM CHLORIDE 10 MEQ: 7.46 INJECTION, SOLUTION INTRAVENOUS at 10:50

## 2019-03-17 RX ADMIN — POTASSIUM CHLORIDE 10 MEQ: 7.46 INJECTION, SOLUTION INTRAVENOUS at 14:07

## 2019-03-17 RX ADMIN — GABAPENTIN 300 MG: 300 CAPSULE ORAL at 08:09

## 2019-03-17 RX ADMIN — METRONIDAZOLE 500 MG: 500 INJECTION, SOLUTION INTRAVENOUS at 21:35

## 2019-03-17 RX ADMIN — CETIRIZINE HYDROCHLORIDE 5 MG: 10 TABLET ORAL at 08:10

## 2019-03-17 RX ADMIN — INSULIN LISPRO 6 UNITS: 100 INJECTION, SOLUTION INTRAVENOUS; SUBCUTANEOUS at 12:01

## 2019-03-17 RX ADMIN — ATORVASTATIN CALCIUM 10 MG: 10 TABLET, FILM COATED ORAL at 08:10

## 2019-03-17 RX ADMIN — MIDODRINE HYDROCHLORIDE 10 MG: 5 TABLET ORAL at 20:08

## 2019-03-17 RX ADMIN — Medication 10 ML: at 20:07

## 2019-03-17 RX ADMIN — MIDODRINE HYDROCHLORIDE 10 MG: 5 TABLET ORAL at 12:49

## 2019-03-17 RX ADMIN — POTASSIUM CHLORIDE 10 MEQ: 7.46 INJECTION, SOLUTION INTRAVENOUS at 08:09

## 2019-03-17 RX ADMIN — BUPROPION HYDROCHLORIDE 150 MG: 150 TABLET, EXTENDED RELEASE ORAL at 08:09

## 2019-03-17 RX ADMIN — ACETAMINOPHEN 650 MG: 325 TABLET ORAL at 21:35

## 2019-03-17 RX ADMIN — POTASSIUM CHLORIDE 10 MEQ: 7.46 INJECTION, SOLUTION INTRAVENOUS at 09:50

## 2019-03-17 RX ADMIN — SEVELAMER CARBONATE 800 MG: 800 TABLET, FILM COATED ORAL at 16:34

## 2019-03-17 RX ADMIN — ACETAMINOPHEN 650 MG: 325 TABLET ORAL at 16:34

## 2019-03-17 RX ADMIN — ACETAMINOPHEN 650 MG: 325 TABLET ORAL at 12:51

## 2019-03-17 RX ADMIN — SEVELAMER CARBONATE 800 MG: 800 TABLET, FILM COATED ORAL at 08:10

## 2019-03-17 RX ADMIN — METRONIDAZOLE 500 MG: 500 INJECTION, SOLUTION INTRAVENOUS at 15:25

## 2019-03-17 RX ADMIN — INSULIN LISPRO 6 UNITS: 100 INJECTION, SOLUTION INTRAVENOUS; SUBCUTANEOUS at 16:34

## 2019-03-17 RX ADMIN — SEVELAMER CARBONATE 800 MG: 800 TABLET, FILM COATED ORAL at 12:49

## 2019-03-17 RX ADMIN — POTASSIUM CHLORIDE 20 MEQ: 40 SOLUTION ORAL at 20:07

## 2019-03-17 RX ADMIN — LEVOTHYROXINE SODIUM 25 MCG: 25 TABLET ORAL at 06:07

## 2019-03-17 RX ADMIN — METRONIDAZOLE 500 MG: 500 INJECTION, SOLUTION INTRAVENOUS at 06:07

## 2019-03-17 RX ADMIN — FLUTICASONE PROPIONATE 2 SPRAY: 50 SPRAY, METERED NASAL at 08:09

## 2019-03-17 RX ADMIN — INSULIN GLARGINE 25 UNITS: 100 INJECTION, SOLUTION SUBCUTANEOUS at 08:16

## 2019-03-17 RX ADMIN — Medication 10 ML: at 08:11

## 2019-03-17 RX ADMIN — Medication 10 ML: at 20:08

## 2019-03-17 RX ADMIN — POTASSIUM CHLORIDE 40 MEQ: 1500 TABLET, EXTENDED RELEASE ORAL at 10:37

## 2019-03-17 ASSESSMENT — PAIN DESCRIPTION - DESCRIPTORS: DESCRIPTORS: ACHING;DISCOMFORT

## 2019-03-17 ASSESSMENT — PAIN SCALES - GENERAL
PAINLEVEL_OUTOF10: 3
PAINLEVEL_OUTOF10: 2
PAINLEVEL_OUTOF10: 0
PAINLEVEL_OUTOF10: 4
PAINLEVEL_OUTOF10: 2

## 2019-03-17 ASSESSMENT — PAIN DESCRIPTION - ORIENTATION: ORIENTATION: RIGHT

## 2019-03-17 ASSESSMENT — PAIN DESCRIPTION - PAIN TYPE: TYPE: CHRONIC PAIN

## 2019-03-17 ASSESSMENT — PAIN DESCRIPTION - LOCATION: LOCATION: FOOT;LEG

## 2019-03-17 ASSESSMENT — PAIN DESCRIPTION - ONSET: ONSET: ON-GOING

## 2019-03-17 NOTE — PLAN OF CARE
Problem: SKIN INTEGRITY  Goal: Skin integrity is maintained or improved  3/17/2019 1433 by Gustavo Mejia RN  Outcome: Ongoing  Note:   Pts skin maintains structural intactness and physiologic function. Pt able to reposition independently in bed/ Assisting pt with turns every 2 hours and heels elevated off bed. Linens remain clean and dry. Will continue to monitor. 3/17/2019 1428 by Gustavo Mejia RN  Outcome: Ongoing  Note:    Pt able to reposition independently in bed/ Assisting pt with turns every 2 hours and heels elevated off bed. Linens remain clean and dry. Will continue to monitor. 3/17/2019 1425 by Gustavo Mejia RN  Outcome: Ongoing     Problem: Injury - Risk of, Physical Injury:  Goal: Absence of physical injury  Description  Absence of physical injury  3/17/2019 1433 by Gustavo Mejia RN  Note:   Pt remains free of falls at this time. Bed locked in lowest position, siderails x2, call light in reach. Non-skid footwear applied. Encouraged pt to call for assistance as needed for safety. Falling star posted outside of room. Will continue to monitor. 3/17/2019 1428 by Gustavo Mejia RN  Outcome: Ongoing  Note:   Pt remains free of falls at this time. Bed locked in lowest position, siderails x2, call light in reach. Non-skid footwear applied Encouraged pt to call for assistance as needed for safety. Falling star posted outside of room. Will continue to monitor. 3/17/2019 1425 by Gustavo Mejia RN  Outcome: Ongoing     Problem: Falls - Risk of:  Goal: Absence of physical injury  Description  Absence of physical injury  3/17/2019 1433 by Gustavo Mejia RN  Note:   Pt remains free of falls at this time. Bed locked in lowest position, siderails x2, call light in reach. Non-skid footwear applied. Encouraged pt to call for assistance as needed for safety. Falling star posted outside of room. Will continue to monitor.     3/17/2019 1428 by Gustavo Mejia RN  Outcome: Ongoing  Note:   Pt remains free of falls at this time. Bed locked in lowest position, siderails x2, call light in reach. Non-skid footwear applied Encouraged pt to call for assistance as needed for safety. Falling star posted outside of room. Will continue to monitor. 3/17/2019 1425 by Gill Dos Santos RN  Outcome: Ongoing     Problem: Pain:  Goal: Pain level will decrease  Description  Pain level will decrease  3/17/2019 0647 by Shyann Mcclendon RN  Outcome: Ongoing  Note:   Pt able to communicate pain as needed, uses call light appropriately. Pt satisfied with pain interventions.

## 2019-03-17 NOTE — PROGRESS NOTES
[facility]  Internal Medicine Residency Program  Inpatient Daily Progress Note  ______________________________________________________________________________    Patient: Trish Marin  YOB: 1945   MRN: [id number]    Acct: [de-identified]     Admit date: 3/8/2019  Today's date: 03/17/19  Number of days in the hospital: 9  Expected Discharge Date: (tbd)    Admitting Diagnosis: Acute blood loss anemia    Subjective:   Patient seen and examined at bedside. Alert and oriented. Stable vitals this AM.  No acute issues overnight. Patient is eating and drinking fine. K low this AM. Will replace with Mag. Also will remove central line today    Objective:   Vital Sign:  BP (!) 141/93   Pulse 88   Temp 98 °F (36.7 °C) (Oral)   Resp 20   Ht 5' 6.93\" (1.7 m)   Wt (!) 300 lb 14.9 oz (136.5 kg)   SpO2 100%   BMI 47.23 kg/m²       Physical Exam:  General appearance:   alert, well appearing, and in no distress  Mental Status: alert, oriented to person, place, and time  Neurologic:  alert, oriented, normal speech, no focal findings or movement disorder noted  Lungs:  clear to auscultation, no wheezes, rales or rhonchi, symmetric air entry  Heart[de-identified] normal rate, regular rhythm, normal S1, S2, no murmurs, rubs, clicks or gallops  Abdomen:  soft, nontender, nondistended, no masses or organomegaly  Extremities: peripheral pulses normal, no pedal edema, no clubbing or cyanosis.  Left femoral line in place   Skin: normal coloration and turgor, no rashes, no suspicious skin lesions noted    Medications:  Scheduled Medications   potassium chloride  40 mEq Oral BID WC    insulin glargine  25 Units Subcutaneous BID    sodium chloride  250 mL Intravenous Once    metroNIDAZOLE  500 mg Intravenous Q8H    lidocaine 1 % injection  5 mL Intradermal Once    sodium chloride flush  10 mL Intravenous 2 times per day    gabapentin  300 mg Oral Daily    insulin lispro  0-18 Units Subcutaneous TID WC    insulin lispro  0-9 Units Subcutaneous Nightly    lansoprazole  30 mg Oral QAM AC    acetaminophen  650 mg Oral 4x Daily    atorvastatin  10 mg Oral Daily    buPROPion  150 mg Oral QAM    donepezil  5 mg Oral QPM    fluticasone  2 spray Nasal Daily    levothyroxine  25 mcg Oral Daily    cetirizine  5 mg Oral Daily    midodrine  10 mg Oral TID    sevelamer  800 mg Oral TID WC    sodium chloride flush  10 mL Intravenous 2 times per day       PRN Medications  potassium chloride 40 mEq PRN   Or     potassium alternative oral replacement 40 mEq PRN   Or     potassium chloride 10 mEq PRN   magnesium sulfate 1 g PRN   sodium chloride 250 mL PRN   sodium chloride 150 mL PRN   fentanNYL 25 mcg Q1H PRN   sodium chloride flush 10 mL PRN   iohexol 50 mL ONCE PRN   ondansetron 4 mg Q6H PRN   glucose 15 g PRN   albuterol 2.5 mg Q8H PRN   guaiFENesin 200 mg 4x Daily PRN   sodium chloride flush 10 mL PRN   acetaminophen 650 mg Q4H PRN   magnesium hydroxide 30 mL Daily PRN   glucagon (rDNA) 1 mg PRN   dextrose 100 mL/hr PRN       Diagnostic Labs and Imaging:  CBC:  Recent Labs     03/14/19  2028 03/15/19  0527 03/17/19  0549   WBC  --  12.7* 11.0   HGB 7.4* 8.1* 8.8*   PLT  --  55* 60*     BMP: Recent Labs     03/15/19  0527 03/17/19  0549   * 132*   K 3.3* 2.8*   CL 94* 95*   CO2 19* 23   BUN 46* 37*   CREATININE 5.33* 4.84*   GLUCOSE 266* 224*     Hepatic: Recent Labs     03/16/19  1255   AST 91*   *   BILITOT 0.66   ALKPHOS 99       Assessment and Plan:     Principal Problem:    Acute blood loss anemia  Active Problems:    Anemia in chronic kidney disease, on chronic dialysis (HCC)    Traumatic hemorrhagic shock (HCC)    Traumatic amputation of thumb    Essential hypertension    Mixed hyperlipidemia    Obstructive sleep apnea    Morbid obesity ([facility] [address]    Benign prostatic hyperplasia without lower urinary tract symptoms    [facility] ([facility]    ESRD (end stage renal disease) on dialysis [facility])    Paroxysmal atrial fibrillation ([facility]    Controlled type 2 diabetes mellitus with chronic kidney disease on chronic dialysis, with long-term current use of insulin ([facility]    Controlled type 2 diabetes mellitus with diabetic polyneuropathy, with long-term current use of insulin ([facility])    Hematoma of right lower extremity    Hematoma of leg, right, initial encounter    Accidental fall from wheelchair  Resolved Problems:    * No resolved hospital problems. *     - R leg wound              - Wound Vac removed, wound nurse with wet to dry dressing changes              - Trauma following  - Acute Blood loss anemia              - Patient transfused 10 units PRBC overall. INE 1.1 this AM              - Continue to transfuse for hemoglobins <7  - ESRD              - Patient with hypotension during dialysis yesterday (3/13), was given FFP during  - ID              - Diarrhea improved. CT abd cannot exclude colitis              - Will continue Flagyl              - Lactic acidosis - resolved. - CV              - Patient with wide pulse pressure and hx of valve replacements, follow with ECHO for       evaluation for aortic insufficiency               - Cardiology following, hold all anticoagulation              - Continue levophed for Systolic BP >053    Allie Almeida MD  PGY-1, Department of Internal Medicine  [address] [place] [address]  3/17/2019 9:09 AM    Attending Physician Statement  I have discussed the care of Bhanu Alexander, including pertinent history and exam findings,  with the resident. I have seen and examined the patient and the key elements of all parts of the encounter have been performed by me. I agree with the assessment, plan and orders as documented by the resident.     Cassie Wellington MD

## 2019-03-17 NOTE — PLAN OF CARE
Problem: Pain:  Goal: Pain level will decrease  Description  Pain level will decrease  Outcome: Ongoing  Note:   Pt able to communicate pain as needed, uses call light appropriately. Pt satisfied with pain interventions.

## 2019-03-17 NOTE — DISCHARGE INSTR - COC
Continuity of Care Form    Patient Name: Elly Caro   :  1945  MRN:  [id number]    [facility] date:  3/8/2019  Discharge date:  3-18-19    Code Status Order: DNR-CCA   Advance Directives:   [facility] Documentation     Date/Time Healthcare Directive Type of Healthcare Directive Copy in [address] Agent's Name Healthcare Agent's Phone Number    [phone]  Yes, patient has an advance directive for healthcare treatment  Durable power of  for health care;Living will  No, copy requested from family -- -- --          Admitting Physician:  Kaitlyn Alanis MD  PCP: Frankie Cortez DO    Discharging Nurse: [facility] Unit/Room#: 6001/0184-70  Discharging Unit Phone Number: [phone]    Emergency Contact:   Extended Emergency Contact Information  Primary Emergency Contact: Jak PEREZ  Address: [address] Phone: [phone]  Work Phone: [phone]  Mobile Phone: [phone]  Relation: Spouse  Secondary Emergency Contact: Michael oNble  Address: N/A   [address] Phone: [phone]  Work Phone: [phone]  Mobile Phone: [phone]  Relation: Child    Past Surgical History:  Past Surgical History:   Procedure Laterality Date    AORTIC VALVE REPLACEMENT  2016    bioprosthetic    ARM SURGERY Left     CARDIAC CATHETERIZATION      CENTRAL VENOUS CATHETER Right 2018    DIALYSIS CATHETER Dr Linette Holter COLONOSCOPY  09    COLONOSCOPY N/A 10/12/2018    COLONOSCOPY WITH BIOPSY performed by Amy Easton DO at [address] [place] [address] Right 3/10/2019    WASHOUT RIGHT LOWER EXTREMITY WITH WOUND VAC EXCHANGE performed by Kaitlyn Alanis MD at [address] [place] [address] Right 3/8/2019    RIGHT LOWER EXTREMITY EXPLORATION, HEMATOMA EVACUATION, WOUND VAC PLACEMENT performed by Dionna Dawn MD at [address] (V99-05 surgeries)    several surgeries on left arm    KNEE ARTHROSCOPY Left 09/17/99    MITRAL VALVE REPLACEMENT  03/18/2016    bioprosthetic     NASAL SEPTUM SURGERY      OTHER SURGICAL HISTORY  3/18/16    Aortic root Enlargement    OTHER SURGICAL HISTORY  3/18/16    ASD Closure    OTHER SURGICAL HISTORY Right 01/09/2017    AV fistula creation, wrist    OTHER SURGICAL HISTORY Right 08/29/2017    ligation of collateral branch of av fistula right wrist    OTHER SURGICAL HISTORY  04/05/2018    FISTULAGRAM WITH DR. Jason GUARDADO EGD TRANSORAL BIOPSY SINGLE/MULTIPLE N/A 10/17/2017    EGD BIOPSY performed by Crispin Luna MD at [address] ANGIOACCESS AV FISTULA Right 8/29/2017     RIGHT  LOWER ARM AV FISTULA  LIGATION OF AQUIRED BRANCHES X2 performed by Moses Redman DO at [address]  3/18/16    median    VASCULAR SURGERY  12/06/2018    Right arm fistulagram,  PTA cephalic vein stenosis  /  DR Ariella Klein       Immunization History:   Immunization History   Administered Date(s) Administered    Influenza Virus Vaccine 10/24/2006, 11/18/2013    Influenza Whole 10/12/2015    Influenza, High Dose (Fluzone 65 yrs and older) 10/04/2017, 10/21/2018    Pneumococcal 13-valent Conjugate ([id number]) 10/25/2017    Pneumococcal Polysaccharide ([id number]) 11/30/2010, 10/12/2015    Tdap (Boostrix, Adacel) 10/27/2015       Active Problems:  Patient Active Problem List   Diagnosis Code    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    Essential hypertension I10    Mixed hyperlipidemia E78.2    Obstructive sleep apnea G47.33    Morbid obesity ([facility] E66.01    Benign prostatic hyperplasia without lower urinary tract symptoms N40.0    Venous stasis dermatitis I87.2    Severe aortic stenosis I35.0    Severe mitral valve stenosis I05.0    Anemia in chronic kidney disease, on chronic dialysis (HCC) N18.6, D63.1, Z99.2  Dialysis patient ([facility] Z99.2    Dementia F03.90    Major depressive disorder with single episode, in partial remission ([facility] F32.4    Chronic cerebral ischemia I67.82    ESRD (end stage renal disease) on dialysis ([facility]) N18.6, Z99.2    Long-term insulin use ([facility]) Z79.4    Paroxysmal atrial fibrillation ([facility]) I48.0    BMI 40.0-44.9, adult ([facility]) Z68.41    Controlled type 2 diabetes mellitus with chronic kidney disease on chronic dialysis, with long-term current use of insulin ([facility]) E11.22, N18.6, Z79.4, Z99.2    Controlled type 2 diabetes mellitus with diabetic polyneuropathy, with long-term current use of insulin ([facility]) E11.42, Z79.4    Dermatitis L30.9    Bruising T14. 8XXA    Hematoma of right lower extremity S80.11XA    Hematoma of leg, right, initial encounter S80.11XA    Acute blood loss anemia D62    Accidental fall from wheelchair W05. 0XXA    Traumatic hemorrhagic shock ([facility] T79. 4XXA       Isolation/Infection:   Isolation          No Isolation            Nurse Assessment:  Last Vital Signs: BP (!) 103/51   Pulse 86   Temp 98 °F (36.7 °C) (Oral)   Resp 18   Ht 5' 6.93\" (1.7 m)   Wt (!) 300 lb 14.9 oz (136.5 kg)   SpO2 100%   BMI 47.23 kg/m²     Last documented pain score (0-10 scale): Pain Level: 0  Last Weight:   Wt Readings from Last 1 Encounters:   03/15/19 (!) 300 lb 14.9 oz (136.5 kg)     Mental Status:  Alert and oriented (intermittent confusion)    IV Access:  - None    Nursing Mobility/ADLs:  Walking   Dependent  Transfer  Dependent  Bathing  Assisted  Dressing  Dependent  Toileting  Dependent  Feeding  Assisted  Med Admin  Dependent  Med Delivery   whole    Wound Care Documentation and Therapy:  Wound 03/08/19 Buttocks Left;Right;Upper deep tissue, denuded, red (Active)   Wound Other 3/15/2019  8:00 PM   Dressing Changed Changed/New 3/14/2019  4:00 PM   Dressing/Treatment Protective barrier 3/17/2019 12:00 PM   Wound Assessment [place];Red;Slough 3/17/2019 12:00 PM   Drainage Amount Scant 3/17/2019 12:00 PM   Drainage Description Serosanguinous 3/17/2019 12:00 PM   Odor None 3/17/2019 12:00 PM   Gwendolyn-wound Assessment Pink 3/17/2019 12:00 PM   Number of days: 8       Wound 03/08/19 Scrotum Posterior (Active)   Wound Pressure Stage  1 3/13/2019  4:00 AM   Dressing Changed Changed/New 3/14/2019  4:00 PM   Dressing/Treatment Protective barrier 3/17/2019 12:00 PM   Wound Assessment Purple;Pink;Red 3/17/2019 12:00 PM   Drainage Amount None 3/17/2019 12:00 PM   Gwendolyn-wound Assessment Red 3/17/2019 12:00 PM   Number of days: 8        Elimination:  Continence:   · Bowel: No  · Bladder: No  Urinary Catheter: None   Colostomy/Ileostomy/Ileal Conduit: No  Rectal Tube With balloon-Stool Appearance: Loose  Rectal Tube With balloon-Stool Color: Brown  Rectal Tube With balloon-Stool Amount: Small    Date of Last BM: 3-19-19    Intake/Output Summary (Last 24 hours) at 3/17/2019 1247  Last data filed at 3/17/2019 0506  Gross per 24 hour   Intake 200 ml   Output 100 ml   Net 100 ml     I/O last 3 completed shifts: In: 200 [P.O.:200]  Out: 100 [Stool:100]    Safety Concerns: At Risk for Falls and Aspiration Risk    Impairments/Disabilities:      walking    Nutrition Therapy:  Current Nutrition Therapy:   - Oral Diet:  General    Routes of Feeding: Oral  Liquids: No Restrictions  Daily Fluid Restriction: no  Last Modified Barium Swallow with Video (Video Swallowing Test): not done    Treatments at the Time of Hospital Discharge:   Respiratory Treatments: none  Oxygen Therapy:  is not on home oxygen therapy. Ventilator:    - No ventilator support    Rehab Therapies: Physical Therapy and Occupational Therapy  Weight Bearing Status/Restrictions: No weight bearing restirctions  Other Medical Equipment (for information only, NOT a DME order):  wheelchair and hospital bed  Other Treatments: Dressing change right lower extremity  with BID saline damp to damp dressings. Zinc to stage 2 buttocks.  Zinc to scrotum      Patient's personal belongings (please select all that are sent with patient):  Glasses    RN SIGNATURE:  Electronically signed by Barbara Brewer RN on 3/18/19 at 10:11 AM    CASE MANAGEMENT/SOCIAL WORK SECTION    Inpatient Status Date: 03/08/2019    Readmission Risk Assessment Score:  Readmission Risk              Risk of Unplanned Readmission:        40           Discharging to Facility/ Agency   · Name: [facility]  · Address:  · Phone: [phone]  · Fax:    Dialysis Facility (if applicable)   · Name:  · Address:  · Dialysis Schedule:  · Phone:  · Fax:    / signature: Electronically signed by Jorge Fajardo RN on 3/19/19 at 12:49 PM    PHYSICIAN SECTION    Prognosis: Fair    Condition at Discharge: Stable    Rehab Potential (if transferring to Rehab): Fair    Recommended Labs or Other Treatments After Discharge:     Physician Certification: I certify the above information and transfer of Wesley Tiwari  is necessary for the continuing treatment of the diagnosis listed and that he requires [place] for greater 30 days. Update Admission H&P: Kindly review chart for details. The patient will need wound care for his leg and back. He is also on dialysis and has co morbid conditions like ESRD, DM, HTN and Atrial fibrillation.     PHYSICIAN SIGNATURE:  Electronically signed by Marilyn Harmon MD on 3/17/19 at 12:47 PM

## 2019-03-17 NOTE — PROGRESS NOTES
Renal Progress Note    Patient :  Lonny Closs; 75 y.o. MRN# [id number]  Location:  9985/6940-30  Attending:  Josselin Rodriguez MD  Admit Date:  3/8/2019   Hospital Day: 9    Subjective   Following for ESRD MWF in [place] admitted with leg injury. No dyspnea. Hb stable, stable hemodynamics. Potassium 2.8. No N/V, + liquid stool but only 100.     Objective     VS: BP (!) 141/93   Pulse 88   Temp 98 °F (36.7 °C) (Oral)   Resp 20   Ht 5' 6.93\" (1.7 m)   Wt (!) 300 lb 14.9 oz (136.5 kg)   SpO2 100%   BMI 47.23 kg/m²   MAXIMUM TEMPERATURE OVER 24HRS:  Temp (24hrs), Av.8 °F (36.6 °C), Min:97.5 °F (36.4 °C), Max:98.2 °F (36.8 °C)    24HR BLOOD PRESSURE RANGE:  Systolic (26ZCQ), JEC:421 , Min:122 , CJF:957   ; Diastolic (01BLG), WDL:71, Min:48, Max:93    24HR INTAKE/OUTPUT:      Intake/Output Summary (Last 24 hours) at 3/17/2019 1100  Last data filed at 3/17/2019 0506  Gross per 24 hour   Intake 200 ml   Output 100 ml   Net 100 ml     WEIGHT :  Patient Vitals for the past 96 hrs (Last 3 readings):   Weight   03/15/19 1303 (!) 300 lb 14.9 oz (136.5 kg)   03/15/19 0916 (!) 303 lb 12.7 oz (137.8 kg)   19 1210 297 lb 9.9 oz (135 kg)       Current Medications:     Scheduled Meds:    potassium chloride  40 mEq Oral BID WC    insulin glargine  25 Units Subcutaneous BID    sodium chloride  250 mL Intravenous Once    metroNIDAZOLE  500 mg Intravenous Q8H    lidocaine 1 % injection  5 mL Intradermal Once    sodium chloride flush  10 mL Intravenous 2 times per day    gabapentin  300 mg Oral Daily    insulin lispro  0-18 Units Subcutaneous TID WC    insulin lispro  0-9 Units Subcutaneous Nightly    lansoprazole  30 mg Oral QAM AC    acetaminophen  650 mg Oral 4x Daily    atorvastatin  10 mg Oral Daily    buPROPion  150 mg Oral QAM    donepezil  5 mg Oral QPM    fluticasone  2 spray Nasal Daily    levothyroxine  25 mcg Oral Daily    cetirizine  5 mg Oral Daily    midodrine  10 mg Oral TID   Mendiola sevelamer  800 mg Oral TID WC    sodium chloride flush  10 mL Intravenous 2 times per day     Continuous Infusions:    dextrose         Physical Examination:     General:  AAO x 3, speaking in full sentences, no accessory muscle use. Morbidly obese  Chest:   Bilateral vesicular breath sounds, no rales or wheezes. Cardiac:  S1 S2 RR, no murmurs, gallops or rubs, JVP not raised. Abdomen: Soft, non-tender, non distended, BS audible. SKIN:  No rashes, good skin turgor. Extremities:  Generalized edema, no clubbing, No cyanosis  Neuro:  AAO x 3, No FND. Right AVF + thrill and bruit    Labs:       Recent Labs     03/14/19  2028 03/15/19  0527 03/17/19  0549   WBC  --  12.7* 11.0   RBC  --  2.56* 2.79*   HGB 7.4* 8.1* 8.8*   HCT 22.8* 24.3* 28.2*   MCV  --  94.9 101.1   MCH  --  31.6 31.5   MCHC  --  33.3 31.2   RDW  --  17.2* 21.1*   PLT  --  55* 60*   MPV  --  9.8 10.9      BMP:   Recent Labs     03/15/19  0527 03/17/19  0549   * 132*   K 3.3* 2.8*   CL 94* 95*   CO2 19* 23   BUN 46* 37*   CREATININE 5.33* 4.84*   GLUCOSE 266* 224*   CALCIUM 8.2* 7.9*   Magnesium:    Recent Labs     03/15/19  0527 03/17/19  0549   MG 1.8 1.7       Urinalysis/Chemistries:      Lab Results   Component Value Date    NITRU NEGATIVE 04/27/2016    COLORU DELORES 04/27/2016    PHUR 5.0 04/27/2016    WBCUA TOO NUMEROUS TO COUNT 04/27/2016    RBCUA 10 TO 20 04/27/2016    MUCUS NOT REPORTED 04/27/2016    TRICHOMONAS NOT REPORTED 04/27/2016    YEAST NOT REPORTED 04/27/2016    BACTERIA MANY 04/27/2016    SPECGRAV 1.015 04/27/2016    LEUKOCYTESUR LARGE 04/27/2016    UROBILINOGEN Normal 04/27/2016    BILIRUBINUR NEGATIVE 04/27/2016    GLUCOSEU NEGATIVE 04/27/2016    KETUA NEGATIVE 04/27/2016    AMORPHOUS 2+ 04/27/2016       Radiology:     CXR:   No recent  Assessment:     1. ESRD on Hemodialysis MWF at [facility] in [place] via tight AVF. Admitted with right leg injury  2. Anemia of chronic disease  3. DM  4. Hypertension  5.  Right lower extremity hematoma: Wound Vac removed  6. Chronic diastolic heart failure  7. Hx of AVR, and MVR  8. Acute diarrhea  empric Rx with flagyl  9. Persistent hypokalemia secondary to diarrhea. Plan:   1. HD as per schedule on monday. 2. Strict Input and Output, Daily weigh and document in the chart. 3. Low Potassium, Low phosphorus and low salt diet. Fluids to be restricted to 1500ml/day. 4. IV Aranesp/Epogen for anemia of chronic disease with HD weekly. 5. IV Zemplar per protocol for secondary hyperparathyroidism with HD thrice a week. 6. Lorena Sat for d/c     Nutrition   Renal Diet/TF      Samira Vito  [facility]  [facility]. Attending Physician Statement  I have discussed the care of Antionette Barahona, including pertinent history and exam findings with the resident/fellow. I have reviewed the key elements of all parts of the encounter with the resident/fellow. I have seen and examined the patient with the resident/fellow. I agree with the assessment and plan and status of the problem list as documented.       .  Electronically signed by Bob Quinonez MD on 3/17/2019 at 6:01 PM

## 2019-03-18 ENCOUNTER — APPOINTMENT (OUTPATIENT)
Dept: DIALYSIS | Age: 74
DRG: 570 | End: 2019-03-18
Payer: MEDICARE

## 2019-03-18 LAB
ABSOLUTE EOS #: 0.58 K/UL (ref 0–0.4)
ABSOLUTE IMMATURE GRANULOCYTE: 0.15 K/UL (ref 0–0.3)
ABSOLUTE LYMPH #: 0.73 K/UL (ref 1–4.8)
ABSOLUTE MONO #: 0.44 K/UL (ref 0.1–0.8)
ANION GAP SERPL CALCULATED.3IONS-SCNC: 15 MMOL/L (ref 9–17)
BASOPHILS # BLD: 0 % (ref 0–2)
BASOPHILS ABSOLUTE: 0 K/UL (ref 0–0.2)
BUN BLDV-MCNC: 51 MG/DL (ref 8–23)
BUN/CREAT BLD: ABNORMAL (ref 9–20)
CALCIUM SERPL-MCNC: 7.8 MG/DL (ref 8.6–10.4)
CHLORIDE BLD-SCNC: 98 MMOL/L (ref 98–107)
CO2: 19 MMOL/L (ref 20–31)
CREAT SERPL-MCNC: 5.56 MG/DL (ref 0.7–1.2)
DIFFERENTIAL TYPE: ABNORMAL
EOSINOPHILS RELATIVE PERCENT: 4 % (ref 1–4)
GFR AFRICAN AMERICAN: 12 ML/MIN
GFR NON-AFRICAN AMERICAN: 10 ML/MIN
GFR SERPL CREATININE-BSD FRML MDRD: ABNORMAL ML/MIN/{1.73_M2}
GFR SERPL CREATININE-BSD FRML MDRD: ABNORMAL ML/MIN/{1.73_M2}
GLUCOSE BLD-MCNC: 138 MG/DL (ref 75–110)
GLUCOSE BLD-MCNC: 164 MG/DL (ref 75–110)
GLUCOSE BLD-MCNC: 169 MG/DL (ref 75–110)
GLUCOSE BLD-MCNC: 171 MG/DL (ref 70–99)
GLUCOSE BLD-MCNC: 192 MG/DL (ref 75–110)
HCT VFR BLD CALC: 26.8 % (ref 40.7–50.3)
HEMOGLOBIN: 8.8 G/DL (ref 13–17)
IMMATURE GRANULOCYTES: 1 %
INR BLD: 1.1
LYMPHOCYTES # BLD: 5 % (ref 24–44)
MCH RBC QN AUTO: 32 PG (ref 25.2–33.5)
MCHC RBC AUTO-ENTMCNC: 32.8 G/DL (ref 28.4–34.8)
MCV RBC AUTO: 97.5 FL (ref 82.6–102.9)
MONOCYTES # BLD: 3 % (ref 1–7)
MORPHOLOGY: ABNORMAL
MORPHOLOGY: ABNORMAL
NRBC AUTOMATED: 0.3 PER 100 WBC
PDW BLD-RTO: 22.6 % (ref 11.8–14.4)
PLATELET # BLD: 66 K/UL (ref 138–453)
PLATELET ESTIMATE: ABNORMAL
PMV BLD AUTO: 10.6 FL (ref 8.1–13.5)
POTASSIUM SERPL-SCNC: 4.1 MMOL/L (ref 3.7–5.3)
PROTHROMBIN TIME: 11.4 SEC (ref 9–12)
RBC # BLD: 2.75 M/UL (ref 4.21–5.77)
RBC # BLD: ABNORMAL 10*6/UL
SEG NEUTROPHILS: 87 % (ref 36–66)
SEGMENTED NEUTROPHILS ABSOLUTE COUNT: 12.7 K/UL (ref 1.8–7.7)
SODIUM BLD-SCNC: 132 MMOL/L (ref 135–144)
WBC # BLD: 14.6 K/UL (ref 3.5–11.3)
WBC # BLD: ABNORMAL 10*3/UL

## 2019-03-18 PROCEDURE — 6370000000 HC RX 637 (ALT 250 FOR IP): Performed by: STUDENT IN AN ORGANIZED HEALTH CARE EDUCATION/TRAINING PROGRAM

## 2019-03-18 PROCEDURE — 6370000000 HC RX 637 (ALT 250 FOR IP): Performed by: NURSE PRACTITIONER

## 2019-03-18 PROCEDURE — 2060000000 HC ICU INTERMEDIATE R&B

## 2019-03-18 PROCEDURE — 6370000000 HC RX 637 (ALT 250 FOR IP): Performed by: INTERNAL MEDICINE

## 2019-03-18 PROCEDURE — 90937 HEMODIALYSIS REPEATED EVAL: CPT

## 2019-03-18 PROCEDURE — 99232 SBSQ HOSP IP/OBS MODERATE 35: CPT | Performed by: INTERNAL MEDICINE

## 2019-03-18 PROCEDURE — 82947 ASSAY GLUCOSE BLOOD QUANT: CPT

## 2019-03-18 PROCEDURE — 2580000003 HC RX 258: Performed by: STUDENT IN AN ORGANIZED HEALTH CARE EDUCATION/TRAINING PROGRAM

## 2019-03-18 PROCEDURE — 36415 COLL VENOUS BLD VENIPUNCTURE: CPT

## 2019-03-18 PROCEDURE — 85025 COMPLETE CBC W/AUTO DIFF WBC: CPT

## 2019-03-18 PROCEDURE — 85610 PROTHROMBIN TIME: CPT

## 2019-03-18 PROCEDURE — 2500000003 HC RX 250 WO HCPCS: Performed by: STUDENT IN AN ORGANIZED HEALTH CARE EDUCATION/TRAINING PROGRAM

## 2019-03-18 PROCEDURE — 80048 BASIC METABOLIC PNL TOTAL CA: CPT

## 2019-03-18 RX ADMIN — Medication 10 ML: at 14:57

## 2019-03-18 RX ADMIN — POTASSIUM CHLORIDE 20 MEQ: 40 SOLUTION ORAL at 14:54

## 2019-03-18 RX ADMIN — SEVELAMER CARBONATE 800 MG: 800 TABLET, FILM COATED ORAL at 07:42

## 2019-03-18 RX ADMIN — FLUTICASONE PROPIONATE 2 SPRAY: 50 SPRAY, METERED NASAL at 14:54

## 2019-03-18 RX ADMIN — ACETAMINOPHEN 650 MG: 325 TABLET ORAL at 19:55

## 2019-03-18 RX ADMIN — METRONIDAZOLE 500 MG: 500 INJECTION, SOLUTION INTRAVENOUS at 21:07

## 2019-03-18 RX ADMIN — METRONIDAZOLE 500 MG: 500 INJECTION, SOLUTION INTRAVENOUS at 14:55

## 2019-03-18 RX ADMIN — SEVELAMER CARBONATE 800 MG: 800 TABLET, FILM COATED ORAL at 17:16

## 2019-03-18 RX ADMIN — DONEPEZIL HYDROCHLORIDE 5 MG: 5 TABLET, FILM COATED ORAL at 17:16

## 2019-03-18 RX ADMIN — Medication 10 ML: at 19:56

## 2019-03-18 RX ADMIN — BUPROPION HYDROCHLORIDE 150 MG: 150 TABLET, EXTENDED RELEASE ORAL at 14:55

## 2019-03-18 RX ADMIN — GABAPENTIN 300 MG: 300 CAPSULE ORAL at 14:54

## 2019-03-18 RX ADMIN — INSULIN GLARGINE 25 UNITS: 100 INJECTION, SOLUTION SUBCUTANEOUS at 20:12

## 2019-03-18 RX ADMIN — ACETAMINOPHEN 650 MG: 325 TABLET ORAL at 02:26

## 2019-03-18 RX ADMIN — Medication 10 ML: at 19:58

## 2019-03-18 RX ADMIN — INSULIN LISPRO 2 UNITS: 100 INJECTION, SOLUTION INTRAVENOUS; SUBCUTANEOUS at 20:12

## 2019-03-18 RX ADMIN — LEVOTHYROXINE SODIUM 25 MCG: 25 TABLET ORAL at 05:53

## 2019-03-18 RX ADMIN — ACETAMINOPHEN 650 MG: 325 TABLET ORAL at 14:54

## 2019-03-18 RX ADMIN — INSULIN GLARGINE 25 UNITS: 100 INJECTION, SOLUTION SUBCUTANEOUS at 07:38

## 2019-03-18 RX ADMIN — INSULIN LISPRO 3 UNITS: 100 INJECTION, SOLUTION INTRAVENOUS; SUBCUTANEOUS at 14:57

## 2019-03-18 RX ADMIN — SEVELAMER CARBONATE 800 MG: 800 TABLET, FILM COATED ORAL at 14:54

## 2019-03-18 RX ADMIN — POTASSIUM CHLORIDE 20 MEQ: 40 SOLUTION ORAL at 19:56

## 2019-03-18 RX ADMIN — ACETAMINOPHEN 650 MG: 325 TABLET ORAL at 06:07

## 2019-03-18 RX ADMIN — LANSOPRAZOLE 30 MG: 30 TABLET, ORALLY DISINTEGRATING, DELAYED RELEASE ORAL at 05:53

## 2019-03-18 RX ADMIN — CETIRIZINE HYDROCHLORIDE 5 MG: 10 TABLET ORAL at 14:54

## 2019-03-18 RX ADMIN — METRONIDAZOLE 500 MG: 500 INJECTION, SOLUTION INTRAVENOUS at 05:19

## 2019-03-18 RX ADMIN — INSULIN LISPRO 3 UNITS: 100 INJECTION, SOLUTION INTRAVENOUS; SUBCUTANEOUS at 16:32

## 2019-03-18 RX ADMIN — MIDODRINE HYDROCHLORIDE 10 MG: 5 TABLET ORAL at 14:54

## 2019-03-18 RX ADMIN — ATORVASTATIN CALCIUM 10 MG: 10 TABLET, FILM COATED ORAL at 14:55

## 2019-03-18 ASSESSMENT — PAIN DESCRIPTION - ORIENTATION: ORIENTATION: RIGHT

## 2019-03-18 ASSESSMENT — PAIN SCALES - GENERAL
PAINLEVEL_OUTOF10: 5
PAINLEVEL_OUTOF10: 4
PAINLEVEL_OUTOF10: 0
PAINLEVEL_OUTOF10: 4
PAINLEVEL_OUTOF10: 4
PAINLEVEL_OUTOF10: 0
PAINLEVEL_OUTOF10: 2

## 2019-03-18 ASSESSMENT — PAIN DESCRIPTION - PAIN TYPE: TYPE: SURGICAL PAIN

## 2019-03-18 ASSESSMENT — PAIN DESCRIPTION - LOCATION: LOCATION: LEG

## 2019-03-18 NOTE — PROGRESS NOTES
0-9 Units Subcutaneous Nightly    lansoprazole  30 mg Oral QAM AC    acetaminophen  650 mg Oral 4x Daily    atorvastatin  10 mg Oral Daily    buPROPion  150 mg Oral QAM    donepezil  5 mg Oral QPM    fluticasone  2 spray Nasal Daily    levothyroxine  25 mcg Oral Daily    cetirizine  5 mg Oral Daily    midodrine  10 mg Oral TID    sevelamer  800 mg Oral TID WC    sodium chloride flush  10 mL Intravenous 2 times per day       PRN Medications  potassium chloride 40 mEq PRN   Or     potassium alternative oral replacement 40 mEq PRN   Or     potassium chloride 10 mEq PRN   magnesium sulfate 1 g PRN   sodium chloride 250 mL PRN   sodium chloride 150 mL PRN   fentanNYL 25 mcg Q1H PRN   sodium chloride flush 10 mL PRN   iohexol 50 mL ONCE PRN   ondansetron 4 mg Q6H PRN   glucose 15 g PRN   albuterol 2.5 mg Q8H PRN   guaiFENesin 200 mg 4x Daily PRN   sodium chloride flush 10 mL PRN   acetaminophen 650 mg Q4H PRN   magnesium hydroxide 30 mL Daily PRN   glucagon (rDNA) 1 mg PRN   dextrose 100 mL/hr PRN       Diagnostic Labs and Imaging:  CBC:  Recent Labs     03/17/19  0549 03/18/19  0456   WBC 11.0 14.6*   HGB 8.8* 8.8*   PLT 60* 66*     BMP: Recent Labs     03/17/19  0549 03/17/19  1531 03/18/19  0456   * 131* 132*   K 2.8* 3.7 4.1   CL 95* 96* 98   CO2 23 20 19*   BUN 37* 41* 51*   CREATININE 4.84* 4.89* 5.56*   GLUCOSE 224* 209* 171*     Hepatic: Recent Labs     03/16/19  1255   AST 91*   *   BILITOT 0.66   ALKPHOS 99       Assessment and Plan:     Principal Problem:    Acute blood loss anemia  Active Problems:    Anemia in chronic kidney disease, on chronic dialysis (HCC)    Traumatic hemorrhagic shock (HCC)    Traumatic amputation of thumb    Essential hypertension    Mixed hyperlipidemia    Obstructive sleep apnea    Morbid obesity ([facility]    Benign prostatic hyperplasia without lower urinary tract symptoms    [facility]    ESRD (end stage renal disease) on dialysis (HCC)    Paroxysmal atrial fibrillation (HCC)    Controlled type 2 diabetes mellitus with chronic kidney disease on chronic dialysis, with long-term current use of insulin ([facility]    Controlled type 2 diabetes mellitus with diabetic polyneuropathy, with long-term current use of insulin (HCC)    Hematoma of right lower extremity    Hematoma of leg, right, initial encounter    Accidental fall from wheelchair  Resolved Problems:    * No resolved hospital problems. *    1. R leg wound s/p Hematoma evacuation              - Wound Vac removed, wound nurse with wet to dry dressing changes    2. Acute Blood loss anemia              - Patient transfused 10 units PRBC overall. INE 1.1 this AM              - Continue to transfuse for hemoglobins <7   - Hb is stable currently    3. ESRD              - Dialysis as scheduled    4. Chronic Diarrhea              - Diarrhea improved. CT abd cannot exclude colitis              - Will continue Flagyl              - Lactic acidosis - resolved. 5. History of bioprosthetic Atrial and Mitral Valve Replacement and A fibb              - Cardiology is OK to start Eliquis 5 mg BD once cleared by Surgery   - Awaiting Surgery recommendations   - Discussed with KRAIG Ayoub MD  PGY-1, Department of Internal Medicine  [address] [place] [address]  3/18/2019 7:11 PM      Attending Physician Statement  I have discussed the care of Court Loss, including pertinent history and exam findings,  with the resident. I have seen and examined the patient and the key elements of all parts of the encounter have been performed by me. I agree with the assessment, plan and orders as documented by the resident.    Cassie Wellington MD

## 2019-03-18 NOTE — PROGRESS NOTES
(NOVOLOG) 100 UNIT/ML injection vial, Inject 5 Units into the skin 3 times daily (before meals) Sliding scale  Nystatin POWD, by Does not apply route TID x 10 days (starting 2/13/17) then use PRN  HYDROcodone-acetaminophen (NORCO) 5-325 MG per tablet, Take 1 tablet by mouth every 8 hours as needed for Pain . aspirin 81 MG tablet, Take 81 mg by mouth daily  Skin Protectants, Misc. (HYDROCERIN) CREA cream, Apply topically nightly  senna (SENOKOT) 8.6 MG tablet, Take 1 tablet by mouth nightly  omeprazole (PRILOSEC) 20 MG delayed release capsule, Take 20 mg by mouth daily  glucagon 1 MG injection, Infuse 1 kit intravenously as needed  donepezil (ARICEPT) 5 MG tablet, Take 5 mg by mouth every evening  levothyroxine (SYNTHROID) 25 MCG tablet, Take 1 tablet by mouth Daily (Patient taking differently: Take 50 mcg by mouth Daily )  midodrine (PROAMATINE) 5 MG tablet, Take 2 tablets by mouth 3 times daily  ondansetron (ZOFRAN ODT) 4 MG disintegrating tablet, Take 1 tablet by mouth every 6 hours as needed for Nausea or Vomiting  albuterol (PROVENTIL) (2.5 MG/3ML) 0.083% nebulizer solution, Take 3 mLs by nebulization every 8 hours as needed for Wheezing  fluticasone (FLONASE) 50 MCG/ACT nasal spray, 2 sprays by Nasal route daily  glucose (GLUTOSE 15) 40 % GEL, Take 15 g by mouth as needed (hypoglycemia) 15 gram oral as needed if blood sugar is less than 70 ( for hypoglycemia)  atorvastatin (LIPITOR) 10 MG tablet, Take 1 tablet by mouth daily  insulin glargine (LANTUS) 100 UNIT/ML injection vial, Inject 40 Units into the skin 2 times daily   ALPRAZolam (XANAX) 0.5 MG tablet, Take 0.5 mg by mouth 2 times daily. Aby Cunha   acetaminophen (TYLENOL) 325 MG tablet, Take 650 mg by mouth every 6 hours as needed for Pain or Fever   magnesium hydroxide (MILK OF MAGNESIA) 400 MG/5ML suspension, Take 30 mLs by mouth daily as needed for Constipation  Multiple Vitamins-Minerals (MULTIVITAL PO), Take 1 tablet by mouth daily    EXAMINATION     Vitals BP (!) 149/65   Pulse 88   Temp 96 °F (35.6 °C)   Resp 19   Ht 5' 6.93\" (1.7 m)   Wt (!) 306 lb 7 oz (139 kg)   SpO2 100%   BMI 48.10 kg/m²   LE edema  Present , no icterus,clubbing,JVP not elevated  CVS :S1 S2 normal,no gallop or organic murmur  RS:Vesicular breath sounds,no crackles/wheeze  CNS:awake,alert  PA: non tender,non distended,Bowel sounds present    ASSESSMENT AND PLAN       1. ESRD MWF Rt AVF. Dr Guzman Officer at [place]. .5  2. Posttraumatic  right lower extremity hematoma status post evacuation  3. Type 2 diabetes  4. History of mitral and aortic valve repair  5. Left ventricular diastolic dysfunction  6. Anemia secondary to iron deficiency/chronic disease  7. Secondary hyperparathyroidism  8. Morbid obesity/SLEEP apnea    The patient was seen and examined while on dialysis. Professional oversight of the patients dialysis care,access care and dialysis related co-morbidities were addressed as necessary with the patient and /or staff. Weight not accurate.   We'll target his last post-hemodialysis weight  Aranesp initiated  Ok to dc from Nephrology stand lisa    This note is created with the assistance of a speech-recognition program. While intending to generate a document that actually reflects the content of the visit, no guarantees can be provided that every mistake has been identified and corrected by editing    Molina Rowe MD, [facility] [place], [address]   3/18/2019 9:20 AM    [facility]

## 2019-03-18 NOTE — PLAN OF CARE
Skin assessment performed this shift,. See LDAs for skin breakdown assessment and treatment. Assisting pt with turns every 2 hours and heels elevated off bed. Linens remain clean and dry. Will continue to monitor.

## 2019-03-18 NOTE — PROGRESS NOTES
[facility]   Progress Note                   Date:   3/18/2019  Patient name: Alli Adams  Date of admission:  3/8/2019 10:24 AM  MRN:   [id number]  YOB: 1945  PCP: Alexia Tipton DO    Reason for Admission: Hematoma of leg, right, initial encounter [S80.11XA]  Hematoma of leg, right, initial encounter [S80.11XA]    Subjective:       Clinical Changes / Abnormalities: Pt seen and examined  In HD. Pt denies any CP or SOB. He states that he continues to have pain in his leg. NSR       Medications:   Scheduled Meds:   potassium chloride  20 mEq Oral TID    insulin glargine  25 Units Subcutaneous BID    sodium chloride  250 mL Intravenous Once    metroNIDAZOLE  500 mg Intravenous Q8H    lidocaine 1 % injection  5 mL Intradermal Once    sodium chloride flush  10 mL Intravenous 2 times per day    gabapentin  300 mg Oral Daily    insulin lispro  0-18 Units Subcutaneous TID WC    insulin lispro  0-9 Units Subcutaneous Nightly    lansoprazole  30 mg Oral QAM AC    acetaminophen  650 mg Oral 4x Daily    atorvastatin  10 mg Oral Daily    buPROPion  150 mg Oral QAM    donepezil  5 mg Oral QPM    fluticasone  2 spray Nasal Daily    levothyroxine  25 mcg Oral Daily    cetirizine  5 mg Oral Daily    midodrine  10 mg Oral TID    sevelamer  800 mg Oral TID WC    sodium chloride flush  10 mL Intravenous 2 times per day     Continuous Infusions:   dextrose       CBC:   Recent Labs     03/17/19  0549 03/18/19  0456   WBC 11.0 14.6*   HGB 8.8* 8.8*   PLT 60* 66*     BMP:    Recent Labs     03/17/19  0549 03/17/19  1531 03/18/19  0456   * 131* 132*   K 2.8* 3.7 4.1   CL 95* 96* 98   CO2 23 20 19*   BUN 37* 41* 51*   CREATININE 4.84* 4.89* 5.56*   GLUCOSE 224* 209* 171*     Hepatic:   Recent Labs     03/16/19  1255   AST 91*   *   BILITOT 0.66   ALKPHOS 99     Troponin: No results for input(s): TROPHS in the last 72 hours.   BNP: No results for input(s): BNP in the last 72 hours. Lipids: No results for input(s): CHOL, HDL in the last 72 hours. Invalid input(s): LDLCALCU  INR:   Recent Labs     03/17/19  0549 03/18/19  0456   INR 1.1 1.1       Objective:   Vitals: /78   Pulse 103   Temp 97.4 °F (36.3 °C)   Resp 19   Ht 5' 6.93\" (1.7 m)   Wt (!) 300 lb 11.3 oz (136.4 kg)   SpO2 100%   BMI 47.20 kg/m²   General appearance: alert and cooperative with exam  HEENT: Head: Normocephalic, no lesions, without obvious abnormality. Neck: no JVD, trachea midline, no adenopathy  Lungs: Clear to auscultation  Heart: Regular rate and rhythm, s1/s2 auscultated, no murmurs  Abdomen: soft, non-tender, bowel sounds active  Extremities: right ACE wrap on edema  Neurologic: not done    EKG:        Results for orders placed or performed during the hospital encounter of 03/08/19   EKG 12 Lead   Sinus bradycardia with 1st degree A-V block  Left axis deviation  Prolonged QT  Abnormal ECG          ECHO:       Results for orders placed or performed during the hospital encounter of 09/19/18   ECHO Complete 2D W Doppler W Color     Summary  Left ventricle is normal in size Global left ventricular systolic function  is normal Estimated ejection fraction is 55 % . Mild concentric left ventricular hypertrophy. Grade I (mild) left ventricular diastolic dysfunction. Bi-atrial enlargement. Right ventricular dilatation with normal systolic function. Bioprosthetic aortic valve replacement. Peak instantaneous gradient 20 mmHg and mean gradient 11 mmHg. No aortic insufficiency. Bioprosthetic mitral valve replacement. Mean gradient is 7 mm Hg. No mitral regurgitation. No pericardial effusion seen.                 Results for orders placed during the hospital encounter of 02/29/16   Echocardiogram Transesophageal Adult ([facility]ADEO)     The study was performed by the Cardiologist, the Cardiology fellow, and the  Sonographer. The study was performed under conscious sedation.   Normal left ventricle size and function with an estimated EF > 55%. Calcified and thickened aortic valve with evidence of severe stenosis;  Mild-moderate aortic insufficiency. Mitral annular calcification and leaflet thickening with evidence of  moderate-severe stenosis; Mild mitral regurgitation. No definite evidence of any thrombi, shunts, or vegetations is seen. No significant pericardial effusion is seen. Assessment / Acute Cardiac Problems:       1 Hematoma of right lower extremity sec to fall and on being [facility] with coumadin  2 H/O Bio prosthetic Mitral and Aortic Valve( 2016)  3 Preserved EF  4 ASD repair with Dacron patch 2016  5 Paroxysmal A fib ([id number]) 3 on AC with coumadin  6 ESRD ON HD  7 T 2 DM  8 COPD    1. Patient Active Problem List:     Traumatic amputation of thumb     Essential hypertension     Mixed hyperlipidemia     Obstructive sleep apnea     Morbid obesity ([address]     Benign prostatic hyperplasia without lower urinary tract symptoms     Venous stasis dermatitis     Severe aortic stenosis     Severe mitral valve stenosis     Anemia in chronic kidney disease, on chronic dialysis ([address]     Dialysis patient ([address]     Dementia     Major depressive disorder with single episode, in partial remission ([address]     Chronic cerebral ischemia     ESRD (end stage renal disease) on dialysis (HCC)     Long-term insulin use (HCC)     Paroxysmal atrial fibrillation (HCC)     BMI 40.0-44.9, adult ([address]     Controlled type 2 diabetes mellitus with chronic kidney disease on chronic dialysis, with long-term current use of insulin ([address]     Controlled type 2 diabetes mellitus with diabetic polyneuropathy, with long-term current use of insulin (HCC)     Dermatitis     Bruising     Hematoma of right lower extremity     Hematoma of leg, right, initial encounter     Acute blood loss anemia     Accidental fall from wheelchair     Traumatic hemorrhagic shock ([address]      Plan of Treatment:   1. PAF. NSR currently. HGB appears stable at 8.8  AC On hold currently. Would recommend holding AC until cleared by surgery.   If surgery ok, then would start on Eliquis 5mg BID     Electronically signed by SHAMEKA Soares CNP on 3/18/2019 at [address] [facility],[address].  [phone]

## 2019-03-18 NOTE — PROGRESS NOTES
Dialysis Post Treatment Note  Patient tolerated treatment well. Denies complaints at time of discharge.    Vitals:    03/18/19 1230   BP: 113/78   Pulse: 103   Resp:    Temp: 97.4 °F (36.3 °C)   SpO2:      Pre-Weight = 139.0kg  Post-weight = Weight: (!) 300 lb 11.3 oz (136.4 kg)  Total Liters Processed = Total Liters Processed (l/min): 107.8 l/min  Rinseback Volume (mL) = Rinseback Volume (ml): 320 ml  Net Removal (mL) = 2680    Length of treatment=270  Pt completed treatment without difficulty pt removal at 2680  Pt post is estimated at 136.4kg

## 2019-03-18 NOTE — PLAN OF CARE
Will continue saline moistened gauze dressings under ABD pads, secured with roll gauze to be changed BID. Follow up to the plastic surgeon for skin graft in future. Dr Ramesh Escobedo contact information added to the after visit summary.

## 2019-03-18 NOTE — PROGRESS NOTES
Nutrition Assessment    Type and Reason for Visit: Reassess    Nutrition Recommendations:   - Continue current diet. Encourage/monitor oral intakes as tolerated. - Monitor labs and provide additional diet restrictions as needed. Nutrition Assessment: Pt off unit for dialysis at visit. RN reports pt tolerated a General diet without issues for breakfast this morning. Noted Speech Therapy evaluated pt and recommends continuing a regular consistency diet. Labs reviewed - Na 132 mmol/L noted. Rectal tube in place with 200 mL output. Malnutrition Assessment:  · Malnutrition Status: At risk for malnutrition  · Context: (Acute on Chronic)  · Findings of the 6 clinical characteristics of malnutrition (Minimum of 2 out of 6 clinical characteristics is required to make the diagnosis of moderate or severe Protein Calorie Malnutrition based on AND/ASPEN Guidelines):  1. Energy Intake-Less than or equal to 75% of estimated energy requirement, Greater than or equal to 7 days    2. Weight Loss-No significant weight loss, Weight fluctuations noted - likely d/t dialysis and fluid shifts  3. Fat Loss-No significant subcutaneous fat loss  4. Muscle Loss-No significant muscle mass loss  5. Fluid Accumulation-(Moderate fluid accumulation per RN documentation), Extremities    Nutrition Risk Level: Moderate    Nutrient Needs:  · Estimated Daily Total Kcal: 9791-2281 kcal/day   · Estimated Daily Protein (g): 100-120 gm pro/day    Nutrition Diagnosis:   · Problem: Increased nutrient needs  · Etiology: related to Renal dysfunction, Current condition     Signs and symptoms:  as evidenced by Intake 50-75%, Known losses from dialysis    Objective Information:  · Nutrition-Focused Physical Findings: Edema to all extremities. Rectal tube in place.   · Wound Type: Deep Tissue Injury, Pressure Ulcer, Stage I(to left coccyx, scrotum, and left thigh)  · Current Nutrition Therapies:  · Oral Diet Orders: General   · Oral Diet intake: 51-75%  · Oral Nutrition Supplement (ONS) Orders: None  · Anthropometric Measures:  · Ht: 5' 6.93\" (170 cm)   · Current Body Wt: 306 lb 7 oz (139 kg)  · Admission Body Wt: 289 lb 11 oz (131.4 kg)  · Usual Body Wt: 274 lb 7.6 oz (124.5 kg)(dry weight)  · Ideal Body Wt: 147 lb 14.9 oz (67.1 kg), % Ideal Body 197% (adm/ideal)  · BMI Classification: BMI > or equal to 40.0 Obese Class III    Nutrition Interventions:   Continue current diet  Continued Inpatient Monitoring, Education Not Indicated    Nutrition Evaluation:   · Evaluation: Progressing toward goals   · Goals: Meet % of estimated need with nutrition support/oral diet.     · Monitoring: Meal Intake, Diet Tolerance, Weight, Pertinent Labs, Wound Healing, Monitor Hemodynamic Status    Electronically signed by Ariel Spears RD, LD on 3/18/19 at 11:12 AM    Contact Number: [phone]

## 2019-03-18 NOTE — PROGRESS NOTES
Physical Therapy  DATE: 3/18/2019    NAME: Isidra Fonseca  MRN: [id number]   : 1945    Patient not seen this date for Physical Therapy due to:  [] Blood transfusion in progress  [x] Hemodialysis  []  Patient Declined  [] Spine Precautions   [] Strict Bedrest  [] Surgery/ Procedure  [] Testing      [] Other        [] PT being discontinued at this time. Patient independent. No further needs. [] PT being discontinued at this time as the patient has been transferred to palliative care. No further needs.     aFlguni Ward, PTA

## 2019-03-19 VITALS
RESPIRATION RATE: 20 BRPM | HEART RATE: 86 BPM | TEMPERATURE: 97.9 F | HEIGHT: 67 IN | SYSTOLIC BLOOD PRESSURE: 140 MMHG | DIASTOLIC BLOOD PRESSURE: 48 MMHG | OXYGEN SATURATION: 97 % | BODY MASS INDEX: 48.55 KG/M2 | WEIGHT: 309.3 LBS

## 2019-03-19 LAB
ABSOLUTE EOS #: 0.12 K/UL (ref 0–0.4)
ABSOLUTE IMMATURE GRANULOCYTE: 0.35 K/UL (ref 0–0.3)
ABSOLUTE LYMPH #: 1.65 K/UL (ref 1–4.8)
ABSOLUTE MONO #: 0.35 K/UL (ref 0.1–0.8)
ANION GAP SERPL CALCULATED.3IONS-SCNC: 15 MMOL/L (ref 9–17)
BASOPHILS # BLD: 0 % (ref 0–2)
BASOPHILS ABSOLUTE: 0 K/UL (ref 0–0.2)
BUN BLDV-MCNC: 34 MG/DL (ref 8–23)
BUN/CREAT BLD: ABNORMAL (ref 9–20)
CALCIUM SERPL-MCNC: 8.2 MG/DL (ref 8.6–10.4)
CHLORIDE BLD-SCNC: 99 MMOL/L (ref 98–107)
CO2: 22 MMOL/L (ref 20–31)
CREAT SERPL-MCNC: 4.11 MG/DL (ref 0.7–1.2)
DIFFERENTIAL TYPE: ABNORMAL
EOSINOPHILS RELATIVE PERCENT: 1 % (ref 1–4)
GFR AFRICAN AMERICAN: 17 ML/MIN
GFR NON-AFRICAN AMERICAN: 14 ML/MIN
GFR SERPL CREATININE-BSD FRML MDRD: ABNORMAL ML/MIN/{1.73_M2}
GFR SERPL CREATININE-BSD FRML MDRD: ABNORMAL ML/MIN/{1.73_M2}
GLUCOSE BLD-MCNC: 104 MG/DL (ref 75–110)
GLUCOSE BLD-MCNC: 119 MG/DL (ref 70–99)
GLUCOSE BLD-MCNC: 173 MG/DL (ref 75–110)
GLUCOSE BLD-MCNC: 197 MG/DL (ref 75–110)
HCT VFR BLD CALC: 26.7 % (ref 40.7–50.3)
HEMOGLOBIN: 8.4 G/DL (ref 13–17)
IMMATURE GRANULOCYTES: 3 %
INR BLD: 1.1
LYMPHOCYTES # BLD: 14 % (ref 24–44)
MCH RBC QN AUTO: 31.5 PG (ref 25.2–33.5)
MCHC RBC AUTO-ENTMCNC: 31.5 G/DL (ref 28.4–34.8)
MCV RBC AUTO: 100 FL (ref 82.6–102.9)
MONOCYTES # BLD: 3 % (ref 1–7)
MORPHOLOGY: ABNORMAL
NRBC AUTOMATED: 0 PER 100 WBC
PDW BLD-RTO: 23.7 % (ref 11.8–14.4)
PLATELET # BLD: 78 K/UL (ref 138–453)
PLATELET ESTIMATE: ABNORMAL
PMV BLD AUTO: 11.2 FL (ref 8.1–13.5)
POTASSIUM SERPL-SCNC: 3.8 MMOL/L (ref 3.7–5.3)
PROTHROMBIN TIME: 11.5 SEC (ref 9–12)
RBC # BLD: 2.67 M/UL (ref 4.21–5.77)
RBC # BLD: ABNORMAL 10*6/UL
SEG NEUTROPHILS: 79 % (ref 36–66)
SEGMENTED NEUTROPHILS ABSOLUTE COUNT: 9.33 K/UL (ref 1.8–7.7)
SODIUM BLD-SCNC: 136 MMOL/L (ref 135–144)
WBC # BLD: 11.8 K/UL (ref 3.5–11.3)
WBC # BLD: ABNORMAL 10*3/UL

## 2019-03-19 PROCEDURE — 2580000003 HC RX 258: Performed by: STUDENT IN AN ORGANIZED HEALTH CARE EDUCATION/TRAINING PROGRAM

## 2019-03-19 PROCEDURE — 36415 COLL VENOUS BLD VENIPUNCTURE: CPT

## 2019-03-19 PROCEDURE — 82947 ASSAY GLUCOSE BLOOD QUANT: CPT

## 2019-03-19 PROCEDURE — 85610 PROTHROMBIN TIME: CPT

## 2019-03-19 PROCEDURE — 6370000000 HC RX 637 (ALT 250 FOR IP): Performed by: STUDENT IN AN ORGANIZED HEALTH CARE EDUCATION/TRAINING PROGRAM

## 2019-03-19 PROCEDURE — 6370000000 HC RX 637 (ALT 250 FOR IP): Performed by: NURSE PRACTITIONER

## 2019-03-19 PROCEDURE — 99232 SBSQ HOSP IP/OBS MODERATE 35: CPT | Performed by: INTERNAL MEDICINE

## 2019-03-19 PROCEDURE — 80048 BASIC METABOLIC PNL TOTAL CA: CPT

## 2019-03-19 PROCEDURE — 97110 THERAPEUTIC EXERCISES: CPT

## 2019-03-19 PROCEDURE — 2500000003 HC RX 250 WO HCPCS: Performed by: STUDENT IN AN ORGANIZED HEALTH CARE EDUCATION/TRAINING PROGRAM

## 2019-03-19 PROCEDURE — 6370000000 HC RX 637 (ALT 250 FOR IP): Performed by: INTERNAL MEDICINE

## 2019-03-19 PROCEDURE — 85025 COMPLETE CBC W/AUTO DIFF WBC: CPT

## 2019-03-19 RX ORDER — MIDODRINE HYDROCHLORIDE 10 MG/1
10 TABLET ORAL 3 TIMES DAILY
Qty: 90 TABLET | Refills: 3 | Status: SHIPPED | OUTPATIENT
Start: 2019-03-19

## 2019-03-19 RX ORDER — INSULIN GLARGINE 100 [IU]/ML
25 INJECTION, SOLUTION SUBCUTANEOUS 2 TIMES DAILY
Qty: 1 VIAL | Refills: 3 | Status: SHIPPED | OUTPATIENT
Start: 2019-03-19

## 2019-03-19 RX ORDER — HYDROCODONE BITARTRATE AND ACETAMINOPHEN 5; 325 MG/1; MG/1
1 TABLET ORAL EVERY 8 HOURS PRN
Qty: 21 TABLET | Refills: 0 | Status: ON HOLD | OUTPATIENT
Start: 2019-03-19 | End: 2019-03-25 | Stop reason: SDUPTHER

## 2019-03-19 RX ORDER — FOLIC ACID 1 MG/1
1 TABLET ORAL DAILY
Qty: 90 TABLET | Refills: 1 | Status: SHIPPED | OUTPATIENT
Start: 2019-03-19

## 2019-03-19 RX ORDER — ALPRAZOLAM 0.5 MG/1
0.5 TABLET ORAL 2 TIMES DAILY
Qty: 14 TABLET | Refills: 0 | Status: ON HOLD | OUTPATIENT
Start: 2019-03-19 | End: 2019-03-25 | Stop reason: SDUPTHER

## 2019-03-19 RX ORDER — METRONIDAZOLE 500 MG/1
500 TABLET ORAL 3 TIMES DAILY
Qty: 21 TABLET | Refills: 0 | Status: ON HOLD | OUTPATIENT
Start: 2019-03-19 | End: 2019-03-24 | Stop reason: SDUPTHER

## 2019-03-19 RX ORDER — GABAPENTIN 300 MG/1
300 CAPSULE ORAL DAILY
Qty: 30 CAPSULE | Refills: 3 | Status: ON HOLD | OUTPATIENT
Start: 2019-03-20 | End: 2019-12-04 | Stop reason: HOSPADM

## 2019-03-19 RX ADMIN — Medication 10 ML: at 08:26

## 2019-03-19 RX ADMIN — SEVELAMER CARBONATE 800 MG: 800 TABLET, FILM COATED ORAL at 08:25

## 2019-03-19 RX ADMIN — LANSOPRAZOLE 30 MG: 30 TABLET, ORALLY DISINTEGRATING, DELAYED RELEASE ORAL at 05:49

## 2019-03-19 RX ADMIN — LEVOTHYROXINE SODIUM 25 MCG: 25 TABLET ORAL at 05:49

## 2019-03-19 RX ADMIN — POTASSIUM CHLORIDE 20 MEQ: 40 SOLUTION ORAL at 08:25

## 2019-03-19 RX ADMIN — CETIRIZINE HYDROCHLORIDE 5 MG: 10 TABLET ORAL at 08:23

## 2019-03-19 RX ADMIN — ACETAMINOPHEN 650 MG: 325 TABLET ORAL at 16:11

## 2019-03-19 RX ADMIN — INSULIN LISPRO 3 UNITS: 100 INJECTION, SOLUTION INTRAVENOUS; SUBCUTANEOUS at 12:11

## 2019-03-19 RX ADMIN — METRONIDAZOLE 500 MG: 500 INJECTION, SOLUTION INTRAVENOUS at 13:22

## 2019-03-19 RX ADMIN — ATORVASTATIN CALCIUM 10 MG: 10 TABLET, FILM COATED ORAL at 08:25

## 2019-03-19 RX ADMIN — SEVELAMER CARBONATE 800 MG: 800 TABLET, FILM COATED ORAL at 12:10

## 2019-03-19 RX ADMIN — MIDODRINE HYDROCHLORIDE 10 MG: 5 TABLET ORAL at 08:24

## 2019-03-19 RX ADMIN — INSULIN GLARGINE 25 UNITS: 100 INJECTION, SOLUTION SUBCUTANEOUS at 08:27

## 2019-03-19 RX ADMIN — ACETAMINOPHEN 650 MG: 325 TABLET ORAL at 12:10

## 2019-03-19 RX ADMIN — APIXABAN 5 MG: 5 TABLET, FILM COATED ORAL at 10:47

## 2019-03-19 RX ADMIN — INSULIN LISPRO 3 UNITS: 100 INJECTION, SOLUTION INTRAVENOUS; SUBCUTANEOUS at 16:15

## 2019-03-19 RX ADMIN — BUPROPION HYDROCHLORIDE 150 MG: 150 TABLET, EXTENDED RELEASE ORAL at 08:25

## 2019-03-19 RX ADMIN — METRONIDAZOLE 500 MG: 500 INJECTION, SOLUTION INTRAVENOUS at 05:49

## 2019-03-19 RX ADMIN — POTASSIUM CHLORIDE 20 MEQ: 40 SOLUTION ORAL at 13:22

## 2019-03-19 RX ADMIN — FLUTICASONE PROPIONATE 2 SPRAY: 50 SPRAY, METERED NASAL at 08:24

## 2019-03-19 RX ADMIN — GABAPENTIN 300 MG: 300 CAPSULE ORAL at 08:24

## 2019-03-19 RX ADMIN — ACETAMINOPHEN 650 MG: 325 TABLET ORAL at 08:24

## 2019-03-19 ASSESSMENT — PAIN SCALES - GENERAL
PAINLEVEL_OUTOF10: 4

## 2019-03-19 NOTE — PROGRESS NOTES
Physical Therapy  Facility/Department: [facility] 4A STEPDOWN  Daily Treatment Note  NAME: Venita Masterson  : 1945  MRN: [id number]    Date of Service: 3/19/2019    Discharge Recommendations:  [address]        Patient Diagnosis(es): The primary encounter diagnosis was Hematoma of right lower extremity, initial encounter. A diagnosis of Hematoma of right lower extremity, subsequent encounter was also pertinent to this visit.      has a past medical history of Anemia in chronic kidney disease (CKD), Arthritis, Bacteremia, Benign prostatic hyperplasia without lower urinary tract symptoms, BMI 40.0-44.9, adult (HCC), Cellulitis of left lower extremity, Chronic cerebral ischemia, Closed fracture of forearm, Controlled type 2 diabetes mellitus with chronic kidney disease on chronic dialysis, with long-term current use of insulin ([address] Controlled type 2 diabetes mellitus with diabetic polyneuropathy, with long-term current use of insulin ([address] Decubital ulcer, Dementia, Dialysis patient ([address] Difficult intravenous access, Difficulty walking, DJD (degenerative joint disease) of knee, Elbow, forearm, and wrist, abrasion or friction burn, without mention of infection, Encephalopathy acute, Encounter regarding vascular access for dialysis for ESRD ([address] ESRD (end stage renal disease) on dialysis ([address] Forgetfulness, H/O transesophageal echocardiography (AMAEDO) for monitoring, Hemodialysis patient ([address] Hyperlipidemia, Hypertension, Intercritical gout, Joint pain, knee, Long-term insulin use ([address] Major depressive disorder with single episode, in partial remission ([address] Mobility impaired, Morbid obesity ([address] Muscle weakness, Obesity, Obstructive sleep apnea, HEATHER on CPAP, Paroxysmal atrial fibrillation ([address] Respiratory failure ([address] S/P cardiac cath, Seborrhea, Severe aortic stenosis, Severe mitral valve stenosis, Severe sepsis ([address] Skin rash, Stasis dermatitis, Thyroid disease, Traumatic amputation of thumb, Venous stasis dermatitis, Wears glasses, and Wheelchair bound. has a past surgical history that includes Colonoscopy (11/19/09); Knee arthroscopy (Left, 09/17/99); Hand surgery (Left, 1990 (x10-12 surgeries)); Arm Surgery (Left, 1990); Cardiac catheterization; Nasal septum surgery; Sternotomy (3/18/16); Aortic valve replacement (03/18/2016); Mitral valve replacement (03/18/2016); other surgical history (3/18/16); other surgical history (3/18/16); other surgical history (Right, 01/09/2017); other surgical history (Right, 08/29/2017); pr ligatn angioaccess av fistula (Right, 8/29/2017); pr egd transoral biopsy single/multiple (N/A, 10/17/2017); central venous catheter (Right, 02/21/2018); Colonoscopy (N/A, 10/12/2018); other surgical history (04/05/2018); vascular surgery (12/06/2018); EXPLORATION OF WOUND OF EXTREMITY (Right, 3/10/2019); and EXPLORATION OF WOUND OF EXTREMITY (Right, 3/8/2019). Restrictions  Restrictions/Precautions  Restrictions/Precautions: Up as Tolerated, Fall Risk, General Precautions  Required Braces or Orthoses?: No  Position Activity Restriction  Other position/activity restrictions: Per RNJhony, patient is ok for bed exercises as tolerated today  Subjective   General  Response To Previous Treatment: Patient with no complaints from previous session. Family / Caregiver Present: No  Subjective  Subjective: Patient states that his legs hurt when he moves them  Pain Screening  Patient Currently in Pain: Yes  Vital Signs  Patient Currently in Pain: Yes       Orientation  Orientation  Overall Orientation Status: Within Functional Limits     Objective      Exercises  Comments: Supine BLE and BUE AAROM x10 reps each in all available joints/planes of motion with rest breaks as needed for fatigue      Assessment   Body structures, Functions, Activity limitations: Decreased functional mobility ; Decreased ROM; Decreased strength;Decreased endurance    Patient tolerated session well with moderate deficits noted in all mobility and endurance this session. Patient's pain and generalized weakness are limiting factors with mobility at this time and patient demos significant deficits as compared to prior level of function. At current level of function, patient will benefit from continued inpatient PT services and will likely require a SNF to promote improved safety and IND with all functional mobility after discharge. Prognosis: Fair  Patient Education: PT POC, UE/LE exercises to promote increased circulation and strength for future mobility  REQUIRES PT FOLLOW UP: Yes  Activity Tolerance  Activity Tolerance: Patient Tolerated treatment well;Patient limited by pain; Patient limited by endurance     AM-PAC Score     AM-PAC Inpatient Mobility without Stair Climbing Raw Score : 5  AM-PAC Inpatient without Stair Climbing T-Scale Score : 23.59  Mobility Inpatient CMS 0-100% Score: 100  Mobility Inpatient without Stair CMS G-Code Modifier : CN       Goals  Short term goals  Time Frame for Short term goals: 14 visits  Short term goal 1: Pt to tolerate 30 minutes of activity to improve endurance. Short term goal 2: Pt to be lifted to [facility] with francia lift. Pt to propel  ft SBA. Short term goal 3: Pt to amb 10ft with AD CGA  Short term goal 4: Pt to tolerate BUE/BLE and dynamic activity for 20 minutes  Patient Goals   Patient goals : Get some water. Plan    Plan  Times per week: 3-5x/ week  Specific instructions for Next Treatment: Francia lift to chair if pt able to tolerate  Current Treatment Recommendations: Strengthening, ROM, Functional Mobility Training, Endurance Training  Safety Devices  Type of devices:  All fall risk precautions in place, Call light within reach, Left in bed     Therapy Time   Individual Concurrent Group Co-treatment   Time In 1205         Time Out 1234         Minutes . Umair Main [address] [place] [address]

## 2019-03-19 NOTE — PROGRESS NOTES
Discharge instructions reviewed with pt/family, discussed medications, VU, denies any further questions or anxiety related to discharge. IV/tele discontinued. Pt discharged to [address] [place] [address]. Taken from room via stretcher by Aaron Carreon, personal belongings taken with. Patients wife miguel notified. Report called to [facility] at [address] [place] [address]. All questions answered. Call back number given.   Miguel Cortés RN

## 2019-03-19 NOTE — PROGRESS NOTES
Primary asked writer to message surgery to see if [facility] could be restarted. Messaged plastic surgery, Harmony Nolan, who was covering for Dr Alfred Aguilar. Dr. Tessei Thornton stated that Dr. Alfred Aguilar should be contacted in morning.

## 2019-03-19 NOTE — PROGRESS NOTES
[facility]  Internal Medicine Residency Program  Inpatient Daily Progress Note  ______________________________________________________________________________    Patient: Art Travis  YOB: 1945   MRN: [id number]    Acct: [de-identified]     Admit date: 3/8/2019  Today's date: 03/19/19  Number of days in the hospital: 11  Expected Discharge Date: (tbd)    Admitting Diagnosis: Acute blood loss anemia    Subjective:   Patient seen and examined at bedside. Alert and oriented. Stable vitals this AM.  No acute issues overnight. Patient is eating and drinking fine. Underwent dialysis yesterday. No complaints this morning. Objective:   Vital Sign:  /62   Pulse 83   Temp 97.5 °F (36.4 °C) (Oral)   Resp 15   Ht 5' 6.93\" (1.7 m)   Wt (!) 309 lb 4.8 oz (140.3 kg)   SpO2 97%   BMI 48.55 kg/m²       Physical Exam:  General appearance:   alert, well appearing, and in no distress  Mental Status: alert, oriented to person, place, and time  Neurologic:  alert, oriented, normal speech, no focal findings or movement disorder noted  Lungs:  clear to auscultation, no wheezes, rales or rhonchi, symmetric air entry  Heart:: normal rate, regular rhythm, normal S1, S2, no murmurs, rubs, clicks or gallops  Abdomen:  soft, nontender, nondistended, no masses or organomegaly  Extremities: peripheral pulses normal, no pedal edema, no clubbing or cyanosis.   Skin: normal coloration and turgor, no rashes, no suspicious skin lesions noted     Medications:  Scheduled Medications   apixaban  5 mg Oral BID    potassium chloride  20 mEq Oral TID    insulin glargine  25 Units Subcutaneous BID    sodium chloride  250 mL Intravenous Once    metroNIDAZOLE  500 mg Intravenous Q8H    lidocaine 1 % injection  5 mL Intradermal Once    sodium chloride flush  10 mL Intravenous 2 times per day    gabapentin  300 mg Oral Daily    insulin lispro  0-18 Units Subcutaneous TID   insulin lispro  0-9 Units Subcutaneous Nightly    lansoprazole  30 mg Oral QAM AC    acetaminophen  650 mg Oral 4x Daily    atorvastatin  10 mg Oral Daily    buPROPion  150 mg Oral QAM    donepezil  5 mg Oral QPM    fluticasone  2 spray Nasal Daily    levothyroxine  25 mcg Oral Daily    cetirizine  5 mg Oral Daily    midodrine  10 mg Oral TID    sevelamer  800 mg Oral TID WC    sodium chloride flush  10 mL Intravenous 2 times per day       PRN Medications  potassium chloride 40 mEq PRN   Or     potassium alternative oral replacement 40 mEq PRN   Or     potassium chloride 10 mEq PRN   magnesium sulfate 1 g PRN   sodium chloride 250 mL PRN   sodium chloride 150 mL PRN   fentanNYL 25 mcg Q1H PRN   sodium chloride flush 10 mL PRN   iohexol 50 mL ONCE PRN   ondansetron 4 mg Q6H PRN   glucose 15 g PRN   albuterol 2.5 mg Q8H PRN   guaiFENesin 200 mg 4x Daily PRN   sodium chloride flush 10 mL PRN   acetaminophen 650 mg Q4H PRN   magnesium hydroxide 30 mL Daily PRN   glucagon (rDNA) 1 mg PRN   dextrose 100 mL/hr PRN       Diagnostic Labs and Imaging:  CBC:  Recent Labs     03/17/19  0549 03/18/19  0456 03/19/19  0555   WBC 11.0 14.6* 11.8*   HGB 8.8* 8.8* 8.4*   PLT 60* 66* 78*     BMP: Recent Labs     03/17/19  1531 03/18/19  0456 03/19/19  0555   * 132* 136   K 3.7 4.1 3.8   CL 96* 98 99   CO2 20 19* 22   BUN 41* 51* 34*   CREATININE 4.89* 5.56* 4.11*   GLUCOSE 209* 171* 119*     Hepatic: Recent Labs     03/16/19  1255   AST 91*   *   BILITOT 0.66   ALKPHOS 99       Assessment and Plan:     Principal Problem:    Acute blood loss anemia  Active Problems:    Anemia in chronic kidney disease, on chronic dialysis (HCC)    Traumatic hemorrhagic shock (HCC)    Traumatic amputation of thumb    Essential hypertension    Mixed hyperlipidemia    Obstructive sleep apnea    Morbid obesity ([facility]    Benign prostatic hyperplasia without lower urinary tract symptoms    Dialysis patient ([facility]    ESRD (end stage renal disease) on dialysis (HCC)    Paroxysmal atrial fibrillation (HCC)    Controlled type 2 diabetes mellitus with chronic kidney disease on chronic dialysis, with long-term current use of insulin (HCC)    Controlled type 2 diabetes mellitus with diabetic polyneuropathy, with long-term current use of insulin (HCC)    Hematoma of right lower extremity    Hematoma of leg, right, initial encounter    Accidental fall from wheelchair  Resolved Problems:    * No resolved hospital problems. *    1. R leg wound s/p Hematoma evacuation              - Wound Vac removed, wound nurse with wet to dry dressing changes     2. Acute Blood loss anemia              - Patient transfused 10 units PRBC overall.  INE 1.1 this AM              - Continue to transfuse for hemoglobins <7              - Hb is stable currently     3. ESRD              - Dialysis as scheduled     4. Chronic Diarrhea              - Diarrhea improved. CT abd cannot exclude colitis              - Will continue Flagyl. Patient will be discharged on PO flagyl              - Lactic acidosis - resolved.     5. History of bioprosthetic Atrial and Mitral Valve Replacement and A fibb              - Cardiology is OK to start Eliquis 5 mg BD once cleared by Surgery              - Perfect served Dr. Kim Murphy this AM regarding starting Eliquis. Dr. Kim Murphy ok with it. Notified the RN.   - Eliquis 5 mg BD ordered    Patient will be discharged today. Gabriela Lezama MD  PGY-1, Department of Internal Medicine  [place], [place]  3/19/2019 8:38 AM    Stable for transfer. Attending Physician Statement  I have discussed the care of Eddi Vasquez, including pertinent history and exam findings,  with the resident. I have seen and examined the patient and the key elements of all parts of the encounter have been performed by me. I agree with the assessment, plan and orders as documented by the resident.  Cassie Wellington MD

## 2019-03-19 NOTE — PLAN OF CARE
Skin assessment performed this shift. See LDAs for skin breakdown and treatment. Assisting pt with turns every 2 hours and heels elevated off bed. Linens remain clean and dry. Will continue to monitor.

## 2019-03-19 NOTE — CARE COORDINATION
Received call from pt wife Conchita Loo, discussed transition to [facility], [facility] - discussed possibility that [facility] may not have a bed until tomorrow. She is very resistant to transfer anywhere but [facility] and has visited [facility] in the past and was not happy with it. Discussed [address] she has never heard of it and not familiar with [facility]. Really doesn't want to have him go anywhere but [facility]. Discussed returning to SNF where pt lives instead of [facility]. IV antibiotics now PO and wound has BID dressing change. Decision by family to return to the [facility] today. Discharge [address] Case Management Department  Written by: Carie Garcia RN    Patient Name: Aniya Hopper  Attending Provider: No att. providers found  Admit Date: 3/8/2019 10:24 AM  MRN: [id number]  Account: [de-identified]                     : 1945  Discharge Date: 3/19/2019      Disposition: SNF - [facility] ambulance @ 1700.     Carie Garcia RN

## 2019-03-19 NOTE — PROGRESS NOTES
NEPHROLOGY DIALYSIS NOTE    Subjective:   Patient seen and examined. Patient was in his bed. He was alert and awake. He voiced no complaint. He was dialyzed yesterday. He tolerated dialysis fairly well. Patient denies any cramping or drop in blood pressure during dialysis. No fever      Objective:        Vitals:    03/19/19 1126   BP:    Pulse:    Resp:    Temp: 97.8 °F (36.6 °C)   SpO2:      Patient was alert and awake and voiced no complaint  Neck: JVD no carotid bruit.   Chest: Bilateral air entry and clear to auscultation no crackles or wheezes  Abdomen: Soft nontender bowel sounds was positive no obvious organomegaly  Extremities: No edema  Neuro grossly intact  INVESTIGATIONS     Last 3 CMP:    Recent Labs     03/17/19  1531 03/18/19  0456 03/19/19  0555   * 132* 136   K 3.7 4.1 3.8   CL 96* 98 99   CO2 20 19* 22   BUN 41* 51* 34*   CREATININE 4.89* 5.56* 4.11*   CALCIUM 7.9* 7.8* 8.2*       Last 3 CBC:  Recent Labs     03/17/19  0549 03/18/19  0456 03/19/19  0555   WBC 11.0 14.6* 11.8*   RBC 2.79* 2.75* 2.67*   HGB 8.8* 8.8* 8.4*   HCT 28.2* 26.8* 26.7*   .1 97.5 100.0   MCH 31.5 32.0 31.5   MCHC 31.2 32.8 31.5   RDW 21.1* 22.6* 23.7*   PLT 60* 66* 78*   MPV 10.9 10.6 11.2       MEDICATIONS     Scheduled Meds:    apixaban  5 mg Oral BID    potassium chloride  20 mEq Oral TID    insulin glargine  25 Units Subcutaneous BID    sodium chloride  250 mL Intravenous Once    metroNIDAZOLE  500 mg Intravenous Q8H    lidocaine 1 % injection  5 mL Intradermal Once    sodium chloride flush  10 mL Intravenous 2 times per day    gabapentin  300 mg Oral Daily    insulin lispro  0-18 Units Subcutaneous TID WC    insulin lispro  0-9 Units Subcutaneous Nightly    lansoprazole  30 mg Oral QAM AC    acetaminophen  650 mg Oral 4x Daily    atorvastatin  10 mg Oral Daily    buPROPion  150 mg Oral QAM    donepezil  5 mg Oral QPM    fluticasone  2 spray Nasal Daily    levothyroxine  25 mcg Oral

## 2019-03-20 ENCOUNTER — TELEPHONE (OUTPATIENT)
Dept: SURGERY | Age: 74
End: 2019-03-20

## 2019-03-21 ENCOUNTER — APPOINTMENT (OUTPATIENT)
Dept: INTERVENTIONAL RADIOLOGY/VASCULAR | Age: 74
End: 2019-03-21
Payer: MEDICARE

## 2019-03-21 ENCOUNTER — TELEPHONE (OUTPATIENT)
Dept: WOUND CARE | Age: 74
End: 2019-03-21

## 2019-03-21 ENCOUNTER — HOSPITAL ENCOUNTER (EMERGENCY)
Age: 74
Discharge: ANOTHER HOSPITAL | End: 2019-03-21
Attending: EMERGENCY MEDICINE
Payer: MEDICARE

## 2019-03-21 ENCOUNTER — APPOINTMENT (OUTPATIENT)
Dept: GENERAL RADIOLOGY | Age: 74
End: 2019-03-21
Payer: MEDICARE

## 2019-03-21 ENCOUNTER — HOSPITAL ENCOUNTER (INPATIENT)
Age: 74
LOS: 4 days | Discharge: NURSING, REHAB OR LONG-TERM CARE | DRG: 919 | End: 2019-03-25
Attending: FAMILY MEDICINE | Admitting: INTERNAL MEDICINE
Payer: MEDICARE

## 2019-03-21 ENCOUNTER — OUTSIDE SERVICES (OUTPATIENT)
Dept: INTERNAL MEDICINE | Age: 74
End: 2019-03-21
Payer: MEDICARE

## 2019-03-21 VITALS
HEART RATE: 75 BPM | BODY MASS INDEX: 54.39 KG/M2 | SYSTOLIC BLOOD PRESSURE: 134 MMHG | TEMPERATURE: 97.7 F | DIASTOLIC BLOOD PRESSURE: 69 MMHG | HEIGHT: 63 IN | RESPIRATION RATE: 17 BRPM | OXYGEN SATURATION: 98 % | WEIGHT: 307 LBS

## 2019-03-21 DIAGNOSIS — N18.6 ESRD (END STAGE RENAL DISEASE) ON DIALYSIS (HCC): ICD-10-CM

## 2019-03-21 DIAGNOSIS — F32.4 MAJOR DEPRESSIVE DISORDER WITH SINGLE EPISODE, IN PARTIAL REMISSION (HCC): ICD-10-CM

## 2019-03-21 DIAGNOSIS — S81.801D WOUND OF RIGHT LOWER EXTREMITY, SUBSEQUENT ENCOUNTER: ICD-10-CM

## 2019-03-21 DIAGNOSIS — L02.419 CELLULITIS AND ABSCESS OF LEG: Primary | ICD-10-CM

## 2019-03-21 DIAGNOSIS — Z99.2 ESRD (END STAGE RENAL DISEASE) ON DIALYSIS (HCC): ICD-10-CM

## 2019-03-21 DIAGNOSIS — N18.6 ANEMIA IN CHRONIC KIDNEY DISEASE, ON CHRONIC DIALYSIS (HCC): ICD-10-CM

## 2019-03-21 DIAGNOSIS — Z99.2 ANEMIA IN CHRONIC KIDNEY DISEASE, ON CHRONIC DIALYSIS (HCC): ICD-10-CM

## 2019-03-21 DIAGNOSIS — L03.119 CELLULITIS AND ABSCESS OF LEG: Primary | ICD-10-CM

## 2019-03-21 DIAGNOSIS — S80.11XD HEMATOMA OF RIGHT LOWER EXTREMITY, SUBSEQUENT ENCOUNTER: ICD-10-CM

## 2019-03-21 DIAGNOSIS — L03.115 CELLULITIS OF RIGHT LOWER EXTREMITY: Primary | ICD-10-CM

## 2019-03-21 DIAGNOSIS — S30.1XXA HEMATOMA OF GROIN, INITIAL ENCOUNTER: Primary | ICD-10-CM

## 2019-03-21 DIAGNOSIS — I10 ESSENTIAL HYPERTENSION: ICD-10-CM

## 2019-03-21 DIAGNOSIS — T14.8XXA OPEN WOUND: ICD-10-CM

## 2019-03-21 DIAGNOSIS — D63.1 ANEMIA IN CHRONIC KIDNEY DISEASE, ON CHRONIC DIALYSIS (HCC): ICD-10-CM

## 2019-03-21 PROBLEM — L03.90 CELLULITIS: Status: ACTIVE | Noted: 2019-03-21

## 2019-03-21 LAB
ABSOLUTE EOS #: 0.3 K/UL (ref 0–0.4)
ABSOLUTE IMMATURE GRANULOCYTE: ABNORMAL K/UL (ref 0–0.3)
ABSOLUTE LYMPH #: 0.71 K/UL (ref 1–4.8)
ABSOLUTE MONO #: 1.11 K/UL (ref 0.1–1.2)
ANION GAP SERPL CALCULATED.3IONS-SCNC: 14 MMOL/L (ref 9–17)
BASOPHILS # BLD: 1 % (ref 0–2)
BASOPHILS ABSOLUTE: 0.1 K/UL (ref 0–0.2)
BNP INTERPRETATION: ABNORMAL
BUN BLDV-MCNC: 45 MG/DL (ref 8–23)
BUN/CREAT BLD: 11 (ref 9–20)
CALCIUM SERPL-MCNC: 8.6 MG/DL (ref 8.6–10.4)
CHLORIDE BLD-SCNC: 96 MMOL/L (ref 98–107)
CO2: 30 MMOL/L (ref 20–31)
CREAT SERPL-MCNC: 4 MG/DL (ref 0.7–1.2)
DIFFERENTIAL TYPE: ABNORMAL
EOSINOPHILS RELATIVE PERCENT: 3 % (ref 1–8)
GFR AFRICAN AMERICAN: 18 ML/MIN
GFR NON-AFRICAN AMERICAN: 15 ML/MIN
GFR SERPL CREATININE-BSD FRML MDRD: ABNORMAL ML/MIN/{1.73_M2}
GFR SERPL CREATININE-BSD FRML MDRD: ABNORMAL ML/MIN/{1.73_M2}
GLUCOSE BLD-MCNC: 132 MG/DL (ref 75–110)
GLUCOSE BLD-MCNC: 145 MG/DL (ref 70–99)
HCT VFR BLD CALC: 27.1 % (ref 41–53)
HEMOGLOBIN: 8.6 G/DL (ref 13.5–17.5)
IMMATURE GRANULOCYTES: ABNORMAL %
LYMPHOCYTES # BLD: 7 % (ref 15–43)
MCH RBC QN AUTO: 32.1 PG (ref 26–34)
MCHC RBC AUTO-ENTMCNC: 31.7 G/DL (ref 31–37)
MCV RBC AUTO: 101.1 FL (ref 80–100)
MONOCYTES # BLD: 11 % (ref 6–14)
MORPHOLOGY: ABNORMAL
NRBC AUTOMATED: ABNORMAL PER 100 WBC
PDW BLD-RTO: 24.9 % (ref 11–14.5)
PLATELET # BLD: 166 K/UL (ref 140–450)
PLATELET ESTIMATE: ABNORMAL
PMV BLD AUTO: 7.9 FL (ref 6–12)
POTASSIUM SERPL-SCNC: 4.5 MMOL/L (ref 3.7–5.3)
PRO-BNP: ABNORMAL PG/ML
RBC # BLD: 2.68 M/UL (ref 4.5–5.9)
RBC # BLD: ABNORMAL 10*6/UL
SEG NEUTROPHILS: 78 % (ref 44–74)
SEGMENTED NEUTROPHILS ABSOLUTE COUNT: 7.88 K/UL (ref 1.8–7.7)
SODIUM BLD-SCNC: 140 MMOL/L (ref 135–144)
WBC # BLD: 10.1 K/UL (ref 3.5–11)
WBC # BLD: ABNORMAL 10*3/UL

## 2019-03-21 PROCEDURE — 87205 SMEAR GRAM STAIN: CPT

## 2019-03-21 PROCEDURE — 96365 THER/PROPH/DIAG IV INF INIT: CPT

## 2019-03-21 PROCEDURE — 83880 ASSAY OF NATRIURETIC PEPTIDE: CPT

## 2019-03-21 PROCEDURE — 87070 CULTURE OTHR SPECIMN AEROBIC: CPT

## 2019-03-21 PROCEDURE — 6370000000 HC RX 637 (ALT 250 FOR IP): Performed by: FAMILY MEDICINE

## 2019-03-21 PROCEDURE — 96366 THER/PROPH/DIAG IV INF ADDON: CPT

## 2019-03-21 PROCEDURE — 36415 COLL VENOUS BLD VENIPUNCTURE: CPT

## 2019-03-21 PROCEDURE — 80048 BASIC METABOLIC PNL TOTAL CA: CPT

## 2019-03-21 PROCEDURE — 87040 BLOOD CULTURE FOR BACTERIA: CPT

## 2019-03-21 PROCEDURE — 6360000002 HC RX W HCPCS: Performed by: EMERGENCY MEDICINE

## 2019-03-21 PROCEDURE — 2580000003 HC RX 258: Performed by: EMERGENCY MEDICINE

## 2019-03-21 PROCEDURE — 82947 ASSAY GLUCOSE BLOOD QUANT: CPT

## 2019-03-21 PROCEDURE — 85025 COMPLETE CBC W/AUTO DIFF WBC: CPT

## 2019-03-21 PROCEDURE — 2580000003 HC RX 258: Performed by: FAMILY MEDICINE

## 2019-03-21 PROCEDURE — 87077 CULTURE AEROBIC IDENTIFY: CPT

## 2019-03-21 PROCEDURE — 99497 ADVNCD CARE PLAN 30 MIN: CPT | Performed by: NURSE PRACTITIONER

## 2019-03-21 PROCEDURE — 1200000000 HC SEMI PRIVATE

## 2019-03-21 PROCEDURE — 93971 EXTREMITY STUDY: CPT

## 2019-03-21 PROCEDURE — 99285 EMERGENCY DEPT VISIT HI MDM: CPT

## 2019-03-21 PROCEDURE — 99309 SBSQ NF CARE MODERATE MDM 30: CPT | Performed by: NURSE PRACTITIONER

## 2019-03-21 PROCEDURE — 71045 X-RAY EXAM CHEST 1 VIEW: CPT

## 2019-03-21 PROCEDURE — 87186 SC STD MICRODIL/AGAR DIL: CPT

## 2019-03-21 RX ORDER — DEXTROSE MONOHYDRATE 25 G/50ML
12.5 INJECTION, SOLUTION INTRAVENOUS PRN
Status: DISCONTINUED | OUTPATIENT
Start: 2019-03-21 | End: 2019-03-25 | Stop reason: HOSPADM

## 2019-03-21 RX ORDER — SODIUM CHLORIDE 9 MG/ML
INJECTION, SOLUTION INTRAVENOUS CONTINUOUS
Status: DISCONTINUED | OUTPATIENT
Start: 2019-03-21 | End: 2019-03-21

## 2019-03-21 RX ORDER — SODIUM CHLORIDE 0.9 % (FLUSH) 0.9 %
10 SYRINGE (ML) INJECTION EVERY 12 HOURS SCHEDULED
Status: DISCONTINUED | OUTPATIENT
Start: 2019-03-21 | End: 2019-03-25 | Stop reason: HOSPADM

## 2019-03-21 RX ORDER — BUPROPION HYDROCHLORIDE 150 MG/1
150 TABLET ORAL EVERY MORNING
Status: DISCONTINUED | OUTPATIENT
Start: 2019-03-22 | End: 2019-03-25 | Stop reason: HOSPADM

## 2019-03-21 RX ORDER — ALBUTEROL SULFATE 2.5 MG/3ML
2.5 SOLUTION RESPIRATORY (INHALATION) EVERY 8 HOURS PRN
Status: DISCONTINUED | OUTPATIENT
Start: 2019-03-21 | End: 2019-03-25 | Stop reason: HOSPADM

## 2019-03-21 RX ORDER — NICOTINE POLACRILEX 4 MG
15 LOZENGE BUCCAL PRN
Status: DISCONTINUED | OUTPATIENT
Start: 2019-03-21 | End: 2019-03-25 | Stop reason: HOSPADM

## 2019-03-21 RX ORDER — FOLIC ACID 1 MG/1
1 TABLET ORAL DAILY
Status: DISCONTINUED | OUTPATIENT
Start: 2019-03-21 | End: 2019-03-25 | Stop reason: HOSPADM

## 2019-03-21 RX ORDER — ASPIRIN 81 MG/1
81 TABLET ORAL DAILY
Status: DISCONTINUED | OUTPATIENT
Start: 2019-03-21 | End: 2019-03-25 | Stop reason: HOSPADM

## 2019-03-21 RX ORDER — MAGNESIUM SULFATE 1 G/100ML
1 INJECTION INTRAVENOUS PRN
Status: DISCONTINUED | OUTPATIENT
Start: 2019-03-21 | End: 2019-03-21

## 2019-03-21 RX ORDER — DEXTROSE MONOHYDRATE 50 MG/ML
100 INJECTION, SOLUTION INTRAVENOUS PRN
Status: DISCONTINUED | OUTPATIENT
Start: 2019-03-21 | End: 2019-03-25 | Stop reason: HOSPADM

## 2019-03-21 RX ORDER — NICOTINE 21 MG/24HR
1 PATCH, TRANSDERMAL 24 HOURS TRANSDERMAL DAILY PRN
Status: DISCONTINUED | OUTPATIENT
Start: 2019-03-21 | End: 2019-03-25 | Stop reason: HOSPADM

## 2019-03-21 RX ORDER — NYSTATIN 10B UNIT
POWDER (EA) MISCELLANEOUS 3 TIMES DAILY
Status: ON HOLD | COMMUNITY
End: 2019-04-04 | Stop reason: HOSPADM

## 2019-03-21 RX ORDER — ACETAMINOPHEN 325 MG/1
650 TABLET ORAL EVERY 4 HOURS PRN
Status: DISCONTINUED | OUTPATIENT
Start: 2019-03-21 | End: 2019-03-25 | Stop reason: HOSPADM

## 2019-03-21 RX ORDER — SODIUM CHLORIDE 0.9 % (FLUSH) 0.9 %
10 SYRINGE (ML) INJECTION PRN
Status: DISCONTINUED | OUTPATIENT
Start: 2019-03-21 | End: 2019-03-25 | Stop reason: HOSPADM

## 2019-03-21 RX ORDER — ATORVASTATIN CALCIUM 10 MG/1
10 TABLET, FILM COATED ORAL DAILY
Status: DISCONTINUED | OUTPATIENT
Start: 2019-03-21 | End: 2019-03-22

## 2019-03-21 RX ORDER — POTASSIUM CHLORIDE 1.5 G/1.77G
40 POWDER, FOR SOLUTION ORAL PRN
Status: DISCONTINUED | OUTPATIENT
Start: 2019-03-21 | End: 2019-03-25 | Stop reason: HOSPADM

## 2019-03-21 RX ORDER — MIDODRINE HYDROCHLORIDE 5 MG/1
10 TABLET ORAL 3 TIMES DAILY
Status: DISCONTINUED | OUTPATIENT
Start: 2019-03-21 | End: 2019-03-25 | Stop reason: HOSPADM

## 2019-03-21 RX ORDER — INSULIN GLARGINE 100 [IU]/ML
25 INJECTION, SOLUTION SUBCUTANEOUS 2 TIMES DAILY
Status: DISCONTINUED | OUTPATIENT
Start: 2019-03-21 | End: 2019-03-25 | Stop reason: HOSPADM

## 2019-03-21 RX ORDER — FLUTICASONE PROPIONATE 50 MCG
2 SPRAY, SUSPENSION (ML) NASAL DAILY
Status: DISCONTINUED | OUTPATIENT
Start: 2019-03-21 | End: 2019-03-25 | Stop reason: HOSPADM

## 2019-03-21 RX ORDER — POTASSIUM CHLORIDE 20 MEQ/1
40 TABLET, EXTENDED RELEASE ORAL PRN
Status: DISCONTINUED | OUTPATIENT
Start: 2019-03-21 | End: 2019-03-25 | Stop reason: HOSPADM

## 2019-03-21 RX ORDER — LEVOTHYROXINE SODIUM 0.03 MG/1
25 TABLET ORAL DAILY
Status: DISCONTINUED | OUTPATIENT
Start: 2019-03-21 | End: 2019-03-25 | Stop reason: HOSPADM

## 2019-03-21 RX ORDER — ONDANSETRON 2 MG/ML
4 INJECTION INTRAMUSCULAR; INTRAVENOUS EVERY 6 HOURS PRN
Status: DISCONTINUED | OUTPATIENT
Start: 2019-03-21 | End: 2019-03-25 | Stop reason: HOSPADM

## 2019-03-21 RX ORDER — SEVELAMER CARBONATE 800 MG/1
800 TABLET, FILM COATED ORAL 3 TIMES DAILY
Status: DISCONTINUED | OUTPATIENT
Start: 2019-03-21 | End: 2019-03-23

## 2019-03-21 RX ORDER — ALPRAZOLAM 0.5 MG/1
0.5 TABLET ORAL 2 TIMES DAILY
Status: DISCONTINUED | OUTPATIENT
Start: 2019-03-21 | End: 2019-03-25 | Stop reason: HOSPADM

## 2019-03-21 RX ORDER — POTASSIUM CHLORIDE 7.45 MG/ML
10 INJECTION INTRAVENOUS PRN
Status: DISCONTINUED | OUTPATIENT
Start: 2019-03-21 | End: 2019-03-25 | Stop reason: HOSPADM

## 2019-03-21 RX ORDER — DONEPEZIL HYDROCHLORIDE 5 MG/1
5 TABLET, FILM COATED ORAL EVERY EVENING
Status: DISCONTINUED | OUTPATIENT
Start: 2019-03-21 | End: 2019-03-25 | Stop reason: HOSPADM

## 2019-03-21 RX ADMIN — INSULIN GLARGINE 25 UNITS: 100 INJECTION, SOLUTION SUBCUTANEOUS at 22:25

## 2019-03-21 RX ADMIN — Medication 10 ML: at 22:24

## 2019-03-21 RX ADMIN — DONEPEZIL HYDROCHLORIDE 5 MG: 5 TABLET, FILM COATED ORAL at 22:23

## 2019-03-21 RX ADMIN — VANCOMYCIN HYDROCHLORIDE 1500 MG: 1 INJECTION, POWDER, LYOPHILIZED, FOR SOLUTION INTRAVENOUS at 12:43

## 2019-03-21 RX ADMIN — SODIUM CHLORIDE: 9 INJECTION, SOLUTION INTRAVENOUS at 18:55

## 2019-03-21 RX ADMIN — SEVELAMER CARBONATE 800 MG: 800 TABLET, FILM COATED ORAL at 22:23

## 2019-03-21 RX ADMIN — APIXABAN 5 MG: 5 TABLET, FILM COATED ORAL at 22:23

## 2019-03-21 RX ADMIN — ALPRAZOLAM 0.5 MG: 0.5 TABLET ORAL at 22:23

## 2019-03-21 ASSESSMENT — PAIN DESCRIPTION - LOCATION
LOCATION: KNEE
LOCATION: KNEE

## 2019-03-21 ASSESSMENT — PAIN SCALES - GENERAL
PAINLEVEL_OUTOF10: 4
PAINLEVEL_OUTOF10: 4

## 2019-03-21 ASSESSMENT — PAIN DESCRIPTION - ORIENTATION
ORIENTATION: RIGHT
ORIENTATION: RIGHT

## 2019-03-21 NOTE — PROGRESS NOTES
Body mass index is 54.4 kg/m². CrCl cannot be calculated (Unknown ideal weight.). Goal Trough Level: 15-20 mcg/mL preHD    Assessment/Plan:  1) Will initiate vancomycin 2000 mg IV once. Patient received 1500 mg, will give another 500 mg for a total of 2000 mg. Will schedule 750 mg with HD when HD schedule available. Timing of trough level will be determined based on culture results, renal function, and clinical response. Thank you for the consult. Will continue to follow. Joi Islas, Pharm. D.   PGY2 Critical Care Pharmacy Resident  3/21/2019 6:10 PM

## 2019-03-22 ENCOUNTER — APPOINTMENT (OUTPATIENT)
Dept: DIALYSIS | Age: 74
DRG: 919 | End: 2019-03-22
Attending: FAMILY MEDICINE
Payer: MEDICARE

## 2019-03-22 PROBLEM — S30.1XXA HEMATOMA OF GROIN: Status: ACTIVE | Noted: 2019-03-08

## 2019-03-22 LAB
ANION GAP SERPL CALCULATED.3IONS-SCNC: 17 MMOL/L (ref 9–17)
BUN BLDV-MCNC: 60 MG/DL (ref 8–23)
BUN/CREAT BLD: ABNORMAL (ref 9–20)
CALCIUM SERPL-MCNC: 8.4 MG/DL (ref 8.6–10.4)
CHLORIDE BLD-SCNC: 97 MMOL/L (ref 98–107)
CO2: 23 MMOL/L (ref 20–31)
CREAT SERPL-MCNC: 5.14 MG/DL (ref 0.7–1.2)
DIRECT EXAM: NORMAL
GFR AFRICAN AMERICAN: 13 ML/MIN
GFR NON-AFRICAN AMERICAN: 11 ML/MIN
GFR SERPL CREATININE-BSD FRML MDRD: ABNORMAL ML/MIN/{1.73_M2}
GFR SERPL CREATININE-BSD FRML MDRD: ABNORMAL ML/MIN/{1.73_M2}
GLUCOSE BLD-MCNC: 150 MG/DL (ref 75–110)
GLUCOSE BLD-MCNC: 154 MG/DL (ref 75–110)
GLUCOSE BLD-MCNC: 164 MG/DL (ref 75–110)
GLUCOSE BLD-MCNC: 167 MG/DL (ref 75–110)
GLUCOSE BLD-MCNC: 180 MG/DL (ref 70–99)
HCT VFR BLD CALC: 27.2 % (ref 40.7–50.3)
HEMOGLOBIN: 8.1 G/DL (ref 13–17)
Lab: NORMAL
MCH RBC QN AUTO: 31.3 PG (ref 25.2–33.5)
MCHC RBC AUTO-ENTMCNC: 29.8 G/DL (ref 28.4–34.8)
MCV RBC AUTO: 105 FL (ref 82.6–102.9)
NRBC AUTOMATED: 0 PER 100 WBC
PDW BLD-RTO: 22.8 % (ref 11.8–14.4)
PLATELET # BLD: 157 K/UL (ref 138–453)
PMV BLD AUTO: 9.9 FL (ref 8.1–13.5)
POTASSIUM SERPL-SCNC: 4.5 MMOL/L (ref 3.7–5.3)
RBC # BLD: 2.59 M/UL (ref 4.21–5.77)
SODIUM BLD-SCNC: 137 MMOL/L (ref 135–144)
SPECIMEN DESCRIPTION: NORMAL
WBC # BLD: 8.8 K/UL (ref 3.5–11.3)

## 2019-03-22 PROCEDURE — 6370000000 HC RX 637 (ALT 250 FOR IP): Performed by: FAMILY MEDICINE

## 2019-03-22 PROCEDURE — 99222 1ST HOSP IP/OBS MODERATE 55: CPT | Performed by: INTERNAL MEDICINE

## 2019-03-22 PROCEDURE — 2580000003 HC RX 258

## 2019-03-22 PROCEDURE — 36415 COLL VENOUS BLD VENIPUNCTURE: CPT

## 2019-03-22 PROCEDURE — 90937 HEMODIALYSIS REPEATED EVAL: CPT

## 2019-03-22 PROCEDURE — 80048 BASIC METABOLIC PNL TOTAL CA: CPT

## 2019-03-22 PROCEDURE — 6370000000 HC RX 637 (ALT 250 FOR IP): Performed by: STUDENT IN AN ORGANIZED HEALTH CARE EDUCATION/TRAINING PROGRAM

## 2019-03-22 PROCEDURE — 2500000003 HC RX 250 WO HCPCS: Performed by: STUDENT IN AN ORGANIZED HEALTH CARE EDUCATION/TRAINING PROGRAM

## 2019-03-22 PROCEDURE — 5A1D70Z PERFORMANCE OF URINARY FILTRATION, INTERMITTENT, LESS THAN 6 HOURS PER DAY: ICD-10-PCS | Performed by: INTERNAL MEDICINE

## 2019-03-22 PROCEDURE — 99212 OFFICE O/P EST SF 10 MIN: CPT

## 2019-03-22 PROCEDURE — 1200000000 HC SEMI PRIVATE

## 2019-03-22 PROCEDURE — 82947 ASSAY GLUCOSE BLOOD QUANT: CPT

## 2019-03-22 PROCEDURE — 85027 COMPLETE CBC AUTOMATED: CPT

## 2019-03-22 PROCEDURE — 2580000003 HC RX 258: Performed by: FAMILY MEDICINE

## 2019-03-22 RX ORDER — GABAPENTIN 300 MG/1
300 CAPSULE ORAL DAILY
Status: DISCONTINUED | OUTPATIENT
Start: 2019-03-22 | End: 2019-03-25 | Stop reason: HOSPADM

## 2019-03-22 RX ORDER — IPRATROPIUM BROMIDE AND ALBUTEROL SULFATE 2.5; .5 MG/3ML; MG/3ML
1 SOLUTION RESPIRATORY (INHALATION) EVERY 6 HOURS PRN
Status: DISCONTINUED | OUTPATIENT
Start: 2019-03-22 | End: 2019-03-25 | Stop reason: HOSPADM

## 2019-03-22 RX ORDER — NICOTINE POLACRILEX 4 MG
15 LOZENGE BUCCAL PRN
Status: DISCONTINUED | OUTPATIENT
Start: 2019-03-22 | End: 2019-03-25 | Stop reason: HOSPADM

## 2019-03-22 RX ORDER — DEXTROSE MONOHYDRATE 25 G/50ML
12.5 INJECTION, SOLUTION INTRAVENOUS PRN
Status: DISCONTINUED | OUTPATIENT
Start: 2019-03-22 | End: 2019-03-25 | Stop reason: HOSPADM

## 2019-03-22 RX ORDER — 0.9 % SODIUM CHLORIDE 0.9 %
250 INTRAVENOUS SOLUTION INTRAVENOUS PRN
Status: DISCONTINUED | OUTPATIENT
Start: 2019-03-22 | End: 2019-03-25 | Stop reason: HOSPADM

## 2019-03-22 RX ORDER — MAGNESIUM HYDROXIDE 1200 MG/15ML
LIQUID ORAL
Status: COMPLETED
Start: 2019-03-22 | End: 2019-03-22

## 2019-03-22 RX ORDER — METRONIDAZOLE 500 MG/1
500 TABLET ORAL 3 TIMES DAILY
Status: DISPENSED | OUTPATIENT
Start: 2019-03-22 | End: 2019-03-25

## 2019-03-22 RX ORDER — GUAIFENESIN 100 MG/5ML
200 SOLUTION ORAL 4 TIMES DAILY PRN
Status: DISCONTINUED | OUTPATIENT
Start: 2019-03-22 | End: 2019-03-25 | Stop reason: HOSPADM

## 2019-03-22 RX ORDER — DEXTROSE MONOHYDRATE 50 MG/ML
100 INJECTION, SOLUTION INTRAVENOUS PRN
Status: DISCONTINUED | OUTPATIENT
Start: 2019-03-22 | End: 2019-03-25 | Stop reason: HOSPADM

## 2019-03-22 RX ORDER — ATORVASTATIN CALCIUM 40 MG/1
40 TABLET, FILM COATED ORAL NIGHTLY
Status: DISCONTINUED | OUTPATIENT
Start: 2019-03-23 | End: 2019-03-25 | Stop reason: HOSPADM

## 2019-03-22 RX ORDER — 0.9 % SODIUM CHLORIDE 0.9 %
150 INTRAVENOUS SOLUTION INTRAVENOUS PRN
Status: DISCONTINUED | OUTPATIENT
Start: 2019-03-22 | End: 2019-03-25 | Stop reason: HOSPADM

## 2019-03-22 RX ADMIN — Medication 10 ML: at 09:11

## 2019-03-22 RX ADMIN — INSULIN GLARGINE 25 UNITS: 100 INJECTION, SOLUTION SUBCUTANEOUS at 09:11

## 2019-03-22 RX ADMIN — FLUTICASONE PROPIONATE 2 SPRAY: 50 SPRAY, METERED NASAL at 10:33

## 2019-03-22 RX ADMIN — ALPRAZOLAM 0.5 MG: 0.5 TABLET ORAL at 22:20

## 2019-03-22 RX ADMIN — APIXABAN 5 MG: 5 TABLET, FILM COATED ORAL at 09:10

## 2019-03-22 RX ADMIN — MIDODRINE HYDROCHLORIDE 10 MG: 5 TABLET ORAL at 14:36

## 2019-03-22 RX ADMIN — ASPIRIN 81 MG: 81 TABLET, COATED ORAL at 09:11

## 2019-03-22 RX ADMIN — DONEPEZIL HYDROCHLORIDE 5 MG: 5 TABLET, FILM COATED ORAL at 20:11

## 2019-03-22 RX ADMIN — APIXABAN 5 MG: 5 TABLET, FILM COATED ORAL at 22:20

## 2019-03-22 RX ADMIN — LEVOTHYROXINE SODIUM 25 MCG: 25 TABLET ORAL at 06:51

## 2019-03-22 RX ADMIN — SEVELAMER CARBONATE 800 MG: 800 TABLET, FILM COATED ORAL at 09:10

## 2019-03-22 RX ADMIN — SERTRALINE 100 MG: 50 TABLET, FILM COATED ORAL at 09:10

## 2019-03-22 RX ADMIN — SEVELAMER CARBONATE 800 MG: 800 TABLET, FILM COATED ORAL at 22:20

## 2019-03-22 RX ADMIN — MIDODRINE HYDROCHLORIDE 10 MG: 5 TABLET ORAL at 22:20

## 2019-03-22 RX ADMIN — FOLIC ACID 1 MG: 1 TABLET ORAL at 09:10

## 2019-03-22 RX ADMIN — INSULIN GLARGINE 25 UNITS: 100 INJECTION, SOLUTION SUBCUTANEOUS at 22:20

## 2019-03-22 RX ADMIN — BUPROPION HYDROCHLORIDE 150 MG: 150 TABLET, EXTENDED RELEASE ORAL at 09:10

## 2019-03-22 RX ADMIN — ATORVASTATIN CALCIUM 10 MG: 10 TABLET, FILM COATED ORAL at 09:11

## 2019-03-22 RX ADMIN — MIDODRINE HYDROCHLORIDE 10 MG: 5 TABLET ORAL at 09:10

## 2019-03-22 RX ADMIN — METRONIDAZOLE 500 MG: 500 TABLET, FILM COATED ORAL at 22:20

## 2019-03-22 RX ADMIN — SODIUM CHLORIDE: 900 IRRIGANT IRRIGATION at 14:23

## 2019-03-22 RX ADMIN — MICONAZOLE NITRATE: 20.6 POWDER TOPICAL at 22:21

## 2019-03-22 RX ADMIN — SEVELAMER CARBONATE 800 MG: 800 TABLET, FILM COATED ORAL at 14:36

## 2019-03-22 RX ADMIN — ALPRAZOLAM 0.5 MG: 0.5 TABLET ORAL at 09:10

## 2019-03-22 RX ADMIN — Medication 10 ML: at 22:21

## 2019-03-22 ASSESSMENT — ENCOUNTER SYMPTOMS
EYE PAIN: 0
COLOR CHANGE: 1
ABDOMINAL DISTENTION: 0

## 2019-03-22 ASSESSMENT — PAIN SCALES - GENERAL: PAINLEVEL_OUTOF10: 0

## 2019-03-22 ASSESSMENT — PAIN DESCRIPTION - ORIENTATION: ORIENTATION: RIGHT

## 2019-03-22 ASSESSMENT — PAIN DESCRIPTION - PROGRESSION: CLINICAL_PROGRESSION: NOT CHANGED

## 2019-03-22 ASSESSMENT — PAIN DESCRIPTION - LOCATION: LOCATION: KNEE

## 2019-03-22 NOTE — PLAN OF CARE
Problem: Falls - Risk of:  Goal: Will remain free from falls  Description  Will remain free from falls  Outcome: Met This Shift  Goal: Absence of physical injury  Description  Absence of physical injury  Outcome: Met This Shift     Problem: Body Temperature - Imbalanced:  Goal: Ability to maintain a body temperature in the normal range will improve  Description  Ability to maintain a body temperature in the normal range will improve  Outcome: Met This Shift     Problem: Risk for Impaired Skin Integrity  Goal: Tissue integrity - skin and mucous membranes  Description  Structural intactness and normal physiological function of skin and  mucous membranes.   Outcome: Ongoing     Problem: Pain:  Goal: Pain level will decrease  Description  Pain level will decrease  Outcome: Ongoing  Goal: Control of acute pain  Description  Control of acute pain  Outcome: Ongoing  Goal: Control of chronic pain  Description  Control of chronic pain  Outcome: Ongoing     Problem: Mobility - Impaired:  Goal: Mobility will improve to maximum level  Description  Mobility will improve to maximum level  Outcome: Ongoing     Problem: Skin Integrity - Impaired:  Goal: Will show no infection signs and symptoms  Description  Will show no infection signs and symptoms  Outcome: Ongoing  Goal: Absence of new skin breakdown  Description  Absence of new skin breakdown  Outcome: Ongoing

## 2019-03-22 NOTE — PROGRESS NOTES
[address] [id number] Continence Nurse  Consult Note       NAME:  Roxi Lopez  MEDICAL RECORD NUMBER:  [id number]  AGE: 75 y.o. GENDER: male  : 1945  TODAY'S DATE:  3/22/2019    Subjective:     Reason for WOCN Evaluation and Assessment: \"RLE wound\" \"multi sacral wounds\"                Roxi Lopez is a 75 y.o. male referred by:   [] Physician  [] Nursing  [] Other:     Wound Identification:  Buttock superficial pressure, friction, and moisture related injuries  Pannus and groin fold dermatitis. Right medial proximal thigh moisture and friction injured skin  Right groin hematoma  Right lower leg surgically debrided hematoma.          PAST MEDICAL HISTORY        Diagnosis Date    Anemia in chronic kidney disease (CKD) 2016    Arthritis     Bacteremia 2016    Benign prostatic hyperplasia without lower urinary tract symptoms     BMI 40.0-44.9, adult ([address] 3/19/2018    Cellulitis of left lower extremity     Chronic cerebral ischemia 1/3/2017    Closed fracture of forearm 2013    Broken lft forearm     Controlled type 2 diabetes mellitus with chronic kidney disease on chronic dialysis, with long-term current use of insulin ([address]     Controlled type 2 diabetes mellitus with diabetic polyneuropathy, with long-term current use of insulin ([address] 10/7/2018    Decubital ulcer     NON OPEN BUTTOCKS    Dementia     memory problems    Dialysis patient ([address] 1/3/2017    Goes Tue, Thurs, Sat in [place]    Difficult intravenous access     HAS NEEDED PICC TEAM IN THE PAST    Difficulty walking     WHEELCHAIR BOUND-PIVOTS WITH ASSIST O F 2    DJD (degenerative joint disease) of knee     Elbow, forearm, and wrist, abrasion or friction burn, without mention of infection 2013    Abrasions lft forearm     Encephalopathy acute 2016    Encounter regarding vascular access for dialysis for ESRD ([address] 2017    ESRD (end stage renal disease) on dialysis ([address] 2017    Forgetfulness     HAS SOME SHORT TERM MEMORY LOSS, QUYEN-WIFE IS LEGAL GUARDIAN    H/O transesophageal echocardiography ([facility]ADEO) for monitoring     Hemodialysis patient ([facility] 11/11/2016    tues-thurs-sat [place]---FRESEMIUS.  TUNNELED CATHETER RT UPPER CHEST    Hyperlipidemia 1990    on Meds    Hypertension 1990    on Meds    Intercritical gout     on Meds    Joint pain, knee 01/13/2017    baldo knee synvisc-1 injections    Long-term insulin use ([address] 1/17/2017    Major depressive disorder with single episode, in partial remission ([address] 1/3/2017    Mobility impaired     Morbid obesity ([address]     Muscle weakness     GRNERALIZED    Obesity     Obstructive sleep apnea     HEATHER on CPAP     On CPAP-TO BRING DOS    Paroxysmal atrial fibrillation ([address] 9/6/2017    Respiratory failure ([address] 03/2016    with open heart surgery, trached x 5 months    S/P cardiac cath     Seborrhea     Severe aortic stenosis 3/9/2016    Severe mitral valve stenosis 3/9/2016    Severe sepsis ([address] 4/3/2016    Skin rash     ABD FOLDS    Stasis dermatitis     Thyroid disease     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 lft thumb     Venous stasis dermatitis     Wears glasses     Wheelchair bound     pivots with 2 assists       PAST SURGICAL HISTORY    Past Surgical History:   Procedure Laterality Date    AORTIC VALVE REPLACEMENT  03/18/2016    bioprosthetic    ARM SURGERY Left 1990    CARDIAC CATHETERIZATION      CENTRAL VENOUS CATHETER Right 02/21/2018    DIALYSIS CATHETER Dr Edward Martino COLONOSCOPY  11/19/09    COLONOSCOPY N/A 10/12/2018    COLONOSCOPY WITH BIOPSY performed by Teresa Akbar DO at [address] [place] [address] Right 3/10/2019    WASHOUT RIGHT LOWER EXTREMITY WITH WOUND VAC EXCHANGE performed by Familia Staley MD at [address] [place] [address] Right 3/8/2019    RIGHT LOWER EXTREMITY EXPLORATION, HEMATOMA EVACUATION, WOUND VAC PLACEMENT performed by Curly Estrada MD at [address] ([id number] surgeries)    several surgeries on left arm    KNEE ARTHROSCOPY Left 99    MITRAL VALVE REPLACEMENT  2016    bioprosthetic     NASAL SEPTUM SURGERY      OTHER SURGICAL HISTORY  3/18/16    Aortic root Enlargement    OTHER SURGICAL HISTORY  3/18/16    ASD Closure    OTHER SURGICAL HISTORY Right 2017    AV fistula creation, wrist    OTHER SURGICAL HISTORY Right 2017    ligation of collateral branch of av fistula right wrist    OTHER SURGICAL HISTORY  2018    FISTULAGRAM WITH DR. Lisseth Zhang    SC EGD TRANSORAL BIOPSY SINGLE/MULTIPLE N/A 10/17/2017    EGD BIOPSY performed by Queenie Babb MD at [facility]    SC [address] ANGIOACCESS AV FISTULA Right 2017     RIGHT  LOWER ARM AV FISTULA  LIGATION OF AQUIRED BRANCHES X2 performed by Mariam Heaton DO at [address]  3/18/16    median    VASCULAR SURGERY  2018    Right arm fistulagram,  PTA cephalic vein stenosis  /  DR Ivy Ratliff       FAMILY HISTORY    Family History   Problem Relation Age of Onset    Lung Cancer Mother     Diabetes Mother     Alzheimer's Disease Father     Diabetes Maternal Grandmother     High Blood Pressure Sister        SOCIAL HISTORY    Social History     Tobacco Use    Smoking status: Former Smoker     Packs/day: 2.00     Years: 25.00     Pack years: 50.00     Types: Cigarettes     Start date: 3/17/1955     Last attempt to quit: 1980     Years since quittin.2    Smokeless tobacco: Never Used   Substance Use Topics    Alcohol use: No     Alcohol/week: 0.0 oz     Comment: 1-2 drinks per year    Drug use: No       ALLERGIES    Allergies   Allergen Reactions    Percocet [Oxycodone-Acetaminophen] Itching and Rash     Also causes shaking    Ampicillin Rash           Objective:      /67   Pulse 99   Temp 99.7 °F (37.6 °C) (Oral)   Resp 20   Ht 5' 7\" (1.702 m)   Wt (!) 304 lb 7.3 oz (138.1 kg) SpO2 96%   BMI 47.68 kg/m²   Umberto Risk Score: Umberto Scale Score: 13    LABS    CBC:   Lab Results   Component Value Date    WBC 8.8 03/22/2019    RBC 2.59 03/22/2019    HGB 8.1 03/22/2019     CMP:  Albumin:    Lab Results   Component Value Date    LABALBU 2.6 03/16/2019     PT/INR:    Lab Results   Component Value Date    PROTIME 11.5 03/19/2019    PROTIME 17.4 12/06/2018    INR 1.1 03/19/2019     HgBA1c:    Lab Results   Component Value Date    LABA1C 5.8 03/09/2019     PTT: No components found for: LABPTT      Assessment:     Patient Active Problem List   Diagnosis    Traumatic amputation of thumb    Essential hypertension    Mixed hyperlipidemia    Obstructive sleep apnea    Morbid obesity ([facility]    Benign prostatic hyperplasia without lower urinary tract symptoms    Venous stasis dermatitis    Severe aortic stenosis    Severe mitral valve stenosis    Anemia in chronic kidney disease, on chronic dialysis ([facility]    Dialysis patient ([facility]    Dementia    Major depressive disorder with single episode, in partial remission ([facility]    Chronic cerebral ischemia    ESRD (end stage renal disease) on dialysis (HCC)    Long-term insulin use ([facility])    Paroxysmal atrial fibrillation ([facility]    BMI 40.0-44.9, adult ([facility]    Controlled type 2 diabetes mellitus with chronic kidney disease on chronic dialysis, with long-term current use of insulin (HCC)    Controlled type 2 diabetes mellitus with diabetic polyneuropathy, with long-term current use of insulin ([facility])    Dermatitis    Bruising    Hematoma of right lower extremity    Hematoma of leg, right, initial encounter    Acute blood loss anemia    Accidental fall from wheelchair    Traumatic hemorrhagic shock ([facility]    Cellulitis       Measurements:     03/22/19 1419   Wound 03/08/19 Buttocks Left;Right;Upper deep tissue, denuded, red   Date First Assessed/Time First Assessed: 03/08/19 1900   Present on Hospital Admission: Yes  Primary Wound Type: Wound Location Orientation: Right; Lower  Wound Description (Comments): large hematoma evacuation   Wound Image     Wound Assessment Clean;Granulation tissue;Pink;Red   Paolo-wound Assessment Black; Hyperpigmented;Edema;Ecchymosis   Closure None   Drainage Amount Small   Drainage Description Sanguinous; Serosanguinous   Odor None   Dressing/Treatment Moist to moist;ABD;Dry Dressing   Dressing Changed Changed/New   Dressing Change Due 03/22/19     Right groin former line site covered with yellow fibrin. No drainage, no warmth, no erythema. Surrounding hematoma soft, faded purple, no warmth, no erythema, no blistering, no drainage. Proximal medial thigh superficial wound consistent with chronic moisture and friction- rubs opposing thigh. Scars surround. Intertriginous dermatitis of the pannus and bilateral groin folds. Clean, pink, superficial. No drainage. No odor, no erythema. No s/s fungal infection. Right lower leg surgical site clean, granulation tissue present. Scattered soft fibrin/slough. Medial edge with necrotic skin. Dry and stable. Outlined paolo-wound residual hematoma soft, faded purple. No warmth, erythema, or drainage. Buttocks through coccygeal area with scattered superficial ulcerations consistent with chronic pressure and exposure to fecal incontinence. Response to treatment:  Well tolerated by patient. Plan:     Plan of Care:     NS moistened fluffs (two packs) to the right lower leg wound under ABD pads (four pads), secure with roll gauze. Change BID  Calcium alginate under ABD to the right medial thigh ulceration. Change daily and prn soilage. Turn every 2 hours  Float heels off of bed with pillows under calves  Use lift sling to reposition patient to minimize potential for shear injury. Foam sacrum dressing to sacrococcygeal area. Peel back dressing, inspect skin beneath, re-secure. Change every 72 hours and prn wrinkles, soilage.  Discontinue Sacral dressing if repeatedly soiled by incontinence. Routine incontinence care with incontinence barrier cloths and zinc oxide cream.   Apply zinc oxide cream BID and prn incontinence. Moisture wicking under pads vs cloth backed briefs    Specialty Bed Required : Yes   [] Low Air Loss   [] Pressure Redistribution  [] Fluid Immersion  [] Bariatric  [] Total Pressure Relief  [] Other:     Discharge Plan:  Placement for patient upon discharge: skilled nursing   Hospice Care: No   Patient appropriate for Outpatient [address] [facility] [address]: Yes    Patient/Caregiver Teaching:  D/w patient's wife at bedside.    [] Indicates understanding       [] Needs reinforcement  [] Unsuccessful      [x] Verbal Understanding  [] Demonstrated understanding       [] No evidence of learning  [] Refused teaching         [] N/A       Electronically signed by Bird Interiano RN, CWON on 3/22/2019 at 2:25 PM

## 2019-03-22 NOTE — PROGRESS NOTES
Smoking Cessation - topics covered   []  Health Risks  []  Benefits of Quitting   []  Smoking Cessation  []  Patient has no history of tobacco use  [x]  Patient is former smoker. Patient quit in 1980. [x]  No need for tobacco cessation education. []  Booklet given  []  Patient verbalizes understanding. []  Patient denies need for tobacco cessation education. []  Unable to meet with patient today. Will follow up as able.   Alana Elizabeth  9:18 AM

## 2019-03-22 NOTE — PROGRESS NOTES
Nutrition Assessment    Type and Reason for Visit: Initial, Positive Nutrition Screen(Wound)    Nutrition Recommendations: Liberalize diet - remove renal restriction and allow for 75 gm CHO per meal. Send Robert ONS (wound healing) twice daily. Nutrition Assessment: Pt's wife at bedside reports pt currently resides at [facility] facility and eating fair amounts of meals. Does consume protein well. Agrees to try wound healing ONS. Malnutrition Assessment:  · Malnutrition Status: Insufficient data    Nutrition Risk Level: High    Nutrient Needs:  · Estimated Daily Total Kcal: 2500 kcals/day  · Estimated Daily Protein (g): 135-170 gm/day    Nutrition Diagnosis:   · Problem: Increased nutrient needs  · Etiology: related to Renal dysfunction, Increased demand for energy/nutrients     Signs and symptoms:  as evidenced by Known losses from dialysis, Presence of wounds    Objective Information:  · Nutrition-Focused Physical Findings:    · Wound Type: Pressure Ulcer, Multiple, Deep Tissue Injury  · Current Nutrition Therapies:  · Oral Diet Orders: Carb Control 4 Carbs/Meal, Renal   · Oral Diet intake: Unable to assess  · Oral Nutrition Supplement (ONS) Orders: None  · Anthropometric Measures:  · Ht: 5' 7\" (170.2 cm)   · Current Body Wt: 304 lb 7.3 oz (138.1 kg)  · Ideal Body Wt: 148 lb (67.1 kg), % Ideal Body 206%  · BMI Classification: BMI > or equal to 40.0 Obese Class III    Nutrition Interventions:   Modify current diet, Start ONS  Continued Inpatient Monitoring, Education Not Indicated    Nutrition Evaluation:   · Evaluation: Goals set   · Goals: Meet at least 75% of estimated nutrient needs with PO intake.     · Monitoring: Meal Intake, Supplement Intake, Wound Healing, Pertinent Labs, Weight, Monitor Hemodynamic Status      Electronically signed by Anjelica Barraza MS, RD, LD on 3/22/19 at 3:44 PM    Contact Number: [phone]

## 2019-03-22 NOTE — DISCHARGE SUMMARY
dialysis and ambulate today when he had splitting of the skin and bleeding.  Patient had reportedly had a low impact fall from wheelchair after the seat fell out on Sunday experienced this scrape to the right lower extremity either during fall or well being picked up by staff at skilled nursing facility.  In the days after that incident he had a gradually expanding hematoma form.  After the skin split today he was brought to [place] [address] where radiography confirmed no fracture and he was transferred to [facility] after being started on IV fluids and IV antibiotics for suspicion of infection related to hematoma.      Workup in [facility] emergency Department found leukocytosis with white blood cell count of 24, mild hypotension treated with fluid (patient has history of hypertension requiring Midodrine).  During his stay bleeding recurred when dressing was examined     Patient was admitted under trauma surgery and went to the OR on 3/9 and 3/10 for hematoma evacuation     Patient extubated on 3/10 in MICU, but did require pressor support. Patient was weaned of pressors and restarted on Coumadin and transferred to the Floor on 3/11 under medical service. Patients last dialysis was 3/11.     Patient was seen on 3/12 AM by internal medical team and patient was hypotensive and confused with hemoglobin 6.9 with a lot of blood in wound vac and hematoma to R groin. At that time we consulted critical care for further workup and evaluation as the patient was in hypovolemic shock. Patient needed pressor support in the ICU. He received total of 5 units of PRBCs and 2 units of FFPs in the ICU during two days. Wound Vac was removed. Pressors were turned off. Vitamin K was added. Patient continued to improve. No signs of bleeding so he was transferred out of ICU. The patient was started on 5mg of Eliquis BD after recommendations from surgery and clearance by surgery.  The patient was then discharged to [facility] in stable condition. Wound care referral was put on discharge. Procedures:  Wound wac evacuation in the OR and central lines place twice    Any Hospital Acquired Infections: none    Discharge Functional Status:  stable    Disposition: [facility]     Patient Instructions:   Discharge Medication List as of 3/20/2019  9:17 AM      START taking these medications    Details   apixaban (ELIQUIS) 5 MG TABS tablet Take 1 tablet by mouth 2 times daily, Disp-60 tablet, R-2Print      gabapentin (NEURONTIN) 300 MG capsule Take 1 capsule by mouth daily for 30 days. , Disp-30 capsule, [id number]      folic acid (FOLVITE) 1 MG tablet Take 1 tablet by mouth daily, Disp-90 tablet, R-1Normal      !! insulin lispro (HUMALOG) 100 UNIT/ML injection vial Inject 0-18 Units into the skin 3 times daily (with meals), Disp-1 vial, R-3Normal      !! insulin lispro (HUMALOG) 100 UNIT/ML injection vial Inject 0-9 Units into the skin nightly, Disp-1 vial, R-3Normal      metroNIDAZOLE (FLAGYL) 500 MG tablet Take 1 tablet by mouth 3 times daily for 7 days, Disp-21 tablet, R-0Normal       !! - Potential duplicate medications found. Please discuss with provider. CONTINUE these medications which have CHANGED    Details   midodrine (PROAMATINE) 10 MG tablet Take 1 tablet by mouth 3 times daily, Disp-90 tablet, R-3Normal      insulin glargine (LANTUS) 100 UNIT/ML injection vial Inject 25 Units into the skin 2 times daily, Disp-1 vial, R-3Normal      HYDROcodone-acetaminophen (NORCO) 5-325 MG per tablet Take 1 tablet by mouth every 8 hours as needed for Pain for up to 7 days. , Disp-21 tablet, R-0Print      ALPRAZolam (XANAX) 0.5 MG tablet Take 1 tablet by mouth 2 times daily for 7 days. , Disp-14 tablet, R-0Print         CONTINUE these medications which have NOT CHANGED    Details   [facility].  Devices [facility]) MISC Disp-1 each, R-0, PrintUse as directed      buPROPion (WELLBUTRIN XL) 150 MG extended release tablet Take 150 mg by mouth every morningHistorical Med      sevelamer (RENVELA) 800 MG tablet Give 2 tabs po TID with meals and additional 1 tab po TID with snack. , Disp-360 tablet, R-5Normal      Pollen Extracts (PROSTAT PO) Take 30 mLs by mouth three times dailyHistorical Med      sertraline (ZOLOFT) 100 MG tablet Take 100 mg by mouth daily Historical Med      loratadine (CLARITIN) 10 MG capsule Take 10 mg by mouthHistorical Med      sodium chloride (OCEAN) 0.65 % nasal spray by Nasal routeHistorical Med      lidocaine (XYLOCAINE) 5 % ointment Apply topically three times a week Apply topically as needed. DIALYSIS DAYS, Topical, THREE TIMES WEEKLY, Until Discontinued, Historical Med      Amino Acids-Protein Hydrolys (PRO-STAT PO) Take 30 mLs by mouth 3 times daily SUGAR FREE Historical Med      guaiFENesin (ROBITUSSIN) 100 MG/5ML syrup Take 200 mg by mouth 4 times daily as needed for CoughHistorical Med      ipratropium-albuterol (DUONEB) 0.5-2.5 (3) MG/3ML SOLN nebulizer solution Inhale 1 vial into the lungs every 6 hours as needed Historical Med      Nystatin POWD by Does not apply route TID x 10 days (starting 2/13/17) then use PRN      aspirin 81 MG tablet Take 81 mg by mouth daily      Skin Protectants, Misc.  (HYDROCERIN) CREA cream Apply topically nightly, Topical, NIGHTLY, Until Discontinued, Historical Med      glucagon 1 MG injection Infuse 1 kit intravenously as needed      omeprazole (PRILOSEC) 20 MG delayed release capsule Take 20 mg by mouth daily      donepezil (ARICEPT) 5 MG tablet Take 5 mg by mouth every evening      levothyroxine (SYNTHROID) 25 MCG tablet Take 1 tablet by mouth Daily, Disp-30 tablet, R-2      albuterol (PROVENTIL) (2.5 MG/3ML) 0.083% nebulizer solution Take 3 mLs by nebulization every 8 hours as needed for Wheezing, Disp-360 each, R-3      fluticasone (FLONASE) 50 MCG/ACT nasal spray 2 sprays by Nasal route daily, Disp-3 Bottle, R-3      glucose (GLUTOSE 15) 40 % GEL Take 15 g by mouth as needed (hypoglycemia) 15 gram oral as needed if blood sugar is less than 70 ( for hypoglycemia), Disp-45 g, R-3      atorvastatin (LIPITOR) 10 MG tablet Take 1 tablet by mouth daily, Disp-90 tablet, R-3      ondansetron (ZOFRAN ODT) 4 MG disintegrating tablet Take 1 tablet by mouth every 6 hours as needed for Nausea or Vomiting, Disp-120 tablet, R-3      acetaminophen (TYLENOL) 325 MG tablet Take 650 mg by mouth every 6 hours as needed for Pain or Fever       magnesium hydroxide (MILK OF MAGNESIA) 400 MG/5ML suspension Take 30 mLs by mouth daily as needed for Constipation      Multiple Vitamins-Minerals (MULTIVITAL PO) Take 1 tablet by mouth daily         STOP taking these medications       warfarin (COUMADIN) 2 MG tablet Comments:   Reason for Stopping:         warfarin (COUMADIN) 3 MG tablet Comments:   Reason for Stopping:         insulin aspart (NOVOLOG) 100 UNIT/ML injection vial Comments:   Reason for Stopping:         senna (SENOKOT) 8.6 MG tablet Comments:   Reason for Stopping:               Activity: activity as tolerated    Diet: renal diet    Follow-up:    Sandra Virk DO  [address] MD Lucia  [address] [place] [address] [address] [facility] Simeon Singh  [phone]    Call  For wound re-check, potential skin graft. Follow up labs:      Follow up imaging:     Note that over 30 minutes was spent in preparing discharge papers, discussing discharge with patient, medication review, etc.      Sherman Maradiaga MD         Department of Internal Medicine  [facility]         3/22/2019, 3:17 PM

## 2019-03-22 NOTE — CARE COORDINATION
Case Management Initial Discharge Plan  Elly Caro,             Met with:patient to discuss discharge plans. Information verified: address, contacts, phone number, , insurance Yes  PCP: Frankie Cortez DO  Date of last visit: sees facility physician at this time    Insurance Provider: medicare and [facility]    Discharge Planning    Living Arrangements:  Other (Comment)(louisa)   Support Systems:  Family Members, Spouse/Significant Other    Home has 1 stories   stairs to climb to get into front door, 0stairs to climb to reach second floor  Location of bedroom/bathroom in home main    Patient able to perform ADL's:Independent    Current Services (outpatient & in home) Currently at [place] [address]  DME equipment: Wheelchair  DME provider: facility    Pharmacy: facility   Potential Assistance Purchasing Medications:  No  Does patient want to participate in local refill/ meds to beds program?  No    Potential Assistance Needed:  Ev[place] Cornel    Patient agreeable to home care: no  [place] of choice provided:  no    Prior SNF/Rehab Placement and Facility: [facility]  Agreeable to SNF/Rehab: Yes  [place] of choice provided: yes   Evaluation: no    Expected Discharge date:  19  Patient expects to be discharged to:  [facility]  Follow Up Appointment: Best Day/ Time: Monday AM    Transportation provider: EMS  Transportation arrangements needed for discharge: Yes    Readmission Risk              Risk of Unplanned Readmission:        38             Does patient have a readmission risk score greater than 14?: Yes  If yes, follow-up appointment must be made within 7 days of discharge.      Discharge Plan: Return to [address] [place] [address]          Electronically signed by Lora RN on 3/22/19 at 10:41 AM

## 2019-03-22 NOTE — CARE COORDINATION
[address] Transitions    3/22/2019    Patient: Isidra Paci Patient : 1945   MRN: [id number]  Reason for Admission: Cellulitis [L03.90]  RARS: Readmission Risk Score: 42  CMRS: 9       Patient seen briefly in transit. Patient known to care transitions from previous admission(s). Collaboration with Case Management for unit completed. Confirmed with CM, plan for return to [facility]. Attempt to meet with spouse, spouse not in room at time of attempt. Care Transitions will continue to follow with patient during his current hospital admission. Outreach after discharge by care transitions team pending final discharge disposition/plan.      Readmission Risk  Patient Active Problem List   Diagnosis    Traumatic amputation of thumb    Essential hypertension    Mixed hyperlipidemia    Obstructive sleep apnea    Morbid obesity ([address]    Benign prostatic hyperplasia without lower urinary tract symptoms    Venous stasis dermatitis    Severe aortic stenosis    Severe mitral valve stenosis    Anemia in chronic kidney disease, on chronic dialysis ([address]    Dialysis patient ([address]    Dementia    Major depressive disorder with single episode, in partial remission ([address]    Chronic cerebral ischemia    ESRD (end stage renal disease) on dialysis (HCC)    Long-term insulin use (HCC)    Paroxysmal atrial fibrillation (HCC)    BMI 40.0-44.9, adult (HCC)    Controlled type 2 diabetes mellitus with chronic kidney disease on chronic dialysis, with long-term current use of insulin (HCC)    Controlled type 2 diabetes mellitus with diabetic polyneuropathy, with long-term current use of insulin (HCC)    Dermatitis    Bruising    Hematoma of right lower extremity    Hematoma of groin    Acute blood loss anemia    Accidental fall from wheelchair    Traumatic hemorrhagic shock ([address]    Cellulitis       Inpatient Summary  Care Transitions Summary    Care Transitions Inpatient Review  Medication Review  Are you able to afford your medications?:  Yes  How often do you have difficulty taking your medications?:  I always take them as prescribed. Housing Review  Who do you live with?:  Other Caregiver  Are you an active caregiver in your home?:  No  Social Support  Durable Medical Equipment  Patient DME:  Wheelchair  Functional Review  Ability to seek help/take action for Emergent/Urgent situations i.e. fire, crime, inclement weather or health crisis.:  Needs Assistance  Ability handle personal hygiene needs (bathing/dressing/grooming):  Needs Assistance  Ability to manage medications:  Needs Assistance  Ability to prepare food:  Needs Assistance  Ability to maintain home (clean home, laundry):  Needs Assistance  Ability to drive and/or has transportation:  Needs Assistance  Ability to do shopping:  Needs Assistance  Ability to manage finances:  Needs Assistance  Is patient able to live independently?:  No  Hearing and Vision  Visual Impairment:  Visual impairment (Glasses/contacts)  Hearing Impairment:  Hard of hearing  Care Transitions Interventions         Follow Up  Future Appointments   Date Time Provider Kaity Donovan   3/26/2019  8:30 AM Marvell Krabbe, MD [facility]   10/30/2019  1:00 PM [facility]       Health Maintenance  There are no preventive care reminders to display for this patient.     Frankie Wolff MA

## 2019-03-22 NOTE — CONSULTS
General Surgery Consult Note    PATIENT NAME: Primo Mac  AGE: 75 y.o. MEDICAL RECORD NO. [id number]  DATE: 3/22/2019  SURGEON: Dr. Torin Churchill,     Patient evaluated at the request of  Dr. Reji Henderson  Reason for evaluation: Concern for abscess in right groin    Patient information was obtained from patient. History/Exam limitations: none. Patient presented to the Emergency Department by ambulance. IMPRESSION:     Patient Active Problem List   Diagnosis    Traumatic amputation of thumb    Essential hypertension    Mixed hyperlipidemia    Obstructive sleep apnea    Morbid obesity ([address]    Benign prostatic hyperplasia without lower urinary tract symptoms    Venous stasis dermatitis    Severe aortic stenosis    Severe mitral valve stenosis    Anemia in chronic kidney disease, on chronic dialysis ([facility]    Dialysis patient ([facility]    Dementia    Major depressive disorder with single episode, in partial remission ([facility]    Chronic cerebral ischemia    ESRD (end stage renal disease) on dialysis (HCC)    Long-term insulin use (HCC)    Paroxysmal atrial fibrillation ([facility])    BMI 40.0-44.9, adult ([facility]    Controlled type 2 diabetes mellitus with chronic kidney disease on chronic dialysis, with long-term current use of insulin (HCC)    Controlled type 2 diabetes mellitus with diabetic polyneuropathy, with long-term current use of insulin ([facility])    Dermatitis    Bruising    Hematoma of right lower extremity    Hematoma of leg, right, initial encounter    Acute blood loss anemia    Accidental fall from wheelchair    Traumatic hemorrhagic shock ([facility])    Cellulitis   1. Right groin complex abscess vs hematoma, likely hematoma as there is not WBC or fever  2. Right lower extremity wound      MEDICAL DECISION MAKING AND PLAN:   1. Continue medical management per primary team  2. Continue antibiotics  3. Patient on Eliquis and ASA  4.  Right groin with dialysis, with long-term current use of insulin ([address] Controlled type 2 diabetes mellitus with diabetic polyneuropathy, with long-term current use of insulin ([address] Decubital ulcer, Dementia, Dialysis patient ([address] Difficult intravenous access, Difficulty walking, DJD (degenerative joint disease) of knee, Elbow, forearm, and wrist, abrasion or friction burn, without mention of infection, Encephalopathy acute, Encounter regarding vascular access for dialysis for ESRD ([address] ESRD (end stage renal disease) on dialysis ([address] Forgetfulness, H/O transesophageal echocardiography ([facility]) for monitoring, Hemodialysis patient ([address] Hyperlipidemia, Hypertension, Intercritical gout, Joint pain, knee, Long-term insulin use ([address] Major depressive disorder with single episode, in partial remission ([address] Mobility impaired, Morbid obesity ([address] Muscle weakness, Obesity, Obstructive sleep apnea, HEATHER on CPAP, Paroxysmal atrial fibrillation ([address] Respiratory failure ([address] S/P cardiac cath, Seborrhea, Severe aortic stenosis, Severe mitral valve stenosis, Severe sepsis (HCC), Skin rash, Stasis dermatitis, Thyroid disease, Traumatic amputation of thumb, Venous stasis dermatitis, Wears glasses, and Wheelchair bound. Past Surgical History   has a past surgical history that includes Colonoscopy (11/19/09); Knee arthroscopy (Left, 09/17/99); Hand surgery (Left, 1990 (x10-12 surgeries)); Arm Surgery (Left, 1990); Cardiac catheterization; Nasal septum surgery; Sternotomy (3/18/16); Aortic valve replacement (03/18/2016); Mitral valve replacement (03/18/2016); other surgical history (3/18/16); other surgical history (3/18/16); other surgical history (Right, 01/09/2017); other surgical history (Right, 08/29/2017); pr ligatn angioaccess av fistula (Right, 8/29/2017); pr egd transoral biopsy single/multiple (N/A, 10/17/2017); central venous catheter (Right, 02/21/2018);  Colonoscopy (N/A, 10/12/2018); other surgical history (04/05/2018); 3 times daily Give with snack. 7/29/18   SHAMEKA Valentine CNP   Pollen Extracts (PROSTAT PO) Take 30 mLs by mouth three times daily    Historical Provider, MD   sertraline (ZOLOFT) 100 MG tablet Take 100 mg by mouth daily     Historical Provider, MD   loratadine (CLARITIN) 10 MG capsule Take 10 mg by mouth    Historical Provider, MD   sodium chloride (OCEAN) 0.65 % nasal spray by Nasal route 10/24/16   Historical Provider, MD   lidocaine (XYLOCAINE) 5 % ointment Apply topically three times a week Apply topically as needed. DIALYSIS DAYS    Historical Provider, MD   Amino Acids-Protein Hydrolys (PRO-STAT PO) Take 30 mLs by mouth 3 times daily SUGAR FREE     Historical Provider, MD   guaiFENesin (ROBITUSSIN) 100 MG/5ML syrup Take 200 mg by mouth 4 times daily as needed for Cough    Historical Provider, MD   ipratropium-albuterol (DUONEB) 0.5-2.5 (3) MG/3ML SOLN nebulizer solution Inhale 1 vial into the lungs every 6 hours as needed     Historical Provider, MD   aspirin 81 MG tablet Take 81 mg by mouth daily    Historical Provider, MD   Skin Protectants, Misc.  (HYDROCERIN) CREA cream Apply topically nightly    Historical Provider, MD   glucagon 1 MG injection Infuse 1 kit intravenously as needed    Historical Provider, MD   donepezil (ARICEPT) 5 MG tablet Take 5 mg by mouth every evening 10/26/16   Historical Provider, MD   levothyroxine (SYNTHROID) 25 MCG tablet Take 1 tablet by mouth Daily 10/26/16   SHAMEKA Valentine CNP   albuterol (PROVENTIL) (2.5 MG/3ML) 0.083% nebulizer solution Take 3 mLs by nebulization every 8 hours as needed for Wheezing 10/24/16   Octavio JENNIFER Mcarthur, DO   fluticasone (FLONASE) 50 MCG/ACT nasal spray 2 sprays by Nasal route daily 10/24/16   Octavio Mcarthur, DO   glucose (GLUTOSE 15) 40 % GEL Take 15 g by mouth as needed (hypoglycemia) 15 gram oral as needed if blood sugar is less than 70 ( for hypoglycemia) 10/24/16   Octavio Mcarthur, DO   atorvastatin (LIPITOR) 10 MG tablet Take 1 tablet by mouth daily 10/24/16   Octaviomaksim Mcarthur, DO   acetaminophen (TYLENOL) 325 MG tablet Take 650 mg by mouth every 6 hours as needed for Pain or Fever     Historical Provider, MD   magnesium hydroxide (MILK OF MAGNESIA) 400 MG/5ML suspension Take 30 mLs by mouth daily as needed for Constipation 3/29/16   Han Trivedi MD    Scheduled Meds:   ALPRAZolam  0.5 mg Oral BID    apixaban  5 mg Oral BID    aspirin  81 mg Oral Daily    atorvastatin  10 mg Oral Daily    buPROPion  150 mg Oral QAM    donepezil  5 mg Oral QPM    fluticasone  2 spray Nasal Daily    folic acid  1 mg Oral Daily    insulin glargine  25 Units Subcutaneous BID    levothyroxine  25 mcg Oral Daily    midodrine  10 mg Oral TID    sertraline  100 mg Oral Daily    sevelamer  800 mg Oral TID    sodium chloride flush  10 mL Intravenous 2 times per day    vancomycin  500 mg Intravenous Once    vancomycin (VANCOCIN) intermittent dosing (placeholder)   Other RX Placeholder     Continuous Infusions:   dextrose       PRN Meds:.albuterol, sodium chloride flush, potassium chloride **OR** potassium alternative oral replacement **OR** potassium chloride, magnesium hydroxide, ondansetron, nicotine, acetaminophen, glucose, dextrose, glucagon (rDNA), dextrose  Allergies  is allergic to percocet [oxycodone-acetaminophen] and ampicillin. Family History  family history includes Alzheimer's Disease in his father; Diabetes in his maternal grandmother and mother; High Blood Pressure in his sister; [employer] in his mother. Social History   reports that he quit smoking about 39 years ago. His smoking use included cigarettes. He started smoking about 64 years ago. He has a 50.00 pack-year smoking history. He has never used smokeless tobacco.   reports that he does not drink alcohol. reports that he does not use drugs.   Review of Systems  General Denies any fever or chills  HEENT Denies any diplopia, tinnitus or vertigo  Resp Denies any shortness of breath, cough or wheezing  Cardiac Denies any chest pain, palpitations, claudication or edema  GI Negative for abdominal pain, diarrhea, nausea, vomiting and constipation   Denies any frequency, urgency, hesitancy or incontinence  Heme Denies bruising or bleeding easily  Endocrine + DM, hypothyroidism   Neuro Denies any focal motor or sensory deficits, bed bound   MSK: right groin tenderness     PHYSICAL:   VITALS:  height is 5' 7\" (1.702 m) and weight is 304 lb 7.3 oz (138.1 kg) (abnormal). His temperature is 99.7 °F (37.6 °C). His blood pressure is 116/67 and his pulse is 99. His respiration is 20 and oxygen saturation is 96%. CONSTITUTIONAL: awake, alert, cooperative, no apparent distress, and appears stated age and Alert and oriented times 3, no acute distress and cooperative to examination. HEENT: Head is normocephalic, atraumatic. EOMI, PERRLA  NECK: Soft, trachea midline and straight  LUNGS: Chest expands equally bilaterally upon respiration, no accessory muscle used. Sounds fluid overloaded in the bases  CARDIOVASCULAR: Heart regular rate and rhythm  ABDOMEN: soft, nontender, nondistended, no masses or organomegaly, no hernias palpable, no guarding or peritoneal signs. Bowel sounds are present in all four quadrants Scars are consistent with previous surgical history. NEUROLOGIC: CN II-XII are grossly intact. There are no focalizing motor or sensory deficits  EXTREMITIES: no cyanosis, clubbing or edema, right groin with fluctuant area measuring 10 x 10 cm extending toward the medial groin, the area is warm to touch with an opening with minimal expression of no pus, tender to palpation, large pannus overlying B/L groin.      LABS:     Recent Labs     03/21/19  1040 03/22/19  0856   WBC 10.1 8.8   HGB 8.6* 8.1*   HCT 27.1* 27.2*    157    137   K 4.5 4.5   CL 96* 97*   CO2 30 23   BUN 45* 60*   CREATININE 4.00* 5.14*   CALCIUM 8.6 8.4*     No

## 2019-03-22 NOTE — CONSULTS
[facility] -   Infectious diseases evaluation  admission date 3/21/2019    reason for consultation:   Groin abscess    Impression :   Current:  · ESRD on HD  · Right . ower leg clean wound  · Post right lower leg drained large hematoma 3/9/19  · Current right groin non infected infected hematoma-   · site of a recent femoral line  · Size and color stable   · Chronic diarhea  · DM2    Other:  ·   Discussion / summary of stay / plan of care   · No cellulitis lower back - has some stage 2 excoriations  · Old diarrhea stable, loose stools  · Right groin stable non infected hematoma  · Right groin superficial ulceration, clean but bleeding  · right lower leg wound clean, site of the recent drained S/C hematoma  · No fever no leukocytosis   Recommendations   · Started on vanco iv  · Stop vanco  · No signs of infection  · Keep the groin and the right lower leg wound clean  · Ok for discharge off antibiotics , w wound care  · disch w  and family  Infection Control Recommendations   · [place] Precautions  · Contact Isolation   Antimicrobial Stewardship Recommendations   · Simplification of therapy  · Targeted therapy  Coordination ofOutpatient Care:   · Estimated Length of IV antimicrobials:  · Patient will need Midline / picc Catheter Insertion:   · Patient will need SNF:  · Patient will need outpatient wound care:     History of Present Illness:   Initial history:  Venita Masterson is a 75y.o.-year-old male with end-stage renal disease on hemodialysis. He also has had a history of atrial fibrillation, severe aortic stenosis, severe mitral stenosis, and thyroid disease. He is wheelchair bound. Earlier this month,he come in because of a right lower extremity hematoma secondary to a fall. He is on anticoagulation with elevated INR. he had an exploration of his right lower extremity hematoma and VAC placement. Had the right groin femoral line placed, removed before discharge. Smith Gunn He was discharged on 3/19. He comes back to the emergency room 3/21/19, 2 to 3 days later because of swelling over the right groin and upper thigh-  An ultrasound of the area shows a large fluid collection and no DVT. The patient was suspected to have an abscess, admitted to medicine and we are consulted. No fever or chills, right groin hematoma stable in size, not warm and not looking infected - WBC normal.    Wound and groin evaluated w the stoma RN, groin hematoma is stable and not changed in size. Interval changes  3/22/2019     Summary of relevant labs:  Labs:    Micro:    Imaging:      I have personally reviewed the past medical history, past surgical history, medications, social history, and family history, and I haveupdated the database accordingly.   Past Medical History:     Past Medical History:   Diagnosis Date    Anemia in chronic kidney disease (CKD) 4/27/2016    Arthritis     Bacteremia 11/11/2016    Benign prostatic hyperplasia without lower urinary tract symptoms     BMI 40.0-44.9, adult ([address] 3/19/2018    Cellulitis of left lower extremity     Chronic cerebral ischemia 1/3/2017    Closed fracture of forearm 8/13/2013    Broken lft forearm     Controlled type 2 diabetes mellitus with chronic kidney disease on chronic dialysis, with long-term current use of insulin ([address]     Controlled type 2 diabetes mellitus with diabetic polyneuropathy, with long-term current use of insulin ([address] 10/7/2018    Decubital ulcer     NON OPEN BUTTOCKS    Dementia     memory problems    Dialysis patient ([address] 1/3/2017    Goes Tue, Thurs, Sat in [place]    Difficult intravenous access     HAS NEEDED PICC TEAM IN THE PAST    Difficulty walking     WHEELCHAIR BOUND-PIVOTS WITH ASSIST O F 2    DJD (degenerative joint disease) of knee     Elbow, forearm, and wrist, abrasion or friction burn, without mention of infection 8/13/2013    Abrasions lft forearm     Encephalopathy acute 4/27/2016    Encounter regarding vascular access for dialysis for ESRD ([facility] 2/2/2017    ESRD (end stage renal disease) on dialysis ([address] 1/17/2017    Forgetfulness     HAS SOME SHORT TERM MEMORY LOSS, QUYEN-WIFE IS LEGAL GUARDIAN    H/O transesophageal echocardiography ([facility]) for monitoring     Hemodialysis patient ([address] 11/11/2016    tues-thurs-sat [place]---[facility].  TUNNELED CATHETER RT UPPER CHEST    Hyperlipidemia 1990    on Meds    Hypertension 1990    on Meds    Intercritical gout     on Meds    Joint pain, knee 01/13/2017    baldo knee synvisc-1 injections    Long-term insulin use ([address] 1/17/2017    Major depressive disorder with single episode, in partial remission ([address] 1/3/2017    Mobility impaired     Morbid obesity ([address]     Muscle weakness     GRNERALIZED    Obesity     Obstructive sleep apnea     HEATHER on CPAP     On CPAP-TO BRING DOS    Paroxysmal atrial fibrillation ([address] 9/6/2017    Respiratory failure ([address] 03/2016    with open heart surgery, trached x 5 months    S/P cardiac cath     Seborrhea     Severe aortic stenosis 3/9/2016    Severe mitral valve stenosis 3/9/2016    Severe sepsis ([address] 4/3/2016    Skin rash     ABD FOLDS    Stasis dermatitis     Thyroid disease     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 lft thumb     Venous stasis dermatitis     Wears glasses     Wheelchair bound     pivots with 2 assists       Past Surgical  History:     Past Surgical History:   Procedure Laterality Date    AORTIC VALVE REPLACEMENT  03/18/2016    bioprosthetic    ARM SURGERY Left 1990    CARDIAC CATHETERIZATION      CENTRAL VENOUS CATHETER Right 02/21/2018    DIALYSIS CATHETER Dr Tati Rubi COLONOSCOPY  11/19/09    COLONOSCOPY N/A 10/12/2018    COLONOSCOPY WITH BIOPSY performed by Omid Pryor DO at [address] Right 3/10/2019    WASHOUT RIGHT LOWER EXTREMITY WITH WOUND VAC EXCHANGE performed by Isidro Godoy MD at [address] [place] [address] EXPLORATION OF WOUND OF EXTREMITY Right 3/8/2019    RIGHT LOWER EXTREMITY EXPLORATION, HEMATOMA EVACUATION, WOUND VAC PLACEMENT performed by David Mirza MD at [address] ([id number] surgeries)    several surgeries on left arm    KNEE ARTHROSCOPY Left 09/17/99    MITRAL VALVE REPLACEMENT  03/18/2016    bioprosthetic     NASAL SEPTUM SURGERY      OTHER SURGICAL HISTORY  3/18/16    Aortic root Enlargement    OTHER SURGICAL HISTORY  3/18/16    ASD Closure    OTHER SURGICAL HISTORY Right 01/09/2017    AV fistula creation, wrist    OTHER SURGICAL HISTORY Right 08/29/2017    ligation of collateral branch of av fistula right wrist    OTHER SURGICAL HISTORY  04/05/2018    FISTULAGRAM WITH DR. Martinez Lobe    ME EGD TRANSORAL BIOPSY SINGLE/MULTIPLE N/A 10/17/2017    EGD BIOPSY performed by Jose Manuel Cochran MD at [facility]    ME LIGATN ANGIOACCESS AV FISTULA Right 8/29/2017     RIGHT  LOWER ARM AV FISTULA  LIGATION OF AQUIRED BRANCHES X2 performed by Elgin Lee DO at [address] [facility]  3/18/16    median    VASCULAR SURGERY  12/06/2018    Right arm fistulagram,  PTA cephalic vein stenosis  /  DR [facility]       Medications:      ALPRAZolam  0.5 mg Oral BID    apixaban  5 mg Oral BID    aspirin  81 mg Oral Daily    atorvastatin  10 mg Oral Daily    buPROPion  150 mg Oral QAM    donepezil  5 mg Oral QPM    fluticasone  2 spray Nasal Daily    folic acid  1 mg Oral Daily    insulin glargine  25 Units Subcutaneous BID    levothyroxine  25 mcg Oral Daily    midodrine  10 mg Oral TID    sertraline  100 mg Oral Daily    sevelamer  800 mg Oral TID    sodium chloride flush  10 mL Intravenous 2 times per day    vancomycin  500 mg Intravenous Once    vancomycin (VANCOCIN) intermittent dosing (placeholder)   Other RX Placeholder       Social History:     Social History     Socioeconomic History    Marital status:      Spouse name: Junior Arroyo Number of children: 2    Years of education: Not on file    Highest education level: Not on file   Occupational History    Occupation: Retired      Employer: [address] resource strain: Not on file    Food insecurity:     Worry: Not on file     Inability: Not on file    Transportation needs:     Medical: Not on file     Non-medical: Not on file   Tobacco Use    Smoking status: Former Smoker     Packs/day: 2.00     Years: 25.00     Pack years: 50.00     Types: Cigarettes     Start date: 3/17/1955     Last attempt to quit: 1980     Years since quittin.2    Smokeless tobacco: Never Used   Substance and Sexual Activity    Alcohol use: No     Alcohol/week: 0.0 oz     Comment: 1-2 drinks per year    Drug use: No    Sexual activity: Never   Lifestyle    Physical activity:     Days per week: Not on file     Minutes per session: Not on file    Stress: Not on file   Relationships    Social connections:     Talks on phone: Not on file     Gets together: Not on file     Attends Hinduism service: Not on file     Active member of club or organization: Not on file     Attends meetings of clubs or organizations: Not on file     Relationship status: Not on file    Intimate partner violence:     Fear of current or ex partner: Not on file     Emotionally abused: Not on file     Physically abused: Not on file     Forced sexual activity: Not on file   Other Topics Concern    Not on file   Social History Narrative    Not on file       Family History:     Family History   Problem Relation Age of Onset    Lung Cancer Mother     Diabetes Mother     Alzheimer's Disease Father     Diabetes Maternal Grandmother     High Blood Pressure Sister         Allergies:   Percocet [oxycodone-acetaminophen] and Ampicillin     Review of Systems:     Review of Systems   Constitutional: Negative for activity change and appetite change. HENT: Negative for congestion. Eyes: Negative for pain. Cardiovascular: Negative for chest pain. Gastrointestinal: Negative for abdominal distention. Endocrine: Negative for cold intolerance. Genitourinary: Negative for flank pain. No urine at all   Musculoskeletal: Negative for arthralgias. Skin: Positive for color change and wound. Allergic/Immunologic: Negative for immunocompromised state. Neurological: Negative for facial asymmetry and light-headedness. Hematological: Bruises/bleeds easily. Psychiatric/Behavioral: Negative for agitation. Physical Examination :     Patient Vitals for the past 8 hrs:   BP Temp Temp src Pulse Resp SpO2 Weight   03/22/19 0800 116/67 99.7 °F (37.6 °C) -- 99 20 96 % --   03/22/19 0616 -- -- -- -- -- -- (!) 304 lb 7.3 oz (138.1 kg)   03/22/19 0439 (!) 142/65 98.7 °F (37.1 °C) Axillary 92 22 100 % --       Physical Exam   Constitutional: He is oriented to person, place, and time. He appears well-developed and well-nourished. No distress. HENT:   Head: Normocephalic and atraumatic. Mouth/Throat: No oropharyngeal exudate. Eyes: Conjunctivae are normal. No scleral icterus. Neck: Neck supple. No tracheal deviation present. Cardiovascular: Normal rate and regular rhythm. Exam reveals no friction rub. No murmur heard. Pulmonary/Chest: No stridor. No respiratory distress. Abdominal: Soft. He exhibits no distension. There is no tenderness. Genitourinary:   Genitourinary Comments: no urine - invaginated penis - scrotum not swollen   Musculoskeletal: He exhibits no edema or deformity. Neurological: He is alert and oriented to person, place, and time. No cranial nerve deficit. Skin: Skin is warm and dry. No erythema. Wound on the sacral area, excoriation  Right groin skin superf ulcer clean  Right groin hematoma wo open area stable  Right lower leg ulcer clean and large     Psychiatric: He has a normal mood and affect.  His behavior is normal.         Medical Decision Making:   I have independently reviewed/ordered the following labs:    CBC with Differential:   Recent Labs     03/21/19  1040 03/22/19  0856   WBC 10.1 8.8   HGB 8.6* 8.1*   HCT 27.1* 27.2*    157   LYMPHOPCT 7*  --    MONOPCT 11  --      BMP:  Recent Labs     03/21/19  1040      K 4.5   CL 96*   CO2 30   BUN 45*   CREATININE 4.00*     Hepatic Function Panel: No results for input(s): PROT, LABALBU, BILIDIR, IBILI, BILITOT, ALKPHOS, ALT, AST in the last 72 hours. No results for input(s): RPR in the last 72 hours. No results for input(s): HIV in the last 72 hours. No results for input(s): BC in the last 72 hours. Lab Results   Component Value Date    CREATININE 4.00 03/21/2019    GLUCOSE 145 03/21/2019       Detailed results: Thank you for allowing us to participate in the care of this patient. Please call with questions. This note is created with the assistance of a speech recognition program.  While intending to generate adocument that actually reflects the content of the visit, the document can still have some errors including those of syntax and sound a like substitutions which may escape proof reading. It such instances, actual meaningcan be extrapolated by contextual diversion.     Belkis Mota MD  Office: [phone]

## 2019-03-22 NOTE — PROGRESS NOTES
Nephrology Progress Note        Subjective: patient evaluated on dialysis. Pt is stable on dialysis. Access cannulated without problems. No new issues overnite. Stable hemodynamics. The patient came back to the hospital after his wife believed he was not acting appropriately. However, the reason for admission was to monitor her right femoral hematoma. He is awake and alert and comfortable on dialysis today. He has a right arm AV fistula that is cannulated without difficulty. His labs and bath and fluid removal are reviewed with the dialysis nurse at the bedside in the unit.         Objective:  CURRENT TEMPERATURE:  Temp: 97.9 °F (36.6 °C)  MAXIMUM TEMPERATURE OVER 24HRS:  Temp (24hrs), Av.3 °F (36.8 °C), Min:97.4 °F (36.3 °C), Max:99.7 °F (37.6 °C)    CURRENT RESPIRATORY RATE:  Resp: 20  CURRENT PULSE:  Pulse: 94  CURRENT BLOOD PRESSURE:  BP: 137/79  24HR BLOOD PRESSURE RANGE:  Systolic (81JSV), SAB:462 , Min:112 , NOC:775   ; Diastolic (30MCK), THL:24, Min:50, Max:79    24HR INTAKE/OUTPUT:      Intake/Output Summary (Last 24 hours) at 3/22/2019 1607  Last data filed at 3/22/2019 0617  Gross per 24 hour   Intake 450 ml   Output --   Net 450 ml     Patient Vitals for the past 96 hrs (Last 3 readings):   Weight   19 1503 (!) 304 lb 7.3 oz (138.1 kg)   19 0616 (!) 304 lb 7.3 oz (138.1 kg)   19 1803 (!) 307 lb 1.6 oz (139.3 kg)         Physical Exam:  General appearance:Awake, alert, in no acute distress  Skin: warm This is some mildly reddened, moist areas in the skin folds and right groin hematoma  Eyes: conjunctivae pale and sclera anicteric  ENT:moist mucus membranes  Neck:  No JVD appreciated, Midline trachea, no accessory muscle use  Pulmonary: clear to auscultation and no wheezing, rales or rhonchi anteriorly or laterally  Cardiovascular: Regular rate and rhythm with positive S1 and S2 and no rubs  Abdomen: soft nontender, bowel sounds present, obese,  no ascites   Extremities: mild lower extremity edema    Access:  previous  Right AV fistula    Current Medications:      0.9 % sodium chloride bolus PRN   0.9 % sodium chloride bolus PRN   guaiFENesin (ROBITUSSIN) 100 MG/5ML oral solution 200 mg 4x Daily PRN   miconazole (MICOTIN) 2 % powder BID   gabapentin (NEURONTIN) capsule 300 mg Daily   ipratropium-albuterol (DUONEB) nebulizer solution 3 mL Q6H PRN   metroNIDAZOLE (FLAGYL) tablet 500 mg TID   [START ON 3/23/2019] atorvastatin (LIPITOR) tablet 40 mg Nightly   glucose (GLUTOSE) 40 % oral gel 15 g PRN   dextrose 50 % solution 12.5 g PRN   glucagon (rDNA) injection 1 mg PRN   dextrose 5 % solution PRN   albuterol (PROVENTIL) nebulizer solution 2.5 mg Q8H PRN   ALPRAZolam (XANAX) tablet 0.5 mg BID   apixaban (ELIQUIS) tablet 5 mg BID   aspirin EC tablet 81 mg Daily   buPROPion (WELLBUTRIN XL) extended release tablet 150 mg QAM   donepezil (ARICEPT) tablet 5 mg QPM   fluticasone (FLONASE) 50 MCG/ACT nasal spray 2 spray Daily   folic acid (FOLVITE) tablet 1 mg Daily   insulin glargine (LANTUS) injection vial 25 Units BID   levothyroxine (SYNTHROID) tablet 25 mcg Daily   midodrine (PROAMATINE) tablet 10 mg TID   sertraline (ZOLOFT) tablet 100 mg Daily   sevelamer (RENVELA) tablet 800 mg TID   sodium chloride flush 0.9 % injection 10 mL 2 times per day   sodium chloride flush 0.9 % injection 10 mL PRN   potassium chloride (KLOR-CON M) extended release tablet 40 mEq PRN   Or    potassium chloride (KLOR-CON) packet 40 mEq PRN   Or    potassium chloride 10 mEq/100 mL IVPB (Peripheral Line) PRN   magnesium hydroxide (MILK OF MAGNESIA) 400 MG/5ML suspension 30 mL Daily PRN   ondansetron (ZOFRAN) injection 4 mg Q6H PRN   nicotine (NICODERM CQ) 21 MG/24HR 1 patch Daily PRN   acetaminophen (TYLENOL) tablet 650 mg Q4H PRN   glucose (GLUTOSE) 40 % oral gel 15 g PRN   dextrose 50 % solution 12.5 g PRN   glucagon (rDNA) injection 1 mg PRN   dextrose 5 % solution PRN   vancomycin (VANCOCIN) 500 mg in dextrose 5 % 100 mL IVPB (mini-bag) Once   vancomycin (VANCOCIN) intermittent dosing (placeholder) RX Placeholder     Labs:   CBC: Recent Labs     03/21/19  1040 03/22/19  0856   WBC 10.1 8.8   RBC 2.68* 2.59*   HGB 8.6* 8.1*   HCT 27.1* 27.2*    157   MPV 7.9 9.9      BMP: Recent Labs     03/21/19  1040 03/22/19  0856    137   K 4.5 4.5   CL 96* 97*   CO2 30 23   BUN 45* 60*   CREATININE 4.00* 5.14*   GLUCOSE 145* 180*   CALCIUM 8.6 8.4*        Phosphorus:  No results for input(s): PHOS in the last 72 hours. Magnesium: No results for input(s): MG in the last 72 hours. Albumin: No results for input(s): LABALBU in the last 72 hours. Dialysis bath:      Radiology:  Reviewed as available. Assessment:  1 ESRD:dialysis bath reviewed with nurse. 2. Essential hypertension with reasonable control  3. Type 2 DM  4. Mild Volume overload:  5. ANEMIA OF CHRONIC DISEASE: Hemoglobin below target at 8.2 g/dL  6. SECONDARY HYPERPARATHYROIDISM  7. Functional right forearm AV fistula    Plan:  1. Weight removal and dialysis orders reviewed. 2. Continue on a Monday and Wednesday and Friday dialysis schedule  3. Outpatient dialysis facility is [facility]  4. Follow up labs ordered. Please do not hesitate to call with questions.     Electronically signed by Kim Bunch MD on 3/22/2019 at 4:07 PM

## 2019-03-22 NOTE — H&P
Internal Medicine - History and Physical Examination    Patient's name:  José Miguel Hunt  Medical Record Number: [id number]  Patient's account/billing number: [de-identified]  Patient's YOB: 1945  Age: 75 y.o. Date of Admission: 3/21/2019  5:52 PM  Primary Care Physician: Lamar Murray DO    Code Status: Full Code    Chief complaint: Groin Swelling    HISTORY OF PRESENT ILLNESS:   History was obtained from chart review and the patient. José Miguel Hunt is a 75 y.o. male who came in to the hospital with complaints of right groin swelling where central line was placed almost 2 weeks ago. He also has some complaints of pain in the right knee. US of the right groin was done which showed fluid collection which was complex elliptical shaped 12.6x12.3x6.8 cm identified in the right medial groin with internal echos and septations. The patient initially presented to [facility] ED but then came to the [facility] ED as he was recently admitted here. The patient was recently admitted for wound of the right lower leg and hematoma evacuation. Wound case has been following the patient. He has a PMH of mitral and aortic bioprosthetic valve replacement as well as Atrial fib discharged on Eliquis recently. He is also a known case of DM Type 2 with an HbA1c of 5.78 recently. He is ESRD and goes to dialysis every M,W,F.     Past Medical History:        Diagnosis Date    Anemia in chronic kidney disease (CKD) 4/27/2016    Arthritis     Bacteremia 11/11/2016    Benign prostatic hyperplasia without lower urinary tract symptoms     BMI 40.0-44.9, adult ([address] 3/19/2018    Cellulitis of left lower extremity     Chronic cerebral ischemia 1/3/2017    Closed fracture of forearm 8/13/2013    Broken lft forearm     Controlled type 2 diabetes mellitus with chronic kidney disease on chronic dialysis, with long-term current use of insulin ([address]     Controlled type 2 diabetes mellitus with diabetic polyneuropathy, History:        Procedure Laterality Date    AORTIC VALVE REPLACEMENT  03/18/2016    bioprosthetic    ARM SURGERY Left 1990    CARDIAC CATHETERIZATION      CENTRAL VENOUS CATHETER Right 02/21/2018    DIALYSIS CATHETER Dr Rachel Patel    COLONOSCOPY  11/19/09    COLONOSCOPY N/A 10/12/2018    COLONOSCOPY WITH BIOPSY performed by Paula Montgomery DO at [address] [facility] Right 3/10/2019    WASHOUT RIGHT LOWER EXTREMITY WITH WOUND VAC EXCHANGE performed by David Mirza MD at [address] [facility] Right 3/8/2019    RIGHT LOWER EXTREMITY EXPLORATION, HEMATOMA EVACUATION, WOUND VAC PLACEMENT performed by David Mirza MD at [address] (x10-12 surgeries)    several surgeries on left arm    KNEE ARTHROSCOPY Left 09/17/99    MITRAL VALVE REPLACEMENT  03/18/2016    bioprosthetic     NASAL SEPTUM SURGERY      OTHER SURGICAL HISTORY  3/18/16    Aortic root Enlargement    OTHER SURGICAL HISTORY  3/18/16    ASD Closure    OTHER SURGICAL HISTORY Right 01/09/2017    AV fistula creation, wrist    OTHER SURGICAL HISTORY Right 08/29/2017    ligation of collateral branch of av fistula right wrist    OTHER SURGICAL HISTORY  04/05/2018    FISTULAGRAM WITH DR. Martinez Lobe    AK EGD TRANSORAL BIOPSY SINGLE/MULTIPLE N/A 10/17/2017    EGD BIOPSY performed by Jose Manuel Cochran MD at [address] ANGIOACCESS AV FISTULA Right 8/29/2017     RIGHT  LOWER ARM AV FISTULA  LIGATION OF AQUIRED BRANCHES X2 performed by Elgin Lee DO at [address]  3/18/16    median    VASCULAR SURGERY  12/06/2018    Right arm fistulagram,  PTA cephalic vein stenosis  /  DR Karen Gordon       Allergies: Allergies   Allergen Reactions    Percocet [Oxycodone-Acetaminophen] Itching and Rash     Also causes shaking    Ampicillin Rash         Home Meds:   Prior to Admission medications    Medication Sig Start Date End Date Taking?  Authorizing Provider   nystatin (MYCOSTATIN) POWD powder Apply topically 3 times daily Indications: x 10 days    Historical Provider, MD   apixaban (ELIQUIS) 5 MG TABS tablet Take 1 tablet by mouth 2 times daily 3/19/19   Jatinder Meza MD   gabapentin (NEURONTIN) 300 MG capsule Take 1 capsule by mouth daily for 30 days. 3/20/19 4/19/19  Jatinder Meza MD   midodrine (PROAMATINE) 10 MG tablet Take 1 tablet by mouth 3 times daily 3/19/19   Jatinder Meza MD   folic acid (FOLVITE) 1 MG tablet Take 1 tablet by mouth daily 3/19/19   Jatinder Meza MD   insulin glargine (LANTUS) 100 UNIT/ML injection vial Inject 25 Units into the skin 2 times daily 3/19/19   Jatinder Meza MD   insulin lispro (HUMALOG) 100 UNIT/ML injection vial Inject 0-18 Units into the skin 3 times daily (with meals) 3/19/19   Jatinder Meza MD   insulin lispro (HUMALOG) 100 UNIT/ML injection vial Inject 0-9 Units into the skin nightly 3/19/19   Jatinder Meza MD   metroNIDAZOLE (FLAGYL) 500 MG tablet Take 1 tablet by mouth 3 times daily for 7 days 3/19/19 3/26/19  Jatinder Meza MD   HYDROcodone-acetaminophen (NORCO) 5-325 MG per tablet Take 1 tablet by mouth every 8 hours as needed for Pain for up to 7 days. 3/19/19 3/26/19  Jatinder Meza MD   ALPRAZolam [place]z [place]) 0.5 MG tablet Take 1 tablet by mouth 2 times daily for 7 days. 3/19/19 3/26/19  Jatinder Meza MD   Misc. Devices [facility]) MIS Use as directed 12/12/18   Octavio Mcarthur DO   buPROPion (WELLBUTRIN XL) 150 MG extended release tablet Take 150 mg by mouth every morning    Historical Provider, MD   sevelamer (RENVELA) 800 MG tablet Give 2 tabs po TID with meals and additional 1 tab po TID with snack. Patient taking differently: Take by mouth 3 times daily Give with snack.  7/29/18   Carlyon Schooling, APRN - CNP   Pollen Extracts (PROSTAT PO) Take 30 mLs by mouth three times daily    Historical Provider, MD   sertraline (ZOLOFT) 100 MG tablet Take 100 mg by mouth daily Historical Provider, MD   loratadine (CLARITIN) 10 MG capsule Take 10 mg by mouth    Historical Provider, MD   sodium chloride (OCEAN) 0.65 % nasal spray by Nasal route 10/24/16   Historical Provider, MD   lidocaine (XYLOCAINE) 5 % ointment Apply topically three times a week Apply topically as needed. DIALYSIS DAYS    Historical Provider, MD   Amino Acids-Protein Hydrolys (PRO-STAT PO) Take 30 mLs by mouth 3 times daily SUGAR FREE     Historical Provider, MD   guaiFENesin (ROBITUSSIN) 100 MG/5ML syrup Take 200 mg by mouth 4 times daily as needed for Cough    Historical Provider, MD   ipratropium-albuterol (DUONEB) 0.5-2.5 (3) MG/3ML SOLN nebulizer solution Inhale 1 vial into the lungs every 6 hours as needed     Historical Provider, MD   aspirin 81 MG tablet Take 81 mg by mouth daily    Historical Provider, MD   Skin Protectants, Misc.  (HYDROCERIN) CREA cream Apply topically nightly    Historical Provider, MD   glucagon 1 MG injection Infuse 1 kit intravenously as needed    Historical Provider, MD   donepezil (ARICEPT) 5 MG tablet Take 5 mg by mouth every evening 10/26/16   Historical Provider, MD   levothyroxine (SYNTHROID) 25 MCG tablet Take 1 tablet by mouth Daily 10/26/16   SHAMEKA Figueroa - CNP   albuterol (PROVENTIL) (2.5 MG/3ML) 0.083% nebulizer solution Take 3 mLs by nebulization every 8 hours as needed for Wheezing 10/24/16   Octavio Mcarthur DO   fluticasone (FLONASE) 50 MCG/ACT nasal spray 2 sprays by Nasal route daily 10/24/16   Octavio Mcarthur DO   glucose (GLUTOSE 15) 40 % GEL Take 15 g by mouth as needed (hypoglycemia) 15 gram oral as needed if blood sugar is less than 70 ( for hypoglycemia) 10/24/16   Octavio Mcarthur DO   atorvastatin (LIPITOR) 10 MG tablet Take 1 tablet by mouth daily 10/24/16   Octavio Mcarthur DO   acetaminophen (TYLENOL) 325 MG tablet Take 650 mg by mouth every 6 hours as needed for Pain or Fever     Historical Provider, MD   magnesium hydroxide (MILK OF MAGNESIA) 400 MG/5ML suspension Take 30 mLs by mouth daily as needed for Constipation 3/29/16   Julia Quevedo MD       Social History:   TOBACCO:   reports that he quit smoking about 39 years ago. His smoking use included cigarettes. He started smoking about 64 years ago. He has a 50.00 pack-year smoking history. He has never used smokeless tobacco.  ETOH:   reports that he does not drink alcohol. DRUGS:  reports that he does not use drugs. OCCUPATION:      Family History:       Problem Relation Age of Onset    Lung Cancer Mother     Diabetes Mother     Alzheimer's Disease Father     Diabetes Maternal Grandmother     High Blood Pressure Sister          REVIEW OF SYSTEMS (ROS):    General ROS: negative  ENT ROS: negative  Respiratory ROS: no cough, shortness of breath, or wheezing  Cardiovascular ROS: no chest pain or dyspnea on exertion  Gastrointestinal ROS: no abdominal pain, change in bowel habits, or black or bloody stools  Neurological ROS: no TIA or stroke symptoms  Endocrine ROS: negative  Genito-Urinary ROS: no dysuria, trouble voiding, or hematuria  Allergy and Immunology ROS: negative  Hematological and Lymphatic ROS: negative  Musculoskeletal ROS: Positive for pain in the right knee  Dermatological ROS: negative      Physical Exam:    Vitals: /67   Pulse 99   Temp 99.7 °F (37.6 °C) (Oral)   Resp 20   Ht 5' 7\" (1.702 m)   Wt (!) 304 lb 7.3 oz (138.1 kg)   SpO2 96%   BMI 47.68 kg/m²     Last Body weight:   Wt Readings from Last 3 Encounters:   03/22/19 (!) 304 lb 7.3 oz (138.1 kg)   03/21/19 (!) 307 lb (139.3 kg)   03/19/19 (!) 309 lb 4.8 oz (140.3 kg)       Body Mass Index : Body mass index is 47.68 kg/m².         PHYSICAL EXAMINATION :    General appearance - alert, well appearing, and in no distress and oriented to person, place, and time  Mental status - alert, oriented to person, place, and time  Head- atraumatic, normocephalic  Eyes - pupils equal and reactive, extraocular eye movements intact, sclera anicteric  Ears - hearing grossly normal bilaterally  Mouth - mucous membranes moist, pharynx normal without lesions  Neck - supple, no significant adenopathy, carotids upstroke normal bilaterally, no bruits  Chest - clear to auscultation, no wheezes, rales or rhonchi, symmetric air entry  Heart - normal rate, regular rhythm, normal S1, S2, no murmurs, rubs, clicks or gallops, no JVD  Abdomen - soft, +BS, nontender, nondistended, no masses or organomegaly   Neurological - alert, oriented, normal speech, no focal findings or movement disorder noted, cranial nerves II-XII grossly intact  Extremities - peripheral pulses normal, no pedal edema, no clubbing or cyanosis. Right groin area is fluctuant. Area is warm and slightly tender to touch. Opening for the previous central line can be seen.  No pus or any discharge noted  Skin - normal coloration and turgor, no rashes, no suspicious skin lesions noted     Any additional physical findings:    Laboratory findings:-    CBC:   Recent Labs     03/22/19  0856   WBC 8.8   HGB 8.1*        BMP:    Recent Labs     03/21/19  1040 03/22/19  0856    137   K 4.5 4.5   CL 96* 97*   CO2 30 23   BUN 45* 60*   CREATININE 4.00* 5.14*   GLUCOSE 145* 180*     S. Calcium:  Recent Labs     03/22/19  0856   CALCIUM 8.4*     S. Glucose:  Recent Labs     03/21/19  2020 03/22/19  0749 03/22/19  1124   POCGLU 132* 164* 167*     Thyroid functions:   Lab Results   Component Value Date    TSH 0.60 03/10/2019       Assessment and Plan     Principal Problem:    Cellulitis  Active Problems:    Anemia in chronic kidney disease, on chronic dialysis ([facility]    Essential hypertension    Mixed hyperlipidemia    Dementia    Major depressive disorder with single episode, in partial remission (HCC)    ESRD (end stage renal disease) on dialysis ([facility]    Paroxysmal atrial fibrillation (HCC)    BMI 40.0-44.9, adult ([facility]    Controlled type 2 diabetes mellitus with chronic kidney disease on chronic dialysis, with long-term current use of insulin (HCC)    Bruising  Resolved Problems:    * No resolved hospital problems. *    1. Cellulitis of the Right Groin with fluid collection  Started patient on Vancomycin. Pharmacy to dose. Trauma discussed with IR regarding drainage. Decision made not to drain and observe at this time. Will repeat US and check. 2. Anemia of Chronic Disease  Will continue to monitor Hb. Transfuse as needed. 3. ESRD  Dialysis every M,W,F. Nephrology on board. Midodrine 10 mg TD    4. Paroxysmal A Fib  Eliquis 5 mg BD. 5. Chronic Diarrhea  Flagyl 500 mg TD for 3 days    6. Hyperlipidemia  Lipitor 40 mg daily    7. Depression and Dementia  Xanax 0.5 mg BD. Wellbutrin  mg daily. Aricept 5 mg Every evening. Zoloft 100 mg daily. 8. DM Type 2  Lantus 25 mg BD. Low Dose SS. Hypoglycemia protocol. POCT Glucose AC HS.     9. Hypothyroidism  Synthroid 25 mcg daily    The primary Internal medicine team assigned to the patient will be following up the patient on the floor. The above mentioned assessment and plan will be reviewed by the Internal medicine attending, and can be changed or modified accordingly by the attending physician. Sherman Maradiaga MD  PGY-1, Department of Internal Medicine  [address] [place] [address]  3/22/2019 2:27 PM      Attending Physician Statement  I have discussed the care of Matt Herrera, including pertinent history and exam findings,  with the resident. I have seen and examined the patient and the key elements of all parts of the encounter have been performed by me. I agree with the assessment, plan and orders as documented by the resident.    Cassie Wellington MD

## 2019-03-23 LAB
ABSOLUTE EOS #: 0.19 K/UL (ref 0–0.4)
ABSOLUTE IMMATURE GRANULOCYTE: 0.1 K/UL (ref 0–0.3)
ABSOLUTE LYMPH #: 0.49 K/UL (ref 1–4.8)
ABSOLUTE MONO #: 0.49 K/UL (ref 0.1–0.8)
ANION GAP SERPL CALCULATED.3IONS-SCNC: 14 MMOL/L (ref 9–17)
BASOPHILS # BLD: 1 % (ref 0–2)
BASOPHILS ABSOLUTE: 0.1 K/UL (ref 0–0.2)
BUN BLDV-MCNC: 35 MG/DL (ref 8–23)
BUN/CREAT BLD: ABNORMAL (ref 9–20)
CALCIUM SERPL-MCNC: 8.1 MG/DL (ref 8.6–10.4)
CHLORIDE BLD-SCNC: 96 MMOL/L (ref 98–107)
CO2: 24 MMOL/L (ref 20–31)
CREAT SERPL-MCNC: 3.78 MG/DL (ref 0.7–1.2)
DIFFERENTIAL TYPE: ABNORMAL
EOSINOPHILS RELATIVE PERCENT: 2 % (ref 1–4)
FOLATE: 16 NG/ML
GFR AFRICAN AMERICAN: 19 ML/MIN
GFR NON-AFRICAN AMERICAN: 16 ML/MIN
GFR SERPL CREATININE-BSD FRML MDRD: ABNORMAL ML/MIN/{1.73_M2}
GFR SERPL CREATININE-BSD FRML MDRD: ABNORMAL ML/MIN/{1.73_M2}
GLUCOSE BLD-MCNC: 201 MG/DL (ref 75–110)
GLUCOSE BLD-MCNC: 218 MG/DL (ref 70–99)
HCT VFR BLD CALC: 25.9 % (ref 40.7–50.3)
HEMOGLOBIN: 7.8 G/DL (ref 13–17)
IMMATURE GRANULOCYTES: 1 %
LYMPHOCYTES # BLD: 5 % (ref 24–44)
MCH RBC QN AUTO: 32 PG (ref 25.2–33.5)
MCHC RBC AUTO-ENTMCNC: 30.1 G/DL (ref 28.4–34.8)
MCV RBC AUTO: 106.1 FL (ref 82.6–102.9)
MONOCYTES # BLD: 5 % (ref 1–7)
MORPHOLOGY: ABNORMAL
MORPHOLOGY: ABNORMAL
NRBC AUTOMATED: 0 PER 100 WBC
PDW BLD-RTO: 21.8 % (ref 11.8–14.4)
PLATELET # BLD: 201 K/UL (ref 138–453)
PLATELET ESTIMATE: ABNORMAL
PMV BLD AUTO: 9.8 FL (ref 8.1–13.5)
POTASSIUM SERPL-SCNC: 3.6 MMOL/L (ref 3.7–5.3)
RBC # BLD: 2.44 M/UL (ref 4.21–5.77)
RBC # BLD: ABNORMAL 10*6/UL
SEG NEUTROPHILS: 86 % (ref 36–66)
SEGMENTED NEUTROPHILS ABSOLUTE COUNT: 8.33 K/UL (ref 1.8–7.7)
SODIUM BLD-SCNC: 134 MMOL/L (ref 135–144)
VITAMIN B-12: 991 PG/ML (ref 232–1245)
WBC # BLD: 9.7 K/UL (ref 3.5–11.3)
WBC # BLD: ABNORMAL 10*3/UL

## 2019-03-23 PROCEDURE — 99232 SBSQ HOSP IP/OBS MODERATE 35: CPT | Performed by: INTERNAL MEDICINE

## 2019-03-23 PROCEDURE — 2580000003 HC RX 258: Performed by: FAMILY MEDICINE

## 2019-03-23 PROCEDURE — 94660 CPAP INITIATION&MGMT: CPT

## 2019-03-23 PROCEDURE — 6370000000 HC RX 637 (ALT 250 FOR IP): Performed by: FAMILY MEDICINE

## 2019-03-23 PROCEDURE — 1200000000 HC SEMI PRIVATE

## 2019-03-23 PROCEDURE — 36415 COLL VENOUS BLD VENIPUNCTURE: CPT

## 2019-03-23 PROCEDURE — 36600 WITHDRAWAL OF ARTERIAL BLOOD: CPT

## 2019-03-23 PROCEDURE — 6360000002 HC RX W HCPCS: Performed by: FAMILY MEDICINE

## 2019-03-23 PROCEDURE — 82947 ASSAY GLUCOSE BLOOD QUANT: CPT

## 2019-03-23 PROCEDURE — 82746 ASSAY OF FOLIC ACID SERUM: CPT

## 2019-03-23 PROCEDURE — 80048 BASIC METABOLIC PNL TOTAL CA: CPT

## 2019-03-23 PROCEDURE — 85025 COMPLETE CBC W/AUTO DIFF WBC: CPT

## 2019-03-23 PROCEDURE — 82607 VITAMIN B-12: CPT

## 2019-03-23 PROCEDURE — 6370000000 HC RX 637 (ALT 250 FOR IP): Performed by: STUDENT IN AN ORGANIZED HEALTH CARE EDUCATION/TRAINING PROGRAM

## 2019-03-23 RX ORDER — SEVELAMER CARBONATE 800 MG/1
1600 TABLET, FILM COATED ORAL
Status: DISCONTINUED | OUTPATIENT
Start: 2019-03-23 | End: 2019-03-25 | Stop reason: HOSPADM

## 2019-03-23 RX ADMIN — MICONAZOLE NITRATE: 20.6 POWDER TOPICAL at 21:15

## 2019-03-23 RX ADMIN — SERTRALINE 100 MG: 50 TABLET, FILM COATED ORAL at 08:52

## 2019-03-23 RX ADMIN — METRONIDAZOLE 500 MG: 500 TABLET, FILM COATED ORAL at 21:15

## 2019-03-23 RX ADMIN — VANCOMYCIN HYDROCHLORIDE 750 MG: 1 INJECTION, POWDER, LYOPHILIZED, FOR SOLUTION INTRAVENOUS at 00:40

## 2019-03-23 RX ADMIN — MIDODRINE HYDROCHLORIDE 10 MG: 5 TABLET ORAL at 14:21

## 2019-03-23 RX ADMIN — FOLIC ACID 1 MG: 1 TABLET ORAL at 08:52

## 2019-03-23 RX ADMIN — LEVOTHYROXINE SODIUM 25 MCG: 25 TABLET ORAL at 06:45

## 2019-03-23 RX ADMIN — ALPRAZOLAM 0.5 MG: 0.5 TABLET ORAL at 21:15

## 2019-03-23 RX ADMIN — INSULIN GLARGINE 25 UNITS: 100 INJECTION, SOLUTION SUBCUTANEOUS at 08:52

## 2019-03-23 RX ADMIN — ALPRAZOLAM 0.5 MG: 0.5 TABLET ORAL at 08:52

## 2019-03-23 RX ADMIN — GUAIFENESIN 200 MG: 200 SOLUTION ORAL at 14:21

## 2019-03-23 RX ADMIN — SEVELAMER CARBONATE 800 MG: 800 TABLET, FILM COATED ORAL at 12:18

## 2019-03-23 RX ADMIN — MIDODRINE HYDROCHLORIDE 10 MG: 5 TABLET ORAL at 08:52

## 2019-03-23 RX ADMIN — DONEPEZIL HYDROCHLORIDE 5 MG: 5 TABLET, FILM COATED ORAL at 17:03

## 2019-03-23 RX ADMIN — APIXABAN 5 MG: 5 TABLET, FILM COATED ORAL at 21:15

## 2019-03-23 RX ADMIN — BUPROPION HYDROCHLORIDE 150 MG: 150 TABLET, EXTENDED RELEASE ORAL at 08:52

## 2019-03-23 RX ADMIN — INSULIN GLARGINE 25 UNITS: 100 INJECTION, SOLUTION SUBCUTANEOUS at 21:15

## 2019-03-23 RX ADMIN — METRONIDAZOLE 500 MG: 500 TABLET, FILM COATED ORAL at 14:21

## 2019-03-23 RX ADMIN — DESMOPRESSIN ACETATE 40 MG: 0.2 TABLET ORAL at 21:15

## 2019-03-23 RX ADMIN — APIXABAN 5 MG: 5 TABLET, FILM COATED ORAL at 08:52

## 2019-03-23 RX ADMIN — GABAPENTIN 300 MG: 300 CAPSULE ORAL at 08:52

## 2019-03-23 RX ADMIN — MICONAZOLE NITRATE: 20.6 POWDER TOPICAL at 10:23

## 2019-03-23 RX ADMIN — SEVELAMER CARBONATE 1600 MG: 800 TABLET, FILM COATED ORAL at 17:03

## 2019-03-23 RX ADMIN — METRONIDAZOLE 500 MG: 500 TABLET, FILM COATED ORAL at 08:52

## 2019-03-23 RX ADMIN — FLUTICASONE PROPIONATE 2 SPRAY: 50 SPRAY, METERED NASAL at 08:52

## 2019-03-23 RX ADMIN — SEVELAMER CARBONATE 800 MG: 800 TABLET, FILM COATED ORAL at 08:52

## 2019-03-23 RX ADMIN — MIDODRINE HYDROCHLORIDE 10 MG: 5 TABLET ORAL at 21:15

## 2019-03-23 RX ADMIN — Medication 10 ML: at 21:16

## 2019-03-23 RX ADMIN — ASPIRIN 81 MG: 81 TABLET, COATED ORAL at 08:52

## 2019-03-23 ASSESSMENT — ENCOUNTER SYMPTOMS
COLOR CHANGE: 1
ABDOMINAL DISTENTION: 0
EYE PAIN: 0

## 2019-03-23 ASSESSMENT — PAIN DESCRIPTION - PAIN TYPE
TYPE: CHRONIC PAIN
TYPE: ACUTE PAIN

## 2019-03-23 ASSESSMENT — PAIN DESCRIPTION - ORIENTATION
ORIENTATION: RIGHT
ORIENTATION: RIGHT

## 2019-03-23 ASSESSMENT — PAIN SCALES - GENERAL
PAINLEVEL_OUTOF10: 3
PAINLEVEL_OUTOF10: 4
PAINLEVEL_OUTOF10: 3

## 2019-03-23 ASSESSMENT — PAIN DESCRIPTION - LOCATION
LOCATION: KNEE
LOCATION: LEG

## 2019-03-23 NOTE — PLAN OF CARE
Problem: Falls - Risk of:  Goal: Will remain free from falls  Description  Will remain free from falls  Outcome: Ongoing     Problem: Falls - Risk of:  Goal: Absence of physical injury  Description  Absence of physical injury  Outcome: Ongoing     Problem: Risk for Impaired Skin Integrity  Goal: Tissue integrity - skin and mucous membranes  Description  Structural intactness and normal physiological function of skin and  mucous membranes. Outcome: Ongoing     Problem: Pain:  Goal: Pain level will decrease  Description  Pain level will decrease  Outcome: Ongoing     Problem: Pain:  Goal: Control of acute pain  Description  Control of acute pain  Outcome: Ongoing     Problem: Pain:  Goal: Control of chronic pain  Description  Control of chronic pain  Outcome: Ongoing     Problem: Discharge Planning:  Goal: Discharged to appropriate level of care  Description  Discharged to appropriate level of care  Outcome: Ongoing     Problem:  Body Temperature - Imbalanced:  Goal: Ability to maintain a body temperature in the normal range will improve  Description  Ability to maintain a body temperature in the normal range will improve  Outcome: Ongoing     Problem: Mobility - Impaired:  Goal: Mobility will improve to maximum level  Description  Mobility will improve to maximum level  Outcome: Ongoing     Problem: Skin Integrity - Impaired:  Goal: Will show no infection signs and symptoms  Description  Will show no infection signs and symptoms  Outcome: Ongoing     Problem: Skin Integrity - Impaired:  Goal: Absence of new skin breakdown  Description  Absence of new skin breakdown  Outcome: Ongoing     Problem: Nutrition  Goal: Optimal nutrition therapy  Outcome: Ongoing

## 2019-03-23 NOTE — PROGRESS NOTES
glargine  25 Units Subcutaneous BID    levothyroxine  25 mcg Oral Daily    midodrine  10 mg Oral TID    sertraline  100 mg Oral Daily    sevelamer  800 mg Oral TID    sodium chloride flush  10 mL Intravenous 2 times per day    vancomycin  500 mg Intravenous Once    vancomycin (VANCOCIN) intermittent dosing (placeholder)   Other RX Placeholder       PRN Medications  sodium chloride 250 mL PRN   sodium chloride 150 mL PRN   guaiFENesin 200 mg 4x Daily PRN   ipratropium-albuterol 1 vial Q6H PRN   glucose 15 g PRN   dextrose 12.5 g PRN   glucagon (rDNA) 1 mg PRN   dextrose 100 mL/hr PRN   albuterol 2.5 mg Q8H PRN   sodium chloride flush 10 mL PRN   potassium chloride 40 mEq PRN   Or     potassium alternative oral replacement 40 mEq PRN   Or     potassium chloride 10 mEq PRN   magnesium hydroxide 30 mL Daily PRN   ondansetron 4 mg Q6H PRN   nicotine 1 patch Daily PRN   acetaminophen 650 mg Q4H PRN   glucose 15 g PRN   dextrose 12.5 g PRN   glucagon (rDNA) 1 mg PRN   dextrose 100 mL/hr PRN       Diagnostic Labs and Imaging:  CBC:  Recent Labs     03/21/19  1040 03/22/19  0856   WBC 10.1 8.8   HGB 8.6* 8.1*    157     BMP: Recent Labs     03/21/19  1040 03/22/19  0856    137   K 4.5 4.5   CL 96* 97*   CO2 30 23   BUN 45* 60*   CREATININE 4.00* 5.14*   GLUCOSE 145* 180*     Hepatic: No results for input(s): AST, ALT, ALB, BILITOT, ALKPHOS in the last 72 hours.     Assessment and Plan:     Principal Problem:    Cellulitis  Active Problems:    Anemia in chronic kidney disease, on chronic dialysis (HCC)    Essential hypertension    Mixed hyperlipidemia    Dementia    Major depressive disorder with single episode, in partial remission ([facility]    ESRD (end stage renal disease) on dialysis ([facility])    Paroxysmal atrial fibrillation (HCC)    BMI 40.0-44.9, adult ([facility]    Controlled type 2 diabetes mellitus with chronic kidney disease on chronic dialysis, with long-term current use of insulin ([facility]    Bruising Hematoma of groin  Resolved Problems:    * No resolved hospital problems. *    1. Cellulitis of the Right Groin with fluid collection  Started patient on Vancomycin. Pharmacy to dose. Trauma discussed with IR regarding drainage. Decision made not to drain and observe at this time. Will repeat US tomorrow and check.     2. Anemia of Chronic Disease  Will continue to monitor Hb. Transfuse as needed.     3. ESRD  Dialysis every M,W,F. Nephrology on board. Midodrine 10 mg TD     4. Paroxysmal A Fib  Eliquis 5 mg BD.     5. Chronic Diarrhea  Flagyl 500 mg TD for 3 days     6. Hyperlipidemia  Lipitor 40 mg daily     7. Depression and Dementia  Xanax 0.5 mg BD. Wellbutrin  mg daily. Aricept 5 mg Every evening. Zoloft 100 mg daily.     8. DM Type 2  Lantus 25 mg BD. Low Dose SS. Hypoglycemia protocol. POCT Glucose AC HS.      9. Hypothyroidism  Synthroid 25 mcg daily    Jatinder Meza MD  PGY-1, Department of Internal Medicine  [address] [place] [address]  3/23/2019 8:47 AM    Attending Physician Statement  I have discussed the care of Bulmaro Gerardo, including pertinent history and exam findings,  with the resident. I have seen and examined the patient and the key elements of all parts of the encounter have been performed by me. I agree with the assessment, plan and orders as documented by the resident.    Cassie Wellington MD

## 2019-03-23 NOTE — PROGRESS NOTES
Nephrology Progress Note    Subjective:   Patient seen and examined at [facility]. Had dialysis yesterday removed 3.5 L. Overall -2.5 L since admission. Next dialysis on Monday as per schedule. Has a right arm AV fistula. Right femoral hematoma remains stable. Oral intake remains good and no acute issues overnight. Vital signs stable.      Objective:  CURRENT TEMPERATURE:  Temp: 97.5 °F (36.4 °C)  MAXIMUM TEMPERATURE OVER 24HRS:  Temp (24hrs), Av.7 °F (36.5 °C), Min:97.3 °F (36.3 °C), Max:98.4 °F (36.9 °C)    CURRENT RESPIRATORY RATE:  Resp: 24  CURRENT PULSE:  Pulse: 81  CURRENT BLOOD PRESSURE:  BP: (!) 139/51  24HR BLOOD PRESSURE RANGE:  Systolic (41WRG), UOZ:071 , Min:104 , CLN:703   ; Diastolic (71TRQ), MMC:59, Min:50, Max:90    24HR INTAKE/OUTPUT:      Intake/Output Summary (Last 24 hours) at 3/23/2019 1009  Last data filed at 3/22/2019 1921  Gross per 24 hour   Intake 550 ml   Output 3490 ml   Net -2940 ml     Patient Vitals for the past 96 hrs (Last 3 readings):   Weight   19 0645 297 lb 11.2 oz (135 kg)   19 1921 297 lb 13.5 oz (135.1 kg)   19 1503 (!) 304 lb 7.3 oz (138.1 kg)         Physical Exam:  General appearance she was lying flat and sleeping  Skin: Warm to touch   Oh Eyes: conjunctivae pale and sclera anicteric  ENT: moist mucus membranes  Neck:  No JVD appreciated, Midline trachea, no accessory muscle use  Pulmonary: Auscultation and no wheezing or rales, + rhonchi  Cardiovascular: Regular rate and rhythm with positive S1 and S2 and no rubs  Abdomen: soft nontender, bowel sounds present, obese,  no ascites  Extremities: mild lower extremity edema    Access:  previous  Right AV fistula + thrill + bruit    Current Medications:      0.9 % sodium chloride bolus PRN   0.9 % sodium chloride bolus PRN   guaiFENesin (ROBITUSSIN) 100 MG/5ML oral solution 200 mg 4x Daily PRN   miconazole (MICOTIN) 2 % powder BID   gabapentin (NEURONTIN) capsule 300 mg Daily   ipratropium-albuterol (DUONEB) 03/21/19  1040 03/22/19  0856 03/23/19  1127    137 134*   K 4.5 4.5 3.6*   CL 96* 97* 96*   CO2 30 23 24   BUN 45* 60* 35*   CREATININE 4.00* 5.14* 3.78*   GLUCOSE 145* 180* 218*   CALCIUM 8.6 8.4* 8.1*        Radiology:  Reviewed as available. Assessment:  1 ESRD with the patient usually on a Monday and Wednesday and Friday dialysis at [facility]. 2. Essential hypertension with reasonable control  3. Type 2 DM  4. Mild Volume overload:  5. ANEMIA OF CHRONIC DISEASE: Hemoglobin below target at 8.1 g/dL  6. SECONDARY HYPERPARATHYROIDISM  7. Functional right forearm AV fistula  8. Right distal lower extremity hematoma     Plan:  1. Continue on hemodialysis Monday and Wednesday and Friday per schedule  3. Outpatient dialysis facility is [facility]. 4. Follow up labs ordered. Please do not hesitate to call with questions. Electronically signed by Lion Schaefer CNP, APRN - CNP on 3/23/2019 at 10:09 AM    Attending Physician Statement  I have discussed the care of Art Travis, including pertinent history and exam findings with the resident/fellow. I have reviewed the key elements of all parts of the encounter with the resident/fellow. I have seen and examined the patient with the resident/fellow. I agree with the assessment and plan and status of the problem list as documented.   Sarkis Oneill

## 2019-03-23 NOTE — CONSULTS
[facility] -   Infectious diseases evaluation  admission date 3/21/2019    reason for consultation:   Groin abscess    Impression :   Current:  · ESRD on HD  · Right . ower leg clean wound  · Post right lower leg drained large hematoma 3/9/19  · Current right groin non infected infected hematoma-   · site of a recent femoral line  · Size and color stable   · Chronic diarhea  · DM2    Discussion / summary of stay / plan of care   · No cellulitis lower back - has some stage 2 excoriations  · Old diarrhea stable, loose stools  · Right groin stable non infected hematoma  · Right groin superficial ulceration, clean but bleeding  · right lower leg wound clean, site of the recent drained S/C hematoma  · No fever no leukocytosis   Recommendations   · Monitor off antibiotics   · Wound care  · Office f/up in 2 weeks with Dr Duran Coburn for infection. Please call [phone] for appointment    Infection Control Recommendations   · [place] Precautions  · Contact Isolation   Antimicrobial Stewardship Recommendations   · Simplification of therapy  · Targeted therapy  Coordination ofOutpatient Care:   · Estimated Length of IV antimicrobials:  · Patient will need Midline / picc Catheter Insertion:   · Patient will need SNF:  · Patient will need outpatient wound care:     History of Present Illness:   Initial history:  Ty Rizvi is a 75y.o.-year-old male with end-stage renal disease on hemodialysis. He also has had a history of atrial fibrillation, severe aortic stenosis, severe mitral stenosis, and thyroid disease. He is wheelchair bound. Earlier this month,he come in because of a right lower extremity hematoma secondary to a fall. He is on anticoagulation with elevated INR. he had an exploration of his right lower extremity hematoma and VAC placement. Had the right groin femoral line placed, removed before discharge. Dennis Gonsalves He was discharged on 3/19.     He comes back to the emergency room 3/21/19, 2 to 3 days later because of swelling over the right groin and upper thigh-  An ultrasound of the area shows a large fluid collection and no DVT. The patient was suspected to have an abscess, admitted to medicine and we are consulted. No fever or chills, right groin hematoma stable in size, not warm and not looking infected - WBC normal.    Wound and groin evaluated w the stoma RN, groin hematoma is stable and not changed in size. Interval changes  3/23/2019   Afebrile  VS stable     The patient seen and evaluated at bedside. He is doing well with no acute issues or concerns today. He does not have any fevers, chills, cough, shortness of breath, chest pains or palpitations. Rt leg wound with pink base. Small amounts of slough    Off antibiotics. Plan to continue wound care. Summary of relevant labs:  Labs:  BUN 35  Cr 3.78  WBC 9.7  Hb 7.8  Plat 201    Micro:  Wound  · 3-21-19: Gram negative bacilli  Blood:  · 3-21-19: No growth  ·   Imaging:      I have personally reviewed the past medical history, past surgical history, medications, social history, and family history, and I haveupdated the database accordingly.   Past Medical History:     Past Medical History:   Diagnosis Date    Anemia in chronic kidney disease (CKD) 4/27/2016    Arthritis     Bacteremia 11/11/2016    Benign prostatic hyperplasia without lower urinary tract symptoms     BMI 40.0-44.9, adult ([address] 3/19/2018    Cellulitis of left lower extremity     Chronic cerebral ischemia 1/3/2017    Closed fracture of forearm 8/13/2013    Broken lft forearm     Controlled type 2 diabetes mellitus with chronic kidney disease on chronic dialysis, with long-term current use of insulin ([address]     Controlled type 2 diabetes mellitus with diabetic polyneuropathy, with long-term current use of insulin ([address] 10/7/2018    Decubital ulcer     NON OPEN BUTTOCKS    Dementia     memory problems    Dialysis patient ([address] 1/3/2017    Goes [place], [place], Sat in [place]    Difficult intravenous access     HAS NEEDED PICC TEAM IN THE PAST    Difficulty walking     WHEELCHAIR BOUND-PIVOTS WITH ASSIST O F 2    DJD (degenerative joint disease) of knee     Elbow, forearm, and wrist, abrasion or friction burn, without mention of infection 8/13/2013    Abrasions lft forearm     Encephalopathy acute 4/27/2016    Encounter regarding vascular access for dialysis for ESRD ([facility] 2/2/2017    ESRD (end stage renal disease) on dialysis ([facility] 1/17/2017    Forgetfulness     HAS SOME SHORT TERM MEMORY LOSS, QUYEN-WIFE IS LEGAL GUARDIAN    H/O transesophageal echocardiography ([facility]) for monitoring     Hemodialysis patient ([address] 11/11/2016    [facility] [place].  TUNNELED CATHETER RT UPPER CHEST    Hyperlipidemia 1990    on Meds    Hypertension 1990    on Meds    Intercritical gout     on Meds    Joint pain, knee 01/13/2017    baldo knee synvisc-1 injections    Long-term insulin use ([address] 1/17/2017    Major depressive disorder with single episode, in partial remission ([address] 1/3/2017    Mobility impaired     Morbid obesity ([address]     Muscle weakness     GRNERALIZED    Obesity     Obstructive sleep apnea     HEATHER on CPAP     On CPAP-TO BRING DOS    Paroxysmal atrial fibrillation ([address] 9/6/2017    Respiratory failure ([address] 03/2016    with open heart surgery, trached x 5 months    S/P cardiac cath     Seborrhea     Severe aortic stenosis 3/9/2016    Severe mitral valve stenosis 3/9/2016    Severe sepsis ([address] 4/3/2016    Skin rash     ABD FOLDS    Stasis dermatitis     Thyroid disease     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 lft thumb     Venous stasis dermatitis     Wears glasses     Wheelchair bound     pivots with 2 assists       Past Surgical  History:     Past Surgical History:   Procedure Laterality Date    AORTIC VALVE REPLACEMENT  03/18/2016    bioprosthetic    ARM SURGERY Left 1990    CARDIAC CATHETERIZATION Concern    Not on file   Social History Narrative    Not on file       Family History:     Family History   Problem Relation Age of Onset    Lung Cancer Mother     Diabetes Mother     Alzheimer's Disease Father     Diabetes Maternal Grandmother     High Blood Pressure Sister         Allergies:   Percocet [oxycodone-acetaminophen] and Ampicillin     Review of Systems:     Review of Systems   Constitutional: Negative for activity change and appetite change. HENT: Negative for congestion. Eyes: Negative for pain. Cardiovascular: Negative for chest pain. Gastrointestinal: Negative for abdominal distention. Endocrine: Negative for cold intolerance. Genitourinary: Negative for flank pain. No urine at all   Musculoskeletal: Negative for arthralgias. Skin: Positive for color change and wound. Allergic/Immunologic: Negative for immunocompromised state. Neurological: Negative for facial asymmetry and light-headedness. Hematological: Bruises/bleeds easily. Psychiatric/Behavioral: Negative for agitation. Physical Examination :     Patient Vitals for the past 8 hrs:   BP Temp Temp src Pulse Resp SpO2   03/23/19 1643 (!) 133/50 97.5 °F (36.4 °C) Axillary 75 20 100 %   03/23/19 1201 (!) 124/51 97.8 °F (36.6 °C) Oral 93 24 100 %       Physical Exam   Constitutional: He is oriented to person, place, and time. He appears well-developed and well-nourished. No distress. HENT:   Head: Normocephalic and atraumatic. Mouth/Throat: No oropharyngeal exudate. Eyes: Conjunctivae are normal. No scleral icterus. Neck: Neck supple. No tracheal deviation present. Cardiovascular: Normal rate and regular rhythm. Exam reveals no friction rub. No murmur heard. Pulmonary/Chest: No stridor. No respiratory distress. Abdominal: Soft. He exhibits no distension. There is no tenderness.    Genitourinary:   Genitourinary Comments: no urine - invaginated penis - scrotum not swollen   Musculoskeletal: He exhibits no edema or deformity. Neurological: He is alert and oriented to person, place, and time. No cranial nerve deficit. Skin: Skin is warm and dry. No erythema. Wound on the sacral area, excoriation  Right groin skin superf ulcer clean  Right groin hematoma wo open area stable  Right lower leg ulcer clean and large     Psychiatric: He has a normal mood and affect. His behavior is normal.         Medical Decision Making:   I have independently reviewed/ordered the following labs:    CBC with Differential:   Recent Labs     03/21/19  1040 03/22/19  0856 03/23/19  1127   WBC 10.1 8.8 9.7   HGB 8.6* 8.1* 7.8*   HCT 27.1* 27.2* 25.9*    157 201   LYMPHOPCT 7*  --  5*   MONOPCT 11  --  5     BMP:  Recent Labs     03/22/19  0856 03/23/19  1127    134*   K 4.5 3.6*   CL 97* 96*   CO2 23 24   BUN 60* 35*   CREATININE 5.14* 3.78*     Hepatic Function Panel: No results for input(s): PROT, LABALBU, BILIDIR, IBILI, BILITOT, ALKPHOS, ALT, AST in the last 72 hours. No results for input(s): RPR in the last 72 hours. No results for input(s): HIV in the last 72 hours. No results for input(s): BC in the last 72 hours. Lab Results   Component Value Date    CREATININE 3.78 03/23/2019    GLUCOSE 218 03/23/2019       Detailed results:  EXAMINATION:   SINGLE XRAY VIEW OF THE CHEST       3/21/2019 11:00 am       COMPARISON:   Chest radiograph dated 03/09/2019       HISTORY:   ORDERING SYSTEM PROVIDED HISTORY: cough   TECHNOLOGIST PROVIDED HISTORY:   cough   Ordering Physician Provided Reason for Exam: Right central line removed from   right groin 2-3 days ago while at [facility]. V's.  Worsening swelling to right thigh,   groin, and penis.       FINDINGS:   Cardiomegaly similar to prior exam.  No infiltrates.  No effusions.  No   pneumothorax.  No free air below the diaphragm.       Interval removal of endotracheal and nasogastric tubes.           Impression   No acute findings in the chest.               Thank you for allowing us to participate in the care of this patient. Please call with questions. This note is created with the assistance of a speech recognition program.  While intending to generate adocument that actually reflects the content of the visit, the document can still have some errors including those of syntax and sound a like substitutions which may escape proof reading. It such instances, actual meaningcan be extrapolated by contextual diversion.     Shyla Chin MD  Office: [phone]

## 2019-03-23 NOTE — PROGRESS NOTES
PROGRESS NOTE          PATIENT NAME: Sandra Burrows  MEDICAL RECORD NO. [id number]  DATE: 3/23/2019  SURGEON: Gm Aguillon  PRIMARY CARE PHYSICIAN: Gucci Byrne DO    HD: # 2    ASSESSMENT    Patient Active Problem List   Diagnosis    Traumatic amputation of thumb    Essential hypertension    Mixed hyperlipidemia    Obstructive sleep apnea    Morbid obesity ([facility]    Benign prostatic hyperplasia without lower urinary tract symptoms    Venous stasis dermatitis    Severe aortic stenosis    Severe mitral valve stenosis    Anemia in chronic kidney disease, on chronic dialysis ([facility]    Dialysis patient ([facility]    Dementia    Major depressive disorder with single episode, in partial remission ([facility]    Chronic cerebral ischemia    ESRD (end stage renal disease) on dialysis ([facility]    Long-term insulin use (HCC)    Paroxysmal atrial fibrillation ([facility]    BMI 40.0-44.9, adult ([facility]    Controlled type 2 diabetes mellitus with chronic kidney disease on chronic dialysis, with long-term current use of insulin (HCC)    Controlled type 2 diabetes mellitus with diabetic polyneuropathy, with long-term current use of insulin (HCC)    Dermatitis    Bruising    Hematoma of right lower extremity    Hematoma of groin    Acute blood loss anemia    Accidental fall from wheelchair    Traumatic hemorrhagic shock (HCC)    Cellulitis       MEDICAL DECISION MAKING AND PLAN    1. Continue medical management per primary team  2. Continue antibiotics  3. Patient on Eliquis and ASA  4. Right groin with concern for loculated abscess vs hematoma. Likely hematoma due to central line placement. 5. Discussed with IR and decision made to not drain hematoma. 6. Continue right lower extremity wound care, wound care following  7. Recommend groin care with daily cleaning and bathing of groin area, please keep clean and dry. Also apply warm compresses to the area.    8. Will repeat US of right groin 3/24 or 3/25 to make sure the area is not expanding. [facility] is is unchanged since yesterday. Tolerating full diet. No complaints currently. No acute events overnight. OBJECTIVE  VITALS: Temp: Temp: 97.5 °F (36.4 °C)Temp  Av.2 °F (36.8 °C)  Min: 97.3 °F (36.3 °C)  Max: 99.7 °F (64.0 °C) BP Systolic (92UJD), BEB:074 , Min:104 , ZCZ:835   Diastolic (44TSP), VZA:63, Min:50, Max:90   Pulse Pulse  Av.1  Min: 82  Max: 99 Resp Resp  Av.6  Min: 14  Max: 25 Pulse ox SpO2  Av.3 %  Min: 96 %  Max: 99 %  GENERAL: alert, no distress  LUNGS: clear to ausculation, without wheezes, rales or rhonci  HEART: normal rate and regular rhythm  ABDOMEN: soft, non-tender, non-distended, bowel sounds present in all 4 quadrants and no guarding or peritoneal signs present. EXTREMITY: no cyanosis, clubbing or edema  Skin: Area of induration palpable over the right medial groin; no obvious erythema or fluctuance palpable    I/O last 3 completed shifts: In: 1000 [P.O.:450]  Out: 3490     Drain/tube output: In: 550   Out: 3490     LAB:  CBC:   Recent Labs     19  1040 19  0856   WBC 10.1 8.8   HGB 8.6* 8.1*   HCT 27.1* 27.2*   .1* 105.0*    157     BMP:   Recent Labs     19  1040 19  0856    137   K 4.5 4.5   CL 96* 97*   CO2 30 23   BUN 45* 60*   CREATININE 4.00* 5.14*   GLUCOSE 145* 180*     COAGS: No results for input(s): APTT, PROT, INR in the last 72 hours. RADIOLOGY:      Jael Huerta DO  3/23/19, 6:44 AM     Trauma, Emergency and Critical Surgical Services  Attending Note      I have reviewed the above TECSS note(s) and confirmed the key elements of the medical history and physical exam. I have discussed the findings, established the care plan and recommendations with Resident, TECSS RN, bedside nurse. Seen and examined. WBC normal.  Continue local care.   Yobani Cormier MD  3/23/2019  10:56 AM

## 2019-03-23 NOTE — PROGRESS NOTES
Dialysis Post Treatment Note  Patient tolerated treatment well. Denies complaints at time of discharge.    Vitals:    03/22/19 1921   BP: 113/89   Pulse: 92   Resp:    Temp: 97.3 °F (36.3 °C)   SpO2:      Pre-Weight = 138.1 kg  Post-weight = Weight: 297 lb 13.5 oz (135.1 kg)  Total Liters Processed = Total Liters Processed (l/min): 104.7 l/min  Rinseback Volume (mL) = Rinseback Volume (ml): 370 ml  Net Removal (mL) = @[id number]  Length of treatment=240 minutes

## 2019-03-24 ENCOUNTER — APPOINTMENT (OUTPATIENT)
Dept: CT IMAGING | Age: 74
DRG: 919 | End: 2019-03-24
Attending: FAMILY MEDICINE
Payer: MEDICARE

## 2019-03-24 ENCOUNTER — APPOINTMENT (OUTPATIENT)
Dept: ULTRASOUND IMAGING | Age: 74
DRG: 919 | End: 2019-03-24
Attending: FAMILY MEDICINE
Payer: MEDICARE

## 2019-03-24 LAB
ABSOLUTE EOS #: 0.34 K/UL (ref 0–0.4)
ABSOLUTE IMMATURE GRANULOCYTE: 0 K/UL (ref 0–0.3)
ABSOLUTE LYMPH #: 1.01 K/UL (ref 1–4.8)
ABSOLUTE MONO #: 0.59 K/UL (ref 0.1–0.8)
ANION GAP SERPL CALCULATED.3IONS-SCNC: 13 MMOL/L (ref 9–17)
BASOPHILS # BLD: 1 % (ref 0–2)
BASOPHILS ABSOLUTE: 0.08 K/UL (ref 0–0.2)
BUN BLDV-MCNC: 60 MG/DL (ref 8–23)
BUN/CREAT BLD: ABNORMAL (ref 9–20)
CALCIUM SERPL-MCNC: 8.2 MG/DL (ref 8.6–10.4)
CHLORIDE BLD-SCNC: 95 MMOL/L (ref 98–107)
CO2: 23 MMOL/L (ref 20–31)
CREAT SERPL-MCNC: 4.67 MG/DL (ref 0.7–1.2)
DIFFERENTIAL TYPE: ABNORMAL
EOSINOPHILS RELATIVE PERCENT: 4 % (ref 1–4)
GFR AFRICAN AMERICAN: 15 ML/MIN
GFR NON-AFRICAN AMERICAN: 12 ML/MIN
GFR SERPL CREATININE-BSD FRML MDRD: ABNORMAL ML/MIN/{1.73_M2}
GFR SERPL CREATININE-BSD FRML MDRD: ABNORMAL ML/MIN/{1.73_M2}
GLUCOSE BLD-MCNC: 140 MG/DL (ref 75–110)
GLUCOSE BLD-MCNC: 150 MG/DL (ref 75–110)
GLUCOSE BLD-MCNC: 75 MG/DL (ref 75–110)
GLUCOSE BLD-MCNC: 90 MG/DL (ref 70–99)
HCT VFR BLD CALC: 26.2 % (ref 40.7–50.3)
HEMOGLOBIN: 7.9 G/DL (ref 13–17)
IMMATURE GRANULOCYTES: 0 %
LYMPHOCYTES # BLD: 12 % (ref 24–44)
MCH RBC QN AUTO: 31.6 PG (ref 25.2–33.5)
MCHC RBC AUTO-ENTMCNC: 30.2 G/DL (ref 28.4–34.8)
MCV RBC AUTO: 104.8 FL (ref 82.6–102.9)
MONOCYTES # BLD: 7 % (ref 1–7)
MORPHOLOGY: ABNORMAL
MORPHOLOGY: ABNORMAL
NRBC AUTOMATED: 0 PER 100 WBC
PDW BLD-RTO: 21.2 % (ref 11.8–14.4)
PLATELET # BLD: 230 K/UL (ref 138–453)
PLATELET ESTIMATE: ABNORMAL
PMV BLD AUTO: 9.7 FL (ref 8.1–13.5)
POTASSIUM SERPL-SCNC: 4.1 MMOL/L (ref 3.7–5.3)
RBC # BLD: 2.5 M/UL (ref 4.21–5.77)
RBC # BLD: ABNORMAL 10*6/UL
SEG NEUTROPHILS: 76 % (ref 36–66)
SEGMENTED NEUTROPHILS ABSOLUTE COUNT: 6.38 K/UL (ref 1.8–7.7)
SODIUM BLD-SCNC: 131 MMOL/L (ref 135–144)
WBC # BLD: 8.4 K/UL (ref 3.5–11.3)
WBC # BLD: ABNORMAL 10*3/UL

## 2019-03-24 PROCEDURE — 85025 COMPLETE CBC W/AUTO DIFF WBC: CPT

## 2019-03-24 PROCEDURE — 6370000000 HC RX 637 (ALT 250 FOR IP): Performed by: FAMILY MEDICINE

## 2019-03-24 PROCEDURE — 2580000003 HC RX 258: Performed by: FAMILY MEDICINE

## 2019-03-24 PROCEDURE — 80048 BASIC METABOLIC PNL TOTAL CA: CPT

## 2019-03-24 PROCEDURE — 99239 HOSP IP/OBS DSCHRG MGMT >30: CPT | Performed by: INTERNAL MEDICINE

## 2019-03-24 PROCEDURE — 99232 SBSQ HOSP IP/OBS MODERATE 35: CPT | Performed by: INTERNAL MEDICINE

## 2019-03-24 PROCEDURE — 76882 US LMTD JT/FCL EVL NVASC XTR: CPT

## 2019-03-24 PROCEDURE — 1200000000 HC SEMI PRIVATE

## 2019-03-24 PROCEDURE — 36415 COLL VENOUS BLD VENIPUNCTURE: CPT

## 2019-03-24 PROCEDURE — 6370000000 HC RX 637 (ALT 250 FOR IP): Performed by: STUDENT IN AN ORGANIZED HEALTH CARE EDUCATION/TRAINING PROGRAM

## 2019-03-24 PROCEDURE — 82947 ASSAY GLUCOSE BLOOD QUANT: CPT

## 2019-03-24 PROCEDURE — 6360000004 HC RX CONTRAST MEDICATION: Performed by: INTERNAL MEDICINE

## 2019-03-24 PROCEDURE — 74177 CT ABD & PELVIS W/CONTRAST: CPT

## 2019-03-24 PROCEDURE — 94660 CPAP INITIATION&MGMT: CPT

## 2019-03-24 RX ORDER — METRONIDAZOLE 500 MG/1
500 TABLET ORAL 3 TIMES DAILY
Qty: 3 TABLET | Refills: 0 | Status: SHIPPED | OUTPATIENT
Start: 2019-03-24 | End: 2019-03-25

## 2019-03-24 RX ORDER — HYDROCODONE BITARTRATE AND ACETAMINOPHEN 5; 325 MG/1; MG/1
1 TABLET ORAL ONCE
Status: COMPLETED | OUTPATIENT
Start: 2019-03-24 | End: 2019-03-24

## 2019-03-24 RX ORDER — MAGNESIUM HYDROXIDE 1200 MG/15ML
LIQUID ORAL
Status: DISPENSED
Start: 2019-03-24 | End: 2019-03-25

## 2019-03-24 RX ORDER — ATORVASTATIN CALCIUM 40 MG/1
40 TABLET, FILM COATED ORAL NIGHTLY
Qty: 30 TABLET | Refills: 3 | Status: SHIPPED | OUTPATIENT
Start: 2019-03-24

## 2019-03-24 RX ADMIN — MIDODRINE HYDROCHLORIDE 10 MG: 5 TABLET ORAL at 13:57

## 2019-03-24 RX ADMIN — METRONIDAZOLE 500 MG: 500 TABLET, FILM COATED ORAL at 22:35

## 2019-03-24 RX ADMIN — DESMOPRESSIN ACETATE 40 MG: 0.2 TABLET ORAL at 22:35

## 2019-03-24 RX ADMIN — GUAIFENESIN 200 MG: 200 SOLUTION ORAL at 15:13

## 2019-03-24 RX ADMIN — SERTRALINE 100 MG: 50 TABLET, FILM COATED ORAL at 10:01

## 2019-03-24 RX ADMIN — LEVOTHYROXINE SODIUM 25 MCG: 25 TABLET ORAL at 05:36

## 2019-03-24 RX ADMIN — Medication 10 ML: at 22:34

## 2019-03-24 RX ADMIN — FLUTICASONE PROPIONATE 2 SPRAY: 50 SPRAY, METERED NASAL at 10:03

## 2019-03-24 RX ADMIN — Medication 10 ML: at 10:05

## 2019-03-24 RX ADMIN — MICONAZOLE NITRATE: 20.6 POWDER TOPICAL at 10:03

## 2019-03-24 RX ADMIN — INSULIN GLARGINE 25 UNITS: 100 INJECTION, SOLUTION SUBCUTANEOUS at 22:35

## 2019-03-24 RX ADMIN — APIXABAN 5 MG: 5 TABLET, FILM COATED ORAL at 22:35

## 2019-03-24 RX ADMIN — MIDODRINE HYDROCHLORIDE 10 MG: 5 TABLET ORAL at 10:03

## 2019-03-24 RX ADMIN — IOVERSOL 75 ML: 741 INJECTION INTRA-ARTERIAL; INTRAVENOUS at 13:34

## 2019-03-24 RX ADMIN — SEVELAMER CARBONATE 1600 MG: 800 TABLET, FILM COATED ORAL at 18:50

## 2019-03-24 RX ADMIN — MIDODRINE HYDROCHLORIDE 10 MG: 5 TABLET ORAL at 22:34

## 2019-03-24 RX ADMIN — ASPIRIN 81 MG: 81 TABLET, COATED ORAL at 10:00

## 2019-03-24 RX ADMIN — METRONIDAZOLE 500 MG: 500 TABLET, FILM COATED ORAL at 10:03

## 2019-03-24 RX ADMIN — GABAPENTIN 300 MG: 300 CAPSULE ORAL at 10:01

## 2019-03-24 RX ADMIN — DONEPEZIL HYDROCHLORIDE 5 MG: 5 TABLET, FILM COATED ORAL at 22:35

## 2019-03-24 RX ADMIN — SEVELAMER CARBONATE 1600 MG: 800 TABLET, FILM COATED ORAL at 12:38

## 2019-03-24 RX ADMIN — MICONAZOLE NITRATE: 20.6 POWDER TOPICAL at 22:46

## 2019-03-24 RX ADMIN — ALPRAZOLAM 0.5 MG: 0.5 TABLET ORAL at 10:07

## 2019-03-24 RX ADMIN — HYDROCODONE BITARTRATE AND ACETAMINOPHEN 1 TABLET: 5; 325 TABLET ORAL at 16:58

## 2019-03-24 RX ADMIN — BUPROPION HYDROCHLORIDE 150 MG: 150 TABLET, EXTENDED RELEASE ORAL at 10:00

## 2019-03-24 RX ADMIN — METRONIDAZOLE 500 MG: 500 TABLET, FILM COATED ORAL at 13:57

## 2019-03-24 RX ADMIN — SEVELAMER CARBONATE 1600 MG: 800 TABLET, FILM COATED ORAL at 10:03

## 2019-03-24 RX ADMIN — APIXABAN 5 MG: 5 TABLET, FILM COATED ORAL at 10:01

## 2019-03-24 RX ADMIN — ACETAMINOPHEN 650 MG: 325 TABLET ORAL at 10:05

## 2019-03-24 RX ADMIN — INSULIN GLARGINE 25 UNITS: 100 INJECTION, SOLUTION SUBCUTANEOUS at 12:42

## 2019-03-24 RX ADMIN — FOLIC ACID 1 MG: 1 TABLET ORAL at 10:03

## 2019-03-24 ASSESSMENT — ENCOUNTER SYMPTOMS
EYE PAIN: 0
COLOR CHANGE: 1
ABDOMINAL DISTENTION: 0

## 2019-03-24 ASSESSMENT — PAIN DESCRIPTION - PROGRESSION
CLINICAL_PROGRESSION: GRADUALLY IMPROVING

## 2019-03-24 ASSESSMENT — PAIN SCALES - GENERAL
PAINLEVEL_OUTOF10: 4
PAINLEVEL_OUTOF10: 0
PAINLEVEL_OUTOF10: 10

## 2019-03-24 ASSESSMENT — PAIN DESCRIPTION - LOCATION: LOCATION: LEG

## 2019-03-24 ASSESSMENT — PAIN DESCRIPTION - FREQUENCY: FREQUENCY: INTERMITTENT

## 2019-03-24 ASSESSMENT — PAIN DESCRIPTION - ORIENTATION: ORIENTATION: RIGHT

## 2019-03-24 ASSESSMENT — PAIN DESCRIPTION - DESCRIPTORS: DESCRIPTORS: ACHING

## 2019-03-24 ASSESSMENT — PAIN DESCRIPTION - ONSET: ONSET: ON-GOING

## 2019-03-24 NOTE — CONSULTS
[facility] -   Progress Note    admission date 3/21/2019    reason for consultation:   Groin abscess    Impression :   Current:  · ESRD on HD  · Right . ower leg clean wound  · Post right lower leg drained large hematoma 3/9/19  · Current right groin non infected infected hematoma-   · site of a recent femoral line  · Size and color stable   · Chronic diarhea  · DM2    Discussion / summary of stay / plan of care   · No cellulitis lower back - has some stage 2 excoriations  · Old diarrhea stable, loose stools  · Right groin stable non infected hematoma  · Right groin superficial ulceration, clean but bleeding  · right lower leg wound clean, site of the recent drained S/C hematoma  · No fever no leukocytosis   Recommendations   · Monitor off antibiotics   · Wound care  · Office f/up in 2 weeks with Dr Izabella Abdi for infection. Please call [phone] for appointment    Infection Control Recommendations   · [place] Precautions  · Contact Isolation   Antimicrobial Stewardship Recommendations   · Simplification of therapy  · Targeted therapy  Coordination ofOutpatient Care:   · Estimated Length of IV antimicrobials:  · Patient will need Midline / picc Catheter Insertion:   · Patient will need SNF:  · Patient will need outpatient wound care:     History of Present Illness:   Initial history:  Venita Masterson is a 75y.o.-year-old male with end-stage renal disease on hemodialysis. He also has had a history of atrial fibrillation, severe aortic stenosis, severe mitral stenosis, and thyroid disease. He is wheelchair bound. Earlier this month,he come in because of a right lower extremity hematoma secondary to a fall. He is on anticoagulation with elevated INR. he had an exploration of his right lower extremity hematoma and VAC placement. Had the right groin femoral line placed, removed before discharge. Smith Gunn He was discharged on 3/19.     He comes back to the emergency room 3/21/19, 2 to 3 days later because of swelling over the right groin and upper thigh-  An ultrasound of the area shows a large fluid collection and no DVT. The patient was suspected to have an abscess, admitted to medicine and we are consulted. No fever or chills, right groin hematoma stable in size, not warm and not looking infected - WBC normal.    Wound and groin evaluated w the stoma RN, groin hematoma is stable and not changed in size. CURRENT EVALUATION :  3/24/2019   Afebrile  VS stable     The patient seen and evaluated at bedside. He is doing well with no acute issues or concerns today. He does not have any fevers, chills, cough, shortness of breath, chest pains or palpitations. Rt leg wound with pink base. Small amounts of slough    Off antibiotics. Plan to continue wound care. Summary of relevant labs:  Labs:  BUN 35  Cr 3.78  WBC 9.7  Hb 7.8  Plat 201    Micro:  Wound  · 3-21-19: Gram negative bacilli  Blood:  · 3-21-19: No growth  ·   Imaging:      I have personally reviewed the past medical history, past surgical history, medications, social history, and family history, and I haveupdated the database accordingly.   Past Medical History:     Past Medical History:   Diagnosis Date    Anemia in chronic kidney disease (CKD) 4/27/2016    Arthritis     Bacteremia 11/11/2016    Benign prostatic hyperplasia without lower urinary tract symptoms     BMI 40.0-44.9, adult ([address] 3/19/2018    Cellulitis of left lower extremity     Chronic cerebral ischemia 1/3/2017    Closed fracture of forearm 8/13/2013    Broken lft forearm     Controlled type 2 diabetes mellitus with chronic kidney disease on chronic dialysis, with long-term current use of insulin ([address]     Controlled type 2 diabetes mellitus with diabetic polyneuropathy, with long-term current use of insulin ([address] 10/7/2018    Decubital ulcer     NON OPEN BUTTOCKS    Dementia     memory problems    Dialysis patient [facility]) 1/3/2017    Jayden Solorio Sat VENOUS CATHETER Right 02/21/2018    DIALYSIS CATHETER Dr Mando Oglesby    COLONOSCOPY  11/19/09    COLONOSCOPY N/A 10/12/2018    COLONOSCOPY WITH BIOPSY performed by Susie Maddox DO at [facility] Right 3/10/2019    WASHOUT RIGHT LOWER EXTREMITY WITH WOUND VAC EXCHANGE performed by Fahad Acosta MD at [facility] Right 3/8/2019    RIGHT LOWER EXTREMITY EXPLORATION, HEMATOMA EVACUATION, WOUND VAC PLACEMENT performed by Fahad Acosta MD at [address] (x10-12 surgeries)    several surgeries on left arm    KNEE ARTHROSCOPY Left 09/17/99    MITRAL VALVE REPLACEMENT  03/18/2016    bioprosthetic     NASAL SEPTUM SURGERY      OTHER SURGICAL HISTORY  3/18/16    Aortic root Enlargement    OTHER SURGICAL HISTORY  3/18/16    ASD Closure    OTHER SURGICAL HISTORY Right 01/09/2017    AV fistula creation, wrist    OTHER SURGICAL HISTORY Right 08/29/2017    ligation of collateral branch of av fistula right wrist    OTHER SURGICAL HISTORY  04/05/2018    FISTULAGRAM WITH DR. Molina Goldman    [place] EGD TRANSORAL BIOPSY SINGLE/MULTIPLE N/A 10/17/2017    EGD BIOPSY performed by Aurea Diaz MD at [address] ANGIOACCESS AV FISTULA Right 8/29/2017     RIGHT  LOWER ARM AV FISTULA  LIGATION OF AQUIRED BRANCHES X2 performed by Lainey Gonzalez DO at [address]  3/18/16    median    VASCULAR SURGERY  12/06/2018    Right arm fistulagram,  PTA cephalic vein stenosis  /  DR Kavon Neal       Medications:      sevelamer  1,600 mg Oral TID WC    miconazole   Topical BID    gabapentin  300 mg Oral Daily    metroNIDAZOLE  500 mg Oral TID    atorvastatin  40 mg Oral Nightly    vancomycin  750 mg Intravenous Q MWF    ALPRAZolam  0.5 mg Oral BID    apixaban  5 mg Oral BID    aspirin  81 mg Oral Daily    buPROPion  150 mg Oral QAM    donepezil  5 mg Oral QPM    fluticasone  2 spray Nasal Daily    folic acid  1 mg Oral Daily    insulin glargine  25 Units Subcutaneous BID    levothyroxine  25 mcg Oral Daily    midodrine  10 mg Oral TID    sertraline  100 mg Oral Daily    sodium chloride flush  10 mL Intravenous 2 times per day    vancomycin  500 mg Intravenous Once    vancomycin (VANCOCIN) intermittent dosing (placeholder)   Other RX Placeholder       Social History:     Social History     Socioeconomic History    Marital status:      Spouse name: Janeen Ramirez Number of children: 2    Years of education: Not on file    Highest education level: Not on file   Occupational History    Occupation: Retired      Employer: [employer] resource strain: Not on file    Food insecurity:     Worry: Not on file     Inability: Not on file    Transportation needs:     Medical: Not on file     Non-medical: Not on file   Tobacco Use    Smoking status: Former Smoker     Packs/day: 2.00     Years: 25.00     Pack years: 50.00     Types: Cigarettes     Start date: 3/17/1955     Last attempt to quit: 1980     Years since quittin.2    Smokeless tobacco: Never Used   Substance and Sexual Activity    Alcohol use: No     Alcohol/week: 0.0 oz     Comment: 1-2 drinks per year    Drug use: No    Sexual activity: Never   Lifestyle    Physical activity:     Days per week: Not on file     Minutes per session: Not on file    Stress: Not on file   Relationships    Social connections:     Talks on phone: Not on file     Gets together: Not on file     Attends Congregational service: Not on file     Active member of club or organization: Not on file     Attends meetings of clubs or organizations: Not on file     Relationship status: Not on file    Intimate partner violence:     Fear of current or ex partner: Not on file     Emotionally abused: Not on file     Physically abused: Not on file     Forced sexual activity: Not on file   Other Topics Concern    Not on file   Social History Narrative    Not on file       Family History:     Family History   Problem Relation Age of Onset    Lung Cancer Mother     Diabetes Mother     Alzheimer's Disease Father     Diabetes Maternal Grandmother     High Blood Pressure Sister         Allergies:   Percocet [oxycodone-acetaminophen] and Ampicillin     Review of Systems:     Review of Systems   Constitutional: Negative for activity change and appetite change. HENT: Negative for congestion. Eyes: Negative for pain. Cardiovascular: Negative for chest pain. Gastrointestinal: Negative for abdominal distention. Endocrine: Negative for cold intolerance. Genitourinary: Negative for flank pain. No urine at all   Musculoskeletal: Negative for arthralgias. Skin: Positive for color change and wound. Allergic/Immunologic: Negative for immunocompromised state. Neurological: Negative for facial asymmetry and light-headedness. Hematological: Bruises/bleeds easily. Psychiatric/Behavioral: Negative for agitation. Physical Examination :     No data found. Physical Exam   Constitutional: He is oriented to person, place, and time. He appears well-developed and well-nourished. No distress. HENT:   Head: Normocephalic and atraumatic. Mouth/Throat: No oropharyngeal exudate. Eyes: Conjunctivae are normal. No scleral icterus. Neck: Neck supple. No tracheal deviation present. Cardiovascular: Normal rate and regular rhythm. Exam reveals no friction rub. No murmur heard. Pulmonary/Chest: No stridor. No respiratory distress. Abdominal: Soft. He exhibits no distension. There is no tenderness. Genitourinary:   Genitourinary Comments: no urine - invaginated penis - scrotum not swollen   Musculoskeletal: He exhibits no edema or deformity. Neurological: He is alert and oriented to person, place, and time. No cranial nerve deficit. Skin: Skin is warm and dry. No erythema.    Wound on the sacral area,

## 2019-03-24 NOTE — PROGRESS NOTES
Med rec done for the patient. Patient will go for CT scan today to evaluate the fluid collection. If stable and Gen Surg has no plan of any intervention, patient can be discharged today. RN notified.      Thurston Bernheim, MD  PGY-1, Department of Internal Medicine  [place], [place]  3/24/2019 11:15 AM

## 2019-03-24 NOTE — PROGRESS NOTES
Nephrology Progress Note    Subjective:   Patient seen and examined at [facility]. Had dialysis Friday and they removed 3.5 L. Overall -1.4 L since admission. Next dialysis on Monday as per schedule. Has a right arm AV fistula. Right femoral hematoma remains stable. Oral intake remains good and no acute issues overnight. Vital signs stable. Going for CT of right groin today to evaluate fluid collection of right groin. Objective:  CURRENT TEMPERATURE:  Temp: 97.6 °F (36.4 °C)  MAXIMUM TEMPERATURE OVER 24HRS:  Temp (24hrs), Av.7 °F (36.5 °C), Min:97.5 °F (36.4 °C), Max:97.8 °F (36.6 °C)    CURRENT RESPIRATORY RATE:  Resp: 18  CURRENT PULSE:  Pulse: 64  CURRENT BLOOD PRESSURE:  BP: (!) 111/52  24HR BLOOD PRESSURE RANGE:  Systolic (59NUI), JLY:134 , Min:111 , EBC:333   ; Diastolic (39LMZ), SYP:54, Min:50, Max:52    24HR INTAKE/OUTPUT:      Intake/Output Summary (Last 24 hours) at 3/24/2019 1128  Last data filed at 3/24/2019 1225  Gross per 24 hour   Intake 1050 ml   Output --   Net 1050 ml     Patient Vitals for the past 96 hrs (Last 3 readings):   Weight   19 0645 297 lb 11.2 oz (135 kg)   19 1921 297 lb 13.5 oz (135.1 kg)   19 1503 (!) 304 lb 7.3 oz (138.1 kg)         Physical Exam:  General appearance Awake, oriented, no acute distress noted.   Skin: Warm to touch   Oh Eyes: conjunctivae pale and sclera anicteric  ENT: moist mucus membranes  Neck:  No JVD appreciated, Midline trachea, no accessory muscle use  Pulmonary: Auscultation and no wheezing or rales, + rhonchi  Cardiovascular: Regular rate and rhythm with positive S1 and S2 and no rubs  Abdomen: soft nontender, bowel sounds present, obese,  no ascites  Extremities: mild lower extremity edema    Access:  previous  Right AV fistula + thrill + bruit    Current Medications:      sevelamer (RENVELA) tablet 1,600 mg TID WC   0.9 % sodium chloride bolus PRN   0.9 % sodium chloride bolus PRN   guaiFENesin (ROBITUSSIN) 100 MG/5ML oral solution 200 mg 4x Daily PRN   miconazole (MICOTIN) 2 % powder BID   gabapentin (NEURONTIN) capsule 300 mg Daily   ipratropium-albuterol (DUONEB) nebulizer solution 3 mL Q6H PRN   metroNIDAZOLE (FLAGYL) tablet 500 mg TID   atorvastatin (LIPITOR) tablet 40 mg Nightly   glucose (GLUTOSE) 40 % oral gel 15 g PRN   dextrose 50 % solution 12.5 g PRN   glucagon (rDNA) injection 1 mg PRN   dextrose 5 % solution PRN   vancomycin (VANCOCIN) 750 mg in dextrose 5 % 250 mL IVPB Q MWF   albuterol (PROVENTIL) nebulizer solution 2.5 mg Q8H PRN   ALPRAZolam (XANAX) tablet 0.5 mg BID   apixaban (ELIQUIS) tablet 5 mg BID   aspirin EC tablet 81 mg Daily   buPROPion (WELLBUTRIN XL) extended release tablet 150 mg QAM   donepezil (ARICEPT) tablet 5 mg QPM   fluticasone (FLONASE) 50 MCG/ACT nasal spray 2 spray Daily   folic acid (FOLVITE) tablet 1 mg Daily   insulin glargine (LANTUS) injection vial 25 Units BID   levothyroxine (SYNTHROID) tablet 25 mcg Daily   midodrine (PROAMATINE) tablet 10 mg TID   sertraline (ZOLOFT) tablet 100 mg Daily   sodium chloride flush 0.9 % injection 10 mL 2 times per day   sodium chloride flush 0.9 % injection 10 mL PRN   potassium chloride (KLOR-CON M) extended release tablet 40 mEq PRN   Or    potassium chloride (KLOR-CON) packet 40 mEq PRN   Or    potassium chloride 10 mEq/100 mL IVPB (Peripheral Line) PRN   magnesium hydroxide (MILK OF MAGNESIA) 400 MG/5ML suspension 30 mL Daily PRN   ondansetron (ZOFRAN) injection 4 mg Q6H PRN   nicotine (NICODERM CQ) 21 MG/24HR 1 patch Daily PRN   acetaminophen (TYLENOL) tablet 650 mg Q4H PRN   glucose (GLUTOSE) 40 % oral gel 15 g PRN   dextrose 50 % solution 12.5 g PRN   glucagon (rDNA) injection 1 mg PRN   dextrose 5 % solution PRN   vancomycin (VANCOCIN) 500 mg in dextrose 5 % 100 mL IVPB (mini-bag) Once   vancomycin (VANCOCIN) intermittent dosing (placeholder) RX Placeholder     Labs:   CBC:   Recent Labs     03/22/19  0856 03/23/19  1127 03/24/19  0607   WBC 8.8 9.7 8.4   RBC 2.59* 2.44* 2.50*   HGB 8.1* 7.8* 7.9*   HCT 27.2* 25.9* 26.2*    201 230   MPV 9.9 9.8 9.7      BMP:   Recent Labs     03/22/19  0856 03/23/19  1127 03/24/19  0607    134* 131*   K 4.5 3.6* 4.1   CL 97* 96* 95*   CO2 23 24 23   BUN 60* 35* 60*   CREATININE 5.14* 3.78* 4.67*   GLUCOSE 180* 218* 90   CALCIUM 8.4* 8.1* 8.2*        Radiology:  Reviewed as available. Assessment:  1 ESRD with the patient usually on a Monday and Wednesday and Friday dialysis at [facility]. 2. Essential hypertension with reasonable control  3. Type 2 DM  4. Mild Volume overload:  5. ANEMIA OF CHRONIC DISEASE: Hemoglobin below target at 8.1 g/dL  6. SECONDARY HYPERPARATHYROIDISM  7. Functional right forearm AV fistula  8. Right distal lower extremity hematoma     Plan:  1. Continue on hemodialysis Monday and Wednesday and Friday per schedule  3. Outpatient dialysis facility is [facility]. 4. Okay for discharge from a renal standpoint. Please do not hesitate to call with questions. Electronically signed by Austin Banerjee CNP, APRN - CNP on 3/24/2019 at 11:28 AM        Attending Physician Statement  I have discussed the care of Nash Cesar, including pertinent history and exam findings with the resident/fellow. I have reviewed the key elements of all parts of the encounter with the resident/fellow. I have seen and examined the patient with the resident/fellow. I agree with the assessment and plan and status of the problem list as documented. CT scan finding noted.    Addiitionally I recommend Ok to D/C in AM after dialysis  Ross Roberts

## 2019-03-24 NOTE — DISCHARGE INSTR - COC
Continuity of Care Form    Patient Name: Art Travis   :  1945  MRN:  [id number]    Admit date:  3/21/2019  Discharge date: 3-25-19    Code Status Order: Full Code   Advance Directives:   [facility] Documentation     Date/Time Healthcare Directive Type of Healthcare Directive Copy in [address] Agent's Name Healthcare Agent's Phone Number    19 1800  Yes, patient has an advance directive for healthcare treatment  Durable power of  for health care;Living will  No, copy requested from family  --  --  --          Admitting Physician:  Frederick Thornton MD  PCP: Benjamin Interiano DO    Discharging Nurse: Debi Carreon   [address] Unit/Room#: 0331/0331-01  Discharging Unit Phone Number: [phone]    Emergency Contact:   Extended Emergency Contact Information  Primary Emergency Contact: Triston PEREZ  Address: [address] Phone: [phone]  Work Phone: [phone]  Mobile Phone: [phone]  Relation: Spouse  Secondary Emergency Contact: Archie Garcia  Address: N/A   [address] Phone: [phone]  Work Phone: [phone]  Mobile Phone: [phone]  Relation: Child    Past Surgical History:  Past Surgical History:   Procedure Laterality Date    AORTIC VALVE REPLACEMENT  2016    bioprosthetic    ARM SURGERY Left     CARDIAC CATHETERIZATION      CENTRAL VENOUS CATHETER Right 2018    DIALYSIS CATHETER Dr Marylee Gut COLONOSCOPY  09    COLONOSCOPY N/A 10/12/2018    COLONOSCOPY WITH BIOPSY performed by Deepa Caruso DO at [address] [place] [address] Right 3/10/2019    WASHOUT RIGHT LOWER EXTREMITY WITH WOUND VAC EXCHANGE performed by Mau Garber MD at [address] [place] [address] Right 3/8/2019    RIGHT LOWER EXTREMITY EXPLORATION, HEMATOMA EVACUATION, WOUND VAC PLACEMENT performed by Steven Haq MD at [address] ([address] surgeries)    several surgeries on left arm    KNEE ARTHROSCOPY Left 09/17/99    MITRAL VALVE REPLACEMENT  03/18/2016    bioprosthetic     NASAL SEPTUM SURGERY      OTHER SURGICAL HISTORY  3/18/16    Aortic root Enlargement    OTHER SURGICAL HISTORY  3/18/16    ASD Closure    OTHER SURGICAL HISTORY Right 01/09/2017    AV fistula creation, wrist    OTHER SURGICAL HISTORY Right 08/29/2017    ligation of collateral branch of av fistula right wrist    OTHER SURGICAL HISTORY  04/05/2018    FISTULAGRAM WITH DR. Gary Reading    [place] EGD TRANSORAL BIOPSY SINGLE/MULTIPLE N/A 10/17/2017    EGD BIOPSY performed by Fina Burton MD [address] ANGIOACCESS AV FISTULA Right 8/29/2017     RIGHT  LOWER ARM AV FISTULA  LIGATION OF AQUIRED BRANCHES X2 performed [place] Fernando Velásquez DO [place] [address]  [place]18[place]    median    VASCULAR SURGERY  12/06/2018    Right arm fistulagram,  PTA cephalic vein stenosis  /  DR Juan C Palma       Immunization History:   Immunization History   Administered Date(s) Administered    Influenza Virus Vaccine 10/24/2006, 11/18/2013    Influenza Whole 10/12/2015    Influenza, High Dose (Fluzone 65 yrs and older) 10/04/2017, 10/21/2018    Pneumococcal 13-valent Conjugate ([id number]) 10/25/2017    Pneumococcal Polysaccharide ([id number]) 11/30/2010, 10/12/2015    Tdap (Boostrix, Adacel) 10/27/2015       Active Problems:  Patient Active Problem List   Diagnosis Code    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    Essential hypertension I10    Mixed hyperlipidemia E78.2    Obstructive sleep apnea G47.33    Morbid obesity ([facility] E66.01    Benign prostatic hyperplasia without lower urinary tract symptoms N40.0    Venous stasis dermatitis I87.2    Severe aortic stenosis I35.0    Severe mitral valve stenosis I05.0    Anemia in chronic kidney disease, on chronic dialysis (HCC) N18.6, D63.1, Z99.2    Dialysis patient ([facility] Z99.2    Dementia F03.90    Major depressive disorder with single episode, in partial remission ([facility] F32.4    Chronic cerebral ischemia I67.82    ESRD (end stage renal disease) on dialysis ([facility]) N18.6, Z99.2    Long-term insulin use ([facility]) Z79.4    Paroxysmal atrial fibrillation ([facility]) I48.0    BMI 40.0-44.9, adult ([facility]) Z68.41    Controlled type 2 diabetes mellitus with chronic kidney disease on chronic dialysis, with long-term current use of insulin ([facility]) E11.22, N18.6, Z79.4, Z99.2    Controlled type 2 diabetes mellitus with diabetic polyneuropathy, with long-term current use of insulin ([facility]) E11.42, Z79.4    Dermatitis L30.9    Bruising T14. 8XXA    Hematoma of right lower extremity S80.11XA    Hematoma of groin S30. 1XXA    Acute blood loss anemia D62    Accidental fall from wheelchair W05. 0XXA    Traumatic hemorrhagic shock ([facility] T79. 4XXA    Cellulitis L03.90    Open wound T14. 8XXA       Isolation/Infection:   Isolation          No Isolation            Nurse Assessment:  Last Vital Signs: BP (!) 111/52   Pulse 64   Temp 97.6 °F (36.4 °C) (Axillary)   Resp 18   Ht 5' 7\" (1.702 m)   Wt 297 lb 11.2 oz (135 kg)   SpO2 94%   BMI 46.63 kg/m²     Last documented pain score (0-10 scale): Pain Level: 10  Last Weight:   Wt Readings from Last 1 Encounters:   03/23/19 297 lb 11.2 oz (135 kg)     Mental Status:  oriented, alert and needs re-oriented at times.      IV Access:  - fistula Right Forearm    Nursing Mobility/ADLs:  Walking   Dependent  Transfer  Dependent  Bathing  Assisted  Dressing  Assisted  Toileting  Dependent  Feeding  Assisted  Med Admin  Assisted  Med Delivery   whole    Wound Care Documentation and Therapy:  Wound 03/08/19 Buttocks Left;Right;Upper deep tissue, denuded, red (Active)   Wound Pressure Stage  2 3/22/2019  8:00 AM   Dressing Changed Changed/New 3/23/2019 10:56 AM   Dressing/Treatment Protective barrier 3/24/2019  8:00 AM   Wound Cleansed Rinsed/Irrigated with saline 3/24/2019  8:00 AM   Dressing Change Due 03/22/19 3/22/2019  2:19 PM   Wound Assessment Fragile;Red;Pink;Bleeding 3/23/2019  8:00 PM   Drainage Amount Scant 3/23/2019 10:56 AM   Drainage Description Serosanguinous 3/23/2019 10:56 AM   Odor None 3/24/2019  8:00 AM   Gwendolyn-wound Assessment Dry; Intact 3/23/2019 10:56 AM   [place]%Wound Bed 100 3/22/2019  2:19 PM   Number of days: 15       Wound 03/08/19 Scrotum Posterior (Active)   Wound Other 3/19/2019  4:00 AM   Dressing Changed Changed/New 3/23/2019 10:56 AM   Dressing/Treatment Protective barrier 3/24/2019  8:00 AM   Wound Cleansed Wound cleanser 3/23/2019 10:56 AM   Wound Assessment Fragile;Dry;Red 3/23/2019  8:00 PM   Drainage Amount None 3/23/2019 10:56 AM   Gwendolyn-wound Assessment Dry;Fragile 3/23/2019 10:56 AM   Number of days: 15       Wound 03/22/19 Thigh Right;Medial;Proximal (Active)   Wound Image   3/22/2019  2:19 PM   Wound Other 3/22/2019  2:19 PM   Dressing Status Changed 3/24/2019  1:00 PM   Dressing Changed Changed/New 3/24/2019  1:00 PM   Dressing/Treatment ABD; Moist to dry 3/24/2019  1:00 PM   Dressing Change Due 03/23/19 3/22/2019  2:19 PM   Wound Length (cm) 4 cm 3/22/2019  2:19 PM   Wound Width (cm) 7 cm 3/22/2019  2:19 PM   Wound Depth (cm) 0.1 cm 3/22/2019  2:19 PM   Wound Surface Area (cm^2) 28 cm^2 3/22/2019  2:19 PM   Wound Volume (cm^3) 2.8 cm^3 3/22/2019  2:19 PM   Wound Assessment Clean;Dry; Intact 3/24/2019  1:00 PM   Drainage Amount Small 3/23/2019 10:56 AM   Drainage Description Sanguinous 3/23/2019 10:56 AM   Odor None 3/24/2019  1:00 PM   Gwendolyn-wound Assessment WILMAR 3/24/2019  8:00 AM   Non-staged Wound Description Partial thickness 3/22/2019  2:19 PM   [place]%Wound Bed 100 3/22/2019  2:19 PM   Number of days: 2        Elimination:  Continence:   · Bowel: No  · Bladder: No  Urinary Catheter: None   Colostomy/Ileostomy/Ileal Conduit: No       Date of Last BM: 3/24/2019    Intake/Output Summary (Last 24

## 2019-03-24 NOTE — PROGRESS NOTES
[facility]  Internal Medicine Residency Program  Inpatient Daily Progress Note  ______________________________________________________________________________    Patient: Amparo Whitehead  YOB: 1945   MRN: [id number]    Acct: [de-identified]     Admit date: 3/21/2019  Today's date: 03/24/19  Number of days in the hospital: 3  Expected Discharge Date: 03/23/19    Admitting Diagnosis: Cellulitis    Subjective:   Patient seen and examined at bedside. Alert and oriented. Stable vitals this AM.  No acute issues overnight. Patient is eating and drinking fine. He will go for CT scan today inorder to evaluate the fluid collection in the right groin    Objective:   Vital Sign:  BP (!) 111/52   Pulse 64   Temp 97.6 °F (36.4 °C) (Axillary)   Resp 18   Ht 5' 7\" (1.702 m)   Wt 297 lb 11.2 oz (135 kg)   SpO2 94%   BMI 46.63 kg/m²       Physical Exam:  General appearance:   alert, well appearing, and in no distress  Mental Status: alert, oriented to person, place, and time  Neurologic:  alert, oriented, normal speech, no focal findings or movement disorder noted  Lungs:  clear to auscultation, no wheezes, rales or rhonchi, symmetric air entry  Heart[de-identified] normal rate, regular rhythm, normal S1, S2, no murmurs, rubs, clicks or gallops  Abdomen:  soft, nontender, nondistended, no masses or organomegaly. Right groin wound is clean. No drainage noted  Extremities: peripheral pulses normal, no pedal edema, no clubbing or cyanosis.  RLE wound is clean  Skin: normal coloration and turgor, no rashes, no suspicious skin lesions noted    Medications:  Scheduled Medications   sevelamer  1,600 mg Oral TID WC    miconazole   Topical BID    gabapentin  300 mg Oral Daily    metroNIDAZOLE  500 mg Oral TID    atorvastatin  40 mg Oral Nightly    vancomycin  750 mg Intravenous Q MWF    ALPRAZolam  0.5 mg Oral BID    apixaban  5 mg Oral BID    aspirin  81 mg Oral Daily    buPROPion  150 mg Oral QAM    donepezil  5 mg Oral QPM    fluticasone  2 spray Nasal Daily    folic acid  1 mg Oral Daily    insulin glargine  25 Units Subcutaneous BID    levothyroxine  25 mcg Oral Daily    midodrine  10 mg Oral TID    sertraline  100 mg Oral Daily    sodium chloride flush  10 mL Intravenous 2 times per day    vancomycin  500 mg Intravenous Once    vancomycin (VANCOCIN) intermittent dosing (placeholder)   Other RX Placeholder       PRN Medications  sodium chloride 250 mL PRN   sodium chloride 150 mL PRN   guaiFENesin 200 mg 4x Daily PRN   ipratropium-albuterol 1 vial Q6H PRN   glucose 15 g PRN   dextrose 12.5 g PRN   glucagon (rDNA) 1 mg PRN   dextrose 100 mL/hr PRN   albuterol 2.5 mg Q8H PRN   sodium chloride flush 10 mL PRN   potassium chloride 40 mEq PRN   Or     potassium alternative oral replacement 40 mEq PRN   Or     potassium chloride 10 mEq PRN   magnesium hydroxide 30 mL Daily PRN   ondansetron 4 mg Q6H PRN   nicotine 1 patch Daily PRN   acetaminophen 650 mg Q4H PRN   glucose 15 g PRN   dextrose 12.5 g PRN   glucagon (rDNA) 1 mg PRN   dextrose 100 mL/hr PRN       Diagnostic Labs and Imaging:  CBC:  Recent Labs     03/22/19  0856 03/23/19  1127 03/24/19  0607   WBC 8.8 9.7 8.4   HGB 8.1* 7.8* 7.9*    201 230     BMP: Recent Labs     03/22/19  0856 03/23/19  1127 03/24/19  0607    134* 131*   K 4.5 3.6* 4.1   CL 97* 96* 95*   CO2 23 24 23   BUN 60* 35* 60*   CREATININE 5.14* 3.78* 4.67*   GLUCOSE 180* 218* 90     Hepatic: No results for input(s): AST, ALT, ALB, BILITOT, ALKPHOS in the last 72 hours.     Assessment and Plan:     Principal Problem:    Cellulitis  Active Problems:    Anemia in chronic kidney disease, on chronic dialysis (HCC)    Essential hypertension    Mixed hyperlipidemia    Dementia    Major depressive disorder with single episode, in partial remission ([facility]    ESRD (end stage renal disease) on dialysis [facility]    Paroxysmal atrial fibrillation ([facility] BMI 40.0-44.9, adult ([facility]    Controlled type 2 diabetes mellitus with chronic kidney disease on chronic dialysis, with long-term current use of insulin (HCC)    Bruising    Hematoma of groin    Open wound  Resolved Problems:    * No resolved hospital problems. *    1. Cellulitis of the Right Groin with fluid collection  Started patient on Vancomycin. Pharmacy to dose. Trauma discussed with IR regarding drainage. Decision made not to drain and observe at this time. Will repeat CT scan today.     2. Anemia of Chronic Disease  Will continue to monitor Hb. Transfuse as needed.     3. ESRD  Dialysis every M,W,F. Nephrology on board. Midodrine 10 mg TD     4. Paroxysmal A Fib  Eliquis 5 mg BD.     5. Chronic Diarrhea  Flagyl 500 mg TD for 3 days     6. Hyperlipidemia  Lipitor 40 mg daily     7. Depression and Dementia  Xanax 0.5 mg BD. Wellbutrin  mg daily. Aricept 5 mg Every evening. Zoloft 100 mg daily.     8. DM Type 2  Lantus 25 mg BD. Low Dose SS. Hypoglycemia protocol. POCT Glucose AC HS.      9. Hypothyroidism  Synthroid 25 mcg daily    Kris Rothman MD  PGY-1, Department of Internal Medicine  [place], [place]  3/24/2019 10:49 AM       IM Attending     Pt seen and examined before discharge   Discharge plan and medications were reviewed and agreed as documented by resident.   Time spent for discharge planning more than 30 minutes     Electronically signed by Jose Rey MD on 3/24/2019 at 12:59 PM

## 2019-03-24 NOTE — PROGRESS NOTES
3/25 to make sure the area is not expanding.       SUBJECTIVE    Alli Adams is is unchanged since yesterday. No complaints. Tolerating diet. No CP/SOB/N/V/D. OBJECTIVE  VITALS: Temp: Temp: 97.8 °F (36.6 °C)Temp  Av.7 °F (36.5 °C)  Min: 97.5 °F (36.4 °C)  Max: 97.8 °F (60.2 °C) BP Systolic (18BGI), PUC:222 , Min:120 , EOD:743   Diastolic (88IUK), VBA:01, Min:50, Max:51   Pulse Pulse  Av.2  Min: 77  Max: 93 Resp Resp  Av  Min: 16  Max: 24 Pulse ox SpO2  Av %  Min: 100 %  Max: 100 %   GENERAL: alert, no distress; morbidly obese man  LUNGS: clear to ausculation, without wheezes, rales or rhonci  HEART: normal rate and regular rhythm  ABDOMEN: soft, non-tender, non-distended, bowel sounds present in all 4 quadrants and no guarding or peritoneal signs present; induration to right groin; no fluctuance; slight erythema around the area and no drainage  EXTREMITY: no cyanosis, clubbing or edema    I/O last 3 completed shifts: In: 900 [P.O.:850; I.V.:50]  Out: -     Drain/tube output: In: 1050 [P.O.:1000; I.V.:50]  Out: -     LAB:  CBC:   Recent Labs     19  0856 19  1127 19  0607   WBC 8.8 9.7 8.4   HGB 8.1* 7.8* 7.9*   HCT 27.2* 25.9* 26.2*   .0* 106.1* 104.8*    201 230     BMP:   Recent Labs     19  1040 19  0856 19  1127    137 134*   K 4.5 4.5 3.6*   CL 96* 97* 96*   CO2 30 23 24   BUN 45* 60* 35*   CREATININE 4.00* 5.14* 3.78*   GLUCOSE 145* 180* 218*     COAGS: No results for input(s): APTT, PROT, INR in the last 72 hours. RADIOLOGY:        Aaliyah Cassidy, DO  3/24/19, 6:41 AM     Trauma, Emergency and Critical Surgical Services  Attending Note      I have reviewed the above TEC note(s) and confirmed the key elements of the medical history and physical exam. I have discussed the findings, established the care plan and recommendations with Resident, TECSS RN, bedside nurse. Patient seen and examined by me.   Rt groin wound stable. No fluctuance. Labs stable. Possible recheck U/S in several days.     Samantha Cueto MD  3/24/2019  8:11 AM

## 2019-03-24 NOTE — PLAN OF CARE
Problem: Falls - Risk of:  Goal: Will remain free from falls  Description  Will remain free from falls  3/24/2019 0302 by Sylwia Yi RN  Outcome: Ongoing  3/23/2019 1511 by Ana M Erazo RN  Outcome: Ongoing  Goal: Absence of physical injury  Description  Absence of physical injury  3/23/2019 1511 by Ana M Erazo RN  Outcome: Ongoing     Problem: Risk for Impaired Skin Integrity  Goal: Tissue integrity - skin and mucous membranes  Description  Structural intactness and normal physiological function of skin and  mucous membranes. 3/24/2019 0302 by Sylwia Yi RN  Outcome: Ongoing  3/23/2019 1511 by Ana M Erazo RN  Outcome: Ongoing     Problem: Pain:  Goal: Pain level will decrease  Description  Pain level will decrease  3/23/2019 1511 by Ana M Erazo RN  Outcome: Ongoing  Goal: Control of acute pain  Description  Control of acute pain  3/24/2019 0302 by Sylwia Yi RN  Outcome: Ongoing  3/23/2019 1511 by Ana M Erazo RN  Outcome: Ongoing  Goal: Control of chronic pain  Description  Control of chronic pain  3/23/2019 1511 by Ana M Erazo RN  Outcome: Ongoing     Problem: Discharge Planning:  Goal: Discharged to appropriate level of care  Description  Discharged to appropriate level of care  3/23/2019 1511 by Ana M Erazo RN  Outcome: Ongoing     Problem:  Body Temperature - Imbalanced:  Goal: Ability to maintain a body temperature in the normal range will improve  Description  Ability to maintain a body temperature in the normal range will improve  3/23/2019 1511 by Ana M Erazo RN  Outcome: Ongoing     Problem: Mobility - Impaired:  Goal: Mobility will improve to maximum level  Description  Mobility will improve to maximum level  3/24/2019 0302 by Sylwia Yi RN  Outcome: Ongoing  3/23/2019 1511 by Ana M Erazo RN  Outcome: Ongoing     Problem: Skin Integrity - Impaired:  Goal: Will show no infection signs and symptoms  Description  Will show no infection

## 2019-03-25 ENCOUNTER — APPOINTMENT (OUTPATIENT)
Dept: DIALYSIS | Age: 74
DRG: 919 | End: 2019-03-25
Attending: FAMILY MEDICINE
Payer: MEDICARE

## 2019-03-25 VITALS
WEIGHT: 304.24 LBS | SYSTOLIC BLOOD PRESSURE: 116 MMHG | HEART RATE: 86 BPM | RESPIRATION RATE: 18 BRPM | BODY MASS INDEX: 47.75 KG/M2 | TEMPERATURE: 98.1 F | DIASTOLIC BLOOD PRESSURE: 53 MMHG | OXYGEN SATURATION: 94 % | HEIGHT: 67 IN

## 2019-03-25 LAB
ABSOLUTE EOS #: 0.18 K/UL (ref 0–0.44)
ABSOLUTE IMMATURE GRANULOCYTE: 0.09 K/UL (ref 0–0.3)
ABSOLUTE LYMPH #: 0.46 K/UL (ref 1.1–3.7)
ABSOLUTE MONO #: 0.37 K/UL (ref 0.1–1.2)
ANION GAP SERPL CALCULATED.3IONS-SCNC: 17 MMOL/L (ref 9–17)
BASOPHILS # BLD: 0 % (ref 0–2)
BASOPHILS ABSOLUTE: 0 K/UL (ref 0–0.2)
BUN BLDV-MCNC: 86 MG/DL (ref 8–23)
BUN/CREAT BLD: ABNORMAL (ref 9–20)
CALCIUM SERPL-MCNC: 8 MG/DL (ref 8.6–10.4)
CHLORIDE BLD-SCNC: 92 MMOL/L (ref 98–107)
CO2: 22 MMOL/L (ref 20–31)
CREAT SERPL-MCNC: 5.67 MG/DL (ref 0.7–1.2)
CULTURE: ABNORMAL
DIFFERENTIAL TYPE: ABNORMAL
DIRECT EXAM: ABNORMAL
DIRECT EXAM: ABNORMAL
EOSINOPHILS RELATIVE PERCENT: 2 % (ref 1–4)
GFR AFRICAN AMERICAN: 12 ML/MIN
GFR NON-AFRICAN AMERICAN: 10 ML/MIN
GFR SERPL CREATININE-BSD FRML MDRD: ABNORMAL ML/MIN/{1.73_M2}
GFR SERPL CREATININE-BSD FRML MDRD: ABNORMAL ML/MIN/{1.73_M2}
GLUCOSE BLD-MCNC: 139 MG/DL (ref 70–99)
GLUCOSE BLD-MCNC: 157 MG/DL (ref 75–110)
GLUCOSE BLD-MCNC: 248 MG/DL (ref 75–110)
HCT VFR BLD CALC: 25.7 % (ref 40.7–50.3)
HEMOGLOBIN: 8 G/DL (ref 13–17)
IMMATURE GRANULOCYTES: 1 %
LYMPHOCYTES # BLD: 5 % (ref 24–43)
Lab: ABNORMAL
MCH RBC QN AUTO: 31.1 PG (ref 25.2–33.5)
MCHC RBC AUTO-ENTMCNC: 31.1 G/DL (ref 28.4–34.8)
MCV RBC AUTO: 100 FL (ref 82.6–102.9)
MONOCYTES # BLD: 4 % (ref 3–12)
MORPHOLOGY: ABNORMAL
NRBC AUTOMATED: 0 PER 100 WBC
PDW BLD-RTO: 20.2 % (ref 11.8–14.4)
PLATELET # BLD: 257 K/UL (ref 138–453)
PLATELET ESTIMATE: ABNORMAL
PMV BLD AUTO: 9.5 FL (ref 8.1–13.5)
POTASSIUM SERPL-SCNC: 4 MMOL/L (ref 3.7–5.3)
RBC # BLD: 2.57 M/UL (ref 4.21–5.77)
RBC # BLD: ABNORMAL 10*6/UL
SEG NEUTROPHILS: 88 % (ref 36–65)
SEGMENTED NEUTROPHILS ABSOLUTE COUNT: 8.1 K/UL (ref 1.5–8.1)
SODIUM BLD-SCNC: 131 MMOL/L (ref 135–144)
SPECIMEN DESCRIPTION: ABNORMAL
WBC # BLD: 9.2 K/UL (ref 3.5–11.3)
WBC # BLD: ABNORMAL 10*3/UL

## 2019-03-25 PROCEDURE — 80048 BASIC METABOLIC PNL TOTAL CA: CPT

## 2019-03-25 PROCEDURE — 82947 ASSAY GLUCOSE BLOOD QUANT: CPT

## 2019-03-25 PROCEDURE — 2580000003 HC RX 258: Performed by: FAMILY MEDICINE

## 2019-03-25 PROCEDURE — 6370000000 HC RX 637 (ALT 250 FOR IP): Performed by: STUDENT IN AN ORGANIZED HEALTH CARE EDUCATION/TRAINING PROGRAM

## 2019-03-25 PROCEDURE — 6370000000 HC RX 637 (ALT 250 FOR IP): Performed by: HOSPITALIST

## 2019-03-25 PROCEDURE — 90937 HEMODIALYSIS REPEATED EVAL: CPT

## 2019-03-25 PROCEDURE — 85025 COMPLETE CBC W/AUTO DIFF WBC: CPT

## 2019-03-25 PROCEDURE — 6370000000 HC RX 637 (ALT 250 FOR IP): Performed by: FAMILY MEDICINE

## 2019-03-25 PROCEDURE — 94660 CPAP INITIATION&MGMT: CPT

## 2019-03-25 PROCEDURE — 6360000002 HC RX W HCPCS: Performed by: INTERNAL MEDICINE

## 2019-03-25 PROCEDURE — 99232 SBSQ HOSP IP/OBS MODERATE 35: CPT | Performed by: INTERNAL MEDICINE

## 2019-03-25 PROCEDURE — 99233 SBSQ HOSP IP/OBS HIGH 50: CPT | Performed by: INTERNAL MEDICINE

## 2019-03-25 RX ORDER — 0.9 % SODIUM CHLORIDE 0.9 %
150 INTRAVENOUS SOLUTION INTRAVENOUS PRN
Status: DISCONTINUED | OUTPATIENT
Start: 2019-03-25 | End: 2019-03-25 | Stop reason: HOSPADM

## 2019-03-25 RX ORDER — 0.9 % SODIUM CHLORIDE 0.9 %
250 INTRAVENOUS SOLUTION INTRAVENOUS PRN
Status: DISCONTINUED | OUTPATIENT
Start: 2019-03-25 | End: 2019-03-25 | Stop reason: HOSPADM

## 2019-03-25 RX ORDER — HYDROCODONE BITARTRATE AND ACETAMINOPHEN 5; 325 MG/1; MG/1
1 TABLET ORAL ONCE
Status: COMPLETED | OUTPATIENT
Start: 2019-03-25 | End: 2019-03-25

## 2019-03-25 RX ORDER — HEPARIN SODIUM 1000 [USP'U]/ML
1000 INJECTION INTRAVENOUS; SUBCUTANEOUS PRN
Status: DISCONTINUED | OUTPATIENT
Start: 2019-03-25 | End: 2019-03-25 | Stop reason: HOSPADM

## 2019-03-25 RX ORDER — ALPRAZOLAM 0.5 MG/1
0.5 TABLET ORAL 2 TIMES DAILY
Qty: 14 TABLET | Refills: 0 | Status: SHIPPED | OUTPATIENT
Start: 2019-03-25 | End: 2019-04-01

## 2019-03-25 RX ORDER — OXYCODONE HYDROCHLORIDE 5 MG/1
5 TABLET ORAL ONCE
Status: DISCONTINUED | OUTPATIENT
Start: 2019-03-25 | End: 2019-03-25

## 2019-03-25 RX ORDER — HYDROCODONE BITARTRATE AND ACETAMINOPHEN 5; 325 MG/1; MG/1
1 TABLET ORAL EVERY 8 HOURS PRN
Qty: 21 TABLET | Refills: 0 | Status: SHIPPED | OUTPATIENT
Start: 2019-03-25 | End: 2019-04-01

## 2019-03-25 RX ADMIN — SERTRALINE 100 MG: 50 TABLET, FILM COATED ORAL at 09:12

## 2019-03-25 RX ADMIN — BUPROPION HYDROCHLORIDE 150 MG: 150 TABLET, EXTENDED RELEASE ORAL at 09:12

## 2019-03-25 RX ADMIN — APIXABAN 5 MG: 5 TABLET, FILM COATED ORAL at 20:08

## 2019-03-25 RX ADMIN — HEPARIN SODIUM 1000 UNITS: 1000 INJECTION INTRAVENOUS; SUBCUTANEOUS at 10:10

## 2019-03-25 RX ADMIN — FLUTICASONE PROPIONATE 2 SPRAY: 50 SPRAY, METERED NASAL at 09:13

## 2019-03-25 RX ADMIN — MIDODRINE HYDROCHLORIDE 10 MG: 5 TABLET ORAL at 16:21

## 2019-03-25 RX ADMIN — FOLIC ACID 1 MG: 1 TABLET ORAL at 09:12

## 2019-03-25 RX ADMIN — SEVELAMER CARBONATE 1600 MG: 800 TABLET, FILM COATED ORAL at 09:12

## 2019-03-25 RX ADMIN — MIDODRINE HYDROCHLORIDE 10 MG: 5 TABLET ORAL at 20:09

## 2019-03-25 RX ADMIN — ALPRAZOLAM 0.5 MG: 0.5 TABLET ORAL at 09:12

## 2019-03-25 RX ADMIN — SEVELAMER CARBONATE 1600 MG: 800 TABLET, FILM COATED ORAL at 16:21

## 2019-03-25 RX ADMIN — Medication 10 ML: at 08:57

## 2019-03-25 RX ADMIN — GABAPENTIN 300 MG: 300 CAPSULE ORAL at 09:12

## 2019-03-25 RX ADMIN — APIXABAN 5 MG: 5 TABLET, FILM COATED ORAL at 09:12

## 2019-03-25 RX ADMIN — ALPRAZOLAM 0.5 MG: 0.5 TABLET ORAL at 20:08

## 2019-03-25 RX ADMIN — INSULIN GLARGINE 25 UNITS: 100 INJECTION, SOLUTION SUBCUTANEOUS at 20:12

## 2019-03-25 RX ADMIN — METRONIDAZOLE 500 MG: 500 TABLET, FILM COATED ORAL at 09:12

## 2019-03-25 RX ADMIN — DESMOPRESSIN ACETATE 40 MG: 0.2 TABLET ORAL at 20:08

## 2019-03-25 RX ADMIN — HYDROCODONE BITARTRATE AND ACETAMINOPHEN 1 TABLET: 5; 325 TABLET ORAL at 18:37

## 2019-03-25 RX ADMIN — INSULIN GLARGINE 25 UNITS: 100 INJECTION, SOLUTION SUBCUTANEOUS at 08:57

## 2019-03-25 RX ADMIN — LEVOTHYROXINE SODIUM 25 MCG: 25 TABLET ORAL at 05:34

## 2019-03-25 RX ADMIN — ASPIRIN 81 MG: 81 TABLET, COATED ORAL at 09:12

## 2019-03-25 RX ADMIN — DONEPEZIL HYDROCHLORIDE 5 MG: 5 TABLET, FILM COATED ORAL at 18:37

## 2019-03-25 RX ADMIN — MIDODRINE HYDROCHLORIDE 10 MG: 5 TABLET ORAL at 09:12

## 2019-03-25 ASSESSMENT — PAIN SCALES - GENERAL
PAINLEVEL_OUTOF10: 4
PAINLEVEL_OUTOF10: 0
PAINLEVEL_OUTOF10: 2
PAINLEVEL_OUTOF10: 4

## 2019-03-25 ASSESSMENT — ENCOUNTER SYMPTOMS
COLOR CHANGE: 1
ABDOMINAL DISTENTION: 0
EYE PAIN: 0

## 2019-03-25 NOTE — PLAN OF CARE
Patient in bariatric bed with alternating pressure, dressing to right leg and buttocks changed today and patient repositioned every 2 hours with wedges, IV site clean dry and intact, no drainage or redness noted to site, patient medicated for pain as needed, patient tolerating diet well and went to hemodialysis today

## 2019-03-25 NOTE — PROGRESS NOTES
[facility] -   Infectious diseases evaluation  admission date 3/21/2019      reason for consultation:   Groin abscess     Impression :   Current:  · ESRD on HD  · Right . ower leg clean wound  ? Post right lower leg drained large hematoma 3/9/19  · Current right groin non infected infected hematoma-   § site of a recent femoral line  § Size and color stable   · Chronic diarhea  · DM2     Other:  ·    Discussion / summary of stay / plan of care   · No cellulitis lower back - has some stage 2 excoriations  · Old diarrhea stable, loose stools  · Right groin stable non infected hematoma  · Right groin superficial ulceration, clean but bleeding  · right lower leg wound clean, site of the recent drained S/C hematoma  · No fever no leukocytosis   Recommendations   · Keep off antibiotics, discharge planning  · Discussed with the wife and nurse. · Wound care, right upper thigh wound  Infection Control Recommendations   · [place] Precautions  · Contact Isolation   Antimicrobial Stewardship Recommendations   · Simplification of therapy  · Targeted therapy  Coordination ofOutpatient Care:   · Estimated Length of IV antimicrobials:  · Patient will need Midline / picc Catheter Insertion:   · Patient will need SNF:  · Patient will need outpatient wound care:      History of Present Illness:   Initial history:  Isidra Fonseca is a 75y.o.-year-old male with end-stage renal disease on hemodialysis. He also has had a history of atrial fibrillation, severe aortic stenosis, severe mitral stenosis, and thyroid disease. He is wheelchair bound.     Earlier this month,he come in because of a right lower extremity hematoma secondary to a fall. He is on anticoagulation with elevated INR. he had an exploration of his right lower extremity hematoma and VAC placement. Had the right groin femoral line placed, removed before discharge. Peggy Ontiveros He was discharged on 3/19.     He comes back to the emergency room 3/21/19, 2 to 3 days later because of swelling over the right groin and upper thigh-  An ultrasound of the area shows a large fluid collection and no DVT. The patient was suspected to have an abscess, admitted to medicine and we are consulted. No fever or chills, right groin hematoma stable in size, not warm and not looking infected - WBC normal.     Wound and groin evaluated w the stoma RN, groin hematoma is stable and not changed in size.       Interval changes  3/25/2019   , No fever, no chills, feels much better, looks more alert today, right coronary hematoma is stable without signs of active infection, the right upper inner thigh wound is looking  and starting to heal.  Discharge planning. Labs within range, discussed with wife        I have personally reviewed the past medical history, past surgical history, medications, social history, and family history, and I haveupdated the database accordingly.   Past Medical History:     Past Medical History:   Diagnosis Date    Anemia in chronic kidney disease (CKD) 4/27/2016    Arthritis     Bacteremia 11/11/2016    Benign prostatic hyperplasia without lower urinary tract symptoms     BMI 40.0-44.9, adult ([address] 3/19/2018    Cellulitis of left lower extremity     Chronic cerebral ischemia 1/3/2017    Closed fracture of forearm 8/13/2013    Broken lft forearm     Controlled type 2 diabetes mellitus with chronic kidney disease on chronic dialysis, with long-term current use of insulin ([address]     Controlled type 2 diabetes mellitus with diabetic polyneuropathy, with long-term current use of insulin ([address] 10/7/2018    Decubital ulcer     NON OPEN BUTTOCKS    Dementia     memory problems    Dialysis patient ([address] 1/3/2017    Goes Tue, Thurs, Sat in [place]    Difficult intravenous access     HAS NEEDED PICC TEAM IN THE PAST    Difficulty walking     WHEELCHAIR BOUND-PIVOTS WITH ASSIST O F 2    DJD (degenerative joint disease) of knee     Elbow, forearm, and wrist, abrasion or friction burn, without mention of infection 8/13/2013    Abrasions lft forearm     Encephalopathy acute 4/27/2016    Encounter regarding vascular access for dialysis for ESRD ([facility] 2/2/2017    ESRD (end stage renal disease) on dialysis ([address] 1/17/2017    Forgetfulness     HAS SOME SHORT TERM MEMORY LOSS, QUYEN-WIFE IS LEGAL GUARDIAN    H/O transesophageal echocardiography (AMADEO) for monitoring     Hemodialysis patient ([address] 11/11/2016    tues-thurs-sat [place]---[facility].  TUNNELED CATHETER RT UPPER CHEST    Hyperlipidemia 1990    on Meds    Hypertension 1990    on Meds    Intercritical gout     on Meds    Joint pain, knee 01/13/2017    baldo knee synvisc-1 injections    Long-term insulin use ([address] 1/17/2017    Major depressive disorder with single episode, in partial remission ([address] 1/3/2017    Mobility impaired     Morbid obesity ([address]     Muscle weakness     GRNERALIZED    Obesity     Obstructive sleep apnea     HEATHER on CPAP     On CPAP-TO BRING DOS    Paroxysmal atrial fibrillation ([address] 9/6/2017    Respiratory failure ([address] 03/2016    with open heart surgery, trached x 5 months    S/P cardiac cath     Seborrhea     Severe aortic stenosis 3/9/2016    Severe mitral valve stenosis 3/9/2016    Severe sepsis (HCC) 4/3/2016    Skin rash     ABD FOLDS    Stasis dermatitis     Thyroid disease     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 lft thumb     Venous stasis dermatitis     Wears glasses     Wheelchair bound     pivots with 2 assists       Past Surgical  History:     Past Surgical History:   Procedure Laterality Date    AORTIC VALVE REPLACEMENT  03/18/2016    bioprosthetic    ARM SURGERY Left 1990    CARDIAC CATHETERIZATION      CENTRAL VENOUS CATHETER Right 02/21/2018    DIALYSIS CATHETER Dr Rodney Ruiz COLONOSCOPY  11/19/09    COLONOSCOPY N/A 10/12/2018    COLONOSCOPY WITH BIOPSY performed by Xander Connor DO at [facility]  EXPLORATION OF WOUND OF EXTREMITY Right 3/10/2019    WASHOUT RIGHT LOWER EXTREMITY WITH WOUND VAC EXCHANGE performed by Phil Flores MD at [address] Right 3/8/2019    RIGHT LOWER EXTREMITY EXPLORATION, HEMATOMA EVACUATION, WOUND VAC PLACEMENT performed by Phil Flores MD at [address] (x10-12 surgeries)    several surgeries on left arm    KNEE ARTHROSCOPY Left 09/17/99    MITRAL VALVE REPLACEMENT  03/18/2016    bioprosthetic     NASAL SEPTUM SURGERY      OTHER SURGICAL HISTORY  3/18/16    Aortic root Enlargement    OTHER SURGICAL HISTORY  3/18/16    ASD Closure    OTHER SURGICAL HISTORY Right 01/09/2017    AV fistula creation, wrist    OTHER SURGICAL HISTORY Right 08/29/2017    ligation of collateral branch of av fistula right wrist    OTHER SURGICAL HISTORY  04/05/2018    FISTULAGRAM WITH DR. Yesika Marquez    LA EGD TRANSORAL BIOPSY SINGLE/MULTIPLE N/A 10/17/2017    EGD BIOPSY performed by Anaya Vivas MD at [facility]    LA LIGATN ANGIOACCESS AV FISTULA Right 8/29/2017     RIGHT  LOWER ARM AV FISTULA  LIGATION OF AQUIRED BRANCHES X2 performed by Farooq Saab DO at [address]  3/18/16    median    VASCULAR SURGERY  12/06/2018    Right arm fistulagram,  PTA cephalic vein stenosis  /  DR Carmela Mendoza       Medications:      sevelamer  1,600 mg Oral TID WC    miconazole   Topical BID    gabapentin  300 mg Oral Daily    atorvastatin  40 mg Oral Nightly    ALPRAZolam  0.5 mg Oral BID    apixaban  5 mg Oral BID    aspirin  81 mg Oral Daily    buPROPion  150 mg Oral QAM    donepezil  5 mg Oral QPM    fluticasone  2 spray Nasal Daily    folic acid  1 mg Oral Daily    insulin glargine  25 Units Subcutaneous BID    levothyroxine  25 mcg Oral Daily    midodrine  10 mg Oral TID    sertraline  100 mg Oral Daily    sodium chloride flush  10 mL Intravenous 2 times per day       Social History:     Social History Socioeconomic History    Marital status:      Spouse name: Ciro Brown Number of children: 2    Years of education: Not on file    Highest education level: Not on file   Occupational History    Occupation: Retired      Employer: [address] resource strain: Not on file    Food insecurity:     Worry: Not on file     Inability: Not on file    Transportation needs:     Medical: Not on file     Non-medical: Not on file   Tobacco Use    Smoking status: Former Smoker     Packs/day: 2.00     Years: 25.00     Pack years: 50.00     Types: Cigarettes     Start date: 3/17/1955     Last attempt to quit: 1980     Years since quittin.2    Smokeless tobacco: Never Used   Substance and Sexual Activity    Alcohol use: No     Alcohol/week: 0.0 oz     Comment: 1-2 drinks per year    Drug use: No    Sexual activity: Never   Lifestyle    Physical activity:     Days per week: Not on file     Minutes per session: Not on file    Stress: Not on file   Relationships    Social connections:     Talks on phone: Not on file     Gets together: Not on file     Attends Rastafari service: Not on file     Active member of club or organization: Not on file     Attends meetings of clubs or organizations: Not on file     Relationship status: Not on file    Intimate partner violence:     Fear of current or ex partner: Not on file     Emotionally abused: Not on file     Physically abused: Not on file     Forced sexual activity: Not on file   Other Topics Concern    Not on file   Social History Narrative    Not on file       Family History:     Family History   Problem Relation Age of Onset    Lung Cancer Mother     Diabetes Mother     Alzheimer's Disease Father     Diabetes Maternal Grandmother     High Blood Pressure Sister         Allergies:   Percocet [oxycodone-acetaminophen] and Ampicillin     Review of Systems:     Review of Systems Constitutional: Negative for activity change. HENT: Negative for congestion. Eyes: Negative for discharge. Respiratory: Negative for apnea. Cardiovascular: Negative for chest pain. Gastrointestinal: Negative for abdominal distention. Genitourinary: Negative for dysuria. Musculoskeletal: Negative for arthralgias. Skin: Negative for color change. Allergic/Immunologic: Negative for immunocompromised state. Neurological: Negative for tremors. Hematological: Negative for adenopathy. Psychiatric/Behavioral: Negative for agitation. Physical Examination :     Patient Vitals for the past 8 hrs:   BP Temp Pulse Weight   03/25/19 1420 (!) 114/47 95.8 °F (35.4 °C) 81 (!) 304 lb 3.8 oz (138 kg)   03/25/19 1410 (!) 104/45 98.4 °F (36.9 °C) 81 --   03/25/19 1340 (!) 118/56 -- 80 --   03/25/19 1310 (!) 101/48 98.4 °F (36.9 °C) 80 --   03/25/19 1240 (!) 106/51 98.4 °F (36.9 °C) 80 --   03/25/19 1210 (!) 100/50 98.4 °F (36.9 °C) 81 --   03/25/19 1207 (!) 110/45 98.4 °F (36.9 °C) 84 --   03/25/19 1140 (!) 92/54 98.4 °F (36.9 °C) 80 --   03/25/19 1110 (!) 113/58 98.4 °F (36.9 °C) 80 --   03/25/19 1040 (!) 115/49 98.4 °F (36.9 °C) 77 --   03/25/19 1010 (!) 123/56 98.4 °F (36.9 °C) 76 --   03/25/19 1000 (!) 129/56 98.4 °F (36.9 °C) 77 (!) 311 lb 4.6 oz (141.2 kg)       Physical Exam   Constitutional: He is oriented to person, place, and time. He appears well-developed and well-nourished. HENT:   Head: Normocephalic and atraumatic. Eyes: Pupils are equal, round, and reactive to light. No scleral icterus. Neck: Neck supple. No JVD present. No tracheal deviation present. Cardiovascular: Normal rate and regular rhythm. Exam reveals no friction rub. No murmur heard. Pulmonary/Chest: Effort normal. No stridor. No respiratory distress. Abdominal: Soft. He exhibits no distension. There is no tenderness. Musculoskeletal: He exhibits no edema or deformity.    Neurological: He is alert and oriented to

## 2019-03-25 NOTE — CONSULTS
[facility] -   Infectious diseases evaluation  admission date 3/21/2019    reason for consultation:   Groin abscess    Impression :   Current:  · ESRD on HD  · Right . ower leg clean wound  · Post right lower leg drained large hematoma 3/9/19  · Current right groin non infected infected hematoma-   · site of a recent femoral line  · Size and color stable   · Chronic diarhea  · DM2    Other:  ·   Discussion / summary of stay / plan of care   · No cellulitis lower back - has some stage 2 excoriations  · Old diarrhea stable, loose stools  · Right groin stable non infected hematoma  · Right groin superficial ulceration, clean but bleeding  · right lower leg wound clean, site of the recent drained S/C hematoma  · No fever no leukocytosis   Recommendations   · Started on vanco iv  · Stop vanco  · No signs of infection  · Keep the groin and the right lower leg wound clean  · Ok for discharge off antibiotics , w wound care  · disch w  and family  Infection Control Recommendations   · [place] Precautions  · Contact Isolation   Antimicrobial Stewardship Recommendations   · Simplification of therapy  · Targeted therapy  Coordination ofOutpatient Care:   · Estimated Length of IV antimicrobials:  · Patient will need Midline / picc Catheter Insertion:   · Patient will need SNF:  · Patient will need outpatient wound care:     History of Present Illness:   Initial history:  Savanna Parrish is a 75y.o.-year-old male with end-stage renal disease on hemodialysis. He also has had a history of atrial fibrillation, severe aortic stenosis, severe mitral stenosis, and thyroid disease. He is wheelchair bound. Earlier this month,he come in because of a right lower extremity hematoma secondary to a fall. He is on anticoagulation with elevated INR. he had an exploration of his right lower extremity hematoma and VAC placement. Had the right groin femoral line placed, removed before discharge. Woodrow Angulo He regarding vascular access for dialysis for ESRD ([facility] [address] 2/2/2017    ESRD (end stage renal disease) on dialysis ([address] 1/17/2017    Forgetfulness     HAS SOME SHORT TERM MEMORY LOSS, QUYEN-WIFE IS LEGAL GUARDIAN    H/O transesophageal echocardiography (AMADEO) for monitoring     Hemodialysis patient ([address] 11/11/2016    tues-thurs-sat [place]---FRESEMIUS.  TUNNELED CATHETER RT UPPER CHEST    Hyperlipidemia 1990    on Meds    Hypertension 1990    on Meds    Intercritical gout     on Meds    Joint pain, knee 01/13/2017    baldo knee synvisc-1 injections    Long-term insulin use ([address] 1/17/2017    Major depressive disorder with single episode, in partial remission ([address] 1/3/2017    Mobility impaired     Morbid obesity ([address]     Muscle weakness     GRNERALIZED    Obesity     Obstructive sleep apnea     HEATHER on CPAP     On CPAP-TO BRING DOS    Paroxysmal atrial fibrillation ([address] 9/6/2017    Respiratory failure ([address] 03/2016    with open heart surgery, trached x 5 months    S/P cardiac cath     Seborrhea     Severe aortic stenosis 3/9/2016    Severe mitral valve stenosis 3/9/2016    Severe sepsis ([address] 4/3/2016    Skin rash     ABD FOLDS    Stasis dermatitis     Thyroid disease     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 lft thumb     Venous stasis dermatitis     Wears glasses     Wheelchair bound     pivots with 2 assists       Past Surgical  History:     Past Surgical History:   Procedure Laterality Date    AORTIC VALVE REPLACEMENT  03/18/2016    bioprosthetic    ARM SURGERY Left 1990    CARDIAC CATHETERIZATION      CENTRAL VENOUS CATHETER Right 02/21/2018    DIALYSIS CATHETER Dr Linette Holter COLONOSCOPY  11/19/09    COLONOSCOPY N/A 10/12/2018    COLONOSCOPY WITH BIOPSY performed by Amy Easton DO at [address] Right 3/10/2019    WASHOUT RIGHT LOWER EXTREMITY WITH WOUND VAC EXCHANGE performed by Kaitlyn Alanis MD at [address] [place] [address] EXPLORATION OF WOUND OF EXTREMITY Right 3/8/2019    RIGHT LOWER EXTREMITY EXPLORATION, HEMATOMA EVACUATION, WOUND VAC PLACEMENT performed by Melody Lucio MD at [address] ([id number] surgeries)    several surgeries on left arm    KNEE ARTHROSCOPY Left 09/17/99    MITRAL VALVE REPLACEMENT  03/18/2016    bioprosthetic     NASAL SEPTUM SURGERY      OTHER SURGICAL HISTORY  3/18/16    Aortic root Enlargement    OTHER SURGICAL HISTORY  3/18/16    ASD Closure    OTHER SURGICAL HISTORY Right 01/09/2017    AV fistula creation, wrist    OTHER SURGICAL HISTORY Right 08/29/2017    ligation of collateral branch of av fistula right wrist    OTHER SURGICAL HISTORY  04/05/2018    FISTULAGRAM WITH DR. Mayuri Joyce    GA EGD TRANSORAL BIOPSY SINGLE/MULTIPLE N/A 10/17/2017    EGD BIOPSY performed by Say Quintanilla MD at [facility]    GA LIGATN ANGIOACCESS AV FISTULA Right 8/29/2017     RIGHT  LOWER ARM AV FISTULA  LIGATION OF AQUIRED BRANCHES X2 [place] Janet Cervantes [place] [address]  [place]/18[place]16    median    VASCULAR SURGERY  12/06/2018    Right arm fistulagram,  PTA cephalic vein stenosis  /  DR Pina Overall       Medications:      sevelamer  1,600 mg Oral TID WC    miconazole   Topical BID    gabapentin  300 mg Oral Daily    metroNIDAZOLE  500 mg Oral TID    atorvastatin  40 mg Oral Nightly    vancomycin  750 mg Intravenous Q MWF    ALPRAZolam  0.5 mg Oral BID    apixaban  5 mg Oral BID    aspirin  81 mg Oral Daily    buPROPion  150 mg Oral QAM    donepezil  5 mg Oral QPM    fluticasone  2 spray Nasal Daily    folic acid  1 mg Oral Daily    insulin glargine  25 Units Subcutaneous BID    levothyroxine  25 mcg Oral Daily    midodrine  10 mg Oral TID    sertraline  100 mg Oral Daily    sodium chloride flush  10 mL Intravenous 2 times per day    vancomycin  500 mg Intravenous Once    vancomycin (VANCOCIN) intermittent dosing (placeholder)   Other RX Placeholder Social History:     Social History     Socioeconomic History    Marital status:      Spouse name: Mt Duong Number of children: 2    Years of education: Not on file    Highest education level: Not on file   Occupational History    Occupation: Retired      Employer: [address] resource strain: Not on file    Food insecurity:     Worry: Not on file     Inability: Not on file    Transportation needs:     Medical: Not on file     Non-medical: Not on file   Tobacco Use    Smoking status: Former Smoker     Packs/day: 2.00     Years: 25.00     Pack years: 50.00     Types: Cigarettes     Start date: 3/17/1955     Last attempt to quit: 1980     Years since quittin.2    Smokeless tobacco: Never Used   Substance and Sexual Activity    Alcohol use: No     Alcohol/week: 0.0 oz     Comment: 1-2 drinks per year    Drug use: No    Sexual activity: Never   Lifestyle    Physical activity:     Days per week: Not on file     Minutes per session: Not on file    Stress: Not on file   Relationships    Social connections:     Talks on phone: Not on file     Gets together: Not on file     Attends Mandaeism service: Not on file     Active member of club or organization: Not on file     Attends meetings of clubs or organizations: Not on file     Relationship status: Not on file    Intimate partner violence:     Fear of current or ex partner: Not on file     Emotionally abused: Not on file     Physically abused: Not on file     Forced sexual activity: Not on file   Other Topics Concern    Not on file   Social History Narrative    Not on file       Family History:     Family History   Problem Relation Age of Onset    Lung Cancer Mother     Diabetes Mother     Alzheimer's Disease Father     Diabetes Maternal Grandmother     High Blood Pressure Sister         Allergies:   Percocet [oxycodone-acetaminophen] and Ampicillin     Review of Systems:     Review of Systems   Constitutional: Negative for activity change and appetite change. HENT: Negative for congestion. Eyes: Negative for pain. Cardiovascular: Negative for chest pain. Gastrointestinal: Negative for abdominal distention. Endocrine: Negative for cold intolerance. Genitourinary: Negative for flank pain. No urine at all   Musculoskeletal: Negative for arthralgias. Skin: Positive for color change and wound. Allergic/Immunologic: Negative for immunocompromised state. Neurological: Negative for facial asymmetry and light-headedness. Hematological: Bruises/bleeds easily. Psychiatric/Behavioral: Negative for agitation. Physical Examination :     Patient Vitals for the past 8 hrs:   BP Temp Temp src Pulse SpO2 Weight   03/25/19 1210 (!) 100/50 98.4 °F (36.9 °C) -- 81 -- --   03/25/19 1207 (!) 110/45 98.4 °F (36.9 °C) -- 84 -- --   03/25/19 1140 (!) 92/54 98.4 °F (36.9 °C) -- 80 -- --   03/25/19 1110 (!) 113/58 98.4 °F (36.9 °C) -- 80 -- --   03/25/19 1040 (!) 115/49 98.4 °F (36.9 °C) -- 77 -- --   03/25/19 1010 (!) 123/56 98.4 °F (36.9 °C) -- 76 -- --   03/25/19 1000 (!) 129/56 98.4 °F (36.9 °C) -- 77 -- (!) 311 lb 4.6 oz (141.2 kg)   03/25/19 0830 (!) 104/40 97.2 °F (36.2 °C) Oral 77 99 % --       Physical Exam   Constitutional: He is oriented to person, place, and time. He appears well-developed and well-nourished. No distress. HENT:   Head: Normocephalic and atraumatic. Mouth/Throat: No oropharyngeal exudate. Eyes: Conjunctivae are normal. No scleral icterus. Neck: Neck supple. No tracheal deviation present. Cardiovascular: Normal rate and regular rhythm. Exam reveals no friction rub. No murmur heard. Pulmonary/Chest: No stridor. No respiratory distress. Abdominal: Soft. He exhibits no distension. There is no tenderness.    Genitourinary:   Genitourinary Comments: no urine - invaginated penis - scrotum not swollen   Musculoskeletal: He exhibits no edema or deformity. Neurological: He is alert and oriented to person, place, and time. No cranial nerve deficit. Skin: Skin is warm and dry. No erythema. Wound on the sacral area, excoriation  Right groin skin superf ulcer clean  Right groin hematoma wo open area stable  Right lower leg ulcer clean and large     Psychiatric: He has a normal mood and affect. His behavior is normal.         Medical Decision Making:   I have independently reviewed/ordered the following labs:    CBC with Differential:   Recent Labs     03/24/19  0607 03/25/19  1129   WBC 8.4 9.2   HGB 7.9* 8.0*   HCT 26.2* 25.7*    257   LYMPHOPCT 12* PENDING   MONOPCT 7 PENDING     BMP:  Recent Labs     03/24/19  0607 03/25/19  1129   * 131*   K 4.1 4.0   CL 95* 92*   CO2 23 22   BUN 60* 86*   CREATININE 4.67* 5.67*     Hepatic Function Panel: No results for input(s): PROT, LABALBU, BILIDIR, IBILI, BILITOT, ALKPHOS, ALT, AST in the last 72 hours. No results for input(s): RPR in the last 72 hours. No results for input(s): HIV in the last 72 hours. No results for input(s): BC in the last 72 hours. Lab Results   Component Value Date    CREATININE 5.67 03/25/2019    GLUCOSE 139 03/25/2019       Detailed results: Thank you for allowing us to participate in the care of this patient. Please call with questions. This note is created with the assistance of a speech recognition program.  While intending to generate adocument that actually reflects the content of the visit, the document can still have some errors including those of syntax and sound a like substitutions which may escape proof reading. It such instances, actual meaningcan be extrapolated by contextual diversion.     Ismael Ignacio MD  Office: [phone]

## 2019-03-25 NOTE — PLAN OF CARE
Problem: Falls - Risk of:  Goal: Will remain free from falls  Description  Will remain free from falls  3/25/2019 0213 by Giancarlo Angela RN  Outcome: Ongoing  3/24/2019 1835 by Mckay Quarles RN  Outcome: Met This Shift     Problem: Risk for Impaired Skin Integrity  Goal: Tissue integrity - skin and mucous membranes  Description  Structural intactness and normal physiological function of skin and  mucous membranes.   3/25/2019 5602 by Giancarlo Angela RN  Outcome: Ongoing  3/24/2019 1835 by Mckay Quarles RN  Outcome: Ongoing     Problem: Pain:  Goal: Pain level will decrease  Description  Pain level will decrease  3/24/2019 1835 by Mckay Quarles RN  Outcome: Ongoing  Goal: Control of acute pain  Description  Control of acute pain  Outcome: Ongoing     Problem: Mobility - Impaired:  Goal: Mobility will improve to maximum level  Description  Mobility will improve to maximum level  Outcome: Ongoing     Problem: Skin Integrity - Impaired:  Goal: Will show no infection signs and symptoms  Description  Will show no infection signs and symptoms  Outcome: Ongoing

## 2019-03-25 NOTE — PROGRESS NOTES
Renal Progress Note    Patient :  Antionette Barahona; 75 y.o. MRN# [id number]  Location:  0156/3079-50  Attending:  Nia Valdovinos MD  Admit Date:  3/21/2019   Hospital Day: 4      Subjective:     Patient was seen and examined on HD. No new issues overnight. Tolerating the procedure well. Leg Wound Cr 3/21/19:   Culture --Abnormal     ACINETOBACTER BAUMANNII   MODERATE GROWTH   Abnormal     Culture --Abnormal     PSEUDOMONAS STUTZERI   MODERATE GROWTH   Abnormal     Culture --Abnormal     ACINETOBACTER BAUMANNII   MODERATE GROWTH   (DIFFERENT SENSITIVITY PATTERN)      ID on board. Outpatient Medications:     Medications Prior to Admission: nystatin (MYCOSTATIN) POWD powder, Apply topically 3 times daily Indications: x 10 days  apixaban (ELIQUIS) 5 MG TABS tablet, Take 1 tablet by mouth 2 times daily  gabapentin (NEURONTIN) 300 MG capsule, Take 1 capsule by mouth daily for 30 days. midodrine (PROAMATINE) 10 MG tablet, Take 1 tablet by mouth 3 times daily  folic acid (FOLVITE) 1 MG tablet, Take 1 tablet by mouth daily  insulin glargine (LANTUS) 100 UNIT/ML injection vial, Inject 25 Units into the skin 2 times daily  insulin lispro (HUMALOG) 100 UNIT/ML injection vial, Inject 0-18 Units into the skin 3 times daily (with meals)  insulin lispro (HUMALOG) 100 UNIT/ML injection vial, Inject 0-9 Units into the skin nightly  Misc. Devices [facility]) MISC, Use as directed  buPROPion (WELLBUTRIN XL) 150 MG extended release tablet, Take 150 mg by mouth every morning  sevelamer (RENVELA) 800 MG tablet, Give 2 tabs po TID with meals and additional 1 tab po TID with snack.  (Patient taking differently: Take by mouth 3 times daily Give with snack.)  Pollen Extracts (PROSTAT PO), Take 30 mLs by mouth three times daily  sertraline (ZOLOFT) 100 MG tablet, Take 100 mg by mouth daily   loratadine (CLARITIN) 10 MG capsule, Take 10 mg by mouth  sodium chloride (OCEAN) 0.65 % nasal spray, by Nasal route  lidocaine (XYLOCAINE) 5 % ointment, Apply topically three times a week Apply topically as needed. DIALYSIS DAYS  Amino Acids-Protein Hydrolys (PRO-STAT PO), Take 30 mLs by mouth 3 times daily SUGAR FREE   guaiFENesin (ROBITUSSIN) 100 MG/5ML syrup, Take 200 mg by mouth 4 times daily as needed for Cough  ipratropium-albuterol (DUONEB) 0.5-2.5 (3) MG/3ML SOLN nebulizer solution, Inhale 1 vial into the lungs every 6 hours as needed   aspirin 81 MG tablet, Take 81 mg by mouth daily  Skin Protectants, Misc.  (HYDROCERIN) CREA cream, Apply topically nightly  glucagon 1 MG injection, Infuse 1 kit intravenously as needed  donepezil (ARICEPT) 5 MG tablet, Take 5 mg by mouth every evening  levothyroxine (SYNTHROID) 25 MCG tablet, Take 1 tablet by mouth Daily  albuterol (PROVENTIL) (2.5 MG/3ML) 0.083% nebulizer solution, Take 3 mLs by nebulization every 8 hours as needed for Wheezing  fluticasone (FLONASE) 50 MCG/ACT nasal spray, 2 sprays by Nasal route daily  glucose (GLUTOSE 15) 40 % GEL, Take 15 g by mouth as needed (hypoglycemia) 15 gram oral as needed if blood sugar is less than 70 ( for hypoglycemia)  [DISCONTINUED] atorvastatin (LIPITOR) 10 MG tablet, Take 1 tablet by mouth daily  acetaminophen (TYLENOL) 325 MG tablet, Take 650 mg by mouth every 6 hours as needed for Pain or Fever   magnesium hydroxide (MILK OF MAGNESIA) 400 MG/5ML suspension, Take 30 mLs by mouth daily as needed for Constipation    Current Medications:     Scheduled Meds:    sevelamer  1,600 mg Oral TID WC    miconazole   Topical BID    gabapentin  300 mg Oral Daily    metroNIDAZOLE  500 mg Oral TID    atorvastatin  40 mg Oral Nightly    vancomycin  750 mg Intravenous Q MWF    ALPRAZolam  0.5 mg Oral BID    apixaban  5 mg Oral BID    aspirin  81 mg Oral Daily    buPROPion  150 mg Oral QAM    donepezil  5 mg Oral QPM    fluticasone  2 spray Nasal Daily    folic acid  1 mg Oral Daily    insulin glargine  25 Units Subcutaneous BID    levothyroxine  25 mcg Oral Daily  midodrine  10 mg Oral TID    sertraline  100 mg Oral Daily    sodium chloride flush  10 mL Intravenous 2 times per day    vancomycin  500 mg Intravenous Once    vancomycin (VANCOCIN) intermittent dosing (placeholder)   Other RX Placeholder     Continuous Infusions:    dextrose      dextrose       PRN Meds:  sodium chloride, sodium chloride, heparin (porcine), heparin (porcine), sodium chloride, sodium chloride, guaiFENesin, ipratropium-albuterol, glucose, dextrose, glucagon (rDNA), dextrose, albuterol, sodium chloride flush, potassium chloride **OR** potassium alternative oral replacement **OR** potassium chloride, magnesium hydroxide, ondansetron, nicotine, acetaminophen, glucose, dextrose, glucagon (rDNA), dextrose    Input/Output:       I/O last 3 completed shifts: In: 200 [P.O.:200]  Out: - .      Patient Vitals for the past 96 hrs (Last 3 readings):   Weight   19 1000 (!) 311 lb 4.6 oz (141.2 kg)   19 0645 297 lb 11.2 oz (135 kg)   19 1921 297 lb 13.5 oz (135.1 kg)       Vital Signs:   Temperature:  Temp: 98.4 °F (36.9 °C)  TMax:   Temp (24hrs), Av.2 °F (36.8 °C), Min:97.2 °F (36.2 °C), Max:98.4 °F (36.9 °C)    Respirations:  Resp: 12  Pulse:   Pulse: 81  BP:    BP: (!) 100/50  BP Range: Systolic (55MLI), BGX:158 , Min:92 , PIU:589       Diastolic (32FIK), XSQ:52, Min:40, Max:62      Physical Examination:     General:  AAO x 3, speaking in full sentences, no accessory muscle use. HEENT: Atraumatic, normocephalic, no throat congestion, moist mucosa. Eyes:   Pupils equal, round and reactive to light, EOMI. Neck:   Supple  Chest:   Bilateral vesicular breath sounds, no rales or wheezes. Cardiac:  S1 S2 RR, no murmurs, gallops or rubs. Abdomen: Soft, non-tender, no masses or organomegaly, BS audible. :   No suprapubic or flank tenderness. Neuro:  AAO x 3, No FND. SKIN:  No rashes, good skin turgor. Extremities:  Trace edema, +ve Le wound.      Labs:       Recent Labs 03/23/19  1127 03/24/19  0607 03/25/19  1129   WBC 9.7 8.4 9.2   RBC 2.44* 2.50* 2.57*   HGB 7.8* 7.9* 8.0*   HCT 25.9* 26.2* 25.7*   .1* 104.8* 100.0   MCH 32.0 31.6 31.1   MCHC 30.1 30.2 31.1   RDW 21.8* 21.2* 20.2*    230 257   MPV 9.8 9.7 9.5      BMP:   Recent Labs     03/23/19  1127 03/24/19  0607 03/25/19  1129   * 131* 131*   K 3.6* 4.1 4.0   CL 96* 95* 92*   CO2 24 23 22   BUN 35* 60* 86*   CREATININE 3.78* 4.67* 5.67*   GLUCOSE 218* 90 139*   CALCIUM 8.1* 8.2* 8.0*      TERESSA:      Lab Results   Component Value Date    TERESSA NEGATIVE 03/21/2016     SPEP:  Lab Results   Component Value Date    PROT 4.5 03/16/2019    ALBCAL 3.0 03/21/2016    ALBPCT 65 03/21/2016    LABALPH 0.3 03/21/2016    LABALPH 0.6 03/21/2016    A1PCT 6 03/21/2016    A2PCT 12 03/21/2016    LABBETA 0.4 03/21/2016    BETAPCT 9 03/21/2016    GAMGLOB 0.4 03/21/2016    GGPCT 8 03/21/2016    PATH ELECTRONICALLY SIGNED.  James Hayden M.D. 03/21/2016     UPEP:     Lab Results   Component Value Date    LABPE NORMAL ELECTROPHORETIC PATTERN 03/21/2016     C3:     Lab Results   Component Value Date    C3 50 03/21/2016     C4:     Lab Results   Component Value Date    C4 21 03/21/2016     Hep BsAg:         Lab Results   Component Value Date    HEPBSAG NONREACTIVE 05/24/2016     Hep C AB:          Lab Results   Component Value Date    HEPCAB NONREACTIVE 11/11/2016       Urinalysis/Chemistries:      Lab Results   Component Value Date    NITRU NEGATIVE 04/27/2016    COLORU DELORES 04/27/2016    PHUR 5.0 04/27/2016    WBCUA TOO NUMEROUS TO COUNT 04/27/2016    RBCUA 10 TO 20 04/27/2016    MUCUS NOT REPORTED 04/27/2016    TRICHOMONAS NOT REPORTED 04/27/2016    YEAST NOT REPORTED 04/27/2016    BACTERIA MANY 04/27/2016    SPECGRAV 1.015 04/27/2016    LEUKOCYTESUR LARGE 04/27/2016    UROBILINOGEN Normal 04/27/2016    BILIRUBINUR NEGATIVE 04/27/2016    GLUCOSEU NEGATIVE 04/27/2016    KETUA NEGATIVE 04/27/2016    AMORPHOUS 2+ 04/27/2016 Urine Sodium:     Lab Results   Component Value Date    RAY 20 04/27/2016     Urine Osmolarity:   Lab Results   Component Value Date    OSMOU 403 03/26/2016      Urine Creatinine:     Lab Results   Component Value Date    LABCREA 95.7 04/27/2016       Radiology:     Reviewed. Assessment:     1 ESRD with the patient usually on a Monday and Wednesday and Friday dialysis at [facility] under my care. 2. Essential hypertension with reasonable control  3. Type 2 DM  4. Mild Volume overload:  5. ANEMIA OF CHRONIC DISEASE: Hemoglobin below target at 8.1 g/dL  6. SECONDARY HYPERPARATHYROIDISM  7. Functional right forearm AV fistula  8. Right distal lower extremity hematoma     Plan:   1. Patient was seen on HD at bedside. Orders were confirmed with the HD nurse. 2. Abx per ID.   3. Ok to discharge from nephrology standpoint post dialysis today. Nutrition   Please ensure that patient is on a renal diet/TF. Avoid nephrotoxic drugs/contrast exposure. We will continue to follow along with you. Vikas Duvall MD  [facility]

## 2019-03-25 NOTE — PROGRESS NOTES
[facility]  Internal Medicine Residency Program  Inpatient Daily Progress Note  ______________________________________________________________________________    Patient: Katherine Marroquin  YOB: 1945   MRN: [id number]    Acct: [de-identified]     Admit date: 3/21/2019  Today's date: 03/25/19  Number of days in the hospital: 4  Expected Discharge Date: 03/23/19    Admitting Diagnosis: Hematoma of groin    Subjective:   Patient seen and examined at bedside. Alert and oriented. Stable vitals this AM.  No acute issues overnight. Patient is eating and drinking fine.     Objective:   Vital Sign:  BP (!) 101/48   Pulse 80   Temp 98.4 °F (36.9 °C)   Resp 12   Ht 5' 7\" (1.702 m)   Wt (!) 311 lb 4.6 oz (141.2 kg)   SpO2 99%   BMI 48.75 kg/m²       Physical Exam:  General appearance:   alert, well appearing, and in no distress  Mental Status: alert, oriented to person, place, and time  Neurologic:  alert, oriented, normal speech, no focal findings or movement disorder noted  Lungs:  clear to auscultation, no wheezes, rales or rhonchi, symmetric air entry  Heart[de-identified] normal rate, regular rhythm, normal S1, S2, no murmurs, rubs, clicks or gallops  Abdomen:  soft, nontender, nondistended, no masses or organomegaly  Extremities: peripheral pulses normal, no pedal edema, no clubbing or cyanosis   Skin: normal coloration and turgor, no rashes, no suspicious skin lesions noted    Medications:  Scheduled Medications   sevelamer  1,600 mg Oral TID WC    miconazole   Topical BID    gabapentin  300 mg Oral Daily    metroNIDAZOLE  500 mg Oral TID    atorvastatin  40 mg Oral Nightly    vancomycin  750 mg Intravenous Q MWF    ALPRAZolam  0.5 mg Oral BID    apixaban  5 mg Oral BID    aspirin  81 mg Oral Daily    buPROPion  150 mg Oral QAM    donepezil  5 mg Oral QPM    fluticasone  2 spray Nasal Daily    folic acid  1 mg Oral Daily    insulin glargine  25 Units Subcutaneous BID    levothyroxine  25 mcg Oral Daily    midodrine  10 mg Oral TID    sertraline  100 mg Oral Daily    sodium chloride flush  10 mL Intravenous 2 times per day    vancomycin  500 mg Intravenous Once    vancomycin (VANCOCIN) intermittent dosing (placeholder)   Other RX Placeholder       PRN Medications  sodium chloride 250 mL PRN   sodium chloride 150 mL PRN   heparin (porcine) 1,000 Units PRN   heparin (porcine) 1,000 Units PRN   sodium chloride 250 mL PRN   sodium chloride 150 mL PRN   guaiFENesin 200 mg 4x Daily PRN   ipratropium-albuterol 1 vial Q6H PRN   glucose 15 g PRN   dextrose 12.5 g PRN   glucagon (rDNA) 1 mg PRN   dextrose 100 mL/hr PRN   albuterol 2.5 mg Q8H PRN   sodium chloride flush 10 mL PRN   potassium chloride 40 mEq PRN   Or     potassium alternative oral replacement 40 mEq PRN   Or     potassium chloride 10 mEq PRN   magnesium hydroxide 30 mL Daily PRN   ondansetron 4 mg Q6H PRN   nicotine 1 patch Daily PRN   acetaminophen 650 mg Q4H PRN   glucose 15 g PRN   dextrose 12.5 g PRN   glucagon (rDNA) 1 mg PRN   dextrose 100 mL/hr PRN       Diagnostic Labs and Imaging:  CBC:  Recent Labs     03/23/19  1127 03/24/19  0607 03/25/19  1129   WBC 9.7 8.4 9.2   HGB 7.8* 7.9* 8.0*    230 257     BMP: Recent Labs     03/23/19  1127 03/24/19  0607 03/25/19  1129   * 131* 131*   K 3.6* 4.1 4.0   CL 96* 95* 92*   CO2 24 23 22   BUN 35* 60* 86*   CREATININE 3.78* 4.67* 5.67*   GLUCOSE 218* 90 139*     Hepatic: No results for input(s): AST, ALT, ALB, BILITOT, ALKPHOS in the last 72 hours.     Assessment and Plan:     Principal Problem:    Cellulitis  Active Problems:    Anemia in chronic kidney disease, on chronic dialysis (HCC)    Essential hypertension    Mixed hyperlipidemia    Dementia    Major depressive disorder with single episode, in partial remission ([facility] [address]    ESRD (end stage renal disease) on dialysis (HCC)    Paroxysmal atrial fibrillation (HCC)    BMI 40.0-44.9, adult ([facility] [address]    Controlled type 2 diabetes mellitus with chronic kidney disease on chronic dialysis, with long-term current use of insulin (HCC)    Bruising    Hematoma of groin    Open wound  Resolved Problems:    * No resolved hospital problems. *    1. Cellulitis of the Right Groin with fluid collection  Started patient on Vancomycin. Pharmacy to dose. Trauma discussed with IR regarding drainage. Decision made not to drain and observe at this time.      2. Anemia of Chronic Disease  Will continue to monitor Hb. Transfuse as needed.     3. ESRD  Dialysis every M,W,F. Nephrology on board. Midodrine 10 mg TD     4. Paroxysmal A Fib  Eliquis 5 mg BD.     5. Chronic Diarrhea  Flagyl 500 mg TD for 3 days     6. Hyperlipidemia  Lipitor 40 mg daily     7. Depression and Dementia  Xanax 0.5 mg BD. Wellbutrin  mg daily. Aricept 5 mg Every evening. Zoloft 100 mg daily.     8. DM Type 2  Lantus 25 mg BD. Low Dose SS. Hypoglycemia protocol. POCT Glucose AC HS.      9. Hypothyroidism  Synthroid 25 mcg daily    Ayo Rawls MD  PGY-1, Department of Internal Medicine  [address] [place] [address]  3/25/2019 1:49 PM    Attending Physician Statement  I have discussed the care of Edgardo Hummel, including pertinent history and exam findings with the resident. I have reviewed the key elements of all parts of the encounter with the resident. I have seen and examined the patient with the resident and the key elements of all parts of the encounter have been performed by me.     Principal Problem:    Hematoma of groin  Active Problems:    Anemia in chronic kidney disease, on chronic dialysis (HCC)    Essential hypertension    Mixed hyperlipidemia    Dementia    Major depressive disorder with single episode, in partial remission ([facility] [address]    ESRD (end stage renal disease) on dialysis (HCC)    Paroxysmal atrial fibrillation (HCC)    BMI 40.0-44.9, adult ([facility] [address]    Controlled type 2 diabetes mellitus with chronic kidney disease on chronic dialysis, with long-term current use of insulin (HCC)    Bruising    Cellulitis    Open wound  Resolved Problems:    * No resolved hospital problems. *    Assessment/Plan  Hemoglobin is stable. Right groin hematoma - due to central line procedure in previous admission - stable per ultrasound and CT scan. No sign of infection.   Okay to DC after hemodialysis    Sharron Dobson MD  Attending and Faculty Internal [address]  Internal Medicine [address] [place] [address], S..   3/25/19

## 2019-03-25 NOTE — CARE COORDINATION
Discharge [id number] [facility]  Clinical Case Management Department  Written by:  Malika Wiley, RN    Patient Name: Art Travis  Attending Provider: Frederick Thornton MD  Admit Date: 3/21/2019  5:52 PM  MRN: [id number]  Account: [de-identified]                     : 1945  Discharge Date: 3/25/19      Disposition: [facility], RN

## 2019-03-25 NOTE — CARE COORDINATION
Spoke to Adair macias from [facility], they are able to accept patient back at any time. Informed her that the discharge is in.   Transport tentatively set up for 4 pm

## 2019-03-25 NOTE — PROGRESS NOTES
Dialysis Post Treatment Note  Patient tolerated treatment well. Denies complaints at time of discharge. Vitals:    03/25/19 1420   BP: (!) 114/47   Pulse: 81   Resp:    Temp: 95.8 °F (35.4 °C)   SpO2:      Pre-Weight = 141.2kg  Post-weight = Weight: (!) 304 lb 3.8 oz (138 kg)  Total Liters Processed = Total Liters Processed (l/min): 117.4 l/min  Rinseback Volume (mL) = Rinseback Volume (ml): 350 ml  Net Removal (mL) = 3050  Length of treatment=240  Pt completed a 4 hour treatment, pt dropped in blood pressure lowered goal to 3400, lowered temp to 36.0  Post weight is 138.0 kg, net removed is 3050. vanco not given .

## 2019-03-26 ENCOUNTER — TELEPHONE (OUTPATIENT)
Dept: WOUND CARE | Age: 74
End: 2019-03-26

## 2019-03-26 ENCOUNTER — HOSPITAL ENCOUNTER (OUTPATIENT)
Age: 74
Setting detail: SPECIMEN
Discharge: HOME | End: 2019-03-26
Payer: COMMERCIAL

## 2019-03-26 ENCOUNTER — OFFICE VISIT (OUTPATIENT)
Dept: WOUND CARE | Age: 74
End: 2019-03-26
Payer: MEDICARE

## 2019-03-26 ENCOUNTER — TELEPHONE (OUTPATIENT)
Dept: INTERNAL MEDICINE | Age: 74
End: 2019-03-26

## 2019-03-26 VITALS
HEART RATE: 78 BPM | TEMPERATURE: 98.2 F | RESPIRATION RATE: 18 BRPM | DIASTOLIC BLOOD PRESSURE: 48 MMHG | OXYGEN SATURATION: 94 % | SYSTOLIC BLOOD PRESSURE: 124 MMHG

## 2019-03-26 DIAGNOSIS — S80.11XA HEMATOMA OF RIGHT LOWER EXTREMITY, INITIAL ENCOUNTER: Primary | ICD-10-CM

## 2019-03-26 DIAGNOSIS — S80.11XA HEMATOMA OF RIGHT LOWER EXTREMITY, INITIAL ENCOUNTER: ICD-10-CM

## 2019-03-26 PROCEDURE — G8482 FLU IMMUNIZE ORDER/ADMIN: HCPCS | Performed by: SURGERY

## 2019-03-26 PROCEDURE — 3017F COLORECTAL CA SCREEN DOC REV: CPT | Performed by: SURGERY

## 2019-03-26 PROCEDURE — 1123F ACP DISCUSS/DSCN MKR DOCD: CPT | Performed by: SURGERY

## 2019-03-26 PROCEDURE — 87077 CULTURE AEROBIC IDENTIFY: CPT

## 2019-03-26 PROCEDURE — G8417 CALC BMI ABV UP PARAM F/U: HCPCS | Performed by: SURGERY

## 2019-03-26 PROCEDURE — 87070 CULTURE OTHR SPECIMN AEROBIC: CPT

## 2019-03-26 PROCEDURE — 87075 CULTR BACTERIA EXCEPT BLOOD: CPT

## 2019-03-26 PROCEDURE — G8428 CUR MEDS NOT DOCUMENT: HCPCS | Performed by: SURGERY

## 2019-03-26 PROCEDURE — 87205 SMEAR GRAM STAIN: CPT

## 2019-03-26 PROCEDURE — 99213 OFFICE O/P EST LOW 20 MIN: CPT | Performed by: SURGERY

## 2019-03-26 PROCEDURE — 1036F TOBACCO NON-USER: CPT | Performed by: SURGERY

## 2019-03-26 PROCEDURE — 1111F DSCHRG MED/CURRENT MED MERGE: CPT | Performed by: SURGERY

## 2019-03-26 PROCEDURE — 87186 SC STD MICRODIL/AGAR DIL: CPT

## 2019-03-26 PROCEDURE — 97597 DBRDMT OPN WND 1ST 20 CM/<: CPT | Performed by: SURGERY

## 2019-03-26 PROCEDURE — 4040F PNEUMOC VAC/ADMIN/RCVD: CPT | Performed by: SURGERY

## 2019-03-26 RX ORDER — OMEPRAZOLE 20 MG/1
20 CAPSULE, DELAYED RELEASE ORAL NIGHTLY
Status: ON HOLD | COMMUNITY
End: 2021-01-01 | Stop reason: HOSPADM

## 2019-03-26 RX ORDER — M-VIT,TX,IRON,MINS/CALC/FOLIC 27MG-0.4MG
1 TABLET ORAL DAILY
COMMUNITY

## 2019-03-26 RX ORDER — METRONIDAZOLE 500 MG/1
500 TABLET ORAL 3 TIMES DAILY
Status: ON HOLD | COMMUNITY
End: 2019-04-04 | Stop reason: HOSPADM

## 2019-03-26 ASSESSMENT — ENCOUNTER SYMPTOMS
EYE DISCHARGE: 0
COLOR CHANGE: 0
ABDOMINAL DISTENTION: 0
APNEA: 0

## 2019-03-26 NOTE — PLAN OF CARE
Problem: Falls - Risk of:  Goal: Will remain free from falls  Description  Will remain free from falls  3/25/2019 2126 by Fernando Chamberlain RN  Outcome: Ongoing  3/25/2019 1853 by Ynes Diana RN  Outcome: Ongoing  Goal: Absence of physical injury  Description  Absence of physical injury  3/25/2019 2126 by Fernando Chabmerlain RN  Outcome: Ongoing  3/25/2019 1853 by Ynes Diana RN  Outcome: Ongoing     Problem: Risk for Impaired Skin Integrity  Goal: Tissue integrity - skin and mucous membranes  Description  Structural intactness and normal physiological function of skin and  mucous membranes. 3/25/2019 2126 by Fernando Chamberlain RN  Outcome: Ongoing  3/25/2019 1853 by Ynes Diana RN  Outcome: Ongoing     Problem: Pain:  Goal: Pain level will decrease  Description  Pain level will decrease  3/25/2019 2126 by Fernando Chamberlain RN  Outcome: Ongoing  3/25/2019 1853 by Ynes Diana RN  Outcome: Ongoing  Goal: Control of acute pain  Description  Control of acute pain  3/25/2019 2126 by Fernando Chamberlain RN  Outcome: Ongoing  3/25/2019 1853 by Ynes Diana RN  Outcome: Ongoing  Goal: Control of chronic pain  Description  Control of chronic pain  3/25/2019 2126 by Fernando Chamberlain RN  Outcome: Ongoing  3/25/2019 1853 by Ynes Diana RN  Outcome: Ongoing     Problem: Discharge Planning:  Goal: Discharged to appropriate level of care  Description  Discharged to appropriate level of care  3/25/2019 2126 by Fernando Chamberlain RN  Outcome: Ongoing  3/25/2019 1853 by Ynes Diana RN  Outcome: Ongoing     Problem:  Body Temperature - Imbalanced:  Goal: Ability to maintain a body temperature in the normal range will improve  Description  Ability to maintain a body temperature in the normal range will improve  3/25/2019 2126 by Fernando Chamberlain RN  Outcome: Ongoing  3/25/2019 1853 by Ynes Diana RN  Outcome: Ongoing     Problem: Mobility - Impaired:  Goal: Mobility will improve to maximum level  Description  Mobility will improve to maximum level  3/25/2019 2126 by Nate Foster RN  Outcome: Ongoing  3/25/2019 1853 by Daysi Sinha RN  Outcome: Ongoing     Problem: Skin Integrity - Impaired:  Goal: Will show no infection signs and symptoms  Description  Will show no infection signs and symptoms  3/25/2019 2126 by Nate Foster RN  Outcome: Ongoing  3/25/2019 1853 by Daysi Sinha RN  Outcome: Ongoing  Goal: Absence of new skin breakdown  Description  Absence of new skin breakdown  3/25/2019 2126 by Nate Foster RN  Outcome: Ongoing  3/25/2019 1853 by Daysi Sinha RN  Outcome: Ongoing     Problem: Nutrition  Goal: Optimal nutrition therapy  3/25/2019 2126 by Nate Foster RN  Outcome: Ongoing  3/25/2019 1853 by Daysi Sinha RN  Outcome: Ongoing

## 2019-03-27 LAB
CULTURE: NORMAL
GLUCOSE BLD-MCNC: 95 MG/DL (ref 75–110)
Lab: NORMAL
SPECIMEN DESCRIPTION: NORMAL

## 2019-03-27 NOTE — CONSULTS
VASCULAR CONSULTATION - pasted to this note for proper documentation purposes. Previously labeled as a \"progress note. \"  Electronically signed by Tracee Rosales DO on 3/27/2019 at 5:38 PM                    Attested        Attestation signed by Tommie Hooper MD at 3/8/2019 3:16 PM   ATTENDING ATTESTATION: I personally examined Roxi Lopez today and confirmed the pertinent history, exam and medical decision making in the resident's note. Please note there may be additional comments below.     Additional Comments: Expanding hematoma in lateral aspect of right leg, likely muscular bleed. On anticoagulation with coumadin. Recommend Trauma evaluation for evacuation of hematoma and debridement as needed. ACE wrap performed to contain hematoma from continuing to spread.        Electronically signed by Tommie Hooper MD on 3/8/2019 at 3:15 PM  Parisa: [phone]  C: [phone]  Email: [email or web address]            Expand All Collapse All          Show:Clear all  [x]Manual[x]Template[]Copied    Added by:  [x]Tiago Heard DO      []Amaury for details            Division of Vascular Surgery             Vascular Consult        Name: Roxi Lopez  MRN:   [id number]                                       Physician Requesting Consult:  Dr. Susan Dupont     Reason for Consult:   RLE hematoma      Chief Complaint:      I fell and hurt my right leg      History of Present Illness:      Roxi Lopez is a 75 y.o.  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 The patient states he only had mild bruising initially and his wife reports that over the past couple days the swelling to the RLE has increased until they realized he would need to go to the ER for an evaluation this morning.  The patient was seen at the [facility] ER and transferred to [facility] ER for surgical evaluation. X-rays negative for any fracture in the extremity and orthopedic surgery was consulted initially. Orthopedic surgery reviewed the imaging and then recommended calling vascular surgery for an evaluation. The patient reports he can move his foot and has sensation in the foot. He does endorse some pain but states it is controlled. In the ER he was AAOx3, hemodynamically stable and vascular surgery called to evaluate the leg and foot.    Past Medical History:      Past Medical History        Past Medical History:   Diagnosis Date    Anemia in chronic kidney disease (CKD) 4/27/2016    Arthritis      Bacteremia 11/11/2016    Benign prostatic hyperplasia without lower urinary tract symptoms      BMI 40.0-44.9, adult ([address] 3/19/2018    Cellulitis of left lower extremity      Chronic cerebral ischemia 1/3/2017    Closed fracture of forearm 8/13/2013     Broken lft forearm     Controlled type 2 diabetes mellitus with chronic kidney disease on chronic dialysis, with long-term current use of insulin ([address]      Controlled type 2 diabetes mellitus with diabetic polyneuropathy, with long-term current use of insulin ([address] 10/7/2018    Decubital ulcer       NON OPEN BUTTOCKS    Dementia       memory problems    Dialysis patient ([address] 1/3/2017     Goes [facility], Sat in [place] Difficult intravenous access       HAS NEEDED PICC TEAM IN THE PAST    Difficulty walking       WHEELCHAIR BOUND-PIVOTS WITH ASSIST O F 2    DJD (degenerative joint disease) of knee      Elbow, forearm, and wrist, abrasion or friction burn, without mention of infection 8/13/2013     Abrasions lft forearm     Encephalopathy acute 4/27/2016    Encounter regarding vascular access for dialysis for ESRD ([address] 2/2/2017    ESRD (end stage renal disease) on dialysis ([address] 1/17/2017    Forgetfulness       HAS SOME SHORT TERM MEMORY LOSS, QUYEN-WIFE IS LEGAL GUARDIAN    H/O transesophageal echocardiography (AMADEO) for monitoring      Hemodialysis patient ([facility] [address] 11/11/2016     tues-thurs-sat [place]---FRESEMIUS.  TUNNELED CATHETER RT UPPER CHEST    Hyperlipidemia 1990     on Meds    Hypertension 1990     on Meds    Intercritical gout       on Meds    Joint pain, knee 01/13/2017     baldo knee synvisc-1 injections    Long-term insulin use ([address] 1/17/2017    Major depressive disorder with single episode, in partial remission ([address] 1/3/2017    Mobility impaired      Morbid obesity ([address]      Muscle weakness       GRNERALIZED    Obesity      Obstructive sleep apnea      HEATHER on CPAP       On CPAP-TO BRING DOS    Paroxysmal atrial fibrillation ([address] 9/6/2017    Respiratory failure (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 lft thumb     Venous stasis dermatitis      Wears glasses      Wheelchair bound       pivots with 2 assists            Past Surgical History:      Past Surgical History   Past Surgical History:   Procedure Laterality Date    AORTIC VALVE REPLACEMENT   03/18/2016     bioprosthetic    ARM SURGERY Left 1990    CARDIAC CATHETERIZATION        CENTRAL VENOUS CATHETER Right 02/21/2018     DIALYSIS CATHETER Dr Rosemary Hernandez    COLONOSCOPY   11/19/09    COLONOSCOPY N/A 10/12/2018     COLONOSCOPY WITH BIOPSY performed by Naima Shown, DO at [address] (x10-12 surgeries)     several surgeries on left arm    KNEE ARTHROSCOPY Left 09/17/99    MITRAL VALVE REPLACEMENT   03/18/2016     bioprosthetic     NASAL SEPTUM SURGERY        OTHER SURGICAL HISTORY   3/18/16     Aortic root Enlargement    OTHER SURGICAL HISTORY   3/18/16     ASD Closure    OTHER SURGICAL HISTORY Right 01/09/2017     AV fistula creation, wrist    OTHER SURGICAL HISTORY Right 08/29/2017     ligation of collateral branch of av fistula right wrist    OTHER SURGICAL HISTORY   04/05/2018     FISTULAGRAM WITH DR. Vandana GUARDADO EGD TRANSORAL BIOPSY SINGLE/MULTIPLE N/A 10/17/2017     EGD BIOPSY performed by America Vivas MD [address] ANGIOACCESS AV FISTULA Right 8/29/2017      RIGHT  LOWER ARM AV FISTULA  LIGATION OF AQUIRED BRANCHES X2 performed by Luis Enrique Simmons DO at [facility]   3/18/16     median    VASCULAR SURGERY   12/06/2018     Right arm fistulagram,  PTA cephalic vein stenosis  /  DR Leslee Benito            Medications Prior to Admission:       Home Medications           Prior to Admission medications    Medication Sig Start Date End Date Taking? Authorizing Provider   Misc. Devices [facility]) MISC Use as directed 12/12/18     Octavio Mcarthur DO   buPROPion (WELLBUTRIN XL) 150 MG extended release tablet Take 150 mg by mouth every morning       Historical Provider, MD   sevelamer (RENVELA) 800 MG tablet Give 2 tabs po TID with meals and additional 1 tab po TID with snack.   Patient taking differently: No sig reported 7/29/18     Windy Coral, APRN - CNP   Pollen Extracts (PROSTAT PO) Take 30 mLs by mouth three times daily       Historical Provider, MD   warfarin (COUMADIN) 2 MG tablet Take 2 mg by mouth nightly Take 1 tablet by mouth at bedtime       Historical Provider, MD   warfarin (COUMADIN) 3 MG tablet Take 3 mg by mouth daily Take 3 mg on Monday and Tuesday       Historical Provider, MD   sertraline (ZOLOFT) 100 MG tablet Take 100 mg by mouth daily        Historical Provider, MD   loratadine (CLARITIN) 10 MG capsule Take 10 mg by mouth       Historical Provider, MD   sodium chloride (OCEAN) 0.65 % nasal spray by Nasal route 10/24/16     Historical Provider, MD   lidocaine (XYLOCAINE) 5 % ointment Apply topically as described in HPI     Constitutional:  negative for  fevers, chills  HEENT:  negative for vision or hearing changes,   Respiratory:  negative for shortness of breath, cough, or congestion  Cardiovascular:  Reports hx of aortic root enlargement, AVR, MVR and on coumadin therapy.    Gastrointestinal:  negative for nausea, vomiting  Genitourinary:  negative for frequency, dysuria  Integument:  Reports severe bruising and skin color changes to the RLE due to injury; reports bloody clear drainage from RLE  Chest/Breast:  No painful inspiration or expiration, no rib sternal pain  Musculoskeletal:  Reports significant RLE tenderness, swelling and pain   Neurological:  negative for headaches, dizziness, lightheadedness  Lymphatics: no lymphadenopathy or painful masses  Behavior/Psych:  negative for depression and anxiety     Physical Exam:      Vitals:  BP (!) 101/55   Pulse 93   Temp 97.6 °F (36.4 °C) (Oral)   Resp 22   SpO2 100%      General appearance - alert, well appearing and in no acute distress  Mental status - oriented to person, place and time with normal affect  Head - normocephalic and atraumatic  Eyes - pupils equal and reactive, extraocular eye movements intact, conjunctiva clear  Ears - hearing appears to be intact  Nose - no drainage noted  Mouth - mucous membranes moist  Neck - supple, trachea midline   Chest - no respiratory distress, no audible wheezing  Heart - +S1/S2  Abdomen - soft, non-tender, non-distended  Neurological - normal speech, no focal findings or movement disorder noted  Extremities - palpable PT/DP pulses in lower extremities; RLE with significant swelling from extensive/massive hematoma; multiple large skin tears with thin, necrosing discolored skin, nearly black in certain areas; significant edema throughout the right ankle; significantly tender to palpation   Skin - no gross lesions elsewhere but significant ecchymoses, tearing of skin and areas of near necrosis along the distal

## 2019-03-27 NOTE — PROGRESS NOTES
Update problem list    Respiratory fialure post op from Dr. Amador Melara case 3/8/2019    Chronic underlying dz, s/p Trauma to leg patient kept intubated for shock post op and repeat surgery <48 hrs later

## 2019-03-28 ENCOUNTER — OUTSIDE SERVICES (OUTPATIENT)
Dept: INTERNAL MEDICINE | Age: 74
End: 2019-03-28
Payer: MEDICARE

## 2019-03-28 DIAGNOSIS — I10 ESSENTIAL HYPERTENSION: ICD-10-CM

## 2019-03-28 DIAGNOSIS — N18.6 CONTROLLED TYPE 2 DIABETES MELLITUS WITH CHRONIC KIDNEY DISEASE ON CHRONIC DIALYSIS, WITH LONG-TERM CURRENT USE OF INSULIN (HCC): ICD-10-CM

## 2019-03-28 DIAGNOSIS — I48.0 PAROXYSMAL ATRIAL FIBRILLATION (HCC): ICD-10-CM

## 2019-03-28 DIAGNOSIS — Z79.4 CONTROLLED TYPE 2 DIABETES MELLITUS WITH CHRONIC KIDNEY DISEASE ON CHRONIC DIALYSIS, WITH LONG-TERM CURRENT USE OF INSULIN (HCC): ICD-10-CM

## 2019-03-28 DIAGNOSIS — E11.22 CONTROLLED TYPE 2 DIABETES MELLITUS WITH CHRONIC KIDNEY DISEASE ON CHRONIC DIALYSIS, WITH LONG-TERM CURRENT USE OF INSULIN (HCC): ICD-10-CM

## 2019-03-28 DIAGNOSIS — S81.801D WOUND OF RIGHT LOWER EXTREMITY, SUBSEQUENT ENCOUNTER: ICD-10-CM

## 2019-03-28 DIAGNOSIS — Z99.2 CONTROLLED TYPE 2 DIABETES MELLITUS WITH CHRONIC KIDNEY DISEASE ON CHRONIC DIALYSIS, WITH LONG-TERM CURRENT USE OF INSULIN (HCC): ICD-10-CM

## 2019-03-28 DIAGNOSIS — D63.1 ANEMIA IN CHRONIC KIDNEY DISEASE, ON CHRONIC DIALYSIS (HCC): ICD-10-CM

## 2019-03-28 DIAGNOSIS — Z99.2 ANEMIA IN CHRONIC KIDNEY DISEASE, ON CHRONIC DIALYSIS (HCC): ICD-10-CM

## 2019-03-28 DIAGNOSIS — N18.6 ESRD (END STAGE RENAL DISEASE) ON DIALYSIS (HCC): Primary | ICD-10-CM

## 2019-03-28 DIAGNOSIS — N18.6 ANEMIA IN CHRONIC KIDNEY DISEASE, ON CHRONIC DIALYSIS (HCC): ICD-10-CM

## 2019-03-28 DIAGNOSIS — Z99.2 ESRD (END STAGE RENAL DISEASE) ON DIALYSIS (HCC): Primary | ICD-10-CM

## 2019-03-28 PROCEDURE — 99308 SBSQ NF CARE LOW MDM 20: CPT | Performed by: NURSE PRACTITIONER

## 2019-03-29 ENCOUNTER — TELEPHONE (OUTPATIENT)
Dept: SURGERY | Age: 74
End: 2019-03-29

## 2019-03-29 ENCOUNTER — TELEPHONE (OUTPATIENT)
Dept: WOUND CARE | Age: 74
End: 2019-03-29

## 2019-03-29 ENCOUNTER — TELEPHONE (OUTPATIENT)
Dept: INTERNAL MEDICINE | Age: 74
End: 2019-03-29

## 2019-03-29 RX ORDER — CIPROFLOXACIN 500 MG/1
500 TABLET, FILM COATED ORAL DAILY
Qty: 7 TABLET | Refills: 0 | Status: ON HOLD | OUTPATIENT
Start: 2019-03-29 | End: 2019-04-04 | Stop reason: HOSPADM

## 2019-03-31 LAB
CULTURE: ABNORMAL
DIRECT EXAM: ABNORMAL
DIRECT EXAM: ABNORMAL
Lab: ABNORMAL
SPECIMEN DESCRIPTION: ABNORMAL

## 2019-03-31 ASSESSMENT — ENCOUNTER SYMPTOMS
CONSTIPATION: 0
FACIAL SWELLING: 0
NAUSEA: 0
SHORTNESS OF BREATH: 0
COUGH: 0
SINUS PRESSURE: 0
COLOR CHANGE: 0
CHEST TIGHTNESS: 0
WHEEZING: 0
ABDOMINAL PAIN: 0
TROUBLE SWALLOWING: 0
EYE PAIN: 0
VOMITING: 0
BLOOD IN STOOL: 0
SORE THROAT: 0
RHINORRHEA: 0
DIARRHEA: 0

## 2019-04-02 ENCOUNTER — APPOINTMENT (OUTPATIENT)
Dept: CT IMAGING | Age: 74
DRG: 604 | End: 2019-04-02
Attending: INTERNAL MEDICINE
Payer: MEDICARE

## 2019-04-02 ENCOUNTER — ANTI-COAG VISIT (OUTPATIENT)
Dept: INTERNAL MEDICINE | Age: 74
End: 2019-04-02

## 2019-04-02 ENCOUNTER — OFFICE VISIT (OUTPATIENT)
Dept: WOUND CARE | Age: 74
End: 2019-04-02
Payer: MEDICARE

## 2019-04-02 ENCOUNTER — HOSPITAL ENCOUNTER (INPATIENT)
Age: 74
LOS: 2 days | Discharge: OTHER INSTITUTION | DRG: 604 | End: 2019-04-04
Attending: INTERNAL MEDICINE | Admitting: INTERNAL MEDICINE
Payer: MEDICARE

## 2019-04-02 VITALS
DIASTOLIC BLOOD PRESSURE: 60 MMHG | OXYGEN SATURATION: 99 % | SYSTOLIC BLOOD PRESSURE: 142 MMHG | HEART RATE: 81 BPM | TEMPERATURE: 98 F

## 2019-04-02 DIAGNOSIS — Z51.89 VISIT FOR WOUND CHECK: Primary | ICD-10-CM

## 2019-04-02 DIAGNOSIS — F41.9 ANXIETY: Primary | ICD-10-CM

## 2019-04-02 DIAGNOSIS — S81.801D OPEN WOUND OF RIGHT LOWER EXTREMITY, SUBSEQUENT ENCOUNTER: ICD-10-CM

## 2019-04-02 PROBLEM — T14.8XXA BLEEDING FROM WOUND: Status: ACTIVE | Noted: 2019-04-02

## 2019-04-02 LAB
GLUCOSE BLD-MCNC: 226 MG/DL (ref 75–110)
GLUCOSE BLD-MCNC: 94 MG/DL (ref 75–110)
HCT VFR BLD CALC: 27 % (ref 40.7–50.3)
HEMOGLOBIN: 8.2 G/DL (ref 13–17)
MCH RBC QN AUTO: 31.1 PG (ref 25.2–33.5)
MCHC RBC AUTO-ENTMCNC: 30.4 G/DL (ref 28.4–34.8)
MCV RBC AUTO: 102.3 FL (ref 82.6–102.9)
NRBC AUTOMATED: 0 PER 100 WBC
PDW BLD-RTO: 19.3 % (ref 11.8–14.4)
PLATELET # BLD: 248 K/UL (ref 138–453)
PMV BLD AUTO: 8.9 FL (ref 8.1–13.5)
RBC # BLD: 2.64 M/UL (ref 4.21–5.77)
WBC # BLD: 7.6 K/UL (ref 3.5–11.3)

## 2019-04-02 PROCEDURE — 99214 OFFICE O/P EST MOD 30 MIN: CPT | Performed by: SURGERY

## 2019-04-02 PROCEDURE — 10140 I&D HMTMA SEROMA/FLUID COLLJ: CPT | Performed by: SURGERY

## 2019-04-02 PROCEDURE — 6370000000 HC RX 637 (ALT 250 FOR IP): Performed by: NURSE PRACTITIONER

## 2019-04-02 PROCEDURE — 2500000003 HC RX 250 WO HCPCS

## 2019-04-02 PROCEDURE — 73700 CT LOWER EXTREMITY W/O DYE: CPT

## 2019-04-02 PROCEDURE — 36415 COLL VENOUS BLD VENIPUNCTURE: CPT

## 2019-04-02 PROCEDURE — 72192 CT PELVIS W/O DYE: CPT

## 2019-04-02 PROCEDURE — 6370000000 HC RX 637 (ALT 250 FOR IP): Performed by: INTERNAL MEDICINE

## 2019-04-02 PROCEDURE — 1123F ACP DISCUSS/DSCN MKR DOCD: CPT | Performed by: SURGERY

## 2019-04-02 PROCEDURE — G8427 DOCREV CUR MEDS BY ELIG CLIN: HCPCS | Performed by: SURGERY

## 2019-04-02 PROCEDURE — G8417 CALC BMI ABV UP PARAM F/U: HCPCS | Performed by: SURGERY

## 2019-04-02 PROCEDURE — 82947 ASSAY GLUCOSE BLOOD QUANT: CPT

## 2019-04-02 PROCEDURE — 85027 COMPLETE CBC AUTOMATED: CPT

## 2019-04-02 PROCEDURE — 99223 1ST HOSP IP/OBS HIGH 75: CPT | Performed by: INTERNAL MEDICINE

## 2019-04-02 PROCEDURE — 1111F DSCHRG MED/CURRENT MED MERGE: CPT | Performed by: SURGERY

## 2019-04-02 PROCEDURE — 2060000000 HC ICU INTERMEDIATE R&B

## 2019-04-02 PROCEDURE — 1036F TOBACCO NON-USER: CPT | Performed by: SURGERY

## 2019-04-02 PROCEDURE — 4040F PNEUMOC VAC/ADMIN/RCVD: CPT | Performed by: SURGERY

## 2019-04-02 PROCEDURE — 6370000000 HC RX 637 (ALT 250 FOR IP)

## 2019-04-02 PROCEDURE — 2580000003 HC RX 258: Performed by: INTERNAL MEDICINE

## 2019-04-02 PROCEDURE — 3017F COLORECTAL CA SCREEN DOC REV: CPT | Performed by: SURGERY

## 2019-04-02 RX ORDER — DEXTROSE MONOHYDRATE 50 MG/ML
100 INJECTION, SOLUTION INTRAVENOUS PRN
Status: DISCONTINUED | OUTPATIENT
Start: 2019-04-02 | End: 2019-04-04 | Stop reason: HOSPADM

## 2019-04-02 RX ORDER — DEXTROSE MONOHYDRATE 25 G/50ML
12.5 INJECTION, SOLUTION INTRAVENOUS PRN
Status: DISCONTINUED | OUTPATIENT
Start: 2019-04-02 | End: 2019-04-04 | Stop reason: HOSPADM

## 2019-04-02 RX ORDER — GABAPENTIN 300 MG/1
300 CAPSULE ORAL DAILY
Status: DISCONTINUED | OUTPATIENT
Start: 2019-04-02 | End: 2019-04-04 | Stop reason: HOSPADM

## 2019-04-02 RX ORDER — POTASSIUM CHLORIDE 20 MEQ/1
40 TABLET, EXTENDED RELEASE ORAL PRN
Status: DISCONTINUED | OUTPATIENT
Start: 2019-04-02 | End: 2019-04-04 | Stop reason: HOSPADM

## 2019-04-02 RX ORDER — INSULIN GLARGINE 100 [IU]/ML
25 INJECTION, SOLUTION SUBCUTANEOUS 2 TIMES DAILY
Status: DISCONTINUED | OUTPATIENT
Start: 2019-04-02 | End: 2019-04-04 | Stop reason: HOSPADM

## 2019-04-02 RX ORDER — NICOTINE POLACRILEX 4 MG
15 LOZENGE BUCCAL PRN
Status: DISCONTINUED | OUTPATIENT
Start: 2019-04-02 | End: 2019-04-04 | Stop reason: HOSPADM

## 2019-04-02 RX ORDER — ONDANSETRON 2 MG/ML
4 INJECTION INTRAMUSCULAR; INTRAVENOUS EVERY 6 HOURS PRN
Status: DISCONTINUED | OUTPATIENT
Start: 2019-04-02 | End: 2019-04-04 | Stop reason: HOSPADM

## 2019-04-02 RX ORDER — SODIUM CHLORIDE 0.9 % (FLUSH) 0.9 %
10 SYRINGE (ML) INJECTION PRN
Status: DISCONTINUED | OUTPATIENT
Start: 2019-04-02 | End: 2019-04-04 | Stop reason: HOSPADM

## 2019-04-02 RX ORDER — MAGNESIUM SULFATE 1 G/100ML
1 INJECTION INTRAVENOUS PRN
Status: DISCONTINUED | OUTPATIENT
Start: 2019-04-02 | End: 2019-04-04 | Stop reason: HOSPADM

## 2019-04-02 RX ORDER — POTASSIUM CHLORIDE 7.45 MG/ML
10 INJECTION INTRAVENOUS PRN
Status: DISCONTINUED | OUTPATIENT
Start: 2019-04-02 | End: 2019-04-04 | Stop reason: HOSPADM

## 2019-04-02 RX ORDER — BUPROPION HYDROCHLORIDE 150 MG/1
150 TABLET ORAL EVERY MORNING
Status: DISCONTINUED | OUTPATIENT
Start: 2019-04-03 | End: 2019-04-04 | Stop reason: HOSPADM

## 2019-04-02 RX ORDER — LEVOTHYROXINE SODIUM 0.03 MG/1
25 TABLET ORAL DAILY
Status: DISCONTINUED | OUTPATIENT
Start: 2019-04-02 | End: 2019-04-04 | Stop reason: HOSPADM

## 2019-04-02 RX ORDER — NICOTINE 21 MG/24HR
1 PATCH, TRANSDERMAL 24 HOURS TRANSDERMAL DAILY PRN
Status: DISCONTINUED | OUTPATIENT
Start: 2019-04-02 | End: 2019-04-04 | Stop reason: HOSPADM

## 2019-04-02 RX ORDER — DONEPEZIL HYDROCHLORIDE 5 MG/1
5 TABLET, FILM COATED ORAL EVERY EVENING
Status: DISCONTINUED | OUTPATIENT
Start: 2019-04-02 | End: 2019-04-04 | Stop reason: HOSPADM

## 2019-04-02 RX ORDER — SODIUM CHLORIDE 0.9 % (FLUSH) 0.9 %
10 SYRINGE (ML) INJECTION EVERY 12 HOURS SCHEDULED
Status: DISCONTINUED | OUTPATIENT
Start: 2019-04-02 | End: 2019-04-04 | Stop reason: HOSPADM

## 2019-04-02 RX ADMIN — Medication 10 ML: at 21:40

## 2019-04-02 RX ADMIN — INSULIN GLARGINE 25 UNITS: 100 INJECTION, SOLUTION SUBCUTANEOUS at 22:51

## 2019-04-02 RX ADMIN — DONEPEZIL HYDROCHLORIDE 5 MG: 5 TABLET, FILM COATED ORAL at 18:48

## 2019-04-02 RX ADMIN — INSULIN LISPRO 2 UNITS: 100 INJECTION, SOLUTION INTRAVENOUS; SUBCUTANEOUS at 22:49

## 2019-04-02 ASSESSMENT — PAIN SCALES - GENERAL: PAINLEVEL_OUTOF10: 0

## 2019-04-02 NOTE — PROGRESS NOTES
Smoking Cessation - topics covered   []  Health Risks  []  Benefits of Quitting   []  Smoking Cessation  []  Patient has no history of tobacco use  [x]  Patient is former smoker. Patient quit in 1980. [x]  No need for tobacco cessation education. []  Booklet given  []  Patient verbalizes understanding. []  Patient denies need for tobacco cessation education. []  Unable to meet with patient today. Will follow up as able.   Regine Carreon  2:02 PM

## 2019-04-02 NOTE — PROGRESS NOTES
Pt's wife also reporting new skin breakdown to buttocks. We were unable to assess this visit due to urgent nature of bleeding to R inguinal wound.

## 2019-04-02 NOTE — CONSULTS
Vascular Surgery Consult Note         PATIENT NAME: Isidra Fonseca     TODAY'S DATE: 4/2/2019, 2:10 PM    CC: Right groin bleeding    SUBJECTIVE:    Patient is a transfer from [place]. Patient previously had a right femoral central line that was removed. In an appointment with Dr. Gisel Amado for a wound check today, patient had continuous oozing from the right groin. Dr. Batres Nurse noticed a hematoma and decided to explore it in the office. He could not control the venous bleeding, so patient was transferred to [facility]. Patient is currently on Eliquis and does not remember his last dose. Patient has a previous right leg wound that is healing well. OBJECTIVE:   Vitals: There were no vitals taken for this visit. INTAKE/OUTPUT:    No intake or output data in the 24 hours ending 04/02/19 1410              GENERAL: AOx3, NAD  HEENT: Atraumatic, normocephalic   CARDIAC: Regular rate and rhythm. ABDOMEN: Soft, NT, ND, Active Bowel Sounds  EXTREMITY: Bilateral 2+ edema. Right leg wound. Distal pulses palpable bilaterally +2. Oozing wound from right groin. No pulsatile bleeding. Bleeding controlled with direct pressure. NEURO: CN II-XII intact. Gross motor intact. ASSESSMENT   1. Venous oozing from previous central line placement site of right groin s/p local wound exploration at [facility] facility   2. No signs of arterial bleeding; pressures stable  3. Healing right lower leg wound    PLAN  1. Hold eliquis/ all AC at this time  2. Direct pressure for 30 minutes with use of thrombin gel foam and surgicel was completed followed by a Pressure dressing, reinforce dressing as needed/ PRN over night, will re-evaluate in am   3. Change right lower leg wound dressing and continue to monitor per wound ostomy team  4. No need for open surgical intervention at this time of right groin  5.  Would not recommend any imaging at this time, if imaging to be ordered per primary would highly recommend using IV contrast if concerned for bleeding    Thank you,     Discussed with Dr. Judy Mathew     Electronically signed by Genell Cheadle  on 4/2/2019 at 2:10 PM     Patient seen and examined at bedside. Changes made to above note as needed including assessment and plan. Pt denies any f/c, n/v, sob, or chest pain. Pt had R groin hematoma explored at outlying facility today in clinic while on eliquis, provider unable to control site of oozing. Pt transferred to [facility]. [facility] for further eval. R groin with very small ooze noted to R groin. Thrombin gelfoam and surgicel applied with direct pressure for 23NKTW and then application of pressure dressing. Will re-eval in am. Annabelle Nicely to sit up. Annabelle Nicely for PT. Hold eliquis/ all AC at this time. Electronically signed by Zulay Jimenez DO on 4/2/2019 at 2:44 PM     The patient was seen and examined with the resident. Labs and data were reviewed. Agree with resident note as documented.     Fernando Velásquez DO  6:59 PM, 4/4/2019

## 2019-04-02 NOTE — PROGRESS NOTES
Pt is here for wound check. The right lower leg wound looks very good. 95 % of the wound has excellent granulation. The superior posterior tunel where we debrided the eschar last visit looks good. The wound cx's were covered by cipro 500 mg daily (due to dialysis, consulted with ID at [facility]). However there was proteus that was not covered. He has had continued oozing from the right groin wound (where pic was). Today we opened the wound to try and find the bleeder. We did this with a normal prep and drape and local.  We extended the incision latereally about 4 cm. We saw a large hematoma cavity about 10 cm by 10 cm. We cleaned it all out and there was generalized oozing. We did see a pooling of venous type blood in center of wound, probably were there was a tract from the pic line. We tried to put a few vicryl sutures in but they tore thru the friable tissue. We tried pressure with surgicel for 30 min and it still kept oozing. At this point we want to transfer to [facility], to have evaluated by surgery or vascular and possible exploration in or. But pt on dialysis so hospitalist here suggested [facility] to [facility]. Awaiting call from [address] link with hospitalist on call. I have spent a total of 45 minutes face-to-face with this patient. Over 50% of that time was spent on counseling and care coordination. Please see assessment and plan for details.

## 2019-04-03 ENCOUNTER — TELEPHONE (OUTPATIENT)
Dept: WOUND CARE | Age: 74
End: 2019-04-03

## 2019-04-03 ENCOUNTER — APPOINTMENT (OUTPATIENT)
Dept: DIALYSIS | Age: 74
DRG: 604 | End: 2019-04-03
Attending: INTERNAL MEDICINE
Payer: MEDICARE

## 2019-04-03 PROBLEM — D62 ACUTE BLOOD LOSS ANEMIA: Status: ACTIVE | Noted: 2019-04-03

## 2019-04-03 PROBLEM — S81.801A OPEN WOUND OF RIGHT LOWER EXTREMITY: Status: ACTIVE | Noted: 2019-04-03

## 2019-04-03 LAB
ANION GAP SERPL CALCULATED.3IONS-SCNC: 15 MMOL/L (ref 9–17)
BUN BLDV-MCNC: 64 MG/DL (ref 8–23)
BUN/CREAT BLD: ABNORMAL (ref 9–20)
CALCIUM SERPL-MCNC: 9 MG/DL (ref 8.6–10.4)
CHLORIDE BLD-SCNC: 94 MMOL/L (ref 98–107)
CO2: 27 MMOL/L (ref 20–31)
CREAT SERPL-MCNC: 5.83 MG/DL (ref 0.7–1.2)
GFR AFRICAN AMERICAN: 12 ML/MIN
GFR NON-AFRICAN AMERICAN: 10 ML/MIN
GFR SERPL CREATININE-BSD FRML MDRD: ABNORMAL ML/MIN/{1.73_M2}
GFR SERPL CREATININE-BSD FRML MDRD: ABNORMAL ML/MIN/{1.73_M2}
GLUCOSE BLD-MCNC: 137 MG/DL (ref 75–110)
GLUCOSE BLD-MCNC: 150 MG/DL (ref 70–99)
GLUCOSE BLD-MCNC: 180 MG/DL (ref 75–110)
GLUCOSE BLD-MCNC: 223 MG/DL (ref 75–110)
HCT VFR BLD CALC: 22.2 % (ref 40.7–50.3)
HEMOGLOBIN: 7.1 G/DL (ref 13–17)
MCH RBC QN AUTO: 31.1 PG (ref 25.2–33.5)
MCHC RBC AUTO-ENTMCNC: 31.1 G/DL (ref 28.4–34.8)
MCV RBC AUTO: 100 FL (ref 82.6–102.9)
NRBC AUTOMATED: 0 PER 100 WBC
PDW BLD-RTO: 18.6 % (ref 11.8–14.4)
PLATELET # BLD: 266 K/UL (ref 138–453)
PMV BLD AUTO: 9.2 FL (ref 8.1–13.5)
POTASSIUM SERPL-SCNC: 4 MMOL/L (ref 3.7–5.3)
RBC # BLD: 2.22 M/UL (ref 4.21–5.77)
SODIUM BLD-SCNC: 136 MMOL/L (ref 135–144)
WBC # BLD: 6.4 K/UL (ref 3.5–11.3)

## 2019-04-03 PROCEDURE — 6370000000 HC RX 637 (ALT 250 FOR IP): Performed by: NURSE PRACTITIONER

## 2019-04-03 PROCEDURE — 36415 COLL VENOUS BLD VENIPUNCTURE: CPT

## 2019-04-03 PROCEDURE — 80048 BASIC METABOLIC PNL TOTAL CA: CPT

## 2019-04-03 PROCEDURE — 99222 1ST HOSP IP/OBS MODERATE 55: CPT | Performed by: INTERNAL MEDICINE

## 2019-04-03 PROCEDURE — 82947 ASSAY GLUCOSE BLOOD QUANT: CPT

## 2019-04-03 PROCEDURE — 85027 COMPLETE CBC AUTOMATED: CPT

## 2019-04-03 PROCEDURE — 2060000000 HC ICU INTERMEDIATE R&B

## 2019-04-03 PROCEDURE — 6370000000 HC RX 637 (ALT 250 FOR IP): Performed by: INTERNAL MEDICINE

## 2019-04-03 PROCEDURE — 99232 SBSQ HOSP IP/OBS MODERATE 35: CPT | Performed by: INTERNAL MEDICINE

## 2019-04-03 RX ORDER — ALPRAZOLAM 0.5 MG/1
0.5 TABLET ORAL NIGHTLY PRN
Status: DISCONTINUED | OUTPATIENT
Start: 2019-04-03 | End: 2019-04-04 | Stop reason: HOSPADM

## 2019-04-03 RX ADMIN — DONEPEZIL HYDROCHLORIDE 5 MG: 5 TABLET, FILM COATED ORAL at 20:49

## 2019-04-03 RX ADMIN — INSULIN LISPRO 2 UNITS: 100 INJECTION, SOLUTION INTRAVENOUS; SUBCUTANEOUS at 17:27

## 2019-04-03 RX ADMIN — INSULIN GLARGINE 25 UNITS: 100 INJECTION, SOLUTION SUBCUTANEOUS at 20:49

## 2019-04-03 RX ADMIN — INSULIN LISPRO 2 UNITS: 100 INJECTION, SOLUTION INTRAVENOUS; SUBCUTANEOUS at 20:49

## 2019-04-03 RX ADMIN — LEVOTHYROXINE SODIUM 25 MCG: 25 TABLET ORAL at 07:08

## 2019-04-03 ASSESSMENT — PAIN SCALES - GENERAL
PAINLEVEL_OUTOF10: 0

## 2019-04-03 NOTE — PROGRESS NOTES
Dialysis Post Treatment Note  Patient tolerated treatment well. Denies complaints at time of discharge.    Vitals:    04/03/19 1529   BP: 104/60   Pulse: 92   Resp: 18   Temp: 98.1 °F (36.7 °C)   SpO2:      Pre-Weight = 128.2  Post-weight = Weight: 275 lb 5.7 oz (124.9 kg)  Total Liters Processed = Total Liters Processed (l/min): 96.2 l/min  Rinseback Volume (mL) = Rinseback Volume (ml): 350 ml  Net Removal (mL) = 3300  Length of treatment=240

## 2019-04-03 NOTE — CONSULTS
[facility] - Initial Consult Note  Today's Date and Time: 4/3/2019, 2:02 PM    Impression :   3. 74 y/o man who has:  2. Acute bleed of right lower extremity chronic venous stasis wound  1. Vascular surgery on board and managing with conservative approach at this time  2. Hemostasis currently achieved  3. 3/25/2019 cultures grew Enterobacter cloacae, moderate Proteus mirabilis, and light Stenotrophomonas growth  3. Chronic eliquis use due to atrial fibrillation  4. Type 2 diabetes  5. ESRD  6. Bacterial contamination of open wound Enterobacter cloacae, moderate Proteus mirabilis, and light MDR Stenotrophomonas growth    Recommendations:   · D/C cipro  · Topical wound care  · Monitor off antibiotics  · Monitor vitals    Medical Decision Making/Summary/Discussion:   · 75year old man with right chronic venous leg wound  · Wound explored by general surgery in outpatient office on 4/2  · Bleeding occurred during exploration and hemostasis could not be achieved  · Transferred to [facility] for further evaluation per vascular surgery  · Currently hemostasis being achieved with conservative measures  Infection Control Recommendations   · [place] Precautions    Antimicrobial Stewardship Recommendations     · Discontinuation of therapy  Coordination of Outpatient Care:   · Estimated Length of IV antimicrobials: TBD  · Patient will need Midline Catheter Insertion: TBD  · Patient will need PICC line Insertion: TBD  · Patient will need: Home IV , [place],  SNF,  LTAC  · Patient will need outpatient wound care: TBD    Chief complaint/reason for consultation:   · Bleeding from chronic left leg wound      History of Present Illness:   Tuyet Duarte is a 75year old male admitted on 4/02/2019 for right lower extremity bleeding of a chronic venous stasis wound. INITIAL HISTORY:    Patient has a medical history significant for chronic eliquis use, type 2 diabetes, and ESRD.  Patient originally took a fall (unknown how long ago) and developed a non-healing wound with hematoma formation. Was following outpatient with wound care and general surgery Chica Hannah MD). On 4/2 he had outpatient general surgery visit where they performed a wound exploration. During the exploration the wound began to bleed and hemostasis could not be achieved. He was transferred here for further evaluation by vascular surgery. At this time vascular is managing with a conservative approach, achieving hemostasis with thrombin gel and surgicel, as well as pressure dressings. Hemoglobin today 7.1    Infectious disease is consulted because patient had cultures of his lower extremity wound taken on 3/26/2019 that showed moderate Enterobacter cloacae, moderate Proteus mirabilis, and light Stenotrophomonas growth. Patient is known to Dr. Bong Son and Dr. Woodfin Felty paged Dr. Bong Son for advice, for which she recommended Ciprofloxacin 500 mg for 7 days. At the time of this consult note, patient is afebrile and has stable vital signs. WBCs wnl at 6.4. No cultures taken since this admission. Denies fevers, chills, shortness of breath, nausea, or vomiting. Wound inspected. No signs of bacterial infection. Bleeding under control  Odor from old blood present. Cultures:  Urine:  · NA  Blood:  · NA  Sputum :  · NA  Wound:  · Na    Discussed with patient, RN. I have personally reviewed the past medical history, past surgical history, medications, social history, and family history, and I have updated the database accordingly.   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 lft thumb     Venous stasis dermatitis     Wears glasses     Wheelchair bound     pivots with 2 assists       Past Surgical  History:     Past Surgical History:   Procedure Laterality Date    AORTIC VALVE REPLACEMENT  03/18/2016    bioprosthetic    ARM SURGERY Left 1990    CARDIAC CATHETERIZATION      CENTRAL VENOUS CATHETER Right 02/21/2018    DIALYSIS CATHETER Dr Mando Oglesby    COLONOSCOPY  11/19/09    COLONOSCOPY N/A 10/12/2018    COLONOSCOPY WITH BIOPSY performed by Susie Maddox DO at [address] [facility] Right 3/10/2019    WASHOUT RIGHT LOWER EXTREMITY WITH WOUND VAC EXCHANGE performed by Fahad Acosta MD at [address] [facility] Right 3/8/2019    RIGHT LOWER EXTREMITY EXPLORATION, HEMATOMA EVACUATION, WOUND VAC PLACEMENT performed by Fahad Acosta MD at [address] (x10-12 surgeries)    several surgeries on left arm    KNEE ARTHROSCOPY Left 09/17/99    MITRAL VALVE REPLACEMENT  03/18/2016    bioprosthetic     NASAL SEPTUM SURGERY      OTHER SURGICAL HISTORY  3/18/16    Aortic root Enlargement    OTHER SURGICAL HISTORY  3/18/16    ASD Closure    OTHER SURGICAL HISTORY Right 01/09/2017    AV fistula creation, wrist    OTHER SURGICAL HISTORY Right 08/29/2017    ligation of collateral branch of av fistula right wrist    OTHER SURGICAL HISTORY  04/05/2018    FISTULAGRAM WITH DR. Molina Golmdan    TN EGD TRANSORAL BIOPSY SINGLE/MULTIPLE N/A 10/17/2017    EGD BIOPSY performed by Aurea Diaz MD at [address] ANGIOACCESS AV FISTULA Right 8/29/2017     RIGHT  LOWER ARM AV FISTULA  LIGATION OF AQUIRED BRANCHES X2 performed by Lainey Gonzalez DO at [address] [place]  3/18/16    median    VASCULAR SURGERY  12/06/2018    Right arm fistulagram,  PTA cephalic vein stenosis  /  DR Kavon Neal       Medications:     [facility] buPROPion  150 mg Oral QAM    donepezil  5 mg Oral QPM    gabapentin  300 mg Oral Daily    insulin glargine  25 Units Subcutaneous BID    levothyroxine  25 mcg Oral Daily    sertraline  100 mg Oral Daily    sodium chloride flush  10 mL Intravenous 2 times per day    insulin lispro  0-12 Units Subcutaneous TID WC    insulin lispro  0-6 Units Subcutaneous Nightly       Social History:     Social History     Socioeconomic History    Marital status:      Spouse name: Ciro Brown Number of children: 2    Years of education: Not on file    Highest education level: Not on file   Occupational History    Occupation: Retired      Employer: [address] resource strain: Not on file    Food insecurity:     Worry: Not on file     Inability: Not on file    Transportation needs:     Medical: Not on file     Non-medical: Not on file   Tobacco Use    Smoking status: Former Smoker     Packs/day: 2.00     Years: 25.00     Pack years: 50.00     Types: Cigarettes     Start date: 3/17/1955     Last attempt to quit: 1980     Years since quittin.2    Smokeless tobacco: Never Used   Substance and Sexual Activity    Alcohol use: No     Alcohol/week: 0.0 oz     Comment: 1-2 drinks per year    Drug use: No    Sexual activity: Never   Lifestyle    Physical activity:     Days per week: Not on file     Minutes per session: Not on file    Stress: Not on file   Relationships    Social connections:     Talks on phone: Not on file     Gets together: Not on file     Attends [employer] service: Not on file     Active member of club or organization: Not on file     Attends meetings of clubs or organizations: Not on file     Relationship status: Not on file    Intimate partner violence:     Fear of current or ex partner: Not on file     Emotionally abused: Not on file     Physically abused: Not on file     Forced sexual activity: Not on file   Other Topics anteriorly with   a pressure dressing in place.  Heterogeneous density including pockets of air   related to the recent exploration and possible packing material as noted   above. Medical Decision Making-Other: Thank you for allowing us to participate in the care of this patient. Please call with questions. Almeda Dance [place]     ATTESTATION:    I have discussed the case, including pertinent history and exam findings with the residents. I have seen and examined the patient and the key elements of the encounter have been performed by me. I have reviewed the laboratory data, other diagnostic studies and discussed them with the residents. I have updated the medical record where necessary. I agree with the assessment, plan and orders as documented by the resident.     Love Couch MD.    Pager: [phone] - Office: [phone]

## 2019-04-03 NOTE — PROGRESS NOTES
Physical Therapy  DATE: 4/3/2019    NAME: Katherine Marroquin  MRN: [id number]   : 1945    Patient not seen this date for Physical Therapy due to:  [] Blood transfusion in progress  [x] Hemodialysis  []  Patient Declined  [] Spine Precautions   [] Strict Bedrest  [] Surgery/ Procedure  [] Testing      [] Other        [] PT being discontinued at this time. Patient independent. No further needs. [] PT being discontinued at this time as the patient has been transferred to palliative care. No further needs.     Racquel Chao, BIBIANA, MSBS, PA-C  Physical Therapist

## 2019-04-03 NOTE — PROGRESS NOTES
Vascular Surgery Progress Note         PATIENT NAME: Noris Ignacio     TODAY'S DATE: 4/3/2019, 6:01 AM      SUBJECTIVE:    Pt seen and examined. Patient denies any overnight groin bleeding or tenderness. Patient admits to tenderness in right lower extremity wound. Patient has an appetite and denies any nausea or vomiting. Patient states he has been using the bathroom. Patient denies any chest pain or shortness of breath. Patient denies having any complaints at this time. OBJECTIVE:   Vitals:  /87   Pulse 87   Temp 98.1 °F (36.7 °C) (Oral)   Resp 17   Ht 5' 7\" (1.702 m)   Wt (!) 302 lb (137 kg)   SpO2 97%   BMI 47.30 kg/m²      INTAKE/OUTPUT:      Intake/Output Summary (Last 24 hours) at 4/3/2019 0601  Last data filed at 4/2/2019 1726  Gross per 24 hour   Intake 240 ml   Output --   Net 240 ml                 GENERAL: AOx3, NAD  HEENT: Atraumatic, normocephalic  CARDIAC: Regular rate and rhythm. ABDOMEN: Soft, NT, ND, Active Bowel Sounds  EXTREMITY: Bilateral 2+ edema. Right leg wound. Distal pulses palpable bilaterally +2. Right groin, R groin examined with very minimal ooze noted, surgicell left in place, betadine used to clean skin/site and dressing reapplied  NEURO: CN II-XII intact. Gross motor intact. Data:  CBC:   Lab Results   Component Value Date    WBC 7.6 04/02/2019    RBC 2.64 04/02/2019    HGB 8.2 04/02/2019    HCT 27.0 04/02/2019    .3 04/02/2019    MCH 31.1 04/02/2019    MCHC 30.4 04/02/2019    RDW 19.3 04/02/2019     04/02/2019    MPV 8.9 04/02/2019         ASSESSMENT   1. No active bleeding of right groin, mild oozing at site   2. Well-healing right lower leg wound    PLAN  1. Continue to monitor r groin for bleeding, hold AC at this time  2. Change right lower leg wound dressing and continue to monitor per wound ostomy team  3.  Ok to ambulate and work with PT from vascular surgery standpoint    Electronically signed by Markos Saenz  on 4/3/2019 at 6:01 AM Patient seen and examined at bedside. Changes made to above note as needed including assessment and plan. Pt denies any f/c, n/v, sob, or chest pain. R groin examined with minimal ooze noted, Surgicel left in place, betadine used to clean skin/site and dressing reapplied.  Cont to [employer] signed by Crhis Archibald DO on 4/3/2019 at 7:06 AM

## 2019-04-03 NOTE — PROGRESS NOTES
,  280-309 lbs x 7 mo per EMR   · Ideal Body Wt: 148 lb (67.1 kg), % Ideal Body 204% (adm/ideal)  · BMI Classification: BMI > or equal to 40.0 Obese Class III    Nutrition Interventions:   Modify current diet, Start ONS  Continued Inpatient Monitoring, Education Not Indicated    Nutrition Evaluation:   · Evaluation: Goals set   · Goals: Pt to consume >75% of meals/supplements     · Monitoring: Nutrition Progression, Meal Intake, Supplement Intake, Diet Tolerance, Skin Integrity, Wound Healing, I&O, Weight, Pertinent Labs, Monitor Hemodynamic Status, Monitor Bowel Function      Electronically signed by Alysia Gallego RD, LD on 4/3/19 at 11:47 AM    Contact Number: [phone]

## 2019-04-03 NOTE — CONSULTS
Nephrology Consult Note    Reason for Consult:  End-stage renal disease on hemodialysis  Requesting Physician:  Dr. Preet Mitchell    Chief Complaint:  right groin bleeding    History Obtained From:  patient, electronic medical record    History of Present Illness: This is a 75 y.o. male who presented to the hospital for evaluation of right lower extremity hematoma after a fall in 3/2019 while on Coumadin. Subsequently underwent drainage of the large hematoma 3/9/2019. Patient was followed at wound care clinic, presented with recurrent right groin hematoma where the wound was explored and had uncontrolled bleeding. Hb 8.2 -- 7.1 , plt 266, INR 1.1. Imaging studies CT abdomen and Pelvis wo contrast shows right groin hematoma No retroperitoneal hematoma. Vascular surgery recommend  Pressure dressing and reassessment of the wound. Past medical history significant for Type II DM, ESRD on HD MWF, Paroxysmal Afib,  prior AVR and MVR on Eliquis. Pt denies any hx of heavy or prolonged NSAID use. There is no history of blood or bone marrow disorders. There is no hx of jaundice or hepatitis or sexually transmitted disease. .No history of dysuria or frequency. No exposure to IV contrast. There is no hx of paraprotein disease. Pt denies any hx of recurrent UTI , incontinence or recurrent nephrolithiasis. Medication review shows use of ace/arb, diuretic.         Past Medical History:        Diagnosis Date    Anemia in chronic kidney disease (CKD) 4/27/2016    Arthritis     Bacteremia 11/11/2016    Benign prostatic hyperplasia without lower urinary tract symptoms     BMI 40.0-44.9, adult ([address] 3/19/2018    Cellulitis of left lower extremity     Chronic cerebral ischemia 1/3/2017    Closed fracture of forearm 8/13/2013    Broken lft forearm     Controlled type 2 diabetes mellitus with chronic kidney disease on chronic dialysis, with long-term current use of insulin ([address]     Controlled type 2 diabetes mellitus with diabetic polyneuropathy, with long-term current use of insulin ([address] 10/7/2018    Decubital ulcer     NON OPEN BUTTOCKS    Dementia     memory problems    Dialysis patient ([address] 01/03/2017    [facility], Sat in [place] Difficult intravenous access     HAS NEEDED PICC TEAM IN THE PAST    Difficulty walking     WHEELCHAIR BOUND-PIVOTS WITH ASSIST O F 2    DJD (degenerative joint disease) of knee     Elbow, forearm, and wrist, abrasion or friction burn, without mention of infection 8/13/2013    Abrasions lft forearm     Encephalopathy acute 4/27/2016    Encounter regarding vascular access for dialysis for ESRD ([address] 2/2/2017    ESRD (end stage renal disease) on dialysis ([address] 1/17/2017    Forgetfulness     HAS SOME SHORT TERM MEMORY LOSS, QUYEN-WIFE IS LEGAL GUARDIAN    H/O transesophageal echocardiography ([facility]) for monitoring     Hemodialysis patient ([address] 11/11/2016    tues-thurs-sat [place]---[facility].  TUNNELED CATHETER RT UPPER CHEST    Hyperlipidemia 1990    on Meds    Hypertension 1990    on Meds    Intercritical gout     on Meds    Joint pain, knee 01/13/2017    baldo knee synvisc-1 injections    Long-term insulin use ([address] 1/17/2017    Major depressive disorder with single episode, in partial remission ([address] 1/3/2017    Mobility impaired     Morbid obesity ([address]     Muscle weakness     GRNERALIZED    Obesity     Obstructive sleep apnea     HEATHER on CPAP     On CPAP-TO BRING DOS    Paroxysmal atrial fibrillation ([address] 9/6/2017    Respiratory failure ([address] 03/2016    with open heart surgery, trached x 5 months    S/P cardiac cath     Seborrhea     Severe aortic stenosis 3/9/2016    Severe mitral valve stenosis 3/9/2016    Severe sepsis ([address] 4/3/2016    Skin rash     ABD FOLDS    Stasis dermatitis     Thyroid disease     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 lft thumb     Venous stasis dermatitis     Wears glasses Min:107 , WBL:296   ; Diastolic (95LIH), RTR:89, Min:50, Max:87    24HR INTAKE/OUTPUT:      Intake/Output Summary (Last 24 hours) at 4/3/2019 0745  Last data filed at 4/2/2019 1726  Gross per 24 hour   Intake 240 ml   Output --   Net 240 ml     Patient Vitals for the past 96 hrs (Last 3 readings):   Weight   04/02/19 1936 (!) 302 lb (137 kg)     Physical Exam:  General appearance:Awake, alert, in no acute distress  Skin: warm and dry, no rash or erythema  Eyes: conjunctivae normal and sclera anicteric  ENT: :no thrush no pharyngeal congestion    Neck: no carotid bruit ,no  JVD,no carotid Lymphadenopathy, noThyromegaly Pulmonary: no wheezing or rhonchi. No rales heard. Cardiovascular: Normal S1 & S2,  No S3 or  S4, no Pericardial Rub no Murmur   Abdomen: soft nontender, bowel sounds present, no organomegaly,  no ascites  Extremities:no cyanosis, clubbing or edema    Labs:   CBC:  Recent Labs     04/02/19  1826 04/03/19  0637   WBC 7.6 6.4   RBC 2.64* 2.22*   HGB 8.2* 7.1*   HCT 27.0* 22.2*   .3 100.0   MCH 31.1 31.1   MCHC 30.4 31.1   RDW 19.3* 18.6*    266   MPV 8.9 9.2      BMP: No results for input(s): NA, K, CL, CO2, BUN, CREATININE, GLUCOSE, CALCIUM in the last 72 hours. Phosphorus:  No results for input(s): PHOS in the last 72 hours. Magnesium: No results for input(s): MG in the last 72 hours. Albumin: No results for input(s): LABALBU in the last 72 hours. IRON:    Lab Results   Component Value Date    IRON 173 03/12/2019     Iron Saturation:  No components found for: PERCENTFE  TIBC:    Lab Results   Component Value Date    TIBC  03/12/2019     Unable to calculate due to low iron binding result.      FERRITIN:    Lab Results   Component Value Date    FERRITIN 43,123 03/12/2019     SPEP: Lab Results   Component Value Date    PROT 4.5 03/16/2019    ALBCAL 3.0 03/21/2016    ALBPCT 65 03/21/2016    LABALPH 0.3 03/21/2016    LABALPH 0.6 03/21/2016    A1PCT 6 03/21/2016    A2PCT 12 03/21/2016 LABBETA 0.4 03/21/2016    BETAPCT 9 03/21/2016    GAMGLOB 0.4 03/21/2016    GGPCT 8 03/21/2016    PATH ELECTRONICALLY SIGNED. Jose Eduardo Feldman M.D. 03/21/2016     UPEP:   Lab Results   Component Value Date    TPU 10 03/21/2016    TPU 10 03/21/2016        C3:   Lab Results   Component Value Date    C3 50 (L) 03/21/2016     C4:   Lab Results   Component Value Date    C4 21 03/21/2016     MPO ANCA:  No results found for: MPO . PR3 ANCA:  No results found for: PR3  Urine Sodium:    Lab Results   Component Value Date    RAY 20 04/27/2016      Urine Creatinine:    Lab Results   Component Value Date    LABCREA 95.7 04/27/2016     Urine Eosinophils:   Lab Results   Component Value Date    UREO PRESENT 04/04/2016     Urine Protein:    Lab Results   Component Value Date    TPU 10 03/21/2016    TPU 10 03/21/2016     Urinalysis:  U/A: Lab Results   Component Value Date    NITRU NEGATIVE 04/27/2016    COLORU DELORES 04/27/2016    PHUR 5.0 04/27/2016    WBCUA TOO NUMEROUS TO COUNT 04/27/2016    RBCUA 10 TO 20 04/27/2016    MUCUS NOT REPORTED 04/27/2016    TRICHOMONAS NOT REPORTED 04/27/2016    YEAST NOT REPORTED 04/27/2016    BACTERIA MANY 04/27/2016    SPECGRAV 1.015 04/27/2016    LEUKOCYTESUR LARGE 04/27/2016    UROBILINOGEN Normal 04/27/2016    BILIRUBINUR NEGATIVE 04/27/2016    GLUCOSEU NEGATIVE 04/27/2016    KETUA NEGATIVE 04/27/2016    AMORPHOUS 2+ 04/27/2016         Radiology:  Reviewed as available. Assessment:  Right groin hematoma at the site of previous central line  End-stage renal disease on hemodialysis  Anemia of chronic kidney disease and blood loss  Paroxysmal atrial fibrillation  S/ post MVR and AVR  Type 2 diabetes mellitus  Hypothyroidism    Plan:  1. Recurrent right groin hematoma, status post wound exploration and bleeding. Pressure dressing. Vascular surgery on board. 2.  End-stage renal disease on dialysis Monday Wednesday Friday. 3.  Hold anticoagulation and antiplatelet's  4.   Will follow

## 2019-04-03 NOTE — CARE COORDINATION
Case Management Initial Discharge Plan  Elly Caro,             Met with:patient to discuss discharge plans. Information verified: address, contacts, phone number, , insurance Yes  PCP: Frankie Cortez DO  Date of last visit: facility     Insurance Provider: Medicare     Discharge Planning    Living Arrangements:  Other (Comment)   Support Systems:  Spouse/Significant Other    Home has one stories  No  stairs to climb to get into front door, no stairs to climb to reach second floor  Location of bedroom/bathroom in home ,main     Patient able to perform ADL's:Assisted    Current Services (outpatient & in home)   DME equipment: facility   DME provider:     Pharmacy: facility    Potential Assistance Purchasing Medications:  No  Does patient want to participate in local refill/ meds to beds program?  No    Potential Assistance Needed:  [place]    Patient agreeable to home care: No  [place] of choice provided:  no    [facility]   Agreeable to SNF/Rehab: Yes  [place] of choice provided: yes   Evaluation: no    Expected Discharge date:  19  Patient expects to be discharged to:  [facility]  Follow Up Appointment: Best Day/ Time: Monday AM    Transportation provider: ambulance   Transportation arrangements needed for discharge: Yes    Readmission Risk              Risk of Unplanned Readmission:        36             Does patient have a readmission risk score greater than 14?: Yes  If yes, follow-up appointment must be made within 7 days of discharge. Discharge Plan: Spoke with pt, he plans to [facility] to [facility] when discharged. 30 day readmit, pt was at Dr. Neil Villalba with oozing from groin site, with hematoma, was sent here for evaluation.            Electronically signed by Apurva Ashley RN on 4/3/19 at 3:09 PM

## 2019-04-03 NOTE — PROGRESS NOTES
[address]    Progress Note    4/3/2019      Name:   Isidra Fonseca  MRN:     [id number]     Acct:      [de-identified]   Room:   [facility] Day:  1  Admit Date:  4/2/2019  1:00 PM    PCP:   Hope Landis DO  Code Status:  Full Code    Subjective:     C/C: Right groin bleeding. Interval History Status: improved. Right groin without active bleeding with pressure bandage. CT showed unchanged size of the Rt groin hematoma. Patient denies any complaints. Is upset as is unable to reach his family on phone. Hb 7.1 from 8.2   Brief History:   74 y/o with Rt lower extremity  hematoma after a fall in 03/2019 while on coumadin and underwent drainage of large hematoma 03/09/2019 . He then presented on 03/21 with Rt groin hematoma which was felt to be noninfected. He was being followed by Wound care outpatient. Recently started on Cipro for +cx   While at Dr Melody Barker office today, he had exploration of Right groin wound sec to continued oozing. Bleeding was uncontrolled inspite of pressure with surgicel which prompted hospital admission.   - vascular sx recommended conservative approach. Review of Systems:     Constitutional:  negative for chills, fevers, sweats  Respiratory:  negative for cough, dyspnea on exertion, hemoptysis, shortness of breath, wheezing  Cardiovascular:  negative for chest pain, chest pressure/discomfort, lower extremity edema, palpitations  Gastrointestinal:  negative for abdominal pain, constipation, diarrhea, nausea, vomiting  Neurological:  negative for dizziness, headache    Medications: Allergies:     Allergies   Allergen Reactions    Percocet [Oxycodone-Acetaminophen] Itching and Rash     Also causes shaking    Ampicillin Rash       Current Meds:   Scheduled Meds:    buPROPion  150 mg Oral QAM    donepezil  5 mg Oral QPM    gabapentin  300 mg Oral Daily    insulin glargine  25 Units Subcutaneous BID    levothyroxine  25 mcg Oral Daily    sertraline  100 mg Oral Daily    sodium chloride flush  10 mL Intravenous 2 times per day    insulin lispro  0-12 Units Subcutaneous TID WC    insulin lispro  0-6 Units Subcutaneous Nightly     Continuous Infusions:    dextrose      dextrose       PRN Meds: sodium chloride flush, potassium chloride **OR** potassium alternative oral replacement **OR** potassium chloride, magnesium sulfate, magnesium hydroxide, ondansetron, nicotine, glucose, dextrose, glucagon (rDNA), dextrose, glucose, dextrose, glucagon (rDNA), dextrose    Data:     Past Medical History:   has a past medical history of Anemia in chronic kidney disease (CKD), Arthritis, Bacteremia, Benign prostatic hyperplasia without lower urinary tract symptoms, BMI 40.0-44.9, adult ([facility]), Cellulitis of left lower extremity, Chronic cerebral ischemia, Closed fracture of forearm, Controlled type 2 diabetes mellitus with chronic kidney disease on chronic dialysis, with long-term current use of insulin ([address] Controlled type 2 diabetes mellitus with diabetic polyneuropathy, with long-term current use of insulin ([address] Decubital ulcer, Dementia, Dialysis patient ([address] Difficult intravenous access, Difficulty walking, DJD (degenerative joint disease) of knee, Elbow, forearm, and wrist, abrasion or friction burn, without mention of infection, Encephalopathy acute, Encounter regarding vascular access for dialysis for ESRD ([address] ESRD (end stage renal disease) on dialysis ([address] Forgetfulness, H/O transesophageal echocardiography ([facility]) for monitoring, Hemodialysis patient ([address] Hyperlipidemia, Hypertension, Intercritical gout, Joint pain, knee, Long-term insulin use ([address] Major depressive disorder with single episode, in partial remission ([address] Mobility impaired, Morbid obesity ([address] Muscle weakness, Obesity, Obstructive sleep apnea, HEATHER on CPAP, Paroxysmal atrial fibrillation ([address] Respiratory failure ([address] S/P cardiac cath, Seborrhea, Severe aortic stenosis, Severe mitral valve stenosis, Severe sepsis (HCC), Skin rash, Stasis dermatitis, Thyroid disease, Traumatic amputation of thumb, Venous stasis dermatitis, Wears glasses, and Wheelchair bound. Social History:   reports that he quit smoking about 39 years ago. His smoking use included cigarettes. He started smoking about 64 years ago. He has a 50.00 pack-year smoking history. He has never used smokeless tobacco. He reports that he does not drink alcohol or use drugs. Family History:   Family History   Problem Relation Age of Onset    Lung Cancer Mother     Diabetes Mother     Alzheimer's Disease Father     Diabetes Maternal Grandmother     High Blood Pressure Sister        Vitals:  /60   Pulse 92   Temp 98.1 °F (36.7 °C) (Oral)   Resp 18   Ht 5' 7\" (1.702 m)   Wt 282 lb 10.1 oz (128.2 kg)   SpO2 97%   BMI 44.27 kg/m²   Temp (24hrs), Av.5 °F (36.9 °C), Min:98.1 °F (36.7 °C), Max:98.9 °F (37.2 °C)    Recent Labs     19  1450 19  2121 19  0710 19  1630   POCGLU 94 226* 137* 180*       I/O (24Hr): Intake/Output Summary (Last 24 hours) at 4/3/2019 1834  Last data filed at 4/3/2019 0630  Gross per 24 hour   Intake 550 ml   Output 0 ml   Net 550 ml       Physical Examination:        General appearance:  alert, cooperative and no distress, obese body habitus. Mental Status:  oriented to person, place and time and normal affect  Lungs:  clear to auscultation bilaterally, normal effort  Heart:  regular rate and rhythm, no murmur  Abdomen:  soft, nontender, nondistended, normal bowel sounds  Extremities:  no edema, redness, tenderness in the calves. Rt leg with dressing, rt groin with pressure dressing.    Skin:  no gross lesions, rashes, induration    Assessment:        Primary Problem  Bleeding from wound    Active Hospital Problems    Diagnosis Date Noted    Anemia in chronic kidney disease, on chronic dialysis [facility] [N18.6, D63.1, Z99.2] 04/27/2016     Priority: High    Open wound of right lower extremity [S81.801A] 04/03/2019    Acute blood loss anemia [D62] 04/03/2019    Bleeding from wound [T14. 8XXA] 04/02/2019    Controlled type 2 diabetes mellitus with diabetic polyneuropathy, with long-term current use of insulin ([address] [E11.42, Z79.4] 10/07/2018    Paroxysmal atrial fibrillation ([address] [I48.0] 09/06/2017    Dialysis patient ([address] [Z99.2] 01/03/2017    Morbid obesity ([address] [E66.01]     Obstructive sleep apnea [G47.33]        Plan:      - Rt groin hematoma with uncontrolled bleeding: hemostasis achieved with conservative measures. Eliquis on hold: high risk for embolism sec to Afib and prior MVR/AVR. - continue to monitor Hb. Transfuse if Hb <7.0.   - Had routine scheduled HD today. - polymicrobial cx from open wound: keep off abx. A/w ID consultation.      Rajiv Harman MD  4/3/2019

## 2019-04-03 NOTE — H&P
[address] [facility]    HISTORY AND PHYSICAL EXAMINATION            Date:   4/2/2019  Patient name:  Ryann Leo  Date of admission:  4/2/2019  1:00 PM  MRN:   [id number]  Account:  [de-identified]  YOB: 1945  PCP:    Lucinda Hernandez DO  Room:   [facility]3005-01  Code Status:    Full Code    Chief Complaint:   Right groin bleeding    History Obtained From:     patient, electronic medical record    History of Present Illness:   74 y/o with Rt lower extremity  hematoma after a fall in 03/2019 while on coumadin and underwent drainage of large hematoma 03/09/2019 . He then presented on 03/21 with Rt groin hematoma which was felt to be noninfected. He was being followed by Wound care outpatient. Recently started on Cipro for +cx   While at Dr Hanks Cap office today, he had exploration of Right groin wound sec to continued oozing. 'We extended the incision latereally about 4 cm. We saw a large hematoma cavity about 10 cm by 10 cm. We cleaned it all out and there was generalized oozing. We did see a pooling of venous type blood in center of wound, probably were there was a tract from the pic line. We tried to put a few vicryl sutures in but they tore thru the friable tissue. We tried pressure with surgicel for 30 min and it still kept oozing.'  He is transferred here for further care of this uncontrolled bleeding. Comorbidities: DM2, ESRD on HD MWF, prior AVR and MVR on Eliquis. , P. Afib.     Past Medical History:     Past Medical History:   Diagnosis Date    Anemia in chronic kidney disease (CKD) 4/27/2016    Arthritis     Bacteremia 11/11/2016    Benign prostatic hyperplasia without lower urinary tract symptoms     BMI 40.0-44.9, adult ([facility] [address] 3/19/2018    Cellulitis of left lower extremity     Chronic cerebral ischemia 1/3/2017    Closed fracture of forearm 8/13/2013    Broken lft forearm     Controlled type 2 diabetes mellitus with chronic kidney disease on chronic dialysis, with long-term current use of insulin ([address]     Controlled type 2 diabetes mellitus with diabetic polyneuropathy, with long-term current use of insulin ([address] 10/7/2018    Decubital ulcer     NON OPEN BUTTOCKS    Dementia     memory problems    Dialysis patient ([address] 01/03/2017    Goes Tue, Thurs, Sat in [place] Difficult intravenous access     HAS NEEDED PICC TEAM IN THE PAST    Difficulty walking     WHEELCHAIR BOUND-PIVOTS WITH ASSIST O F 2    DJD (degenerative joint disease) of knee     Elbow, forearm, and wrist, abrasion or friction burn, without mention of infection 8/13/2013    Abrasions lft forearm     Encephalopathy acute 4/27/2016    Encounter regarding vascular access for dialysis for ESRD ([address] 2/2/2017    ESRD (end stage renal disease) on dialysis ([address] 1/17/2017    Forgetfulness     HAS SOME SHORT TERM MEMORY LOSS, QUYEN-WIFE IS LEGAL GUARDIAN    H/O transesophageal echocardiography ([facility]) for monitoring     Hemodialysis patient ([address] 11/11/2016    tues-thurs-sat [place]---FRESEMIUS.  TUNNELED CATHETER RT UPPER CHEST    Hyperlipidemia 1990    on Meds    Hypertension 1990    on Meds    Intercritical gout     on Meds    Joint pain, knee 01/13/2017    baldo knee synvisc-1 injections    Long-term insulin use ([address] 1/17/2017    Major depressive disorder with single episode, in partial remission ([address] 1/3/2017    Mobility impaired     Morbid obesity ([address]     Muscle weakness     GRNERALIZED    Obesity     Obstructive sleep apnea     HEATHER on CPAP     On CPAP-TO BRING DOS    Paroxysmal atrial fibrillation ([address] 9/6/2017    Respiratory failure ([address] 03/2016    with open heart surgery, trached x 5 months    S/P cardiac cath     Seborrhea     Severe aortic stenosis 3/9/2016    Severe mitral valve stenosis 3/9/2016    Severe sepsis ([address] 4/3/2016    Skin rash     ABD FOLDS    Stasis dermatitis     Thyroid disease     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 lft thumb     Venous stasis dermatitis     Wears glasses     Wheelchair bound     pivots with 2 assists        Past Surgical History:     Past Surgical History:   Procedure Laterality Date    AORTIC VALVE REPLACEMENT  03/18/2016    bioprosthetic    ARM SURGERY Left 1990    CARDIAC CATHETERIZATION      CENTRAL VENOUS CATHETER Right 02/21/2018    DIALYSIS CATHETER Dr Sal Hopkins    COLONOSCOPY  11/19/09    COLONOSCOPY N/A 10/12/2018    COLONOSCOPY WITH BIOPSY performed by Mayra Calvo DO at [address] Right 3/10/2019    WASHOUT RIGHT LOWER EXTREMITY WITH WOUND VAC EXCHANGE performed by Vika Gamble MD at [facility] Right 3/8/2019    RIGHT LOWER EXTREMITY EXPLORATION, HEMATOMA EVACUATION, WOUND VAC PLACEMENT performed by Vika Gamble MD at [address] (x10-12 surgeries)    several surgeries on left arm    KNEE ARTHROSCOPY Left 09/17/99    MITRAL VALVE REPLACEMENT  03/18/2016    bioprosthetic     NASAL SEPTUM SURGERY      OTHER SURGICAL HISTORY  3/18/16    Aortic root Enlargement    OTHER SURGICAL HISTORY  3/18/16    ASD Closure    OTHER SURGICAL HISTORY Right 01/09/2017    AV fistula creation, wrist    OTHER SURGICAL HISTORY Right 08/29/2017    ligation of collateral branch of av fistula right wrist    OTHER SURGICAL HISTORY  04/05/2018    FISTULAGRAM WITH DR. Maci Pacheco    MA EGD TRANSORAL BIOPSY SINGLE/MULTIPLE N/A 10/17/2017    EGD BIOPSY performed by Eugenio Beaulieu MD at [address] ANGIOACCESS AV FISTULA Right 8/29/2017     RIGHT  LOWER ARM AV FISTULA  LIGATION OF AQUIRED BRANCHES X2 performed by Altaf De Santiago DO at [address]  3/18/16    median    VASCULAR SURGERY  12/06/2018    Right arm fistulagram,  PTA cephalic vein stenosis  /  DR Hernandez Liner        Medications Prior to Admission:     Prior to Admission medications (37.2 °C)    Recent Labs     04/02/19  1450 04/02/19  2121   POCGLU 94 226*       Intake/Output Summary (Last 24 hours) at 4/2/2019 2348  Last data filed at 4/2/2019 1726  Gross per 24 hour   Intake 240 ml   Output --   Net 240 ml       General Appearance:  alert, well appearing, and in no acute distress  Mental status: oriented to person, place, and time with normal affect  Head:  normocephalic, atraumatic. Eye: no icterus, redness, pupils equal and reactive, extraocular eye movements intact, conjunctiva clear  Ear: normal external ear, no discharge, hearing intact  Nose:  no drainage noted  Lungs: Bilateral equal air entry, clear to ausculation, no wheezing, rales or rhonchi, normal effort  Cardiovascular: normal rate, regular rhythm, no murmur,  Abdomen: Soft, nontender, nondistended, normal bowel sounds, no hepatomegaly or splenomegaly  Neurologic: There are no new focal motor or sensory deficits, normal muscle tone and bulk, no abnormal sensation, normal speech, cranial nerves II through XII grossly intact  Skin: No gross lesions, rashes, bruising or bleeding on exposed skin area  Extremities:  peripheral pulses palpable, no pedal edema or calf pain with palpation. - on arrival to floor: noted to have profuse fresh venous bleeding. Overlying dressing saturated. Rt leg wound in dressing.        Investigations:      Laboratory Testing:  Recent Results (from the past 24 hour(s))   POC Glucose Fingerstick    Collection Time: 04/02/19  2:50 PM   Result Value Ref Range    POC Glucose 94 75 - 110 mg/dL   CBC    Collection Time: 04/02/19  6:26 PM   Result Value Ref Range    WBC 7.6 3.5 - 11.3 k/uL    RBC 2.64 (L) 4.21 - 5.77 m/uL    Hemoglobin 8.2 (L) 13.0 - 17.0 g/dL    Hematocrit 27.0 (L) 40.7 - 50.3 %    .3 82.6 - 102.9 fL    MCH 31.1 25.2 - 33.5 pg    MCHC 30.4 28.4 - 34.8 g/dL    RDW 19.3 (H) 11.8 - 14.4 %    Platelets 717 683 - 969 k/uL    MPV 8.9 8.1 - 13.5 fL    NRBC Automated 0.0 0.0 per 100 WBC   POC Glucose Fingerstick    Collection Time: 04/02/19  9:21 PM   Result Value Ref Range    POC Glucose 226 (H) 75 - 110 mg/dL         Assessment :      Primary Problem  Bleeding from wound    Active Hospital Problems    Diagnosis Date Noted    Anemia in chronic kidney disease, on chronic dialysis ([facility] [address] [N18.6, D63.1, Z99.2] 04/27/2016     Priority: High    Bleeding from wound [T14. 8XXA] 04/02/2019    Controlled type 2 diabetes mellitus with diabetic polyneuropathy, with long-term current use of insulin ([facility] [address] [E11.42, Z79.4] 10/07/2018    Paroxysmal atrial fibrillation ([facility] [address] [I48.0] 09/06/2017    Dialysis patient ([facility] [address] [Z99.2] 01/03/2017    Morbid obesity ([facility] [address] [E66.01]     Obstructive sleep apnea [G47.33]        Plan:     Patient status Admit as inpatient in the  Progressive Unit/Step down  - uncontrolled venous bleeding from Rt groin old catheter site while on Eliquis. Vascular Sx consult stat. Pressure dressing until can be evaluated by the team.  CT pelvis and RT thigh to evaluate extent of hematoma. Hold eliquis. - resume other home medications without changes. - recheck CBC in a few hours. Transfuse if Hb<7.  - Recent cx from [address] open wound with proteus, stenotrophomonas and Enterobacter: monitor off abx. ID consultation.   - Nephrology consultation for HD. Consultations:   IP CONSULT TO VASCULAR SURGERY  IP CONSULT TO NEPHROLOGY    Patient is admitted as inpatient status because of co-morbidities listed above, severity of signs and symptoms as outlined, requirement for current medical therapies and most importantly because of direct risk to patient if care not provided in a hospital setting.     Javier Hernandez MD  4/2/2019    Copy sent to Dr. Radha Vital DO

## 2019-04-03 NOTE — PROGRESS NOTES
Occupational Therapy Not Seen Note    DATE: 4/3/2019  Name: Peterson Mcpherson  : 1945  MRN: [id number]    Patient not available for Occupational Therapy due to:    Hemodialysis per KRAIG Carrera    Next Scheduled Treatment: Re-check as able 4/3 or 2019    Electronically signed by NAS Alberts on 4/3/2019 at 10:01 AM

## 2019-04-04 VITALS
TEMPERATURE: 98.3 F | WEIGHT: 268 LBS | HEART RATE: 92 BPM | DIASTOLIC BLOOD PRESSURE: 47 MMHG | RESPIRATION RATE: 19 BRPM | OXYGEN SATURATION: 99 % | SYSTOLIC BLOOD PRESSURE: 130 MMHG | HEIGHT: 67 IN | BODY MASS INDEX: 42.06 KG/M2

## 2019-04-04 LAB
ANION GAP SERPL CALCULATED.3IONS-SCNC: 13 MMOL/L (ref 9–17)
BUN BLDV-MCNC: 35 MG/DL (ref 8–23)
BUN/CREAT BLD: ABNORMAL (ref 9–20)
CALCIUM SERPL-MCNC: 8.7 MG/DL (ref 8.6–10.4)
CHLORIDE BLD-SCNC: 99 MMOL/L (ref 98–107)
CO2: 24 MMOL/L (ref 20–31)
CREAT SERPL-MCNC: 3.94 MG/DL (ref 0.7–1.2)
GFR AFRICAN AMERICAN: 18 ML/MIN
GFR NON-AFRICAN AMERICAN: 15 ML/MIN
GFR SERPL CREATININE-BSD FRML MDRD: ABNORMAL ML/MIN/{1.73_M2}
GFR SERPL CREATININE-BSD FRML MDRD: ABNORMAL ML/MIN/{1.73_M2}
GLUCOSE BLD-MCNC: 146 MG/DL (ref 75–110)
GLUCOSE BLD-MCNC: 172 MG/DL (ref 70–99)
GLUCOSE BLD-MCNC: 183 MG/DL (ref 75–110)
GLUCOSE BLD-MCNC: 216 MG/DL (ref 75–110)
HCT VFR BLD CALC: 23.4 % (ref 40.7–50.3)
HEMOGLOBIN: 7.2 G/DL (ref 13–17)
MCH RBC QN AUTO: 30.8 PG (ref 25.2–33.5)
MCHC RBC AUTO-ENTMCNC: 30.8 G/DL (ref 28.4–34.8)
MCV RBC AUTO: 100 FL (ref 82.6–102.9)
NRBC AUTOMATED: 0 PER 100 WBC
PDW BLD-RTO: 18.7 % (ref 11.8–14.4)
PLATELET # BLD: 264 K/UL (ref 138–453)
PMV BLD AUTO: 8.7 FL (ref 8.1–13.5)
POTASSIUM SERPL-SCNC: 3.7 MMOL/L (ref 3.7–5.3)
RBC # BLD: 2.34 M/UL (ref 4.21–5.77)
SODIUM BLD-SCNC: 136 MMOL/L (ref 135–144)
WBC # BLD: 7 K/UL (ref 3.5–11.3)

## 2019-04-04 PROCEDURE — 76937 US GUIDE VASCULAR ACCESS: CPT

## 2019-04-04 PROCEDURE — 94762 N-INVAS EAR/PLS OXIMTRY CONT: CPT

## 2019-04-04 PROCEDURE — 80048 BASIC METABOLIC PNL TOTAL CA: CPT

## 2019-04-04 PROCEDURE — 97161 PT EVAL LOW COMPLEX 20 MIN: CPT

## 2019-04-04 PROCEDURE — 6370000000 HC RX 637 (ALT 250 FOR IP): Performed by: INTERNAL MEDICINE

## 2019-04-04 PROCEDURE — 36415 COLL VENOUS BLD VENIPUNCTURE: CPT

## 2019-04-04 PROCEDURE — 85027 COMPLETE CBC AUTOMATED: CPT

## 2019-04-04 PROCEDURE — 6370000000 HC RX 637 (ALT 250 FOR IP): Performed by: NURSE PRACTITIONER

## 2019-04-04 PROCEDURE — 99232 SBSQ HOSP IP/OBS MODERATE 35: CPT | Performed by: INTERNAL MEDICINE

## 2019-04-04 PROCEDURE — 97530 THERAPEUTIC ACTIVITIES: CPT

## 2019-04-04 PROCEDURE — 99233 SBSQ HOSP IP/OBS HIGH 50: CPT | Performed by: INTERNAL MEDICINE

## 2019-04-04 PROCEDURE — 82947 ASSAY GLUCOSE BLOOD QUANT: CPT

## 2019-04-04 RX ORDER — SEVELAMER CARBONATE 800 MG/1
800 TABLET, FILM COATED ORAL 3 TIMES DAILY
Status: DISCONTINUED | OUTPATIENT
Start: 2019-04-04 | End: 2019-04-04 | Stop reason: HOSPADM

## 2019-04-04 RX ADMIN — LEVOTHYROXINE SODIUM 25 MCG: 25 TABLET ORAL at 08:48

## 2019-04-04 RX ADMIN — INSULIN GLARGINE 25 UNITS: 100 INJECTION, SOLUTION SUBCUTANEOUS at 08:46

## 2019-04-04 RX ADMIN — INSULIN LISPRO 2 UNITS: 100 INJECTION, SOLUTION INTRAVENOUS; SUBCUTANEOUS at 14:04

## 2019-04-04 RX ADMIN — GABAPENTIN 300 MG: 300 CAPSULE ORAL at 08:49

## 2019-04-04 RX ADMIN — INSULIN LISPRO 2 UNITS: 100 INJECTION, SOLUTION INTRAVENOUS; SUBCUTANEOUS at 08:44

## 2019-04-04 RX ADMIN — SEVELAMER CARBONATE 800 MG: 800 TABLET, FILM COATED ORAL at 15:05

## 2019-04-04 RX ADMIN — BUPROPION HYDROCHLORIDE 150 MG: 150 TABLET, EXTENDED RELEASE ORAL at 08:49

## 2019-04-04 RX ADMIN — DONEPEZIL HYDROCHLORIDE 5 MG: 5 TABLET, FILM COATED ORAL at 17:57

## 2019-04-04 RX ADMIN — INSULIN LISPRO 4 UNITS: 100 INJECTION, SOLUTION INTRAVENOUS; SUBCUTANEOUS at 18:01

## 2019-04-04 RX ADMIN — SERTRALINE 100 MG: 50 TABLET, FILM COATED ORAL at 08:50

## 2019-04-04 NOTE — PROGRESS NOTES
Renal Progress Note    Patient :  Wesley Tiwari; 75 y.o. MRN# [id number]  Location:  0644/6898-48  Attending:  Lizette Jimenez MD  Admit Date:  4/2/2019   Hospital Day: 2      Subjective:     Patient was seen and examined. No new issues overnight. Patient regular hemodialysis yesterday. Got 3.3 L off. Patient to follow-up in wound care clinic. Outpatient Medications:     Medications Prior to Admission: [DISCONTINUED] ciprofloxacin (CIPRO) 500 MG tablet, Take 1 tablet by mouth daily  Multiple Vitamins-Minerals (THERAPEUTIC MULTIVITAMIN-MINERALS) tablet, Take 1 tablet by mouth daily  omeprazole (PRILOSEC) 20 MG delayed release capsule, Take 20 mg by mouth daily  [DISCONTINUED] metroNIDAZOLE (FLAGYL) 500 MG tablet, Take 500 mg by mouth 3 times daily  atorvastatin (LIPITOR) 40 MG tablet, Take 1 tablet by mouth nightly  [DISCONTINUED] nystatin (MYCOSTATIN) POWD powder, Apply topically 3 times daily Indications: x 10 days  gabapentin (NEURONTIN) 300 MG capsule, Take 1 capsule by mouth daily for 30 days. midodrine (PROAMATINE) 10 MG tablet, Take 1 tablet by mouth 3 times daily  folic acid (FOLVITE) 1 MG tablet, Take 1 tablet by mouth daily  insulin glargine (LANTUS) 100 UNIT/ML injection vial, Inject 25 Units into the skin 2 times daily  insulin lispro (HUMALOG) 100 UNIT/ML injection vial, Inject 0-18 Units into the skin 3 times daily (with meals)  insulin lispro (HUMALOG) 100 UNIT/ML injection vial, Inject 0-9 Units into the skin nightly  Misc. Devices [facility] [place]) MISC, Use as directed  buPROPion (WELLBUTRIN XL) 150 MG extended release tablet, Take 150 mg by mouth every morning  sevelamer (RENVELA) 800 MG tablet, Give 2 tabs po TID with meals and additional 1 tab po TID with snack.  (Patient taking differently: Take by mouth 3 times daily Give with snack.)  Pollen Extracts (PROSTAT PO), Take 30 mLs by mouth three times daily  sertraline (ZOLOFT) 100 MG tablet, Take 100 mg by mouth daily   [DISCONTINUED] loratadine (CLARITIN) 10 MG capsule, Take 10 mg by mouth  [DISCONTINUED] sodium chloride (OCEAN) 0.65 % nasal spray, by Nasal route  lidocaine (XYLOCAINE) 5 % ointment, Apply topically three times a week Apply topically as needed. DIALYSIS DAYS  Amino Acids-Protein Hydrolys (PRO-STAT PO), Take 30 mLs by mouth 3 times daily SUGAR FREE   [DISCONTINUED] guaiFENesin (ROBITUSSIN) 100 MG/5ML syrup, Take 200 mg by mouth 4 times daily as needed for Cough  ipratropium-albuterol (DUONEB) 0.5-2.5 (3) MG/3ML SOLN nebulizer solution, Inhale 1 vial into the lungs every 6 hours as needed   Skin Protectants, Misc.  (HYDROCERIN) CREA cream, Apply topically nightly  [DISCONTINUED] glucagon 1 MG injection, Infuse 1 kit intravenously as needed  donepezil (ARICEPT) 5 MG tablet, Take 5 mg by mouth every evening  levothyroxine (SYNTHROID) 25 MCG tablet, Take 1 tablet by mouth Daily  albuterol (PROVENTIL) (2.5 MG/3ML) 0.083% nebulizer solution, Take 3 mLs by nebulization every 8 hours as needed for Wheezing  fluticasone (FLONASE) 50 MCG/ACT nasal spray, 2 sprays by Nasal route daily  [DISCONTINUED] glucose (GLUTOSE 15) 40 % GEL, Take 15 g by mouth as needed (hypoglycemia) 15 gram oral as needed if blood sugar is less than 70 ( for hypoglycemia)  [DISCONTINUED] acetaminophen (TYLENOL) 325 MG tablet, Take 650 mg by mouth every 6 hours as needed for Pain or Fever   magnesium hydroxide (MILK OF MAGNESIA) 400 MG/5ML suspension, Take 30 mLs by mouth daily as needed for Constipation    Current Medications:     Scheduled Meds:    sevelamer  800 mg Oral TID    buPROPion  150 mg Oral QAM    donepezil  5 mg Oral QPM    gabapentin  300 mg Oral Daily    insulin glargine  25 Units Subcutaneous BID    levothyroxine  25 mcg Oral Daily    sertraline  100 mg Oral Daily    sodium chloride flush  10 mL Intravenous 2 times per day    insulin lispro  0-12 Units Subcutaneous TID WC    insulin lispro  0-6 Units Subcutaneous Nightly     Continuous Infusions:    GLUCOSE 150* 172*   CALCIUM 9.0 8.7      TERESSA:      Lab Results   Component Value Date    TERESSA NEGATIVE 03/21/2016     SPEP:  Lab Results   Component Value Date    PROT 4.5 03/16/2019    ALBCAL 3.0 03/21/2016    ALBPCT 65 03/21/2016    LABALPH 0.3 03/21/2016    LABALPH 0.6 03/21/2016    A1PCT 6 03/21/2016    A2PCT 12 03/21/2016    LABBETA 0.4 03/21/2016    BETAPCT 9 03/21/2016    GAMGLOB 0.4 03/21/2016    GGPCT 8 03/21/2016    PATH ELECTRONICALLY SIGNED. Annette Packer M.D. 03/21/2016     UPEP:     Lab Results   Component Value Date    LABPE NORMAL ELECTROPHORETIC PATTERN 03/21/2016     C3:     Lab Results   Component Value Date    C3 50 03/21/2016     C4:     Lab Results   Component Value Date    C4 21 03/21/2016     Hep BsAg:         Lab Results   Component Value Date    HEPBSAG NONREACTIVE 05/24/2016     Hep C AB:          Lab Results   Component Value Date    HEPCAB NONREACTIVE 11/11/2016       Urinalysis/Chemistries:      Lab Results   Component Value Date    NITRU NEGATIVE 04/27/2016    COLORU DELORES 04/27/2016    PHUR 5.0 04/27/2016    WBCUA TOO NUMEROUS TO COUNT 04/27/2016    RBCUA 10 TO 20 04/27/2016    MUCUS NOT REPORTED 04/27/2016    TRICHOMONAS NOT REPORTED 04/27/2016    YEAST NOT REPORTED 04/27/2016    BACTERIA MANY 04/27/2016    SPECGRAV 1.015 04/27/2016    LEUKOCYTESUR LARGE 04/27/2016    UROBILINOGEN Normal 04/27/2016    BILIRUBINUR NEGATIVE 04/27/2016    GLUCOSEU NEGATIVE 04/27/2016    KETUA NEGATIVE 04/27/2016    AMORPHOUS 2+ 04/27/2016     Urine Sodium:     Lab Results   Component Value Date    RAY 20 04/27/2016     Urine Osmolarity:   Lab Results   Component Value Date    OSMOU 403 03/26/2016      Urine Creatinine:     Lab Results   Component Value Date    LABCREA 95.7 04/27/2016     Radiology:     Reviewed. Assessment:       1.  ESRD with the patient usually on a Monday and Wednesday and Friday dialysis at [facility] under my care.   2. Right groin hematoma at the site of previous central line  3. Anemia of chronic kidney disease and blood loss  4. Paroxysmal atrial fibrillation  5. S/ post MVR and AVR  6. Type 2 diabetes mellitus  7. Hypothyroidism  8. History of right lower extremity edema after a fall 3/2019 while on Coumadin and underwent drainage for the hematoma. Plan:   1. Patient to get regular dialysis as per MWF schedule. 2.  Continue to monitor hemoglobin and transfuse if hemoglobin less than 7.  3.  Antibiotics as per ID.  4.  Ok to discharge from nephrology standpoint. Nutrition   Please ensure that patient is on a renal diet/TF. Avoid nephrotoxic drugs/contrast exposure. We will continue to follow along with you. Vikas Santos MD  [facility]     This note is created with the assistance of a speech-recognition program. While intending to generate a document that actually reflects the content of the visit, no guarantees can be provided that every mistake has been identified and corrected by editing.

## 2019-04-04 NOTE — PROGRESS NOTES
[facility] - Progress Note    Today's Date and Time: 4/4/2019, 8:21 AM    Impression :   3. 74 y/o man who has:  2. Acute bleed of right lower extremity chronic venous stasis wound  1. Vascular surgery on board and managing with conservative approach at this time  2. Hemostasis currently achieved  3. 3/25/2019 cultures grew Enterobacter cloacae, moderate Proteus mirabilis, and light Stenotrophomonas growth  3. Chronic eliquis use due to atrial fibrillation  4. Type 2 diabetes  5. ESRD  6. Bacterial contamination of open wound Enterobacter cloacae, moderate Proteus mirabilis, and light MDR Stenotrophomonas growth    Recommendations:   · D/C cipro  · Topical wound care  · Monitor off antibiotics  · Monitor vitals    Medical Decision Making/Summary/Discussion:   · 75year old man with right chronic venous leg wound  · Wound explored by general surgery in outpatient office on 4/2  · Bleeding occurred during exploration and hemostasis could not be achieved  · Transferred to [facility] for further evaluation per vascular surgery  · Currently hemostasis being achieved with conservative measures  Infection Control Recommendations   · [place] Precautions    Antimicrobial Stewardship Recommendations     · Discontinuation of therapy  Coordination of Outpatient Care:   · Estimated Length of IV antimicrobials: TBD  · Patient will need Midline Catheter Insertion: TBD  · Patient will need PICC line Insertion: TBD  · Patient will need: Home IV , [place],  SNF,  LTAC  · Patient will need outpatient wound care: TBD    Chief complaint/reason for consultation:   · Bleeding from chronic left leg wound      History of Present Illness:   Lamar Collins is a 75year old male admitted on 4/02/2019 for right lower extremity bleeding of a chronic venous stasis wound. INITIAL HISTORY:    Patient has a medical history significant for chronic eliquis use, type 2 diabetes, and ESRD.  Patient originally took a fall (unknown how long ago) and developed a non-healing wound with hematoma formation. Was following outpatient with wound care and general surgery Lynn Molina MD). On 4/2 he had outpatient general surgery visit where they performed a wound exploration. During the exploration the wound began to bleed and hemostasis could not be achieved. He was transferred here for further evaluation by vascular surgery. At this time vascular is managing with a conservative approach, achieving hemostasis with thrombin gel and surgicel, as well as pressure dressings. Hemoglobin today 7.1    Infectious disease is consulted because patient had cultures of his lower extremity wound taken on 3/26/2019 that showed moderate Enterobacter cloacae, moderate Proteus mirabilis, and light Stenotrophomonas growth. Patient is known to Dr. Minesh Mosqueda and Dr. Nati Poe paged Dr. Minesh Mosqueda for advice, for which she recommended Ciprofloxacin 500 mg for 7 days. CURRENT EVALUATION : 4/4/2019    Patient seen and examined. Doing well. No complaints. No fevers, chills, nausea, vomiting, chest pain. Non toxic appearing. [id number] [address] Yusuf for d/c per vascular surgery note. Afebrile, VSS    Wound inspected. No signs of bacterial infection. Bleeding under control  Odor from old blood present. Labs: 4/4/2019    WBC: 6.4 -> 7.4  Hgb: 7.1 -> 7.2    Cultures:  Urine:  · NA  Blood:  · NA  Sputum :  · NA  Wound:  · Na    Discussed with patient, RN. I have personally reviewed the past medical history, past surgical history, medications, social history, and family history, and I have updated the database accordingly.   Past Medical History:     Past Medical History:   Diagnosis Date    Anemia in chronic kidney disease (CKD) 4/27/2016    Arthritis     Bacteremia 11/11/2016    Benign prostatic hyperplasia without lower urinary tract symptoms     BMI 40.0-44.9, adult ([facility] 3/19/2018    Cellulitis of left lower extremity     Chronic cerebral ischemia 1/3/2017    Closed fracture of forearm 8/13/2013    Broken lft forearm     Controlled type 2 diabetes mellitus with chronic kidney disease on chronic dialysis, with long-term current use of insulin ([address]     Controlled type 2 diabetes mellitus with diabetic polyneuropathy, with long-term current use of insulin ([address] 10/7/2018    Decubital ulcer     NON OPEN BUTTOCKS    Dementia     memory problems    Dialysis patient ([address] 01/03/2017    [facility], Sat in [place]    Difficult intravenous access     HAS NEEDED PICC TEAM IN THE PAST    Difficulty walking     WHEELCHAIR BOUND-PIVOTS WITH ASSIST O F 2    DJD (degenerative joint disease) of knee     Elbow, forearm, and wrist, abrasion or friction burn, without mention of infection 8/13/2013    Abrasions lft forearm     Encephalopathy acute 4/27/2016    Encounter regarding vascular access for dialysis for ESRD ([address] 2/2/2017    ESRD (end stage renal disease) on dialysis ([address] 1/17/2017    Forgetfulness     HAS SOME SHORT TERM MEMORY LOSS, QUYEN-WIFE IS LEGAL GUARDIAN    H/O transesophageal echocardiography ([facility]ADE[facility]) for monitoring     Hemodialysis patient ([address] 11/11/2016    scar-sat defiance---FRESEMIUS.  TUNNELED CATHETER RT UPPER CHEST    Hyperlipidemia 1990    on Meds    Hypertension 1990    on Meds    Intercritical gout     on Meds    Joint pain, knee 01/13/2017    baldo knee synvisc-1 injections    Long-term insulin use ([address] 1/17/2017    Major depressive disorder with single episode, in partial remission ([address] 1/3/2017    Mobility impaired     Morbid obesity ([address]     Muscle weakness     GRNERALIZED    Obesity     Obstructive sleep apnea     HEATHER on CPAP     On CPAP-TO BRING DOS    Paroxysmal atrial fibrillation ([address] 9/6/2017    Respiratory failure ([address] 03/2016    with open heart surgery, trached x 5 months    S/P cardiac cath     Seborrhea     Severe aortic stenosis 3/9/2016    Severe mitral valve stenosis 3/9/2016    Severe sepsis ([address] 4/3/2016    Skin rash     ABD FOLDS    Stasis dermatitis     Thyroid disease     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 lft thumb     Venous stasis dermatitis     Wears glasses     Wheelchair bound     pivots with 2 assists       Past Surgical  History:     Past Surgical History:   Procedure Laterality Date    AORTIC VALVE REPLACEMENT  03/18/2016    bioprosthetic    ARM SURGERY Left 1990    CARDIAC CATHETERIZATION      CENTRAL VENOUS CATHETER Right 02/21/2018    DIALYSIS CATHETER Dr Mini Menard    COLONOSCOPY  11/19/09    COLONOSCOPY N/A 10/12/2018    COLONOSCOPY WITH BIOPSY performed by Yolanda Hernandez DO at [address] [facility] Right 3/10/2019    WASHOUT RIGHT LOWER EXTREMITY WITH WOUND VAC EXCHANGE performed by Katja Asencio MD at [address] [place] [address] Right 3/8/2019    RIGHT LOWER EXTREMITY EXPLORATION, HEMATOMA EVACUATION, WOUND VAC PLACEMENT performed by Katja Asencio MD at [address] (x10-12 surgeries)    several surgeries on left arm    KNEE ARTHROSCOPY Left 09/17/99    MITRAL VALVE REPLACEMENT  03/18/2016    bioprosthetic     NASAL SEPTUM SURGERY      OTHER SURGICAL HISTORY  3/18/16    Aortic root Enlargement    OTHER SURGICAL HISTORY  3/18/16    ASD Closure    OTHER SURGICAL HISTORY Right 01/09/2017    AV fistula creation, wrist    OTHER SURGICAL HISTORY Right 08/29/2017    ligation of collateral branch of av fistula right wrist    OTHER SURGICAL HISTORY  04/05/2018    FISTULAGRAM WITH DR. Vandana Brandon    IA EGD TRANSORAL BIOPSY SINGLE/MULTIPLE N/A 10/17/2017    EGD BIOPSY performed by America Vivas MD at [address] ANGIOACCESS AV FISTULA Right 8/29/2017     RIGHT  LOWER ARM AV FISTULA  LIGATION OF AQUIRED BRANCHES X2 performed by Luis Enrique Simmons DO at [address]  3/18/16    median    VASCULAR SURGERY  12/06/2018    Right arm fistulagram, PTA cephalic vein stenosis  /  DR Janett Perera       Medications:      buPROPion  150 mg Oral QAM    donepezil  5 mg Oral QPM    gabapentin  300 mg Oral Daily    insulin glargine  25 Units Subcutaneous BID    levothyroxine  25 mcg Oral Daily    sertraline  100 mg Oral Daily    sodium chloride flush  10 mL Intravenous 2 times per day    insulin lispro  0-12 Units Subcutaneous TID WC    insulin lispro  0-6 Units Subcutaneous Nightly       Social History:     Social History     Socioeconomic History    Marital status:      Spouse name: Aracelis Nino Number of children: 2    Years of education: Not on file    Highest education level: Not on file   Occupational History    Occupation: Retired      Employer: [employer] resource strain: Not on file    Food insecurity:     Worry: Not on file     Inability: Not on file    Transportation needs:     Medical: Not on file     Non-medical: Not on file   Tobacco Use    Smoking status: Former Smoker     Packs/day: 2.00     Years: 25.00     Pack years: 50.00     Types: Cigarettes     Start date: 3/17/1955     Last attempt to quit: 1980     Years since quittin.2    Smokeless tobacco: Never Used   Substance and Sexual Activity    Alcohol use: No     Alcohol/week: 0.0 oz     Comment: 1-2 drinks per year    Drug use: No    Sexual activity: Never   Lifestyle    Physical activity:     Days per week: Not on file     Minutes per session: Not on file    Stress: Not on file   Relationships    Social connections:     Talks on phone: Not on file     Gets together: Not on file     Attends [employer]ism service: Not on file     Active member of club or organization: Not on file     Attends meetings of clubs or organizations: Not on file     Relationship status: Not on file    Intimate partner violence:     Fear of current or ex partner: Not on file     Emotionally abused: Not on file     Physically abused: Not phenomena. ENT: Oropharynx clear, without erythema, exudate, or thrush. No tenderness of sinuses. Mouth/throat: mucosa pink and moist. No lesions. Dentition in good repair. Neck:Supple, without lymphadenopathy. Thyroid normal, No bruits. Pulmonary/Chest: Clear to auscultation, without wheezes, rales, or rhonchi. No dullness to percussion. No egophony. Cardiovascular: Regular rate and rhythm without murmurs, rubs, or gallops. Abdomen: Soft, non tender. Bowel sounds normal. No organomegaly  All four Extremities: No cyanosis, clubbing, edema, or effusions. Neurologic: No gross sensory or motor deficits. Skin: Warm and dry with good turgor. No signs of peripheral arterial or venous insufficiency. No ulcerations. No open wounds. Medical Decision Making -Laboratory:   I have independently reviewed/ordered the following labs:    CBC with Differential:   Recent Labs     04/03/19  0637 04/04/19  0605   WBC 6.4 7.0   HGB 7.1* 7.2*   HCT 22.2* 23.4*    264     BMP:   Recent Labs     04/03/19  0637 04/04/19  0605    136   K 4.0 3.7   CL 94* 99   CO2 27 24   BUN 64* 35*   CREATININE 5.83* 3.94*     Hepatic Function Panel: No results for input(s): PROT, LABALBU, BILIDIR, IBILI, BILITOT, ALKPHOS, ALT, AST in the last 72 hours. No results for input(s): RPR in the last 72 hours. No results for input(s): HIV in the last 72 hours. No results for input(s): BC in the last 72 hours.   Lab Results   Component Value Date    MUCUS NOT REPORTED 04/27/2016    RBC 2.34 04/04/2019    TRICHOMONAS NOT REPORTED 04/27/2016    WBC 7.0 04/04/2019    YEAST NOT REPORTED 04/27/2016    TURBIDITY TURBID 04/27/2016     Lab Results   Component Value Date    CREATININE 3.94 04/04/2019    GLUCOSE 172 04/04/2019       Medical Decision Making-Imaging:     CT scan of femur and pelvis on 4/3:    Impression   Right groin hematoma is overall similar in size.  Open wound anteriorly with   a pressure dressing in place.  Heterogeneous density including pockets of air   related to the recent exploration and possible packing material as noted   above. Medical Decision Making-Other: Thank you for allowing us to participate in the care of this patient. Please call with questions. [place]     ATTESTATION:    I have discussed the case, including pertinent history and exam findings with the residents. I have seen and examined the patient and the key elements of the encounter have been performed by me. I have reviewed the laboratory data, other diagnostic studies and discussed them with the residents. I have updated the medical record where necessary. I agree with the assessment, plan and orders as documented by the resident.     Tyron Kirkland MD.    Pager: [phone] - Office: [phone]

## 2019-04-04 NOTE — CARE COORDINATION
[address] Transitions Interview     2019    Patient: Janie Snyder Patient : 1945   MRN: [id number]  Reason for Admission: Bleeding from wound  RARS: Readmission Risk Score: 29         Spoke with: Jaxon Lincoln and wife Yogi Britton    Met with patient and wife in room. Patient is a 30 day readmission. Patient has been at [facility], came in with bleeding from wound. Per spouse plans are to return back to [facility]. Care transitions will follow.        Readmission Risk  Patient Active Problem List   Diagnosis    Traumatic amputation of thumb    Essential hypertension    Mixed hyperlipidemia    Obstructive sleep apnea    Morbid obesity ([facility]    Benign prostatic hyperplasia without lower urinary tract symptoms    Venous stasis dermatitis    Severe aortic stenosis    Severe mitral valve stenosis    Anemia in chronic kidney disease, on chronic dialysis ([facility]    Dialysis patient ([facility]    Dementia    Major depressive disorder with single episode, in partial remission ([facility]    Chronic cerebral ischemia    ESRD (end stage renal disease) on dialysis (HCC)    Long-term insulin use (HCC)    [facility]oxysmal [facility] fibrillation [facility]    BMI 40.0-44.9, adult (HCC)    Controlled type 2 diabetes mellitus with chronic kidney disease on chronic dialysis, with long-term current use of insulin (HCC)    Controlled type 2 diabetes mellitus with diabetic polyneuropathy, with long-term current use of insulin (HCC)    Dermatitis    Bruising    Hematoma of right lower extremity    Hematoma of groin    Acute blood loss anemia    Accidental fall from wheelchair    Traumatic hemorrhagic shock (HCC)    Cellulitis    Open wound    Hematoma of right thigh    Wound of right leg    Bleeding from wound    Open wound of right lower extremity    Acute blood loss anemia       Inpatient Assessment  Care Transitions Summary    Care Transitions Inpatient Review  Medication Review  Are you able to afford your medications?:  Yes  How often do you have difficulty taking your medications?:  I always take them as prescribed. Housing Review  Who do you live with?:  Other Caregiver  Are you an active caregiver in your home?:  No  Social Support  Durable Medical Equipment  Patient DME:  Wheelchair  Functional Review  Ability to seek help/take action for Emergent/Urgent situations i.e. fire, crime, inclement weather or health crisis.:  Needs Assistance  Ability handle personal hygiene needs (bathing/dressing/grooming):  Needs Assistance  Ability to manage medications:  Needs Assistance  Ability to prepare food:  Needs Assistance  Ability to maintain home (clean home, laundry):  Needs Assistance  Ability to drive and/or has transportation:  Needs Assistance  Ability to do shopping:  Needs Assistance  Ability to manage finances:  Needs Assistance  Is patient able to live independently?:  No  Hearing and Vision  Visual Impairment:  Visual impairment (Glasses/contacts)  Hearing Impairment:  Hard of hearing  Care Transitions Interventions         Follow Up  Future Appointments   Date Time Provider Kaity Donovan   4/12/2019  4:00 PM MD [facility]   10/30/2019  1:00 PM [facility]       Health Maintenance  There are no preventive care reminders to display for this patient.     Aamir Diallo RN

## 2019-04-04 NOTE — PROGRESS NOTES
insulin glargine  25 Units Subcutaneous BID    levothyroxine  25 mcg Oral Daily    sertraline  100 mg Oral Daily    sodium chloride flush  10 mL Intravenous 2 times per day    insulin lispro  0-12 Units Subcutaneous TID WC    insulin lispro  0-6 Units Subcutaneous Nightly     Continuous Infusions:    dextrose      dextrose       PRN Meds: ALPRAZolam, sodium chloride flush, potassium chloride **OR** potassium alternative oral replacement **OR** potassium chloride, magnesium sulfate, magnesium hydroxide, ondansetron, nicotine, glucose, dextrose, glucagon (rDNA), dextrose, glucose, dextrose, glucagon (rDNA), dextrose    Data:     Past Medical History:   has a past medical history of Anemia in chronic kidney disease (CKD), Arthritis, Bacteremia, Benign prostatic hyperplasia without lower urinary tract symptoms, BMI 40.0-44.9, adult (HCC), Cellulitis of left lower extremity, Chronic cerebral ischemia, Closed fracture of forearm, Controlled type 2 diabetes mellitus with chronic kidney disease on chronic dialysis, with long-term current use of insulin ([address] Controlled type 2 diabetes mellitus with diabetic polyneuropathy, with long-term current use of insulin ([address] Decubital ulcer, Dementia, Dialysis patient ([address] Difficult intravenous access, Difficulty walking, DJD (degenerative joint disease) of knee, Elbow, forearm, and wrist, abrasion or friction burn, without mention of infection, Encephalopathy acute, Encounter regarding vascular access for dialysis for ESRD ([address] ESRD (end stage renal disease) on dialysis ([address] Forgetfulness, H/O transesophageal echocardiography ([facility]ADEO) for monitoring, Hemodialysis patient ([address] Hyperlipidemia, Hypertension, Intercritical gout, Joint pain, knee, Long-term insulin use ([address] Major depressive disorder with single episode, in partial remission ([address] Mobility impaired, Morbid obesity ([address] Muscle weakness, Obesity, Obstructive sleep apnea, HEATHER on CPAP, Paroxysmal atrial fibrillation ([facility] Respiratory failure ([facility] S/P cardiac cath, Seborrhea, Severe aortic stenosis, Severe mitral valve stenosis, Severe sepsis (HCC), Skin rash, Stasis dermatitis, Thyroid disease, Traumatic amputation of thumb, Venous stasis dermatitis, Wears glasses, and Wheelchair bound. Social History:   reports that he quit smoking about 39 years ago. His smoking use included cigarettes. He started smoking about 64 years ago. He has a 50.00 pack-year smoking history. He has never used smokeless tobacco. He reports that he does not drink alcohol or use drugs. Family History:   Family History   Problem Relation Age of Onset    Lung Cancer Mother     Diabetes Mother     Alzheimer's Disease Father     Diabetes Maternal Grandmother     High Blood Pressure Sister        Vitals:  BP (!) 124/50   Pulse 87   Temp 98.2 °F (36.8 °C) (Oral)   Resp 18   Ht 5' 7\" (1.702 m)   Wt 268 lb (121.6 kg)   SpO2 99%   BMI 41.97 kg/m²   Temp (24hrs), Av.2 °F (36.8 °C), Min:97.8 °F (36.6 °C), Max:99 °F (37.2 °C)    Recent Labs     19  1630 19  2031 19  0721 19  1145   POCGLU 180* 223* 146* 183*       I/O (24Hr): Intake/Output Summary (Last 24 hours) at 2019 1456  Last data filed at 2019 0806  Gross per 24 hour   Intake 250 ml   Output --   Net 250 ml       Physical Examination:        General appearance:  alert, cooperative and no distress, obese body habitus. Mental Status:  oriented to person, place and time and normal affect  Lungs:  clear to auscultation bilaterally, normal effort  Heart:  regular rate and rhythm, no murmur  Abdomen:  soft, nontender, nondistended, normal bowel sounds  Extremities:  no edema, redness, tenderness in the calves. Rt leg with dressing, rt groin with pressure dressing.    Skin:  no gross lesions, rashes, induration    Assessment:        Primary Problem  Bleeding from wound    Active Hospital Problems    Diagnosis Date Noted    Anemia in chronic kidney disease, on chronic dialysis ([facility] [N18.6, D63.1, Z99.2] 04/27/2016     Priority: High    Open wound of right lower extremity [S81.801A] 04/03/2019    Acute blood loss anemia [D62] 04/03/2019    Bleeding from wound [T14. 8XXA] 04/02/2019    Controlled type 2 diabetes mellitus with diabetic polyneuropathy, with long-term current use of insulin ([facility] [E11.42, Z79.4] 10/07/2018    [facility] [I48.0] 09/06/2017    Dialysis [facility] [Z99.2] 01/03/2017    Morbid obesity ([facility] [E66.01]     Obstructive sleep apnea [G47.33]        Plan:      - Rt groin hematoma with uncontrolled bleeding: hemostasis achieved with conservative measures. Eliquis on hold: high risk for embolism sec to Afib and prior MVR/AVR. - continue to monitor Hb. Transfuse if Hb <7.0.   - Had routine scheduled HD today. - polymicrobial cx from open wound: keep off abx per ID. Appreciate consultant's input.      Javier Hernandez MD  4/4/2019

## 2019-04-04 NOTE — PROGRESS NOTES
Physical Therapy    Facility/Department: [facility]  Initial Assessment    NAME: Ty Rizvi  : 1945  MRN: [id number]    Date of Service: 2019    Discharge Recommendations:    No further therapy required at discharge. PT Equipment Recommendations  Equipment Needed: No    Assessment   Body structures, Functions, Activity limitations: Decreased functional mobility ; Decreased ROM; Decreased endurance  Assessment: Pt with very limited mobility at baseline. Will benefit from acute PT for ROM to promote circulation and joint integrity  Prognosis: Guarded  Decision Making: Low Complexity  Clinical Presentation: weakness  Patient Education: PT POC, ROM  REQUIRES PT FOLLOW UP: Yes  Activity Tolerance  Activity Tolerance: Patient limited by endurance; Patient limited by fatigue       Patient Diagnosis(es): There were no encounter diagnoses.      has a past medical history of Anemia in chronic kidney disease (CKD), Arthritis, Bacteremia, Benign prostatic hyperplasia without lower urinary tract symptoms, BMI 40.0-44.9, adult (HCC), Cellulitis of left lower extremity, Chronic cerebral ischemia, Closed fracture of forearm, Controlled type 2 diabetes mellitus with chronic kidney disease on chronic dialysis, with long-term current use of insulin ([address] Controlled type 2 diabetes mellitus with diabetic polyneuropathy, with long-term current use of insulin ([address] Decubital ulcer, Dementia, Dialysis patient ([address] Difficult intravenous access, Difficulty walking, DJD (degenerative joint disease) of knee, Elbow, forearm, and wrist, abrasion or friction burn, without mention of infection, Encephalopathy acute, Encounter regarding vascular access for dialysis for ESRD ([address] ESRD (end stage renal disease) on dialysis ([address] Forgetfulness, H/O transesophageal echocardiography ([facility]) for monitoring, Hemodialysis patient ([address] Hyperlipidemia, Hypertension, Intercritical gout, Joint pain, knee, Long-term insulin use ([address] Major depressive disorder with single episode, in partial remission ([address] Mobility impaired, Morbid obesity ([address] Muscle weakness, Obesity, Obstructive sleep apnea, HEATHER on CPAP, Paroxysmal atrial fibrillation ([address] Respiratory failure ([address] S/P cardiac cath, Seborrhea, Severe aortic stenosis, Severe mitral valve stenosis, Severe sepsis (HCC), Skin rash, Stasis dermatitis, Thyroid disease, Traumatic amputation of thumb, Venous stasis dermatitis, Wears glasses, and Wheelchair bound. has a past surgical history that includes Colonoscopy (11/19/09); Knee arthroscopy (Left, 09/17/99); Hand surgery (Left, 1990 (x10-12 surgeries)); Arm Surgery (Left, 1990); Cardiac catheterization; Nasal septum surgery; Sternotomy (3/18/16); Aortic valve replacement (03/18/2016); Mitral valve replacement (03/18/2016); other surgical history (3/18/16); other surgical history (3/18/16); other surgical history (Right, 01/09/2017); other surgical history (Right, 08/29/2017); pr ligatn angioaccess av fistula (Right, 8/29/2017); pr egd transoral biopsy single/multiple (N/A, 10/17/2017); central venous catheter (Right, 02/21/2018); Colonoscopy (N/A, 10/12/2018); other surgical history (04/05/2018); vascular surgery (12/06/2018); EXPLORATION OF WOUND OF EXTREMITY (Right, 3/10/2019); and EXPLORATION OF WOUND OF EXTREMITY (Right, 3/8/2019).     Restrictions  Restrictions/Precautions  Restrictions/Precautions: General Precautions, Isolation  Position Activity Restriction  Other position/activity restrictions: Pt is w/c bound, dependent for transfers  Vision/Hearing  Vision: Within Functional Limits  Hearing: Exceptions to [facility]  Hearing Exceptions: Hard of hearing/hearing concerns     Subjective  General  Chart Reviewed: Yes  Patient assessed for rehabilitation services?: Yes  Family / Caregiver Present: No  Follows Commands: Impaired  Other (Comment): repetition for follow through  Pain Screening  Patient Currently in Pain: Denies  Vital Signs  Patient Currently in Pain: Denies       Orientation  Orientation  Overall Orientation Status: Impaired  Orientation Level: Oriented to place;Oriented to situation;Oriented to person;Disoriented to time  Social/Functional History  Social/Functional History  Lives With: Other (comment)(nursing home)  Type of Home: Facility  Home Layout: One level  Home Access: Ramped entrance  Bathroom Shower/Tub: Walk-in shower  Bathroom Equipment: Grab bars in shower, Shower chair  Home Equipment: [address] Help From: Personal care attendant  ADL Assistance: Needs assistance  Homemaking Responsibilities: No  Ambulation Assistance: (w/c bound)  Transfer Assistance: Needs assistance(hilario/ maxA x2)  Active : No  Additional Comments: Pt reports he is dependent for all care and dressing, able to feed himself and wheel very limited distances in w/c  Cognition   Cognition  Overall Cognitive Status: Exceptions  Following Commands:  Follows one step commands with repetition  Attention Span: Difficulty attending to directions  Memory: Decreased short term memory;Decreased recall of recent events  Initiation: Requires cues for all  Sequencing: Requires cues for all    Objective          PROM RLE (degrees)  RLE General PROM: lacks hip and knee flexion in supine due to pain, 15-20 degrees tolerated  PROM LLE (degrees)  LLE PROM: WFL  PROM RUE (degrees)  RUE PROM: WFL  PROM LUE (degrees)  LUE PROM: WFL  Strength RLE  Comment: 2-/5 grossly  Strength LLE  Comment: 2/5 grossly  Strength RUE  Strength RUE: Exception  R Shoulder Flexion: 2/5  R Elbow Flexion: 3-/5  R Elbow Extension: 3-/5  Strength LUE  Strength LUE: Exception  L Shoulder Flexion: 2/5  L Elbow Flexion: 3-/5  L Elbow Extension: 3-/5     Sensation  Overall Sensation Status: WFL  Bed mobility  Rolling to Left: Dependent/Total  Rolling to Right: Dependent/Total  Supine to Sit: Dependent/Total  Sit to Supine: Dependent/Total  Scooting:

## 2019-04-04 NOTE — DISCHARGE SUMMARY
Umair Delgado 18    Discharge Summary     Patient ID: Trish Marin  :  1945   MRN: [id number]     ACCOUNT:  [de-identified]   Patient's PCP: Cate Martinez DO  Admit Date: 2019   Discharge Date: 2019 Length of Stay: 2  Code Status:  Full Code  Admitting Physician: Matias Oconnell MD  Discharge Physician: Matias Oconnell MD     Active Discharge Diagnoses:     Hospital Problem Lists:  Principal Problem:    Bleeding from wound  Active Problems:    Anemia in chronic kidney disease, on chronic dialysis ([facility] [address]    Obstructive sleep apnea    Morbid obesity ([facility] [address]    Dialysis patient ([facility] [address]    Paroxysmal atrial fibrillation ([facility] [address]    Controlled type 2 diabetes mellitus with diabetic polyneuropathy, with long-term current use of insulin ([facility] [address]    Open wound of right lower extremity    Acute blood loss anemia  Resolved Problems:    * No resolved hospital problems. *      Admission Condition:  fair     Discharged Condition: fair    Hospital Stay:     Hospital Course:    74 y/o with Rt lower extremity  hematoma after a fall in 2019 while on coumadin and underwent drainage of large hematoma 2019 . He then presented on  with Rt groin hematoma which was felt to be noninfected. He was being followed by Wound care outpatient. Recently started on Cipro for +cx   While at Dr Jasmin Cardenas office today, he had exploration of Right groin wound sec to continued oozing. Bleeding was uncontrolled inspite of pressure with surgicel which prompted hospital admission.    -Comorbidities: DM2, ESRD on HD MWF, prior AVR and MVR with bioprosthetic valve on Eliquis. , P. Afib.  - vascular sx recommended conservative approach with reinforced pressure dressings. His ASA and Eliquis were held after discussing risk with patient. CT showed unchanged size of the Rt groin hematoma. - ID was consulted for prior positive cx and recommended no antibiotics. bladder is collapsed. This is similar in appearance compared to the prior. Stranding in the fat just above the urinary bladder is noted. Vascular calcifications are noted. There is no pelvic hematoma. Peritoneum/Retroperitoneum: Vascular calcifications are noted without aneurysm. No enlarged lymph nodes are noted. There is no hematoma in the retroperitoneum. Bones/Soft Tissues: Mild multifocal stranding is noted in the visualized portions of the right lower extremity. This extends into the right hip and pelvic region as well as the lateral flank tissues which are not entirely included. There is no hematoma along the iliac muscle or the psoas muscle. There is no focal glial hematoma. There is no discrete hematoma noted. There is, however, multifocal edema noted in the visualized portions of the right lower extremity and lower right lateral pelvic soft tissues. Mild nonspecific stranding in the fat near the sigmoid colon without definitive wall thickening. Subtle colitis would be difficult to exclude. Note that the colon is similar in configuration compared to prior with marked redundancy of the sigmoid colon. Stable renal lesions on the left Minimal pleural fluid bilaterally with mild basilar airspace disease most likely atelectasis or edema. Pneumonia less likely     Xr Tibia Fibula Right (2 Views)    Result Date: 3/8/2019  EXAMINATION: 4 XRAY VIEWS OF THE RIGHT TIBIA AND FIBULA 3/8/2019 11:14 am COMPARISON: Radiographs today at 6:44 a.m.. HISTORY: ORDERING SYSTEM PROVIDED HISTORY: soft tissue injury TECHNOLOGIST PROVIDED HISTORY: soft tissue injury FINDINGS: Osteopenia. Prominent soft tissue swelling in the anterior lower leg is again noted. No subcutaneous gas or foreign body. No underlying fracture identified. Advanced degenerative change in the medial compartment of the knee with varus angulation is noted. Soft tissue swelling in the anterior lower leg without underlying fracture identified. Xr Tibia Fibula Right (2 Views)    Result Date: 3/8/2019  EXAMINATION: 2 XRAY VIEWS OF THE RIGHT TIBIA AND FIBULA 3/8/2019 6:41 am COMPARISON: None. HISTORY: ORDERING SYSTEM PROVIDED HISTORY: trauma TECHNOLOGIST PROVIDED HISTORY: trauma Ordering Physician Provided Reason for Exam: Hematoma to the right lower tib/fib that is bleeding a lot, pt is unsure what happened to his leg, he is on blood thinners Acuity: Acute Type of Exam: Initial FINDINGS: Normal alignment at the knee and ankle. Osteoarthrosis in the knee. Osteopenia. No clear evidence for acute fracture. Large masslike soft tissue swelling along the anterolateral aspect of the lower leg about 20 cm in craniocaudal length and 5 cm in AP dimension. In the setting of trauma this would be most compatible with a hematoma but the pre-existing mass would be difficult to exclude on radiograph. Vascular calcifications noted. 1. No acute fracture or dislocation. Degenerative changes in the knee and ankle. 2. Large homogeneous masslike soft tissue swelling about 20 cm in length in the anterolateral lower leg most compatible with hematoma. Underlying mass would be difficult to exclude on radiographs. Ct Pelvis Wo Contrast Additional Contrast? None    Result Date: 4/3/2019  EXAMINATION: CT OF THE PELVIS WITHOUT CONTRAST; CT OF THE RIGHT FEMUR WITHOUT CONTRAST 4/2/2019 3:14 pm TECHNIQUE: CT of the pelvis was performed without the administration of intravenous contrast.  Multiplanar reformatted images are provided for review. Dose modulation, iterative reconstruction, and/or weight based adjustment of the mA/kV was utilized to reduce the radiation dose to as low as reasonably achievable.; CT of the right femur was performed without the administration of intravenous contrast.  Multiplanar reformatted images are provided for review.  Dose modulation, iterative reconstruction, and/or weight based adjustment of the mA/kV was utilized to reduce the radiation dose to as low as reasonably achievable. COMPARISON: 03/24/2019, ultrasound 03/24/2019 HISTORY: ORDERING SYSTEM PROVIDED HISTORY: Rt groin hematoma:worsening TECHNOLOGIST PROVIDED HISTORY: Ordering Physician Provided Reason for Exam: FINDINGS: Right medial thigh/groin hematoma appears similar in size measuring up to 14 cm AP x 5.8 cm transverse x 14 cm cephalocaudal.  There is now a pressure dressing anteriorly with an open wound anterolaterally related to recent exploration. Heterogeneous density within the collection including pockets of air likely related to combination of clot, organized debris, and packing material/Gel-Foam.  Infection should be excluded clinically. There is subcutaneous soft tissue edema and skin thickening in the thigh most prominent medially. Osseous degenerative changes with no acute osseous abnormality identified. Small right knee joint effusion. Image quality in the pelvis is degraded by patient size and overhanging pannus. Pelvic lipomatosis. Mild calcific plaque of the aorta and right femoral artery. Partially visualized calcifications in the pancreas and somewhat atrophic right kidney. No significant bulky adenopathy in the pelvis. Right groin hematoma is overall similar in size. Open wound anteriorly with a pressure dressing in place. Heterogeneous density including pockets of air related to the recent exploration and possible packing material as noted above. Ct Abdomen Pelvis W Iv Contrast    Result Date: 3/24/2019  EXAMINATION: CT OF THE ABDOMEN AND PELVIS WITH CONTRAST 3/24/2019 1:03 pm TECHNIQUE: CT of the abdomen and pelvis was performed with the administration of intravenous contrast. Multiplanar reformatted images are provided for review. Dose modulation, iterative reconstruction, and/or weight based adjustment of the mA/kV was utilized to reduce the radiation dose to as low as reasonably achievable.  COMPARISON: 12 March 2019 HISTORY: ORDERING SYSTEM PROVIDED HISTORY: 12cm x 12cm R groin ?hematoma vs AVM vs abscess TECHNOLOGIST PROVIDED HISTORY: IV Only Contrast Ordering Physician Provided Reason for Exam: 12cm x 12cm R groin ?hematoma vs AVM vs abscess Acuity: Unknown Type of Exam: Unknown FINDINGS: Lower Chest: Bibasilar atelectasis and trace pleural fluid is noted. Evidence of coronary artery disease and prior cardiac surgery. Heart is mildly enlarged without pericardial effusion. Organs: Liver and spleen are homogeneous without space-occupying mass lesion or focal abnormality. Gallbladder demonstrates no acute abnormality or calculi. No new pancreatic abnormality. Multiple pancreatic calcifications. Adrenals are unremarkable. Kidneys demonstrate cortical thinning and left renal cyst.  No obstruction is noted. GI/Bowel: No free fluid or free air is noted. Colon is filled with stool and air. Ventral hernia containing a portion of the stomach is noted, non strangulated. Colonic redundancy is again noted. Mild prominence of proximal jejunal loops left upper abdomen is redemonstrated. Pelvis: Evaluation is limited due to artifact due to girth. Appendix is unremarkable. No abnormal fluid collections, or pelvic adenopathy is noted. Large collection medial right groin is present 16.4 x 5.5 x 10.9 cm, area not included on prior exam.  No air bubbles are noted within the collection. Attenuation coefficient of the collection is above that of simple fluid. Adjacent shotty lymph nodes are present. This likely represents a hematoma. Appearance is not typical of abscess but infected hematoma is not excluded. Appearance is that of AV malformation. AV fistula would be unlikely and no history is given of recent right groin arterial puncture. Peritoneum/Retroperitoneum: No aortic aneurysm. No retroperitoneal or mesenteric adenopathy. Scattered calcified plaque in the aorta and branches is noted. Bones/Soft Tissues: No acute osseous abnormality. Prior median sternotomy. Estimated biologic radiation dose for this procedure:1937.86 mGy/cm2.     1.  Large crescentic collection medial right groin and upper thigh of 16.4 x 5.5 x 10.9 cm with Hounsfield units above that of simple cyst.  No air bubbles are seen within this collection. Appearance is not typical of AV malformation. AV fistula is not suspected as there is no history of recent right groin puncture. Most likely this represents a hematoma with infection not excluded. 2.  Bibasilar consolidation likely atelectasis. Trace pleural effusions. 3.  Kidneys demonstrating cortical thinning. 4.  Colon distended with gas and feces. Overall appearance suggests mild ileus. RECOMMENDATIONS: Ultrasound evaluation of the right groin with color flow Doppler is advised. Xr Chest Portable    Result Date: 3/21/2019  EXAMINATION: SINGLE XRAY VIEW OF THE CHEST 3/21/2019 11:00 am COMPARISON: Chest radiograph dated 03/09/2019 HISTORY: ORDERING SYSTEM PROVIDED HISTORY: cough TECHNOLOGIST PROVIDED HISTORY: cough Ordering Physician Provided Reason for Exam: Right central line removed from right groin 2-3 days ago while at [facility]. V's. Worsening swelling to right thigh, groin, and penis. FINDINGS: Cardiomegaly similar to prior exam.  No infiltrates. No effusions. No pneumothorax. No free air below the diaphragm. Interval removal of endotracheal and nasogastric tubes. No acute findings in the chest.     Xr Chest Portable    Result Date: 3/9/2019  EXAMINATION: SINGLE X-RAY VIEW OF THE CHEST, 3/9/2019 6:35 am COMPARISON: 03/08/2019 HISTORY: ORDERING SYSTEM PROVIDED HISTORY: s/p intubation TECHNOLOGIST PROVIDED HISTORY: s/p intubation FINDINGS: Endotracheal tube is approximately 3 cm above the jsoe. Enteric tube extends below the left hemidiaphragm with distal tip over the proximal stomach. Median sternotomy wires and prosthetic cardiac valve are noted. Moderate cardiomegaly is unchanged. No significant vascular congestion.   No focal airspace consolidation. Left hemidiaphragm is obscured, but this is unchanged and is probably related to the cardiomegaly and mildly elevated left hemidiaphragm. No obvious pneumothorax on this limited supine view. No significant interval change. Xr Chest Portable    Result Date: 3/8/2019  EXAMINATION: SINGLE XRAY VIEW OF THE CHEST 3/8/2019 8:49 pm COMPARISON: 9 hours ago HISTORY: ORDERING SYSTEM PROVIDED HISTORY: ETT verification TECHNOLOGIST PROVIDED HISTORY: ETT verification FINDINGS: ET tube 35 mm above the jose. Endotracheal tube in the stomach. Sternotomy wires noted. Prosthetic heart valve noted. Moderate cardiomegaly. Mediastinum unremarkable. Bony thorax intact. New life support apparatus as above. Xr Chest Portable    Result Date: 3/8/2019  EXAMINATION: SINGLE XRAY VIEW OF THE CHEST 3/8/2019 11:14 am COMPARISON: November 10, 2016 HISTORY: ORDERING SYSTEM PROVIDED HISTORY: leukocytosis TECHNOLOGIST PROVIDED HISTORY: leukocytosis FINDINGS: Cardiac silhouette enlargement is again noted. The patient is status post median sternotomy. No consolidation, pneumothorax or evidence for edema. No significant effusion identified. No acute osseous abnormality appreciated. No acute airspace disease identified. Ct Femur Right Wo Contrast    Result Date: 4/3/2019  EXAMINATION: CT OF THE PELVIS WITHOUT CONTRAST; CT OF THE RIGHT FEMUR WITHOUT CONTRAST 4/2/2019 3:14 pm TECHNIQUE: CT of the pelvis was performed without the administration of intravenous contrast.  Multiplanar reformatted images are provided for review. Dose modulation, iterative reconstruction, and/or weight based adjustment of the mA/kV was utilized to reduce the radiation dose to as low as reasonably achievable.; CT of the right femur was performed without the administration of intravenous contrast.  Multiplanar reformatted images are provided for review.  Dose modulation, iterative reconstruction, and/or weight based adjustment of the mA/kV was utilized to reduce the radiation dose to as low as reasonably achievable. COMPARISON: 03/24/2019, ultrasound 03/24/2019 HISTORY: ORDERING SYSTEM PROVIDED HISTORY: Rt groin hematoma:worsening TECHNOLOGIST PROVIDED HISTORY: Ordering Physician Provided Reason for Exam: FINDINGS: Right medial thigh/groin hematoma appears similar in size measuring up to 14 cm AP x 5.8 cm transverse x 14 cm cephalocaudal.  There is now a pressure dressing anteriorly with an open wound anterolaterally related to recent exploration. Heterogeneous density within the collection including pockets of air likely related to combination of clot, organized debris, and packing material/Gel-Foam.  Infection should be excluded clinically. There is subcutaneous soft tissue edema and skin thickening in the thigh most prominent medially. Osseous degenerative changes with no acute osseous abnormality identified. Small right knee joint effusion. Image quality in the pelvis is degraded by patient size and overhanging pannus. Pelvic lipomatosis. Mild calcific plaque of the aorta and right femoral artery. Partially visualized calcifications in the pancreas and somewhat atrophic right kidney. No significant bulky adenopathy in the pelvis. Right groin hematoma is overall similar in size. Open wound anteriorly with a pressure dressing in place. Heterogeneous density including pockets of air related to the recent exploration and possible packing material as noted above. Us Extremity Right Non Vasc Limited    Result Date: 3/24/2019  EXAMINATION: NONVASCULAR ULTRASOUND OF THE RIGHT EXTREMITY 3/24/2019 11:03 am COMPARISON: 21 March 2019 HISTORY: ORDERING SYSTEM PROVIDED HISTORY: Fluid collection seen previously Prior complex fluid collection right medial groin 12.6 x 12.3 x 6.8 cm. Patient with large body habitus.  FINDINGS: Targeted ultrasonography over the right groin demonstrates a complex fluid collection 11.2 x 7.8 x 11 cm, with some septations and echogenic debris. Possibilities include hematoma, complicated or infected seroma. Lesion is grossly unchanged from prior examination. Complex hypoechoic fluid type collection in the right groin without change from prior exam. RECOMMENDATION: Lesion could be sampled or drained percutaneously as necessary. Appearance is not that of a pseudoaneurysm. Vl Dup Lower Extremity Venous Right    Result Date: 3/21/2019  EXAMINATION: DUPLEX VENOUS ULTRASOUND OF THE RIGHT LOWER EXTREMITY, 3/21/2019 10:30 am TECHNIQUE: Duplex ultrasound and Doppler images were obtained of the right lower extremity. COMPARISON: None. HISTORY: ORDERING SYSTEM PROVIDED HISTORY: right thigh swelling and pain TECHNOLOGIST PROVIDED HISTORY: right thigh swelling and pain FINDINGS: The visualized veins of the right lower extremity are patent and free of echogenic thrombus. The visualized veins are normally compressible and have normal phasic flow. The peroneal and posterior tibial veins were not visualized due to cast in place. Complex 12.6 x 12.3 x 6.8 cm elliptical shaped fluid collection identified right medial groin with internal echoes/septations. No evidence of DVT in the right lower extremity, although peroneal and posterior tibial veins nonvisualized due to a cast. Complex medial groin fluid collection, as above. Depending upon the history and circumstances, this could be sampled or drained percutaneously if indicated. Consultations:    Consults:     Final Specialist Recommendations/Findings:   IP CONSULT TO VASCULAR SURGERY  IP CONSULT TO NEPHROLOGY  IP CONSULT TO INFECTIOUS DISEASES  IP CONSULT TO IV TEAM      The patient was seen and examined on day of discharge and this discharge summary is in conjunction with any daily progress note from day of discharge.     Discharge plan:     Disposition: Skilled Facility    Physician Follow Up:   Alfie Islas MD  [facility].  Units into the skin 2 times daily     * insulin lispro 100 UNIT/ML injection vial  Commonly known as:  HUMALOG  Inject 0-18 Units into the skin 3 times daily (with meals)     * insulin lispro 100 UNIT/ML injection vial  Commonly known as:  HUMALOG  Inject 0-9 Units into the skin nightly     ipratropium-albuterol 0.5-2.5 (3) MG/3ML Soln nebulizer solution  Commonly known as:  DUONEB     levothyroxine 25 MCG tablet  Commonly known as:  SYNTHROID  Take 1 tablet by mouth Daily     lidocaine 5 % ointment  Commonly known as:  XYLOCAINE     magnesium hydroxide 400 MG/5ML suspension  Commonly known as:  MILK OF MAGNESIA  Take 30 mLs by mouth daily as needed for Constipation     midodrine 10 MG tablet  Commonly known as:  PROAMATINE  Take 1 tablet by mouth 3 times daily     omeprazole 20 MG delayed release capsule  Commonly known as:  PRILOSEC     PRO-STAT PO     PROSTAT PO     sertraline 100 MG tablet  Commonly known as:  ZOLOFT     therapeutic multivitamin-minerals tablet     Wheelchair Misc  Use as directed         * This list has 2 medication(s) that are the same as other medications prescribed for you. Read the directions carefully, and ask your doctor or other care provider to review them with you.             STOP taking these medications    acetaminophen 325 MG tablet  Commonly known as:  TYLENOL     ciprofloxacin 500 MG tablet  Commonly known as:  CIPRO     glucagon 1 MG injection     glucose 40 % Gel  Commonly known as:  GLUTOSE 15     guaiFENesin 100 MG/5ML syrup  Commonly known as:  ROBITUSSIN     loratadine 10 MG capsule  Commonly known as:  CLARITIN     metroNIDAZOLE 500 MG tablet  Commonly known as:  FLAGYL     nystatin Powd powder  Commonly known as:  MYCOSTATIN     sodium chloride 0.65 % nasal spray  Commonly known as:  OCEAN           Where to Get Your Medications      You can get these medications from any pharmacy    Bring a paper prescription for each of these medications  · apixaban 5 MG Tabs tablet  · aspirin 81 MG tablet         Time Spent on discharge is  32 mins in patient examination, evaluation, counseling as well as medication reconciliation, prescriptions for required medications, discharge plan and follow up. Electronically signed by   Julio C Barboza MD  4/4/2019  3:29 PM      Thank you Dr. Beth Glover DO for the opportunity to be involved in this patient's care.

## 2019-04-04 NOTE — DISCHARGE INSTR - COC
Continuity of Care Form    Patient Name: Katherine Celaya   :  1945  MRN:  [id number]    Admit date:  2019  Discharge date:  19    Code Status Order: Full Code   Advance Directives:   Advance Care Flowsheet Documentation     Date/Time Healthcare Directive Type of Healthcare Directive Copy in [address] Agent's Name Healthcare Agent's Phone Number    [phone]  Yes, patient has an advance directive for healthcare treatment  Living will  No, copy requested from medical records  --  --  --          Admitting Physician:  Alonso Seymour MD  PCP: Robbin Anne DO    Discharging Nurse: Therese Lopez [facility] Unit/Room#: 3005/3005-01  Discharging Unit Phone Number: [phone]    Emergency Contact:   Extended Emergency Contact Information  Primary Emergency Contact: Jocelyne Sepulveda  Address: [address], [address] [facility] [address] [facility] Phone: [phone]  Work Phone: [phone]  Mobile Phone: [phone]  Relation: Spouse  Secondary Emergency Contact: Korey Chaney  Address: N/A   [address] Phone: [phone]  Work Phone: [phone]  Mobile Phone: [phone]  Relation: Child    Past Surgical History:  Past Surgical History:   Procedure Laterality Date    AORTIC VALVE REPLACEMENT  2016    bioprosthetic    ARM SURGERY Left 1990    CARDIAC CATHETERIZATION      CENTRAL VENOUS CATHETER Right 2018    DIALYSIS CATHETER Dr Skyler Morrow COLONOSCOPY  09    COLONOSCOPY N/A 10/12/2018    COLONOSCOPY WITH BIOPSY performed by Deepak Slaughter DO at  [address] Right 3/10/2019    WASHOUT RIGHT LOWER EXTREMITY WITH WOUND VAC EXCHANGE performed by Kiko Ontiveros MD at  [address] Right 3/8/2019    RIGHT LOWER EXTREMITY EXPLORATION, HEMATOMA EVACUATION, WOUND VAC PLACEMENT performed by Rod Mixon Imani Ramos MD at [address] (X25-79 surgeries)    several surgeries on left arm    KNEE ARTHROSCOPY Left 09/17/99    MITRAL VALVE REPLACEMENT  03/18/2016    bioprosthetic     NASAL SEPTUM SURGERY      OTHER SURGICAL HISTORY  3/18/16    Aortic root Enlargement    OTHER SURGICAL HISTORY  3/18/16    ASD Closure    OTHER SURGICAL HISTORY Right 01/09/2017    AV fistula creation, wrist    OTHER SURGICAL HISTORY Right 08/29/2017    ligation of collateral branch of av fistula right wrist    OTHER SURGICAL HISTORY  04/05/2018    FISTULAGRAM WITH DR. Ernestine Coyle    CO EGD TRANSORAL BIOPSY SINGLE/MULTIPLE N/A 10/17/2017    EGD BIOPSY performed by Ange Davalos MD at [address] ANGIOACCESS AV FISTULA Right 8/29/2017     RIGHT  LOWER ARM AV FISTULA  LIGATION OF AQUIRED BRANCHES X2 performed by Frannie Chapa DO at [address]  3/18/16    median    VASCULAR SURGERY  12/06/2018    Right arm fistulagram,  PTA cephalic vein stenosis  /  DR Jennifer Foreman       Immunization History:   Immunization History   Administered Date(s) Administered    Influenza Virus Vaccine 10/24/2006, 11/18/2013    Influenza Whole 10/12/2015    Influenza, High Dose (Fluzone 65 yrs and older) 10/04/2017, 10/21/2018    Pneumococcal 13-valent Conjugate (Butrtpb30) 10/25/2017    Pneumococcal Polysaccharide ([id number]) 11/30/2010, 10/12/2015    Tdap (Boostrix, Adacel) 10/27/2015       Active Problems:  Patient Active Problem List   Diagnosis Code    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    Essential hypertension I10    Mixed hyperlipidemia E78.2    Obstructive sleep apnea G47.33    Morbid obesity ([facility] E66.01    Benign prostatic hyperplasia without lower urinary tract symptoms N40.0    Venous stasis dermatitis I87.2    Severe aortic stenosis I35.0    Severe mitral valve stenosis I05.0    Anemia in chronic kidney disease, on chronic dialysis (HCC) N18.6, D63.1, Z99.2    Dialysis patient ([facility] Z99.2    Dementia F03.90    Major depressive disorder with single episode, in partial remission ([facility] F32.4    Chronic cerebral ischemia I67.82    ESRD (end stage renal disease) on dialysis ([facility]) N18.6, Z99.2    Long-term insulin use ([facility]) Z79.4    Paroxysmal atrial fibrillation ([facility]) I48.0    BMI 40.0-44.9, adult ([facility]) Z68.41    Controlled type 2 diabetes mellitus with chronic kidney disease on chronic dialysis, with long-term current use of insulin ([facility]) E11.22, N18.6, Z79.4, Z99.2    Controlled type 2 diabetes mellitus with diabetic polyneuropathy, with long-term current use of insulin ([facility]) E11.42, Z79.4    Dermatitis L30.9    Bruising T14. 8XXA    Hematoma of right lower extremity S80.11XA    Hematoma of groin S30. 1XXA    Acute blood loss anemia D62    Accidental fall from wheelchair W05. 0XXA    Traumatic hemorrhagic shock ([facility] T79. 4XXA    Cellulitis L03.90    Open wound T14. 8XXA    Hematoma of right thigh S70.11XA    Wound of right leg S81.801A    Bleeding from wound T14. 8XXA    Open wound of right lower extremity S81.801A    Acute blood loss anemia D62       Isolation/Infection:   Isolation          Contact        Patient Infection Status     Infection Encounter Level?  Onset Date Added Added By Resolved Resolved By Review Date    MDRO (multi-drug resistant organism) No  04/01/19 Masood Ruano RN       Right lower leg proteus mirabilis and enterobacter cloacae 3/26/2019    ESBL (Extended Spectrum Beta Lactamase) No 03/26/19 03/31/19 Anaerobic And Aerobic Culture             Nurse Assessment:  Last Vital Signs: BP (!) 124/50   Pulse 87   Temp 98.2 °F (36.8 °C) (Oral)   Resp 18   Ht 5' 7\" (1.702 m)   Wt 268 lb (121.6 kg)   SpO2 99%   BMI 41.97 kg/m²     Last documented pain score (0-10 scale): Pain Level: 0  Last Weight:   Wt Readings from Last 1 Encounters:   04/04/19 268 lb (121.6 kg)     Mental Status:  disoriented    IV Access:  - None    Nursing Mobility/ADLs:  Walking Description Sanguinous 3/23/2019 10:56 AM   Odor None 4/3/2019  4:00 AM   Gwendolyn-wound Assessment WILMAR 3/25/2019  8:00 PM   Non-staged Wound Description Partial thickness 4/3/2019  4:00 AM   [place]%Wound Bed 100 3/22/2019  2:19 PM   Number of days: 13       Wound 04/02/19 #2 Right inguinal area (Active)   Wound Other 4/3/2019  8:00 PM   Dressing Changed Dressing reinforced 4/4/2019  4:00 AM   Dressing/Treatment Pressure Dressing 4/4/2019  4:00 AM   Drainage Amount Large 4/4/2019  4:00 AM   Drainage Description Sanguinous 4/4/2019  4:00 AM   Non-staged Wound Description Full thickness 4/2/2019 11:50 AM   Number of days: 2        Elimination:  Continence:   · Bowel: Yes  · Bladder: Yes  Urinary Catheter: None   Colostomy/Ileostomy/Ileal Conduit: No       Date of Last BM: 04/04/19    Intake/Output Summary (Last 24 hours) at 4/4/2019 1514  Last data filed at 4/4/2019 0806  Gross per 24 hour   Intake 250 ml   Output --   Net 250 ml     I/O last 3 completed shifts: In: 250 [P.O.:250]  Out: -     Safety Concerns:     Sundowners [place] and At Risk for Falls    Impairments/Disabilities:      Vision and Hearing    Nutrition Therapy:  Current Nutrition Therapy:   - Oral Diet:  Carb Control 4 carbs/meal (1800kcals/day)    Routes of Feeding: Oral  Liquids: No Restrictions  Daily Fluid Restriction: yes - amount 1200  Last Modified Barium Swallow with Video (Video Swallowing Test): not done    Treatments at the Time of Hospital Discharge:   Respiratory Treatments: ***  Oxygen Therapy:  is not on home oxygen therapy.   Ventilator:    - CPAP   only when sleeping    Rehab Therapies: Physical Therapy and Occupational Therapy  Weight Bearing Status/Restrictions: No weight bearing restirctions  Other Medical Equipment (for information only, NOT a DME order):  {EQUIPMENT:[id number]}  Other Treatments: pressure dressing to right groin with guaze and foam tape 3 x daily    Patient's personal belongings (please select all that are sent with patient):  None    RN SIGNATURE:  Electronically signed by Vic Paris RN on 4/4/19 at 4:31 PM    CASE MANAGEMENT/SOCIAL WORK SECTION    Inpatient Status Date: ***    Readmission Risk Assessment Score:  Readmission Risk              Risk of Unplanned Readmission:        37           Discharging to Facility/ Agency   · Name: [facility]  · Address:  · Phone:  · Fax:    Dialysis Facility (if applicable)   · Name:  · Address:  · Dialysis Schedule:  · Phone:  · Fax:    / signature: Electronically signed by Miko Mccray RN on 4/4/19 at 3:19 PM    PHYSICIAN SECTION    Prognosis: Fair    Condition at Discharge: Stable    Rehab Potential (if transferring to Rehab): Good    Recommended Labs or Other Treatments After Discharge: CBC Q every other day. - Hold Aspirin and Eliquis until 04/08 . - Right groin hematoma ;change dressing 3 times a day: use gauze reinforced with pressure dressing.   - Right leg open wound: NS moistened fluffs (two packs) to the right lower leg wound under ABD pads (four pads), secure with roll gauze. Change BID  Lightly pack normal saline-moistened Kerlix gauze into undermined area of right lower leg wound (approximately 2:00 position of wound). 1/4\" iodoform packing under ABD to the right medial thigh ulceration. Change daily  and prn soilage.       Physician Certification: I certify the above information and transfer of Katherine Celaay  is necessary for the continuing treatment of the diagnosis listed and that he requires [facility] for less 30 days.      Update Admission H&P: No change in H&P    PHYSICIAN SIGNATURE:  Electronically signed by Alonso Seymour MD on 4/4/19 at 3:16 PM

## 2019-04-04 NOTE — PROGRESS NOTES
Dilip Constantino for d/c from vascular surgery stance. Ok to f/u with wound care clinic as previously doing for R groin and RLE wounds. Cont daily dressing to RLE with reinforcement as needed for any oozing. Would recommend holding oral AC until R groin oozing ceases. RN ok for R groin dressing changes as needed. Contact vascular Sx as needed.      Thank you,    Electronically signed by Fredrick Moran DO on 4/3/2019 at 9:17 PM

## 2019-04-05 ENCOUNTER — TELEPHONE (OUTPATIENT)
Dept: INTERNAL MEDICINE | Age: 74
End: 2019-04-05

## 2019-04-05 ENCOUNTER — TELEPHONE (OUTPATIENT)
Dept: SURGERY | Age: 74
End: 2019-04-05

## 2019-04-05 RX ORDER — HYDROCODONE BITARTRATE AND ACETAMINOPHEN 5; 325 MG/1; MG/1
1 TABLET ORAL EVERY 8 HOURS PRN
Qty: 10 TABLET | Refills: 0 | Status: SHIPPED | OUTPATIENT
Start: 2019-04-05 | End: 2019-04-08

## 2019-04-05 RX ORDER — ALPRAZOLAM 0.5 MG/1
0.5 TABLET ORAL 2 TIMES DAILY PRN
Qty: 10 TABLET | Refills: 0 | Status: SHIPPED | OUTPATIENT
Start: 2019-04-05 | End: 2019-04-08

## 2019-04-05 NOTE — TELEPHONE ENCOUNTER
Patients wife, Winifred Banerjee, called requesting to speak with Debi with Dr. Haja Hyman regarding patients dressing. Please call Winifred Banerjee at [phone].

## 2019-04-05 NOTE — DISCHARGE SUMMARY
[address] [place] [address]     Department of Internal Medicine - Staff Internal Medicine Service    INPATIENT DISCHARGE SUMMARY        Patient Identification:  William Lobo is a 75 y.o. male. :  1945  MRN: [id number]     Acct: [de-identified]   Admit Date:  3/21/2019  Discharge date and time: 3/25/2019  9:27 PM   Attending Provider: Alecia att. providers found                                     Admission Diagnoses:   Cellulitis [L03.90]    Discharge Diagnoses:   Principal Problem:    Hematoma of groin  Active Problems:    Anemia in chronic kidney disease, on chronic dialysis ([facility]    Essential hypertension    Mixed hyperlipidemia    Dementia    Major depressive disorder with single episode, in partial remission ([facility]    ESRD (end stage renal disease) on dialysis ([facility]    Paroxysmal atrial fibrillation (HCC)    BMI 40.0-44.9, adult ([facility]    Controlled type 2 diabetes mellitus with chronic kidney disease on chronic dialysis, with long-term current use of insulin (HCC)    Bruising    Cellulitis    Open wound    Hematoma of right thigh    Wound of right leg  Resolved Problems:    * No resolved hospital problems. *       Consults:   ID, nephrology and general surgery    Brief Inpatient course:    William Lobo is a 75 y.o. male who came in to the hospital with complaints of right groin swelling where central line was placed almost 2 weeks ago. He also has some complaints of pain in the right knee. US of the right groin was done which showed fluid collection which was complex elliptical shaped 12.6x12.3x6.8 cm identified in the right medial groin with internal echos and septations. The patient initially presented to [facility] and the [facility] ED but then [facility] as he was recently admitted here.  It was clinically unable to be determined if the hematoma was related to the central line procedure itself or the device.      The patient was recently admitted for wound of the right lower leg and hematoma evacuation. Wound case has been following the patient. He has a PMH of mitral and aortic bioprosthetic valve replacement as well as Atrial fib discharged on Eliquis recently. He is also a known case of DM Type 2 with an HbA1c of 5.78 recently. He is ESRD and goes to dialysis every M,W,F. General surgery was on board and after discussion with IR, decision was made not to drain the hematoma but to observe it if it expands. Vancomycin was initially started but then ID recommended not to continue antibiotic as it was not infected. Recommendations were given to keep the site clean. CT scan was done which showed right groin hematoma to be stable. The patient was then discharged back to the [facility]. Procedures:  Routine dialysis    Any Hospital Acquired Infections: none    Discharge Functional Status:  stable    Disposition: [facility]    Patient Instructions:   Discharge Medication List as of 3/26/2019  8:41 AM      CONTINUE these medications which have CHANGED    Details   HYDROcodone-acetaminophen (NORCO) 5-325 MG per tablet Take 1 tablet by mouth every 8 hours as needed for Pain for up to 7 days. , Disp-21 tablet, R-0Print      ALPRAZolam (XANAX) 0.5 MG tablet Take 1 tablet by mouth 2 times daily for 7 days. , Disp-14 tablet, R-0Print      atorvastatin (LIPITOR) 40 MG tablet Take 1 tablet by mouth nightly, Disp-30 tablet, R-3Normal         CONTINUE these medications which have NOT CHANGED    Details   nystatin (MYCOSTATIN) POWD powder Apply topically 3 times daily Indications: x 10 daysHistorical Med      gabapentin (NEURONTIN) 300 MG capsule Take 1 capsule by mouth daily for 30 days. , Disp-30 capsule, R-3Normal      midodrine (PROAMATINE) 10 MG tablet Take 1 tablet by mouth 3 times daily, Disp-90 tablet, [id number]      folic acid (FOLVITE) 1 MG tablet Take 1 tablet by mouth daily, Disp-90 tablet, R-1Normal      insulin glargine (LANTUS) 100 UNIT/ML injection vial Inject 25 Units into the skin 2 times daily, Disp-1 vial, R-3Normal      !! insulin lispro (HUMALOG) 100 UNIT/ML injection vial Inject 0-18 Units into the skin 3 times daily (with meals), Disp-1 vial, R-3Normal      !! insulin lispro (HUMALOG) 100 UNIT/ML injection vial Inject 0-9 Units into the skin nightly, Disp-1 vial, R-3Normal      apixaban (ELIQUIS) 5 MG TABS tablet Take 1 tablet by mouth 2 times daily, Disp-60 tablet, R-2Print      Misc. Devices [facility]) MISC Disp-1 each, R-0, PrintUse as directed      buPROPion (WELLBUTRIN XL) 150 MG extended release tablet Take 150 mg by mouth every morningHistorical Med      sevelamer (RENVELA) 800 MG tablet Give 2 tabs po TID with meals and additional 1 tab po TID with snack. , Disp-360 tablet, R-5Normal      Pollen Extracts (PROSTAT PO) Take 30 mLs by mouth three times dailyHistorical Med      sertraline (ZOLOFT) 100 MG tablet Take 100 mg by mouth daily Historical Med      loratadine (CLARITIN) 10 MG capsule Take 10 mg by mouthHistorical Med      sodium chloride (OCEAN) 0.65 % nasal spray by Nasal routeHistorical Med      lidocaine (XYLOCAINE) 5 % ointment Apply topically three times a week Apply topically as needed. DIALYSIS DAYS, Topical, THREE TIMES WEEKLY, Until Discontinued, Historical Med      Amino Acids-Protein Hydrolys (PRO-STAT PO) Take 30 mLs by mouth 3 times daily SUGAR FREE Historical Med      guaiFENesin (ROBITUSSIN) 100 MG/5ML syrup Take 200 mg by mouth 4 times daily as needed for CoughHistorical Med      ipratropium-albuterol (DUONEB) 0.5-2.5 (3) MG/3ML SOLN nebulizer solution Inhale 1 vial into the lungs every 6 hours as needed Historical Med      aspirin 81 MG tablet Take 81 mg by mouth daily      Skin Protectants, Misc.  (HYDROCERIN) CREA cream Apply topically nightly, Topical, NIGHTLY, Until Discontinued, Historical Med      glucagon 1 MG injection Infuse 1 kit intravenously as needed      donepezil (ARICEPT) 5 MG tablet Take 5 mg by mouth every evening levothyroxine (SYNTHROID) 25 MCG tablet Take 1 tablet by mouth Daily, Disp-30 tablet, R-2      albuterol (PROVENTIL) (2.5 MG/3ML) 0.083% nebulizer solution Take 3 mLs by nebulization every 8 hours as needed for Wheezing, Disp-360 each, R-3      fluticasone (FLONASE) 50 MCG/ACT nasal spray 2 sprays by Nasal route daily, Disp-3 Bottle, R-3      glucose (GLUTOSE 15) 40 % GEL Take 15 g by mouth as needed (hypoglycemia) 15 gram oral as needed if blood sugar is less than 70 ( for hypoglycemia), Disp-45 g, R-3      acetaminophen (TYLENOL) 325 MG tablet Take 650 mg by mouth every 6 hours as needed for Pain or Fever       magnesium hydroxide (MILK OF MAGNESIA) 400 MG/5ML suspension Take 30 mLs by mouth daily as needed for Constipation       !! - Potential duplicate medications found. Please discuss with provider. STOP taking these medications       metroNIDAZOLE (FLAGYL) 500 MG tablet Comments:   Reason for Stopping:               Activity: activity as tolerated    Diet: renal diet    Follow-up:    No follow-up provider specified. Follow up labs:      Follow up imaging:     Note that over 30 minutes was spent in preparing discharge papers, discussing discharge with patient, medication review, etc.      Gema Barragan MD         Department of Internal Medicine  [facility]         4/4/2019, 8:18 PM

## 2019-04-06 ENCOUNTER — HOSPITAL ENCOUNTER (EMERGENCY)
Age: 74
Discharge: NURSING, REHAB OR LONG-TERM CARE | End: 2019-04-06
Attending: EMERGENCY MEDICINE
Payer: MEDICARE

## 2019-04-06 VITALS
HEART RATE: 91 BPM | TEMPERATURE: 99 F | SYSTOLIC BLOOD PRESSURE: 120 MMHG | RESPIRATION RATE: 16 BRPM | DIASTOLIC BLOOD PRESSURE: 58 MMHG | OXYGEN SATURATION: 96 %

## 2019-04-06 DIAGNOSIS — Z48.89 ENCOUNTER FOR POST SURGICAL WOUND CHECK: Primary | ICD-10-CM

## 2019-04-06 LAB
ABSOLUTE EOS #: 0.2 K/UL (ref 0–0.4)
ABSOLUTE IMMATURE GRANULOCYTE: ABNORMAL K/UL (ref 0–0.3)
ABSOLUTE LYMPH #: 0.7 K/UL (ref 1–4.8)
ABSOLUTE MONO #: 0.7 K/UL (ref 0.1–1.2)
BASOPHILS # BLD: 1 % (ref 0–2)
BASOPHILS ABSOLUTE: 0 K/UL (ref 0–0.2)
DIFFERENTIAL TYPE: ABNORMAL
EOSINOPHILS RELATIVE PERCENT: 3 % (ref 1–8)
HCT VFR BLD CALC: 23.8 % (ref 41–53)
HEMOGLOBIN: 7.7 G/DL (ref 13.5–17.5)
IMMATURE GRANULOCYTES: ABNORMAL %
LYMPHOCYTES # BLD: 12 % (ref 15–43)
MCH RBC QN AUTO: 31.4 PG (ref 26–34)
MCHC RBC AUTO-ENTMCNC: 32.3 G/DL (ref 31–37)
MCV RBC AUTO: 97 FL (ref 80–100)
MONOCYTES # BLD: 12 % (ref 6–14)
NRBC AUTOMATED: ABNORMAL PER 100 WBC
PDW BLD-RTO: 18.6 % (ref 11–14.5)
PLATELET # BLD: 284 K/UL (ref 140–450)
PLATELET ESTIMATE: ABNORMAL
PMV BLD AUTO: 6.3 FL (ref 6–12)
RBC # BLD: 2.46 M/UL (ref 4.5–5.9)
RBC # BLD: ABNORMAL 10*6/UL
SEG NEUTROPHILS: 72 % (ref 44–74)
SEGMENTED NEUTROPHILS ABSOLUTE COUNT: 4.6 K/UL (ref 1.8–7.7)
WBC # BLD: 6.3 K/UL (ref 3.5–11)
WBC # BLD: ABNORMAL 10*3/UL

## 2019-04-06 PROCEDURE — 85025 COMPLETE CBC W/AUTO DIFF WBC: CPT

## 2019-04-06 PROCEDURE — 99284 EMERGENCY DEPT VISIT MOD MDM: CPT

## 2019-04-06 PROCEDURE — 36415 COLL VENOUS BLD VENIPUNCTURE: CPT

## 2019-04-06 ASSESSMENT — ENCOUNTER SYMPTOMS
ALLERGIC/IMMUNOLOGIC NEGATIVE: 1
RESPIRATORY NEGATIVE: 1
EYES NEGATIVE: 1

## 2019-04-07 NOTE — ED PROVIDER NOTES
eMERGENCY dEPARTMENT eNCOUnter      Pt Name: Humza Lawrence  MRN: [id number]  [place] 1945  Date of evaluation: 4/6/2019      CHIEF COMPLAINT       Chief Complaint   Patient presents with    Post-op Problem     bleeding right groin          HISTORY OF PRESENT ILLNESS    Humza Lawrence is a 75 y.o. male who presents to the emergency department for evaluation of possible bleeding of a groin wound of the patient has had for several weeks. The patient actually had several hematomas on his leg and was going that were operated on at [facility] the patient was then discharged home to a nursing facility. Patient has been off of his anticoagulation during this time and he is been there and apparently today had a large amount of bruising that through the dressing. However when we take the dressing down there is no active bleeding of the patient's wound packed looks good we are just cleaned up the periphery do not believe that the patient has a bleeding problem at this point in time. REVIEW OF SYSTEMS       Review of Systems   Constitutional: Negative. HENT: Negative. Eyes: Negative. Respiratory: Negative. Cardiovascular: Negative. Gastrointestinal:        Positive abdominal wound   Genitourinary: Negative. Musculoskeletal: Negative. Skin: Negative. Allergic/Immunologic: Negative. Neurological: Negative. Speech difficulty:      Hematological: Negative. Psychiatric/Behavioral: Negative.           PAST MEDICAL HISTORY    has a past medical history of Anemia in chronic kidney disease (CKD), Arthritis, Bacteremia, Benign prostatic hyperplasia without lower urinary tract symptoms, BMI 40.0-44.9, adult ([facility] [address] Cellulitis of left lower extremity, Chronic cerebral ischemia, Closed fracture of forearm, Controlled type 2 diabetes mellitus with chronic kidney disease on chronic dialysis, with long-term current use of insulin ([address] Controlled type 2 diabetes mellitus with diabetic SUSPENSION    Take 30 mLs by mouth daily as needed for Constipation    MIDODRINE (PROAMATINE) 10 MG TABLET    Take 1 tablet by mouth 3 times daily    MISC. DEVICES (WHEELCHAIR) MISC    Use as directed    MULTIPLE VITAMINS-MINERALS (THERAPEUTIC MULTIVITAMIN-MINERALS) TABLET    Take 1 tablet by mouth daily    OMEPRAZOLE (PRILOSEC) 20 MG DELAYED RELEASE CAPSULE    Take 20 mg by mouth daily    POLLEN EXTRACTS (PROSTAT PO)    Take 30 mLs by mouth three times daily    SERTRALINE (ZOLOFT) 100 MG TABLET    Take 100 mg by mouth daily     SEVELAMER (RENVELA) 800 MG TABLET    Give 2 tabs po TID with meals and additional 1 tab po TID with snack. SKIN PROTECTANTS, MISC. (HYDROCERIN) CREA CREAM    Apply topically nightly       ALLERGIES     is allergic to percocet [oxycodone-acetaminophen] and ampicillin. FAMILY HISTORY     indicated that his mother is . He indicated that his father is . He indicated that his sister is alive. He indicated that his maternal grandmother is . He indicated that his maternal grandfather is . family history includes Alzheimer's Disease in his father; Diabetes in his maternal grandmother and mother; High Blood Pressure in his sister; Dayami Phillips in his mother. SOCIAL HISTORY      reports that he quit smoking about 39 years ago. His smoking use included cigarettes. He started smoking about 64 years ago. He has a 50.00 pack-year smoking history. He has never used smokeless tobacco. He reports that he does not drink alcohol or use drugs. PHYSICAL EXAM     INITIAL VITALS:  tympanic temperature is 99 °F (37.2 °C). His blood pressure is 120/58 (abnormal) and his pulse is 91. His respiration is 16 and oxygen saturation is 96%. Physical Exam   Constitutional: He is oriented to person, place, and time. He appears well-developed and well-nourished. HENT:   Head: Normocephalic and atraumatic. Eyes: Pupils are equal, round, and reactive to light.  EOM are normal.   Neck: Normal range of motion. Neck supple. Cardiovascular: Normal rate and regular rhythm. Pulmonary/Chest: Effort normal and breath sounds normal.   Abdominal: Soft. Bowel sounds are normal.   Patient and patient's right groin reveals a well-healed surgical incision with packing in it there is some drainage that was superficial from around the wound was then able to clean up with their is no active bleeding at this wound. Musculoskeletal: Normal range of motion. Neurological: He is alert and oriented to person, place, and time. Skin: Skin is warm and dry. Capillary refill takes 2 to 3 seconds. Psychiatric: He has a normal mood and affect. Vitals reviewed. DIFFERENTIAL DIAGNOSIS/ MDM:     Superficial draining from site no hemorrhaging noted. Patient's H&H will be rechecked and the patient will be allowed to go back to the nursing facility.     DIAGNOSTIC RESULTS     EKG: All EKG's are interpreted by the Emergency Department Physician who either signs or Co-signs this chart in the absence of a cardiologist.        Not indicated unless otherwise documented above    LABS:  Results for orders placed or performed during the hospital encounter of 04/06/19   CBC Auto Differential   Result Value Ref Range    WBC 6.3 3.5 - 11.0 k/uL    RBC 2.46 (L) 4.5 - 5.9 m/uL    Hemoglobin 7.7 (L) 13.5 - 17.5 g/dL    Hematocrit 23.8 (L) 41 - 53 %    MCV 97.0 80 - 100 fL    MCH 31.4 26 - 34 pg    MCHC 32.3 31 - 37 g/dL    RDW 18.6 (H) 11.0 - 14.5 %    Platelets 081 603 - 803 k/uL    MPV 6.3 6.0 - 12.0 fL    NRBC Automated NOT REPORTED per 100 WBC    Differential Type NOT REPORTED     Immature Granulocytes NOT REPORTED 0 %    Absolute Immature Granulocyte NOT REPORTED 0.00 - 0.30 k/uL    WBC Morphology NOT REPORTED     RBC Morphology NOT REPORTED     Platelet Estimate NOT REPORTED     Seg Neutrophils 72 44 - 74 %    Lymphocytes 12 (L) 15 - 43 %    Monocytes 12 6 - 14 %    Eosinophils % 3 1 - 8 %    Basophils 1 MEDICATIONS:  New Prescriptions    No medications on file       (Please note that portions of this note were completed with a voice recognition program.  Efforts were made to edit the dictations but occasionally words are mis-transcribed.)    Jordan MD  Attending Emergency Physician            Janet Canales MD  04/06/19 [id number]

## 2019-04-08 ENCOUNTER — TELEPHONE (OUTPATIENT)
Dept: SURGERY | Age: 74
End: 2019-04-08

## 2019-04-08 NOTE — TELEPHONE ENCOUNTER
Governor Cassette from [employer] called stating that Pt's Elisquis and Aspirin are on hold until he is seen by Haja Hyman on 4/9/19. Governor Cassette stated that Pt Hgb is 7.5 and is wanting to know if medication is to be given or remain on hold until seen by Haja Hyman.  Please Advise Governor Cassette [phone]  Last Appt:  4/2/19  Next Appt:  4/9/19   Med verified in Epic

## 2019-04-09 ENCOUNTER — HOSPITAL ENCOUNTER (EMERGENCY)
Age: 74
Discharge: HOME | End: 2019-04-09
Attending: EMERGENCY MEDICINE
Payer: MEDICARE

## 2019-04-09 ENCOUNTER — TELEPHONE (OUTPATIENT)
Dept: WOUND CARE | Age: 74
End: 2019-04-09

## 2019-04-09 ENCOUNTER — OFFICE VISIT (OUTPATIENT)
Dept: WOUND CARE | Age: 74
End: 2019-04-09
Payer: MEDICARE

## 2019-04-09 VITALS
WEIGHT: 280 LBS | RESPIRATION RATE: 22 BRPM | HEART RATE: 78 BPM | TEMPERATURE: 97.7 F | OXYGEN SATURATION: 97 % | BODY MASS INDEX: 43.85 KG/M2 | DIASTOLIC BLOOD PRESSURE: 64 MMHG | SYSTOLIC BLOOD PRESSURE: 155 MMHG

## 2019-04-09 VITALS — HEART RATE: 76 BPM | SYSTOLIC BLOOD PRESSURE: 100 MMHG | TEMPERATURE: 98.4 F | DIASTOLIC BLOOD PRESSURE: 60 MMHG

## 2019-04-09 DIAGNOSIS — D64.9 ANEMIA, UNSPECIFIED TYPE: Primary | ICD-10-CM

## 2019-04-09 DIAGNOSIS — Z51.89 VISIT FOR WOUND CHECK: Primary | ICD-10-CM

## 2019-04-09 LAB
ABSOLUTE EOS #: 0.3 K/UL (ref 0–0.4)
ABSOLUTE IMMATURE GRANULOCYTE: ABNORMAL K/UL (ref 0–0.3)
ABSOLUTE LYMPH #: 1 K/UL (ref 1–4.8)
ABSOLUTE MONO #: 0.7 K/UL (ref 0.1–1.2)
ANION GAP SERPL CALCULATED.3IONS-SCNC: 14 MMOL/L (ref 9–17)
BASOPHILS # BLD: 1 % (ref 0–2)
BASOPHILS ABSOLUTE: 0.1 K/UL (ref 0–0.2)
BUN BLDV-MCNC: 59 MG/DL (ref 8–23)
BUN/CREAT BLD: 12 (ref 9–20)
CALCIUM SERPL-MCNC: 9 MG/DL (ref 8.6–10.4)
CHLORIDE BLD-SCNC: 98 MMOL/L (ref 98–107)
CO2: 30 MMOL/L (ref 20–31)
CREAT SERPL-MCNC: 4.88 MG/DL (ref 0.7–1.2)
DIFFERENTIAL TYPE: ABNORMAL
EOSINOPHILS RELATIVE PERCENT: 4 % (ref 1–8)
GFR AFRICAN AMERICAN: 14 ML/MIN
GFR NON-AFRICAN AMERICAN: 12 ML/MIN
GFR SERPL CREATININE-BSD FRML MDRD: ABNORMAL ML/MIN/{1.73_M2}
GFR SERPL CREATININE-BSD FRML MDRD: ABNORMAL ML/MIN/{1.73_M2}
GLUCOSE BLD-MCNC: 117 MG/DL (ref 70–99)
HCT VFR BLD CALC: 24.2 % (ref 41–53)
HEMOGLOBIN: 7.7 G/DL (ref 13.5–17.5)
IMMATURE GRANULOCYTES: ABNORMAL %
INR BLD: 1
LYMPHOCYTES # BLD: 14 % (ref 15–43)
MCH RBC QN AUTO: 30.9 PG (ref 26–34)
MCHC RBC AUTO-ENTMCNC: 31.7 G/DL (ref 31–37)
MCV RBC AUTO: 97.6 FL (ref 80–100)
MONOCYTES # BLD: 10 % (ref 6–14)
NRBC AUTOMATED: ABNORMAL PER 100 WBC
PARTIAL THROMBOPLASTIN TIME: 21.6 SEC (ref 27–35)
PDW BLD-RTO: 19.6 % (ref 11–14.5)
PLATELET # BLD: 263 K/UL (ref 140–450)
PLATELET ESTIMATE: ABNORMAL
PMV BLD AUTO: 6.6 FL (ref 6–12)
POTASSIUM SERPL-SCNC: 3.9 MMOL/L (ref 3.7–5.3)
PROTHROMBIN TIME: 10.6 SEC (ref 9.4–11.3)
RBC # BLD: 2.48 M/UL (ref 4.5–5.9)
RBC # BLD: ABNORMAL 10*6/UL
SEG NEUTROPHILS: 71 % (ref 44–74)
SEGMENTED NEUTROPHILS ABSOLUTE COUNT: 5.2 K/UL (ref 1.8–7.7)
SODIUM BLD-SCNC: 142 MMOL/L (ref 135–144)
WBC # BLD: 7.1 K/UL (ref 3.5–11)
WBC # BLD: ABNORMAL 10*3/UL

## 2019-04-09 PROCEDURE — 99213 OFFICE O/P EST LOW 20 MIN: CPT | Performed by: SURGERY

## 2019-04-09 PROCEDURE — G8417 CALC BMI ABV UP PARAM F/U: HCPCS | Performed by: SURGERY

## 2019-04-09 PROCEDURE — 1111F DSCHRG MED/CURRENT MED MERGE: CPT | Performed by: SURGERY

## 2019-04-09 PROCEDURE — 85610 PROTHROMBIN TIME: CPT

## 2019-04-09 PROCEDURE — 80048 BASIC METABOLIC PNL TOTAL CA: CPT

## 2019-04-09 PROCEDURE — G8427 DOCREV CUR MEDS BY ELIG CLIN: HCPCS | Performed by: SURGERY

## 2019-04-09 PROCEDURE — 85730 THROMBOPLASTIN TIME PARTIAL: CPT

## 2019-04-09 PROCEDURE — 3017F COLORECTAL CA SCREEN DOC REV: CPT | Performed by: SURGERY

## 2019-04-09 PROCEDURE — 4040F PNEUMOC VAC/ADMIN/RCVD: CPT | Performed by: SURGERY

## 2019-04-09 PROCEDURE — 99024 POSTOP FOLLOW-UP VISIT: CPT | Performed by: SURGERY

## 2019-04-09 PROCEDURE — 99283 EMERGENCY DEPT VISIT LOW MDM: CPT

## 2019-04-09 PROCEDURE — 1123F ACP DISCUSS/DSCN MKR DOCD: CPT | Performed by: SURGERY

## 2019-04-09 PROCEDURE — 6370000000 HC RX 637 (ALT 250 FOR IP): Performed by: EMERGENCY MEDICINE

## 2019-04-09 PROCEDURE — 85025 COMPLETE CBC W/AUTO DIFF WBC: CPT

## 2019-04-09 PROCEDURE — 1036F TOBACCO NON-USER: CPT | Performed by: SURGERY

## 2019-04-09 RX ORDER — HYDROCODONE BITARTRATE AND ACETAMINOPHEN 5; 325 MG/1; MG/1
1 TABLET ORAL ONCE
Status: COMPLETED | OUTPATIENT
Start: 2019-04-09 | End: 2019-04-09

## 2019-04-09 RX ORDER — CIPROFLOXACIN 500 MG/1
500 TABLET, FILM COATED ORAL DAILY
COMMUNITY
End: 2019-04-12 | Stop reason: ALTCHOICE

## 2019-04-09 RX ADMIN — HYDROCODONE BITARTRATE AND ACETAMINOPHEN 1 TABLET: 5; 325 TABLET ORAL at 17:17

## 2019-04-09 ASSESSMENT — PAIN SCALES - GENERAL
PAINLEVEL_OUTOF10: 4
PAINLEVEL_OUTOF10: 3
PAINLEVEL_OUTOF10: 3

## 2019-04-09 NOTE — PATIENT INSTRUCTIONS
Saline moistened continuous Kerlix packing to groin wound daily. Tunnel at 3 o'clock 13.5cm. Dr Elizabeth Espitia used most of the Kerlix roll to fill the wound. Cover with a dry dressing. Continue saline gauze dressing to lower leg wound. Be sure to pack under the flap on the superior edge of wound. Ebony Espitia and Blue aware of CBC results. No plan for blood infusion.

## 2019-04-09 NOTE — PROGRESS NOTES
daily.  2.  On the lower leg wound. We will put a wet to dry with gauze and make sure the gauze gets backed up under the tunnel.  3.  .  We'll have Dr. Torin Padilla treatment medical problems in the hemoglobin. The one today was 7. 4.  he was notified. I have spent a total of 20 minutes face-to-face with this patient. Over 50% of that time was spent on counseling and care coordination. Please see assessment and plan for details.

## 2019-04-09 NOTE — ED PROVIDER NOTES
chronic kidney disease on chronic dialysis, with long-term current use of insulin ([address] Controlled type 2 diabetes mellitus with diabetic polyneuropathy, with long-term current use of insulin ([address] Decubital ulcer, Dementia, Dialysis patient ([address] Difficult intravenous access, Difficulty walking, DJD (degenerative joint disease) of knee, Elbow, forearm, and wrist, abrasion or friction burn, without mention of infection, Encephalopathy acute, Encounter regarding vascular access for dialysis for ESRD ([address] ESRD (end stage renal disease) on dialysis ([address] Forgetfulness, H/O transesophageal echocardiography ([facility]) for monitoring, Hemodialysis patient ([address] Hyperlipidemia, Hypertension, Intercritical gout, Joint pain, knee, Long-term insulin use ([address] Major depressive disorder with single episode, in partial remission ([address] Mobility impaired, Morbid obesity ([address] Muscle weakness, Obesity, Obstructive sleep apnea, HEATHER on CPAP, Paroxysmal atrial fibrillation ([address] Respiratory failure ([address] S/P cardiac cath, Seborrhea, Severe aortic stenosis, Severe mitral valve stenosis, Severe sepsis (HCC), Skin rash, Stasis dermatitis, Thyroid disease, Traumatic amputation of thumb, Venous stasis dermatitis, Wears glasses, and Wheelchair bound. SURGICAL HISTORY      has a past surgical history that includes Colonoscopy (11/19/09); Knee arthroscopy (Left, 09/17/99); Hand surgery (Left, 1990 (x10-12 surgeries)); Arm Surgery (Left, 1990); Cardiac catheterization; Nasal septum surgery; Sternotomy (3/18/16); Aortic valve replacement (03/18/2016); Mitral valve replacement (03/18/2016); other surgical history (3/18/16); other surgical history (3/18/16); other surgical history (Right, 01/09/2017); other surgical history (Right, 08/29/2017); pr ligatn angioaccess av fistula (Right, 8/29/2017); pr egd transoral biopsy single/multiple (N/A, 10/17/2017); central venous catheter (Right, 02/21/2018);  Colonoscopy (N/A, 10/12/2018); other surgical history (04/05/2018); vascular surgery (12/06/2018); EXPLORATION OF WOUND OF EXTREMITY (Right, 3/10/2019); and EXPLORATION OF WOUND OF EXTREMITY (Right, 3/8/2019). CURRENT MEDICATIONS       Previous Medications    ALBUTEROL (PROVENTIL) (2.5 MG/3ML) 0.083% NEBULIZER SOLUTION    Take 3 mLs by nebulization every 8 hours as needed for Wheezing    AMINO ACIDS-PROTEIN HYDROLYS (PRO-STAT PO)    Take 30 mLs by mouth 3 times daily SUGAR FREE     APIXABAN (ELIQUIS) 5 MG TABS TABLET    Take 1 tablet by mouth 2 times daily    ASPIRIN 81 MG TABLET    Take 1 tablet by mouth daily    ATORVASTATIN (LIPITOR) 40 MG TABLET    Take 1 tablet by mouth nightly    BUPROPION (WELLBUTRIN XL) 150 MG EXTENDED RELEASE TABLET    Take 150 mg by mouth every morning    CIPROFLOXACIN (CIPRO) 500 MG TABLET    Take 500 mg by mouth daily    DONEPEZIL (ARICEPT) 5 MG TABLET    Take 5 mg by mouth every evening    FLUTICASONE (FLONASE) 50 MCG/ACT NASAL SPRAY    2 sprays by Nasal route daily    FOLIC ACID (FOLVITE) 1 MG TABLET    Take 1 tablet by mouth daily    GABAPENTIN (NEURONTIN) 300 MG CAPSULE    Take 1 capsule by mouth daily for 30 days. INSULIN GLARGINE (LANTUS) 100 UNIT/ML INJECTION VIAL    Inject 25 Units into the skin 2 times daily    INSULIN LISPRO (HUMALOG) 100 UNIT/ML INJECTION VIAL    Inject 0-18 Units into the skin 3 times daily (with meals)    INSULIN LISPRO (HUMALOG) 100 UNIT/ML INJECTION VIAL    Inject 0-9 Units into the skin nightly    IPRATROPIUM-ALBUTEROL (DUONEB) 0.5-2.5 (3) MG/3ML SOLN NEBULIZER SOLUTION    Inhale 1 vial into the lungs every 6 hours as needed     LEVOTHYROXINE (SYNTHROID) 25 MCG TABLET    Take 1 tablet by mouth Daily    LIDOCAINE (XYLOCAINE) 5 % OINTMENT    Apply topically three times a week Apply topically as needed.  DIALYSIS DAYS    MAGNESIUM HYDROXIDE (MILK OF MAGNESIA) 400 MG/5ML SUSPENSION    Take 30 mLs by mouth daily as needed for Constipation    MIDODRINE (PROAMATINE) 10 MG TABLET    Take 1 tablet by mouth 3 times daily    MISC. DEVICES (WHEELCHAIR) MISC    Use as directed    MULTIPLE VITAMINS-MINERALS (THERAPEUTIC MULTIVITAMIN-MINERALS) TABLET    Take 1 tablet by mouth daily    OMEPRAZOLE (PRILOSEC) 20 MG DELAYED RELEASE CAPSULE    Take 20 mg by mouth daily    POLLEN EXTRACTS (PROSTAT PO)    Take 30 mLs by mouth three times daily    SERTRALINE (ZOLOFT) 100 MG TABLET    Take 100 mg by mouth daily     SEVELAMER (RENVELA) 800 MG TABLET    Give 2 tabs po TID with meals and additional 1 tab po TID with snack. SKIN PROTECTANTS, MISC. (HYDROCERIN) CREA CREAM    Apply topically nightly       ALLERGIES     is allergic to percocet [oxycodone-acetaminophen] and ampicillin. FAMILY HISTORY     indicated that his mother is . He indicated that his father is . He indicated that his sister is alive. He indicated that his maternal grandmother is . He indicated that his maternal grandfather is . family history includes Alzheimer's Disease in his father; Diabetes in his maternal grandmother and mother; High Blood Pressure in his sister; Doretha Raring in his mother. SOCIAL HISTORY      reports that he quit smoking about 39 years ago. His smoking use included cigarettes. He started smoking about 64 years ago. He has a 50.00 pack-year smoking history. He has never used smokeless tobacco. He reports that he does not drink alcohol or use drugs. PHYSICAL EXAM       ED Triage Vitals [19 1603]   BP Temp Temp Source Pulse Resp SpO2 Height Weight   (!) 155/64 97.7 °F (36.5 °C) Tympanic 78 22 97 % -- 280 lb (127 kg)     Constitutional: Alert, in no acute distress nontoxic.   HEENT: Extraocular muscles intact, mucus membranes moist,  Neck: Trachea midline   Cardiovascular: Regular rhythm and rate no S3, S4, or murmurs   Respiratory: Diminished, Clear to auscultation bilaterally no wheezes, rhonchi, rales, no respiratory distress no tachypnea no retractions no hypoxia  Gastrointestinal: Soft, nontender, nondistended, diminished bowel sounds. No rebound, rigidity, or guarding. Obese  Musculoskeletal: Right lower extremity, dressing to the right groin and dressing to the right distal anterior tibia seen by his surgeon earlier today. Pedal pulses intact bilaterally. Neurologic: Moving upper extremities no new gross focal neurologic deficits  Skin: Warm and dry     Physical Exam  DIFFERENTIAL DIAGNOSIS/ MDM:     CBC BMP reevaluate.     DIAGNOSTIC RESULTS     EKG: [facility]'s [facility] who either signs or Co-signs this chart in the absence of a cardiologist.        Not indicated unless otherwise documented above    LABS:  Results for orders placed or performed during the hospital encounter of 04/09/19   CBC Auto Differential   Result Value Ref Range    WBC 7.1 3.5 - 11.0 k/uL    RBC 2.48 (L) 4.5 - 5.9 m/uL    Hemoglobin 7.7 (L) 13.5 - 17.5 g/dL    Hematocrit 24.2 (L) 41 - 53 %    MCV 97.6 80 - 100 fL    MCH 30.9 26 - 34 pg    MCHC 31.7 31 - 37 g/dL    RDW 19.6 (H) 11.0 - 14.5 %    Platelets 038 642 - 432 k/uL    MPV 6.6 6.0 - 12.0 fL    NRBC Automated NOT REPORTED per 100 WBC    Differential Type NOT REPORTED     Immature Granulocytes NOT REPORTED 0 %    Absolute Immature Granulocyte NOT REPORTED 0.00 - 0.30 k/uL    WBC Morphology NOT REPORTED     RBC Morphology NOT REPORTED     Platelet Estimate NOT REPORTED     Seg Neutrophils 71 44 - 74 %    Lymphocytes 14 (L) 15 - 43 %    Monocytes 10 6 - 14 %    Eosinophils % 4 1 - 8 %    Basophils 1 0 - 2 %    Segs Absolute 5.20 1.8 - 7.7 k/uL    Absolute Lymph # 1.00 1.0 - 4.8 k/uL    Absolute Mono # 0.70 0.1 - 1.2 k/uL    Absolute Eos # 0.30 0.0 - 0.4 k/uL    Basophils # 0.10 0.0 - 0.2 k/uL   Protime-INR   Result Value Ref Range    Protime 10.6 9.4 - 11.3 sec    INR 1.0    APTT   Result Value Ref Range    PTT 21.6 (L) 27.0 - 35.0 sec   Basic Metabolic Panel   Result Value Ref Range    Glucose 117 (H) 70 - 99 mg/dL    BUN 59 (H) 8 - 23 mg/dL    CREATININE 4.88 (H) 0.70 - 1.20 mg/dL    Bun/Cre Ratio 12 9 - 20    Calcium 9.0 8.6 - 10.4 mg/dL    Sodium 142 135 - 144 mmol/L    Potassium 3.9 3.7 - 5.3 mmol/L    Chloride 98 98 - 107 mmol/L    CO2 30 20 - 31 mmol/L    Anion Gap 14 9 - 17 mmol/L    GFR Non-African American 12 (L) >60 mL/min    GFR  14 (L) >60 mL/min    GFR Comment          GFR Staging NOT REPORTED        Not indicated unless otherwise documented above    RADIOLOGY:   I reviewed the radiologist interpretations:    No orders to display       Not indicated unless otherwise documented above    EMERGENCY DEPARTMENT COURSE:     The patient was given the following medications:  No orders of the defined types were placed in this encounter. Vitals:   -------------------------  BP (!) 155/64   Pulse 78   Temp 97.7 °F (36.5 °C) (Tympanic)   Resp 22   Wt 280 lb (127 kg)   SpO2 97%   BMI 43.85 kg/m²     5 PM repeat hemoglobin is 7.7. That is the patient's norm. His BUN and creatinine are elevated he is due for dialysis tomorrow. He says he feels no different than normal we will discharge him back to the nursing home. I have reviewed the disposition diagnosis with the patient and or their family/guardian. I have answered their questions and given discharge instructions. They voiced understanding of these instructions and did not have any furtherquestions or complaints. CRITICAL CARE:    None    CONSULTS:    None    PROCEDURES:    None      OARRS Report if indicated             FINAL IMPRESSION      1.  Anemia, unspecified type          DISPOSITION/PLAN   DISPOSITION Decision To Discharge 04/09/2019 05:01:41 PM        PATIENT REFERRED TO:  Tobias Echevarria DO  [address] [place]  [phone]    Schedule an appointment as soon as possible for a visit in 2 days        DISCHARGE MEDICATIONS:  New Prescriptions    No medications on file

## 2019-04-10 ENCOUNTER — CARE COORDINATION (OUTPATIENT)
Dept: CARE COORDINATION | Age: 74
End: 2019-04-10

## 2019-04-10 NOTE — CARE COORDINATION
Patient was called to follow up with most recent ER visit. There was no answer. A message was left on voicemail to have patient call back regarding ER visit. Office number given [phone].

## 2019-04-10 NOTE — CARE COORDINATION
Ambulatory Care Coordination ED Follow up Call   Write spoke to Winifred Banerjee, pt wife. Pt resides at [address] [facility]. Reason for ED Visit:  anemia  Care Management Risk Score: CMRS 13  How are you feeling? :     improved  Patient Reports the following:  Wife states pt is doing OK. She states she is aware of appts listed below and will be going with pt to his appt. [facility] care [facility].         Post DischarFU appts/Provider:    Future Appointments   Date Time Provider Kaity Donovan   4/12/2019  4:00 PM Celi Schulz MD [facility]   4/16/2019 11:30 AM Rafiq Aponte MD [facility]   10/30/2019  1:00 PM Steffany Daly MD [facility]

## 2019-04-11 ENCOUNTER — TELEPHONE (OUTPATIENT)
Dept: WOUND CARE | Age: 74
End: 2019-04-11

## 2019-04-11 NOTE — TELEPHONE ENCOUNTER
Johan Meza from [facility] called and said Miko's wound has a foul order when they change dressing. Color of drainage is brownish -red. Patient is afebrile. They are packing the wound with moist saline gauze. Patient has finished Cipro. No redness noted around site.

## 2019-04-12 ENCOUNTER — HOSPITAL ENCOUNTER (EMERGENCY)
Age: 74
Discharge: HOME | End: 2019-04-12
Attending: EMERGENCY MEDICINE
Payer: MEDICARE

## 2019-04-12 ENCOUNTER — TELEPHONE (OUTPATIENT)
Dept: WOUND CARE | Age: 74
End: 2019-04-12

## 2019-04-12 ENCOUNTER — OFFICE VISIT (OUTPATIENT)
Dept: SURGERY | Age: 74
End: 2019-04-12
Payer: MEDICARE

## 2019-04-12 VITALS
RESPIRATION RATE: 16 BRPM | DIASTOLIC BLOOD PRESSURE: 62 MMHG | OXYGEN SATURATION: 94 % | TEMPERATURE: 99.4 F | SYSTOLIC BLOOD PRESSURE: 130 MMHG | HEART RATE: 86 BPM

## 2019-04-12 VITALS
TEMPERATURE: 99 F | DIASTOLIC BLOOD PRESSURE: 63 MMHG | RESPIRATION RATE: 12 BRPM | SYSTOLIC BLOOD PRESSURE: 96 MMHG | OXYGEN SATURATION: 98 % | HEART RATE: 82 BPM | BODY MASS INDEX: 43.85 KG/M2 | WEIGHT: 280 LBS

## 2019-04-12 DIAGNOSIS — Z51.89 VISIT FOR WOUND CHECK: Primary | ICD-10-CM

## 2019-04-12 DIAGNOSIS — S81.801A WOUND OF RIGHT LOWER EXTREMITY, INITIAL ENCOUNTER: Primary | ICD-10-CM

## 2019-04-12 LAB
ABSOLUTE EOS #: 0.2 K/UL (ref 0–0.4)
ABSOLUTE IMMATURE GRANULOCYTE: ABNORMAL K/UL (ref 0–0.3)
ABSOLUTE LYMPH #: 1.3 K/UL (ref 1–4.8)
ABSOLUTE MONO #: 0.6 K/UL (ref 0.1–1.2)
ANION GAP SERPL CALCULATED.3IONS-SCNC: 12 MMOL/L (ref 9–17)
BASOPHILS # BLD: 1 % (ref 0–2)
BASOPHILS ABSOLUTE: 0.1 K/UL (ref 0–0.2)
BUN BLDV-MCNC: 32 MG/DL (ref 8–23)
BUN/CREAT BLD: 10 (ref 9–20)
CALCIUM SERPL-MCNC: 9 MG/DL (ref 8.6–10.4)
CHLORIDE BLD-SCNC: 94 MMOL/L (ref 98–107)
CO2: 32 MMOL/L (ref 20–31)
CREAT SERPL-MCNC: 3.32 MG/DL (ref 0.7–1.2)
DIFFERENTIAL TYPE: ABNORMAL
EOSINOPHILS RELATIVE PERCENT: 3 % (ref 1–8)
GFR AFRICAN AMERICAN: 22 ML/MIN
GFR NON-AFRICAN AMERICAN: 18 ML/MIN
GFR SERPL CREATININE-BSD FRML MDRD: ABNORMAL ML/MIN/{1.73_M2}
GFR SERPL CREATININE-BSD FRML MDRD: ABNORMAL ML/MIN/{1.73_M2}
GLUCOSE BLD-MCNC: 164 MG/DL (ref 70–99)
HCT VFR BLD CALC: 24.7 % (ref 41–53)
HEMOGLOBIN: 8 G/DL (ref 13.5–17.5)
IMMATURE GRANULOCYTES: ABNORMAL %
LACTIC ACID: 1.8 MMOL/L (ref 0.5–2.2)
LYMPHOCYTES # BLD: 21 % (ref 15–43)
MCH RBC QN AUTO: 31.3 PG (ref 26–34)
MCHC RBC AUTO-ENTMCNC: 32.6 G/DL (ref 31–37)
MCV RBC AUTO: 95.9 FL (ref 80–100)
MONOCYTES # BLD: 10 % (ref 6–14)
NRBC AUTOMATED: ABNORMAL PER 100 WBC
PDW BLD-RTO: 18.7 % (ref 11–14.5)
PLATELET # BLD: 245 K/UL (ref 140–450)
PLATELET ESTIMATE: ABNORMAL
PMV BLD AUTO: 6.4 FL (ref 6–12)
POTASSIUM SERPL-SCNC: 3.7 MMOL/L (ref 3.7–5.3)
RBC # BLD: 2.57 M/UL (ref 4.5–5.9)
RBC # BLD: ABNORMAL 10*6/UL
SEG NEUTROPHILS: 65 % (ref 44–74)
SEGMENTED NEUTROPHILS ABSOLUTE COUNT: 3.9 K/UL (ref 1.8–7.7)
SODIUM BLD-SCNC: 138 MMOL/L (ref 135–144)
WBC # BLD: 6 K/UL (ref 3.5–11)
WBC # BLD: ABNORMAL 10*3/UL

## 2019-04-12 PROCEDURE — 80048 BASIC METABOLIC PNL TOTAL CA: CPT

## 2019-04-12 PROCEDURE — 1036F TOBACCO NON-USER: CPT | Performed by: PLASTIC SURGERY

## 2019-04-12 PROCEDURE — 87075 CULTR BACTERIA EXCEPT BLOOD: CPT

## 2019-04-12 PROCEDURE — 1123F ACP DISCUSS/DSCN MKR DOCD: CPT | Performed by: PLASTIC SURGERY

## 2019-04-12 PROCEDURE — 36415 COLL VENOUS BLD VENIPUNCTURE: CPT

## 2019-04-12 PROCEDURE — 3017F COLORECTAL CA SCREEN DOC REV: CPT | Performed by: PLASTIC SURGERY

## 2019-04-12 PROCEDURE — 1111F DSCHRG MED/CURRENT MED MERGE: CPT | Performed by: PLASTIC SURGERY

## 2019-04-12 PROCEDURE — 87205 SMEAR GRAM STAIN: CPT

## 2019-04-12 PROCEDURE — 99213 OFFICE O/P EST LOW 20 MIN: CPT | Performed by: PLASTIC SURGERY

## 2019-04-12 PROCEDURE — 83605 ASSAY OF LACTIC ACID: CPT

## 2019-04-12 PROCEDURE — 4040F PNEUMOC VAC/ADMIN/RCVD: CPT | Performed by: PLASTIC SURGERY

## 2019-04-12 PROCEDURE — G8427 DOCREV CUR MEDS BY ELIG CLIN: HCPCS | Performed by: PLASTIC SURGERY

## 2019-04-12 PROCEDURE — 99283 EMERGENCY DEPT VISIT LOW MDM: CPT

## 2019-04-12 PROCEDURE — 87070 CULTURE OTHR SPECIMN AEROBIC: CPT

## 2019-04-12 PROCEDURE — 85025 COMPLETE CBC W/AUTO DIFF WBC: CPT

## 2019-04-12 PROCEDURE — G8417 CALC BMI ABV UP PARAM F/U: HCPCS | Performed by: PLASTIC SURGERY

## 2019-04-12 RX ORDER — LORATADINE 10 MG/1
10 CAPSULE, LIQUID FILLED ORAL DAILY
COMMUNITY

## 2019-04-12 RX ORDER — DOXYCYCLINE HYCLATE 100 MG
100 TABLET ORAL 2 TIMES DAILY
Qty: 20 TABLET | Refills: 0 | Status: SHIPPED | OUTPATIENT
Start: 2019-04-12 | End: 2019-04-22

## 2019-04-12 RX ORDER — HYDROCODONE BITARTRATE AND ACETAMINOPHEN 5; 325 MG/1; MG/1
1 TABLET ORAL EVERY 6 HOURS PRN
Status: ON HOLD | COMMUNITY
End: 2019-08-03 | Stop reason: SDUPTHER

## 2019-04-12 NOTE — TELEPHONE ENCOUNTER
Return call to Dahiana Parker at [facility], [phone]. The inguinal wound (not abdominal wound) is having \"dark reddish drainage\" with \"funky\" odor. Pt w/ temps today of 99.1 (before dialysis) & 99.3 (after dialysis), BT=129/56, other VS are WNL. Pt completed a round of Cipro for lower leg wound on 4/4/19. The inguinal wound is of concern today. Encouraged to have pt taken to ER for evaluation, due to changes in temperature & wound drainage; Dr. Army Perkins is not at [facility] today. Dahiana Parker is requesting specific orders from Dr. Army Perkins or Dr. Norris Young for transporting to ER, or for more antibiotics. Please advise, thank you.

## 2019-04-12 NOTE — ED NOTES
Wound to RT groin repacked and dressing applied. Area surrounding with no redness.  Skin intact     Cayden Franklin, KRAIG  04/12/19 [address] [place] [address] KRAIG Mc  04/12/19 [id number]

## 2019-04-12 NOTE — TELEPHONE ENCOUNTER
Alli Florez from the [facility] called asking if Dr. Errol Crisostomo would like to extend patient cipro course. States patient has been running low-grade fevers for the last two days (last temp 99.3) and his abdominal wound has has an odor. Patient was taking Cipro 500mg. Please advise and call [phone].

## 2019-04-12 NOTE — PATIENT INSTRUCTIONS
Continue saline moist to dry dressings to right lower leg wound, 2x/day, and as needed for soiling or dislodged. Follow up with Dr. Jose Winn in 1 month. Report to ER for evaluation of right inguinal wound due to copious amount of purulent drainage.

## 2019-04-12 NOTE — TELEPHONE ENCOUNTER
Spoke with Josee Lance at [facility] up in surgery. Patient is seeing Dr. Alejandro Vora at this time then they will send hin=m to ER.

## 2019-04-12 NOTE — PROGRESS NOTES
Subjective:      Patient ID: Evette Jean is a 75 y.o. male. HPI  Patient returns today for evaluation of right lower leg wound for possible skin graft. Review of Systems    Objective:   Physical Exam   Constitutional: He is oriented to person, place, and time. He appears well-nourished. HENT:   Head: Normocephalic and atraumatic. Eyes: Pupils are equal, round, and reactive to light. Conjunctivae and EOM are normal.   Pulmonary/Chest: Effort normal.   Musculoskeletal:        Legs:  Right leg wound now fully granulated. surprizingly he has many areas of re-epithelialization, even where he was debrided to fat. This covers about 15% of the wound. Minimal swelling. He however also has wound right groin draining watery tan pus. Which undermines about 6 inches medially. This apparently is being handled by Dr. Angela Chandra and / or vascular. Neurological: He is alert and oriented to person, place, and time. Skin: Skin is warm and dry. Vitals reviewed. Assessment:      Granulating right lower leg wound. Plan: Will need groin infection clear before we can proceed with skin graft to lower leg. Will continue with damp to damp saline dressings to lower leg. He may close in more of the wound as we wait for groin infection to clear    Will recheck in a month.         Pawan Araujo MD

## 2019-04-12 NOTE — TELEPHONE ENCOUNTER
Patient was seen by Dr. Shani Snell today and Moreno Alvarez RN. He then went to [facility] emergency department for evaluation.

## 2019-04-12 NOTE — ED PROVIDER NOTES
ALBUTEROL (PROVENTIL) (2.5 MG/3ML) 0.083% NEBULIZER SOLUTION    Take 3 mLs by nebulization every 8 hours as needed for Wheezing    AMINO ACIDS-PROTEIN HYDROLYS (PRO-STAT PO)    Take 30 mLs by mouth 3 times daily SUGAR FREE     APIXABAN (ELIQUIS) 5 MG TABS TABLET    Take 1 tablet by mouth 2 times daily    ASPIRIN 81 MG TABLET    Take 1 tablet by mouth daily    ATORVASTATIN (LIPITOR) 40 MG TABLET    Take 1 tablet by mouth nightly    BUPROPION (WELLBUTRIN XL) 150 MG EXTENDED RELEASE TABLET    Take 150 mg by mouth every morning    DONEPEZIL (ARICEPT) 5 MG TABLET    Take 5 mg by mouth every evening    FLUTICASONE (FLONASE) 50 MCG/ACT NASAL SPRAY    2 sprays by Nasal route daily    FOLIC ACID (FOLVITE) 1 MG TABLET    Take 1 tablet by mouth daily    GABAPENTIN (NEURONTIN) 300 MG CAPSULE    Take 1 capsule by mouth daily for 30 days. HYDROCODONE-ACETAMINOPHEN (NORCO) 5-325 MG PER TABLET    Take 1 tablet by mouth every 6 hours as needed for Pain. INSULIN GLARGINE (LANTUS) 100 UNIT/ML INJECTION VIAL    Inject 25 Units into the skin 2 times daily    INSULIN LISPRO (HUMALOG) 100 UNIT/ML INJECTION VIAL    Inject 0-18 Units into the skin 3 times daily (with meals)    INSULIN LISPRO (HUMALOG) 100 UNIT/ML INJECTION VIAL    Inject 0-9 Units into the skin nightly    IPRATROPIUM-ALBUTEROL (DUONEB) 0.5-2.5 (3) MG/3ML SOLN NEBULIZER SOLUTION    Inhale 1 vial into the lungs every 6 hours as needed     LEVOTHYROXINE (SYNTHROID) 25 MCG TABLET    Take 1 tablet by mouth Daily    LIDOCAINE (XYLOCAINE) 5 % OINTMENT    Apply topically three times a week Apply topically as needed. DIALYSIS DAYS    LORATADINE (CLARITIN) 10 MG CAPSULE    Take 10 mg by mouth daily    MAGNESIUM HYDROXIDE (MILK OF MAGNESIA) 400 MG/5ML SUSPENSION    Take 30 mLs by mouth daily as needed for Constipation    MIDODRINE (PROAMATINE) 10 MG TABLET    Take 1 tablet by mouth 3 times daily    MISC.  DEVICES [facility]") MISC    Use as directed    MULTIPLE VITAMINS-MINERALS (THERAPEUTIC MULTIVITAMIN-MINERALS) TABLET    Take 1 tablet by mouth daily    OMEPRAZOLE (PRILOSEC) 20 MG DELAYED RELEASE CAPSULE    Take 20 mg by mouth daily    POLLEN EXTRACTS (PROSTAT PO)    Take 30 mLs by mouth three times daily    SERTRALINE (ZOLOFT) 100 MG TABLET    Take 100 mg by mouth daily     SEVELAMER (RENVELA) 800 MG TABLET    Give 2 tabs po TID with meals and additional 1 tab po TID with snack. SKIN PROTECTANTS, MISC. (HYDROCERIN) CREA CREAM    Apply topically nightly       ALLERGIES     is allergic to percocet [oxycodone-acetaminophen] and ampicillin. FAMILY HISTORY     indicated that his mother is . He indicated that his father is . He indicated that his sister is alive. He indicated that his maternal grandmother is . He indicated that his maternal grandfather is . family history includes Alzheimer's Disease in his father; Diabetes in his maternal grandmother and mother; High Blood Pressure in his sister; Gina Catherine in his mother. SOCIAL HISTORY      reports that he quit smoking about 39 years ago. His smoking use included cigarettes. He started smoking about 64 years ago. He has a 50.00 pack-year smoking history. He has never used smokeless tobacco. He reports that he does not drink alcohol or use drugs. PHYSICAL EXAM    (up to 7 for level 4, 8 or more for level 5)   INITIAL VITALS:  weight is 280 lb (127 kg). His tympanic temperature is 99 °F (37.2 °C). His blood pressure is 88/50 (abnormal) and his pulse is 85. His respiration is 12 and oxygen saturation is 100%. Physical Exam   Constitutional: He appears well-developed and well-nourished. HENT:   Head: Normocephalic and atraumatic. Eyes: Conjunctivae are normal.   Neck: Normal range of motion. Neck supple. Cardiovascular: Normal rate, regular rhythm and normal heart sounds. Pulmonary/Chest: Effort normal and breath sounds normal. No stridor. No respiratory distress. He has no wheezes. He has no rales. Abdominal: Soft. He exhibits no distension. There is no tenderness. Musculoskeletal: Normal range of motion. The patient is noted to have a wound in the right groin. It has packing in place. The packing is removed. I see pink granulated tissue in the wound site. I did not appreciate any surrounding cellulitis   Neurological: He is alert. GCS of 15 with no focal deficits appreciated   Skin:   He has a wound to the right groin did have packing in place. It was removed. We will go ahead and get a culture of the site and then repacked the wound. Also has a wound to the right lower leg. It is wrapped. They had pictures of this. This appears to be well-healing   Nursing note and vitals reviewed. DIFFERENTIAL DIAGNOSIS/ MDM:     The patient presents for a wound check. He is noted to have a low-grade temperature. I will get basic labs.   Lactic acid culture of the wound    DIAGNOSTIC RESULTS         LABS:  Results for orders placed or performed during the hospital encounter of 15/82/48   Basic Metabolic Panel   Result Value Ref Range    Glucose 164 (H) 70 - 99 mg/dL    BUN 32 (H) 8 - 23 mg/dL    CREATININE 3.32 (H) 0.70 - 1.20 mg/dL    Bun/Cre Ratio 10 9 - 20    Calcium 9.0 8.6 - 10.4 mg/dL    Sodium 138 135 - 144 mmol/L    Potassium 3.7 3.7 - 5.3 mmol/L    Chloride 94 (L) 98 - 107 mmol/L    CO2 32 (H) 20 - 31 mmol/L    Anion Gap 12 9 - 17 mmol/L    GFR Non-African American 18 (L) >60 mL/min    GFR  22 (L) >60 mL/min    GFR Comment          GFR Staging NOT REPORTED    CBC Auto Differential   Result Value Ref Range    WBC 6.0 3.5 - 11.0 k/uL    RBC 2.57 (L) 4.5 - 5.9 m/uL    Hemoglobin 8.0 (L) 13.5 - 17.5 g/dL    Hematocrit 24.7 (L) 41 - 53 %    MCV 95.9 80 - 100 fL    MCH 31.3 26 - 34 pg    MCHC 32.6 31 - 37 g/dL    RDW 18.7 (H) 11.0 - 14.5 %    Platelets 902 972 - 135 k/uL    MPV 6.4 6.0 - 12.0 fL    NRBC Automated NOT REPORTED per 100 WBC    Differential Type NOT REPORTED     Immature Granulocytes NOT REPORTED 0 %    Absolute Immature Granulocyte NOT REPORTED 0.00 - 0.30 k/uL    WBC Morphology NOT REPORTED     RBC Morphology NOT REPORTED     Platelet Estimate NOT REPORTED     Seg Neutrophils 65 44 - 74 %    Lymphocytes 21 15 - 43 %    Monocytes 10 6 - 14 %    Eosinophils % 3 1 - 8 %    Basophils 1 0 - 2 %    Segs Absolute 3.90 1.8 - 7.7 k/uL    Absolute Lymph # 1.30 1.0 - 4.8 k/uL    Absolute Mono # 0.60 0.1 - 1.2 k/uL    Absolute Eos # 0.20 0.0 - 0.4 k/uL    Basophils # 0.10 0.0 - 0.2 k/uL   Lactic Acid   Result Value Ref Range    Lactic Acid 1.8 0.5 - 2.2 mmol/L           EMERGENCY DEPARTMENT COURSE:   Vitals:    Vitals:    04/12/19 1649   BP: (!) 88/50   Pulse: 85   Resp: 12   Temp: 99 °F (37.2 °C)   TempSrc: Tympanic   SpO2: 100%   Weight: 280 lb (127 kg)     -------------------------  BP: (!) 88/50, Temp: 99 °F (37.2 °C), Pulse: 85, Resp: 12      RE-EVALUATION:  The patient presents for a wound check. The patient's labs are improved. I did discuss the patient with his primary care physician at this point he is requesting we place the patient on doxycycline and he will see him as an outpatient. The patient is agreeable to this plan I recommended to him that he return to the ER for any abdominal pain fever, worsening of symptoms or other concerns. Otherwise he is to call his family doctor on Monday for a follow-up appointment  I have reviewed the disposition diagnosis with the patient and or their family/guardian. I have answered their questions and given discharge instructions. They voiced understanding of these instructions and did not have any further questions or complaints. CONSULTS:  I reviewed the labs with Dr. Josey Lima the wound itself appears without any surrounding cellulitis at this point he is requesting that we place the patient on doxycycline and he will see the patient in his office    PROCEDURES:  None    FINAL IMPRESSION      1.  Visit for wound check          DISPOSITION/PLAN   DISPOSITION        CONDITION ON DISPOSITION:   Stable    PATIENT REFERRED TO:  Lennoxgregorio Rawls DO  [address] [facility]  [phone]    Call in 2 days        DISCHARGE MEDICATIONS:  New Prescriptions    DOXYCYCLINE HYCLATE (VIBRA-TABS) 100 MG TABLET    Take 1 tablet by mouth 2 times daily for 10 days       (Please note that portions of this note were completed with a voicerecognition program.  Efforts were made to edit the dictations but occasionally words are mis-transcribed.)    Maria MD, F.A.C.E.P.   Attending Emergency Medicine Physician       Genia Ramos MD  04/12/19 4286

## 2019-04-15 ENCOUNTER — CARE COORDINATION (OUTPATIENT)
Dept: CARE COORDINATION | Age: 74
End: 2019-04-15

## 2019-04-15 ENCOUNTER — ANTI-COAG VISIT (OUTPATIENT)
Dept: INTERNAL MEDICINE | Age: 74
End: 2019-04-15

## 2019-04-15 PROBLEM — I48.0 PAROXYSMAL ATRIAL FIBRILLATION (HCC): Status: RESOLVED | Noted: 2017-09-06 | Resolved: 2019-04-15

## 2019-04-15 NOTE — CARE COORDINATION
Patient was called to follow up with most recent ER visit. There was no answer. phone rings many time, no voicemail to leave a message.       FU appts/Provider:    Future Appointments   Date Time Provider Kaity Donovan   4/16/2019 11:30 AM Roxann House MD [facility]   5/10/2019  2:45 PM Dorian Joseph MD [facility]   10/30/2019  1:00 PM Tj Gonzales MD [facility]

## 2019-04-16 ENCOUNTER — TELEPHONE (OUTPATIENT)
Dept: WOUND CARE | Age: 74
End: 2019-04-16

## 2019-04-16 ENCOUNTER — TELEPHONE (OUTPATIENT)
Dept: INTERNAL MEDICINE | Age: 74
End: 2019-04-16

## 2019-04-16 ENCOUNTER — OFFICE VISIT (OUTPATIENT)
Dept: WOUND CARE | Age: 74
End: 2019-04-16
Payer: MEDICARE

## 2019-04-16 VITALS
TEMPERATURE: 96.6 F | HEART RATE: 75 BPM | DIASTOLIC BLOOD PRESSURE: 68 MMHG | SYSTOLIC BLOOD PRESSURE: 164 MMHG | RESPIRATION RATE: 16 BRPM | OXYGEN SATURATION: 98 %

## 2019-04-16 DIAGNOSIS — Z51.89 VISIT FOR WOUND CHECK: Primary | ICD-10-CM

## 2019-04-16 PROCEDURE — 97597 DBRDMT OPN WND 1ST 20 CM/<: CPT | Performed by: SURGERY

## 2019-04-16 NOTE — TELEPHONE ENCOUNTER
Patient's spouse stopped by the office to let Graciela Michelle know that [address] staff is not getting him up for meals and also not allowing him to sit upright for 40 minutes after meals as instructed. She is frustrated with this.

## 2019-04-16 NOTE — PROGRESS NOTES
It's more acute care as needed. Then a nursing home. I think he needs more of an LTAC type situation. We will look into that and see if Dr. Krystal Hernandes or myself can get him admitted to one of the LTAC's and [place].

## 2019-04-16 NOTE — TELEPHONE ENCOUNTER
Funmi De La Paz with [facility] notified of recommendations. She reports the unit manager is aware and working on setting pt up with LTAC.

## 2019-04-17 LAB
CULTURE: ABNORMAL
CULTURE: ABNORMAL
DIRECT EXAM: ABNORMAL
DIRECT EXAM: ABNORMAL
Lab: ABNORMAL
SPECIMEN DESCRIPTION: ABNORMAL

## 2019-04-18 ENCOUNTER — TELEPHONE (OUTPATIENT)
Dept: WOUND CARE | Age: 74
End: 2019-04-18

## 2019-04-22 RX ORDER — SEVELAMER CARBONATE 800 MG/1
1 TABLET, FILM COATED ORAL 3 TIMES DAILY
Qty: 2430 TABLET | Refills: 3 | Status: SHIPPED
Start: 2019-04-22 | End: 2019-07-16 | Stop reason: SDUPTHER

## 2019-04-22 NOTE — TELEPHONE ENCOUNTER
Patients wife stopped in and stated that the patient takes 9 pills a day. She needs 90 day supply sent to [facility].        Last Appt:  Visit date not found  Next Appt:   Visit date not found  Med verified in Epic

## 2019-04-23 ENCOUNTER — OFFICE VISIT (OUTPATIENT)
Dept: WOUND CARE | Age: 74
End: 2019-04-23
Payer: COMMERCIAL

## 2019-04-23 VITALS
RESPIRATION RATE: 20 BRPM | SYSTOLIC BLOOD PRESSURE: 157 MMHG | DIASTOLIC BLOOD PRESSURE: 67 MMHG | TEMPERATURE: 96.9 F | HEART RATE: 70 BPM

## 2019-04-23 DIAGNOSIS — Z51.89 VISIT FOR WOUND CHECK: Primary | ICD-10-CM

## 2019-04-23 PROCEDURE — 99309 SBSQ NF CARE MODERATE MDM 30: CPT | Performed by: INTERNAL MEDICINE

## 2019-04-23 PROCEDURE — 97597 DBRDMT OPN WND 1ST 20 CM/<: CPT | Performed by: SURGERY

## 2019-04-23 NOTE — PATIENT INSTRUCTIONS
Continue normal saline moist to dry dressings to both right leg wounds. Change dressings daily, and as needed for soiling or dislodged. Ensure tunneled & undermined areas are fully, but lightly packed with saline-moistened gauze. Ensure that moist dressing, not overly wet dressing is used, and avoid use of moist dressing on intact skin. SIGNS OF INFECTION  - Redness, swelling, skin hot  - Wound bed turns black or stringy yellow  - Foul odor  - Increased drainage or pus  - Increased pain  - Fever greater than 100F    CALL YOUR DOCTOR OR SEEK MEDICAL ATTENTION IF SIGNS OF INFECTION. DO NOT WAIT UNTIL YOUR NEXT APPOINTMENT    Call the Wound Care Nurse with any other questions or concerns- [phone]. Follow up in 1 week with Dr. Nati Poe.

## 2019-04-26 ENCOUNTER — OUTSIDE SERVICES (OUTPATIENT)
Dept: INTERNAL MEDICINE | Age: 74
End: 2019-04-26
Payer: MEDICARE

## 2019-04-26 DIAGNOSIS — F32.4 MAJOR DEPRESSIVE DISORDER WITH SINGLE EPISODE, IN PARTIAL REMISSION (HCC): ICD-10-CM

## 2019-04-26 DIAGNOSIS — N18.6 ANEMIA IN CHRONIC KIDNEY DISEASE, ON CHRONIC DIALYSIS (HCC): ICD-10-CM

## 2019-04-26 DIAGNOSIS — E78.2 MIXED HYPERLIPIDEMIA: ICD-10-CM

## 2019-04-26 DIAGNOSIS — E66.01 MORBID OBESITY (HCC): ICD-10-CM

## 2019-04-26 DIAGNOSIS — Z99.2 CONTROLLED TYPE 2 DIABETES MELLITUS WITH CHRONIC KIDNEY DISEASE ON CHRONIC DIALYSIS, WITH LONG-TERM CURRENT USE OF INSULIN (HCC): ICD-10-CM

## 2019-04-26 DIAGNOSIS — G47.33 OBSTRUCTIVE SLEEP APNEA SYNDROME: ICD-10-CM

## 2019-04-26 DIAGNOSIS — Z99.2 ANEMIA IN CHRONIC KIDNEY DISEASE, ON CHRONIC DIALYSIS (HCC): ICD-10-CM

## 2019-04-26 DIAGNOSIS — E11.22 CONTROLLED TYPE 2 DIABETES MELLITUS WITH CHRONIC KIDNEY DISEASE ON CHRONIC DIALYSIS, WITH LONG-TERM CURRENT USE OF INSULIN (HCC): ICD-10-CM

## 2019-04-26 DIAGNOSIS — N18.6 ESRD (END STAGE RENAL DISEASE) ON DIALYSIS (HCC): ICD-10-CM

## 2019-04-26 DIAGNOSIS — Z99.2 ESRD (END STAGE RENAL DISEASE) ON DIALYSIS (HCC): ICD-10-CM

## 2019-04-26 DIAGNOSIS — I35.0 SEVERE AORTIC STENOSIS: ICD-10-CM

## 2019-04-26 DIAGNOSIS — F01.50 VASCULAR DEMENTIA WITHOUT BEHAVIORAL DISTURBANCE (HCC): ICD-10-CM

## 2019-04-26 DIAGNOSIS — Z79.4 CONTROLLED TYPE 2 DIABETES MELLITUS WITH CHRONIC KIDNEY DISEASE ON CHRONIC DIALYSIS, WITH LONG-TERM CURRENT USE OF INSULIN (HCC): ICD-10-CM

## 2019-04-26 DIAGNOSIS — I10 ESSENTIAL HYPERTENSION: ICD-10-CM

## 2019-04-26 DIAGNOSIS — I05.0 SEVERE MITRAL VALVE STENOSIS: ICD-10-CM

## 2019-04-26 DIAGNOSIS — S81.801D WOUND OF RIGHT LOWER EXTREMITY, SUBSEQUENT ENCOUNTER: Primary | ICD-10-CM

## 2019-04-26 DIAGNOSIS — S30.1XXD HEMATOMA OF GROIN, SUBSEQUENT ENCOUNTER: ICD-10-CM

## 2019-04-26 DIAGNOSIS — E11.42 CONTROLLED TYPE 2 DIABETES MELLITUS WITH DIABETIC POLYNEUROPATHY, WITH LONG-TERM CURRENT USE OF INSULIN (HCC): ICD-10-CM

## 2019-04-26 DIAGNOSIS — Z99.2 DIALYSIS PATIENT (HCC): ICD-10-CM

## 2019-04-26 DIAGNOSIS — N18.6 CONTROLLED TYPE 2 DIABETES MELLITUS WITH CHRONIC KIDNEY DISEASE ON CHRONIC DIALYSIS, WITH LONG-TERM CURRENT USE OF INSULIN (HCC): ICD-10-CM

## 2019-04-26 DIAGNOSIS — Z79.4 LONG-TERM INSULIN USE (HCC): ICD-10-CM

## 2019-04-26 DIAGNOSIS — D63.1 ANEMIA IN CHRONIC KIDNEY DISEASE, ON CHRONIC DIALYSIS (HCC): ICD-10-CM

## 2019-04-26 DIAGNOSIS — Z79.4 CONTROLLED TYPE 2 DIABETES MELLITUS WITH DIABETIC POLYNEUROPATHY, WITH LONG-TERM CURRENT USE OF INSULIN (HCC): ICD-10-CM

## 2019-04-26 DIAGNOSIS — I48.0 PAROXYSMAL ATRIAL FIBRILLATION (HCC): ICD-10-CM

## 2019-04-27 NOTE — PROGRESS NOTES
DR. 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 He is on Coumadin for atrial fibrillation. He is on Zoloft for depression. He has been in and out of the hospital multiple times recently with episodes of anemia and also some problems with ulcerations on his right leg and in his groin. He has been getting wound care from Dr. Derrick Hernandez, who has been looking to try to get him into an LTAC-type facility, but he does not qualify for that. There are no other complaints at this time. ALLERGIES:   Allergies   Allergen Reactions    Percocet [Oxycodone-Acetaminophen] Itching and Rash     Also causes shaking    Ampicillin Rash        MEDICATIONS: As noted on the [facility], referenced and incorporated herein.     PAST MEDICAL HISTORY:   Past Medical History:   Diagnosis Date    Anemia in chronic kidney disease (CKD) 4/27/2016    Arthritis     Bacteremia 11/11/2016    Benign prostatic hyperplasia without lower urinary tract symptoms     BMI 40.0-44.9, adult ([address] 3/19/2018    Cellulitis of left lower extremity     Chronic cerebral ischemia 1/3/2017    Closed fracture of forearm 8/13/2013    Broken lft forearm     Controlled type 2 diabetes mellitus with chronic kidney disease on chronic dialysis, with long-term current use of insulin ([address]     Controlled type 2 diabetes mellitus with diabetic polyneuropathy, with long-term current use of insulin ([address] 10/7/2018    Decubital ulcer     NON OPEN BUTTOCKS    Dementia     memory problems    Dialysis patient ([address] 01/03/2017    [facility], Sat in CATHETER Right 2018    DIALYSIS CATHETER Dr Sal Hopkins    COLONOSCOPY  09    COLONOSCOPY N/A 10/12/2018    COLONOSCOPY WITH BIOPSY performed by Mayra Calvo DO at [address] [facility] Right 3/10/2019    WASHOUT RIGHT LOWER EXTREMITY WITH WOUND VAC EXCHANGE performed by Vika Gamble MD at [address] [facility] Right 3/8/2019    RIGHT LOWER EXTREMITY EXPLORATION, HEMATOMA EVACUATION, WOUND VAC PLACEMENT performed by Vika Gamble MD at [address] (x10-12 surgeries)    several surgeries on left arm    KNEE ARTHROSCOPY Left 99    MITRAL VALVE REPLACEMENT  2016    bioprosthetic     NASAL SEPTUM SURGERY      OTHER SURGICAL HISTORY  3/18/16    Aortic root Enlargement    OTHER SURGICAL HISTORY  3/18/16    ASD Closure    OTHER SURGICAL HISTORY Right 2017    AV fistula creation, wrist    OTHER SURGICAL HISTORY Right 2017    ligation of collateral branch of av fistula right wrist    OTHER SURGICAL HISTORY  2018    FISTULAGRAM WITH DR. Maci Pacheco    CA EGD TRANSORAL BIOPSY SINGLE/MULTIPLE N/A 10/17/2017    EGD BIOPSY performed by Euegnio Beaulieu MD at [facility] ANGIOACCESS AV FISTULA Right 2017     RIGHT  LOWER ARM AV FISTULA  LIGATION OF AQUIRED BRANCHES X2 performed by Altaf De Santiago DO at [address] [facility]  3/18/16    median    VASCULAR SURGERY  2018    Right arm fistulagram,  PTA cephalic vein stenosis  /  DR Hernandez Lineskyler       SOCIAL HISTORY:     Tobacco:   Social History     Tobacco Use   Smoking Status Former Smoker    Packs/day: 2.00    Years: 25.00    Pack years: 50.00    Types: Cigarettes    Start date: 3/17/1955   Jessie Darby Last attempt to quit: 1980    Years since quittin.3   Smokeless Tobacco Never Used     Alcohol:   Social History     Substance and Sexual Activity   Alcohol Use No    Alcohol/week: 0.0 oz    Comment: 1-2 drinks per

## 2019-04-30 ENCOUNTER — HOSPITAL ENCOUNTER (OUTPATIENT)
Dept: INTERVENTIONAL RADIOLOGY/VASCULAR | Age: 74
Discharge: HOME | End: 2019-05-02
Payer: MEDICARE

## 2019-04-30 VITALS
BODY MASS INDEX: 43.79 KG/M2 | TEMPERATURE: 98.3 F | WEIGHT: 279 LBS | OXYGEN SATURATION: 98 % | SYSTOLIC BLOOD PRESSURE: 125 MMHG | DIASTOLIC BLOOD PRESSURE: 56 MMHG | HEIGHT: 67 IN | HEART RATE: 78 BPM | RESPIRATION RATE: 18 BRPM

## 2019-04-30 DIAGNOSIS — N18.6 ESRD (END STAGE RENAL DISEASE) (HCC): ICD-10-CM

## 2019-04-30 LAB
INR BLD: 1
PARTIAL THROMBOPLASTIN TIME: 30.1 SEC (ref 20.5–30.5)
PLATELET # BLD: 105 K/UL (ref 138–453)
POTASSIUM SERPL-SCNC: 3.4 MMOL/L (ref 3.7–5.3)
PROTHROMBIN TIME: 10.4 SEC (ref 9–12)

## 2019-04-30 PROCEDURE — 36215 PLACE CATHETER IN ARTERY: CPT | Performed by: RADIOLOGY

## 2019-04-30 PROCEDURE — 75710 ARTERY X-RAYS ARM/LEG: CPT | Performed by: RADIOLOGY

## 2019-04-30 PROCEDURE — 76937 US GUIDE VASCULAR ACCESS: CPT

## 2019-04-30 PROCEDURE — 36902 INTRO CATH DIALYSIS CIRCUIT: CPT | Performed by: RADIOLOGY

## 2019-04-30 PROCEDURE — 7100000011 HC PHASE II RECOVERY - ADDTL 15 MIN

## 2019-04-30 PROCEDURE — 6360000004 HC RX CONTRAST MEDICATION: Performed by: RADIOLOGY

## 2019-04-30 PROCEDURE — 85049 AUTOMATED PLATELET COUNT: CPT

## 2019-04-30 PROCEDURE — C1894 INTRO/SHEATH, NON-LASER: HCPCS

## 2019-04-30 PROCEDURE — 85730 THROMBOPLASTIN TIME PARTIAL: CPT

## 2019-04-30 PROCEDURE — 7100000010 HC PHASE II RECOVERY - FIRST 15 MIN

## 2019-04-30 PROCEDURE — 36907 BALO ANGIOP CTR DIALYSIS SEG: CPT | Performed by: RADIOLOGY

## 2019-04-30 PROCEDURE — 2709999900 HC NON-CHARGEABLE SUPPLY

## 2019-04-30 PROCEDURE — C1725 CATH, TRANSLUMIN NON-LASER: HCPCS

## 2019-04-30 PROCEDURE — 85610 PROTHROMBIN TIME: CPT

## 2019-04-30 PROCEDURE — 84132 ASSAY OF SERUM POTASSIUM: CPT

## 2019-04-30 PROCEDURE — 2580000003 HC RX 258: Performed by: PHYSICIAN ASSISTANT

## 2019-04-30 PROCEDURE — C1769 GUIDE WIRE: HCPCS

## 2019-04-30 RX ORDER — SODIUM CHLORIDE 9 MG/ML
INJECTION, SOLUTION INTRAVENOUS CONTINUOUS
Status: DISCONTINUED | OUTPATIENT
Start: 2019-04-30 | End: 2019-05-03 | Stop reason: HOSPADM

## 2019-04-30 RX ADMIN — SODIUM CHLORIDE 20 ML: 9 INJECTION, SOLUTION INTRAVENOUS at 11:20

## 2019-04-30 RX ADMIN — IOVERSOL 122 ML: 509 INJECTION INTRA-ARTERIAL; INTRAVENOUS at 14:08

## 2019-04-30 ASSESSMENT — PAIN SCALES - GENERAL
PAINLEVEL_OUTOF10: 0
PAINLEVEL_OUTOF10: 0

## 2019-04-30 ASSESSMENT — PAIN - FUNCTIONAL ASSESSMENT: PAIN_FUNCTIONAL_ASSESSMENT: 0-10

## 2019-04-30 NOTE — PRE SEDATION
Sedation Pre-Procedure Note    Patient Name: Tootie Ramon   YOB: 1945  Room/Bed: Room/bed info not found  Medical Record Number: [id number]  Date: 4/30/2019   Time: 12:42 PM       Indication:  Fistulagram    Consent: I have discussed with the patient and/or the patient representative the indication, alternatives, and the possible risks and/or complications of the planned procedure and the anesthesia methods. The patient and/or patient representative appear to understand and agree to proceed.     Vital Signs:   Vitals:    04/30/19 1045   BP: (!) 113/50   Pulse: 80   Resp: 20   Temp: 98 °F (36.7 °C)   SpO2: 100%       Past Medical History:   has a past medical history of Anemia in chronic kidney disease (CKD), Arthritis, Bacteremia, Benign prostatic hyperplasia without lower urinary tract symptoms, BMI 40.0-44.9, adult (HCC), Cellulitis of left lower extremity, Chronic cerebral ischemia, Closed fracture of forearm, Controlled type 2 diabetes mellitus with chronic kidney disease on chronic dialysis, with long-term current use of insulin ([address] Controlled type 2 diabetes mellitus with diabetic polyneuropathy, with long-term current use of insulin ([address] Decubital ulcer, Dementia, Dialysis patient ([address] Difficult intravenous access, Difficulty walking, DJD (degenerative joint disease) of knee, Elbow, forearm, and wrist, abrasion or friction burn, without mention of infection, Encephalopathy acute, Encounter regarding vascular access for dialysis for ESRD ([address] ESRD (end stage renal disease) on dialysis ([address] Forgetfulness, H/O transesophageal echocardiography ([facility]) for monitoring, Hemodialysis patient ([address] Hyperlipidemia, Hypertension, Intercritical gout, Joint pain, knee, Long-term insulin use ([address] Major depressive disorder with single episode, in partial remission ([address] Mobility impaired, Morbid obesity ([address] Muscle weakness, Obesity, Obstructive sleep apnea, HEATHER on CPAP, Paroxysmal atrial fibrillation ([facility] Respiratory failure ([facility] S/P cardiac cath, Seborrhea, Severe aortic stenosis, Severe mitral valve stenosis, Severe sepsis (HCC), Skin rash, Stasis dermatitis, Thyroid disease, Traumatic amputation of thumb, Venous stasis dermatitis, Wears glasses, and Wheelchair bound. Past Surgical History:   has a past surgical history that includes Colonoscopy (11/19/09); Knee arthroscopy (Left, 09/17/99); Hand surgery (Left, 1990 (x10-12 surgeries)); Arm Surgery (Left, 1990); Cardiac catheterization; Nasal septum surgery; Sternotomy (3/18/16); Aortic valve replacement (03/18/2016); Mitral valve replacement (03/18/2016); other surgical history (3/18/16); other surgical history (3/18/16); other surgical history (Right, 01/09/2017); other surgical history (Right, 08/29/2017); pr ligatn angioaccess av fistula (Right, 8/29/2017); pr egd transoral biopsy single/multiple (N/A, 10/17/2017); central venous catheter (Right, 02/21/2018); Colonoscopy (N/A, 10/12/2018); other surgical history (04/05/2018); vascular surgery (12/06/2018); EXPLORATION OF WOUND OF EXTREMITY (Right, 3/10/2019); and EXPLORATION OF WOUND OF EXTREMITY (Right, 3/8/2019). Medications:   Scheduled Meds:   Continuous Infusions:    sodium chloride 20 mL (04/30/19 1120)     PRN Meds:   Home Meds:   Prior to Admission medications    Medication Sig Start Date End Date Taking? Authorizing Provider   sevelamer (RENVELA) 800 MG tablet Take 1 tablet by mouth 3 times daily Give with snack. 4/22/19   Octavio Mcarthur DO   loratadine (CLARITIN) 10 MG capsule Take 10 mg by mouth daily    Historical Provider, MD   HYDROcodone-acetaminophen (NORCO) 5-325 MG per tablet Take 1 tablet by mouth every 6 hours as needed for Pain.     Historical Provider, MD   aspirin 81 MG tablet Take 1 tablet by mouth daily 4/8/19   Glenys Shane MD   apixaban (ELIQUIS) 5 MG TABS tablet Take 1 tablet by mouth 2 times daily 4/8/19   Glenys Shane MD   Multiple Vitamins-Minerals (THERAPEUTIC MULTIVITAMIN-MINERALS) tablet Take 1 tablet by mouth daily    Historical Provider, MD   omeprazole (PRILOSEC) 20 MG delayed release capsule Take 20 mg by mouth daily    Historical Provider, MD   atorvastatin (LIPITOR) 40 MG tablet Take 1 tablet by mouth nightly 3/24/19   Rdige Mccauley MD   gabapentin (NEURONTIN) 300 MG capsule Take 1 capsule by mouth daily for 30 days. 3/20/19 4/19/19  Ridge Mccauley MD   midodrine (PROAMATINE) 10 MG tablet Take 1 tablet by mouth 3 times daily 3/19/19   Ridge Mccauley MD   folic acid (FOLVITE) 1 MG tablet Take 1 tablet by mouth daily 3/19/19   Ridge Mccauley MD   insulin glargine (LANTUS) 100 UNIT/ML injection vial Inject 25 Units into the skin 2 times daily 3/19/19   Ridge Mccauley MD   insulin lispro (HUMALOG) 100 UNIT/ML injection vial Inject 0-18 Units into the skin 3 times daily (with meals) 3/19/19   Ridge Mccauley MD   insulin lispro (HUMALOG) 100 UNIT/ML injection vial Inject 0-9 Units into the skin nightly 3/19/19   Ridge Mccauley MD   Misc. Devices [facility]) MISC Use as directed 12/12/18   Octavio Mcarthur DO   buPROPion (WELLBUTRIN XL) 150 MG extended release tablet Take 150 mg by mouth every morning    Historical Provider, MD   Pollen Extracts (PROSTAT PO) Take 30 mLs by mouth three times daily    Historical Provider, MD   sertraline (ZOLOFT) 100 MG tablet Take 100 mg by mouth daily     Historical Provider, MD   lidocaine (XYLOCAINE) 5 % ointment Apply topically three times a week Apply topically as needed. DIALYSIS DAYS    Historical Provider, MD   Amino Acids-Protein Hydrolys (PRO-STAT PO) Take 30 mLs by mouth 3 times daily SUGAR FREE     Historical Provider, MD   ipratropium-albuterol (DUONEB) 0.5-2.5 (3) MG/3ML SOLN nebulizer solution Inhale 1 vial into the lungs every 6 hours as needed     Historical Provider, MD   Skin Protectants, Misc.  (HYDROCERIN) CREA cream Apply topically nightly    Historical Provider, MD donepezil (ARICEPT) 5 MG tablet Take 5 mg by mouth every evening 10/26/16   Historical Provider, MD   levothyroxine (SYNTHROID) 25 MCG tablet Take 1 tablet by mouth Daily 10/26/16   SHAMEKA Raymundo CNP   albuterol (PROVENTIL) (2.5 MG/3ML) 0.083% nebulizer solution Take 3 mLs by nebulization every 8 hours as needed for Wheezing 10/24/16   Gareth Mcarthur,    fluticasone (FLONASE) 50 MCG/ACT nasal spray 2 sprays by Nasal route daily 10/24/16   Octavio JENNIFER Mcarthur, DO   magnesium hydroxide (MILK OF MAGNESIA) 400 MG/5ML suspension Take 30 mLs by mouth daily as needed for Constipation 3/29/16   Fuad Baker MD     Coumadin Use Last 7 Days:  no  Antiplatelet drug therapy use last 7 days: no  Other anticoagulant use last 7 days: no  Additional Medication Information:        Pre-Sedation Documentation and Exam:   Vital signs have been reviewed (see flow sheet for vitals).     Mallampati Airway Assessment:  dentition not prohibitive, normal neck range of motion    Prior History of Anesthesia Complications:   none    ASA Classification:  Class 3 - A patient with severe systemic disease that limits activity but is not incapacitating    Sedation/ Anesthesia Plan:   intravenous sedation    Medications Planned:   midazolam (Versed) intravenously and fentanyl intravenously    Patient is an appropriate candidate for plan of sedation: yes    Electronically signed by YAMILE Velez on 4/30/2019 at 12:42 PM

## 2019-04-30 NOTE — PROGRESS NOTES
Patient tolerated procedure well. Right AV fistula with positive bruitt, and thrill. No active drainage. Patient denies any pain, numbness, or tingling. Positive pulses.

## 2019-04-30 NOTE — H&P
History and Physical    Pt Name: Wesley Tiwari  MRN: [id number]  YOB: 1945  Date of evaluation: 4/30/2019  Primary Care Physician: Ciaran Mauricio DO    SUBJECTIVE:   History of Chief Complaint:    Wesley Tiwari is a 75 y.o. male who is scheduled for right arm fistulagram. Patient's wife, sister are present at today's visit, much of history obtained from patient's wife and EMR. Patient has a history of dementia- resides at [address] [place] [address]. Patient goes for HD on M,W,F. He has been on hemodialysis since April 2016. His wife states he was not able to get dialysis yesterday because the fistula didn't work. Allergies  is allergic to percocet [oxycodone-acetaminophen] and ampicillin. Medications  Prior to Admission medications    Medication Sig Start Date End Date Taking? Authorizing Provider   sevelamer (RENVELA) 800 MG tablet Take 1 tablet by mouth 3 times daily Give with snack. 4/22/19   Octavio Mcarthur DO   loratadine (CLARITIN) 10 MG capsule Take 10 mg by mouth daily    Historical Provider, MD   HYDROcodone-acetaminophen (NORCO) 5-325 MG per tablet Take 1 tablet by mouth every 6 hours as needed for Pain. Historical Provider, MD   aspirin 81 MG tablet Take 1 tablet by mouth daily 4/8/19   Lizette Jimenez MD   apixaban (ELIQUIS) 5 MG TABS tablet Take 1 tablet by mouth 2 times daily 4/8/19   Lizette Jimenez MD   Multiple Vitamins-Minerals (THERAPEUTIC MULTIVITAMIN-MINERALS) tablet Take 1 tablet by mouth daily    Historical Provider, MD   omeprazole (PRILOSEC) 20 MG delayed release capsule Take 20 mg by mouth daily    Historical Provider, MD   atorvastatin (LIPITOR) 40 MG tablet Take 1 tablet by mouth nightly 3/24/19   Carmelita Perry MD   gabapentin (NEURONTIN) 300 MG capsule Take 1 capsule by mouth daily for 30 days.  3/20/19 4/19/19  Carmelita Perry MD   midodrine (PROAMATINE) 10 MG tablet Take 1 tablet by mouth 3 times daily 3/19/19   Carmelita Perry MD   folic acid 30 mLs by mouth daily as needed for Constipation 3/29/16   Sherrilee Blizzard, MD     Past Medical History    has a past medical history of Anemia in chronic kidney disease (CKD), Arthritis, Bacteremia, Benign prostatic hyperplasia without lower urinary tract symptoms, BMI 40.0-44.9, adult ([address] Cellulitis of left lower extremity, Chronic cerebral ischemia, Closed fracture of forearm, Controlled type 2 diabetes mellitus with chronic kidney disease on chronic dialysis, with long-term current use of insulin ([address] Controlled type 2 diabetes mellitus with diabetic polyneuropathy, with long-term current use of insulin ([address] Decubital ulcer, Dementia, Dialysis patient ([address] Difficult intravenous access, Difficulty walking, DJD (degenerative joint disease) of knee, Elbow, forearm, and wrist, abrasion or friction burn, without mention of infection, Encephalopathy acute, Encounter regarding vascular access for dialysis for ESRD ([address] ESRD (end stage renal disease) on dialysis ([address] Forgetfulness, H/O transesophageal echocardiography (AMADEO) for monitoring, Hemodialysis patient ([address] Hyperlipidemia, Hypertension, Intercritical gout, Joint pain, knee, Long-term insulin use ([address] Major depressive disorder with single episode, in partial remission ([address] Mobility impaired, Morbid obesity ([address] Muscle weakness, Obesity, Obstructive sleep apnea, HEATHER on CPAP, Paroxysmal atrial fibrillation ([address] Respiratory failure ([address] S/P cardiac cath, Seborrhea, Severe aortic stenosis, Severe mitral valve stenosis, Severe sepsis (HCC), Skin rash, Stasis dermatitis, Thyroid disease, Traumatic amputation of thumb, Venous stasis dermatitis, Wears glasses, and Wheelchair bound. Past Surgical History   has a past surgical history that includes Colonoscopy (11/19/09); Knee arthroscopy (Left, 09/17/99); Hand surgery (Left, 1990 (x10-12 surgeries)); Arm Surgery (Left, 1990);  Cardiac catheterization; Nasal septum surgery; Sternotomy (3/18/16); Aortic valve replacement (2016); Mitral valve replacement (2016); other surgical history (3/18/16); other surgical history (3/18/16); other surgical history (Right, 2017); other surgical history (Right, 2017); pr ligatn angioaccess av fistula (Right, 2017); pr egd transoral biopsy single/multiple (N/A, 10/17/2017); central venous catheter (Right, 2018); Colonoscopy (N/A, 10/12/2018); other surgical history (2018); vascular surgery (2018); EXPLORATION OF WOUND OF EXTREMITY (Right, 3/10/2019); and EXPLORATION OF WOUND OF EXTREMITY (Right, 3/8/2019). Social History   reports that he quit smoking about 39 years ago. His smoking use included cigarettes. He started smoking about 64 years ago. He has a 50.00 pack-year smoking history. He has never used smokeless tobacco.   reports that he does not drink alcohol. reports that he does not use drugs. Marital Status      Family History  Family Status   Relation Name Status    Mother     [facility] Father     [facility]      Sister Eren [facility]       family history includes Alzheimer's Disease in his father; Diabetes in his maternal grandmother and mother; High Blood Pressure in his sister; Lucendia Fears in his mother. OBJECTIVE:   VITALS:  height is 5' 7\" (1.702 m) and weight is 279 lb (126.6 kg). His oral temperature is 98 °F (36.7 °C). His blood pressure is 113/50 (abnormal) and his pulse is 80. His respiration is 20 and oxygen saturation is 100%. CONSTITUTIONAL:alert, follows commands. SKIN: pale hue. Chronic appearing bruises on arms  HEAD:  Normocephalic, atraumatic  EYES: PERRLA. EOMI. EARS:  Hearing grossly WNL. NOSE:  Nares patent. No rhinorrhea   MOUTH/THROAT: Very dry cracked lips and tongue- with dried blood notes  NECK:supple, no lymphadenopathy, large thick neck  LUNGS: Respirations even and non-labored.  Clear to auscultation bilaterally, no wheezes, rales, or rhonchi. Overall decreased breath sounds. CARDIOVASCULAR: Regular rate and rhythm. +click (s/p valve replacements)  ABDOMEN: soft, non tender, non distended, no masses or organomegaly, morbidly obese  EXTREMITIES: pedal edema. RLE with a dressing intact. IMPRESSIONS:   1. ESRD      Diagnosis Date    Anemia in chronic kidney disease (CKD) 4/27/2016    Arthritis     Bacteremia 11/11/2016    Benign prostatic hyperplasia without lower urinary tract symptoms     BMI 40.0-44.9, adult ([facility] 3/19/2018    Cellulitis of left lower extremity     Chronic cerebral ischemia 1/3/2017    Closed fracture of forearm 8/13/2013    Broken lft forearm     Controlled type 2 diabetes mellitus with chronic kidney disease on chronic dialysis, with long-term current use of insulin ([address]     Controlled type 2 diabetes mellitus with diabetic polyneuropathy, with long-term current use of insulin ([address] 10/7/2018    Decubital ulcer     NON OPEN BUTTOCKS    Dementia     memory problems    Dialysis patient ([facility] 01/03/2017    Goes [facility], Sat in [place]    Difficult intravenous access     HAS NEEDED PICC TEAM IN THE PAST    Difficulty walking     WHEELCHAIR BOUND-PIVOTS WITH ASSIST O F 2    DJD (degenerative joint disease) of knee     Elbow, forearm, and wrist, abrasion or friction burn, without mention of infection 8/13/2013    Abrasions lft forearm     Encephalopathy acute 4/27/2016    Encounter regarding vascular access for dialysis for ESRD ([address] 2/2/2017    ESRD (end stage renal disease) on dialysis ([facility] 1/17/2017    Forgetfulness     HAS SOME SHORT TERM MEMORY LOSS, QUYEN-WIFE IS LEGAL GUARDIAN    H/O transesophageal echocardiography (AMADEO) for monitoring     Hemodialysis patient ([address] 11/11/2016    scar-sat [place]---FRESEMIUS.  TUNNELED CATHETER RT UPPER CHEST    Hyperlipidemia 1990    on Meds    Hypertension 1990    on Meds    Intercritical gout     on Meds    Joint pain, knee 01/13/2017 baldo knee synvisc-1 injections    Long-term insulin use ([address] 1/17/2017    Major depressive disorder with single episode, in partial remission ([address] 1/3/2017    Mobility impaired     Morbid obesity ([address]     Muscle weakness     GRNERALIZED    Obesity     Obstructive sleep apnea     HEATHER on CPAP     On CPAP-TO BRING DOS    Paroxysmal atrial fibrillation ([address] 9/6/2017    Respiratory failure ([address] 03/2016    with open heart surgery, trached x 5 months    S/P cardiac cath     Seborrhea     Severe aortic stenosis 3/9/2016    Severe mitral valve stenosis 3/9/2016    Severe sepsis ([address] 4/3/2016    Skin rash     ABD FOLDS    Stasis dermatitis     Thyroid disease     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 lft thumb     Venous stasis dermatitis     Wears glasses     Wheelchair bound     pivots with 2 assists     PLANS:   1.  Fistulagram    MAGDI  CROCKER CNP  Electronically signed 4/30/2019 at 10:57 AM

## 2019-04-30 NOTE — BRIEF OP NOTE
Brief Postoperative Note    Tuyet Daurte  YOB: 1945  [id number]    Pre-operative Diagnosis: stenosed AV fistula    Post-operative Diagnosis: Same    Procedure: Fistulagram with Venoplasty    Anesthesia: Local    Surgeons/Assistants: Dr. Cameron Wolf    Estimated Blood Loss: minimal    Complications: None    Specimens: Was Not Obtained    Findings: Fistulagram performed with venoplasty. Pressure held to puncture site. Dressing applied. Patient tolerated the procedure well.     Electronically signed by YAMILE Varela on 4/30/2019 at 1:54 PM

## 2019-04-30 NOTE — POST SEDATION
Sedation Post Procedure Note    Patient Name: Tuyet Duarte   YOB: 1945  Room/Bed: Room/bed info not found  Medical Record Number: [id number]  Date: 4/30/2019   Time: 1:53 PM         Physicians/Assistants: Dr. Debi Newsome MD    Procedure Performed:  Niraj Rucker with Venoplasty    Post-Sedation Vital Signs:  Vitals:    04/30/19 1350   BP: 132/60   Pulse: 82   Resp: 20   Temp:    SpO2: 97%      Vital signs were reviewed and were stable after the procedure (see flow sheet for vitals)            Post-Sedation Exam: stable           Complications: none    Electronically signed by YAMILE Varela on 4/30/2019 at 1:53 PM

## 2019-05-01 PROCEDURE — 2709999900 HC NON-CHARGEABLE SUPPLY

## 2019-05-01 PROCEDURE — C1894 INTRO/SHEATH, NON-LASER: HCPCS

## 2019-05-01 PROCEDURE — C1769 GUIDE WIRE: HCPCS

## 2019-05-01 PROCEDURE — C1887 CATHETER, GUIDING: HCPCS

## 2019-05-02 ENCOUNTER — TELEPHONE (OUTPATIENT)
Dept: INTERNAL MEDICINE | Age: 74
End: 2019-05-02

## 2019-05-02 ENCOUNTER — TELEPHONE (OUTPATIENT)
Dept: CARDIOLOGY | Age: 74
End: 2019-05-02

## 2019-05-02 NOTE — TELEPHONE ENCOUNTER
Patient is scheduled for procedure at [facility] to have clot removed from fistula Monday am. They would like cardiologist to hold eliquis and asa for 2 days and bridge with heparin. Surgeon has never seen patient before and doesn't feel comfortable stopping. Orders need faxed to [id number].  Tremaine Byers will call back to give louisa contact information

## 2019-05-03 ENCOUNTER — TELEPHONE (OUTPATIENT)
Dept: CARDIOLOGY | Age: 74
End: 2019-05-03

## 2019-05-03 NOTE — TELEPHONE ENCOUNTER
Pt's wife stopped at window, confused about Lovenox. She has never picked it up yet from pharmacy. She states pt's surgery was cancelled and pt will not go off bloodthinners. I instructed pt/wife to NOT pick it up and NOT take Lovenox at this time, and they accept.

## 2019-05-07 ENCOUNTER — OUTSIDE SERVICES (OUTPATIENT)
Dept: INTERNAL MEDICINE | Age: 74
End: 2019-05-07
Payer: MEDICARE

## 2019-05-07 DIAGNOSIS — F01.50 VASCULAR DEMENTIA WITHOUT BEHAVIORAL DISTURBANCE (HCC): Primary | ICD-10-CM

## 2019-05-07 DIAGNOSIS — E66.01 MORBID OBESITY (HCC): ICD-10-CM

## 2019-05-07 DIAGNOSIS — Z99.2 CONTROLLED TYPE 2 DIABETES MELLITUS WITH CHRONIC KIDNEY DISEASE ON CHRONIC DIALYSIS, WITH LONG-TERM CURRENT USE OF INSULIN (HCC): ICD-10-CM

## 2019-05-07 DIAGNOSIS — E11.22 CONTROLLED TYPE 2 DIABETES MELLITUS WITH CHRONIC KIDNEY DISEASE ON CHRONIC DIALYSIS, WITH LONG-TERM CURRENT USE OF INSULIN (HCC): ICD-10-CM

## 2019-05-07 DIAGNOSIS — N18.6 CONTROLLED TYPE 2 DIABETES MELLITUS WITH CHRONIC KIDNEY DISEASE ON CHRONIC DIALYSIS, WITH LONG-TERM CURRENT USE OF INSULIN (HCC): ICD-10-CM

## 2019-05-07 DIAGNOSIS — Z79.4 CONTROLLED TYPE 2 DIABETES MELLITUS WITH CHRONIC KIDNEY DISEASE ON CHRONIC DIALYSIS, WITH LONG-TERM CURRENT USE OF INSULIN (HCC): ICD-10-CM

## 2019-05-07 DIAGNOSIS — M25.561 RIGHT KNEE PAIN, UNSPECIFIED CHRONICITY: ICD-10-CM

## 2019-05-07 PROCEDURE — 3044F HG A1C LEVEL LT 7.0%: CPT | Performed by: NURSE PRACTITIONER

## 2019-05-07 PROCEDURE — 1123F ACP DISCUSS/DSCN MKR DOCD: CPT | Performed by: NURSE PRACTITIONER

## 2019-05-07 PROCEDURE — 99308 SBSQ NF CARE LOW MDM 20: CPT | Performed by: NURSE PRACTITIONER

## 2019-05-08 NOTE — PROGRESS NOTES
05/07/19  Court Miranda  1945      Chief Complaint  1. Vascular dementia without behavioral disturbance    2. Morbid obesity ([facility]    3. Controlled type 2 diabetes mellitus with chronic kidney disease on chronic dialysis, with long-term current use of insulin ([facility]    4. Right knee pain, unspecified chronicity        HPI: 26-year-old male with progressive dementia morbid obesity diabetes mellitus being seen at the request of his wife. Has concerns regarding his mobility. States she lays in bed most of the day unless he is on a stretcher to go over to dialysis. Concern that his bed does not raise very high and that he is not eating appropriately. Concerns he will choke on his food. He is currently working with speech therapy due to pocketing foods after his recent fistulogram.  Talked with speech therapist and states he is going to release him this Thursday. Question if this is related to anesthesia effect because he is back to his baseline. Wife states he is not able to get up into his wheelchair because he slides out of his wheelchair. Unable to get up into a recliner chair because he tilts himself forward and he slides out. Obviously concern for safety issues. Also wanting me to look at his right knee. States he has occasional pain. She noticed some swelling there over the last week. No recent falls. Patient currently denies any pain to right knee small effusion present.     Allergies   Allergen Reactions    Percocet [Oxycodone-Acetaminophen] Itching and Rash     Also causes shaking    Ampicillin Rash       Past Medical History:   Diagnosis Date    Anemia in chronic kidney disease (CKD) 4/27/2016    Arthritis     Bacteremia 11/11/2016    Benign prostatic hyperplasia without lower urinary tract symptoms     BMI 40.0-44.9, adult ([facility] 3/19/2018    Cellulitis of left lower extremity     Chronic cerebral ischemia 1/3/2017    Closed fracture of forearm 8/13/2013    Broken lft forearm Visit   Medication Sig Dispense Refill    enoxaparin (LOVENOX) 120 MG/0.8ML injection Inject 0.87 mLs into the skin every 12 hours Take 1mg/kg twice a day until Monday 5/6/19 morning- should be held. 6 Syringe 0    sevelamer (RENVELA) 800 MG tablet Take 1 tablet by mouth 3 times daily Give with snack. 2430 tablet 3    loratadine (CLARITIN) 10 MG capsule Take 10 mg by mouth daily      HYDROcodone-acetaminophen (NORCO) 5-325 MG per tablet Take 1 tablet by mouth every 6 hours as needed for Pain.  aspirin 81 MG tablet Take 1 tablet by mouth daily 30 tablet 3    apixaban (ELIQUIS) 5 MG TABS tablet Take 1 tablet by mouth 2 times daily 60 tablet 2    Multiple Vitamins-Minerals (THERAPEUTIC MULTIVITAMIN-MINERALS) tablet Take 1 tablet by mouth daily      omeprazole (PRILOSEC) 20 MG delayed release capsule Take 20 mg by mouth daily      atorvastatin (LIPITOR) 40 MG tablet Take 1 tablet by mouth nightly 30 tablet 3    gabapentin (NEURONTIN) 300 MG capsule Take 1 capsule by mouth daily for 30 days. 30 capsule 3    midodrine (PROAMATINE) 10 MG tablet Take 1 tablet by mouth 3 times daily 90 tablet 3    folic acid (FOLVITE) 1 MG tablet Take 1 tablet by mouth daily 90 tablet 1    insulin glargine (LANTUS) 100 UNIT/ML injection vial Inject 25 Units into the skin 2 times daily 1 vial 3    insulin lispro (HUMALOG) 100 UNIT/ML injection vial Inject 0-18 Units into the skin 3 times daily (with meals) 1 vial 3    insulin lispro (HUMALOG) 100 UNIT/ML injection vial Inject 0-9 Units into the skin nightly 1 vial 3    Misc. Devices [facility]) MISC Use as directed 1 each 0    buPROPion (WELLBUTRIN XL) 150 MG extended release tablet Take 150 mg by mouth every morning      Pollen Extracts (PROSTAT PO) Take 30 mLs by mouth three times daily      sertraline (ZOLOFT) 100 MG tablet Take 100 mg by mouth daily       lidocaine (XYLOCAINE) 5 % ointment Apply topically three times a week Apply topically as needed.  DIALYSIS DAYS  Amino Acids-Protein Hydrolys (PRO-STAT PO) Take 30 mLs by mouth 3 times daily SUGAR FREE       ipratropium-albuterol (DUONEB) 0.5-2.5 (3) MG/3ML SOLN nebulizer solution Inhale 1 vial into the lungs every 6 hours as needed       Skin Protectants, Misc. (HYDROCERIN) CREA cream Apply topically nightly      donepezil (ARICEPT) 5 MG tablet Take 5 mg by mouth every evening      levothyroxine (SYNTHROID) 25 MCG tablet Take 1 tablet by mouth Daily 30 tablet 2    albuterol (PROVENTIL) (2.5 MG/3ML) 0.083% nebulizer solution Take 3 mLs by nebulization every 8 hours as needed for Wheezing 360 each 3    fluticasone (FLONASE) 50 MCG/ACT nasal spray 2 sprays by Nasal route daily 3 Bottle 3    magnesium hydroxide (MILK OF MAGNESIA) 400 MG/5ML suspension Take 30 mLs by mouth daily as needed for Constipation       No current facility-administered medications on file prior to visit.         Social History     Socioeconomic History    Marital status:      Spouse name: Rickie Shaw Number of children: 2    Years of education: Not on file    Highest education level: Not on file   Occupational History    Occupation: Retired      Employer: [employer] resource strain: Not on file    Food insecurity:     Worry: Not on file     Inability: Not on file    Transportation needs:     Medical: Not on file     Non-medical: Not on file   Tobacco Use    Smoking status: Former Smoker     Packs/day: 2.00     Years: 25.00     Pack years: 50.00     Types: Cigarettes     Start date: 3/17/1955     Last attempt to quit: 1980     Years since quittin.3    Smokeless tobacco: Never Used   Substance and Sexual Activity    Alcohol use: No     Alcohol/week: 0.0 oz     Comment: 1-2 drinks per year    Drug use: No    Sexual activity: Never   Lifestyle    Physical activity:     Days per week: Not on file     Minutes per session: Not on file    Stress: Not on file confused- alert to name and place today      Skin: Skin is warm and dry. Capillary refill takes less than 2 seconds. No rash noted. He is not diaphoretic. No erythema. No pallor. Psychiatric: He has a normal mood and affect. His behavior is normal. Judgment and thought content normal.   Nursing note and vitals reviewed. Vital signs: Temperature 96.9°F, blood pressure 105/47, pulse 86, respirations 16, SPO2 99% on room air    Assessment:  1. Vascular dementia without behavioral disturbance  Stable     2. Morbid obesity ([address]  Work on diet   Dietician to eval and treat     3. Controlled type 2 diabetes mellitus with chronic kidney disease on chronic dialysis, with long-term current use of insulin ([facility])  Stable     4. Right knee pain, unspecified chronicity  X ray right knee       Plan:  As noted above. Pt to have a bariatric bed that is able to get him to sit upright to eat his meals  Also discussed getting pt a customized wheelchair to help prevent him sliding out and causing further injury to lower extremities. Follow up for routine visit. Call sooner with concerns prior.     Electronically signed by SHAMEKA Carrizales CNP on 5/7/2019 at 10:09 PM

## 2019-05-09 ENCOUNTER — OFFICE VISIT (OUTPATIENT)
Dept: CARDIOLOGY | Age: 74
End: 2019-05-09
Payer: MEDICARE

## 2019-05-09 VITALS
HEART RATE: 77 BPM | OXYGEN SATURATION: 100 % | WEIGHT: 279 LBS | DIASTOLIC BLOOD PRESSURE: 71 MMHG | HEIGHT: 67 IN | SYSTOLIC BLOOD PRESSURE: 143 MMHG | RESPIRATION RATE: 14 BRPM | BODY MASS INDEX: 43.79 KG/M2

## 2019-05-09 DIAGNOSIS — I48.0 PAF (PAROXYSMAL ATRIAL FIBRILLATION) (HCC): Primary | ICD-10-CM

## 2019-05-09 PROCEDURE — 1036F TOBACCO NON-USER: CPT | Performed by: INTERNAL MEDICINE

## 2019-05-09 PROCEDURE — 3017F COLORECTAL CA SCREEN DOC REV: CPT | Performed by: INTERNAL MEDICINE

## 2019-05-09 PROCEDURE — G8427 DOCREV CUR MEDS BY ELIG CLIN: HCPCS | Performed by: INTERNAL MEDICINE

## 2019-05-09 PROCEDURE — 4040F PNEUMOC VAC/ADMIN/RCVD: CPT | Performed by: INTERNAL MEDICINE

## 2019-05-09 PROCEDURE — 1123F ACP DISCUSS/DSCN MKR DOCD: CPT | Performed by: INTERNAL MEDICINE

## 2019-05-09 PROCEDURE — G8417 CALC BMI ABV UP PARAM F/U: HCPCS | Performed by: INTERNAL MEDICINE

## 2019-05-09 PROCEDURE — 99213 OFFICE O/P EST LOW 20 MIN: CPT | Performed by: INTERNAL MEDICINE

## 2019-05-09 NOTE — PROGRESS NOTES
Today's Date: 5/9/2019  Patient Name: Humza Lawrence  Patient's age: 75 y.o., 1945          The patient is a 75 y.o.  male is in the office for f/u, Patient has been doing well cardiac wise, no new cardiac complaints. He denies angina, PND/Orthopnea. He still has right lower leg wound, seems to be healing.     Past Medical History:   has a past medical history of Anemia in chronic kidney disease (CKD), Arthritis, Bacteremia, Benign prostatic hyperplasia without lower urinary tract symptoms, BMI 40.0-44.9, adult ([facility]), Cellulitis of left lower extremity, Chronic cerebral ischemia, Closed fracture of forearm, Controlled type 2 diabetes mellitus with chronic kidney disease on chronic dialysis, with long-term current use of insulin ([address] Controlled type 2 diabetes mellitus with diabetic polyneuropathy, with long-term current use of insulin ([address] Decubital ulcer, Dementia, Dialysis patient ([address] Difficult intravenous access, Difficulty walking, DJD (degenerative joint disease) of knee, Elbow, forearm, and wrist, abrasion or friction burn, without mention of infection, Encephalopathy acute, Encounter regarding vascular access for dialysis for ESRD ([address] ESRD (end stage renal disease) on dialysis ([address] Forgetfulness, H/O transesophageal echocardiography ([facility]) for monitoring, Hemodialysis patient ([address] Hyperlipidemia, Hypertension, Intercritical gout, Joint pain, knee, Long-term insulin use ([address] Major depressive disorder with single episode, in partial remission ([address] Mobility impaired, Morbid obesity ([address] Muscle weakness, Obesity, Obstructive sleep apnea, HEATHER on CPAP, Paroxysmal atrial fibrillation ([address] Respiratory failure ([address] S/P cardiac cath, Seborrhea, Severe aortic stenosis, Severe mitral valve stenosis, Severe sepsis ([facility]), Skin rash, Stasis dermatitis, Thyroid disease, Traumatic amputation of thumb, Venous stasis dermatitis, Wears glasses, and Wheelchair albuterol (PROVENTIL) (2.5 MG/3ML) 0.083% nebulizer solution Take 3 mLs by nebulization every 8 hours as needed for Wheezing 10/24/16   Octavio Mcarthur DO   fluticasone (FLONASE) 50 MCG/ACT nasal spray 2 sprays by Nasal route daily 10/24/16   Octavio Mcarthur DO   magnesium hydroxide (MILK OF MAGNESIA) 400 MG/5ML suspension Take 30 mLs by mouth daily as needed for Constipation 3/29/16   Viviana Cross MD       Allergies:  Percocet [oxycodone-acetaminophen] and Ampicillin    Social History:   reports that he quit smoking about 39 years ago. His smoking use included cigarettes. He started smoking about 64 years ago. He has a 50.00 pack-year smoking history. He has never used smokeless tobacco. He reports that he does not drink alcohol or use drugs. REVIEW OF SYSTEMS:  CONSTITUTIONAL:NEGATIVE  HEENT:NEG  Cardiovascular: No chest pain, Yes dyspnea on exertion, No palpitations. Lower extremity edema: No  RESPIRATORY: BAHENA  GASTROINTESTINAL:  negative  GENITOURINARY:  negative  INTEGUMENT:  negative  MUSCULOSKELETAL:  positive for  pain  NEUROLOGICAL:  negative    PHYSICAL EXAM:      BP (!) 143/71   Pulse 77   Resp 14   Ht 5' 7\" (1.702 m)   Wt 279 lb (126.6 kg)   SpO2 100%   BMI 43.70 kg/m²    HEENT: PERRL, no cervical lymphadenopathy. No masses palpable. Cardiovascular:  · The apical impulse is not displaced  · Heart  Sounds:RRR, KAITLIN  · Jugular venous pulsation Normal  · The carotid upstroke is NL  · Peripheral pulses are symmetrical and full  Respiratory: Good respiratory effort. On auscultation: clear to auscultation bilaterally  Abdomen:  · No masses or tenderness  · Bowel sounds present  Extremities:  ·  No Cyanosis or Clubbing  ·  Lower extremity edema: No  Skin: Warm and dry    Cardiac data:    Echo 3/8/2019:  Summary  Technically difficult study due to patients body habitus. Left ventricle is normal in size with hyperdynamic systolic function.   Estimated ejection fraction is 60-65 % . Mildly increased velocities are seen in the LV cavity likely secondary to  the hypercontracility of the left ventricle. Mild left ventricular hypertrophy. Left atrium is moderately dilated. Right atrial dilatation. Bioprosthetic AVR is seen in aortic position. Mild aortic insufficiency. Bioprosthetic MVR is seen in mitral position. Averaged mean gradient of 7 mmHg is seen which is mildly elevated and may  suggests some mitral stenosis. Mild tricuspid regurgitation. RVSP of 33. Trivial pulmonic insufficiency. Labs:     CBC: No results for input(s): WBC, HGB, HCT, PLT in the last 72 hours. BMP: No results for input(s): NA, K, CO2, BUN, CREATININE, LABGLOM, GLUCOSE in the last 72 hours. PT/INR: No results for input(s): PROTIME, INR in the last 72 hours. FASTING LIPID PANEL:  Lab Results   Component Value Date    HDL 54 04/27/2015    TRIG 100 03/22/2016     LIVER PROFILE:No results for input(s): AST, ALT, LABALBU in the last 72 hours.     IMPRESSION:    PAF  S/P AVR, MV REPAIR, closure of ASD/foramen ovale with Dacron patch 3/2016    CKD/ESRD ON HD  DM II  MORBID OBESITY  HEATHER  A/c with NOAC  RIGHT LEG WOUND S/P FALL/HEMATOMA    Patient Active Problem List   Diagnosis    Traumatic amputation of thumb    Essential hypertension    Mixed hyperlipidemia    Obstructive sleep apnea    Morbid obesity ([address]    Benign prostatic hyperplasia without lower urinary tract symptoms    Venous stasis dermatitis    Severe aortic stenosis    Severe mitral valve stenosis    Anemia in chronic kidney disease, on chronic dialysis ([address]    Dialysis patient ([address]    Dementia    Major depressive disorder with single episode, in partial remission ([address]    Chronic cerebral ischemia    ESRD (end stage renal disease) on dialysis ([address]    Long-term insulin use ([address]    BMI 40.0-44.9, adult ([address]    Controlled type 2 diabetes mellitus with chronic kidney disease on chronic dialysis, with long-term current use of insulin ([facility]    Controlled type 2 diabetes mellitus with diabetic polyneuropathy, with long-term current use of insulin (HCC)    Dermatitis    Bruising    Hematoma of right lower extremity    Hematoma of groin    Acute blood loss anemia    Accidental fall from wheelchair    Traumatic hemorrhagic shock (HCC)    Cellulitis    Open wound    Hematoma of right thigh    Wound of right leg    Bleeding from wound    Open wound of right lower extremity    Acute blood loss anemia       RECOMMENDATIONS:  Discussed medication use and possible side effects. Recommend increase physical activity as tolerated. Discussed salt and diet. Answered all questions and concerns. Refill medications as needed until next visit. RTC appointment is scheduled.   CALORIE RESTRICTION  WEIGHT LOSS  CONTINUE CURRENT TREATMENT    [facility] [address] Cardiology Consult           [phone]

## 2019-05-10 ENCOUNTER — OFFICE VISIT (OUTPATIENT)
Dept: SURGERY | Age: 74
End: 2019-05-10
Payer: MEDICARE

## 2019-05-10 VITALS
OXYGEN SATURATION: 97 % | HEIGHT: 67 IN | BODY MASS INDEX: 43.69 KG/M2 | DIASTOLIC BLOOD PRESSURE: 68 MMHG | RESPIRATION RATE: 18 BRPM | SYSTOLIC BLOOD PRESSURE: 127 MMHG | HEART RATE: 78 BPM

## 2019-05-10 DIAGNOSIS — S81.801A WOUND OF RIGHT LOWER EXTREMITY, INITIAL ENCOUNTER: Primary | ICD-10-CM

## 2019-05-10 PROCEDURE — 4040F PNEUMOC VAC/ADMIN/RCVD: CPT | Performed by: PLASTIC SURGERY

## 2019-05-10 PROCEDURE — 1123F ACP DISCUSS/DSCN MKR DOCD: CPT | Performed by: PLASTIC SURGERY

## 2019-05-10 PROCEDURE — G8427 DOCREV CUR MEDS BY ELIG CLIN: HCPCS | Performed by: PLASTIC SURGERY

## 2019-05-10 PROCEDURE — G8417 CALC BMI ABV UP PARAM F/U: HCPCS | Performed by: PLASTIC SURGERY

## 2019-05-10 PROCEDURE — 99213 OFFICE O/P EST LOW 20 MIN: CPT | Performed by: PLASTIC SURGERY

## 2019-05-10 PROCEDURE — 3017F COLORECTAL CA SCREEN DOC REV: CPT | Performed by: PLASTIC SURGERY

## 2019-05-10 PROCEDURE — 1036F TOBACCO NON-USER: CPT | Performed by: PLASTIC SURGERY

## 2019-05-10 NOTE — PROGRESS NOTES
Pt seen with Dr. Jose Winn per Dr. Priscilla Collazo request.   See flow sheet. Wound photo of RLE ulcer obtained. See wound flowsheet.

## 2019-05-10 NOTE — PATIENT INSTRUCTIONS
No change to wound care to right lower leg or right inguinal wounds. Follow up as scheduled. SIGNS OF INFECTION  - Redness, swelling, skin hot  - Wound bed turns black or stringy yellow  - Foul odor  - Increased drainage or pus  - Increased pain  - Fever greater than 100F    CALL YOUR DOCTOR OR SEEK MEDICAL ATTENTION IF SIGNS OF INFECTION. DO NOT WAIT UNTIL YOUR NEXT APPOINTMENT    Call the Wound Care Nurse with any other questions or concerns- [phone].

## 2019-05-14 ENCOUNTER — OFFICE VISIT (OUTPATIENT)
Dept: WOUND CARE | Age: 74
End: 2019-05-14
Payer: MEDICARE

## 2019-05-14 VITALS
SYSTOLIC BLOOD PRESSURE: 96 MMHG | RESPIRATION RATE: 14 BRPM | TEMPERATURE: 98.4 F | DIASTOLIC BLOOD PRESSURE: 58 MMHG | HEART RATE: 76 BPM

## 2019-05-14 DIAGNOSIS — S80.11XA HEMATOMA OF RIGHT LOWER EXTREMITY, INITIAL ENCOUNTER: Primary | ICD-10-CM

## 2019-05-14 DIAGNOSIS — L89.322 DECUBITUS ULCER OF LEFT BUTTOCK, STAGE 2 (HCC): ICD-10-CM

## 2019-05-14 DIAGNOSIS — L89.312 DECUBITUS ULCER OF RIGHT BUTTOCK, STAGE 2 (HCC): ICD-10-CM

## 2019-05-14 DIAGNOSIS — S31.109A WOUND OF RIGHT GROIN, INITIAL ENCOUNTER: ICD-10-CM

## 2019-05-14 PROCEDURE — 97597 DBRDMT OPN WND 1ST 20 CM/<: CPT | Performed by: SURGERY

## 2019-05-14 RX ORDER — DULOXETIN HYDROCHLORIDE 60 MG/1
60 CAPSULE, DELAYED RELEASE ORAL DAILY
COMMUNITY
End: 2021-01-01

## 2019-05-14 NOTE — PATIENT INSTRUCTIONS
Continue normal saline moist to dry dressing changes daily and as needed to right lower leg and right groin wounds. Continue pressure ulcer prevention interventions. Apply zinc oxide/dimethicone barrier ointment to buttocks & paolo area daily. Consider using dimethicone wipes such as Comfort Shields for toileting hygiene. Taking good care of your skin is important to prevent wounds, skin tears and superficial pressure ulcers. Many factors contribute to dry, easily damaged skin; medications, age, venous disease, lymphedema, diabetes, incontinence and others. Your skin is normally slightly acidic and has bacteria on it which are very important. Many soaps, lotions and perfumes are alkaline upsetting the pH balance or they strip away the normal bacteria. You should use a pH balanced (non-alkaline) cleanser to wash yourself. Pat yourself dry instead of rubbing vigorously. Moisturize your skin with a non-perfumed moisturizer, preferably hypoallergenic. Examples of pH balanced cleansers are baby skin cleansers, Miles , Cetaphil, Summer's Zaria cleanser, and pHisoDerm. Generic equivalents are fine. If you develop a small break in your skin, apply a topical antibiotic cream or ointment 2 times a day and cover with a band-aid. If it has not healed in 3 days or shows signs of infection make an appointment. If it is recommended that you use a barrier cream on your skin, many different baby diaper rash products are available. Preferably look for one with Dimethicone as the major ingredient as it lasts through several clean-ups and does not require scrubbing to remove as zinc oxide ointments may. Follow up with Dr. Ozzie Sanchez as scheduled. SIGNS OF INFECTION  - Redness, swelling, skin hot  - Wound bed turns black or stringy yellow  - Foul odor  - Increased drainage or pus  - Increased pain  - Fever greater than 100F    CALL YOUR DOCTOR OR SEEK MEDICAL ATTENTION IF SIGNS OF INFECTION.   DO NOT WAIT UNTIL YOUR NEXT APPOINTMENT    Call the Wound Care Nurse with any other questions or concerns- [phone]

## 2019-05-14 NOTE — PROGRESS NOTES
Follow-Up Wound Progress Note  Roxi Lopez  AGE: 75 y.o. GENDER: male  : 1945  TODAY'S DATE:  2019  Subjective:    Roxi Lopez is a 75 y.o. male who presents today for wound check. Wound Etiology : 1. Trauma to right lower leg         2. Right groin, s/p cath         3. Sacral wounds from pressure  Wound Location :  1. Right lower leg  2. Right groin  3. Sacral area  Abx :No Cultures :wereobtained  The patient denies any problems since last visit. The patients pain is 0  .   Problem list:    Patient Active Problem List   Diagnosis    Traumatic amputation of thumb    Essential hypertension    Mixed hyperlipidemia    Obstructive sleep apnea    [facility]    Benign prostatic hyperplasia without lower urinary tract symptoms    Venous stasis dermatitis    Severe aortic stenosis    Severe mitral valve stenosis    Anemia in chronic kidney disease, on chronic dialysis ([facility]    Dialysis patient ([facility]    Dementia    Major depressive disorder with single episode, in partial remission ([facility]    Chronic cerebral ischemia    ESRD (end stage renal disease) on dialysis ([facility]    Long-term insulin use ([facility]    BMI 40.0-44.9, adult ([address]    Controlled type 2 diabetes mellitus with chronic kidney disease on chronic dialysis, with long-term current use of insulin ([facility])    Controlled type 2 diabetes mellitus with diabetic polyneuropathy, with long-term current use of insulin ([facility])    Dermatitis    Bruising    Hematoma of right lower extremity    Hematoma of groin    Acute blood loss anemia    Accidental fall from wheelchair    Traumatic hemorrhagic shock (HCC)    Cellulitis    Open wound    Hematoma of right thigh    Wound of right leg    Bleeding from wound    Open wound of right lower extremity    Acute blood loss anemia       Assessment:      BP (!) 96/58   Pulse 76   Temp 98.4 °F (36.9 °C) (Tympanic)   Resp 14   Wound Reference Date is when first assessed. Measurements shown are from today's visit.     Wound   BP (!) 96/58   Pulse 76   Temp 98.4 °F (36.9 °C) (Tympanic)   Resp 14     Wound   #1 appears improved compared to last recheck  #2 appears improved  #3 sacral is new  Wound 03/08/19 Buttocks Left;Right;Upper deep tissue, denuded, red (Active)   Number of days: 66       Wound 03/08/19 Scrotum Posterior (Active)   Number of days: 66       Wound 03/22/19 Thigh Right;Medial;Proximal (Active)   Number of days: 52       Wound 04/02/19 #2 Right inguinal area (Active)   Wound Other 4/2/2019 11:50 AM   Dressing/Treatment Moist to dry 5/10/2019  3:28 PM   Wound Cleansed Rinsed/Irrigated with saline 5/10/2019  3:28 PM   Wound Length (cm) 4.2 cm 5/14/2019 10:03 AM   Wound Width (cm) 0.4 cm 5/14/2019 10:03 AM   Wound Depth (cm) 3.3 cm 5/14/2019 10:03 AM   Wound Surface Area (cm^2) 1.68 cm^2 5/14/2019 10:03 AM   Change in Wound Size % (l*w) 30 5/14/2019 10:03 AM   Wound Volume (cm^3) 5.54 cm^3 5/14/2019 10:03 AM   Wound Healing % 70 5/14/2019 10:03 AM   Distance Tunneling (cm) 4.4 cm 5/14/2019 10:03 AM   Tunneling Position ___ O'Clock 2 5/14/2019 10:03 AM   Wound Assessment Red 5/10/2019  3:28 PM   Drainage Amount Moderate 5/10/2019  3:28 PM   Drainage Description Serosanguinous 5/10/2019  3:28 PM   Odor None 5/10/2019  3:28 PM   Margins Defined edges 5/10/2019  3:28 PM   Gwendolyn-wound Assessment Clean;Dry 5/10/2019  3:28 PM   Non-staged Wound Description Full thickness 5/10/2019  3:28 PM   Red%Wound Bed 100 5/10/2019  3:28 PM   Number of days: 42       Wound 05/14/19 Buttocks (Active)   Wound Length (cm) 8 cm 5/14/2019 10:03 AM   Wound Width (cm) 11 cm 5/14/2019 10:03 AM   Wound Depth (cm) 0.1 cm 5/14/2019 10:03 AM   Wound Surface Area (cm^2) 88 cm^2 5/14/2019 10:03 AM   Wound Volume (cm^3) 8.8 cm^3 5/14/2019 10:03 AM   Number of days: 0        #1 right lower leg          #2 Rt Groin        Procedure:     Lidocaine gel soaked gauze was applied at beginning of wound evaluation. The wound(s) right groin was debrided excisionally, with removal of fibrotic, necrotic, and hyperkeratotic tissue, including a layer of surrounding healthy tissue down through and including partial thickness tissue,using curette for a total surface area of 2 cm2.100%% of the wound was debrided. There was minimal bleeding that was controlled with pressure. Patient tolerated procedure well and was given proper instruction. # 3 sacral         Impression:    1. Right lower leg wound    The wound looks improved. Extensive continuing to epithelialize. About half of the wound is covered up. There are epithelial buds throughout the wound. He is still seeing Dr. Sonja Maloney back for possible skin graft. We'll continue to place wet to dry over the right lower leg wound. 2.  Right groin wound   's wound looks much improved. The tunneling is almost gone. It's got very good granulation tissue. There is no sign of any drainage or pus or fistula. There is no odor today. 3.  Sacral wound   This is a new wound for us. He said he got this about a week ago. He thought it might be due to a bedpan. However, it symmetrical on both buttocks and issue tuberosities. It appears to be pressure related. He does have a level II air mattress. He also does move from side-to-side. He is incontinent of stool once or twice a day, but it usually is cleaned up quickly and is not sitting on it. Plan for wound:    #1 Rt lower leg wound:   -  Cont wet to dry daily    #2 Rt groin wound   -  Cont wet to dry daily    #  Sacral wound   -  Apply barrier cream daily      4.   Then follow up in [facility] in 2 weeks        Continue to follow up with dr. Alfred Aguilar as he directs                Electronically signed by Lizzette Chaves MD on 5/14/2019 at 10:35 AM

## 2019-05-15 ENCOUNTER — TELEPHONE (OUTPATIENT)
Dept: WOUND CARE | Age: 74
End: 2019-05-15

## 2019-05-16 ASSESSMENT — ENCOUNTER SYMPTOMS
SHORTNESS OF BREATH: 0
TROUBLE SWALLOWING: 0
ABDOMINAL PAIN: 0
COUGH: 0

## 2019-05-21 PROCEDURE — 99309 SBSQ NF CARE MODERATE MDM 30: CPT | Performed by: INTERNAL MEDICINE

## 2019-05-22 ENCOUNTER — TELEPHONE (OUTPATIENT)
Dept: INTERNAL MEDICINE | Age: 74
End: 2019-05-22

## 2019-05-24 ENCOUNTER — OUTSIDE SERVICES (OUTPATIENT)
Dept: INTERNAL MEDICINE | Age: 74
End: 2019-05-24
Payer: MEDICARE

## 2019-05-24 DIAGNOSIS — S30.1XXD HEMATOMA OF GROIN, SUBSEQUENT ENCOUNTER: ICD-10-CM

## 2019-05-24 DIAGNOSIS — I48.0 PAROXYSMAL ATRIAL FIBRILLATION (HCC): ICD-10-CM

## 2019-05-24 DIAGNOSIS — Z79.4 LONG-TERM INSULIN USE (HCC): ICD-10-CM

## 2019-05-24 DIAGNOSIS — E11.22 CONTROLLED TYPE 2 DIABETES MELLITUS WITH CHRONIC KIDNEY DISEASE ON CHRONIC DIALYSIS, WITH LONG-TERM CURRENT USE OF INSULIN (HCC): ICD-10-CM

## 2019-05-24 DIAGNOSIS — N18.6 ANEMIA IN CHRONIC KIDNEY DISEASE, ON CHRONIC DIALYSIS (HCC): ICD-10-CM

## 2019-05-24 DIAGNOSIS — E66.01 MORBID OBESITY (HCC): ICD-10-CM

## 2019-05-24 DIAGNOSIS — Z99.2 DIALYSIS PATIENT (HCC): ICD-10-CM

## 2019-05-24 DIAGNOSIS — F01.50 VASCULAR DEMENTIA WITHOUT BEHAVIORAL DISTURBANCE (HCC): ICD-10-CM

## 2019-05-24 DIAGNOSIS — Z99.2 CONTROLLED TYPE 2 DIABETES MELLITUS WITH CHRONIC KIDNEY DISEASE ON CHRONIC DIALYSIS, WITH LONG-TERM CURRENT USE OF INSULIN (HCC): ICD-10-CM

## 2019-05-24 DIAGNOSIS — F32.4 MAJOR DEPRESSIVE DISORDER WITH SINGLE EPISODE, IN PARTIAL REMISSION (HCC): ICD-10-CM

## 2019-05-24 DIAGNOSIS — Z99.2 ESRD (END STAGE RENAL DISEASE) ON DIALYSIS (HCC): ICD-10-CM

## 2019-05-24 DIAGNOSIS — N18.6 ESRD (END STAGE RENAL DISEASE) ON DIALYSIS (HCC): ICD-10-CM

## 2019-05-24 DIAGNOSIS — E78.2 MIXED HYPERLIPIDEMIA: ICD-10-CM

## 2019-05-24 DIAGNOSIS — D63.1 ANEMIA IN CHRONIC KIDNEY DISEASE, ON CHRONIC DIALYSIS (HCC): ICD-10-CM

## 2019-05-24 DIAGNOSIS — Z99.2 ANEMIA IN CHRONIC KIDNEY DISEASE, ON CHRONIC DIALYSIS (HCC): ICD-10-CM

## 2019-05-24 DIAGNOSIS — I35.0 SEVERE AORTIC STENOSIS: ICD-10-CM

## 2019-05-24 DIAGNOSIS — E11.42 CONTROLLED TYPE 2 DIABETES MELLITUS WITH DIABETIC POLYNEUROPATHY, WITH LONG-TERM CURRENT USE OF INSULIN (HCC): ICD-10-CM

## 2019-05-24 DIAGNOSIS — G47.33 OBSTRUCTIVE SLEEP APNEA SYNDROME: ICD-10-CM

## 2019-05-24 DIAGNOSIS — Z79.4 CONTROLLED TYPE 2 DIABETES MELLITUS WITH CHRONIC KIDNEY DISEASE ON CHRONIC DIALYSIS, WITH LONG-TERM CURRENT USE OF INSULIN (HCC): ICD-10-CM

## 2019-05-24 DIAGNOSIS — I10 ESSENTIAL HYPERTENSION: ICD-10-CM

## 2019-05-24 DIAGNOSIS — S81.801D WOUND OF RIGHT LOWER EXTREMITY, SUBSEQUENT ENCOUNTER: Primary | ICD-10-CM

## 2019-05-24 DIAGNOSIS — I05.0 SEVERE MITRAL VALVE STENOSIS: ICD-10-CM

## 2019-05-24 DIAGNOSIS — Z79.4 CONTROLLED TYPE 2 DIABETES MELLITUS WITH DIABETIC POLYNEUROPATHY, WITH LONG-TERM CURRENT USE OF INSULIN (HCC): ICD-10-CM

## 2019-05-24 DIAGNOSIS — N18.6 CONTROLLED TYPE 2 DIABETES MELLITUS WITH CHRONIC KIDNEY DISEASE ON CHRONIC DIALYSIS, WITH LONG-TERM CURRENT USE OF INSULIN (HCC): ICD-10-CM

## 2019-05-25 NOTE — PROGRESS NOTES
DR. 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 He is on Coumadin for atrial fibrillation. He is on Zoloft for depression. He still has the ulceration of his right leg and in his groin. He is continuing to see Dr. José Miguel Mason for wound care. The wounds are starting to heal. There are no other complaints at this time. ALLERGIES:   Allergies   Allergen Reactions    Percocet [Oxycodone-Acetaminophen] Itching and Rash     Also causes shaking    Ampicillin Rash        MEDICATIONS: As noted on the [facility], referenced and incorporated herein.     PAST MEDICAL HISTORY:   Past Medical History:   Diagnosis Date    Anemia in chronic kidney disease (CKD) 4/27/2016    Arthritis     Bacteremia 11/11/2016    Benign prostatic hyperplasia without lower urinary tract symptoms     BMI 40.0-44.9, adult ([address] 3/19/2018    Cellulitis of left lower extremity     Chronic cerebral ischemia 1/3/2017    Closed fracture of forearm 8/13/2013    Broken lft forearm     Controlled type 2 diabetes mellitus with chronic kidney disease on chronic dialysis, with long-term current use of insulin ([address]     Controlled type 2 diabetes mellitus with diabetic polyneuropathy, with long-term current use of insulin ([address] 10/7/2018    Decubital ulcer     NON OPEN BUTTOCKS    Dementia     memory problems    Dialysis patient ([address] 01/03/2017    Goes Tue, Thurs, Sat in [place]    Difficult intravenous access     HAS NEEDED PICC TEAM IN THE PAST    Difficulty walking     WHEELCHAIR BOUND-PIVOTS WITH ASSIST O F 2    DJD (degenerative joint disease) of knee     Elbow, forearm, and wrist, abrasion or friction burn, without mention of infection 8/13/2013    Abrasions lft forearm     Encephalopathy acute 4/27/2016    Encounter regarding vascular access for dialysis for ESRD ([facility] 2/2/2017    ESRD (end stage renal disease) on dialysis ([address] 1/17/2017    Forgetfulness     HAS SOME SHORT TERM MEMORY LOSS, QUYEN-WIFE IS LEGAL GUARDIAN    H/O transesophageal echocardiography ([facility]) for monitoring     Hemodialysis patient ([address] 11/11/2016    tues-thurs-sat [place]---[facility].  TUNNELED CATHETER RT UPPER CHEST    Hyperlipidemia 1990    on Meds    Hypertension 1990    on Meds    Intercritical gout     on Meds    Joint pain, knee 01/13/2017    baldo knee synvisc-1 injections    Long-term insulin use ([address] 1/17/2017    Major depressive disorder with single episode, in partial remission ([address] 1/3/2017    Mobility impaired     Morbid obesity ([address]     Muscle weakness     GRNERALIZED    Obesity     Obstructive sleep apnea     HEATHER on CPAP     On CPAP-TO BRING DOS    Paroxysmal atrial fibrillation ([address] 9/6/2017    Respiratory failure ([address] 03/2016    with open heart surgery, trached x 5 months    S/P cardiac cath     Seborrhea     Severe aortic stenosis 3/9/2016    Severe mitral valve stenosis 3/9/2016    Severe sepsis ([address] 4/3/2016    Skin rash     ABD FOLDS    Stasis dermatitis     Thyroid disease     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 lft thumb     Venous stasis dermatitis     Wears glasses     Wheelchair bound     pivots with 2 assists       PAST SURGICAL HISTORY:   Past Surgical History:   Procedure Laterality Date    AORTIC VALVE REPLACEMENT  03/18/2016    bioprosthetic    ARM SURGERY Left 1990    CARDIAC CATHETERIZATION      CENTRAL VENOUS CATHETER Right 02/21/2018    DIALYSIS CATHETER Dr Erasmo Henderson    COLONOSCOPY  11/19/09    COLONOSCOPY N/A 10/12/2018    COLONOSCOPY WITH BIOPSY performed by Teresa Akbar DO at [facility] Right 3/10/2019    WASHOUT RIGHT LOWER EXTREMITY WITH WOUND VAC EXCHANGE performed by Familia Staley MD at [facility] Right 3/8/2019    RIGHT LOWER EXTREMITY EXPLORATION, HEMATOMA EVACUATION, WOUND VAC PLACEMENT performed by Familia Staley MD at [address] (x10-12 surgeries)    several surgeries on left arm    KNEE ARTHROSCOPY Left 99    MITRAL VALVE REPLACEMENT  2016    bioprosthetic     NASAL SEPTUM SURGERY      OTHER SURGICAL HISTORY  3/18/16    Aortic root Enlargement    OTHER SURGICAL HISTORY  3/18/16    ASD Closure    OTHER SURGICAL HISTORY Right 2017    AV fistula creation, wrist    OTHER SURGICAL HISTORY Right 2017    ligation of collateral branch of av fistula right wrist    OTHER SURGICAL HISTORY  2018    FISTULAGRAM WITH DR. Reyna Bojorquez    MA EGD TRANSORAL BIOPSY SINGLE/MULTIPLE N/A 10/17/2017    EGD BIOPSY performed by Allison Eddy MD at [facility] ANGIOACCESS AV FISTULA Right 2017     RIGHT  LOWER ARM AV FISTULA  LIGATION OF AQUIRED BRANCHES X2 performed by Dutch Aviles DO at [address]  3/18/16    median    VASCULAR SURGERY  2018    Right arm fistulagram,  PTA cephalic vein stenosis  /  DR Jamee Alcantara       SOCIAL HISTORY:     Tobacco:   Social History     Tobacco Use   Smoking Status Former Smoker    Packs/day: 2.00    Years: 25.00    Pack years: 50.00    Types: Cigarettes    Start date: 3/17/1955    Last attempt to quit: 1980    Years since quittin.4   Smokeless Tobacco Never Used     Alcohol:   Social History     Substance and Sexual Activity   Alcohol Use No    Alcohol/week: 0.0 oz    Comment: 1-2 drinks per year     Drugs:   Social History     Substance and Sexual Activity   Drug Use No       FAMILY HISTORY: family history includes Alzheimer's Disease in his Major depressive disorder with single episode, in partial remission ([facility]     15. Essential hypertension     16. Mixed hyperlipidemia     17. Morbid obesity ([facility]     18. BMI 40.0-44.9, adult ([facility]           PLAN:    1. Continue current treatment  2. Nursing home record reviewed and updates summarized and entered into electronic record  3. Nursing home blood sugar logs and diabetic medication administration reviewed. No changes. 4. Coumadin management is ongoing. We are the ones managing his Coumadin. 5. He will continue to see Dr. Army Perkins for wound care treatment. 6. See nursing home orders and MAR.       Electronically signed by Aakash Negrete DO on 5/24/2019 at 11:32 PM  Internal Medicine

## 2019-06-04 ENCOUNTER — OFFICE VISIT (OUTPATIENT)
Dept: WOUND CARE | Age: 74
End: 2019-06-04
Payer: MEDICARE

## 2019-06-04 VITALS
RESPIRATION RATE: 18 BRPM | OXYGEN SATURATION: 97 % | HEART RATE: 74 BPM | SYSTOLIC BLOOD PRESSURE: 132 MMHG | DIASTOLIC BLOOD PRESSURE: 71 MMHG | TEMPERATURE: 96.7 F

## 2019-06-04 DIAGNOSIS — L89.322 DECUBITUS ULCER OF LEFT BUTTOCK, STAGE 2 (HCC): ICD-10-CM

## 2019-06-04 DIAGNOSIS — S80.11XA HEMATOMA OF RIGHT LOWER EXTREMITY, INITIAL ENCOUNTER: Primary | ICD-10-CM

## 2019-06-04 DIAGNOSIS — L89.312 DECUBITUS ULCER OF RIGHT BUTTOCK, STAGE 2 (HCC): ICD-10-CM

## 2019-06-04 DIAGNOSIS — S31.109A WOUND OF RIGHT GROIN, INITIAL ENCOUNTER: ICD-10-CM

## 2019-06-04 PROCEDURE — 97597 DBRDMT OPN WND 1ST 20 CM/<: CPT | Performed by: SURGERY

## 2019-06-04 NOTE — PROGRESS NOTES
Follow-Up Wound Progress Note  Ryann Leo  AGE: 75 y.o. GENDER: male  : 1945  TODAY'S DATE:  2019  Subjective:      Ryann Leo is a 75 y.o. male who presents today for wound check. Wound Etiology : 1. Trauma to right lower leg                               2.  Right groin, s/p cath                               3.  Sacral wounds from pressure  Wound Location :  1. Right lower leg  2. Right groin  3. Sacral area  Abx :No           Cultures :wereobtained  The patient denies any problems since last visit. The patients pain is 0  .     Problem list:    Patient Active Problem List   Diagnosis    Traumatic amputation of thumb    Essential hypertension    Mixed hyperlipidemia    Obstructive sleep apnea    [facility]bid [facility]    Benign prostatic hyperplasia without lower urinary tract symptoms    Venous stasis dermatitis    Severe aortic stenosis    Severe mitral valve stenosis    Anemia in chronic kidney disease, on chronic dialysis ([facility]    Dialysis patient ([facility]    Dementia    Major depressive disorder with single episode, in partial remission ([facility]    Chronic cerebral ischemia    ESRD (end stage renal disease) on dialysis ([facility])    Long-term insulin use ([facility]    BMI 40.0-44.9, adult ([facility]    Controlled type 2 diabetes mellitus with chronic kidney disease on chronic dialysis, with long-term current use of insulin ([facility])    Controlled type 2 diabetes mellitus with diabetic polyneuropathy, with long-term current use of insulin ([facility])    Dermatitis    Bruising    Hematoma of right lower extremity    Hematoma of groin    Acute blood loss anemia    Accidental fall from wheelchair    Traumatic hemorrhagic shock ([facility])    Cellulitis    Open wound    Hematoma of right thigh    Wound of right leg    Bleeding from wound    Open wound of right lower extremity    Acute blood loss anemia       Assessment:      /71   Pulse 74   Temp 96.7 °F (35.9 °C) (Tympanic) lower leg:           #3 wound             Procedure:     Lidocaine gel soaked gauze was applied at beginning of wound evaluation. The wound(s)right and leg was debrided excisionally, with removal of fibrotic, necrotic, and hyperkeratotic tissue, including a layer of surrounding healthy tissue down through and including full thickness tissue,using curette for a total surface area of 5 cm2.100%% of the wound was debrided. There was minimal bleeding that was controlled with pressure. Patient tolerated procedure well and was given proper instruction. Impression:    #1 Rt leg:  Much improved    #2 Rt groin:  Much improved    #3 sacral:   Some improvement, but a little macerated. But overall erythema better. Plan for wound:    1 Rt lower leg wound:              -  Change from wet to dry to adaptic and cover with quaze     #2 Rt groin wound              -  Cont wet to dry daily     #  Sacral wound              -  Apply barrier cream daily        4.   Then follow up in [facility] in 3 weeks        Continue to follow up with dr. Alfred Aguilar as he directs            Patient Instructions   Discontinue Aveeno cream to buttocks    Calmoseptine to buttocks with each brief change for stool incontinence and every shift                Electronically signed by Lizzette Chaves MD on 6/4/2019 at 9:26 AM

## 2019-06-06 ENCOUNTER — TELEPHONE (OUTPATIENT)
Dept: WOUND CARE | Age: 74
End: 2019-06-06

## 2019-06-14 ENCOUNTER — OFFICE VISIT (OUTPATIENT)
Dept: SURGERY | Age: 74
End: 2019-06-14
Payer: MEDICARE

## 2019-06-14 VITALS
SYSTOLIC BLOOD PRESSURE: 100 MMHG | WEIGHT: 279.1 LBS | DIASTOLIC BLOOD PRESSURE: 60 MMHG | HEART RATE: 79 BPM | BODY MASS INDEX: 43.7 KG/M2

## 2019-06-14 DIAGNOSIS — S81.801A WOUND OF RIGHT LOWER EXTREMITY, INITIAL ENCOUNTER: Primary | ICD-10-CM

## 2019-06-14 PROCEDURE — 3017F COLORECTAL CA SCREEN DOC REV: CPT | Performed by: PLASTIC SURGERY

## 2019-06-14 PROCEDURE — G8427 DOCREV CUR MEDS BY ELIG CLIN: HCPCS | Performed by: PLASTIC SURGERY

## 2019-06-14 PROCEDURE — 1036F TOBACCO NON-USER: CPT | Performed by: PLASTIC SURGERY

## 2019-06-14 PROCEDURE — 4040F PNEUMOC VAC/ADMIN/RCVD: CPT | Performed by: PLASTIC SURGERY

## 2019-06-14 PROCEDURE — 1123F ACP DISCUSS/DSCN MKR DOCD: CPT | Performed by: PLASTIC SURGERY

## 2019-06-14 PROCEDURE — G8417 CALC BMI ABV UP PARAM F/U: HCPCS | Performed by: PLASTIC SURGERY

## 2019-06-14 PROCEDURE — 99213 OFFICE O/P EST LOW 20 MIN: CPT | Performed by: PLASTIC SURGERY

## 2019-06-14 ASSESSMENT — ENCOUNTER SYMPTOMS
TROUBLE SWALLOWING: 0
ABDOMINAL PAIN: 0
COUGH: 0
SHORTNESS OF BREATH: 0

## 2019-06-14 NOTE — PROGRESS NOTES
Subjective:      Patient ID: Gwynda Raymundo is a 75 y.o. male. HPI  Patient returns for F/U wound right leg. Continuing with Xeroform dressings. Review of Systems   Constitutional: Negative. HENT: Negative for congestion and trouble swallowing. Respiratory: Negative for cough and shortness of breath. Cardiovascular: Negative for chest pain. Gastrointestinal: Negative for abdominal pain. Neurological: Negative for light-headedness and headaches. Psychiatric/Behavioral: Negative for dysphoric mood. Objective:   Physical Exam   Constitutional: He is oriented to person, place, and time. He appears well-developed and well-nourished. HENT:   Head: Normocephalic and atraumatic. Eyes: Pupils are equal, round, and reactive to light. Conjunctivae and EOM are normal.   Pulmonary/Chest: Effort normal.   Musculoskeletal:   Wound on right leg now about 95% epithelialized. Swelling in legs is down. No infection. Neurological: He is alert and oriented to person, place, and time. Skin: Skin is warm and dry. Vitals reviewed. Assessment:      Right leg wound healing in nicely. Plan:      Continue dressings. Recheck in 1 month.         Aleks Mooney MD

## 2019-06-25 ENCOUNTER — OFFICE VISIT (OUTPATIENT)
Dept: WOUND CARE | Age: 74
End: 2019-06-25
Payer: MEDICARE

## 2019-06-25 ENCOUNTER — TELEPHONE (OUTPATIENT)
Dept: WOUND CARE | Age: 74
End: 2019-06-25

## 2019-06-25 VITALS — SYSTOLIC BLOOD PRESSURE: 132 MMHG | TEMPERATURE: 97.6 F | HEART RATE: 74 BPM | DIASTOLIC BLOOD PRESSURE: 58 MMHG

## 2019-06-25 DIAGNOSIS — Z51.89 VISIT FOR WOUND CHECK: Primary | ICD-10-CM

## 2019-06-25 PROCEDURE — 99309 SBSQ NF CARE MODERATE MDM 30: CPT | Performed by: INTERNAL MEDICINE

## 2019-06-25 PROCEDURE — 97597 DBRDMT OPN WND 1ST 20 CM/<: CPT | Performed by: SURGERY

## 2019-06-25 NOTE — PATIENT INSTRUCTIONS
Continue impregnated gauze dressing (Adaptic or xeroform, or comparable) to right lower leg wounds, secure with gauze. OK to apply moisturizer to intact skin. Change dressing daily, and as needed for soiling or dislodged. Continue saline moist to dry dressing to right inguinal wound, change daily, and as needed for soiling or dislodged. Reduce frequency of barrier ointment to buttocks to daily, and as needed after large bowel movements. New skin tear to left forearm, film dressing & pressure wrap applied. Remove coban dressing on 6/25/19 in late afternoon or early evening. Change film dressing weekly and if needed to manage drainage. Irrigate wound gently with normal saline, pat dry. Follow up in 3 weeks with Dr. Peter West, or before if questions or concerns. SIGNS OF INFECTION  - Redness, swelling, skin hot  - Wound bed turns black or stringy yellow  - Foul odor  - Increased drainage or pus  - Increased pain  - Fever greater than 100F    CALL YOUR DOCTOR OR SEEK MEDICAL ATTENTION IF SIGNS OF INFECTION.   DO NOT WAIT UNTIL YOUR NEXT APPOINTMENT    Call the Wound Care Nurse with any other questions or concerns- [phone]

## 2019-06-25 NOTE — PROGRESS NOTES
Follow-Up Wound Progress Note  José Miguel Hunt  AGE: 75 y.o. GENDER: male  : 1945  TODAY'S DATE:  2019  Patient is here for a wound check. This is due to trauma to the right lower leg. She has had catheterization of the right groin and developed a wound and she has a sacral pressure wound. .  Problem list:    Patient Active Problem List   Diagnosis    Traumatic amputation of thumb    Essential hypertension    Mixed hyperlipidemia    Obstructive sleep apnea    Morbid obesity ([address]    Benign prostatic hyperplasia without lower urinary tract symptoms    Venous stasis dermatitis    Severe aortic stenosis    Severe mitral valve stenosis    Anemia in chronic kidney disease, on chronic dialysis ([address]    Dialysis patient ([address]    Dementia    Major depressive disorder with single episode, in partial remission ([address]    Chronic cerebral ischemia    ESRD (end stage renal disease) on dialysis [facility]    Long-term insulin use ([facility])    BMI 40.0-44.9, adult ([facility])    Controlled type 2 diabetes mellitus with chronic kidney disease on chronic dialysis, with long-term current use of insulin ([facility])    Controlled type 2 diabetes mellitus with diabetic polyneuropathy, with long-term current use of insulin ([facility])    Dermatitis    Bruising    Hematoma of right lower extremity    Hematoma of groin    Acute blood loss anemia    Accidental fall from wheelchair    Traumatic hemorrhagic shock ([facility])    Cellulitis    Open wound    Hematoma of right thigh    Wound of right leg    Bleeding from wound    Open wound of right lower extremity    Acute blood loss anemia       Assessment:      BP (!) 132/58   Pulse 74   Temp 97.6 °F (36.4 °C) (Tympanic)   Wound Reference Date is when first assessed. Measurements shown are from today's visit. Wound   BP (!) 132/58   Pulse 74   Temp 97.6 °F (36.4 °C) (Tympanic)     Wound all the wounds look slightly better.     Wound 19 Buttocks

## 2019-06-27 ENCOUNTER — OUTSIDE SERVICES (OUTPATIENT)
Dept: INTERNAL MEDICINE | Age: 74
End: 2019-06-27
Payer: MEDICARE

## 2019-06-27 DIAGNOSIS — Z79.4 CONTROLLED TYPE 2 DIABETES MELLITUS WITH DIABETIC POLYNEUROPATHY, WITH LONG-TERM CURRENT USE OF INSULIN (HCC): ICD-10-CM

## 2019-06-27 DIAGNOSIS — S81.801D WOUND OF RIGHT LOWER EXTREMITY, SUBSEQUENT ENCOUNTER: Primary | ICD-10-CM

## 2019-06-27 DIAGNOSIS — N18.6 CONTROLLED TYPE 2 DIABETES MELLITUS WITH CHRONIC KIDNEY DISEASE ON CHRONIC DIALYSIS, WITH LONG-TERM CURRENT USE OF INSULIN (HCC): ICD-10-CM

## 2019-06-27 DIAGNOSIS — E66.01 MORBID OBESITY (HCC): ICD-10-CM

## 2019-06-27 DIAGNOSIS — E11.22 CONTROLLED TYPE 2 DIABETES MELLITUS WITH CHRONIC KIDNEY DISEASE ON CHRONIC DIALYSIS, WITH LONG-TERM CURRENT USE OF INSULIN (HCC): ICD-10-CM

## 2019-06-27 DIAGNOSIS — I05.0 SEVERE MITRAL VALVE STENOSIS: ICD-10-CM

## 2019-06-27 DIAGNOSIS — Z79.4 CONTROLLED TYPE 2 DIABETES MELLITUS WITH CHRONIC KIDNEY DISEASE ON CHRONIC DIALYSIS, WITH LONG-TERM CURRENT USE OF INSULIN (HCC): ICD-10-CM

## 2019-06-27 DIAGNOSIS — I48.0 PAROXYSMAL ATRIAL FIBRILLATION (HCC): ICD-10-CM

## 2019-06-27 DIAGNOSIS — Z99.2 DIALYSIS PATIENT (HCC): ICD-10-CM

## 2019-06-27 DIAGNOSIS — N18.6 ESRD (END STAGE RENAL DISEASE) ON DIALYSIS (HCC): ICD-10-CM

## 2019-06-27 DIAGNOSIS — E11.42 CONTROLLED TYPE 2 DIABETES MELLITUS WITH DIABETIC POLYNEUROPATHY, WITH LONG-TERM CURRENT USE OF INSULIN (HCC): ICD-10-CM

## 2019-06-27 DIAGNOSIS — Z99.2 ESRD (END STAGE RENAL DISEASE) ON DIALYSIS (HCC): ICD-10-CM

## 2019-06-27 DIAGNOSIS — F32.4 MAJOR DEPRESSIVE DISORDER WITH SINGLE EPISODE, IN PARTIAL REMISSION (HCC): ICD-10-CM

## 2019-06-27 DIAGNOSIS — F01.50 VASCULAR DEMENTIA WITHOUT BEHAVIORAL DISTURBANCE (HCC): ICD-10-CM

## 2019-06-27 DIAGNOSIS — S30.1XXD HEMATOMA OF GROIN, SUBSEQUENT ENCOUNTER: ICD-10-CM

## 2019-06-27 DIAGNOSIS — Z79.4 LONG-TERM INSULIN USE (HCC): ICD-10-CM

## 2019-06-27 DIAGNOSIS — D63.1 ANEMIA IN CHRONIC KIDNEY DISEASE, ON CHRONIC DIALYSIS (HCC): ICD-10-CM

## 2019-06-27 DIAGNOSIS — Z99.2 CONTROLLED TYPE 2 DIABETES MELLITUS WITH CHRONIC KIDNEY DISEASE ON CHRONIC DIALYSIS, WITH LONG-TERM CURRENT USE OF INSULIN (HCC): ICD-10-CM

## 2019-06-27 DIAGNOSIS — Z99.2 ANEMIA IN CHRONIC KIDNEY DISEASE, ON CHRONIC DIALYSIS (HCC): ICD-10-CM

## 2019-06-27 DIAGNOSIS — I35.0 SEVERE AORTIC STENOSIS: ICD-10-CM

## 2019-06-27 DIAGNOSIS — I10 ESSENTIAL HYPERTENSION: ICD-10-CM

## 2019-06-27 DIAGNOSIS — G47.33 OBSTRUCTIVE SLEEP APNEA SYNDROME: ICD-10-CM

## 2019-06-27 DIAGNOSIS — N18.6 ANEMIA IN CHRONIC KIDNEY DISEASE, ON CHRONIC DIALYSIS (HCC): ICD-10-CM

## 2019-06-27 DIAGNOSIS — E78.2 MIXED HYPERLIPIDEMIA: ICD-10-CM

## 2019-06-28 NOTE — PROGRESS NOTES
DR. 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 He is on Coumadin for atrial fibrillation. He is on Zoloft for depression. The right leg and groin wounds are healing well. He continues to follow with Dr. Cristy Zelaya. There are no other complaints. ALLERGIES:   Allergies   Allergen Reactions    Percocet [Oxycodone-Acetaminophen] Itching and Rash     Also causes shaking    Ampicillin Rash        MEDICATIONS: As noted on the [facility], referenced and incorporated herein.     PAST MEDICAL HISTORY:   Past Medical History:   Diagnosis Date    Anemia in chronic kidney disease (CKD) 4/27/2016    Arthritis     Bacteremia 11/11/2016    Benign prostatic hyperplasia without lower urinary tract symptoms     BMI 40.0-44.9, adult ([address] 3/19/2018    Cellulitis of left lower extremity     Chronic cerebral ischemia 1/3/2017    Closed fracture of forearm 8/13/2013    Broken lft forearm     Controlled type 2 diabetes mellitus with chronic kidney disease on chronic dialysis, with long-term current use of insulin ([address]     Controlled type 2 diabetes mellitus with diabetic polyneuropathy, with long-term current use of insulin ([address] 10/7/2018    Decubital ulcer     NON OPEN BUTTOCKS    Dementia     memory problems    Dialysis patient ([address] 01/03/2017    Goes [facility], Sat in [place]    Difficult intravenous access     HAS NEEDED PICC TEAM IN THE PAST    Difficulty walking     WHEELCHAIR BOUND-PIVOTS WITH ASSIST O F 2    DJD (degenerative joint disease) of knee     Elbow, forearm, and wrist, abrasion or friction burn, without mention of infection 8/13/2013    Abrasions lft forearm     Encephalopathy acute 4/27/2016    Encounter regarding vascular access for dialysis for ESRD ([address] 2/2/2017    ESRD (end stage renal disease) on dialysis ([address] 1/17/2017    Forgetfulness     HAS SOME SHORT TERM MEMORY LOSS, QUYEN-WIFE IS LEGAL GUARDIAN    H/O transesophageal echocardiography ([facility]) for monitoring     Hemodialysis patient ([address] 11/11/2016    tues-thurs-[place]---[facility].  TUNNELED CATHETER RT UPPER CHEST    Hyperlipidemia 1990    on Meds    Hypertension 1990    on Meds    Intercritical gout     on Meds    Joint pain, knee 01/13/2017    baldo knee synvisc-1 injections    Long-term insulin use ([address] 1/17/2017    Major depressive disorder with single episode, in partial remission ([address] 1/3/2017    Mobility impaired     Morbid obesity ([address]     Muscle weakness     GRNERALIZED    Obesity     Obstructive sleep apnea     HEATHER on CPAP     On CPAP-TO BRING DOS    Paroxysmal atrial fibrillation ([address] 9/6/2017    Respiratory failure ([address] 03/2016    with open heart surgery, trached x 5 months    S/P cardiac cath     Seborrhea     Severe aortic stenosis 3/9/2016    Severe mitral valve stenosis 3/9/2016    Severe sepsis ([address] 4/3/2016    Skin rash     ABD FOLDS    Stasis dermatitis     Thyroid disease     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 lft thumb     Venous stasis dermatitis     Wears glasses     Wheelchair bound     pivots with 2 assists       PAST SURGICAL HISTORY:   Past Surgical History:   Procedure Laterality Date    AORTIC VALVE REPLACEMENT  03/18/2016    bioprosthetic    ARM SURGERY Left 1990    CARDIAC CATHETERIZATION      CENTRAL VENOUS CATHETER Right 02/21/2018    DIALYSIS CATHETER Dr Margie Mayorga COLONOSCOPY  11/19/09    COLONOSCOPY N/A 10/12/2018    COLONOSCOPY WITH BIOPSY performed by Angel Johnson DO at [address] Right 3/10/2019    WASHOUT RIGHT LOWER EXTREMITY WITH WOUND VAC EXCHANGE performed by Thomas Xavier MD at [address] Right 3/8/2019    RIGHT LOWER EXTREMITY EXPLORATION, HEMATOMA EVACUATION, WOUND VAC PLACEMENT performed by Thomas Xavier MD at [address] (x10-12 surgeries)    several surgeries on left arm    KNEE ARTHROSCOPY Left 99    MITRAL VALVE REPLACEMENT  2016    bioprosthetic     NASAL SEPTUM SURGERY      OTHER SURGICAL HISTORY  3/18/16    Aortic root Enlargement    OTHER SURGICAL HISTORY  3/18/16    ASD Closure    OTHER SURGICAL HISTORY Right 2017    AV fistula creation, wrist    OTHER SURGICAL HISTORY Right 2017    ligation of collateral branch of av fistula right wrist    OTHER SURGICAL HISTORY  2018    FISTULAGRAM WITH DR. Kvng Clements    MS EGD TRANSORAL BIOPSY SINGLE/MULTIPLE N/A 10/17/2017    EGD BIOPSY performed by Kaiser Durand MD at [address] ANGIOACCESS AV FISTULA Right 2017     RIGHT  LOWER ARM AV FISTULA  LIGATION OF AQUIRED BRANCHES X2 performed by Karen Cortes DO at [address]  3/18/16    median    VASCULAR SURGERY  2018    Right arm fistulagram,  PTA cephalic vein stenosis  /  DR Amilcar Curry       SOCIAL HISTORY:     Tobacco:   Social History     Tobacco Use   Smoking Status Former Smoker    Packs/day: 2.00    Years: 25.00    Pack years: 50.00    Types: Cigarettes    Start date: 3/17/1955    Last attempt to quit: 1980    Years since quittin.5   Smokeless Tobacco Never Used     Alcohol:   Social History     Substance and Sexual Activity   Alcohol Use No    Alcohol/week: 0.0 oz    Comment: 1-2 drinks per year     Drugs:   Social History     Substance and Sexual Activity   Drug Use No       FAMILY HISTORY: family history includes Alzheimer's Disease in his father; Diabetes in his maternal grandmother and mother; High Blood Pressure in his

## 2019-07-05 ENCOUNTER — CARE COORDINATION (OUTPATIENT)
Dept: CASE MANAGEMENT | Age: 74
End: 2019-07-05

## 2019-07-08 ENCOUNTER — TELEPHONE (OUTPATIENT)
Dept: WOUND CARE | Age: 74
End: 2019-07-08

## 2019-07-12 ENCOUNTER — OFFICE VISIT (OUTPATIENT)
Dept: SURGERY | Age: 74
End: 2019-07-12
Payer: MEDICARE

## 2019-07-12 VITALS
HEIGHT: 67 IN | BODY MASS INDEX: 43.81 KG/M2 | OXYGEN SATURATION: 96 % | RESPIRATION RATE: 16 BRPM | DIASTOLIC BLOOD PRESSURE: 55 MMHG | WEIGHT: 279.1 LBS | HEART RATE: 73 BPM | SYSTOLIC BLOOD PRESSURE: 124 MMHG

## 2019-07-12 VITALS — TEMPERATURE: 97.4 F | DIASTOLIC BLOOD PRESSURE: 55 MMHG | HEART RATE: 73 BPM | SYSTOLIC BLOOD PRESSURE: 124 MMHG

## 2019-07-12 DIAGNOSIS — S71.101D OPEN WOUND OF RIGHT THIGH, SUBSEQUENT ENCOUNTER: Primary | ICD-10-CM

## 2019-07-12 DIAGNOSIS — S81.801A WOUND OF RIGHT LOWER EXTREMITY, INITIAL ENCOUNTER: Primary | ICD-10-CM

## 2019-07-12 PROCEDURE — G8417 CALC BMI ABV UP PARAM F/U: HCPCS | Performed by: PLASTIC SURGERY

## 2019-07-12 PROCEDURE — G8428 CUR MEDS NOT DOCUMENT: HCPCS | Performed by: SURGERY

## 2019-07-12 PROCEDURE — 1036F TOBACCO NON-USER: CPT | Performed by: PLASTIC SURGERY

## 2019-07-12 PROCEDURE — 4040F PNEUMOC VAC/ADMIN/RCVD: CPT | Performed by: PLASTIC SURGERY

## 2019-07-12 PROCEDURE — G8417 CALC BMI ABV UP PARAM F/U: HCPCS | Performed by: SURGERY

## 2019-07-12 PROCEDURE — 99212 OFFICE O/P EST SF 10 MIN: CPT | Performed by: SURGERY

## 2019-07-12 PROCEDURE — 1036F TOBACCO NON-USER: CPT | Performed by: SURGERY

## 2019-07-12 PROCEDURE — 3017F COLORECTAL CA SCREEN DOC REV: CPT | Performed by: PLASTIC SURGERY

## 2019-07-12 PROCEDURE — 1123F ACP DISCUSS/DSCN MKR DOCD: CPT | Performed by: SURGERY

## 2019-07-12 PROCEDURE — 4040F PNEUMOC VAC/ADMIN/RCVD: CPT | Performed by: SURGERY

## 2019-07-12 PROCEDURE — 1123F ACP DISCUSS/DSCN MKR DOCD: CPT | Performed by: PLASTIC SURGERY

## 2019-07-12 PROCEDURE — G8427 DOCREV CUR MEDS BY ELIG CLIN: HCPCS | Performed by: PLASTIC SURGERY

## 2019-07-12 PROCEDURE — 3017F COLORECTAL CA SCREEN DOC REV: CPT | Performed by: SURGERY

## 2019-07-12 PROCEDURE — 99213 OFFICE O/P EST LOW 20 MIN: CPT | Performed by: PLASTIC SURGERY

## 2019-07-12 RX ORDER — INSULIN GLARGINE 100 [IU]/ML
INJECTION, SOLUTION SUBCUTANEOUS
Status: ON HOLD | COMMUNITY
Start: 2019-07-09 | End: 2019-12-04 | Stop reason: HOSPADM

## 2019-07-12 RX ORDER — BUPROPION HYDROCHLORIDE 150 MG/1
TABLET, EXTENDED RELEASE ORAL
COMMUNITY
Start: 2019-07-03 | End: 2019-07-12

## 2019-07-12 RX ORDER — ALPRAZOLAM 0.5 MG/1
TABLET ORAL
Status: ON HOLD | COMMUNITY
Start: 2019-07-07 | End: 2019-08-03 | Stop reason: SDUPTHER

## 2019-07-12 ASSESSMENT — ENCOUNTER SYMPTOMS
ABDOMINAL PAIN: 0
TROUBLE SWALLOWING: 0
SHORTNESS OF BREATH: 0
COUGH: 0

## 2019-07-12 NOTE — PROGRESS NOTES
Subjective:      Patient ID: Wilmar Sutherland is a 75 y.o. male. HPI  Patient returns for wound check. Review of Systems   Constitutional: Negative. HENT: Negative for congestion and trouble swallowing. Respiratory: Negative for cough and shortness of breath. Cardiovascular: Negative for chest pain. Gastrointestinal: Negative for abdominal pain. Neurological: Negative for light-headedness and headaches. Psychiatric/Behavioral: Negative for dysphoric mood. Objective:   Physical Exam   Constitutional: He is oriented to person, place, and time. He appears well-developed and well-nourished. HENT:   Head: Normocephalic and atraumatic. Eyes: Pupils are equal, round, and reactive to light. Conjunctivae and EOM are normal.   Pulmonary/Chest: Effort normal.   Musculoskeletal:   Wound on right lower leg healed except for 2 small areas at top. Edge has fully smoothed with no residual overhang or undermining. Neurological: He is alert and oriented to person, place, and time. Skin: Skin is warm and dry. Vitals reviewed. Assessment:      Healing wound right lower leg. Plan:      Continue dressings and moisturizer (Eucerin). Recheck in 2 months.         Deb Petit MD

## 2019-07-16 ENCOUNTER — TELEPHONE (OUTPATIENT)
Dept: WOUND CARE | Age: 74
End: 2019-07-16

## 2019-07-16 RX ORDER — SEVELAMER CARBONATE 800 MG/1
2 TABLET, FILM COATED ORAL 3 TIMES DAILY
Qty: 540 TABLET | Refills: 3 | Status: SHIPPED | OUTPATIENT
Start: 2019-07-16 | End: 2020-04-06 | Stop reason: SDUPTHER

## 2019-07-28 PROCEDURE — 99309 SBSQ NF CARE MODERATE MDM 30: CPT | Performed by: INTERNAL MEDICINE

## 2019-07-30 ENCOUNTER — OUTSIDE SERVICES (OUTPATIENT)
Dept: INTERNAL MEDICINE | Age: 74
End: 2019-07-30
Payer: MEDICARE

## 2019-07-30 DIAGNOSIS — E66.01 MORBID OBESITY (HCC): ICD-10-CM

## 2019-07-30 DIAGNOSIS — Z79.4 CONTROLLED TYPE 2 DIABETES MELLITUS WITH DIABETIC POLYNEUROPATHY, WITH LONG-TERM CURRENT USE OF INSULIN (HCC): ICD-10-CM

## 2019-07-30 DIAGNOSIS — Z99.2 CONTROLLED TYPE 2 DIABETES MELLITUS WITH CHRONIC KIDNEY DISEASE ON CHRONIC DIALYSIS, WITH LONG-TERM CURRENT USE OF INSULIN (HCC): ICD-10-CM

## 2019-07-30 DIAGNOSIS — S30.1XXD HEMATOMA OF GROIN, SUBSEQUENT ENCOUNTER: ICD-10-CM

## 2019-07-30 DIAGNOSIS — E11.42 CONTROLLED TYPE 2 DIABETES MELLITUS WITH DIABETIC POLYNEUROPATHY, WITH LONG-TERM CURRENT USE OF INSULIN (HCC): ICD-10-CM

## 2019-07-30 DIAGNOSIS — F01.50 VASCULAR DEMENTIA WITHOUT BEHAVIORAL DISTURBANCE (HCC): ICD-10-CM

## 2019-07-30 DIAGNOSIS — I35.0 SEVERE AORTIC STENOSIS: ICD-10-CM

## 2019-07-30 DIAGNOSIS — N18.6 ESRD (END STAGE RENAL DISEASE) ON DIALYSIS (HCC): ICD-10-CM

## 2019-07-30 DIAGNOSIS — D63.1 ANEMIA IN CHRONIC KIDNEY DISEASE, ON CHRONIC DIALYSIS (HCC): ICD-10-CM

## 2019-07-30 DIAGNOSIS — N18.6 CONTROLLED TYPE 2 DIABETES MELLITUS WITH CHRONIC KIDNEY DISEASE ON CHRONIC DIALYSIS, WITH LONG-TERM CURRENT USE OF INSULIN (HCC): ICD-10-CM

## 2019-07-30 DIAGNOSIS — I10 ESSENTIAL HYPERTENSION: ICD-10-CM

## 2019-07-30 DIAGNOSIS — Z79.4 CONTROLLED TYPE 2 DIABETES MELLITUS WITH CHRONIC KIDNEY DISEASE ON CHRONIC DIALYSIS, WITH LONG-TERM CURRENT USE OF INSULIN (HCC): ICD-10-CM

## 2019-07-30 DIAGNOSIS — Z79.4 LONG-TERM INSULIN USE (HCC): ICD-10-CM

## 2019-07-30 DIAGNOSIS — E78.2 MIXED HYPERLIPIDEMIA: ICD-10-CM

## 2019-07-30 DIAGNOSIS — I05.0 SEVERE MITRAL VALVE STENOSIS: ICD-10-CM

## 2019-07-30 DIAGNOSIS — Z99.2 ANEMIA IN CHRONIC KIDNEY DISEASE, ON CHRONIC DIALYSIS (HCC): ICD-10-CM

## 2019-07-30 DIAGNOSIS — G47.33 OBSTRUCTIVE SLEEP APNEA SYNDROME: ICD-10-CM

## 2019-07-30 DIAGNOSIS — F32.4 MAJOR DEPRESSIVE DISORDER WITH SINGLE EPISODE, IN PARTIAL REMISSION (HCC): ICD-10-CM

## 2019-07-30 DIAGNOSIS — N18.6 ANEMIA IN CHRONIC KIDNEY DISEASE, ON CHRONIC DIALYSIS (HCC): ICD-10-CM

## 2019-07-30 DIAGNOSIS — I48.0 PAROXYSMAL ATRIAL FIBRILLATION (HCC): ICD-10-CM

## 2019-07-30 DIAGNOSIS — E11.22 CONTROLLED TYPE 2 DIABETES MELLITUS WITH CHRONIC KIDNEY DISEASE ON CHRONIC DIALYSIS, WITH LONG-TERM CURRENT USE OF INSULIN (HCC): ICD-10-CM

## 2019-07-30 DIAGNOSIS — Z99.2 DIALYSIS PATIENT (HCC): ICD-10-CM

## 2019-07-30 DIAGNOSIS — Z99.2 ESRD (END STAGE RENAL DISEASE) ON DIALYSIS (HCC): ICD-10-CM

## 2019-07-30 DIAGNOSIS — S81.801D WOUND OF RIGHT LOWER EXTREMITY, SUBSEQUENT ENCOUNTER: Primary | ICD-10-CM

## 2019-07-30 NOTE — PROGRESS NOTES
mother; High Blood Pressure in his sister; Lung Cancer in his mother. REVIEW OF SYSTEMS:     Please see history of chief complaint above; otherwise no new problems with respect to General, HEENT, Cardiovascular, Respiratory, Gastrointestinal, Genitourinary, Endocrinologic, Musculoskeletal, or Neuropsychiatric complaints. PHYSICAL EXAMINATION:    Vitals: Temp: 97.4 deg F. Pulse: 72. Resp: 16. BP: 98/47. General: A 75 y.o.  male. Alert and oriented to person, place and time. He does not appear to be in any acute distress. Skin: Skin color, texture, turgor normal. No rashes. Ulcerations in groin and on legs as described above. HEENT/Neck: Essentially unremarkable  Chest: There was a chest catheter in place  Lungs: Normal - CTA without rales, rhonchi, or wheezing. Heart: regular rate and rhythm, S1, S2 normal, no murmur, click, rub or gallop No S3 or S4. Abdomen: Obese, soft, non-distended, non-tender, normal active bowel sounds, no masses palpated and no hepatosplenomegaly  Extremities: No clubbing, cyanosis, edema  Neurologic: cranial nerves II-XII are grossly intact    ASSESSMENT:     Diagnosis Orders   1. Wound of right lower extremity, subsequent encounter     2. Hematoma of groin, subsequent encounter     3. Controlled type 2 diabetes mellitus with chronic kidney disease on chronic dialysis, with long-term current use of insulin ([address]     4. Controlled type 2 diabetes mellitus with diabetic polyneuropathy, with long-term current use of insulin ([address]     5. Long-term insulin use ([address]     6. ESRD (end stage renal disease) on dialysis ([address]     7. Dialysis patient ([address]     8. Anemia in chronic kidney disease, on chronic dialysis ([facility])     9. Paroxysmal atrial fibrillation ([facility])     10. Severe aortic stenosis     11. Severe mitral valve stenosis     12. Vascular dementia without behavioral disturbance     13. Obstructive sleep apnea     14.  Major depressive disorder with single episode, in

## 2019-08-02 ENCOUNTER — HOSPITAL ENCOUNTER (INPATIENT)
Age: 74
LOS: 1 days | Discharge: NURSING, REHAB OR LONG-TERM CARE | DRG: 314 | End: 2019-08-03
Attending: INTERNAL MEDICINE | Admitting: INTERNAL MEDICINE
Payer: MEDICARE

## 2019-08-02 ENCOUNTER — HOSPITAL ENCOUNTER (EMERGENCY)
Age: 74
Discharge: ANOTHER HOSPITAL | End: 2019-08-02
Attending: EMERGENCY MEDICINE
Payer: MEDICARE

## 2019-08-02 ENCOUNTER — APPOINTMENT (OUTPATIENT)
Dept: INTERVENTIONAL RADIOLOGY/VASCULAR | Age: 74
DRG: 314 | End: 2019-08-02
Attending: INTERNAL MEDICINE
Payer: MEDICARE

## 2019-08-02 VITALS
TEMPERATURE: 97.9 F | HEIGHT: 67 IN | DIASTOLIC BLOOD PRESSURE: 66 MMHG | WEIGHT: 279 LBS | HEART RATE: 78 BPM | SYSTOLIC BLOOD PRESSURE: 147 MMHG | RESPIRATION RATE: 12 BRPM | OXYGEN SATURATION: 96 % | BODY MASS INDEX: 43.79 KG/M2

## 2019-08-02 DIAGNOSIS — N18.6 ESRD (END STAGE RENAL DISEASE) (HCC): ICD-10-CM

## 2019-08-02 DIAGNOSIS — T82.590A: Primary | ICD-10-CM

## 2019-08-02 DIAGNOSIS — F41.9 ANXIETY: ICD-10-CM

## 2019-08-02 DIAGNOSIS — T82.9XXA COMPLICATION OF ARTERIOVENOUS DIALYSIS FISTULA, INITIAL ENCOUNTER: Primary | ICD-10-CM

## 2019-08-02 DIAGNOSIS — Z99.2 ADMISSION FOR DIALYSIS (HCC): ICD-10-CM

## 2019-08-02 LAB
ANION GAP SERPL CALCULATED.3IONS-SCNC: 15 MMOL/L (ref 9–17)
BUN BLDV-MCNC: 70 MG/DL (ref 8–23)
BUN/CREAT BLD: 14 (ref 9–20)
CALCIUM SERPL-MCNC: 9.3 MG/DL (ref 8.6–10.4)
CHLORIDE BLD-SCNC: 90 MMOL/L (ref 98–107)
CO2: 28 MMOL/L (ref 20–31)
CREAT SERPL-MCNC: 5.11 MG/DL (ref 0.7–1.2)
GFR AFRICAN AMERICAN: 13 ML/MIN
GFR NON-AFRICAN AMERICAN: 11 ML/MIN
GFR SERPL CREATININE-BSD FRML MDRD: ABNORMAL ML/MIN/{1.73_M2}
GFR SERPL CREATININE-BSD FRML MDRD: ABNORMAL ML/MIN/{1.73_M2}
GLUCOSE BLD-MCNC: 159 MG/DL (ref 75–110)
GLUCOSE BLD-MCNC: 198 MG/DL (ref 75–110)
GLUCOSE BLD-MCNC: 201 MG/DL (ref 70–99)
HCT VFR BLD CALC: 31.9 % (ref 41–53)
HEMOGLOBIN: 10.3 G/DL (ref 13.5–17.5)
INR BLD: 1
MCH RBC QN AUTO: 27.2 PG (ref 26–34)
MCHC RBC AUTO-ENTMCNC: 32.3 G/DL (ref 31–37)
MCV RBC AUTO: 84.2 FL (ref 80–100)
NRBC AUTOMATED: ABNORMAL PER 100 WBC
PARTIAL THROMBOPLASTIN TIME: 33.4 SEC (ref 27–35)
PDW BLD-RTO: 19.1 % (ref 11–14.5)
PLATELET # BLD: 169 K/UL (ref 140–450)
PMV BLD AUTO: 6.4 FL (ref 6–12)
POTASSIUM SERPL-SCNC: 3.5 MMOL/L (ref 3.7–5.3)
PROTHROMBIN TIME: 10.8 SEC (ref 9.4–11.3)
RBC # BLD: 3.78 M/UL (ref 4.5–5.9)
SODIUM BLD-SCNC: 133 MMOL/L (ref 135–144)
WBC # BLD: 6.9 K/UL (ref 3.5–11)

## 2019-08-02 PROCEDURE — 36556 INSERT NON-TUNNEL CV CATH: CPT

## 2019-08-02 PROCEDURE — 6360000002 HC RX W HCPCS: Performed by: INTERNAL MEDICINE

## 2019-08-02 PROCEDURE — 2580000003 HC RX 258: Performed by: INTERNAL MEDICINE

## 2019-08-02 PROCEDURE — 6360000002 HC RX W HCPCS: Performed by: RADIOLOGY

## 2019-08-02 PROCEDURE — 02H633Z INSERTION OF INFUSION DEVICE INTO RIGHT ATRIUM, PERCUTANEOUS APPROACH: ICD-10-PCS | Performed by: RADIOLOGY

## 2019-08-02 PROCEDURE — 85610 PROTHROMBIN TIME: CPT

## 2019-08-02 PROCEDURE — 80048 BASIC METABOLIC PNL TOTAL CA: CPT

## 2019-08-02 PROCEDURE — 2709999900 HC NON-CHARGEABLE SUPPLY

## 2019-08-02 PROCEDURE — 90935 HEMODIALYSIS ONE EVALUATION: CPT

## 2019-08-02 PROCEDURE — 99222 1ST HOSP IP/OBS MODERATE 55: CPT | Performed by: INTERNAL MEDICINE

## 2019-08-02 PROCEDURE — 0JH63XZ INSERTION OF TUNNELED VASCULAR ACCESS DEVICE INTO CHEST SUBCUTANEOUS TISSUE AND FASCIA, PERCUTANEOUS APPROACH: ICD-10-PCS | Performed by: RADIOLOGY

## 2019-08-02 PROCEDURE — C1750 CATH, HEMODIALYSIS,LONG-TERM: HCPCS

## 2019-08-02 PROCEDURE — 5A1D70Z PERFORMANCE OF URINARY FILTRATION, INTERMITTENT, LESS THAN 6 HOURS PER DAY: ICD-10-PCS | Performed by: INTERNAL MEDICINE

## 2019-08-02 PROCEDURE — 85730 THROMBOPLASTIN TIME PARTIAL: CPT

## 2019-08-02 PROCEDURE — 36415 COLL VENOUS BLD VENIPUNCTURE: CPT

## 2019-08-02 PROCEDURE — 2500000003 HC RX 250 WO HCPCS: Performed by: RADIOLOGY

## 2019-08-02 PROCEDURE — 76937 US GUIDE VASCULAR ACCESS: CPT | Performed by: RADIOLOGY

## 2019-08-02 PROCEDURE — C1769 GUIDE WIRE: HCPCS

## 2019-08-02 PROCEDURE — 82947 ASSAY GLUCOSE BLOOD QUANT: CPT

## 2019-08-02 PROCEDURE — 77001 FLUOROGUIDE FOR VEIN DEVICE: CPT | Performed by: RADIOLOGY

## 2019-08-02 PROCEDURE — 99283 EMERGENCY DEPT VISIT LOW MDM: CPT

## 2019-08-02 PROCEDURE — 1200000000 HC SEMI PRIVATE

## 2019-08-02 PROCEDURE — 36558 INSERT TUNNELED CV CATH: CPT | Performed by: RADIOLOGY

## 2019-08-02 PROCEDURE — C1894 INTRO/SHEATH, NON-LASER: HCPCS

## 2019-08-02 PROCEDURE — 76937 US GUIDE VASCULAR ACCESS: CPT

## 2019-08-02 PROCEDURE — 6370000000 HC RX 637 (ALT 250 FOR IP): Performed by: INTERNAL MEDICINE

## 2019-08-02 PROCEDURE — 85027 COMPLETE CBC AUTOMATED: CPT

## 2019-08-02 RX ORDER — HEPARIN SODIUM (PORCINE) LOCK FLUSH IV SOLN 100 UNIT/ML 100 UNIT/ML
SOLUTION INTRAVENOUS
Status: COMPLETED | OUTPATIENT
Start: 2019-08-02 | End: 2019-08-02

## 2019-08-02 RX ORDER — SODIUM CHLORIDE 0.9 % (FLUSH) 0.9 %
10 SYRINGE (ML) INJECTION PRN
Status: DISCONTINUED | OUTPATIENT
Start: 2019-08-02 | End: 2019-08-03 | Stop reason: HOSPADM

## 2019-08-02 RX ORDER — SEVELAMER CARBONATE 800 MG/1
1600 TABLET, FILM COATED ORAL 3 TIMES DAILY
Status: DISCONTINUED | OUTPATIENT
Start: 2019-08-02 | End: 2019-08-03 | Stop reason: HOSPADM

## 2019-08-02 RX ORDER — SODIUM CHLORIDE 0.9 % (FLUSH) 0.9 %
10 SYRINGE (ML) INJECTION EVERY 12 HOURS SCHEDULED
Status: DISCONTINUED | OUTPATIENT
Start: 2019-08-02 | End: 2019-08-03 | Stop reason: HOSPADM

## 2019-08-02 RX ORDER — ATORVASTATIN CALCIUM 40 MG/1
40 TABLET, FILM COATED ORAL NIGHTLY
Status: DISCONTINUED | OUTPATIENT
Start: 2019-08-02 | End: 2019-08-03 | Stop reason: HOSPADM

## 2019-08-02 RX ORDER — HEPARIN SODIUM 1000 [USP'U]/ML
1600 INJECTION, SOLUTION INTRAVENOUS; SUBCUTANEOUS ONCE
Status: COMPLETED | OUTPATIENT
Start: 2019-08-02 | End: 2019-08-02

## 2019-08-02 RX ORDER — POTASSIUM CHLORIDE 20 MEQ/1
40 TABLET, EXTENDED RELEASE ORAL PRN
Status: DISCONTINUED | OUTPATIENT
Start: 2019-08-02 | End: 2019-08-03 | Stop reason: HOSPADM

## 2019-08-02 RX ORDER — MAGNESIUM SULFATE 1 G/100ML
1 INJECTION INTRAVENOUS PRN
Status: DISCONTINUED | OUTPATIENT
Start: 2019-08-02 | End: 2019-08-03 | Stop reason: HOSPADM

## 2019-08-02 RX ORDER — BUPROPION HYDROCHLORIDE 150 MG/1
150 TABLET ORAL EVERY MORNING
Status: DISCONTINUED | OUTPATIENT
Start: 2019-08-03 | End: 2019-08-03 | Stop reason: HOSPADM

## 2019-08-02 RX ORDER — DONEPEZIL HYDROCHLORIDE 5 MG/1
5 TABLET, FILM COATED ORAL EVERY EVENING
Status: DISCONTINUED | OUTPATIENT
Start: 2019-08-02 | End: 2019-08-03 | Stop reason: HOSPADM

## 2019-08-02 RX ORDER — FENTANYL CITRATE 50 UG/ML
INJECTION, SOLUTION INTRAMUSCULAR; INTRAVENOUS
Status: COMPLETED | OUTPATIENT
Start: 2019-08-02 | End: 2019-08-02

## 2019-08-02 RX ORDER — DEXTROSE MONOHYDRATE 25 G/50ML
12.5 INJECTION, SOLUTION INTRAVENOUS PRN
Status: DISCONTINUED | OUTPATIENT
Start: 2019-08-02 | End: 2019-08-03 | Stop reason: HOSPADM

## 2019-08-02 RX ORDER — LEVOTHYROXINE SODIUM 0.03 MG/1
25 TABLET ORAL DAILY
Status: DISCONTINUED | OUTPATIENT
Start: 2019-08-03 | End: 2019-08-03 | Stop reason: HOSPADM

## 2019-08-02 RX ORDER — DULOXETIN HYDROCHLORIDE 30 MG/1
60 CAPSULE, DELAYED RELEASE ORAL DAILY
Status: DISCONTINUED | OUTPATIENT
Start: 2019-08-02 | End: 2019-08-03 | Stop reason: HOSPADM

## 2019-08-02 RX ORDER — NICOTINE POLACRILEX 4 MG
15 LOZENGE BUCCAL PRN
Status: DISCONTINUED | OUTPATIENT
Start: 2019-08-02 | End: 2019-08-03 | Stop reason: HOSPADM

## 2019-08-02 RX ORDER — GABAPENTIN 300 MG/1
300 CAPSULE ORAL DAILY
Status: DISCONTINUED | OUTPATIENT
Start: 2019-08-02 | End: 2019-08-03 | Stop reason: HOSPADM

## 2019-08-02 RX ORDER — ONDANSETRON 2 MG/ML
4 INJECTION INTRAMUSCULAR; INTRAVENOUS EVERY 6 HOURS PRN
Status: DISCONTINUED | OUTPATIENT
Start: 2019-08-02 | End: 2019-08-03 | Stop reason: HOSPADM

## 2019-08-02 RX ORDER — DEXTROSE MONOHYDRATE 50 MG/ML
100 INJECTION, SOLUTION INTRAVENOUS PRN
Status: DISCONTINUED | OUTPATIENT
Start: 2019-08-02 | End: 2019-08-03 | Stop reason: HOSPADM

## 2019-08-02 RX ORDER — ASPIRIN 81 MG/1
81 TABLET ORAL DAILY
Status: DISCONTINUED | OUTPATIENT
Start: 2019-08-02 | End: 2019-08-03 | Stop reason: HOSPADM

## 2019-08-02 RX ORDER — NICOTINE 21 MG/24HR
1 PATCH, TRANSDERMAL 24 HOURS TRANSDERMAL DAILY PRN
Status: DISCONTINUED | OUTPATIENT
Start: 2019-08-02 | End: 2019-08-03 | Stop reason: HOSPADM

## 2019-08-02 RX ORDER — CLINDAMYCIN PHOSPHATE 600 MG/50ML
600 INJECTION INTRAVENOUS ONCE
Status: COMPLETED | OUTPATIENT
Start: 2019-08-02 | End: 2019-08-02

## 2019-08-02 RX ORDER — POTASSIUM CHLORIDE 7.45 MG/ML
10 INJECTION INTRAVENOUS PRN
Status: DISCONTINUED | OUTPATIENT
Start: 2019-08-02 | End: 2019-08-03 | Stop reason: HOSPADM

## 2019-08-02 RX ORDER — INSULIN GLARGINE 100 [IU]/ML
25 INJECTION, SOLUTION SUBCUTANEOUS 2 TIMES DAILY
Status: DISCONTINUED | OUTPATIENT
Start: 2019-08-02 | End: 2019-08-03 | Stop reason: HOSPADM

## 2019-08-02 RX ORDER — MIDODRINE HYDROCHLORIDE 5 MG/1
10 TABLET ORAL 3 TIMES DAILY
Status: DISCONTINUED | OUTPATIENT
Start: 2019-08-02 | End: 2019-08-03 | Stop reason: HOSPADM

## 2019-08-02 RX ADMIN — HEPARIN SODIUM (PORCINE) LOCK FLUSH IV SOLN 100 UNIT/ML 1.6 ML: 100 SOLUTION at 14:53

## 2019-08-02 RX ADMIN — HEPARIN SODIUM (PORCINE) LOCK FLUSH IV SOLN 100 UNIT/ML 1.6 ML: 100 SOLUTION at 14:52

## 2019-08-02 RX ADMIN — DESMOPRESSIN ACETATE 40 MG: 0.2 TABLET ORAL at 20:55

## 2019-08-02 RX ADMIN — SEVELAMER CARBONATE 1600 MG: 800 TABLET, FILM COATED ORAL at 20:55

## 2019-08-02 RX ADMIN — MIDODRINE HYDROCHLORIDE 10 MG: 5 TABLET ORAL at 20:55

## 2019-08-02 RX ADMIN — DONEPEZIL HYDROCHLORIDE 5 MG: 5 TABLET, FILM COATED ORAL at 20:55

## 2019-08-02 RX ADMIN — INSULIN LISPRO 1 UNITS: 100 INJECTION, SOLUTION INTRAVENOUS; SUBCUTANEOUS at 21:05

## 2019-08-02 RX ADMIN — HEPARIN SODIUM 1600 UNITS: 1000 INJECTION, SOLUTION INTRAVENOUS; SUBCUTANEOUS at 19:03

## 2019-08-02 RX ADMIN — Medication 10 ML: at 20:56

## 2019-08-02 RX ADMIN — FENTANYL CITRATE 50 MCG: 50 INJECTION INTRAMUSCULAR; INTRAVENOUS at 14:41

## 2019-08-02 RX ADMIN — CLINDAMYCIN PHOSPHATE 600 MG: 600 INJECTION, SOLUTION INTRAVENOUS at 20:52

## 2019-08-02 RX ADMIN — ASPIRIN 81 MG: 81 TABLET ORAL at 20:55

## 2019-08-02 ASSESSMENT — PAIN DESCRIPTION - PAIN TYPE
TYPE: ACUTE PAIN

## 2019-08-02 ASSESSMENT — PAIN SCALES - GENERAL
PAINLEVEL_OUTOF10: 4
PAINLEVEL_OUTOF10: 0
PAINLEVEL_OUTOF10: 3

## 2019-08-02 ASSESSMENT — PAIN DESCRIPTION - ORIENTATION
ORIENTATION: RIGHT
ORIENTATION: RIGHT

## 2019-08-02 ASSESSMENT — ENCOUNTER SYMPTOMS
WHEEZING: 0
DIARRHEA: 0
SORE THROAT: 0
STRIDOR: 0
NAUSEA: 0
EYE PAIN: 0
COLOR CHANGE: 1
EYE REDNESS: 0
VOMITING: 0
COUGH: 0
SHORTNESS OF BREATH: 0
ABDOMINAL PAIN: 0
EYE DISCHARGE: 0
CONSTIPATION: 0

## 2019-08-02 ASSESSMENT — PAIN DESCRIPTION - FREQUENCY
FREQUENCY: INTERMITTENT
FREQUENCY: INTERMITTENT

## 2019-08-02 ASSESSMENT — PAIN DESCRIPTION - LOCATION
LOCATION: CHEST
LOCATION: ARM
LOCATION: ARM

## 2019-08-02 NOTE — H&P
[facility]    HISTORY AND PHYSICAL EXAMINATION            Date:   8/2/2019  Patient name:  Cornel Taveras  Date of admission:  8/2/2019 10:17 AM  MRN:   [id number]  Account:  [de-identified]  YOB: 1945  PCP:    Xochilt Zheng DO  Room:   1128/9477-56  Code Status:    DNR-CCA    Chief Complaint:     Arm swelling    History Obtained From:     patient, electronic medical record    History of Present Illness: The patient is a 75 y.o. M with PMH significant for ESRD on HD, DM who presented with arm swelling. Initially presented to HD, fistula found to be infiltrated. Presented to [facility] ER, transferred to [facility] for further evaluation of fistula and IR evaluation. Denied any associated symptoms. States swelling was going on for the past week.        Past Medical History:     Past Medical History:   Diagnosis Date    Anemia in chronic kidney disease (CKD) 4/27/2016    Arthritis     Bacteremia 11/11/2016    Benign prostatic hyperplasia without lower urinary tract symptoms     BMI 40.0-44.9, adult ([address] 3/19/2018    Cellulitis of left lower extremity     Chronic cerebral ischemia 1/3/2017    Closed fracture of forearm 8/13/2013    Broken lft forearm     Controlled type 2 diabetes mellitus with chronic kidney disease on chronic dialysis, with long-term current use of insulin ([address]     Controlled type 2 diabetes mellitus with diabetic polyneuropathy, with long-term current use of insulin ([address] 10/7/2018    Decubital ulcer     NON OPEN BUTTOCKS    Dementia     memory problems    Dialysis patient ([address] 01/03/2017    [facility], Sat in [facility]    Difficult intravenous access     HAS NEEDED PICC TEAM IN THE PAST    Difficulty walking     WHEELCHAIR BOUND-PIVOTS WITH ASSIST O F 2    DJD (degenerative joint disease) of knee     Elbow, forearm, and wrist, abrasion or friction burn, without mention of infection 8/13/2013 Nephrology consult  - IR for fistulogram vs HD catheter placement pending nephrology evaluation  - Continue home medications  - Continue home insulin   - Insulin correction scale, last A1c 5.8 (3/9/19)      Consultations:   Sonia Malloy 761 TO IV TEAM     Patient is admitted as inpatient status because of co-morbidities listed above, severity of signs and symptoms as outlined, requirement for current medical therapies and most importantly because of direct risk to patient if care not provided in a hospital setting.     Marci Ortiz MD  8/2/2019  1:22 PM    Copy sent to Dr. Swapna Yarbrough DO

## 2019-08-02 NOTE — BRIEF OP NOTE
Brief Postoperative Note    Wendi Haq  YOB: 1945  [id number]    Pre-operative Diagnosis: Chronic Renal Failure      Post-operative Diagnosis: Same    Procedure: Tunneled Dialysis Catheter    Medication Given: fentanyl    Anesthesia: 2%Lidocaine Local Injection right neck and upper chest    Surgeons/Assistants: MIKE JUAREZ    Estimated Blood Loss: Minimal    Complications: none    14 Fr x 19 cm (tip to cuff) tunneled HD Catheter placed Site:  Right Internal Jugular Vein. Dressing applied. Good blood flows demonstrated. May use HD cath for dialysis. Dialysis to instill heparin 1000 units per ml to priming volume in each port after use. If dialysis treatment is done within 24 hours of insertion DO NOT USE HEPARIN in treatment, or contact nephrologist for decrease heparin dose. Vital signs were reviewed and were stable after the procedure. Discharged to floor.     Electronically signed by Brian Maldonado on 8/2/2019 at 3:18 PM

## 2019-08-02 NOTE — ED PROVIDER NOTES
[facility]  [address]  [place] [address]  Phone: [phone]        Pt Name: Teresita Miller  MRN: [id number]  Docgfjay 1945  Date of evaluation: 8/2/19      CHIEF COMPLAINT   Arm swollen and bruised    HISTORY OF PRESENT ILLNESS  (Location/Symptom, Timing/Onset, Context/Setting, Quality, Duration, Modifying Factors, Severity.)    Teresita Miller is a 75 y.o. male who presents complaining of infiltrated fistula site. Wife is at bedside and is a very good historian. She relates his last dialysis was on Wednesday. At that time he had infiltration of the site. She relates it happened before but never to this extent. It is now getting tight and hot. She relates that she is pretty sure they will not be able to do dialysis on this arm today and she thinks is probably going to need some type of other short-term dialysis device. He is not having shortness of breath or chest pain. It is just his normal day for dialysis today and so she has come in for evaluation. REVIEW OF SYSTEMS    (2-9 systems for level 4, 10 or more for level 5)     Review of Systems   Constitutional: Negative for activity change, appetite change, chills, fatigue and fever. HENT: Negative for congestion, ear pain and sore throat. Eyes: Negative for pain, discharge and redness. Respiratory: Negative for cough, shortness of breath, wheezing and stridor. Cardiovascular: Negative for chest pain. Gastrointestinal: Negative for abdominal pain, constipation, diarrhea, nausea and vomiting. Genitourinary: Negative for decreased urine volume and difficulty urinating. Musculoskeletal: Negative for arthralgias and myalgias. R arm swelling, heat   Skin: Positive for color change. Negative for rash. Neurological: Negative for dizziness, weakness and headaches. Psychiatric/Behavioral: Negative for behavioral problems and confusion.        PAST MEDICAL HISTORY    has a past medical history of Anemia in chronic kidney disease (CKD), Arthritis, Bacteremia, Benign prostatic hyperplasia without lower urinary tract symptoms, BMI 40.0-44.9, adult ([facility]), Cellulitis of left lower extremity, Chronic cerebral ischemia, Closed fracture of forearm, Controlled type 2 diabetes mellitus with chronic kidney disease on chronic dialysis, with long-term current use of insulin ([address] Controlled type 2 diabetes mellitus with diabetic polyneuropathy, with long-term current use of insulin ([address] Decubital ulcer, Dementia, Dialysis patient ([address] Difficult intravenous access, Difficulty walking, DJD (degenerative joint disease) of knee, Elbow, forearm, and wrist, abrasion or friction burn, without mention of infection, Encephalopathy acute, Encounter regarding vascular access for dialysis for ESRD ([address] ESRD (end stage renal disease) on dialysis ([address] Forgetfulness, H/O transesophageal echocardiography ([facility]) for monitoring, Hemodialysis patient ([address] Hyperlipidemia, Hypertension, Intercritical gout, Joint pain, knee, Long-term insulin use ([address] Major depressive disorder with single episode, in partial remission ([address] Mobility impaired, Morbid obesity ([address] Muscle weakness, Obesity, Obstructive sleep apnea, HEATHER on CPAP, Paroxysmal atrial fibrillation ([address] Respiratory failure ([address] S/P cardiac cath, Seborrhea, Severe aortic stenosis, Severe mitral valve stenosis, Severe sepsis ([facility]), Skin rash, Stasis dermatitis, Thyroid disease, Traumatic amputation of thumb, Venous stasis dermatitis, Wears glasses, and Wheelchair bound. SURGICAL HISTORY      has a past surgical history that includes Colonoscopy (11/19/09); Knee arthroscopy (Left, 09/17/99); Hand surgery (Left, 1990 (x10-12 surgeries)); Arm Surgery (Left, 1990); Cardiac catheterization; Nasal septum surgery; Sternotomy (3/18/16); Aortic valve replacement (03/18/2016);  Mitral valve replacement (03/18/2016); other surgical history (3/18/16); other surgical history CO2 28 20 - 31 mmol/L    Anion Gap 15 9 - 17 mmol/L    GFR Non-African American 11 (L) >60 mL/min    GFR  13 (L) >60 mL/min    GFR Comment          GFR Staging NOT REPORTED    Labs pending on sign out    EMERGENCY DEPARTMENT COURSE:   Vitals:    Vitals:    08/02/19 0701 08/02/19 0858   BP: 130/60 (!) 147/66   Pulse: 79 78   Resp: 12 12   Temp: 97.9 °F (36.6 °C)    TempSrc: Tympanic    SpO2: 99% 96%   Weight: 126.6 kg (279 lb)    Height: 5' 7\" (1.702 m)      -------------------------  BP: (!) 147/66, Temp: 97.9 °F (36.6 °C), Pulse: 78, Resp: 12      RE-EVALUATION:  no change    CONSULTS:  none    PROCEDURES:  None    FINAL IMPRESSION      1. Failing arteriovenous fistula, initial encounter ([facility]    2. ESRD (end stage renal disease) ([facility]    3. Admission for dialysis [facility])          DISPOSITION/PLAN   DISPOSITION  Pending on sign out      CONDITION ON DISPOSITION:   stable    PATIENT REFERRED TO:  No follow-up provider specified. DISCHARGE MEDICATIONS:  Discharge Medication List as of 8/2/2019  9:50 AM          (Please note that portions of this note were completed with a voicerecognition program.  Efforts were made to edit the dictations but occasionally words are mis-transcribed.)    Malhotra MD, F.A.C.E.P.   Attending Emergency Medicine Physician        Sylvie Fletcher MD  08/03/19 [id number]

## 2019-08-02 NOTE — SEDATION DOCUMENTATION
14.5 fr x 19 cm palindrome to right IJ. Brisk blood return. Lines flushed and sutured in place. Caps and dressing to be applied.

## 2019-08-02 NOTE — ED TRIAGE NOTES
Pt to room #4 via stretcher by [facility] from [facility]. Pt was directed to our care for worsening of fistula infiltration to right forearm. Pt's right forearm fistula infiltrated during dialysis 7-31-19, with less than 30 minutes remaining for dialysis. [facility] dialysis center took measurements of effected area and sent infiltration instructions with pt to ECF, where they report they have followed the instructions for elevation, with ice and heat rotation. ECF also used skin pen to trace boundaries of reddened area of infiltration and the dark red/purple infiltration has spread outside of these boundaries and fistula site continues to swell. Pt c/o mild pain to infiltrated fistula site only during movement. Skin at infiltrated fistula site to right forearm is dry, warm and taught. No drainage noted from affected site at this time.

## 2019-08-02 NOTE — ED PROVIDER NOTES
[facility]  [address] [facility]  Phone: [phone]        ADDENDUM:      Care of this patient was assumed from Dr. Magdalena Vital. The patient was seen for No chief complaint on file. .  The patient's initial evaluation and plan have been discussed with the prior provider who initially evaluated the patient. Nursing Notes, Past Medical Hx, Past Surgical Hx, Allergies, were all reviewed.     PAST MEDICAL HISTORY    has a past medical history of Anemia in chronic kidney disease (CKD), Arthritis, Bacteremia, Benign prostatic hyperplasia without lower urinary tract symptoms, BMI 40.0-44.9, adult ([address] Cellulitis of left lower extremity, Chronic cerebral ischemia, Closed fracture of forearm, Controlled type 2 diabetes mellitus with chronic kidney disease on chronic dialysis, with long-term current use of insulin ([address] Controlled type 2 diabetes mellitus with diabetic polyneuropathy, with long-term current use of insulin ([address] Decubital ulcer, Dementia, Dialysis patient ([address] Difficult intravenous access, Difficulty walking, DJD (degenerative joint disease) of knee, Elbow, forearm, and wrist, abrasion or friction burn, without mention of infection, Encephalopathy acute, Encounter regarding vascular access for dialysis for ESRD ([address] ESRD (end stage renal disease) on dialysis ([address] Forgetfulness, H/O transesophageal echocardiography ([facility]) for monitoring, Hemodialysis patient ([address] Hyperlipidemia, Hypertension, Intercritical gout, Joint pain, knee, Long-term insulin use ([address] Major depressive disorder with single episode, in partial remission ([address] Mobility impaired, Morbid obesity ([address] Muscle weakness, Obesity, Obstructive sleep apnea, HEATHER on CPAP, Paroxysmal atrial fibrillation ([address] Respiratory failure ([address] S/P cardiac cath, Seborrhea, Severe aortic stenosis, Severe mitral valve stenosis, Severe sepsis ([address] Skin rash, Stasis dermatitis, Thyroid disease, Traumatic

## 2019-08-03 VITALS
DIASTOLIC BLOOD PRESSURE: 57 MMHG | TEMPERATURE: 98 F | WEIGHT: 253.53 LBS | HEIGHT: 67 IN | OXYGEN SATURATION: 99 % | HEART RATE: 78 BPM | BODY MASS INDEX: 39.79 KG/M2 | SYSTOLIC BLOOD PRESSURE: 128 MMHG | RESPIRATION RATE: 16 BRPM

## 2019-08-03 LAB
ANION GAP SERPL CALCULATED.3IONS-SCNC: 17 MMOL/L (ref 9–17)
BUN BLDV-MCNC: 34 MG/DL (ref 8–23)
BUN/CREAT BLD: ABNORMAL (ref 9–20)
CALCIUM SERPL-MCNC: 8.9 MG/DL (ref 8.6–10.4)
CHLORIDE BLD-SCNC: 94 MMOL/L (ref 98–107)
CO2: 22 MMOL/L (ref 20–31)
CREAT SERPL-MCNC: 3.68 MG/DL (ref 0.7–1.2)
GFR AFRICAN AMERICAN: 20 ML/MIN
GFR NON-AFRICAN AMERICAN: 16 ML/MIN
GFR SERPL CREATININE-BSD FRML MDRD: ABNORMAL ML/MIN/{1.73_M2}
GFR SERPL CREATININE-BSD FRML MDRD: ABNORMAL ML/MIN/{1.73_M2}
GLUCOSE BLD-MCNC: 133 MG/DL (ref 70–99)
GLUCOSE BLD-MCNC: 158 MG/DL (ref 75–110)
GLUCOSE BLD-MCNC: 166 MG/DL (ref 75–110)
GLUCOSE BLD-MCNC: 243 MG/DL (ref 75–110)
HCT VFR BLD CALC: 32.8 % (ref 40.7–50.3)
HEMOGLOBIN: 9.7 G/DL (ref 13–17)
INR BLD: 1
MCH RBC QN AUTO: 26.4 PG (ref 25.2–33.5)
MCHC RBC AUTO-ENTMCNC: 29.6 G/DL (ref 28.4–34.8)
MCV RBC AUTO: 89.4 FL (ref 82.6–102.9)
NRBC AUTOMATED: 0 PER 100 WBC
PDW BLD-RTO: 18 % (ref 11.8–14.4)
PLATELET # BLD: 157 K/UL (ref 138–453)
PMV BLD AUTO: 9.8 FL (ref 8.1–13.5)
POTASSIUM SERPL-SCNC: 3.7 MMOL/L (ref 3.7–5.3)
PROTHROMBIN TIME: 10.2 SEC (ref 9–12)
RBC # BLD: 3.67 M/UL (ref 4.21–5.77)
SODIUM BLD-SCNC: 133 MMOL/L (ref 135–144)
WBC # BLD: 7.1 K/UL (ref 3.5–11.3)

## 2019-08-03 PROCEDURE — 85610 PROTHROMBIN TIME: CPT

## 2019-08-03 PROCEDURE — 97162 PT EVAL MOD COMPLEX 30 MIN: CPT

## 2019-08-03 PROCEDURE — 6370000000 HC RX 637 (ALT 250 FOR IP): Performed by: INTERNAL MEDICINE

## 2019-08-03 PROCEDURE — 97530 THERAPEUTIC ACTIVITIES: CPT

## 2019-08-03 PROCEDURE — 99239 HOSP IP/OBS DSCHRG MGMT >30: CPT | Performed by: INTERNAL MEDICINE

## 2019-08-03 PROCEDURE — 85027 COMPLETE CBC AUTOMATED: CPT

## 2019-08-03 PROCEDURE — 2580000003 HC RX 258: Performed by: INTERNAL MEDICINE

## 2019-08-03 PROCEDURE — 82947 ASSAY GLUCOSE BLOOD QUANT: CPT

## 2019-08-03 PROCEDURE — 97535 SELF CARE MNGMENT TRAINING: CPT

## 2019-08-03 PROCEDURE — 97165 OT EVAL LOW COMPLEX 30 MIN: CPT

## 2019-08-03 PROCEDURE — 94760 N-INVAS EAR/PLS OXIMETRY 1: CPT

## 2019-08-03 PROCEDURE — 36415 COLL VENOUS BLD VENIPUNCTURE: CPT

## 2019-08-03 PROCEDURE — 80048 BASIC METABOLIC PNL TOTAL CA: CPT

## 2019-08-03 RX ORDER — HYDROCODONE BITARTRATE AND ACETAMINOPHEN 5; 325 MG/1; MG/1
1 TABLET ORAL EVERY 6 HOURS PRN
Qty: 10 TABLET | Refills: 0 | Status: SHIPPED | OUTPATIENT
Start: 2019-08-03 | End: 2019-08-10

## 2019-08-03 RX ORDER — ALPRAZOLAM 0.5 MG/1
0.5 TABLET ORAL 3 TIMES DAILY PRN
Qty: 10 TABLET | Refills: 0 | Status: SHIPPED | OUTPATIENT
Start: 2019-08-03 | End: 2019-08-10

## 2019-08-03 RX ADMIN — DULOXETINE HYDROCHLORIDE 60 MG: 30 CAPSULE, DELAYED RELEASE ORAL at 08:40

## 2019-08-03 RX ADMIN — DONEPEZIL HYDROCHLORIDE 5 MG: 5 TABLET, FILM COATED ORAL at 18:10

## 2019-08-03 RX ADMIN — INSULIN LISPRO 2 UNITS: 100 INJECTION, SOLUTION INTRAVENOUS; SUBCUTANEOUS at 12:33

## 2019-08-03 RX ADMIN — SEVELAMER CARBONATE 1600 MG: 800 TABLET, FILM COATED ORAL at 08:40

## 2019-08-03 RX ADMIN — INSULIN LISPRO 4 UNITS: 100 INJECTION, SOLUTION INTRAVENOUS; SUBCUTANEOUS at 16:52

## 2019-08-03 RX ADMIN — BUPROPION HYDROCHLORIDE 150 MG: 150 TABLET, EXTENDED RELEASE ORAL at 08:40

## 2019-08-03 RX ADMIN — Medication 10 ML: at 08:39

## 2019-08-03 RX ADMIN — ASPIRIN 81 MG: 81 TABLET ORAL at 08:41

## 2019-08-03 RX ADMIN — INSULIN GLARGINE 25 UNITS: 100 INJECTION, SOLUTION SUBCUTANEOUS at 08:39

## 2019-08-03 RX ADMIN — SERTRALINE 100 MG: 50 TABLET, FILM COATED ORAL at 08:40

## 2019-08-03 RX ADMIN — LEVOTHYROXINE SODIUM 25 MCG: 25 TABLET ORAL at 05:41

## 2019-08-03 RX ADMIN — GABAPENTIN 300 MG: 300 CAPSULE ORAL at 08:40

## 2019-08-03 ASSESSMENT — PAIN DESCRIPTION - ORIENTATION
ORIENTATION: RIGHT
ORIENTATION: RIGHT

## 2019-08-03 ASSESSMENT — PAIN SCALES - GENERAL
PAINLEVEL_OUTOF10: 5
PAINLEVEL_OUTOF10: 3

## 2019-08-03 ASSESSMENT — PAIN DESCRIPTION - LOCATION
LOCATION: ARM
LOCATION: ARM

## 2019-08-03 ASSESSMENT — PAIN DESCRIPTION - DESCRIPTORS: DESCRIPTORS: ACHING;DISCOMFORT

## 2019-08-03 ASSESSMENT — PAIN DESCRIPTION - FREQUENCY: FREQUENCY: INTERMITTENT

## 2019-08-03 ASSESSMENT — PAIN DESCRIPTION - PAIN TYPE: TYPE: ACUTE PAIN

## 2019-08-03 NOTE — PROGRESS NOTES
Writer call in report to [facility].
Currently in Pain: Yes  Pain Assessment  Pain Assessment: 0-10  Pain Level: 3  Pain Location: Arm  Pain Orientation: Right  Vital Signs  Patient Currently in Pain: Yes       Orientation  Orientation  Overall Orientation Status: Within Functional Limits  Social/Functional History  Social/Functional History  Lives With: Other (comment)  Type of Home: Facility(ECF)  Home Layout: One level  Home Access: Level entry  Bathroom Shower/Tub: (pt reports spongebathing while in bed at facility)  [employer]: (pt states use of bedpan or briefs)  Home Equipment: [address] Help From: Family, Personal care attendant  ADL Assistance: Needs assistance  Bath: (pt reports able to participate in UB spongebathing tasks with min A; pt states max A to dependent for all LB spongebathing tasks)  Dressing: (pt reports able to participate in UB dressing tasks with min A; pt states max A to dependent for all LB dressing tasks)  Grooming: (pt reports able to complete simple grooming tasks with setup only required)  Feeding: (setup)  Toileting: Needs assistance(dependent for all pericare/bottom hygiene per pt report; use of bed pans/briefs)  Homemaking Assistance: Needs assistance  Homemaking Responsibilities: No(all performed by facility attendants per pt)  Ambulation Assistance: Needs assistance(use of wheelchair for all functional mobility, pt reports able to self propel wheelchair short distances with BUE)  Transfer Assistance: Needs assistance(use of hilario lift for all transfers per pt report)  Active : No  Patient's  Info: Ambulette or family  Occupation: Retired  Cognition      Objective     AROM RLE (degrees)  RLE AROM: WFL  AROM LLE (degrees)  LLE AROM : WFL  AROM RUE (degrees)  RUE AROM : WFL  AROM LUE (degrees)  LUE AROM : WFL  Strength RLE  Strength RLE: (2+/5)  Strength LLE  Strength LLE: (3-/5)  Strength RUE  Strength RUE: WFL  Strength LUE  Strength LUE: WFL     Sensation  Overall Sensation
ALBPCT 65 03/21/2016    LABALPH 0.3 03/21/2016    LABALPH 0.6 03/21/2016    A1PCT 6 03/21/2016    A2PCT 12 03/21/2016    LABBETA 0.4 03/21/2016    BETAPCT 9 03/21/2016    GAMGLOB 0.4 03/21/2016    GGPCT 8 03/21/2016    PATH ELECTRONICALLY SIGNED. Nehemias Floyd M.D. 03/21/2016     UPEP:     Lab Results   Component Value Date    LABPE NORMAL ELECTROPHORETIC PATTERN 03/21/2016     C3:     Lab Results   Component Value Date    C3 50 03/21/2016     C4:     Lab Results   Component Value Date    C4 21 03/21/2016     MPO ANCA:   No results found for: MPO  PR3 ANCA:   No results found for: PR3  Anti-GBM:   No results found for: GBMABIGG  Hep BsAg:         Lab Results   Component Value Date    HEPBSAG NONREACTIVE 05/24/2016     Hep C AB:          Lab Results   Component Value Date    HEPCAB NONREACTIVE 11/11/2016       Urinalysis/Chemistries:      Lab Results   Component Value Date    NITRU NEGATIVE 04/27/2016    COLORU DELORES 04/27/2016    PHUR 5.0 04/27/2016    WBCUA TOO NUMEROUS TO COUNT 04/27/2016    RBCUA 10 TO 20 04/27/2016    MUCUS NOT REPORTED 04/27/2016    TRICHOMONAS NOT REPORTED 04/27/2016    YEAST NOT REPORTED 04/27/2016    BACTERIA MANY 04/27/2016    SPECGRAV 1.015 04/27/2016    LEUKOCYTESUR LARGE 04/27/2016    UROBILINOGEN Normal 04/27/2016    BILIRUBINUR NEGATIVE 04/27/2016    GLUCOSEU NEGATIVE 04/27/2016    KETUA NEGATIVE 04/27/2016    AMORPHOUS 2+ 04/27/2016     Urine Sodium:     Lab Results   Component Value Date    RAY 20 04/27/2016     Urine Potassium:  No results found for: KUR  Urine Chloride:  No results found for: CLUR  Urine Osmolarity:   Lab Results   Component Value Date    OSMOU 403 03/26/2016     Urine Protein:   No components found for: TOTALPROTEIN, URINE   Urine Creatinine:     Lab Results   Component Value Date    LABCREA 95.7 04/27/2016     Urine Eosinophils:  No components found for: UEOS    Radiology:     CXR:     Assessment:     1. ESRD on Hemodialysis.  His regular HD days are
Evaluated and Discharged    AM-PAC Score  AM-PAC Inpatient Daily Activity Raw Score: 14 (08/03/19 1043)  AM-PAC Inpatient ADL T-Scale Score : 33.39 (08/03/19 1043)  ADL Inpatient CMS 0-100% Score: 59.67 (08/03/19 1043)  ADL Inpatient CMS G-Code Modifier : CK (08/03/19 1043)    Therapy Time   Individual Concurrent Group Co-treatment   Time In 0849         Time Out 0906         Minutes 17         Timed Code Treatment Minutes: 8 Minutes       Mariama Escobedo OTR/L

## 2019-08-03 NOTE — DISCHARGE SUMMARY
MCHC 29.6 08/03/2019    RDW 18.0 08/03/2019     08/03/2019     BMP:    Lab Results   Component Value Date    GLUCOSE 133 08/03/2019     08/03/2019    K 3.7 08/03/2019    CL 94 08/03/2019    CO2 22 08/03/2019    ANIONGAP 17 08/03/2019    BUN 34 08/03/2019    CREATININE 3.68 08/03/2019    BUNCRER NOT REPORTED 08/03/2019    CALCIUM 8.9 08/03/2019    LABGLOM 16 08/03/2019    GFRAA 20 08/03/2019    GFR      08/03/2019    GFR NOT REPORTED 08/03/2019     HFP:    Lab Results   Component Value Date    PROT 4.5 03/16/2019     CMP:    Lab Results   Component Value Date    GLUCOSE 133 08/03/2019     08/03/2019    K 3.7 08/03/2019    CL 94 08/03/2019    CO2 22 08/03/2019    BUN 34 08/03/2019    CREATININE 3.68 08/03/2019    ANIONGAP 17 08/03/2019    ALKPHOS 99 03/16/2019     03/16/2019    AST 91 03/16/2019    BILITOT 0.66 03/16/2019    LABALBU 2.6 03/16/2019    ALBUMIN 1.4 03/16/2019    LABGLOM 16 08/03/2019    GFRAA 20 08/03/2019    GFR      08/03/2019    GFR NOT REPORTED 08/03/2019    PROT 4.5 03/16/2019    CALCIUM 8.9 08/03/2019     PT/INR:  No results found for: PTINR  PTT:   Lab Results   Component Value Date    APTT 33.4 08/02/2019     FLP:    Lab Results   Component Value Date    CHOL 136 04/27/2015    TRIG 100 03/22/2016    HDL 54 04/27/2015     U/A:    Lab Results   Component Value Date    COLORU DELORES 04/27/2016    TURBIDITY TURBID 04/27/2016    SPECGRAV 1.015 04/27/2016    HGBUR LARGE 04/27/2016    PHUR 5.0 04/27/2016    PROTEINU 2+ 04/27/2016    GLUCOSEU NEGATIVE 04/27/2016    KETUA NEGATIVE 04/27/2016    BILIRUBINUR NEGATIVE 04/27/2016    UROBILINOGEN Normal 04/27/2016    NITRU NEGATIVE 04/27/2016    LEUKOCYTESUR LARGE 04/27/2016     TSH:    Lab Results   Component Value Date    TSH 0.60 03/10/2019        Radiology:  Ir Tunneled Cvc Place Wo Sq Port/pump > 5 Years    Result Date: 8/2/2019  Successful ultrasound and fluoroscopy guided tunneled catheter placement, as above.   Catheter is ready

## 2019-08-05 ENCOUNTER — TELEPHONE (OUTPATIENT)
Dept: INTERNAL MEDICINE | Age: 74
End: 2019-08-05

## 2019-08-06 ENCOUNTER — OFFICE VISIT (OUTPATIENT)
Dept: WOUND CARE | Age: 74
End: 2019-08-06
Payer: MEDICARE

## 2019-08-06 ENCOUNTER — TELEPHONE (OUTPATIENT)
Dept: WOUND CARE | Age: 74
End: 2019-08-06

## 2019-08-06 VITALS
DIASTOLIC BLOOD PRESSURE: 64 MMHG | SYSTOLIC BLOOD PRESSURE: 136 MMHG | RESPIRATION RATE: 16 BRPM | HEART RATE: 69 BPM | OXYGEN SATURATION: 98 % | TEMPERATURE: 96.8 F

## 2019-08-06 DIAGNOSIS — S31.109A WOUND OF RIGHT GROIN, INITIAL ENCOUNTER: ICD-10-CM

## 2019-08-06 DIAGNOSIS — S80.11XA HEMATOMA OF RIGHT LOWER EXTREMITY, INITIAL ENCOUNTER: Primary | ICD-10-CM

## 2019-08-06 DIAGNOSIS — L89.312 DECUBITUS ULCER OF RIGHT BUTTOCK, STAGE 2 (HCC): ICD-10-CM

## 2019-08-06 DIAGNOSIS — L89.322 DECUBITUS ULCER OF LEFT BUTTOCK, STAGE 2 (HCC): ICD-10-CM

## 2019-08-06 PROCEDURE — 11042 DBRDMT SUBQ TIS 1ST 20SQCM/<: CPT | Performed by: SURGERY

## 2019-08-06 RX ORDER — SERTRALINE HYDROCHLORIDE 100 MG/1
100 TABLET, FILM COATED ORAL DAILY
COMMUNITY
End: 2019-08-27 | Stop reason: ALTCHOICE

## 2019-08-06 RX ORDER — KETOCONAZOLE 20 MG/G
CREAM TOPICAL
COMMUNITY
Start: 2019-07-30 | End: 2020-06-16

## 2019-08-06 RX ORDER — LIDOCAINE AND PRILOCAINE 25; 25 MG/G; MG/G
CREAM TOPICAL
Refills: 4 | Status: ON HOLD | COMMUNITY
Start: 2019-07-30 | End: 2019-12-04 | Stop reason: HOSPADM

## 2019-08-06 NOTE — PROGRESS NOTES
Unable to safely turn patient for assessment on buttocks, due to narrow transport cot.
today because pt could not safely be put on table. #4  Right Arm       Plan for wound:    1. Just apply eucerin cream to right leg wound    2. Wet to dry with guaze to right groin wound    3. Sacral wound:     -  Apply barrier cream daily     There are no Patient Instructions on file for this visit.               Electronically signed by Bay Moreau MD on 8/6/2019 at 10:05 AM

## 2019-08-06 NOTE — PATIENT INSTRUCTIONS
Discontinue dressings to right lower leg; wounds are healed. Start good quality moisturizer (I.e. Eucerin, CeraVe, Lubriderm, or comparable) to legs, twice daily to improve & maintain skin integrity. Continue saline moist to dry dressing to right inguinal wound, change daily and as needed. Vascular or renal to continue to follow for right arm. Continue skin care to buttocks as previously directed. Follow up in wound clinic in 2 weeks. SIGNS OF INFECTION  - Redness, swelling, skin hot  - Wound bed turns black or stringy yellow  - Foul odor  - Increased drainage or pus  - Increased pain  - Fever greater than 100F    CALL YOUR DOCTOR OR SEEK MEDICAL ATTENTION IF SIGNS OF INFECTION.   DO NOT WAIT UNTIL YOUR NEXT APPOINTMENT    Call the Wound Care Nurse with any other questions or concerns- [phone]

## 2019-08-08 ENCOUNTER — TELEPHONE (OUTPATIENT)
Dept: WOUND CARE | Age: 74
End: 2019-08-08

## 2019-08-27 ENCOUNTER — OFFICE VISIT (OUTPATIENT)
Dept: WOUND CARE | Age: 74
End: 2019-08-27
Payer: MEDICARE

## 2019-08-27 VITALS — DIASTOLIC BLOOD PRESSURE: 72 MMHG | HEART RATE: 72 BPM | SYSTOLIC BLOOD PRESSURE: 153 MMHG | TEMPERATURE: 98.3 F

## 2019-08-27 DIAGNOSIS — S31.109A WOUND OF RIGHT GROIN, INITIAL ENCOUNTER: Primary | ICD-10-CM

## 2019-08-27 DIAGNOSIS — T79.8XXA OTHER EARLY COMPLICATIONS OF TRAUMA, INITIAL ENCOUNTER (HCC): ICD-10-CM

## 2019-08-27 PROCEDURE — 99309 SBSQ NF CARE MODERATE MDM 30: CPT | Performed by: INTERNAL MEDICINE

## 2019-08-27 PROCEDURE — 11042 DBRDMT SUBQ TIS 1ST 20SQCM/<: CPT | Performed by: SURGERY

## 2019-08-27 RX ORDER — ALPRAZOLAM 0.5 MG/1
0.5 TABLET ORAL 2 TIMES DAILY
Status: ON HOLD | COMMUNITY
End: 2021-01-01 | Stop reason: HOSPADM

## 2019-08-27 RX ORDER — SEVELAMER HYDROCHLORIDE 800 MG/1
1600 TABLET, FILM COATED ORAL
COMMUNITY
End: 2019-09-17

## 2019-08-27 RX ORDER — HYDROCODONE BITARTRATE AND ACETAMINOPHEN 5; 325 MG/1; MG/1
1 TABLET ORAL EVERY 6 HOURS PRN
Status: ON HOLD | COMMUNITY
End: 2019-12-04 | Stop reason: SDUPTHER

## 2019-08-27 NOTE — PROGRESS NOTES
Follow-Up Wound Progress Note  Love Roche  AGE: 75 y.o. GENDER: male  : 1945  TODAY'S DATE:  2019  Subjective:    Love Roche is a 75 y.o. male who presents today for wound check. Wound Etiology : trauma to right lower leg and cathererization of right groin and pressure on sacrum  Wound Location : right, leg and buttocks, right groin  Abx :Yes          Cultures :wereobtained  The patient denies any problems since last visit. The patients pain is   .       Problem list:    Patient Active Problem List   Diagnosis    Traumatic amputation of thumb    Essential hypertension    Mixed hyperlipidemia    Obstructive sleep apnea    [facility]    Benign prostatic hyperplasia without lower urinary tract symptoms    Venous stasis dermatitis    Severe aortic stenosis    Severe mitral valve stenosis    Anemia in chronic kidney disease, on chronic dialysis ([facility]    Dialysis patient ([facility]    Dementia    Major depressive disorder with single episode, in partial remission ([facility]    Chronic cerebral ischemia    ESRD (end stage renal disease) on dialysis ([facility]    Long-term insulin use ([facility]    BMI 40.0-44.9, adult ([facility]    Controlled type 2 diabetes mellitus with chronic kidney disease on chronic dialysis, with long-term current use of insulin ([facility])    Controlled type 2 diabetes mellitus with diabetic polyneuropathy, with long-term current use of insulin ([facility])    Dermatitis    Bruising    Hematoma of right lower extremity    Hematoma of groin    Acute blood loss anemia    Accidental fall from wheelchair    Traumatic hemorrhagic shock (HCC)    Cellulitis    Open wound    Hematoma of right thigh    Wound of right leg    Bleeding from wound    Open wound of right lower extremity    Acute blood loss anemia    Complication of arteriovenous dialysis fistula       Assessment:      BP (!) 153/72 (Position: Supine)   Pulse 72   Temp 98.3 °F (36.8 °C) (Tympanic)   Wound edges 8/27/2019  9:08 AM   Gwendolyn-wound Assessment Clean;Dry 8/27/2019  9:08 AM   Non-staged Wound Description Full thickness 6/25/2019 10:08 AM   Red%Wound Bed 100 8/27/2019  9:08 AM   Number of days: 147       Wound 05/14/19 Buttocks (Active)   Wound Pressure Stage  2 6/25/2019 10:08 AM   Dressing/Treatment Calmoseptine 6/25/2019 10:08 AM   Wound Length (cm) 3 cm 6/25/2019 10:08 AM   Wound Width (cm) 1 cm 6/25/2019 10:08 AM   Wound Depth (cm) 0.1 cm 6/25/2019 10:08 AM   Wound Surface Area (cm^2) 3 cm^2 6/25/2019 10:08 AM   Change in Wound Size % (l*w) 96.59 6/25/2019 10:08 AM   Wound Volume (cm^3) 0.3 cm^3 6/25/2019 10:08 AM   Wound Healing % 97 6/25/2019 10:08 AM   Odor None 6/25/2019 10:08 AM   Number of days: 104               Procedure:  Excisional debridement of right groin wound with curet. Lidocaine gel soaked gauze was applied at beginning of wound evaluation. The wound(s)right groin was debrided excisionally, with removal of fibrotic, necrotic, and hyperkeratotic tissue, including a layer of surrounding healthy tissue down through and including subcutaneous tissue,using curette for a total surface area of 1 cm2.100%% of the wound was debrided. There was minimal bleeding that was controlled with pressure. Patient tolerated procedure well and was given proper instruction. Impression:    #1 Rt leg:  healed     #2 Rt groin:  Much improved     #3 sacral:              improved     #4  Right Arm    improved           Plan for wound:    1. Just apply eucerin cream to right leg wound     2. Wet to dry with guaze to right groin wound     3. Sacral wound:              -  Apply barrier cream daily     4. Just observe the right arm    5. Follow up 3 weeks. , then may just have come in PRN      There are no Patient Instructions on file for this visit.               Electronically signed by Jagdish Lindsay MD on 8/27/2019 at 9:42 AM

## 2019-08-27 NOTE — PATIENT INSTRUCTIONS
Continue moist to dry saline dressings to groin wound. Be sure to pack to tunnel depth at one o'clock. Continue barrier cream to buttocks. Follow up 3 weeks. Call sooner if concerns.

## 2019-08-28 ENCOUNTER — TELEPHONE (OUTPATIENT)
Dept: WOUND CARE | Age: 74
End: 2019-08-28

## 2019-08-30 ENCOUNTER — OUTSIDE SERVICES (OUTPATIENT)
Dept: INTERNAL MEDICINE | Age: 74
End: 2019-08-30
Payer: MEDICARE

## 2019-08-30 DIAGNOSIS — Z79.4 LONG-TERM INSULIN USE (HCC): ICD-10-CM

## 2019-08-30 DIAGNOSIS — E66.01 CLASS 2 SEVERE OBESITY WITH SERIOUS COMORBIDITY AND BODY MASS INDEX (BMI) OF 39.0 TO 39.9 IN ADULT, UNSPECIFIED OBESITY TYPE (HCC): ICD-10-CM

## 2019-08-30 DIAGNOSIS — E11.42 CONTROLLED TYPE 2 DIABETES MELLITUS WITH DIABETIC POLYNEUROPATHY, WITH LONG-TERM CURRENT USE OF INSULIN (HCC): ICD-10-CM

## 2019-08-30 DIAGNOSIS — F32.4 MAJOR DEPRESSIVE DISORDER WITH SINGLE EPISODE, IN PARTIAL REMISSION (HCC): ICD-10-CM

## 2019-08-30 DIAGNOSIS — E78.2 MIXED HYPERLIPIDEMIA: ICD-10-CM

## 2019-08-30 DIAGNOSIS — I10 ESSENTIAL HYPERTENSION: ICD-10-CM

## 2019-08-30 DIAGNOSIS — N18.6 CONTROLLED TYPE 2 DIABETES MELLITUS WITH CHRONIC KIDNEY DISEASE ON CHRONIC DIALYSIS, WITH LONG-TERM CURRENT USE OF INSULIN (HCC): ICD-10-CM

## 2019-08-30 DIAGNOSIS — Z99.2 DIALYSIS PATIENT (HCC): ICD-10-CM

## 2019-08-30 DIAGNOSIS — Z99.2 CONTROLLED TYPE 2 DIABETES MELLITUS WITH CHRONIC KIDNEY DISEASE ON CHRONIC DIALYSIS, WITH LONG-TERM CURRENT USE OF INSULIN (HCC): ICD-10-CM

## 2019-08-30 DIAGNOSIS — S81.801D WOUND OF RIGHT LOWER EXTREMITY, SUBSEQUENT ENCOUNTER: Primary | ICD-10-CM

## 2019-08-30 DIAGNOSIS — D63.1 ANEMIA IN CHRONIC KIDNEY DISEASE, ON CHRONIC DIALYSIS (HCC): ICD-10-CM

## 2019-08-30 DIAGNOSIS — G47.33 OBSTRUCTIVE SLEEP APNEA SYNDROME: ICD-10-CM

## 2019-08-30 DIAGNOSIS — E11.22 CONTROLLED TYPE 2 DIABETES MELLITUS WITH CHRONIC KIDNEY DISEASE ON CHRONIC DIALYSIS, WITH LONG-TERM CURRENT USE OF INSULIN (HCC): ICD-10-CM

## 2019-08-30 DIAGNOSIS — Z79.4 CONTROLLED TYPE 2 DIABETES MELLITUS WITH CHRONIC KIDNEY DISEASE ON CHRONIC DIALYSIS, WITH LONG-TERM CURRENT USE OF INSULIN (HCC): ICD-10-CM

## 2019-08-30 DIAGNOSIS — Z79.4 CONTROLLED TYPE 2 DIABETES MELLITUS WITH DIABETIC POLYNEUROPATHY, WITH LONG-TERM CURRENT USE OF INSULIN (HCC): ICD-10-CM

## 2019-08-30 DIAGNOSIS — I35.0 SEVERE AORTIC STENOSIS: ICD-10-CM

## 2019-08-30 DIAGNOSIS — N18.6 ESRD (END STAGE RENAL DISEASE) ON DIALYSIS (HCC): ICD-10-CM

## 2019-08-30 DIAGNOSIS — F01.50 VASCULAR DEMENTIA WITHOUT BEHAVIORAL DISTURBANCE (HCC): ICD-10-CM

## 2019-08-30 DIAGNOSIS — N18.6 ANEMIA IN CHRONIC KIDNEY DISEASE, ON CHRONIC DIALYSIS (HCC): ICD-10-CM

## 2019-08-30 DIAGNOSIS — I05.0 SEVERE MITRAL VALVE STENOSIS: ICD-10-CM

## 2019-08-30 DIAGNOSIS — I48.0 PAROXYSMAL ATRIAL FIBRILLATION (HCC): ICD-10-CM

## 2019-08-30 DIAGNOSIS — Z99.2 ESRD (END STAGE RENAL DISEASE) ON DIALYSIS (HCC): ICD-10-CM

## 2019-08-30 DIAGNOSIS — Z99.2 ANEMIA IN CHRONIC KIDNEY DISEASE, ON CHRONIC DIALYSIS (HCC): ICD-10-CM

## 2019-08-30 NOTE — PROGRESS NOTES
[facility]    COLONOSCOPY  09    COLONOSCOPY N/A 10/12/2018    COLONOSCOPY WITH BIOPSY performed by Radha Smith DO at [facility] Right 3/10/2019    WASHOUT RIGHT LOWER EXTREMITY WITH WOUND VAC EXCHANGE performed by Thony Glynn MD at [address] [place] [address] Right 3/8/2019    RIGHT LOWER EXTREMITY EXPLORATION, HEMATOMA EVACUATION, WOUND VAC PLACEMENT performed by Thony Glynn MD at [address] (x10-12 surgeries)    several surgeries on left arm    KNEE ARTHROSCOPY Left 99    MITRAL VALVE REPLACEMENT  2016    bioprosthetic     NASAL SEPTUM SURGERY      OTHER SURGICAL HISTORY  3/18/16    Aortic root Enlargement    OTHER SURGICAL HISTORY  3/18/16    ASD Closure    OTHER SURGICAL HISTORY Right 2017    AV fistula creation, wrist    OTHER SURGICAL HISTORY Right 2017    ligation of collateral branch of av fistula right wrist    OTHER SURGICAL HISTORY  2018    FISTULAGRAM WITH DR. Angelica Edge    [place] EGD TRANSORAL BIOPSY SINGLE/MULTIPLE N/A 10/17/2017    EGD BIOPSY performed by Ashkan Cardona MD at [facility] ANGIOACCESS AV FISTULA Right 2017     RIGHT  LOWER ARM AV FISTULA  LIGATION OF AQUIRED BRANCHES X2 performed by Jerry Gillis DO at [address]  3/18/16    median    VASCULAR SURGERY  2018    Right arm fistulagram,  PTA cephalic vein stenosis  /  DR Maria       SOCIAL HISTORY:     Tobacco:   Social History     Tobacco Use   Smoking Status Former Smoker    Packs/day: 2.00    Years: 25.00    Pack years: 50.00    Types: Cigarettes    Start date: 3/17/1955   [place] Last attempt to quit: 1980    Years since quittin.6   Smokeless Tobacco Never Used     Alcohol:   Social History     Substance and Sexual Activity   Alcohol Use No    Alcohol/week: 0.0 standard drinks    Comment: 1-2 drinks per year     Drugs:   Social History     Substance and Sexual Activity   Drug Use No       FAMILY HISTORY: family history includes Alzheimer's Disease in his father; Diabetes in his maternal grandmother and mother; High Blood Pressure in his sister; Edwin Downing in his mother. REVIEW OF SYSTEMS:     Please see history of chief complaint above; otherwise no new problems with respect to General, HEENT, Cardiovascular, Respiratory, Gastrointestinal, Genitourinary, Endocrinologic, Musculoskeletal, or Neuropsychiatric complaints. PHYSICAL EXAMINATION:    Vitals: Temp: 97.8 deg F. Pulse: 72. Resp: 16. BP: 140/68. General: A 75 y.o.  male. Alert and oriented to person, place and time. He does not appear to be in any acute distress. Skin: Skin color, texture, turgor normal. No rashes. Ulcerations in groin and on legs as described above. HEENT/Neck: Essentially unremarkable  Chest: There was a chest catheter in place  Lungs: Normal - CTA without rales, rhonchi, or wheezing. Heart: regular rate and rhythm, S1, S2 normal, no murmur, click, rub or gallop No S3 or S4. Abdomen: Obese, soft, non-distended, non-tender, normal active bowel sounds, no masses palpated and no hepatosplenomegaly  Extremities: No clubbing, cyanosis, edema  Neurologic: cranial nerves II-XII are grossly intact    ASSESSMENT:     Diagnosis Orders   1. Wound of right lower extremity, subsequent encounter     2. Controlled type 2 diabetes mellitus with chronic kidney disease on chronic dialysis, with long-term current use of insulin ([address]     3. Controlled type 2 diabetes mellitus with diabetic polyneuropathy, with long-term current use of insulin ([address]     4. Long-term insulin use ([address]     5. ESRD (end stage renal disease) on dialysis ([address]     6. Dialysis patient ([address]     7. Anemia in chronic kidney disease, on chronic dialysis ([address]     8. Paroxysmal atrial fibrillation (HCC)     9. Severe aortic stenosis     10. Severe mitral valve stenosis     11.  Vascular dementia without behavioral

## 2019-09-09 ENCOUNTER — TELEPHONE (OUTPATIENT)
Dept: CARDIOLOGY | Age: 74
End: 2019-09-09

## 2019-09-10 NOTE — TELEPHONE ENCOUNTER
Spoke with nurse Alba Self, she states pt is currently being treated for URI with nebulizers, inhalers and meds, they will continue to monitor and report back as needed.

## 2019-09-13 ENCOUNTER — TELEPHONE (OUTPATIENT)
Dept: INTERNAL MEDICINE | Age: 74
End: 2019-09-13

## 2019-09-13 ENCOUNTER — OFFICE VISIT (OUTPATIENT)
Dept: SURGERY | Age: 74
End: 2019-09-13
Payer: MEDICARE

## 2019-09-13 VITALS
SYSTOLIC BLOOD PRESSURE: 126 MMHG | HEIGHT: 67 IN | DIASTOLIC BLOOD PRESSURE: 66 MMHG | HEART RATE: 88 BPM | BODY MASS INDEX: 39.79 KG/M2 | OXYGEN SATURATION: 96 % | RESPIRATION RATE: 20 BRPM | WEIGHT: 253.53 LBS

## 2019-09-13 DIAGNOSIS — S81.801A WOUND OF RIGHT LOWER EXTREMITY, INITIAL ENCOUNTER: Primary | ICD-10-CM

## 2019-09-13 PROCEDURE — 3017F COLORECTAL CA SCREEN DOC REV: CPT | Performed by: PLASTIC SURGERY

## 2019-09-13 PROCEDURE — 4040F PNEUMOC VAC/ADMIN/RCVD: CPT | Performed by: PLASTIC SURGERY

## 2019-09-13 PROCEDURE — 1123F ACP DISCUSS/DSCN MKR DOCD: CPT | Performed by: PLASTIC SURGERY

## 2019-09-13 PROCEDURE — 1036F TOBACCO NON-USER: CPT | Performed by: PLASTIC SURGERY

## 2019-09-13 PROCEDURE — G8417 CALC BMI ABV UP PARAM F/U: HCPCS | Performed by: PLASTIC SURGERY

## 2019-09-13 PROCEDURE — 99213 OFFICE O/P EST LOW 20 MIN: CPT | Performed by: PLASTIC SURGERY

## 2019-09-13 PROCEDURE — G8427 DOCREV CUR MEDS BY ELIG CLIN: HCPCS | Performed by: PLASTIC SURGERY

## 2019-09-13 RX ORDER — AZITHROMYCIN 250 MG/1
TABLET, FILM COATED ORAL
COMMUNITY
Start: 2019-09-09 | End: 2019-09-17 | Stop reason: ALTCHOICE

## 2019-09-13 ASSESSMENT — ENCOUNTER SYMPTOMS
TROUBLE SWALLOWING: 0
WHEEZING: 1
ABDOMINAL PAIN: 0

## 2019-09-16 ENCOUNTER — OUTSIDE SERVICES (OUTPATIENT)
Dept: INTERNAL MEDICINE | Age: 74
End: 2019-09-16

## 2019-09-16 DIAGNOSIS — J40 BRONCHITIS: Primary | ICD-10-CM

## 2019-09-16 ASSESSMENT — ENCOUNTER SYMPTOMS
CONSTIPATION: 0
NAUSEA: 0
CHEST TIGHTNESS: 0
COLOR CHANGE: 0
ABDOMINAL PAIN: 0
SHORTNESS OF BREATH: 0
VOMITING: 0
WHEEZING: 0
EYE PAIN: 0
FACIAL SWELLING: 0
SORE THROAT: 0
BLOOD IN STOOL: 0
TROUBLE SWALLOWING: 0
RHINORRHEA: 0
COUGH: 1
DIARRHEA: 0
SINUS PRESSURE: 0

## 2019-09-16 NOTE — PROGRESS NOTES
Activity    Alcohol use: No     Alcohol/week: 0.0 standard drinks     Comment: 1-2 drinks per year    Drug use: No    Sexual activity: Never   Lifestyle    Physical activity:     Days per week: Not on file     Minutes per session: Not on file    Stress: Not on file   Relationships    Social connections:     Talks on phone: Not on file     Gets together: Not on file     Attends Church service: Not on file     Active member of club or organization: Not on file     Attends meetings of clubs or organizations: Not on file     Relationship status: Not on file    Intimate partner violence:     Fear of current or ex partner: Not on file     Emotionally abused: Not on file     Physically abused: Not on file     Forced sexual activity: Not on file   Other Topics Concern    Not on file   Social History Narrative    Not on file       Review of Systems   Constitutional: Negative for activity change, appetite change, chills, fatigue, fever and unexpected weight change. HENT: Negative for congestion, dental problem, ear discharge, ear pain, facial swelling, hearing loss, postnasal drip, rhinorrhea, sinus pressure, sore throat and trouble swallowing. Eyes: Negative for pain and visual disturbance. Respiratory: Positive for cough. Negative for chest tightness, shortness of breath and wheezing. Cardiovascular: Negative for chest pain, palpitations and leg swelling. Gastrointestinal: Negative for abdominal pain, blood in stool, constipation, diarrhea, nausea and vomiting. Endocrine: Negative for cold intolerance, heat intolerance and polyuria. Genitourinary: Negative for difficulty urinating. Musculoskeletal: Positive for arthralgias (Chronic). Negative for gait problem, myalgias, neck pain and neck stiffness. Skin: Negative for color change, rash and wound. Neurological: Positive for weakness. Negative for dizziness, tremors, seizures, light-headedness, numbness and headaches.

## 2019-09-17 ENCOUNTER — OFFICE VISIT (OUTPATIENT)
Dept: WOUND CARE | Age: 74
End: 2019-09-17
Payer: MEDICARE

## 2019-09-17 ENCOUNTER — TELEPHONE (OUTPATIENT)
Dept: WOUND CARE | Age: 74
End: 2019-09-17

## 2019-09-17 ENCOUNTER — HOSPITAL ENCOUNTER (OUTPATIENT)
Dept: GENERAL RADIOLOGY | Age: 74
Discharge: HOME | End: 2019-09-19
Payer: MEDICARE

## 2019-09-17 VITALS
TEMPERATURE: 98.3 F | DIASTOLIC BLOOD PRESSURE: 74 MMHG | OXYGEN SATURATION: 95 % | HEART RATE: 82 BPM | RESPIRATION RATE: 24 BRPM | SYSTOLIC BLOOD PRESSURE: 142 MMHG

## 2019-09-17 DIAGNOSIS — R09.89 ABNORMAL LUNG SOUNDS: Primary | ICD-10-CM

## 2019-09-17 DIAGNOSIS — S31.109A WOUND OF RIGHT GROIN, INITIAL ENCOUNTER: Primary | ICD-10-CM

## 2019-09-17 DIAGNOSIS — R09.89 ABNORMAL LUNG SOUNDS: ICD-10-CM

## 2019-09-17 DIAGNOSIS — T79.8XXA OTHER EARLY COMPLICATIONS OF TRAUMA, INITIAL ENCOUNTER (HCC): ICD-10-CM

## 2019-09-17 PROCEDURE — 71046 X-RAY EXAM CHEST 2 VIEWS: CPT

## 2019-09-17 PROCEDURE — 11042 DBRDMT SUBQ TIS 1ST 20SQCM/<: CPT | Performed by: SURGERY

## 2019-09-17 RX ORDER — SERTRALINE HYDROCHLORIDE 100 MG/1
100 TABLET, FILM COATED ORAL DAILY
COMMUNITY
End: 2020-06-16

## 2019-09-17 NOTE — PROGRESS NOTES
Follow-Up Wound Progress Note  Jonathan Ernst  AGE: 75 y.o. GENDER: male  : 1945  TODAY'S DATE:  2019  Subjective:     Jonathan Ernst is a 75 y.o. male who presents today for wound check. Wound Etiology : trauma to right lower leg(healed)    and cathererization of right groin and pressure on sacrum  Wound Location : right, leg(healed) and buttocks, right groin  Abx :Yes          Cultures :wereobtained  The patient denies any problems since last visit. The patients pain is  1 .       Problem list:    Patient Active Problem List   Diagnosis    Traumatic amputation of thumb    Essential hypertension    Mixed hyperlipidemia    Obstructive sleep apnea    Morbid obesity ([facility]    Benign prostatic hyperplasia without lower urinary tract symptoms    Venous stasis dermatitis    Severe aortic stenosis    Severe mitral valve stenosis    Anemia in chronic kidney disease, on chronic dialysis ([facility]    Dialysis patient ([facility]    Dementia    Major depressive disorder with single episode, in partial remission ([facility]    Chronic cerebral ischemia    ESRD (end stage renal disease) on dialysis ([facility])    Long-term insulin use ([facility]    BMI 40.0-44.9, adult ([facility]    Controlled type 2 diabetes mellitus with chronic kidney disease on chronic dialysis, with long-term current use of insulin ([facility])    Controlled type 2 diabetes mellitus with diabetic polyneuropathy, with long-term current use of insulin ([facility])    Dermatitis    Bruising    Hematoma of right lower extremity    Hematoma of groin    Acute blood loss anemia    Accidental fall from wheelchair    Traumatic hemorrhagic shock ([facility])    Cellulitis    Open wound    Hematoma of right thigh    Wound of right leg    Bleeding from wound    Open wound of right lower extremity    Acute blood loss anemia    Complication of arteriovenous dialysis fistula    Class 2 severe obesity with serious comorbidity and body mass index (BMI) of 39.0 to 39.9 in

## 2019-09-18 DIAGNOSIS — I48.0 PAROXYSMAL ATRIAL FIBRILLATION (HCC): Primary | ICD-10-CM

## 2019-09-21 ENCOUNTER — OUTSIDE SERVICES (OUTPATIENT)
Dept: INTERNAL MEDICINE | Age: 74
End: 2019-09-21
Payer: MEDICARE

## 2019-09-21 DIAGNOSIS — J40 BRONCHITIS: Primary | ICD-10-CM

## 2019-09-21 PROCEDURE — 99308 SBSQ NF CARE LOW MDM 20: CPT | Performed by: NURSE PRACTITIONER

## 2019-09-21 ASSESSMENT — ENCOUNTER SYMPTOMS
WHEEZING: 0
SHORTNESS OF BREATH: 0
COUGH: 1
APNEA: 0

## 2019-09-21 NOTE — PROGRESS NOTES
performed by Thea Alejo MD at [address] Right 3/8/2019    RIGHT LOWER EXTREMITY EXPLORATION, HEMATOMA EVACUATION, WOUND VAC PLACEMENT performed by Thea Alejo MD at [address] (x10-12 surgeries)    several surgeries on left arm    KNEE ARTHROSCOPY Left 09/17/99    MITRAL VALVE REPLACEMENT  03/18/2016    bioprosthetic     NASAL SEPTUM SURGERY      OTHER SURGICAL HISTORY  3/18/16    Aortic root Enlargement    OTHER SURGICAL HISTORY  3/18/16    ASD Closure    OTHER SURGICAL HISTORY Right 01/09/2017    AV fistula creation, wrist    OTHER SURGICAL HISTORY Right 08/29/2017    ligation of collateral branch of av fistula right wrist    OTHER SURGICAL HISTORY  04/05/2018    FISTULAGRAM WITH DR. Pond Risk    [place] EGD TRANSORAL BIOPSY SINGLE/MULTIPLE N/A 10/17/2017    EGD BIOPSY performed by Jacques Boudreaux MD at [facility]    [place] [address] ANGIOACCESS AV FISTULA Right 8/29/2017     RIGHT  LOWER ARM AV FISTULA  LIGATION OF AQUIRED BRANCHES X2 performed by Scout Crain DO at [address]  3/18/16    median    VASCULAR SURGERY  12/06/2018    Right arm fistulagram,  PTA cephalic vein stenosis  /  DR Maria       Medications as per [facility] Chart /reviewed     Social History     Socioeconomic History    Marital status:      Spouse name: Juan Bravo Number of children: 2    Years of education: Not on file    Highest education level: Not on file   Occupational History    Occupation: Retired      Employer: [address] resource strain: Not on file    Food insecurity:     Worry: Not on file     Inability: Not on file    Transportation needs:     Medical: Not on file     Non-medical: Not on file   Tobacco Use    Smoking status: Former Smoker     Packs/day: 2.00     Years: 25.00     Pack years: 50.00     Types: Cigarettes     Start date: 3/17/1955     Last attempt to

## 2019-09-23 ENCOUNTER — TELEPHONE (OUTPATIENT)
Dept: WOUND CARE | Age: 74
End: 2019-09-23

## 2019-09-24 ENCOUNTER — TELEPHONE (OUTPATIENT)
Dept: WOUND CARE | Age: 74
End: 2019-09-24

## 2019-09-26 ENCOUNTER — TELEPHONE (OUTPATIENT)
Dept: INTERNAL MEDICINE | Age: 74
End: 2019-09-26

## 2019-09-26 NOTE — TELEPHONE ENCOUNTER
Call back to [facility], spoke with staff nurse, Juliane Hernandez with report of status on wound concerns. Juliane Hernandez reports pt is comfortable & afebrile, but still w/ bronchitis symptoms of which  is treating.

## 2019-09-26 NOTE — TELEPHONE ENCOUNTER
Call to [address] [facility], spoke to a float nurse who is on 2nd shift; she was not too familiar w/ pt, and requested writer to call back tomorrow & speak to 1st shift nurse.

## 2019-09-27 NOTE — TELEPHONE ENCOUNTER
Call back to [facility], spoke with staff nurse Lisbeth. She denies any different s/s for pt, she states she did not notice any wound odor when she did the dressing change today. Pt remains comfortable & without s/s wound infection to R inguinal wound. Plan to f/u with Dr. Oralia Zepeda on 10/3/19 as scheduled, and to report to ER if any concerns through the weekend.

## 2019-09-27 NOTE — TELEPHONE ENCOUNTER
Please make sure louisa puts a note in my binder and I will see him either Monday or Tuesday unless Dr Brendon Garcia sees him over the weekend for rounds and clears him then

## 2019-09-29 PROCEDURE — 99309 SBSQ NF CARE MODERATE MDM 30: CPT | Performed by: INTERNAL MEDICINE

## 2019-10-03 ENCOUNTER — OFFICE VISIT (OUTPATIENT)
Dept: WOUND CARE | Age: 74
End: 2019-10-03
Payer: MEDICARE

## 2019-10-03 ENCOUNTER — TELEPHONE (OUTPATIENT)
Dept: WOUND CARE | Age: 74
End: 2019-10-03

## 2019-10-03 VITALS
TEMPERATURE: 97.6 F | HEART RATE: 68 BPM | BODY MASS INDEX: 39.79 KG/M2 | SYSTOLIC BLOOD PRESSURE: 128 MMHG | DIASTOLIC BLOOD PRESSURE: 64 MMHG | HEIGHT: 67 IN

## 2019-10-03 DIAGNOSIS — S31.109D WOUND OF RIGHT GROIN, SUBSEQUENT ENCOUNTER: Primary | ICD-10-CM

## 2019-10-03 PROCEDURE — 1123F ACP DISCUSS/DSCN MKR DOCD: CPT | Performed by: SURGERY

## 2019-10-03 PROCEDURE — 3017F COLORECTAL CA SCREEN DOC REV: CPT | Performed by: SURGERY

## 2019-10-03 PROCEDURE — 1036F TOBACCO NON-USER: CPT | Performed by: SURGERY

## 2019-10-03 PROCEDURE — 99212 OFFICE O/P EST SF 10 MIN: CPT | Performed by: SURGERY

## 2019-10-03 PROCEDURE — G8417 CALC BMI ABV UP PARAM F/U: HCPCS | Performed by: SURGERY

## 2019-10-03 PROCEDURE — G8427 DOCREV CUR MEDS BY ELIG CLIN: HCPCS | Performed by: SURGERY

## 2019-10-03 PROCEDURE — 4040F PNEUMOC VAC/ADMIN/RCVD: CPT | Performed by: SURGERY

## 2019-10-03 PROCEDURE — G8484 FLU IMMUNIZE NO ADMIN: HCPCS | Performed by: SURGERY

## 2019-10-13 ENCOUNTER — OUTSIDE SERVICES (OUTPATIENT)
Dept: INTERNAL MEDICINE | Age: 74
End: 2019-10-13
Payer: MEDICARE

## 2019-10-13 DIAGNOSIS — F32.4 MAJOR DEPRESSIVE DISORDER WITH SINGLE EPISODE, IN PARTIAL REMISSION (HCC): ICD-10-CM

## 2019-10-13 DIAGNOSIS — E66.01 CLASS 2 SEVERE OBESITY WITH SERIOUS COMORBIDITY AND BODY MASS INDEX (BMI) OF 39.0 TO 39.9 IN ADULT, UNSPECIFIED OBESITY TYPE (HCC): ICD-10-CM

## 2019-10-13 DIAGNOSIS — S81.801D WOUND OF RIGHT LOWER EXTREMITY, SUBSEQUENT ENCOUNTER: Primary | ICD-10-CM

## 2019-10-13 DIAGNOSIS — Z79.4 CONTROLLED TYPE 2 DIABETES MELLITUS WITH DIABETIC POLYNEUROPATHY, WITH LONG-TERM CURRENT USE OF INSULIN (HCC): ICD-10-CM

## 2019-10-13 DIAGNOSIS — N18.6 CONTROLLED TYPE 2 DIABETES MELLITUS WITH CHRONIC KIDNEY DISEASE ON CHRONIC DIALYSIS, WITH LONG-TERM CURRENT USE OF INSULIN (HCC): ICD-10-CM

## 2019-10-13 DIAGNOSIS — Z99.2 CONTROLLED TYPE 2 DIABETES MELLITUS WITH CHRONIC KIDNEY DISEASE ON CHRONIC DIALYSIS, WITH LONG-TERM CURRENT USE OF INSULIN (HCC): ICD-10-CM

## 2019-10-13 DIAGNOSIS — F01.50 VASCULAR DEMENTIA WITHOUT BEHAVIORAL DISTURBANCE (HCC): ICD-10-CM

## 2019-10-13 DIAGNOSIS — I48.0 PAROXYSMAL ATRIAL FIBRILLATION (HCC): ICD-10-CM

## 2019-10-13 DIAGNOSIS — Z99.2 DIALYSIS PATIENT (HCC): ICD-10-CM

## 2019-10-13 DIAGNOSIS — E78.2 MIXED HYPERLIPIDEMIA: ICD-10-CM

## 2019-10-13 DIAGNOSIS — G47.33 OBSTRUCTIVE SLEEP APNEA SYNDROME: ICD-10-CM

## 2019-10-13 DIAGNOSIS — I05.0 SEVERE MITRAL VALVE STENOSIS: ICD-10-CM

## 2019-10-13 DIAGNOSIS — I35.0 SEVERE AORTIC STENOSIS: ICD-10-CM

## 2019-10-13 DIAGNOSIS — D63.1 ANEMIA IN CHRONIC KIDNEY DISEASE, ON CHRONIC DIALYSIS (HCC): ICD-10-CM

## 2019-10-13 DIAGNOSIS — N18.6 ESRD (END STAGE RENAL DISEASE) ON DIALYSIS (HCC): ICD-10-CM

## 2019-10-13 DIAGNOSIS — Z79.4 CONTROLLED TYPE 2 DIABETES MELLITUS WITH CHRONIC KIDNEY DISEASE ON CHRONIC DIALYSIS, WITH LONG-TERM CURRENT USE OF INSULIN (HCC): ICD-10-CM

## 2019-10-13 DIAGNOSIS — Z79.4 LONG-TERM INSULIN USE (HCC): ICD-10-CM

## 2019-10-13 DIAGNOSIS — I10 ESSENTIAL HYPERTENSION: ICD-10-CM

## 2019-10-13 DIAGNOSIS — Z99.2 ESRD (END STAGE RENAL DISEASE) ON DIALYSIS (HCC): ICD-10-CM

## 2019-10-13 DIAGNOSIS — E11.22 CONTROLLED TYPE 2 DIABETES MELLITUS WITH CHRONIC KIDNEY DISEASE ON CHRONIC DIALYSIS, WITH LONG-TERM CURRENT USE OF INSULIN (HCC): ICD-10-CM

## 2019-10-13 DIAGNOSIS — N18.6 ANEMIA IN CHRONIC KIDNEY DISEASE, ON CHRONIC DIALYSIS (HCC): ICD-10-CM

## 2019-10-13 DIAGNOSIS — Z99.2 ANEMIA IN CHRONIC KIDNEY DISEASE, ON CHRONIC DIALYSIS (HCC): ICD-10-CM

## 2019-10-13 DIAGNOSIS — E11.42 CONTROLLED TYPE 2 DIABETES MELLITUS WITH DIABETIC POLYNEUROPATHY, WITH LONG-TERM CURRENT USE OF INSULIN (HCC): ICD-10-CM

## 2019-10-16 ENCOUNTER — TELEPHONE (OUTPATIENT)
Dept: INTERNAL MEDICINE | Age: 74
End: 2019-10-16

## 2019-10-21 ENCOUNTER — TELEPHONE (OUTPATIENT)
Dept: INTERNAL MEDICINE | Age: 74
End: 2019-10-21

## 2019-10-21 ENCOUNTER — TELEPHONE (OUTPATIENT)
Dept: SURGERY | Age: 74
End: 2019-10-21

## 2019-10-27 PROCEDURE — 99309 SBSQ NF CARE MODERATE MDM 30: CPT | Performed by: INTERNAL MEDICINE

## 2019-10-29 ENCOUNTER — TELEPHONE (OUTPATIENT)
Dept: PULMONOLOGY | Age: 74
End: 2019-10-29

## 2019-10-29 ENCOUNTER — OFFICE VISIT (OUTPATIENT)
Dept: WOUND CARE | Age: 74
End: 2019-10-29
Payer: MEDICARE

## 2019-10-29 VITALS
RESPIRATION RATE: 18 BRPM | DIASTOLIC BLOOD PRESSURE: 68 MMHG | TEMPERATURE: 98 F | OXYGEN SATURATION: 98 % | SYSTOLIC BLOOD PRESSURE: 124 MMHG | HEART RATE: 74 BPM

## 2019-10-29 DIAGNOSIS — Z51.89 VISIT FOR WOUND CHECK: Primary | ICD-10-CM

## 2019-10-29 PROCEDURE — 97597 DBRDMT OPN WND 1ST 20 CM/<: CPT | Performed by: SURGERY

## 2019-10-30 ENCOUNTER — INITIAL CONSULT (OUTPATIENT)
Dept: SURGERY | Age: 74
End: 2019-10-30
Payer: MEDICARE

## 2019-10-30 ENCOUNTER — TELEPHONE (OUTPATIENT)
Dept: WOUND CARE | Age: 74
End: 2019-10-30

## 2019-10-30 VITALS — DIASTOLIC BLOOD PRESSURE: 70 MMHG | SYSTOLIC BLOOD PRESSURE: 122 MMHG | TEMPERATURE: 97.5 F | HEART RATE: 68 BPM

## 2019-10-30 DIAGNOSIS — K62.5 BLOOD PER RECTUM: Primary | ICD-10-CM

## 2019-10-30 DIAGNOSIS — Z87.19 HISTORY OF COLITIS: ICD-10-CM

## 2019-10-30 PROCEDURE — G8482 FLU IMMUNIZE ORDER/ADMIN: HCPCS | Performed by: SURGERY

## 2019-10-30 PROCEDURE — 1123F ACP DISCUSS/DSCN MKR DOCD: CPT | Performed by: SURGERY

## 2019-10-30 PROCEDURE — G8427 DOCREV CUR MEDS BY ELIG CLIN: HCPCS | Performed by: SURGERY

## 2019-10-30 PROCEDURE — 3017F COLORECTAL CA SCREEN DOC REV: CPT | Performed by: SURGERY

## 2019-10-30 PROCEDURE — 99213 OFFICE O/P EST LOW 20 MIN: CPT | Performed by: SURGERY

## 2019-10-30 PROCEDURE — 1036F TOBACCO NON-USER: CPT | Performed by: SURGERY

## 2019-10-30 PROCEDURE — 4040F PNEUMOC VAC/ADMIN/RCVD: CPT | Performed by: SURGERY

## 2019-10-30 PROCEDURE — G8417 CALC BMI ABV UP PARAM F/U: HCPCS | Performed by: SURGERY

## 2019-10-30 RX ORDER — GABAPENTIN 300 MG/1
300 CAPSULE ORAL DAILY
COMMUNITY

## 2019-10-31 DIAGNOSIS — G47.33 OSA ON CPAP: Primary | ICD-10-CM

## 2019-10-31 DIAGNOSIS — Z99.89 OSA ON CPAP: Primary | ICD-10-CM

## 2019-11-08 ENCOUNTER — OFFICE VISIT (OUTPATIENT)
Dept: SURGERY | Age: 74
End: 2019-11-08
Payer: MEDICARE

## 2019-11-08 VITALS
RESPIRATION RATE: 14 BRPM | OXYGEN SATURATION: 93 % | SYSTOLIC BLOOD PRESSURE: 141 MMHG | HEART RATE: 79 BPM | DIASTOLIC BLOOD PRESSURE: 71 MMHG

## 2019-11-08 DIAGNOSIS — S81.801A WOUND OF RIGHT LOWER EXTREMITY, INITIAL ENCOUNTER: Primary | ICD-10-CM

## 2019-11-08 PROCEDURE — 1123F ACP DISCUSS/DSCN MKR DOCD: CPT | Performed by: PLASTIC SURGERY

## 2019-11-08 PROCEDURE — 4040F PNEUMOC VAC/ADMIN/RCVD: CPT | Performed by: PLASTIC SURGERY

## 2019-11-08 PROCEDURE — G8427 DOCREV CUR MEDS BY ELIG CLIN: HCPCS | Performed by: PLASTIC SURGERY

## 2019-11-08 PROCEDURE — G8417 CALC BMI ABV UP PARAM F/U: HCPCS | Performed by: PLASTIC SURGERY

## 2019-11-08 PROCEDURE — G8482 FLU IMMUNIZE ORDER/ADMIN: HCPCS | Performed by: PLASTIC SURGERY

## 2019-11-08 PROCEDURE — 1036F TOBACCO NON-USER: CPT | Performed by: PLASTIC SURGERY

## 2019-11-08 PROCEDURE — 99213 OFFICE O/P EST LOW 20 MIN: CPT | Performed by: PLASTIC SURGERY

## 2019-11-08 PROCEDURE — 3017F COLORECTAL CA SCREEN DOC REV: CPT | Performed by: PLASTIC SURGERY

## 2019-11-08 ASSESSMENT — ENCOUNTER SYMPTOMS
TROUBLE SWALLOWING: 0
COUGH: 0
SHORTNESS OF BREATH: 0
ABDOMINAL PAIN: 0

## 2019-11-14 ENCOUNTER — OFFICE VISIT (OUTPATIENT)
Dept: CARDIOLOGY | Age: 74
End: 2019-11-14
Payer: MEDICARE

## 2019-11-14 VITALS
HEART RATE: 76 BPM | DIASTOLIC BLOOD PRESSURE: 60 MMHG | OXYGEN SATURATION: 94 % | SYSTOLIC BLOOD PRESSURE: 140 MMHG | RESPIRATION RATE: 18 BRPM

## 2019-11-14 DIAGNOSIS — I48.0 PAF (PAROXYSMAL ATRIAL FIBRILLATION) (HCC): Primary | ICD-10-CM

## 2019-11-14 PROCEDURE — 93000 ELECTROCARDIOGRAM COMPLETE: CPT | Performed by: INTERNAL MEDICINE

## 2019-11-14 PROCEDURE — G8427 DOCREV CUR MEDS BY ELIG CLIN: HCPCS | Performed by: INTERNAL MEDICINE

## 2019-11-14 PROCEDURE — 99214 OFFICE O/P EST MOD 30 MIN: CPT | Performed by: INTERNAL MEDICINE

## 2019-11-14 PROCEDURE — 4040F PNEUMOC VAC/ADMIN/RCVD: CPT | Performed by: INTERNAL MEDICINE

## 2019-11-14 PROCEDURE — G8482 FLU IMMUNIZE ORDER/ADMIN: HCPCS | Performed by: INTERNAL MEDICINE

## 2019-11-14 PROCEDURE — 3017F COLORECTAL CA SCREEN DOC REV: CPT | Performed by: INTERNAL MEDICINE

## 2019-11-14 PROCEDURE — G8417 CALC BMI ABV UP PARAM F/U: HCPCS | Performed by: INTERNAL MEDICINE

## 2019-11-14 PROCEDURE — 1123F ACP DISCUSS/DSCN MKR DOCD: CPT | Performed by: INTERNAL MEDICINE

## 2019-11-14 PROCEDURE — 1036F TOBACCO NON-USER: CPT | Performed by: INTERNAL MEDICINE

## 2019-11-15 ENCOUNTER — TELEPHONE (OUTPATIENT)
Dept: WOUND CARE | Age: 74
End: 2019-11-15

## 2019-11-19 ENCOUNTER — OFFICE VISIT (OUTPATIENT)
Dept: WOUND CARE | Age: 74
End: 2019-11-19
Payer: MEDICARE

## 2019-11-19 ENCOUNTER — CLINICAL DOCUMENTATION (OUTPATIENT)
Dept: WOUND CARE | Age: 74
End: 2019-11-19

## 2019-11-19 ENCOUNTER — TELEPHONE (OUTPATIENT)
Dept: WOUND CARE | Age: 74
End: 2019-11-19

## 2019-11-19 VITALS
HEART RATE: 76 BPM | OXYGEN SATURATION: 96 % | RESPIRATION RATE: 15 BRPM | TEMPERATURE: 97.6 F | DIASTOLIC BLOOD PRESSURE: 69 MMHG | SYSTOLIC BLOOD PRESSURE: 165 MMHG

## 2019-11-19 DIAGNOSIS — S80.11XA HEMATOMA OF RIGHT LOWER EXTREMITY, INITIAL ENCOUNTER: ICD-10-CM

## 2019-11-19 DIAGNOSIS — M79.89 LEG SWELLING: Primary | ICD-10-CM

## 2019-11-19 DIAGNOSIS — Z51.89 VISIT FOR WOUND CHECK: ICD-10-CM

## 2019-11-19 PROCEDURE — 4040F PNEUMOC VAC/ADMIN/RCVD: CPT | Performed by: SURGERY

## 2019-11-19 PROCEDURE — 3017F COLORECTAL CA SCREEN DOC REV: CPT | Performed by: SURGERY

## 2019-11-19 PROCEDURE — 1036F TOBACCO NON-USER: CPT | Performed by: SURGERY

## 2019-11-19 PROCEDURE — 1123F ACP DISCUSS/DSCN MKR DOCD: CPT | Performed by: SURGERY

## 2019-11-19 PROCEDURE — 99213 OFFICE O/P EST LOW 20 MIN: CPT | Performed by: SURGERY

## 2019-11-19 PROCEDURE — G8482 FLU IMMUNIZE ORDER/ADMIN: HCPCS | Performed by: SURGERY

## 2019-11-19 PROCEDURE — G8417 CALC BMI ABV UP PARAM F/U: HCPCS | Performed by: SURGERY

## 2019-11-19 PROCEDURE — G8427 DOCREV CUR MEDS BY ELIG CLIN: HCPCS | Performed by: SURGERY

## 2019-11-20 ENCOUNTER — OUTSIDE SERVICES (OUTPATIENT)
Dept: INTERNAL MEDICINE | Age: 74
End: 2019-11-20
Payer: MEDICARE

## 2019-11-20 ENCOUNTER — TELEPHONE (OUTPATIENT)
Dept: WOUND CARE | Age: 74
End: 2019-11-20

## 2019-11-20 DIAGNOSIS — N18.6 ANEMIA IN CHRONIC KIDNEY DISEASE, ON CHRONIC DIALYSIS (HCC): ICD-10-CM

## 2019-11-20 DIAGNOSIS — D63.1 ANEMIA IN CHRONIC KIDNEY DISEASE, ON CHRONIC DIALYSIS (HCC): ICD-10-CM

## 2019-11-20 DIAGNOSIS — K92.1 BLOOD IN STOOL: Primary | ICD-10-CM

## 2019-11-20 DIAGNOSIS — I10 ESSENTIAL HYPERTENSION: ICD-10-CM

## 2019-11-20 DIAGNOSIS — E78.2 MIXED HYPERLIPIDEMIA: ICD-10-CM

## 2019-11-20 DIAGNOSIS — N18.6 CONTROLLED TYPE 2 DIABETES MELLITUS WITH CHRONIC KIDNEY DISEASE ON CHRONIC DIALYSIS, WITH LONG-TERM CURRENT USE OF INSULIN (HCC): ICD-10-CM

## 2019-11-20 DIAGNOSIS — G47.33 OBSTRUCTIVE SLEEP APNEA SYNDROME: ICD-10-CM

## 2019-11-20 DIAGNOSIS — F32.4 MAJOR DEPRESSIVE DISORDER WITH SINGLE EPISODE, IN PARTIAL REMISSION (HCC): ICD-10-CM

## 2019-11-20 DIAGNOSIS — Z79.4 CONTROLLED TYPE 2 DIABETES MELLITUS WITH CHRONIC KIDNEY DISEASE ON CHRONIC DIALYSIS, WITH LONG-TERM CURRENT USE OF INSULIN (HCC): ICD-10-CM

## 2019-11-20 DIAGNOSIS — I35.0 SEVERE AORTIC STENOSIS: ICD-10-CM

## 2019-11-20 DIAGNOSIS — I48.0 PAROXYSMAL ATRIAL FIBRILLATION (HCC): ICD-10-CM

## 2019-11-20 DIAGNOSIS — Z99.2 CONTROLLED TYPE 2 DIABETES MELLITUS WITH CHRONIC KIDNEY DISEASE ON CHRONIC DIALYSIS, WITH LONG-TERM CURRENT USE OF INSULIN (HCC): ICD-10-CM

## 2019-11-20 DIAGNOSIS — N18.6 ESRD (END STAGE RENAL DISEASE) ON DIALYSIS (HCC): ICD-10-CM

## 2019-11-20 DIAGNOSIS — E11.22 CONTROLLED TYPE 2 DIABETES MELLITUS WITH CHRONIC KIDNEY DISEASE ON CHRONIC DIALYSIS, WITH LONG-TERM CURRENT USE OF INSULIN (HCC): ICD-10-CM

## 2019-11-20 DIAGNOSIS — Z99.2 ESRD (END STAGE RENAL DISEASE) ON DIALYSIS (HCC): ICD-10-CM

## 2019-11-20 DIAGNOSIS — I05.0 SEVERE MITRAL VALVE STENOSIS: ICD-10-CM

## 2019-11-20 DIAGNOSIS — Z79.4 CONTROLLED TYPE 2 DIABETES MELLITUS WITH DIABETIC POLYNEUROPATHY, WITH LONG-TERM CURRENT USE OF INSULIN (HCC): ICD-10-CM

## 2019-11-20 DIAGNOSIS — E11.42 CONTROLLED TYPE 2 DIABETES MELLITUS WITH DIABETIC POLYNEUROPATHY, WITH LONG-TERM CURRENT USE OF INSULIN (HCC): ICD-10-CM

## 2019-11-20 DIAGNOSIS — Z99.2 DIALYSIS PATIENT (HCC): ICD-10-CM

## 2019-11-20 DIAGNOSIS — Z79.4 LONG-TERM INSULIN USE (HCC): ICD-10-CM

## 2019-11-20 DIAGNOSIS — E66.01 CLASS 2 SEVERE OBESITY WITH SERIOUS COMORBIDITY AND BODY MASS INDEX (BMI) OF 39.0 TO 39.9 IN ADULT, UNSPECIFIED OBESITY TYPE (HCC): ICD-10-CM

## 2019-11-20 DIAGNOSIS — F01.50 VASCULAR DEMENTIA WITHOUT BEHAVIORAL DISTURBANCE (HCC): ICD-10-CM

## 2019-11-20 DIAGNOSIS — Z99.2 ANEMIA IN CHRONIC KIDNEY DISEASE, ON CHRONIC DIALYSIS (HCC): ICD-10-CM

## 2019-11-22 ENCOUNTER — TELEPHONE (OUTPATIENT)
Dept: SURGERY | Age: 74
End: 2019-11-22

## 2019-11-22 ENCOUNTER — TELEPHONE (OUTPATIENT)
Dept: WOUND CARE | Age: 74
End: 2019-11-22

## 2019-11-22 ENCOUNTER — OUTSIDE SERVICES (OUTPATIENT)
Dept: INTERNAL MEDICINE | Age: 74
End: 2019-11-22
Payer: MEDICARE

## 2019-11-22 DIAGNOSIS — T14.8XXA HEMATOMA: Primary | ICD-10-CM

## 2019-11-22 PROCEDURE — 99307 SBSQ NF CARE SF MDM 10: CPT | Performed by: NURSE PRACTITIONER

## 2019-11-22 NOTE — TELEPHONE ENCOUNTER
Patient's wife was at the front window today. Wife asked to talk to [facility] or Debi. Wife states patient has a start of a hematoma on his left leg. Wife states this is how the wound on his right leg started. Wife wants Dr Nelly Franco to order the elastic bandage that is currently used on the the right for both legs. Please call the wife for any questions # [phone].

## 2019-11-22 NOTE — TELEPHONE ENCOUNTER
Writer spoke with nurse from [facility]  yesterday and Windy Huerta had asked to put bordered foam dressing on hematoma to protect it. Dr. John Ayon gave v.o. order for bordered foam dressing to hematoma left lower extremity. Do you want tubi  on bilateral  lower extremities?

## 2019-11-25 ENCOUNTER — TELEPHONE (OUTPATIENT)
Dept: WOUND CARE | Age: 74
End: 2019-11-25

## 2019-11-26 ENCOUNTER — OFFICE VISIT (OUTPATIENT)
Dept: PULMONOLOGY | Age: 74
End: 2019-11-26
Payer: MEDICARE

## 2019-11-26 VITALS
OXYGEN SATURATION: 97 % | RESPIRATION RATE: 19 BRPM | HEIGHT: 67 IN | DIASTOLIC BLOOD PRESSURE: 68 MMHG | HEART RATE: 77 BPM | SYSTOLIC BLOOD PRESSURE: 117 MMHG | BODY MASS INDEX: 39.71 KG/M2

## 2019-11-26 DIAGNOSIS — Z98.890 HISTORY OF TRACHEOSTOMY: ICD-10-CM

## 2019-11-26 DIAGNOSIS — Z99.89 OSA ON CPAP: Primary | ICD-10-CM

## 2019-11-26 DIAGNOSIS — G47.33 OSA ON CPAP: Primary | ICD-10-CM

## 2019-11-26 DIAGNOSIS — N18.6 ESRD (END STAGE RENAL DISEASE) ON DIALYSIS (HCC): ICD-10-CM

## 2019-11-26 DIAGNOSIS — Z99.2 ESRD (END STAGE RENAL DISEASE) ON DIALYSIS (HCC): ICD-10-CM

## 2019-11-26 PROCEDURE — 1036F TOBACCO NON-USER: CPT | Performed by: INTERNAL MEDICINE

## 2019-11-26 PROCEDURE — 99213 OFFICE O/P EST LOW 20 MIN: CPT | Performed by: INTERNAL MEDICINE

## 2019-11-26 PROCEDURE — G8417 CALC BMI ABV UP PARAM F/U: HCPCS | Performed by: INTERNAL MEDICINE

## 2019-11-26 PROCEDURE — 1123F ACP DISCUSS/DSCN MKR DOCD: CPT | Performed by: INTERNAL MEDICINE

## 2019-11-26 PROCEDURE — G8482 FLU IMMUNIZE ORDER/ADMIN: HCPCS | Performed by: INTERNAL MEDICINE

## 2019-11-26 PROCEDURE — G8427 DOCREV CUR MEDS BY ELIG CLIN: HCPCS | Performed by: INTERNAL MEDICINE

## 2019-11-26 PROCEDURE — 4040F PNEUMOC VAC/ADMIN/RCVD: CPT | Performed by: INTERNAL MEDICINE

## 2019-11-26 PROCEDURE — 3017F COLORECTAL CA SCREEN DOC REV: CPT | Performed by: INTERNAL MEDICINE

## 2019-11-26 PROCEDURE — 99309 SBSQ NF CARE MODERATE MDM 30: CPT | Performed by: INTERNAL MEDICINE

## 2019-11-27 ENCOUNTER — TELEPHONE (OUTPATIENT)
Dept: INTERNAL MEDICINE | Age: 74
End: 2019-11-27

## 2019-12-02 ENCOUNTER — DIRECT ADMIT ORDERS (OUTPATIENT)
Dept: INTERNAL MEDICINE CLINIC | Age: 74
End: 2019-12-02

## 2019-12-02 ENCOUNTER — HOSPITAL ENCOUNTER (EMERGENCY)
Age: 74
Discharge: ANOTHER HOSPITAL | End: 2019-12-02
Attending: SPECIALIST
Payer: MEDICARE

## 2019-12-02 ENCOUNTER — APPOINTMENT (OUTPATIENT)
Dept: GENERAL RADIOLOGY | Age: 74
End: 2019-12-02
Payer: MEDICARE

## 2019-12-02 ENCOUNTER — HOSPITAL ENCOUNTER (INPATIENT)
Age: 74
LOS: 2 days | Discharge: HOME | DRG: 291 | End: 2019-12-04
Attending: FAMILY MEDICINE | Admitting: FAMILY MEDICINE
Payer: MEDICARE

## 2019-12-02 ENCOUNTER — APPOINTMENT (OUTPATIENT)
Dept: GENERAL RADIOLOGY | Age: 74
DRG: 291 | End: 2019-12-02
Attending: FAMILY MEDICINE
Payer: MEDICARE

## 2019-12-02 ENCOUNTER — TELEPHONE (OUTPATIENT)
Dept: INTERNAL MEDICINE | Age: 74
End: 2019-12-02

## 2019-12-02 VITALS
OXYGEN SATURATION: 99 % | DIASTOLIC BLOOD PRESSURE: 80 MMHG | WEIGHT: 253 LBS | HEART RATE: 79 BPM | SYSTOLIC BLOOD PRESSURE: 163 MMHG | BODY MASS INDEX: 39.63 KG/M2 | TEMPERATURE: 96.9 F | RESPIRATION RATE: 22 BRPM

## 2019-12-02 DIAGNOSIS — N18.5 CHRONIC RENAL FAILURE, STAGE 5 (HCC): ICD-10-CM

## 2019-12-02 DIAGNOSIS — J18.9 PNEUMONIA OF BOTH LOWER LOBES DUE TO INFECTIOUS ORGANISM: Primary | ICD-10-CM

## 2019-12-02 PROBLEM — I50.33 ACUTE ON CHRONIC DIASTOLIC CHF (CONGESTIVE HEART FAILURE) (HCC): Status: ACTIVE | Noted: 2019-12-02

## 2019-12-02 LAB
ABSOLUTE EOS #: 0.6 K/UL (ref 0–0.4)
ABSOLUTE IMMATURE GRANULOCYTE: ABNORMAL K/UL (ref 0–0.3)
ABSOLUTE LYMPH #: 1.6 K/UL (ref 1–4.8)
ABSOLUTE MONO #: 0.8 K/UL (ref 0.1–1.2)
ANION GAP SERPL CALCULATED.3IONS-SCNC: 21 MMOL/L (ref 9–17)
BASOPHILS # BLD: 1 % (ref 0–2)
BASOPHILS ABSOLUTE: 0.1 K/UL (ref 0–0.2)
BNP INTERPRETATION: ABNORMAL
BUN BLDV-MCNC: 104 MG/DL (ref 8–23)
BUN/CREAT BLD: 14 (ref 9–20)
CALCIUM SERPL-MCNC: 9.6 MG/DL (ref 8.6–10.4)
CHLORIDE BLD-SCNC: 94 MMOL/L (ref 98–107)
CO2: 26 MMOL/L (ref 20–31)
CREAT SERPL-MCNC: 7.33 MG/DL (ref 0.7–1.2)
DIFFERENTIAL TYPE: ABNORMAL
EKG ATRIAL RATE: 79 BPM
EKG P AXIS: -37 DEGREES
EKG P-R INTERVAL: 318 MS
EKG Q-T INTERVAL: 426 MS
EKG QRS DURATION: 104 MS
EKG QTC CALCULATION (BAZETT): 488 MS
EKG R AXIS: -65 DEGREES
EKG T AXIS: 63 DEGREES
EKG VENTRICULAR RATE: 79 BPM
EOSINOPHILS RELATIVE PERCENT: 7 % (ref 1–8)
GFR AFRICAN AMERICAN: 9 ML/MIN
GFR NON-AFRICAN AMERICAN: 7 ML/MIN
GFR SERPL CREATININE-BSD FRML MDRD: ABNORMAL ML/MIN/{1.73_M2}
GFR SERPL CREATININE-BSD FRML MDRD: ABNORMAL ML/MIN/{1.73_M2}
GLUCOSE BLD-MCNC: 125 MG/DL (ref 70–99)
GLUCOSE BLD-MCNC: 141 MG/DL (ref 75–110)
GLUCOSE BLD-MCNC: 165 MG/DL (ref 75–110)
GLUCOSE BLD-MCNC: 196 MG/DL (ref 75–110)
GLUCOSE BLD-MCNC: 91 MG/DL (ref 75–110)
HCT VFR BLD CALC: 39.9 % (ref 41–53)
HEMOGLOBIN: 12.8 G/DL (ref 13.5–17.5)
IMMATURE GRANULOCYTES: ABNORMAL %
LACTIC ACID: 1.8 MMOL/L (ref 0.5–2.2)
LYMPHOCYTES # BLD: 19 % (ref 15–43)
MAGNESIUM: 2.7 MG/DL (ref 1.6–2.6)
MCH RBC QN AUTO: 29.8 PG (ref 26–34)
MCHC RBC AUTO-ENTMCNC: 32.2 G/DL (ref 31–37)
MCV RBC AUTO: 92.5 FL (ref 80–100)
MONOCYTES # BLD: 10 % (ref 6–14)
NRBC AUTOMATED: ABNORMAL PER 100 WBC
PDW BLD-RTO: 17.5 % (ref 11–14.5)
PLATELET # BLD: 143 K/UL (ref 140–450)
PLATELET ESTIMATE: ABNORMAL
PMV BLD AUTO: 6.8 FL (ref 6–12)
POTASSIUM SERPL-SCNC: 3.5 MMOL/L (ref 3.7–5.3)
PRO-BNP: ABNORMAL PG/ML
RBC # BLD: 4.32 M/UL (ref 4.5–5.9)
RBC # BLD: ABNORMAL 10*6/UL
SEG NEUTROPHILS: 63 % (ref 44–74)
SEGMENTED NEUTROPHILS ABSOLUTE COUNT: 5.4 K/UL (ref 1.8–7.7)
SODIUM BLD-SCNC: 141 MMOL/L (ref 135–144)
TROPONIN INTERP: ABNORMAL
TROPONIN T: ABNORMAL NG/ML
TROPONIN, HIGH SENSITIVITY: 168 NG/L (ref 0–22)
WBC # BLD: 8.5 K/UL (ref 3.5–11)
WBC # BLD: ABNORMAL 10*3/UL

## 2019-12-02 PROCEDURE — 83605 ASSAY OF LACTIC ACID: CPT

## 2019-12-02 PROCEDURE — 82947 ASSAY GLUCOSE BLOOD QUANT: CPT

## 2019-12-02 PROCEDURE — 2580000003 HC RX 258: Performed by: NURSE PRACTITIONER

## 2019-12-02 PROCEDURE — 92611 MOTION FLUOROSCOPY/SWALLOW: CPT

## 2019-12-02 PROCEDURE — 83880 ASSAY OF NATRIURETIC PEPTIDE: CPT

## 2019-12-02 PROCEDURE — 6360000002 HC RX W HCPCS: Performed by: FAMILY MEDICINE

## 2019-12-02 PROCEDURE — 6370000000 HC RX 637 (ALT 250 FOR IP): Performed by: NURSE PRACTITIONER

## 2019-12-02 PROCEDURE — 87205 SMEAR GRAM STAIN: CPT

## 2019-12-02 PROCEDURE — 99223 1ST HOSP IP/OBS HIGH 75: CPT | Performed by: FAMILY MEDICINE

## 2019-12-02 PROCEDURE — 6370000000 HC RX 637 (ALT 250 FOR IP): Performed by: FAMILY MEDICINE

## 2019-12-02 PROCEDURE — 94640 AIRWAY INHALATION TREATMENT: CPT

## 2019-12-02 PROCEDURE — 71046 X-RAY EXAM CHEST 2 VIEWS: CPT

## 2019-12-02 PROCEDURE — 87040 BLOOD CULTURE FOR BACTERIA: CPT

## 2019-12-02 PROCEDURE — 94761 N-INVAS EAR/PLS OXIMETRY MLT: CPT

## 2019-12-02 PROCEDURE — 84484 ASSAY OF TROPONIN QUANT: CPT

## 2019-12-02 PROCEDURE — 96367 TX/PROPH/DG ADDL SEQ IV INF: CPT

## 2019-12-02 PROCEDURE — 2060000000 HC ICU INTERMEDIATE R&B

## 2019-12-02 PROCEDURE — 2700000000 HC OXYGEN THERAPY PER DAY

## 2019-12-02 PROCEDURE — 87070 CULTURE OTHR SPECIMN AEROBIC: CPT

## 2019-12-02 PROCEDURE — 74230 X-RAY XM SWLNG FUNCJ C+: CPT

## 2019-12-02 PROCEDURE — 85025 COMPLETE CBC W/AUTO DIFF WBC: CPT

## 2019-12-02 PROCEDURE — 90935 HEMODIALYSIS ONE EVALUATION: CPT

## 2019-12-02 PROCEDURE — 94664 DEMO&/EVAL PT USE INHALER: CPT

## 2019-12-02 PROCEDURE — 83735 ASSAY OF MAGNESIUM: CPT

## 2019-12-02 PROCEDURE — 36415 COLL VENOUS BLD VENIPUNCTURE: CPT

## 2019-12-02 PROCEDURE — 80048 BASIC METABOLIC PNL TOTAL CA: CPT

## 2019-12-02 PROCEDURE — 96365 THER/PROPH/DIAG IV INF INIT: CPT

## 2019-12-02 PROCEDURE — 2580000003 HC RX 258: Performed by: FAMILY MEDICINE

## 2019-12-02 PROCEDURE — 6360000002 HC RX W HCPCS: Performed by: SPECIALIST

## 2019-12-02 PROCEDURE — 99284 EMERGENCY DEPT VISIT MOD MDM: CPT

## 2019-12-02 PROCEDURE — 6370000000 HC RX 637 (ALT 250 FOR IP): Performed by: SPECIALIST

## 2019-12-02 PROCEDURE — 2580000003 HC RX 258: Performed by: SPECIALIST

## 2019-12-02 PROCEDURE — 93005 ELECTROCARDIOGRAM TRACING: CPT | Performed by: SPECIALIST

## 2019-12-02 RX ORDER — ALBUTEROL SULFATE 2.5 MG/3ML
2.5 SOLUTION RESPIRATORY (INHALATION)
Status: DISCONTINUED | OUTPATIENT
Start: 2019-12-02 | End: 2019-12-02

## 2019-12-02 RX ORDER — ONDANSETRON 2 MG/ML
4 INJECTION INTRAMUSCULAR; INTRAVENOUS EVERY 6 HOURS PRN
Status: DISCONTINUED | OUTPATIENT
Start: 2019-12-02 | End: 2019-12-04 | Stop reason: HOSPADM

## 2019-12-02 RX ORDER — SODIUM CHLORIDE 0.9 % (FLUSH) 0.9 %
10 SYRINGE (ML) INJECTION EVERY 12 HOURS SCHEDULED
Status: DISCONTINUED | OUTPATIENT
Start: 2019-12-02 | End: 2019-12-04 | Stop reason: HOSPADM

## 2019-12-02 RX ORDER — NICOTINE POLACRILEX 4 MG
15 LOZENGE BUCCAL PRN
Status: CANCELLED | OUTPATIENT
Start: 2019-12-02

## 2019-12-02 RX ORDER — SODIUM CHLORIDE 0.9 % (FLUSH) 0.9 %
10 SYRINGE (ML) INJECTION PRN
Status: DISCONTINUED | OUTPATIENT
Start: 2019-12-02 | End: 2019-12-04 | Stop reason: HOSPADM

## 2019-12-02 RX ORDER — DEXTROSE MONOHYDRATE 25 G/50ML
12.5 INJECTION, SOLUTION INTRAVENOUS PRN
Status: DISCONTINUED | OUTPATIENT
Start: 2019-12-02 | End: 2019-12-04 | Stop reason: HOSPADM

## 2019-12-02 RX ORDER — SODIUM CHLORIDE 0.9 % (FLUSH) 0.9 %
10 SYRINGE (ML) INJECTION EVERY 12 HOURS SCHEDULED
Status: CANCELLED | OUTPATIENT
Start: 2019-12-02

## 2019-12-02 RX ORDER — LEVOFLOXACIN 5 MG/ML
500 INJECTION, SOLUTION INTRAVENOUS EVERY 24 HOURS
Status: DISCONTINUED | OUTPATIENT
Start: 2019-12-03 | End: 2019-12-02

## 2019-12-02 RX ORDER — DEXTROSE MONOHYDRATE 50 MG/ML
100 INJECTION, SOLUTION INTRAVENOUS PRN
Status: DISCONTINUED | OUTPATIENT
Start: 2019-12-02 | End: 2019-12-04 | Stop reason: HOSPADM

## 2019-12-02 RX ORDER — LEVOFLOXACIN 5 MG/ML
250 INJECTION, SOLUTION INTRAVENOUS
Status: DISCONTINUED | OUTPATIENT
Start: 2019-12-04 | End: 2019-12-04 | Stop reason: HOSPADM

## 2019-12-02 RX ORDER — SODIUM CHLORIDE 9 MG/ML
INJECTION, SOLUTION INTRAVENOUS CONTINUOUS
Status: DISCONTINUED | OUTPATIENT
Start: 2019-12-02 | End: 2019-12-02

## 2019-12-02 RX ORDER — IPRATROPIUM BROMIDE AND ALBUTEROL SULFATE 2.5; .5 MG/3ML; MG/3ML
1 SOLUTION RESPIRATORY (INHALATION)
Status: CANCELLED | OUTPATIENT
Start: 2019-12-02

## 2019-12-02 RX ORDER — DEXTROSE MONOHYDRATE 25 G/50ML
12.5 INJECTION, SOLUTION INTRAVENOUS PRN
Status: CANCELLED | OUTPATIENT
Start: 2019-12-02

## 2019-12-02 RX ORDER — IPRATROPIUM BROMIDE AND ALBUTEROL SULFATE 2.5; .5 MG/3ML; MG/3ML
1 SOLUTION RESPIRATORY (INHALATION) ONCE
Status: COMPLETED | OUTPATIENT
Start: 2019-12-02 | End: 2019-12-02

## 2019-12-02 RX ORDER — NICOTINE 21 MG/24HR
1 PATCH, TRANSDERMAL 24 HOURS TRANSDERMAL DAILY PRN
Status: CANCELLED | OUTPATIENT
Start: 2019-12-02

## 2019-12-02 RX ORDER — SODIUM CHLORIDE 9 MG/ML
INJECTION, SOLUTION INTRAVENOUS CONTINUOUS
Status: CANCELLED | OUTPATIENT
Start: 2019-12-02

## 2019-12-02 RX ORDER — DEXTROSE MONOHYDRATE 50 MG/ML
100 INJECTION, SOLUTION INTRAVENOUS PRN
Status: CANCELLED | OUTPATIENT
Start: 2019-12-02

## 2019-12-02 RX ORDER — ONDANSETRON 2 MG/ML
4 INJECTION INTRAMUSCULAR; INTRAVENOUS EVERY 6 HOURS PRN
Status: CANCELLED | OUTPATIENT
Start: 2019-12-02

## 2019-12-02 RX ORDER — ALBUTEROL SULFATE 2.5 MG/3ML
2.5 SOLUTION RESPIRATORY (INHALATION)
Status: CANCELLED | OUTPATIENT
Start: 2019-12-02

## 2019-12-02 RX ORDER — ALBUTEROL SULFATE 2.5 MG/3ML
2.5 SOLUTION RESPIRATORY (INHALATION) EVERY 6 HOURS PRN
Status: DISCONTINUED | OUTPATIENT
Start: 2019-12-02 | End: 2019-12-04 | Stop reason: HOSPADM

## 2019-12-02 RX ORDER — SODIUM CHLORIDE 0.9 % (FLUSH) 0.9 %
10 SYRINGE (ML) INJECTION PRN
Status: CANCELLED | OUTPATIENT
Start: 2019-12-02

## 2019-12-02 RX ORDER — NICOTINE POLACRILEX 4 MG
15 LOZENGE BUCCAL PRN
Status: DISCONTINUED | OUTPATIENT
Start: 2019-12-02 | End: 2019-12-04 | Stop reason: HOSPADM

## 2019-12-02 RX ORDER — IPRATROPIUM BROMIDE AND ALBUTEROL SULFATE 2.5; .5 MG/3ML; MG/3ML
1 SOLUTION RESPIRATORY (INHALATION)
Status: DISCONTINUED | OUTPATIENT
Start: 2019-12-02 | End: 2019-12-02

## 2019-12-02 RX ORDER — LEVOFLOXACIN 5 MG/ML
500 INJECTION, SOLUTION INTRAVENOUS ONCE
Status: COMPLETED | OUTPATIENT
Start: 2019-12-02 | End: 2019-12-02

## 2019-12-02 RX ORDER — IPRATROPIUM BROMIDE AND ALBUTEROL SULFATE 2.5; .5 MG/3ML; MG/3ML
1 SOLUTION RESPIRATORY (INHALATION) 4 TIMES DAILY
Status: DISCONTINUED | OUTPATIENT
Start: 2019-12-02 | End: 2019-12-04 | Stop reason: HOSPADM

## 2019-12-02 RX ORDER — NICOTINE 21 MG/24HR
1 PATCH, TRANSDERMAL 24 HOURS TRANSDERMAL DAILY PRN
Status: DISCONTINUED | OUTPATIENT
Start: 2019-12-02 | End: 2019-12-04 | Stop reason: HOSPADM

## 2019-12-02 RX ADMIN — INSULIN LISPRO 1 UNITS: 100 INJECTION, SOLUTION INTRAVENOUS; SUBCUTANEOUS at 23:48

## 2019-12-02 RX ADMIN — IPRATROPIUM BROMIDE AND ALBUTEROL SULFATE 1 AMPULE: .5; 3 SOLUTION RESPIRATORY (INHALATION) at 11:34

## 2019-12-02 RX ADMIN — CEFEPIME HYDROCHLORIDE 1 G: 1 INJECTION, POWDER, FOR SOLUTION INTRAMUSCULAR; INTRAVENOUS at 07:18

## 2019-12-02 RX ADMIN — INSULIN LISPRO 1 UNITS: 100 INJECTION, SOLUTION INTRAVENOUS; SUBCUTANEOUS at 20:06

## 2019-12-02 RX ADMIN — IPRATROPIUM BROMIDE AND ALBUTEROL SULFATE 1 AMPULE: .5; 3 SOLUTION RESPIRATORY (INHALATION) at 05:33

## 2019-12-02 RX ADMIN — LEVOFLOXACIN 500 MG: 5 INJECTION, SOLUTION INTRAVENOUS at 06:21

## 2019-12-02 RX ADMIN — VANCOMYCIN HYDROCHLORIDE 750 MG: 10 INJECTION, POWDER, LYOPHILIZED, FOR SOLUTION INTRAVENOUS at 17:08

## 2019-12-02 RX ADMIN — Medication 10 ML: at 13:15

## 2019-12-02 RX ADMIN — CEFEPIME HYDROCHLORIDE 1 G: 1 INJECTION, POWDER, FOR SOLUTION INTRAMUSCULAR; INTRAVENOUS at 20:16

## 2019-12-02 RX ADMIN — IPRATROPIUM BROMIDE AND ALBUTEROL SULFATE 1 AMPULE: .5; 3 SOLUTION RESPIRATORY (INHALATION) at 18:29

## 2019-12-02 ASSESSMENT — ENCOUNTER SYMPTOMS
SHORTNESS OF BREATH: 1
VOMITING: 0
COLOR CHANGE: 1
CHEST TIGHTNESS: 0
VOICE CHANGE: 0
BACK PAIN: 0
COUGH: 1
ABDOMINAL PAIN: 0
CONSTIPATION: 0
SINUS PRESSURE: 0
SHORTNESS OF BREATH: 1
RHINORRHEA: 0
COUGH: 1
ABDOMINAL PAIN: 0
WHEEZING: 1
CHOKING: 0
DIARRHEA: 0
VOMITING: 0
RHINORRHEA: 1
WHEEZING: 1
DIARRHEA: 0

## 2019-12-02 ASSESSMENT — PAIN SCALES - GENERAL
PAINLEVEL_OUTOF10: 3
PAINLEVEL_OUTOF10: 0
PAINLEVEL_OUTOF10: 3

## 2019-12-02 ASSESSMENT — PAIN DESCRIPTION - LOCATION
LOCATION: GENERALIZED
LOCATION: GENERALIZED

## 2019-12-02 ASSESSMENT — PAIN DESCRIPTION - PAIN TYPE: TYPE: CHRONIC PAIN

## 2019-12-02 NOTE — CARE COORDINATION
Case Management Initial Discharge Plan  Bruce Bailey,             Met with:patient's wife Noy Lawson via phone to discuss discharge plans. Information verified: address, contacts, phone number, , insurance Yes  PCP: Dr. Laura Sierra  Date of last visit: facility      Insurance Provider: medicare     Discharge Planning     Living Arrangements:  Other (Comment)   Support Systems:  Spouse/Significant Other     Home has 1 stories  0 stairs to climb to get into front door, 0stairs to climb to reach second floor  Location of bedroom/bathroom in home lives in facility     Patient able to perform ADL's:Assisted     Current Services (outpatient & in home)   DME equipment: facility  DME provider: facility     Pharmacy: facility       Potential Assistance Purchasing Medications:  No  Does patient want to participate in local refill/ meds to beds program?  No     Potential Assistance Needed:  [employer]     Patient agreeable to home care: No  [place] of choice provided:  no     Prior SNF/Rehab Placement and Facility: [facility]  Agreeable to SNF/Rehab: Yes  [place] of choice provided: yes   Evaluation: no    Expected Discharge date:  19  Patient expects to be discharged to:  2019  Follow Up Appointment: Best Day/ Time: Tuesday AM    Transportation provider: faciltiy  Transportation arrangements needed for discharge: Yes    Readmission Risk              Risk of Unplanned Readmission:        30             Does patient have a readmission risk score greater than 14?: Yes  If yes, follow-up appointment must be made within 7 days of discharge. Goals of Care:       Discharge Plan: return to [facility].  Referral sent          Electronically signed by Afshan Del Real RN on 19 at 5:17 PM

## 2019-12-02 NOTE — H&P
[facility]      HISTORY AND PHYSICAL EXAMINATION            Date:   12/2/2019  Patient name:  Lg Sevilla  Date of admission:  12/2/2019 10:33 AM  MRN:   [id number]  Account:  [de-identified]  YOB: 1945  PCP:    Shorty Hernandez DO  Room:   1513/1694-64  Code Status:    DNR-CCA    Chief Complaint:     Shortness of breath  Cough    History Obtained From:     Patient, electronic medical record    History of Present Illness: The patient is a 75 y.o. Non-/non  male who presents with No chief complaint on file. and he is admitted to the hospital for the management of  Acute on chronic diastolic CHF (congestive heart failure) ([facility] [address] Patient was transferred from [facility] emergency room where he presented after transfer from his F [facility] for shortness of breath, wheezing, difficulty breathing for last 2 days. F had attempted bronchodilators to help but did not improve his breathing. Has been having some yellow productive sputum with cough. Wife does not report any discharge fever, chills. Patient has been having fatigue and tired for last few days. He has history of ESRD on hemodialysis. Patient is bedbound and has been living in [facility] for last 3 years. He has history of morbid obesity, obstructive sleep apnea, pulmonary hypertension, aortic, mitral valve repair with bioprosthetic valve. He has chronic lymphedema with bilateral lower extremity wounds and any mental wound and has been getting wound care therapy at [facility]. Initial evaluation at emergency room by [facility] showed normal white count, chest x-ray suggestive of Bibasilar airspace disease. Patient was afebrile on presentation. [place] [address]. Patient was sent to [facility] for further treatment and continuation of dialysis.     Past Medical History:     Past Medical History:   Diagnosis Date    Anemia in chronic kidney disease (CKD) 4/27/2016    Arthritis     Bacteremia 11/11/2016    Benign prostatic hyperplasia without lower urinary tract symptoms     BMI 40.0-44.9, adult ([address] 3/19/2018    Cellulitis of left lower extremity     Chronic cerebral ischemia 1/3/2017    Closed fracture of forearm 8/13/2013    Broken lft forearm     Controlled type 2 diabetes mellitus with chronic kidney disease on chronic dialysis, with long-term current use of insulin ([address]     Controlled type 2 diabetes mellitus with diabetic polyneuropathy, with long-term current use of insulin ([address] 10/7/2018    Decubital ulcer     NON OPEN BUTTOCKS    Dementia ([address]     memory problems    Dialysis patient ([facility] [address] 01/03/2017    Goes [facility], Sat in [place]    Difficult intravenous access     HAS NEEDED PICC TEAM IN THE PAST    Difficulty walking     WHEELCHAIR BOUND-PIVOTS WITH ASSIST O F 2    DJD (degenerative joint disease) of knee     Elbow, forearm, and wrist, abrasion or friction burn, without mention of infection 8/13/2013    Abrasions lft forearm     Encephalopathy acute 4/27/2016    Encounter regarding vascular access for dialysis for ESRD ([address] 2/2/2017    ESRD (end stage renal disease) on dialysis ([address] 1/17/2017    Forgetfulness     HAS SOME SHORT TERM MEMORY LOSS, QUYEN-WIFE IS LEGAL GUARDIAN    H/O transesophageal echocardiography (AMADEO) for monitoring     Hemodialysis patient ([address] 11/11/2016    [place]---[facility]ES[facility].  TUNNELED CATHETER RT UPPER CHEST    Hyperlipidemia 1990    on Meds    Hypertension 1990    on Meds    Intercritical gout     on Meds    Joint pain, knee 01/13/2017    baldo knee synvisc-1 injections    Long-term insulin use ([address] 1/17/2017    Major depressive disorder with single episode, in partial remission ([address] 1/3/2017    Mobility impaired     Morbid obesity (HCC)     Muscle weakness     GRNERALIZED    Obesity     Obstructive sleep apnea     HEATHER on CPAP     On CPAP-TO BRING DOS    Paroxysmal atrial fibrillation ([facility] [address] 9/6/2017    Respiratory failure ([facility] [address] 03/2016    with open heart surgery, trached x 5 months    S/P cardiac cath     Seborrhea     Severe aortic stenosis 3/9/2016    Severe mitral valve stenosis 3/9/2016    Severe sepsis ([facility] [address] 4/3/2016    Skin rash     ABD FOLDS    Stasis dermatitis     Thyroid disease     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 lft thumb     Venous stasis dermatitis     Wears glasses     Wheelchair bound     pivots with 2 assists        Past Surgical History:     Past Surgical History:   Procedure Laterality Date    AORTIC VALVE REPLACEMENT  03/18/2016    bioprosthetic    ARM SURGERY Left 1990    CARDIAC CATHETERIZATION      CENTRAL VENOUS CATHETER Right 02/21/2018    DIALYSIS CATHETER Dr Donal Caldera    COLONOSCOPY  11/19/09    COLONOSCOPY N/A 10/12/2018    COLONOSCOPY WITH BIOPSY performed by Dalia Espinoza DO at [address] [place] [address] Right 3/10/2019    WASHOUT RIGHT LOWER EXTREMITY WITH WOUND VAC EXCHANGE performed by Roman Mason MD at [address] [place] [address] Right 3/8/2019    RIGHT LOWER EXTREMITY EXPLORATION, HEMATOMA EVACUATION, WOUND VAC PLACEMENT performed by Roman Mason MD at [address] (x10-12 surgeries)    several surgeries on left arm    KNEE ARTHROSCOPY Left 09/17/99    MITRAL VALVE REPLACEMENT  03/18/2016    bioprosthetic     NASAL SEPTUM SURGERY      OTHER SURGICAL HISTORY  3/18/16    Aortic root Enlargement    OTHER SURGICAL HISTORY  3/18/16    ASD Closure    OTHER SURGICAL HISTORY Right 01/09/2017    AV fistula creation, wrist    OTHER SURGICAL HISTORY Right 08/29/2017    ligation of collateral branch of av fistula right wrist    OTHER SURGICAL HISTORY  04/05/2018    FISTULAGRAM WITH DR. Billy Trerazas    RI EGD TRANSORAL BIOPSY SINGLE/MULTIPLE N/A 10/17/2017    EGD BIOPSY performed by Daniella Iyer MD at [address] ANGIOACCESS AV FISTULA Right 8/29/2017     RIGHT  LOWER ARM AV FISTULA  LIGATION OF AQUIRED BRANCHES X2 performed by Frankie Blanco DO [place] [address]  3/18/16    median    VASCULAR SURGERY  12/06/2018    Right arm fistulagram,  PTA cephalic vein stenosis  /  DR Ulla Bosworth        Medications Prior to Admission:     Prior to Admission medications    Medication Sig Start Date End Date Taking? Authorizing Provider   Elastic Bandages & Supports MISC 30-40 mmHg compression stockings to both lower legs, on every morning, off at bedtime. Patient not taking: Reported on 11/26/2019 11/19/19   Bonny Gitelman, MD   gabapentin (NEURONTIN) 300 MG capsule Take 300 mg by mouth daily. Historical Provider, MD   sertraline (ZOLOFT) 100 MG tablet Take 100 mg by mouth daily    Historical Provider, MD   ALPRAZolam (XANAX) 0.5 MG tablet Take 0.5 mg by mouth 2 times daily. Historical Provider, MD   HYDROcodone-acetaminophen (NORCO) 5-325 MG per tablet Take 1 tablet by mouth every 6 hours as needed for Pain. Historical Provider, MD   Insulin Aspart, w/Niacinamide, (FIASP FLEXTOUCH SC) Inject into the skin Sliding scale    Historical Provider, MD   ketoconazole (NIZORAL) 2 % cream  7/30/19   Historical Provider, MD   lidocaine-prilocaine (EMLA) 2.5-2.5 % cream APPLY SMALL AMOUNT TO ACCESS SITE (AVF) 1 TO 2 HOURS BEFORE DIALYSIS. COVER WITH OCCLUSIVE DRESSING (SARAN WRAP) 7/30/19   Historical Provider, MD   RENVELA 800 MG tablet Take 2 tablets by mouth 3 times daily Give with snack.  7/16/19   SHAMEKA Gonzales - CNP   Viet Seed KWIKPEN 100 UNIT/ML injection pen  7/9/19   Historical Provider, MD   DULoxetine (CYMBALTA) 60 MG extended release capsule Take 60 mg by mouth daily    Historical Provider, MD   loratadine (CLARITIN) 10 MG capsule Take 10 mg by mouth daily    Historical Provider, MD   aspirin 81 MG tablet Take 1 tablet by mouth daily 4/8/19   Yoan Dowd MD   apixaban (ELIQUIS) 5 MG TABS tablet Take 1 tablet by mouth taking: Reported on 11/26/2019 10/24/16   Nica Mcarthur DO   fluticasone [facility]) 50 MCG/ACT nasal spray 2 sprays by Nasal route daily 10/24/16   Jarethnatalio Donald Mcarthur DO   magnesium hydroxide (MILK OF MAGNESIA) 400 MG/5ML suspension Take 30 mLs by mouth daily as needed for Constipation  Patient not taking: Reported on 11/26/2019 3/29/16   Kenny Lopez MD        Allergies:     Percocet [oxycodone-acetaminophen] and Ampicillin    Social History:     Tobacco:    reports that he quit smoking about 39 years ago. His smoking use included cigarettes. He started smoking about 64 years ago. He has a 50.00 pack-year smoking history. He has never used smokeless tobacco.  Alcohol:      reports no history of alcohol use. Drug Use:  reports no history of drug use. Family History:     Family History   Problem Relation Age of Onset    Lung Cancer Mother     Diabetes Mother     Alzheimer's Disease Father     Diabetes Maternal Grandmother     High Blood Pressure Sister        Review of Systems:     Positive and Negative as described in HPI. Review of Systems   Constitutional: Positive for appetite change and fatigue. Negative for activity change, fever and unexpected weight change. HENT: Negative for congestion, nosebleeds, rhinorrhea, sinus pressure, sneezing and voice change. Respiratory: Positive for cough, shortness of breath and wheezing. Negative for choking and chest tightness. Cardiovascular: Positive for leg swelling. Negative for chest pain and palpitations. Gastrointestinal: Negative for abdominal pain, constipation, diarrhea and vomiting. Genitourinary: Negative for difficulty urinating, discharge, dysuria, frequency and testicular pain. Musculoskeletal: Negative for back pain. Skin: Positive for color change and wound. Negative for rash. Neurological: Negative for dizziness, weakness, light-headedness and headaches. Hematological: Does not bruise/bleed easily. Psychiatric/Behavioral: Negative for agitation, behavioral problems, confusion, self-injury, sleep disturbance and suicidal ideas. Physical Exam:   BP (!) 141/78   Pulse 86   Temp 98.2 °F (36.8 °C)   Resp 20   Ht 5' 8\" (1.727 m)   Wt 245 lb 2.4 oz (111.2 kg)   SpO2 97%   BMI 37.28 kg/m²   Temp (24hrs), Av.7 °F (36.5 °C), Min:96.9 °F (36.1 °C), Max:98.2 °F (36.8 °C)    Recent Labs     19  1135   POCGLU 91     No intake or output data in the 24 hours ending 19 1629  Physical Exam  Vitals signs and nursing note reviewed. Constitutional:       General: He is not in acute distress. Appearance: He is not diaphoretic. HENT:      Head: Normocephalic and atraumatic. Nose:      Right Sinus: No maxillary sinus tenderness or frontal sinus tenderness. Left Sinus: No maxillary sinus tenderness or frontal sinus tenderness. Mouth/Throat:      Pharynx: No oropharyngeal exudate. Eyes:      General: No scleral icterus. Conjunctiva/sclera: Conjunctivae normal.      Pupils: Pupils are equal, round, and reactive to light. Neck:      Musculoskeletal: Full passive range of motion without pain and neck supple. Thyroid: No thyromegaly. Vascular: No JVD. Cardiovascular:      Rate and Rhythm: Normal rate and regular rhythm. Pulses:           Dorsalis pedis pulses are 2+ on the right side and 2+ on the left side. Heart sounds: Normal heart sounds. No murmur. Pulmonary:      Effort: Pulmonary effort is normal.      Breath sounds: Normal breath sounds. No wheezing or rales. Comments: Coarse breath sounds. Abdominal:      Palpations: Abdomen is soft. There is no mass. Tenderness: There is no tenderness. Musculoskeletal:      Comments: Inguinal wounds   Lymphadenopathy:      Head:      Right side of head: No submandibular adenopathy. Left side of head: No submandibular adenopathy. Cervical: No cervical adenopathy.    Skin:     General: Skin is warm. Comments: Bilateral lower extremity skin redness, scaling, wounds with dressing in place. Neurological:      Mental Status: He is alert and oriented to person, place, and time. Motor: No tremor. Psychiatric:         Behavior: Behavior is cooperative.          Investigations:      Laboratory Testing:  Recent Results (from the past 24 hour(s))   CBC Auto Differential    Collection Time: 12/02/19  5:16 AM   Result Value Ref Range    WBC 8.5 3.5 - 11.0 k/uL    RBC 4.32 (L) 4.5 - 5.9 m/uL    Hemoglobin 12.8 (L) 13.5 - 17.5 g/dL    Hematocrit 39.9 (L) 41 - 53 %    MCV 92.5 80 - 100 fL    MCH 29.8 26 - 34 pg    MCHC 32.2 31 - 37 g/dL    RDW 17.5 (H) 11.0 - 14.5 %    Platelets 722 192 - 621 k/uL    MPV 6.8 6.0 - 12.0 fL    NRBC Automated NOT REPORTED per 100 WBC    Differential Type NOT REPORTED     Immature Granulocytes NOT REPORTED 0 %    Absolute Immature Granulocyte NOT REPORTED 0.00 - 0.30 k/uL    WBC Morphology NOT REPORTED     RBC Morphology NOT REPORTED     Platelet Estimate NOT REPORTED     Seg Neutrophils 63 44 - 74 %    Lymphocytes 19 15 - 43 %    Monocytes 10 6 - 14 %    Eosinophils % 7 1 - 8 %    Basophils 1 0 - 2 %    Segs Absolute 5.40 1.8 - 7.7 k/uL    Absolute Lymph # 1.60 1.0 - 4.8 k/uL    Absolute Mono # 0.80 0.1 - 1.2 k/uL    Absolute Eos # 0.60 (H) 0.0 - 0.4 k/uL    Basophils Absolute 0.10 0.0 - 0.2 k/uL   Basic Metabolic Panel w/ Reflex to MG    Collection Time: 12/02/19  5:16 AM   Result Value Ref Range    Glucose 125 (H) 70 - 99 mg/dL     (HH) 8 - 23 mg/dL    CREATININE 7.33 (HH) 0.70 - 1.20 mg/dL    Bun/Cre Ratio 14 9 - 20    Calcium 9.6 8.6 - 10.4 mg/dL    Sodium 141 135 - 144 mmol/L    Potassium 3.5 (L) 3.7 - 5.3 mmol/L    Chloride 94 (L) 98 - 107 mmol/L    CO2 26 20 - 31 mmol/L    Anion Gap 21 (H) 9 - 17 mmol/L    GFR Non-African American 7 (L) >60 mL/min    GFR  9 (L) >60 mL/min    GFR Comment          GFR Staging NOT REPORTED    Troponin Collection Time: 12/02/19  5:16 AM   Result Value Ref Range    Troponin, High Sensitivity 168 (HH) 0 - 22 ng/L    Troponin T NOT REPORTED <0.03 ng/mL    Troponin Interp NOT REPORTED    Brain Natriuretic Peptide    Collection Time: 12/02/19  5:16 AM   Result Value Ref Range    Pro-BNP 27,556 (H) <300 pg/mL    BNP Interpretation Pro-BNP Reference Range:    Magnesium    Collection Time: 12/02/19  5:16 AM   Result Value Ref Range    Magnesium 2.7 (H) 1.6 - 2.6 mg/dL   Lactic Acid    Collection Time: 12/02/19  5:16 AM   Result Value Ref Range    Lactic Acid 1.8 0.5 - 2.2 mmol/L   EKG 12 Lead    Collection Time: 12/02/19  5:26 AM   Result Value Ref Range    Ventricular Rate 79 BPM    Atrial Rate 79 BPM    P-R Interval 318 ms    QRS Duration 104 ms    Q-T Interval 426 ms    QTc Calculation (Bazett) 488 ms    P Axis -37 degrees    R Axis -65 degrees    T Axis 63 degrees   POC Glucose Fingerstick    Collection Time: 12/02/19 11:35 AM   Result Value Ref Range    POC Glucose 91 75 - 110 mg/dL       Imaging/Diagonstics:    Xr Chest Standard (2 Vw)    Result Date: 12/2/2019  Pleuroparenchymal opacities in the lung bases representing small effusions with underlying airspace disease. Echocardiogram 3/14/2019  LVEF 60 to 65%, mild LVH, bioprosthetic MVR and mitral position. Mild tricuspid regurgitation, bioprosthetic AVR and aortic position . Cardiac cath on 2/29/16    1. Single vessel CAD   2. Elevated PCWP and PA pressures   3. Moderate pulmonary hypertension   4. Normal CO and CI   5.  Unable to cross aortic valve with wire due to severe stenosis    Assessment :      Primary Problem  Acute on chronic diastolic CHF (congestive heart failure) [facility])    Active Hospital Problems    Diagnosis Date Noted    Anemia in chronic kidney disease, on chronic dialysis ([facility] [address] [N18.6, D63.1, Z99.2] 04/27/2016     Priority: High    Pneumonia [J18.9] 12/02/2019    Acute on chronic diastolic CHF (congestive heart failure) ([facility] [address] [I50.33] 12/02/2019    Class 2 severe obesity with serious comorbidity and body mass index (BMI) of 39.0 to 39.9 in adult [facility]) [E66.01, Z68.39] 08/30/2019    Controlled type 2 diabetes mellitus with chronic kidney disease on chronic dialysis, with long-term current use of insulin ([address] [E11.22, N18.6, Z79.4, Z99.2] 10/07/2018    Controlled type 2 diabetes mellitus with diabetic polyneuropathy, with long-term current use of insulin ([address] [E11.42, Z79.4] 10/07/2018    ESRD (end stage renal disease) on dialysis ([address] [N18.6, Z99.2] 01/17/2017    Dementia ([address] [F03.90] 01/03/2017    Dialysis patient ([address] [Z99.2] 01/03/2017    Essential hypertension [I10]     Obstructive sleep apnea [G47.33]     Morbid obesity ([address] [E66.01]        Plan:     Patient status Admit as inpatient in the  Progressive Unit/Step down    1. Bilateral lower lobe pneumonia -empiric vancomycin, Levaquin, cefepime. Follow blood culture sensitivity, respiratory cultures. Oxygen as needed. Bronchodilators. Check swallow study. 2. Acute on chronic diastolic CHF -nephrology for dialysis and fluid management. 3. Morbid obesity -   4. ESRD on hemodialysis  5. Essential hypertension-well-controlled. 6. Bedbound -PT/OT   7. 2 diabetes mellitus with chronic kidney disease   8. Pulmonary hypertension -continue oxygen support. 9. Bioprosthetic aortic and mitral valve replacement -     Consultations:   IP CONSULT TO SPIRITUAL SERVICES  IP CONSULT TO NEPHROLOGY  PHARMACY TO DOSE VANCOMYCIN    Patient is admitted as inpatient status because of co-morbidities listed above, severity of signs and symptoms as outlined, requirement for current medical therapies and most importantly because of direct risk to patient if care not provided in a hospital setting.     Scott García MD  12/2/2019    Copy sent to Dr. Bradford German DO    (Please note that portions of this note were completed with a voice recognition program. Efforts were made to edit the dictations but occasionally words are mis-transcribed.)

## 2019-12-02 NOTE — PROGRESS NOTES
Smoking Cessation - topics covered   []  Health Risks  []  Benefits of Quitting   []  Smoking Cessation  []  Patient has no history of tobacco use  [x]  Patient is former smoker. Patient quit in 1980. [x]  No need for tobacco cessation education. []  Booklet given  []  Patient verbalizes understanding. []  Patient denies need for tobacco cessation education. []  Unable to meet with patient today. Will follow up as able.   Carol Jaffe  11:06 AM

## 2019-12-02 NOTE — PROCEDURES
used to smoke up to 2 packs/day. Patient has history of paroxysmal atrial fibrillation and has undergone mitral valve replacement as well as aortic valve replacement and is currently on Eliquis. Patient has had a portable chest x-ray performed yesterday which showed moderate cardiomegaly with mild cardiogenic pulmonary edema. Behavior/Cognition/Vision/Hearing:  Vision: Within Functional Limits  Hearing: Within functional limits    Impressions: Patient presents with safe swallow for Dysphagia soft and bite/sized (Dysphagia III) diet with thin liquids as no aspiration noted with consistencies tested. +penetration, no aspiration noted with first puree trial; no penetration, no aspiration noted with second puree trial. +premature spill and decreased anterior to posterior transit noted with all consistencies tested. +latent cough noted with nectar thick trials, unrelated to penetration or aspiration. Recommend small sips and bites, only feed when alert and awake and upright at 90 degrees for all PO intake. Recommend close monitoring for overt/clinical s/s of aspiration and D/C PO intake and complete Modified Barium Swallow Study should they occur. Results and recommendations reported to RN. Patient Degrees: 90 degrees     Consistencies Administered: Dysphagia Soft and Bite-Sized (Dysphagia III); Reg solid; Dysphagia Pureed (Dysphagia I); Thin cup;Nectar  teaspoon;Nectar cup; Thin teaspoon    Compensatory Swallowing Strategies Attempted: Upright as possible for all oral intake  Postural Changes and/or Swallow Maneuvers Trialed: Upright;Upright 90 degrees    Recommended Diet:  Solid consistency: Dysphagia Soft and Bite-Sized (Dysphagia III)  Liquid consistency: Thin     Safe Swallow Protocol:     Compensatory Swallowing Strategies: Upright as possible for all oral intake    Recommendations/Treatment  Requires SLP Intervention: yes     D/C Recommendations:  Further therapy recommended at discharge.   Postural Changes and/or Swallow Maneuvers: Upright;Upright 90 degrees    Recommended Exercises:    Therapeutic Interventions: Patient/Family education; Laryngeal exercises; Mckenna;Diet tolerance monitoring;Oral motor exercises     Education: Images and recommendations were reviewed with pt. And RN following this exam.   Patient Education: yes  Patient Education Response: Verbalizes understanding    Prognosis  Prognosis for safe diet advancement: fair  Duration/Frequency of Treatment  Duration/Frequency of Treatment: 1-2 x per week    Goals:       Goal 1: Pt. will complete OMEX for dysphagia x 15-30 reps x 2 sets per session    Dysphagia Goals: The patient will tolerate recommended diet without observed clinical signs of aspiration    Oral Preparation / Oral Phase  Oral Phase: Impaired  Oral Phase: Puree: +premature spill, decreased anterior to posterior transit; Soft Solid: +premature spill, decreased anterior to posterior transit; Regular Solid: +premature spill, decreased anterior to posterior transit; Nectar Thick (tsp & cup): +premature spill, decreased anterior to posterior transit; Thin (tsp & cup): +premature spill to the pyriform, decreased anterior to posterior transit    Pharyngeal Phase  Pharyngeal Phase: Impaired  Pharyngeal Phase: Puree (1st trial): +penetration, no aspiration, mod-max vallecula and pyriform stasis; Puree (2nd trial): no penetration, no aspiration, mod-max vallecula and pyriform stasis; Soft Solid: no penetration, no aspiration, mod vallecula and pyriform stasis; Regular Solid: no penetration, no aspiration, mod vallecula and pyriform stasis; Nectar Thick (tsp & cup): no penetration, no aspiration, +latent cough, mod vallecula and pyriform stasis;  Thin (tsp & cup): mo penetration, no aspiration, mod vallecula and pyriform stasis     Esophageal Phase  Esophageal Screen: WFL    Pain   Patient Currently in Pain: No  Pain Level: 3  Pain Type: Chronic pain  Pain Location: Generalized    Therapy Time: Individual Concurrent Group Co-treatment   Time In 1410         Time Out 1420         Minutes 10                   Completed by Ligia Boudreaux,  Clinician    Co-signed by Talia Lozoya A.CCC/SLP   12/2/2019, 2:50 PM

## 2019-12-02 NOTE — PLAN OF CARE
BRONCHOSPASM/BRONCHOCONSTRICTION     [x]         IMPROVE AERATION/BREATH SOUNDS  [x]   ADMINISTER BRONCHODILATOR THERAPY AS APPROPRIATE  [x]   ASSESS BREATH SOUNDS  PROVIDE ADEQUATE OXYGENATION WITH ACCEPTABLE SP02/ABG'S    [x]  IDENTIFY APPROPRIATE OXYGEN THERAPY  [x]   MONITOR SP02/ABG'S AS NEEDED   [x]   PATIENT EDUCATION AS NEEDED      IMPLEMENT AEROSOL/MDI PROTOCOL  [x]   PATIENT EDUCATION AS NEEDED

## 2019-12-02 NOTE — PROGRESS NOTES
Physical Therapy  DATE: 2019    NAME: Mirna Villeda  MRN: [id number]   : 1945    Patient not seen this date for Physical Therapy due to:  [] Blood transfusion in progress  [] Hemodialysis  []  Patient Declined  [] Spine Precautions   [] Strict Bedrest  [] Surgery/ Procedure  [x] Testing XR     [] Other        [] PT being discontinued at this time. Patient independent. No further needs. [] PT being discontinued at this time as the patient has been transferred to palliative care. No further needs.     Jonathon Bales, PT

## 2019-12-02 NOTE — CONSULTS
Renal Consult Note    Patient :  Patti Fitzgerald; 75 y.o. MRN# [id number]  Location:  3850/4198-95  Attending:  Arleth Carter MD  Admit Date:  12/2/2019   Hospital Day: 0    Reason for Consult:     Asked by Dr Arleth Carter MD to see for JACKELYN/Elevated Creatinine. History Obtained From:     patient, spouse, emr    HD Access:     previous  Right AV fistula    HD Unit:     [facility]    Nephrologist:     Dr. Jeronimo Fu    Dry Weight:     113.5 kg    Admission Weight:     111.2 kg    History of Present Illness:     Patti Fitzgerald; 75 y.o. male with past medical history as mentioned below presented to the hospital with the chief complaint of chest congestion. He presented to the emergency department transferred from [facility] with complaints of nasal and chest congestion, cough with occasional yellow-colored sputum, wheezing and increased respiratory rate since last 2 days. Wife states that patient has been on hemodialysis on Mondays, Wednesdays and Fridays and is due for dialysis this morning. Wife has not noticed any swelling in the lower extremities. Patient is anuric. Patient has history of paroxysmal atrial fibrillation and has undergone mitral valve replacement as well as aortic valve replacement and is currently on Eliquis. Patient has had a portable chest x-ray performed yesterday which showed moderate cardiomegaly with mild cardiogenic pulmonary edema. Patient denies any chest pain, abdominal pain, vomiting or diarrhea. No history of fever or chills.     Pt resides in an ECF facility. Was found to have yellowish phlegm, increased respiratory rate and wheezing. On presentation he appeared raspy and breathing. Chest x-ray showed increased vascular congestion. He is now being admitted for respiratory failure. Past History/Allergies? Social History:     Past Medical History:   Diagnosis Date    Anemia in chronic kidney disease (CKD) 4/27/2016    Arthritis     Bacteremia 11/11/2016    Benign prostatic hyperplasia without lower urinary tract symptoms     BMI 40.0-44.9, adult ([address] 3/19/2018    Cellulitis of left lower extremity     Chronic cerebral ischemia 1/3/2017    Closed fracture of forearm 8/13/2013    Broken lft forearm     Controlled type 2 diabetes mellitus with chronic kidney disease on chronic dialysis, with long-term current use of insulin ([address]     Controlled type 2 diabetes mellitus with diabetic polyneuropathy, with long-term current use of insulin ([address] 10/7/2018    Decubital ulcer     NON OPEN BUTTOCKS    Dementia ([address]     memory problems    Dialysis patient ([address] 01/03/2017    [facility], Sat in [place]    Difficult intravenous access     HAS NEEDED PICC TEAM IN THE PAST    Difficulty walking     WHEELCHAIR BOUND-PIVOTS WITH ASSIST O F 2    DJD (degenerative joint disease) of knee     Elbow, forearm, and wrist, abrasion or friction burn, without mention of infection 8/13/2013    Abrasions lft forearm     Encephalopathy acute 4/27/2016    Encounter regarding vascular access for dialysis for ESRD ([address] 2/2/2017    ESRD (end stage renal disease) on dialysis ([address] 1/17/2017    Forgetfulness     HAS SOME SHORT TERM MEMORY LOSS, QUYEN-WIFE IS LEGAL GUARDIAN    H/O transesophageal echocardiography (AMADEO) for monitoring     Hemodialysis patient ([address] 11/11/2016    [place].  TUNNELED CATHETER RT UPPER CHEST    Hyperlipidemia 1990    on Meds    Hypertension 1990    on Meds    Intercritical gout     on Meds    Joint pain, knee 01/13/2017    baldo knee synvisc-1 injections    Long-term insulin use ([address] 1/17/2017    Major depressive disorder with single episode, in partial remission ([address] 1/3/2017    Mobility impaired     Morbid obesity (HCC)     Muscle weakness     GRNERALIZED    Obesity     Obstructive sleep apnea     HEATHER on CPAP     On CPAP-TO BRING DOS    Paroxysmal atrial fibrillation ([address] 2017    Respiratory failure ([facility] 2016    with open heart surgery, trached x 5 months    S/P cardiac cath     Seborrhea     Severe aortic stenosis 3/9/2016    Severe mitral valve stenosis 3/9/2016    Severe sepsis ([facility] 4/3/2016    Skin rash     ABD FOLDS    Stasis dermatitis     Thyroid disease     Traumatic amputation of thumb 2013    Amp. lft thumb     Venous stasis dermatitis     Wears glasses     Wheelchair bound     pivots with 2 assists       Allergies   Allergen Reactions    Percocet [Oxycodone-Acetaminophen] Itching and Rash     Also causes shaking    Ampicillin Rash       Social History     Socioeconomic History    Marital status:      Spouse name: Shanon Woodson Number of children: 2    Years of education: Not on file    Highest education level: Not on file   Occupational History    Occupation: Retired      Employer: [address] resource strain: Not on file    Food insecurity:     Worry: Not on file     Inability: Not on file    Transportation needs:     Medical: Not on file     Non-medical: Not on file   Tobacco Use    Smoking status: Former Smoker     Packs/day: 2.00     Years: 25.00     Pack years: 50.00     Types: Cigarettes     Start date: 3/17/1955     Last attempt to quit: 1980     Years since quittin.9    Smokeless tobacco: Never Used   Substance and Sexual Activity    Alcohol use: No     Alcohol/week: 0.0 standard drinks     Comment: 1-2 drinks per year    Drug use: No    Sexual activity: Never   Lifestyle    Physical activity:     Days per week: Not on file     Minutes per session: Not on file    Stress: Not on file   Relationships    Social connections:     Talks on phone: Not on file     Gets together: Not on file     Attends Baptism service: Not on file     Active member of club or organization: Not on file     Attends meetings of clubs or organizations: Not on file Relationship status: Not on file    Intimate partner violence:     Fear of current or ex partner: Not on file     Emotionally abused: Not on file     Physically abused: Not on file     Forced sexual activity: Not on file   Other Topics Concern    Not on file   Social History Narrative    Not on file       Family History:        Family History   Problem Relation Age of Onset    Lung Cancer Mother     Diabetes Mother     Alzheimer's Disease Father     Diabetes Maternal Grandmother     High Blood Pressure Sister        Outpatient Medications:     Medications Prior to Admission: Elastic Bandages & Supports MISC, 30-40 mmHg compression stockings to both lower legs, on every morning, off at bedtime. (Patient not taking: Reported on 11/26/2019)  gabapentin (NEURONTIN) 300 MG capsule, Take 300 mg by mouth daily. sertraline (ZOLOFT) 100 MG tablet, Take 100 mg by mouth daily  ALPRAZolam (XANAX) 0.5 MG tablet, Take 0.5 mg by mouth 2 times daily. HYDROcodone-acetaminophen (NORCO) 5-325 MG per tablet, Take 1 tablet by mouth every 6 hours as needed for Pain. Insulin Aspart, w/Niacinamide, (FIASP FLEXTOUCH SC), Inject into the skin Sliding scale  ketoconazole (NIZORAL) 2 % cream,   lidocaine-prilocaine (EMLA) 2.5-2.5 % cream, APPLY SMALL AMOUNT TO ACCESS SITE (AVF) 1 TO 2 HOURS BEFORE DIALYSIS. COVER WITH OCCLUSIVE DRESSING (SARAN WRAP)  RENVELA 800 MG tablet, Take 2 tablets by mouth 3 times daily Give with snack.   BASAGLAR KWIKPEN 100 UNIT/ML injection pen,   DULoxetine (CYMBALTA) 60 MG extended release capsule, Take 60 mg by mouth daily  loratadine (CLARITIN) 10 MG capsule, Take 10 mg by mouth daily  aspirin 81 MG tablet, Take 1 tablet by mouth daily  apixaban (ELIQUIS) 5 MG TABS tablet, Take 1 tablet by mouth 2 times daily  Multiple Vitamins-Minerals (THERAPEUTIC MULTIVITAMIN-MINERALS) tablet, Take 1 tablet by mouth daily  omeprazole (PRILOSEC) 20 MG delayed release capsule, Take 20 mg by mouth daily  atorvastatin (LIPITOR) 40 MG tablet, Take 1 tablet by mouth nightly  gabapentin (NEURONTIN) 300 MG capsule, Take 1 capsule by mouth daily for 30 days. midodrine (PROAMATINE) 10 MG tablet, Take 1 tablet by mouth 3 times daily  folic acid (FOLVITE) 1 MG tablet, Take 1 tablet by mouth daily  insulin glargine (LANTUS) 100 UNIT/ML injection vial, Inject 25 Units into the skin 2 times daily  Misc. Devices [facility]) MISC, Use as directed (Patient not taking: Reported on 11/26/2019)  buPROPion (WELLBUTRIN XL) 150 MG extended release tablet, Take 150 mg by mouth every morning  lidocaine (XYLOCAINE) 5 % ointment, Apply topically three times a week Apply topically as needed. DIALYSIS DAYS  Amino Acids-Protein Hydrolys (PRO-STAT PO), Take 30 mLs by mouth 3 times daily SUGAR FREE   ipratropium-albuterol (DUONEB) 0.5-2.5 (3) MG/3ML SOLN nebulizer solution, Inhale 1 vial into the lungs every 6 hours as needed   Skin Protectants, Misc.  (HYDROCERIN) CREA cream, Apply topically nightly  donepezil (ARICEPT) 5 MG tablet, Take 5 mg by mouth every evening  levothyroxine (SYNTHROID) 25 MCG tablet, Take 1 tablet by mouth Daily  albuterol (PROVENTIL) (2.5 MG/3ML) 0.083% nebulizer solution, Take 3 mLs by nebulization every 8 hours as needed for Wheezing (Patient not taking: Reported on 11/26/2019)  fluticasone (FLONASE) 50 MCG/ACT nasal spray, 2 sprays by Nasal route daily  magnesium hydroxide (MILK OF MAGNESIA) 400 MG/5ML suspension, Take 30 mLs by mouth daily as needed for Constipation (Patient not taking: Reported on 11/26/2019)    Current Medications:     Scheduled Meds:    ipratropium-albuterol  1 ampule Inhalation Q4H WA    sodium chloride flush  10 mL Intravenous 2 times per day    azithromycin  500 mg Intravenous Q24H    And    cefTRIAXone (ROCEPHIN) IV  1 g Intravenous Q24H    [START ON 12/3/2019] cefepime  2 g Intravenous Q12H    insulin lispro  0-6 Units Subcutaneous TID WC    insulin lispro  0-3 Units Subcutaneous Nightly    [START ON 12/3/2019] levofloxacin  500 mg Intravenous Q24H     Continuous Infusions:    sodium chloride      dextrose       PRN Meds:  albuterol, magnesium hydroxide, nicotine, ondansetron, sodium chloride flush, dextrose, dextrose, glucagon (rDNA), glucose    Review of Systems:     Constitutional: No fever, no chills, no lethargy, no weakness. HEENT:  Positive for nasal discharge. No headache, otalgia, itchy eyes, sore throat. Cardiac:  Positive for dyspnea. No chest pain, orthopnea or PND. Chest:   Positive for cough, phlegm and wheezing. Abdomen:  No abdominal pain, nausea or vomiting. Neuro:  No focal weakness, abnormal movements orseizure like activity. Skin:   Positive for bilateral lower extremity wounds. :   No hematuria, no pyuria, no dysuria, no flank pain. Extremities:  No swelling or joint pains. ROS was otherwise negative except as mentioned in the [address] Input/Output:     No intake/output data recorded. Patient Vitals for the past 96 hrs (Last 3 readings):   Weight   19 1452 245 lb 2.4 oz (111.2 kg)   19 0947 253 lb 15.5 oz (115.2 kg)     Vital Signs:   Temperature:  Temp: 98 °F (36.7 °C)  TMax:   Temp (24hrs), Av.5 °F (36.4 °C), Min:96.9 °F (36.1 °C), Max:98 °F (36.7 °C)    Respirations:  Resp: 20  Pulse:   Pulse: 78  BP:    BP: 134/70  BP Range: Systolic (69SDY), NXS:565 , Min:134 , ZJT:376       Diastolic (00SCY), AZC:11, Min:70, Max:99      Physical Examination:     General:  AAO x 3, speaking in full sentences, no accessory muscle use. HEENT: Atraumatic, normocephalic, no throat congestion, moist mucosa. Eyes:   Pupils equal, round and reactive to light, EOMI. Neck:   No JVD, no thyromegaly, no lymphadenopathy. Chest:   Wheezing is present. Cardiac:  S1 S2 RR, no murmurs, gallops or rubs, JVP not raised. Abdomen: Soft, non-tender, no masses or organomegaly, BS audible. :   No suprapubic or flank tenderness. Neuro:  AAO x 3, No FND.   SKIN:  Bilateral no acute abnormality. Impression: Pleuroparenchymal opacities in the lung bases representing small effusions  with underlying airspace disease. Assessment:     1. ESRD on Hemodialysis. His regular HD days are Monday, Wednesday and Friday at [address] [place] [address] Hemodialysis facility using Rt AVF under [place]  His dry weight is 113. 5.2 kg. Admitted with pneumonia/fluid overload. 2. Anemia of chronic disease  3. Secondary hyperparathyroidism  4. Hypertension    Plan:   1. HD as per schedule. 2. Strict Input and Output, Daily weigh and document in the chart. 3. Low Potassium, Low phosphorus and low salt diet. Fluids to be restricted to 1500ml/day. 4. IV Aranesp/Epogen for anemia of chronic disease with HD weekly. 5. IV Zemplar per protocol for secondary hyperparathyroidism with HD thrice a week. 6. challenge DW  7. Stable for d/c     Nutrition   Please ensure that patient is on a renal diet/TF. Thank you for the consultation. Please do not hesitate to contact us for any further questions/concerns. We will continue to follow along with you. Attending Physician Statement  I have discussed the care of Lg Sevilla, including pertinent history and exam findings with the resident/fellow. I have reviewed the key elements of all parts of the encounter with the resident/fellow. I have seen and examined the patient with the resident/fellow. I agree with the assessment and plan and status of the problem list as documented.       .  Electronically signed by Sally Sanders MD on 12/2/2019 at 4:06 PM

## 2019-12-02 NOTE — PROGRESS NOTES
Pharmacy Note  Vancomycin Consult    Emmanuel Hill is a 75 y.o. male started on Vancomycin for pneumonia; consult received from Dr. Ronen Wing to manage therapy. Also receiving the following antibiotics: levofloxacin and cefepime.     Patient Active Problem List   Diagnosis    Traumatic amputation of thumb    Essential hypertension    Mixed hyperlipidemia    Obstructive sleep apnea    Morbid obesity ([facility]    Benign prostatic hyperplasia without lower urinary tract symptoms    Venous stasis dermatitis    Severe aortic stenosis    Severe mitral valve stenosis    Anemia in chronic kidney disease, on chronic dialysis ([facility]    Dialysis patient ([facility]    Dementia ([facility]    Major depressive disorder with single episode, in partial remission ([facility]    Chronic cerebral ischemia    ESRD (end stage renal disease) on dialysis ([facility]    Long-term insulin use (HCC)    BMI 40.0-44.9, adult ([facility]    Controlled type 2 diabetes mellitus with chronic kidney disease on chronic dialysis, with long-term current use of insulin ([facility]    Controlled type 2 diabetes mellitus with diabetic polyneuropathy, with long-term current use of insulin (HCC)    Dermatitis    Bruising    Hematoma of right lower extremity    Hematoma of groin    Acute blood loss anemia    Accidental fall from wheelchair    Traumatic hemorrhagic shock (HCC)    Cellulitis    Open wound    Hematoma of right thigh    Wound of right leg    Bleeding from wound    Open wound of right lower extremity    Acute blood loss anemia    Complication of arteriovenous dialysis fistula    Class 2 severe obesity with serious comorbidity and body mass index (BMI) of 39.0 to 39.9 in adult [facility])    Pneumonia       Allergies:  Percocet [oxycodone-acetaminophen] and Ampicillin     Temp max: afebrile    Recent Labs     12/02/19  0516   *       Recent Labs     12/02/19  0516   CREATININE 7.33*       Recent Labs     12/02/19  0516   WBC 8.5       No intake or output data in the 24 hours ending 12/02/19 1507      Ht Readings from Last 1 Encounters:   12/02/19 5' 8\" (1.727 m)        Wt Readings from Last 1 Encounters:   12/02/19 245 lb 2.4 oz (111.2 kg)         Body mass index is 37.28 kg/m². Estimated Creatinine Clearance: 11 mL/min (A) (based on SCr of 7.33 mg/dL [facility])). Goal Trough Level: 15-20 mcg/mL    Assessment/Plan:  Will initiate vancomycin 1500 mg IV once then vancomycin 750 mg QMWF with dialysis hours. Timing of trough level will be determined based on culture results, renal function, and clinical response. Thank you for the consult. Will continue to follow.    Manjit Ayala, PharmD, BCPS  12/2/2019  3:10 PM

## 2019-12-03 ENCOUNTER — APPOINTMENT (OUTPATIENT)
Dept: GENERAL RADIOLOGY | Age: 74
DRG: 291 | End: 2019-12-03
Attending: FAMILY MEDICINE
Payer: MEDICARE

## 2019-12-03 LAB
ANION GAP SERPL CALCULATED.3IONS-SCNC: 18 MMOL/L (ref 9–17)
BUN BLDV-MCNC: 47 MG/DL (ref 8–23)
BUN/CREAT BLD: ABNORMAL (ref 9–20)
CALCIUM SERPL-MCNC: 9 MG/DL (ref 8.6–10.4)
CHLORIDE BLD-SCNC: 97 MMOL/L (ref 98–107)
CO2: 23 MMOL/L (ref 20–31)
CREAT SERPL-MCNC: 4.72 MG/DL (ref 0.7–1.2)
GFR AFRICAN AMERICAN: 15 ML/MIN
GFR NON-AFRICAN AMERICAN: 12 ML/MIN
GFR SERPL CREATININE-BSD FRML MDRD: ABNORMAL ML/MIN/{1.73_M2}
GFR SERPL CREATININE-BSD FRML MDRD: ABNORMAL ML/MIN/{1.73_M2}
GLUCOSE BLD-MCNC: 120 MG/DL (ref 75–110)
GLUCOSE BLD-MCNC: 134 MG/DL (ref 70–99)
GLUCOSE BLD-MCNC: 149 MG/DL (ref 75–110)
GLUCOSE BLD-MCNC: 205 MG/DL (ref 75–110)
GLUCOSE BLD-MCNC: 212 MG/DL (ref 75–110)
HCT VFR BLD CALC: 44.2 % (ref 40.7–50.3)
HEMOGLOBIN: 13.4 G/DL (ref 13–17)
MAGNESIUM: 2.1 MG/DL (ref 1.6–2.6)
MCH RBC QN AUTO: 29.4 PG (ref 25.2–33.5)
MCHC RBC AUTO-ENTMCNC: 30.3 G/DL (ref 28.4–34.8)
MCV RBC AUTO: 96.9 FL (ref 82.6–102.9)
NRBC AUTOMATED: 0 PER 100 WBC
PDW BLD-RTO: 16.3 % (ref 11.8–14.4)
PLATELET # BLD: 117 K/UL (ref 138–453)
PMV BLD AUTO: 9.2 FL (ref 8.1–13.5)
POTASSIUM SERPL-SCNC: 3.5 MMOL/L (ref 3.7–5.3)
PROCALCITONIN: 0.92 NG/ML
RBC # BLD: 4.56 M/UL (ref 4.21–5.77)
SODIUM BLD-SCNC: 138 MMOL/L (ref 135–144)
WBC # BLD: 7.4 K/UL (ref 3.5–11.3)

## 2019-12-03 PROCEDURE — 2580000003 HC RX 258: Performed by: NURSE PRACTITIONER

## 2019-12-03 PROCEDURE — 84145 PROCALCITONIN (PCT): CPT

## 2019-12-03 PROCEDURE — 36415 COLL VENOUS BLD VENIPUNCTURE: CPT

## 2019-12-03 PROCEDURE — 6370000000 HC RX 637 (ALT 250 FOR IP): Performed by: FAMILY MEDICINE

## 2019-12-03 PROCEDURE — 85027 COMPLETE CBC AUTOMATED: CPT

## 2019-12-03 PROCEDURE — 80048 BASIC METABOLIC PNL TOTAL CA: CPT

## 2019-12-03 PROCEDURE — 97535 SELF CARE MNGMENT TRAINING: CPT

## 2019-12-03 PROCEDURE — 94761 N-INVAS EAR/PLS OXIMETRY MLT: CPT

## 2019-12-03 PROCEDURE — 83735 ASSAY OF MAGNESIUM: CPT

## 2019-12-03 PROCEDURE — 97161 PT EVAL LOW COMPLEX 20 MIN: CPT

## 2019-12-03 PROCEDURE — 2700000000 HC OXYGEN THERAPY PER DAY

## 2019-12-03 PROCEDURE — 97530 THERAPEUTIC ACTIVITIES: CPT

## 2019-12-03 PROCEDURE — 99213 OFFICE O/P EST LOW 20 MIN: CPT

## 2019-12-03 PROCEDURE — 6370000000 HC RX 637 (ALT 250 FOR IP): Performed by: NURSE PRACTITIONER

## 2019-12-03 PROCEDURE — 71046 X-RAY EXAM CHEST 2 VIEWS: CPT

## 2019-12-03 PROCEDURE — 82947 ASSAY GLUCOSE BLOOD QUANT: CPT

## 2019-12-03 PROCEDURE — 94640 AIRWAY INHALATION TREATMENT: CPT

## 2019-12-03 PROCEDURE — 2060000000 HC ICU INTERMEDIATE R&B

## 2019-12-03 PROCEDURE — 99232 SBSQ HOSP IP/OBS MODERATE 35: CPT | Performed by: FAMILY MEDICINE

## 2019-12-03 PROCEDURE — 97166 OT EVAL MOD COMPLEX 45 MIN: CPT

## 2019-12-03 RX ORDER — FOLIC ACID 1 MG/1
1 TABLET ORAL DAILY
Status: DISCONTINUED | OUTPATIENT
Start: 2019-12-03 | End: 2019-12-04 | Stop reason: HOSPADM

## 2019-12-03 RX ORDER — HYDROCODONE BITARTRATE AND ACETAMINOPHEN 5; 325 MG/1; MG/1
1 TABLET ORAL EVERY 6 HOURS PRN
Status: DISCONTINUED | OUTPATIENT
Start: 2019-12-03 | End: 2019-12-04 | Stop reason: HOSPADM

## 2019-12-03 RX ORDER — PANTOPRAZOLE SODIUM 40 MG/1
40 TABLET, DELAYED RELEASE ORAL
Status: DISCONTINUED | OUTPATIENT
Start: 2019-12-03 | End: 2019-12-04 | Stop reason: HOSPADM

## 2019-12-03 RX ORDER — MIDODRINE HYDROCHLORIDE 5 MG/1
10 TABLET ORAL 3 TIMES DAILY
Status: DISCONTINUED | OUTPATIENT
Start: 2019-12-03 | End: 2019-12-04 | Stop reason: HOSPADM

## 2019-12-03 RX ORDER — SEVELAMER CARBONATE 800 MG/1
1600 TABLET, FILM COATED ORAL 3 TIMES DAILY
Status: DISCONTINUED | OUTPATIENT
Start: 2019-12-03 | End: 2019-12-04 | Stop reason: HOSPADM

## 2019-12-03 RX ORDER — ASPIRIN 81 MG/1
81 TABLET ORAL DAILY
Status: DISCONTINUED | OUTPATIENT
Start: 2019-12-03 | End: 2019-12-04 | Stop reason: HOSPADM

## 2019-12-03 RX ORDER — ATORVASTATIN CALCIUM 40 MG/1
40 TABLET, FILM COATED ORAL NIGHTLY
Status: DISCONTINUED | OUTPATIENT
Start: 2019-12-03 | End: 2019-12-04 | Stop reason: HOSPADM

## 2019-12-03 RX ORDER — FLUTICASONE PROPIONATE 50 MCG
2 SPRAY, SUSPENSION (ML) NASAL DAILY
Status: DISCONTINUED | OUTPATIENT
Start: 2019-12-03 | End: 2019-12-04 | Stop reason: HOSPADM

## 2019-12-03 RX ORDER — IPRATROPIUM BROMIDE AND ALBUTEROL SULFATE 2.5; .5 MG/3ML; MG/3ML
1 SOLUTION RESPIRATORY (INHALATION) EVERY 6 HOURS PRN
Status: DISCONTINUED | OUTPATIENT
Start: 2019-12-03 | End: 2019-12-04 | Stop reason: HOSPADM

## 2019-12-03 RX ORDER — ALPRAZOLAM 0.5 MG/1
0.5 TABLET ORAL 2 TIMES DAILY
Status: DISCONTINUED | OUTPATIENT
Start: 2019-12-03 | End: 2019-12-04 | Stop reason: HOSPADM

## 2019-12-03 RX ORDER — INSULIN GLARGINE 100 [IU]/ML
25 INJECTION, SOLUTION SUBCUTANEOUS 2 TIMES DAILY
Status: DISCONTINUED | OUTPATIENT
Start: 2019-12-03 | End: 2019-12-04 | Stop reason: HOSPADM

## 2019-12-03 RX ORDER — DONEPEZIL HYDROCHLORIDE 5 MG/1
5 TABLET, FILM COATED ORAL EVERY EVENING
Status: DISCONTINUED | OUTPATIENT
Start: 2019-12-03 | End: 2019-12-04 | Stop reason: HOSPADM

## 2019-12-03 RX ORDER — BUPROPION HYDROCHLORIDE 150 MG/1
150 TABLET ORAL EVERY MORNING
Status: DISCONTINUED | OUTPATIENT
Start: 2019-12-03 | End: 2019-12-04 | Stop reason: HOSPADM

## 2019-12-03 RX ORDER — GABAPENTIN 300 MG/1
300 CAPSULE ORAL DAILY
Status: DISCONTINUED | OUTPATIENT
Start: 2019-12-03 | End: 2019-12-04 | Stop reason: HOSPADM

## 2019-12-03 RX ADMIN — GABAPENTIN 300 MG: 300 CAPSULE ORAL at 09:05

## 2019-12-03 RX ADMIN — INSULIN LISPRO 1 UNITS: 100 INJECTION, SOLUTION INTRAVENOUS; SUBCUTANEOUS at 17:33

## 2019-12-03 RX ADMIN — IPRATROPIUM BROMIDE AND ALBUTEROL SULFATE 1 AMPULE: .5; 3 SOLUTION RESPIRATORY (INHALATION) at 16:12

## 2019-12-03 RX ADMIN — SERTRALINE 100 MG: 50 TABLET, FILM COATED ORAL at 09:05

## 2019-12-03 RX ADMIN — SEVELAMER CARBONATE 1600 MG: 800 TABLET, FILM COATED ORAL at 12:48

## 2019-12-03 RX ADMIN — INSULIN GLARGINE 25 UNITS: 100 INJECTION, SOLUTION SUBCUTANEOUS at 21:15

## 2019-12-03 RX ADMIN — ALPRAZOLAM 0.5 MG: 0.5 TABLET ORAL at 09:04

## 2019-12-03 RX ADMIN — Medication 81 MG: at 09:04

## 2019-12-03 RX ADMIN — INSULIN LISPRO 1 UNITS: 100 INJECTION, SOLUTION INTRAVENOUS; SUBCUTANEOUS at 21:17

## 2019-12-03 RX ADMIN — BUPROPION HYDROCHLORIDE 150 MG: 150 TABLET, EXTENDED RELEASE ORAL at 09:35

## 2019-12-03 RX ADMIN — SEVELAMER CARBONATE 1600 MG: 800 TABLET, FILM COATED ORAL at 09:04

## 2019-12-03 RX ADMIN — MIDODRINE HYDROCHLORIDE 10 MG: 5 TABLET ORAL at 09:04

## 2019-12-03 RX ADMIN — FOLIC ACID 1 MG: 1 TABLET ORAL at 09:05

## 2019-12-03 RX ADMIN — Medication 10 ML: at 09:05

## 2019-12-03 RX ADMIN — IPRATROPIUM BROMIDE AND ALBUTEROL SULFATE 1 AMPULE: .5; 3 SOLUTION RESPIRATORY (INHALATION) at 12:09

## 2019-12-03 RX ADMIN — SEVELAMER CARBONATE 1600 MG: 800 TABLET, FILM COATED ORAL at 21:15

## 2019-12-03 RX ADMIN — DESMOPRESSIN ACETATE 40 MG: 0.2 TABLET ORAL at 21:15

## 2019-12-03 RX ADMIN — APIXABAN 5 MG: 5 TABLET, FILM COATED ORAL at 09:04

## 2019-12-03 RX ADMIN — INSULIN LISPRO 2 UNITS: 100 INJECTION, SOLUTION INTRAVENOUS; SUBCUTANEOUS at 12:20

## 2019-12-03 RX ADMIN — APIXABAN 5 MG: 5 TABLET, FILM COATED ORAL at 21:15

## 2019-12-03 RX ADMIN — FLUTICASONE PROPIONATE 2 SPRAY: 50 SPRAY, METERED NASAL at 09:05

## 2019-12-03 RX ADMIN — PANTOPRAZOLE SODIUM 40 MG: 40 TABLET, DELAYED RELEASE ORAL at 09:05

## 2019-12-03 RX ADMIN — INSULIN GLARGINE 25 UNITS: 100 INJECTION, SOLUTION SUBCUTANEOUS at 09:07

## 2019-12-03 RX ADMIN — DONEPEZIL HYDROCHLORIDE 5 MG: 5 TABLET, FILM COATED ORAL at 17:40

## 2019-12-03 RX ADMIN — Medication 10 ML: at 21:17

## 2019-12-03 RX ADMIN — IPRATROPIUM BROMIDE AND ALBUTEROL SULFATE 1 AMPULE: .5; 3 SOLUTION RESPIRATORY (INHALATION) at 21:05

## 2019-12-03 RX ADMIN — IPRATROPIUM BROMIDE AND ALBUTEROL SULFATE 1 AMPULE: .5; 3 SOLUTION RESPIRATORY (INHALATION) at 07:58

## 2019-12-03 RX ADMIN — ALPRAZOLAM 0.5 MG: 0.5 TABLET ORAL at 21:15

## 2019-12-03 ASSESSMENT — ENCOUNTER SYMPTOMS
VOICE CHANGE: 0
WHEEZING: 0
RHINORRHEA: 0
CONSTIPATION: 0
SHORTNESS OF BREATH: 1
ABDOMINAL PAIN: 0
NAUSEA: 0
COUGH: 1
BACK PAIN: 0
DIARRHEA: 0
SINUS PRESSURE: 0
CHOKING: 0
CHEST TIGHTNESS: 0
VOMITING: 0

## 2019-12-03 ASSESSMENT — PAIN SCALES - GENERAL
PAINLEVEL_OUTOF10: 0

## 2019-12-03 ASSESSMENT — PAIN DESCRIPTION - PAIN TYPE: TYPE: CHRONIC PAIN

## 2019-12-03 ASSESSMENT — PAIN DESCRIPTION - LOCATION: LOCATION: GENERALIZED

## 2019-12-03 NOTE — PROGRESS NOTES
Gertrude Paige, [facility]. Pneumonia [J18.9] . Vitals:    12/03/19 0758   BP:    Pulse:    Resp: 17   Temp:    SpO2: 93%   . Patients home meds are   Prior to Admission medications    Medication Sig Start Date End Date Taking? Authorizing Provider   Elastic Bandages & Supports MISC 30-40 mmHg compression stockings to both lower legs, on every morning, off at bedtime. Patient not taking: Reported on 11/26/2019 11/19/19   Tori Burkett MD   gabapentin (NEURONTIN) 300 MG capsule Take 300 mg by mouth daily. Historical Provider, MD   sertraline (ZOLOFT) 100 MG tablet Take 100 mg by mouth daily    Historical Provider, MD   ALPRAZolam (XANAX) 0.5 MG tablet Take 0.5 mg by mouth 2 times daily. Historical Provider, MD   HYDROcodone-acetaminophen (NORCO) 5-325 MG per tablet Take 1 tablet by mouth every 6 hours as needed for Pain. Historical Provider, MD   Insulin Aspart, w/Niacinamide, (FIASP FLEXTOUCH SC) Inject into the skin Sliding scale    Historical Provider, MD   ketoconazole (NIZORAL) 2 % cream  7/30/19   Historical Provider, MD   lidocaine-prilocaine (EMLA) 2.5-2.5 % cream APPLY SMALL AMOUNT TO ACCESS SITE (AVF) 1 TO 2 HOURS BEFORE DIALYSIS. COVER WITH OCCLUSIVE DRESSING (SARAN WRAP) 7/30/19   Historical Provider, MD   RENVELA 800 MG tablet Take 2 tablets by mouth 3 times daily Give with snack.  7/16/19   SHAMEKA Kyle - CNP   Versie Peyer KWIKPEN 100 UNIT/ML injection pen  7/9/19   Historical Provider, MD   DULoxetine (CYMBALTA) 60 MG extended release capsule Take 60 mg by mouth daily    Historical Provider, MD   loratadine (CLARITIN) 10 MG capsule Take 10 mg by mouth daily    Historical Provider, MD   aspirin 81 MG tablet Take 1 tablet by mouth daily 4/8/19   Aisha Collins MD   apixaban (ELIQUIS) 5 MG TABS tablet Take 1 tablet by mouth 2 times daily 4/8/19   Aisha Collins MD   Multiple Vitamins-Minerals (THERAPEUTIC MULTIVITAMIN-MINERALS) tablet Take 1 tablet by mouth daily 10/24/16   Octavio Mcarthur DO   magnesium hydroxide (MILK OF MAGNESIA) 400 MG/5ML suspension Take 30 mLs by mouth daily as needed for Constipation  Patient not taking: Reported on 11/26/2019 3/29/16   Vincent Hui MD   .      Assessment     Peak Flow (asthma only)    Predicted: na  Personal Best: na  PEF na  % Predicted na  Peak Flow : not applicable = 0    DKV0/NZN    FEV1 Predicted na      FEV1 na    FEV1 % Predicted na  FVC na  IS volume na  IBW na    RR 17    Breath Sounds: Rhonchi/ inspiratory wheezes      · Bronchodilator assessment at level  3  · Hyperinflation assessment at level   · Secretion Management assessment at level    ·   · [x]    Bronchodilator Assessment  BRONCHODILATOR ASSESSMENT SCORE  Score 0 1 2 3 4 5   Breath Sounds   []  Patient Baseline []  No Wheeze good aeration []  Faint, scattered wheezing, good aeration [x]  Expiratory Wheezing and or moderately diminished []  Insp/Exp wheeze and/or very diminished []  Insp/Exp and/ or marked distress   Respiratory Rate   []  Patient Baseline []  Less than 20 []  Less than 20 [x]  20-25 []  Greater than 25 []  Greater than 25   Peak flow % of Pred or PB [x]  NA   []  Greater than 90%  []  81-90% []  71-80% []  Less than or equal to 70%  or unable to perform []  Unable due to Respiratory Distress   Dyspnea re []  Patient Baseline []  No SOB []  No SOB [x]  SOB on exertion []  SOB min activity []  At rest/acute   e FEV% Predicted       [x]  NA []  Above 69%  []  Unable []  Above 60-69%  []  Unable []  Above 50-59%  []  Unable []  Above 35-49%  []  Unable []  Less than 35%  []  Unable                 []  Hyperinflation Assessment  Score 1 2 3   CXR and Breath Sounds   []  Clear []  No atelectasis  Basilar aeration []  Atelectasis or absent basilar breath sounds   Incentive Spirometry Volume  (Per IBW)   []  Greater than or equal to 15ml/Kg []  less than 15ml/Kg []  less than 15ml/Kg   Surgery within last 2 weeks []  None or general []  Abdominal or thoracic surgery  []  Abdominal or thoracic   Chronic Pulmonary Historyre []  No []  Yes []  Yes     []  Secretion Management Assessment  Score 1 2 3   Bilateral Breath Sounds   []  Occasional Rhonchi []  Scattered Rhonchi []  Course Rhonchi and/or poor aeration   Sputum    []  Small amount of thin secretions []  Moderate amount of viscous secretions []  Copius, Viscious Yellow/ Secretions   CXR as reported by physician []  clear  []  Unavailable []  Infiltrates and/or consolidation  []  Unavailable []  Mucus Plugging and or lobar consolidation  []  Unavailable   Cough []  Strong, productive cough []  Weak productive cough []  No cough or weak non-productive cough   Ramona Montiel  9:56 AM

## 2019-12-03 NOTE — CARE COORDINATION
Transitional Planning  Call to [address] [place] [address] ([phone]), spoke to [facility], states patient is there private pay. He is able to return whenever discharged. They are able to accommodate IV antibiotics should he need to continue them.

## 2019-12-03 NOTE — PLAN OF CARE
Problem: Risk for Impaired Skin Integrity  Goal: Tissue integrity - skin and mucous membranes  Description  Structural intactness and normal physiological function of skin and  mucous membranes. 12/3/2019 1840 by Chanel Jeong RN  Outcome: Ongoing   Full skin assessment completed this shift. No new skin breakdown at this time. Pt repositioned q2h and prn. Pt kept clean and dry. Magnolia mattress in place on bed, heels floated. Will continue to monitor.

## 2019-12-03 NOTE — PROGRESS NOTES
[facility]      Daily Progress Note     Admit Date: 12/2/2019  Bed/Room No.  5372/9809-32  Admitting Physician : Queen Reece MD  [facility]  LOS: 1 day   Chief Complaint:       Shortness of Breath   Cough       Principal Problem:    Acute on chronic diastolic CHF (congestive heart failure) ([address]  Active Problems:    Anemia in chronic kidney disease, on chronic dialysis ([address]    Essential hypertension    Obstructive sleep apnea    Morbid obesity ([address]    Dialysis patient ([address]    Dementia ([address]    ESRD (end stage renal disease) on dialysis ([address]    Controlled type 2 diabetes mellitus with chronic kidney disease on chronic dialysis, with long-term current use of insulin ([address]    Controlled type 2 diabetes mellitus with diabetic polyneuropathy, with long-term current use of insulin ([facility])    Class 2 severe obesity with serious comorbidity and body mass index (BMI) of 39.0 to 39.9 in adult [facility])    Pneumonia  Resolved Problems:    * No resolved hospital problems. *    Subjective : Interval History/Significant events :  12/03/19    Patient continues to have cough and is having productive sputum. He is tolerating dysphagia diet . Remains afebrile and has good appetite . He has no change in lower ext wound . Vitals - Stable afebrile. Temp 99 F   Labs - hypokalemia 3.5 . Normal WBC. CXR showing cardiomegaly and left effusion . Nursing notes , Consults notes reviewed. Overnight events and updates discussed with Nursing staff . Background History:         Kimi Cooper is 75 y.o. male  Who was admitted to the hospital on 12/2/2019 for treatment of Acute on chronic diastolic CHF (congestive heart failure) ([address] Patient was transferred from [facility] emergency room where he presented after transfer from his ECF [facility] for shortness of breath, wheezing, difficulty breathing for last 2 days.   ECF had attempted bronchodilators to help but did not improve his breathing. Has been having some yellow productive sputum with cough. Wife does not report any discharge fever, chills. Patient has been having fatigue and tired for last few days. He has history of ESRD on hemodialysis. Patient is bedbound and has been living in [facility] for last 3 years. He has history of morbid obesity, obstructive sleep apnea, pulmonary hypertension, aortic, mitral valve repair with bioprosthetic valve. He has chronic lymphedema with bilateral lower extremity wounds and any mental wound and has been getting wound care therapy at [facility]. Initial evaluation at emergency room by [facility] showed normal white count, chest x-ray suggestive of Bibasilar airspace disease. Patient was afebrile on presentation. [place] [address]. Patient was sent to [facility] for further treatment and continuation of dialysis.     PMH:  Past Medical History:   Diagnosis Date    Anemia in chronic kidney disease (CKD) 4/27/2016    Arthritis     Bacteremia 11/11/2016    Benign prostatic hyperplasia without lower urinary tract symptoms     BMI 40.0-44.9, adult ([address] 3/19/2018    Cellulitis of left lower extremity     Chronic cerebral ischemia 1/3/2017    Closed fracture of forearm 8/13/2013    Broken lft forearm     Controlled type 2 diabetes mellitus with chronic kidney disease on chronic dialysis, with long-term current use of insulin ([address]     Controlled type 2 diabetes mellitus with diabetic polyneuropathy, with long-term current use of insulin ([address] 10/7/2018    Decubital ulcer     NON OPEN BUTTOCKS    Dementia ([address]     memory problems    Dialysis patient ([address] 01/03/2017    Goes Tue, Thurs, Sat in [place]    Difficult intravenous access     HAS NEEDED PICC TEAM IN THE PAST    Difficulty walking     WHEELCHAIR BOUND-PIVOTS WITH ASSIST O F 2    DJD (degenerative joint disease) of knee     Elbow, forearm, and wrist, abrasion or friction burn, without mention of infection 8/13/2013    Abrasions lft forearm     Encephalopathy acute 4/27/2016    Encounter regarding vascular access for dialysis for ESRD [facility] 2/2/2017    ESRD (end stage renal disease) on dialysis ([facility] 1/17/2017    Forgetfulness     HAS SOME SHORT TERM MEMORY LOSS, QUYEN-WIFE IS LEGAL GUARDIAN    H/O transesophageal echocardiography (AMADEO) for monitoring     Hemodialysis patient ([facility] 11/11/2016    [place]FRESEMIUS. TUNNELED CATHETER RT UPPER CHEST    Hyperlipidemia 1990    on Meds    Hypertension 1990    on Meds    Intercritical gout     on Meds    Joint pain, knee 01/13/2017    baldo knee synvisc-1 injections    Long-term insulin use ([facility] 1/17/2017    Major depressive disorder with single episode, in partial remission ([facility] 1/3/2017    Mobility impaired     Morbid obesity ([facility]     Muscle weakness     GRNERALIZED    Obesity     Obstructive sleep apnea     HEATHER on CPAP     On CPAP-TO BRING DOS    Paroxysmal atrial fibrillation ([address] 9/6/2017    Respiratory failure ([address] 03/2016    with open heart surgery, trached x 5 months    S/P cardiac cath     Seborrhea     Severe aortic stenosis 3/9/2016    Severe mitral valve stenosis 3/9/2016    Severe sepsis (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 lft thumb     Venous stasis dermatitis     Wears glasses     Wheelchair bound     pivots with 2 assists      Allergies:    Allergies   Allergen Reactions    Percocet [Oxycodone-Acetaminophen] Itching and Rash     Also causes shaking    Ampicillin Rash      Medications :  sodium chloride flush, 10 mL, Intravenous, 2 times per day  insulin lispro, 0-6 Units, Subcutaneous, TID WC  insulin lispro, 0-3 Units, Subcutaneous, Nightly  vancomycin, 1,500 mg, Intravenous, Q24H  [START ON 12/4/2019] levofloxacin, 250 mg, Intravenous, Q48H  cefepime, 1 g, Intravenous, Q24H  vancomycin (VANCOCIN) intermittent dosing (placeholder), , Other, RX Placeholder  [START ON 12/4/2019] vancomycin, 750 mg, Intravenous, Q MWF  ipratropium-albuterol, 1 ampule, Inhalation, 4x daily        Review of Systems   Review of Systems   Constitutional: Positive for appetite change and fatigue. Negative for activity change, fever and unexpected weight change. HENT: Negative for congestion, nosebleeds, rhinorrhea, sinus pressure, sneezing and voice change. Respiratory: Positive for cough and shortness of breath. Negative for choking, chest tightness and wheezing. Cardiovascular: Negative for chest pain, palpitations and leg swelling. Gastrointestinal: Negative for abdominal pain, constipation, diarrhea, nausea and vomiting. Genitourinary: Negative for difficulty urinating, discharge, dysuria, frequency and testicular pain. Musculoskeletal: Negative for back pain. Skin: Negative for rash. Neurological: Negative for dizziness, weakness, light-headedness and headaches. Hematological: Does not bruise/bleed easily. Psychiatric/Behavioral: Negative for agitation, behavioral problems, confusion, self-injury, sleep disturbance and suicidal ideas.      Objective :      Current Vitals : Temp: 98.1 °F (36.7 °C),  Pulse: 84, Resp: 17, BP: 134/66, SpO2: 93 %  Last 24 Hrs Vitals   Patient Vitals for the past 24 hrs:   BP Temp Temp src Pulse Resp SpO2 Height Weight   12/03/19 0758 -- -- -- -- 17 93 % -- --   12/03/19 0503 -- -- -- -- -- -- -- 241 lb 13.5 oz (109.7 kg)   12/03/19 0335 134/66 98.1 °F (36.7 °C) Oral 84 22 97 % -- --   12/02/19 2335 (!) 144/105 97.4 °F (36.3 °C) Temporal 86 19 97 % -- --   12/02/19 1900 (!) 103/55 97.8 °F (36.6 °C) -- 87 20 99 % -- 239 lb 13.8 oz (108.8 kg)   12/02/19 1829 -- -- -- -- 20 99 % -- --   12/02/19 1824 125/62 -- -- 87 -- -- -- --   12/02/19 1754 103/70 -- -- 86 -- -- -- --   12/02/19 1724 126/61 -- -- 88 -- -- -- --   12/02/19 1654 (!) 126/96 -- -- 89 -- -- -- --   12/02/19 1624 (!) Skin:     General: Skin is warm. Neurological:      Mental Status: He is alert and oriented to person, place, and time. Motor: No tremor. Psychiatric:         Behavior: Behavior is cooperative. Lower Extremities : No ankle Edema , No calf Tenderness     Laboratory findings:    Recent Labs     12/02/19  0516 12/03/19  0650   WBC 8.5 7.4   HGB 12.8* 13.4   HCT 39.9* 44.2    117*     Recent Labs     12/02/19  0516 12/03/19  0650    138   K 3.5* 3.5*   CL 94* 97*   CO2 26 23   GLUCOSE 125* 134*   * 47*   CREATININE 7.33* 4.72*   MG 2.7* 2.1   CALCIUM 9.6 9.0     No results for input(s): PROT, LABALBU, LABA1C, A7FCHSG, H3ZKLAX, FT4, TSH, AST, ALT, LDH, GGT, ALKPHOS, BILITOT, BILIDIR, AMMONIA, AMYLASE, LIPASE, LACTATE, CHOL, HDL, LDLCHOLESTEROL, CHOLHDLRATIO, TRIG, VLDL, BNP, TROPONINI, CKTOTAL, CKMB, CKMBINDEX, RF, TERESSA in the last 72 hours. Specific Gravity, UA   Date Value Ref Range Status   04/27/2016 1.015 1.005 - 1.030 Final     Protein, UA   Date Value Ref Range Status   04/27/2016 2+ (A) NEG Final     RBC, UA   Date Value Ref Range Status   04/27/2016 10 TO 20 0 - 2 /HPF Final     Bacteria, UA   Date Value Ref Range Status   04/27/2016 MANY (A) NONE Final     Nitrite, Urine   Date Value Ref Range Status   04/27/2016 NEGATIVE NEG Final     WBC, UA   Date Value Ref Range Status   04/27/2016 TOO NUMEROUS TO COUNT 0 - 5 /HPF Final     Leukocyte Esterase, Urine   Date Value Ref Range Status   04/27/2016 LARGE (A) NEG Final       Imaging / Clinical Data :-   Xr Chest Standard (2 Vw)    Result Date: 12/2/2019  Pleuroparenchymal opacities in the lung bases representing small effusions with underlying airspace disease. Clinical Course : gradually improving  Assessment and Plan  :        1. Bilateral lower lobe pneumonia -empiric vancomycin, Levaquin, cefepime. Follow blood culture sensitivity, respiratory cultures. Oxygen as needed. Bronchodilators.   deescalate antibiotic therapy . Levaquin monotherapy   2. Acute on chronic diastolic CHF -nephrology for dialysis and fluid management. 3. Morbid obesity -   4. ESRD on hemodialysis  5. Essential hypertension- well-controlled. 6. Bedbound -PT/OT   7. Type 2 diabetes mellitus with chronic kidney disease   8. Pulmonary hypertension -continue oxygen support. 9. Bioprosthetic aortic and mitral valve replacement -  10. Lymphedema with lower ext venous ulcers - wound care . PT OT     Continue to monitor vitals , Intake / output ,  Cell count , HGB , Kidney function, oxygenation  as indicated . Plan and updates discussed with patient ,  answers  explained to satisfaction.    Plan discussed with Staff  RN     (Please note that portions of this note were completed with a voice recognition program. Efforts were made to edit the dictations but occasionally words are mis-transcribed.)      Cecile Valentine MD  12/3/2019

## 2019-12-03 NOTE — PROGRESS NOTES
[address] [address] Continence Nurse  Consult Note       NAME:  Deidre Leal  MEDICAL RECORD NUMBER:  [id number]  AGE: 75 y.o. GENDER: male  : 1945  TODAY'S DATE:  12/3/2019    Subjective:     Reason for WOCN Evaluation and Assessment: right pre-tibial venous ulcers, left pre-tibial traumatic injury with hematoma.        Deidre Lael is a 75 y.o. male referred by:   [] Physician  [] Nursing  [] Other:     Wound Identification:  Wound Type: venous and traumatic  Contributing Factors: edema, venous stasis, diabetes, chronic pressure, decreased mobility, shear force, obesity, decreased tissue oxygenation and anticoagulation therapy        PAST MEDICAL HISTORY        Diagnosis Date    Anemia in chronic kidney disease (CKD) 2016    Arthritis     Bacteremia 2016    Benign prostatic hyperplasia without lower urinary tract symptoms     BMI 40.0-44.9, adult ([address] 3/19/2018    Cellulitis of left lower extremity     Chronic cerebral ischemia 1/3/2017    Closed fracture of forearm 2013    Broken lft forearm     Controlled type 2 diabetes mellitus with chronic kidney disease on chronic dialysis, with long-term current use of insulin ([address]     Controlled type 2 diabetes mellitus with diabetic polyneuropathy, with long-term current use of insulin ([address] 10/7/2018    Decubital ulcer     NON OPEN BUTTOCKS    Dementia ([address]     memory problems    Dialysis patient ([address] 2017    Goes Tue, Thurs, Sat in [place]    Difficult intravenous access     HAS NEEDED PICC TEAM IN THE PAST    Difficulty walking     WHEELCHAIR BOUND-PIVOTS WITH ASSIST O F 2    DJD (degenerative joint disease) of knee     Elbow, forearm, and wrist, abrasion or friction burn, without mention of infection 2013    Abrasions lft forearm     Encephalopathy acute 2016    Encounter regarding vascular access for dialysis for ESRD ([address] 2017    ESRD (end stage renal disease) on dialysis ([address] 2017    Forgetfulness     HAS SOME SHORT TERM MEMORY LOSS, QUYEN-WIFE IS LEGAL GUARDIAN    H/O transesophageal echocardiography (AMADEO) for monitoring     Hemodialysis patient ([address] 11/11/2016    [place].  TUNNELED CATHETER RT UPPER CHEST    Hyperlipidemia 1990    on Meds    Hypertension 1990    on Meds    Intercritical gout     on Meds    Joint pain, knee 01/13/2017    baldo knee synvisc-1 injections    Long-term insulin use ([address] 1/17/2017    Major depressive disorder with single episode, in partial remission ([address] 1/3/2017    Mobility impaired     Morbid obesity ([address]     Muscle weakness     GRNERALIZED    Obesity     Obstructive sleep apnea     HEATHER on CPAP     On CPAP-TO BRING DOS    Paroxysmal atrial fibrillation ([address] 9/6/2017    Respiratory failure ([address] 03/2016    with open heart surgery, trached x 5 months    S/P cardiac cath     Seborrhea     Severe aortic stenosis 3/9/2016    Severe mitral valve stenosis 3/9/2016    Severe sepsis ([address] 4/3/2016    Skin rash     ABD FOLDS    Stasis dermatitis     Thyroid disease     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 lft thumb     Venous stasis dermatitis     Wears glasses     Wheelchair bound     pivots with 2 assists       PAST SURGICAL HISTORY    Past Surgical History:   Procedure Laterality Date    AORTIC VALVE REPLACEMENT  03/18/2016    bioprosthetic    ARM SURGERY Left 1990    CARDIAC CATHETERIZATION      CENTRAL VENOUS CATHETER Right 02/21/2018    DIALYSIS CATHETER Dr Humza Lima COLONOSCOPY  11/19/09    COLONOSCOPY N/A 10/12/2018    COLONOSCOPY WITH BIOPSY performed by Reginald Martino DO at [address] [place] [address] Right 3/10/2019    WASHOUT RIGHT LOWER EXTREMITY WITH WOUND VAC EXCHANGE performed by Yashira Villavicencio MD at [address] [place] [address] Right 3/8/2019    RIGHT LOWER EXTREMITY EXPLORATION, HEMATOMA EVACUATION, WOUND VAC PLACEMENT performed by Nini Murphy MD at [address] (D83-83 surgeries)    several surgeries on left arm    KNEE ARTHROSCOPY Left 99    MITRAL VALVE REPLACEMENT  2016    bioprosthetic     NASAL SEPTUM SURGERY      OTHER SURGICAL HISTORY  3/18/16    Aortic root Enlargement    OTHER SURGICAL HISTORY  3/18/16    ASD Closure    OTHER SURGICAL HISTORY Right 2017    AV fistula creation, wrist    OTHER SURGICAL HISTORY Right 2017    ligation of collateral branch of av fistula right wrist    OTHER SURGICAL HISTORY  2018    FISTULAGRAM WITH DR. Ana Baker    [place] EGD TRANSORAL BIOPSY SINGLE/MULTIPLE N/A 10/17/2017    EGD BIOPSY performed by Marci Addison MD at [facility]    [place] [address] ANGIOACCESS AV FISTULA Right 2017     RIGHT  LOWER ARM AV FISTULA  LIGATION OF AQUIRED BRANCHES X2 performed by Yulia Blackwell DO at [address]  3/18/16    median    VASCULAR SURGERY  2018    Right arm fistulagram,  PTA cephalic vein stenosis  /  DR Sue Desouza       FAMILY HISTORY    Family History   Problem Relation Age of Onset    Lung Cancer Mother     Diabetes Mother     Alzheimer's Disease Father     Diabetes Maternal Grandmother     High Blood Pressure Sister        SOCIAL HISTORY    Social History     Tobacco Use    Smoking status: Former Smoker     Packs/day: 2.00     Years: 25.00     Pack years: 50.00     Types: Cigarettes     Start date: 3/17/1955     Last attempt to quit: 1980     Years since quittin.9    Smokeless tobacco: Never Used   Substance Use Topics    Alcohol use: No     Alcohol/week: 0.0 standard drinks     Comment: 1-2 drinks per year    Drug use: No       ALLERGIES    Allergies   Allergen Reactions    Percocet [Oxycodone-Acetaminophen] Itching and Rash     Also causes shaking    Ampicillin Rash           Objective:      BP (!) 134/55   Pulse 87   Temp 99 °F (37.2 °C) (Oral)   Resp 25   Ht 5' 8\" (1.727 m)   Wt 241 lb 13.5 oz (109.7 kg)   SpO2 90%   BMI 36.77 kg/m²   Umberto Risk Score: Umberto Scale Score: 15    LABS    CBC:   Lab Results   Component Value Date    WBC 7.4 12/03/2019    RBC 4.56 12/03/2019    HGB 13.4 12/03/2019     CMP:  Albumin:    Lab Results   Component Value Date    LABALBU 2.6 03/16/2019     PT/INR:    Lab Results   Component Value Date    PROTIME 10.2 08/03/2019    PROTIME 17.4 12/06/2018    INR 1.0 08/03/2019     HgBA1c:    Lab Results   Component Value Date    LABA1C 5.8 03/09/2019     PTT: No components found for: LABPTT      Assessment:     Patient Active Problem List   Diagnosis    Traumatic amputation of thumb    Essential hypertension    Mixed hyperlipidemia    Obstructive sleep apnea    Morbid obesity ([address]    Benign prostatic hyperplasia without lower urinary tract symptoms    Venous stasis dermatitis    Severe aortic stenosis    Severe mitral valve stenosis    Anemia in chronic kidney disease, on chronic dialysis ([address]    D[facility]    Dementia ([facility]    Major depressive disorder with single episode, in partial remission ([address]    Chronic cerebral ischemia    ESRD (end stage renal disease) on dialysis ([address]    Long-term insulin use ([facility]    BMI 40.0-44.9, adult ([address]    Controlled type 2 diabetes mellitus with chronic kidney disease on chronic dialysis, with long-term current use of insulin (HCC)    Controlled type 2 diabetes mellitus with diabetic polyneuropathy, with long-term current use of insulin (HCC)    Dermatitis    Bruising    Hematoma of right lower extremity    Hematoma of groin    Acute blood loss anemia    Accidental fall from wheelchair    Traumatic hemorrhagic shock (HCC)    Cellulitis    Open wound    Hematoma of right thigh    Wound of right leg    Bleeding from wound    Open wound of right lower extremity    Acute blood loss anemia    Complication of arteriovenous dialysis fistula    Class 2 severe obesity with serious comorbidity and body mass index (BMI) of 39.0 to 39.9 in adult [facility])    Pneumonia    Acute on chronic diastolic CHF (congestive heart failure) ([facility]       Measurements:     12/03/19 1105   Wound 12/03/19 Pretibial Right; Anterior;Distal   Date First Assessed/Time First Assessed: 12/03/19 1105   Present on Hospital Admission: Yes  Primary Wound Type: Venous Ulcer  Location: Pretibial  Wound Location Orientation: Right; Anterior;Distal   Wound Image    Wound Venous   Dressing Status Changed   Dressing Changed Changed/New   Dressing/Treatment Foam   Wound Cleansed Rinsed/Irrigated with saline   Dressing Change Due 12/05/19   Wound Length (cm) 2 cm   Wound Width (cm) 2.5 cm   Wound Depth (cm) 0.1 cm   Wound Surface Area (cm^2) 5 cm^2   Wound Volume (cm^3) 0.5 cm^3   Wound Assessment Epithelialization;Red;Dry   Drainage Amount Small   Drainage Description Serous   Odor None   Gwendolyn-wound Assessment Hyperpigmented;Red; Intact   Red%Wound Bed 100   Wound 12/03/19 Pretibial Right; Lower;Proximal   Date First Assessed/Time First Assessed: 12/03/19 1105   Present on Hospital Admission: Yes  Primary Wound Type: Venous Ulcer  Location: Pretibial  Wound Location Orientation: Right; Lower;Proximal   Wound Venous   Dressing Changed Changed/New   Dressing/Treatment Alginate; Foam   Wound Cleansed Rinsed/Irrigated with saline   Dressing Change Due 12/05/19   Wound Length (cm) 3.2 cm   Wound Width (cm) 1.8 cm   Wound Depth (cm) 0.1 cm   Wound Surface Area (cm^2) 5.76 cm^2   Wound Volume (cm^3) 0.58 cm^3   Wound Assessment Drainage;Pink   Drainage Amount Moderate   Drainage Description Serous   Odor None   Gwendolyn-wound Assessment Dry;Hyperpigmented;Red   Pink%Wound Bed 100   Wound 12/03/19 Pretibial Left   Date First Assessed/Time First Assessed: 12/03/19 1105   Present on Hospital Admission: Yes  Primary Wound Type: Traumatic  Location: Pretibial  Wound Location Orientation: Left   Wound Image    Wound Traumatic   Dressing Changed Changed/New Dressing/Treatment Moist to moist;Dry Dressing   Wound Cleansed Rinsed/Irrigated with saline   Dressing Change Due 12/03/19   Wound Length (cm) 2.5 cm   Wound Width (cm) 1.5 cm   Wound Surface Area (cm^2) 3.75 cm^2   Wound Assessment Dusky; Red;Edema   Drainage Amount Small   Drainage Description Sanguinous   Odor None   Gwendolyn-wound Assessment Edema;Dry;Hyperpigmented; Intact; Induration;Pale       Response to treatment:  Well tolerated by patient. Plan:     Plan of Care:   Irrigate wounds with NS  Provide hygiene and moisturizing cream to the intact skin of the BLE and feet. Right leg proximal ulcer: calcium alginate (maxsorb) over wound, secure with foam dressing. Change every 2 days. Right leg distal ulcer: silicone foam. Change every 2 days. Left leg: saline moistened a 2x2, fill wound bed, cover with ABD, secure with roll gauze. Change BID. Specialty Bed Required : Yes   [x] Low Air Loss   [x] Pressure Redistribution  [] Fluid Immersion  [] Bariatric  [] Total Pressure Relief  [] Other:     Discharge Plan:  Placement for patient upon discharge: skilled nursing   Hospice Care: No   Patient appropriate for Outpatient [address] [place] [address] Road: Yes: Dr Humberto Oliver in [facility].     Patient/Caregiver Teaching:    [] Indicates understanding       [] Needs reinforcement  [] Unsuccessful      [] Verbal Understanding  [] Demonstrated understanding       [] No evidence of learning  [] Refused teaching         [] N/A       Electronically signed by Yoshi Paulino RN, CWON on 12/3/2019 at 2:12 PM

## 2019-12-03 NOTE — PLAN OF CARE
Problem: Falls - Risk of:  Goal: Will remain free from falls  Description  Will remain free from falls  Outcome: Met This Shift   Pt assessed as a fall risk this shift. Pt remains free from falls at this time. Fall precautions in place, Floor free from obstacles, and bed is locked and in lowest position. Adequate lighting provided. Call light within reach; pt encouraged to call before getting OOB for any need. Bed alarm activated. Will continue to monitor needs during hourly rounding. Problem: Pain:  Goal: Pain level will decrease  Description  Pain level will decrease  Outcome: Met This Shift   Pt's pain assessed frequently with hourly rounding; assessed all pain characteristics including level, type, location, frequency, and onset. Pt medicated by RN per PRN orders. Non-pharmacologic interventions offered to pt as well. Pt states pain is tolerable at this time. Will continue to monitor.   Electronically signed by Nimco Parrish RN on 12/3/2019 at 4:48 AM

## 2019-12-03 NOTE — PROGRESS NOTES
[facility] Christopher, [facility]. Pneumonia [J18.9] . Vitals:    12/02/19 1900   BP: (!) 103/55   Pulse: 87   Resp: 20   Temp: 97.8 °F (36.6 °C)   SpO2: 99%   . Patients home meds are   Prior to Admission medications    Medication Sig Start Date End Date Taking? Authorizing Provider   Elastic Bandages & Supports MISC 30-40 mmHg compression stockings to both lower legs, on every morning, off at bedtime. Patient not taking: Reported on 11/26/2019 11/19/19   Onelia Fletcher MD   gabapentin (NEURONTIN) 300 MG capsule Take 300 mg by mouth daily. Historical Provider, MD   sertraline (ZOLOFT) 100 MG tablet Take 100 mg by mouth daily    Historical Provider, MD   ALPRAZolam (XANAX) 0.5 MG tablet Take 0.5 mg by mouth 2 times daily. Historical Provider, MD   HYDROcodone-acetaminophen (NORCO) 5-325 MG per tablet Take 1 tablet by mouth every 6 hours as needed for Pain. Historical Provider, MD   Insulin Aspart, w/Niacinamide, (FIASP FLEXTOUCH SC) Inject into the skin Sliding scale    Historical Provider, MD   ketoconazole (NIZORAL) 2 % cream  7/30/19   Historical Provider, MD   lidocaine-prilocaine (EMLA) 2.5-2.5 % cream APPLY SMALL AMOUNT TO ACCESS SITE (AVF) 1 TO 2 HOURS BEFORE DIALYSIS. COVER WITH OCCLUSIVE DRESSING (SARAN WRAP) 7/30/19   Historical Provider, MD   RENVELA 800 MG tablet Take 2 tablets by mouth 3 times daily Give with snack.  7/16/19   SHAMEKA Kelsey - CNP   Katlin Dial KWIKPEN 100 UNIT/ML injection pen  7/9/19   Historical Provider, MD   DULoxetine (CYMBALTA) 60 MG extended release capsule Take 60 mg by mouth daily    Historical Provider, MD   loratadine (CLARITIN) 10 MG capsule Take 10 mg by mouth daily    Historical Provider, MD   aspirin 81 MG tablet Take 1 tablet by mouth daily 4/8/19   Petros Batista MD   apixaban (ELIQUIS) 5 MG TABS tablet Take 1 tablet by mouth 2 times daily 4/8/19   Petros Batista MD   Multiple Vitamins-Minerals (THERAPEUTIC MULTIVITAMIN-MINERALS) tablet Take 1 tablet by mouth daily    Historical Provider, MD   omeprazole (PRILOSEC) 20 MG delayed release capsule Take 20 mg by mouth daily    Historical Provider, MD   atorvastatin (LIPITOR) 40 MG tablet Take 1 tablet by mouth nightly 3/24/19   Bertha Galeana MD   gabapentin (NEURONTIN) 300 MG capsule Take 1 capsule by mouth daily for 30 days. 3/20/19 11/14/19  Bertha Galeana MD   midodrine (PROAMATINE) 10 MG tablet Take 1 tablet by mouth 3 times daily 3/19/19   Bertha Galeana MD   folic acid (FOLVITE) 1 MG tablet Take 1 tablet by mouth daily 3/19/19   Bertha Galeana MD   insulin glargine (LANTUS) 100 UNIT/ML injection vial Inject 25 Units into the skin 2 times daily 3/19/19   Bertha Galeana MD   Misc. Devices [facility]) MISC Use as directed  Patient not taking: Reported on 11/26/2019 12/12/18   Octavio Mcarthur DO   buPROPion (WELLBUTRIN XL) 150 MG extended release tablet Take 150 mg by mouth every morning    Historical Provider, MD   lidocaine (XYLOCAINE) 5 % ointment Apply topically three times a week Apply topically as needed. DIALYSIS DAYS    Historical Provider, MD   Amino Acids-Protein Hydrolys (PRO-STAT PO) Take 30 mLs by mouth 3 times daily SUGAR FREE     Historical Provider, MD   ipratropium-albuterol (DUONEB) 0.5-2.5 (3) MG/3ML SOLN nebulizer solution Inhale 1 vial into the lungs every 6 hours as needed     Historical Provider, MD   Skin Protectants, Misc.  (HYDROCERIN) CREA cream Apply topically nightly    Historical Provider, MD   donepezil (ARICEPT) 5 MG tablet Take 5 mg by mouth every evening 10/26/16   Historical Provider, MD   levothyroxine (SYNTHROID) 25 MCG tablet Take 1 tablet by mouth Daily 10/26/16   SHAMEKA Nuñez - CNP   albuterol (PROVENTIL) (2.5 MG/3ML) 0.083% nebulizer solution Take 3 mLs by nebulization every 8 hours as needed for Wheezing  Patient not taking: Reported on 11/26/2019 10/24/16   Octavio R Karatsoridis, DO   fluticasone (FLONASE) 50 MCG/ACT nasal spray 2 Pulmonary Historyre []  No []  Yes []  Yes     []  Secretion Management Assessment  Score 1 2 3   Bilateral Breath Sounds   []  Occasional Rhonchi []  Scattered Rhonchi []  Course Rhonchi and/or poor aeration   Sputum    []  Small amount of thin secretions []  Moderate amount of viscous secretions []  Copius, Viscious Yellow/ Secretions   CXR as reported by physician []  clear  []  Unavailable []  Infiltrates and/or consolidation  []  Unavailable []  Mucus Plugging and or lobar consolidation  []  Unavailable   Cough []  Strong, productive cough []  Weak productive cough []  No cough or weak non-productive cough   Vanessa Christopher  10:40 PM                            FEMALE                                  MALE                            FEV1 Predicted Normal Values                        FEV1 Predicted Normal Values          Age                                     Height in Feet and Inches       Age                                     Height in Feet and Inches       4' 11\" 5' 1\" 5' 3\" 5' 5\" 5' 7\" 5' 9\" 5' 11\" 6' 1\"  4' 11\" 5' 1\" 5' 3\" 5' 5\" 5' 7\" 5' 9\" 5' 11\" 6' 1\"   42 - 45 2.49 2.66 2.84 3.03 3.22 3.42 3.62 3.83 42 - 45 2.82 3.03 3.26 3.49 3.72 3.96 4.22 4.47   46 - 49 2.40 2.57 2.76 2.94 3.14 3.33 3.54 3.75 46 - 49 2.70 2.92 3.14 3.37 3.61 3.85 4.10 4.36   50 - 53 2.31 2.48 2.66 2.85 3.04 3.24 3.45 3.66 50 - 53 2.58 2.80 3.02 3.25 3.49 3.73 3.98 4.24   54 - 57 2.21 2.38 2.57 2.75 2.95 3.14 3.35 3.56 54 - 57 2.46 2.67 2.89 3.12 3.36 3.60 3.85 4.11   58 - 61 2.10 2.28 2.46 2.65 2.84 3.04 3.24 3.45 58 - 61 2.32 2.54 2.76 2.99 3.23 3.47 3.72 3.98   62 - 65 1.99 2.17 2.35 2.54 2.73 2.93 3.13 3.34 62 - 65 2.19 2.40 2.62 2.85 3.09 3.33 3.58 3.84   66 - 69 1.88 2.05 2.23 2.42 2.61 2.81 3.02 3.23 66 - 69 2.04 2.26 2.48 2.71 2.95 3.19 3.44 3.70   70+ 1.82 1.99 2.17 2.36 2.55 2.75 2.95 3.16 70+ 1.97 2.19 2.41 2.64 2.87 3.12 3.37 3.62             Predicted Peak Expiratory Flow Rate                                       Height

## 2019-12-03 NOTE — PROGRESS NOTES
Renal Progress Note    Patient :  Steven Schmidt; 75 y.o. MRN# [id number]  Location:  5564/4276-13  Attending:  Elijah Osman MD  Admit Date:  12/2/2019   Hospital Day: 1      Subjective:     Steven Schmidt; 75 y.o. male with past medical history of ESRD on HD on MWF schedule, admitted for bilateral lower lobe pneumonia and fluid overload. Last hemodialysis 12/2. Dry weight 113.5 and current weight 109.7 kg. Got about 3.4 L off yesterday. Patient continues to have raspy breathing and cough but O2 sats in the low 90's. States he feels that he is improving. Outpatient Medications:     Medications Prior to Admission: Elastic Bandages & Supports MISC, 30-40 mmHg compression stockings to both lower legs, on every morning, off at bedtime. (Patient not taking: Reported on 11/26/2019)  gabapentin (NEURONTIN) 300 MG capsule, Take 300 mg by mouth daily. sertraline (ZOLOFT) 100 MG tablet, Take 100 mg by mouth daily  ALPRAZolam (XANAX) 0.5 MG tablet, Take 0.5 mg by mouth 2 times daily. HYDROcodone-acetaminophen (NORCO) 5-325 MG per tablet, Take 1 tablet by mouth every 6 hours as needed for Pain. Insulin Aspart, w/Niacinamide, (FIASP FLEXTOUCH SC), Inject into the skin Sliding scale  ketoconazole (NIZORAL) 2 % cream,   lidocaine-prilocaine (EMLA) 2.5-2.5 % cream, APPLY SMALL AMOUNT TO ACCESS SITE (AVF) 1 TO 2 HOURS BEFORE DIALYSIS. COVER WITH OCCLUSIVE DRESSING (SARAN WRAP)  RENVELA 800 MG tablet, Take 2 tablets by mouth 3 times daily Give with snack.   BASAGLAR KWIKPEN 100 UNIT/ML injection pen,   DULoxetine (CYMBALTA) 60 MG extended release capsule, Take 60 mg by mouth daily  loratadine (CLARITIN) 10 MG capsule, Take 10 mg by mouth daily  aspirin 81 MG tablet, Take 1 tablet by mouth daily  apixaban (ELIQUIS) 5 MG TABS tablet, Take 1 tablet by mouth 2 times daily  Multiple Vitamins-Minerals (THERAPEUTIC MULTIVITAMIN-MINERALS) tablet, Take 1 tablet by mouth daily  omeprazole (PRILOSEC) 20 MG delayed release capsule, Take 20 mg by mouth daily  atorvastatin (LIPITOR) 40 MG tablet, Take 1 tablet by mouth nightly  gabapentin (NEURONTIN) 300 MG capsule, Take 1 capsule by mouth daily for 30 days. midodrine (PROAMATINE) 10 MG tablet, Take 1 tablet by mouth 3 times daily  folic acid (FOLVITE) 1 MG tablet, Take 1 tablet by mouth daily  insulin glargine (LANTUS) 100 UNIT/ML injection vial, Inject 25 Units into the skin 2 times daily  Misc. Devices [facility]) MISC, Use as directed (Patient not taking: Reported on 11/26/2019)  buPROPion (WELLBUTRIN XL) 150 MG extended release tablet, Take 150 mg by mouth every morning  lidocaine (XYLOCAINE) 5 % ointment, Apply topically three times a week Apply topically as needed. DIALYSIS DAYS  Amino Acids-Protein Hydrolys (PRO-STAT PO), Take 30 mLs by mouth 3 times daily SUGAR FREE   ipratropium-albuterol (DUONEB) 0.5-2.5 (3) MG/3ML SOLN nebulizer solution, Inhale 1 vial into the lungs every 6 hours as needed   Skin Protectants, Misc.  (HYDROCERIN) CREA cream, Apply topically nightly  donepezil (ARICEPT) 5 MG tablet, Take 5 mg by mouth every evening  levothyroxine (SYNTHROID) 25 MCG tablet, Take 1 tablet by mouth Daily  albuterol (PROVENTIL) (2.5 MG/3ML) 0.083% nebulizer solution, Take 3 mLs by nebulization every 8 hours as needed for Wheezing (Patient not taking: Reported on 11/26/2019)  fluticasone (FLONASE) 50 MCG/ACT nasal spray, 2 sprays by Nasal route daily  magnesium hydroxide (MILK OF MAGNESIA) 400 MG/5ML suspension, Take 30 mLs by mouth daily as needed for Constipation (Patient not taking: Reported on 11/26/2019)    Current Medications:     Scheduled Meds:    ALPRAZolam  0.5 mg Oral BID    apixaban  5 mg Oral BID    aspirin  81 mg Oral Daily    atorvastatin  40 mg Oral Nightly    donepezil  5 mg Oral QPM    buPROPion  150 mg Oral QAM    fluticasone  2 spray Nasal Daily    folic acid  1 mg Oral Daily    gabapentin  300 mg Oral Daily    insulin glargine  25 Units Subcutaneous BID    midodrine  10 mg Oral TID    pantoprazole  40 mg Oral QAM AC    sertraline  100 mg Oral Daily    sevelamer  1,600 mg Oral TID    sodium chloride flush  10 mL Intravenous 2 times per day    insulin lispro  0-6 Units Subcutaneous TID WC    insulin lispro  0-3 Units Subcutaneous Nightly    vancomycin  1,500 mg Intravenous Q24H    [START ON 2019] levofloxacin  250 mg Intravenous Q48H    cefepime  1 g Intravenous Q24H    vancomycin (VANCOCIN) intermittent dosing (placeholder)   Other RX Placeholder    [START ON 2019] vancomycin  750 mg Intravenous Q MWF    ipratropium-albuterol  1 ampule Inhalation 4x daily     Continuous Infusions:    dextrose       PRN Meds:  HYDROcodone-acetaminophen, ipratropium-albuterol, magnesium hydroxide, nicotine, ondansetron, sodium chloride flush, dextrose, dextrose, glucagon (rDNA), glucose, albuterol    Input/Output:       I/O last 3 completed shifts: In: 1110 [P.O.:500]  Out: 3400 . Patient Vitals for the past 96 hrs (Last 3 readings):   Weight   19 0503 241 lb 13.5 oz (109.7 kg)   19 1900 239 lb 13.8 oz (108.8 kg)   19 1452 245 lb 2.4 oz (111.2 kg)       Vital Signs:   Temperature:  Temp: 98 °F (36.7 °C)  TMax:   Temp (24hrs), Av.9 °F (36.6 °C), Min:97.4 °F (36.3 °C), Max:98.2 °F (36.8 °C)    Respirations:  Resp: 17  Pulse:   Pulse: 85  BP:    BP: 132/70  BP Range: Systolic (60FYK), QVS:621 , Min:103 , WVS:324       Diastolic (84LMT), IYT:94, Min:55, Max:105      Physical Examination:     General:  AAO x 3, speaking in full sentences, no accessory muscle use. HEENT: Atraumatic, normocephalic, no throat congestion, moist mucosa. Eyes:   Pupils equal, round and reactive to light, EOMI. Neck:   Supple. Chest:   Positive for cough, expiratory wheeze and bilateral posterior lower crackles  Cardiac:  S1 S2 RR, no murmurs, gallops or rubs. Abdomen: Soft, non-tender, no masses or organomegaly, BS audible.   :   No suprapubic or flank tenderness. Neuro:  AAO x 3, No FND. SKIN:  Positive for bilateral lower extremity wounds, good skin turgor. Extremities:  No edema. Labs:       Recent Labs     12/02/19  0516 12/03/19  0650   WBC 8.5 7.4   RBC 4.32* 4.56   HGB 12.8* 13.4   HCT 39.9* 44.2   MCV 92.5 96.9   MCH 29.8 29.4   MCHC 32.2 30.3   RDW 17.5* 16.3*    117*   MPV 6.8 9.2      BMP:   Recent Labs     12/02/19  0516 12/03/19  0650    138   K 3.5* 3.5*   CL 94* 97*   CO2 26 23   * 47*   CREATININE 7.33* 4.72*   GLUCOSE 125* 134*   CALCIUM 9.6 9.0      Magnesium:    Recent Labs     12/02/19  0516 12/03/19  0650   MG 2.7* 2.1     TERESSA:      Lab Results   Component Value Date    TERESSA NEGATIVE 03/21/2016     SPEP:  Lab Results   Component Value Date    PROT 4.5 03/16/2019    ALBCAL 3.0 03/21/2016    ALBPCT 65 03/21/2016    LABALPH 0.3 03/21/2016    LABALPH 0.6 03/21/2016    A1PCT 6 03/21/2016    A2PCT 12 03/21/2016    LABBETA 0.4 03/21/2016    BETAPCT 9 03/21/2016    GAMGLOB 0.4 03/21/2016    GGPCT 8 03/21/2016    PATH ELECTRONICALLY SIGNED.  Jodi Leyva M.D. 03/21/2016     UPEP:     Lab Results   Component Value Date    LABPE NORMAL ELECTROPHORETIC PATTERN 03/21/2016     C3:     Lab Results   Component Value Date    C3 50 03/21/2016     C4:     Lab Results   Component Value Date    C4 21 03/21/2016     Hep BsAg:         Lab Results   Component Value Date    HEPBSAG NONREACTIVE 05/24/2016     Hep C AB:          Lab Results   Component Value Date    HEPCAB NONREACTIVE 11/11/2016       Urinalysis/Chemistries:      Lab Results   Component Value Date    NITRU NEGATIVE 04/27/2016    COLORU DELORES 04/27/2016    PHUR 5.0 04/27/2016    WBCUA TOO NUMEROUS TO COUNT 04/27/2016    RBCUA 10 TO 20 04/27/2016    MUCUS NOT REPORTED 04/27/2016    TRICHOMONAS NOT REPORTED 04/27/2016    YEAST NOT REPORTED 04/27/2016    BACTERIA MANY 04/27/2016    SPECGRAV 1.015 04/27/2016    LEUKOCYTESUR LARGE 04/27/2016    UROBILINOGEN Normal 04/27/2016 Aranesp/Epogen for anemia of chronic disease with HD weekly. 5. IV Zemplar per protocol for secondary hyperparathyroidism with HD thrice a week. 6.  Will try to challenge his dry weight some in a.m.  7.  Patient to get dialysis in a.m. and should be okay to discharge from nephrology standpoint after dialysis tomorrow. 8.  Will follow. Patient's nurse was updated. Nutrition   Please ensure that patient is on a renal diet/TF. Avoid nephrotoxic drugs/contrast exposure. We will continue to follow along with you. Attending Physician Statement  I have discussed the care of Constanza Cramer, including pertinent history and exam findings, with the Resident/CNP/medical student. I also seen and examined the patient myself. I have reviewed all the key elements of all parts of the encounter and edited all that was required. Vikas Bourgeois MD   Nephrology [address] [place] [address] Drive    This note is created with the assistance of a speech-recognition program. While intending to generate a document that actually reflects the content of the visit, no guarantees can be provided that every mistake has been identified and corrected by editing.

## 2019-12-03 NOTE — DISCHARGE INSTR - COC
Continuity of Care Form    Patient Name: Arlyn Waddell   :  1945  MRN:  [id number]    Admit date:  2019  Discharge date19                    Code Status Order: DNR-CCA   Advance Directives:   [facility] Documentation     Date/Time Healthcare Directive Type of Healthcare Directive Copy in [address] [facility] [address] Agent's Name Healthcare Agent's Phone Number    [phone]  Yes, patient has an advance directive for healthcare treatment  --  --  --  --  --          Admitting Physician:  Sd Asher MD  PCP: Elvira Chung DO    Discharging Nurse: Sheryl Bingham, RN  [address] Unit/Room#: 6710/1433-06  Discharging Unit Phone Number: [phone]    Emergency Contact:   Extended Emergency Contact Information  Primary Emergency Contact: Pedro Luis PEREZ  Address: [address] [facility] [address] [facility] Phone: [phone]  Work Phone: [phone]  Mobile Phone: [phone]  Relation: Spouse  Secondary Emergency Contact: Leisa Hamlin  Address: N/A   [facility] [address] [facility] Phone: [phone]  Work Phone: [phone]  Mobile Phone: [phone]  Relation: Child    Past Surgical History:  Past Surgical History:   Procedure Laterality Date    AORTIC VALVE REPLACEMENT  2016    bioprosthetic    ARM SURGERY Left 1990    CARDIAC CATHETERIZATION      CENTRAL VENOUS CATHETER Right 2018    DIALYSIS CATHETER Dr Stella Bella COLONOSCOPY  09    COLONOSCOPY N/A 10/12/2018    COLONOSCOPY WITH BIOPSY performed by Vito Dc DO at [address] [place] [address] Right 3/10/2019    WASHOUT RIGHT LOWER EXTREMITY WITH WOUND VAC EXCHANGE performed by Esme Sierra MD at [address] [place] [address] Right 3/8/2019    RIGHT LOWER EXTREMITY EXPLORATION, HEMATOMA EVACUATION, WOUND VAC PLACEMENT performed by Esme Sierra MD at [address] [place] [address] Left 1990 (x10-12 surgeries)    several surgeries on left arm    KNEE ARTHROSCOPY Left 09/17/99    MITRAL VALVE REPLACEMENT  03/18/2016    bioprosthetic     NASAL SEPTUM SURGERY      OTHER SURGICAL HISTORY  3/18/16    Aortic root Enlargement    OTHER SURGICAL HISTORY  3/18/16    ASD Closure    OTHER SURGICAL HISTORY Right 01/09/2017    AV fistula creation, wrist    OTHER SURGICAL HISTORY Right 08/29/2017    ligation of collateral branch of av fistula right wrist    OTHER SURGICAL HISTORY  04/05/2018    FISTULAGRAM WITH DR. Indira GUARDADO EGD TRANSORAL BIOPSY SINGLE/MULTIPLE N/A 10/17/2017    EGD BIOPSY performed by Blanca Simpson MD at [address] ANGIOACCESS AV FISTULA Right 8/29/2017     RIGHT  LOWER ARM AV FISTULA  LIGATION OF AQUIRED BRANCHES X2 performed by Elisa Montalvo DO at [address] [place] [address]  3/18/16    median    VASCULAR SURGERY  12/06/2018    Right arm fistulagram,  PTA cephalic vein stenosis  /  DR Nurys Bourgeois       Immunization History:   Immunization History   Administered Date(s) Administered    Influenza Virus Vaccine 10/24/2006, 11/18/2013    Influenza Whole 10/12/2015    Influenza, High Dose (Fluzone 65 yrs and older) 10/04/2017, 10/21/2018, 10/08/2019    Pneumococcal Conjugate 13-valent ([id number]) 10/25/2017    Pneumococcal Polysaccharide (Eichxigku75) 11/30/2010, 10/12/2015    Tdap (Boostrix, Adacel) 10/27/2015       Active Problems:  Patient Active Problem List   Diagnosis Code    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    Essential hypertension I10    Mixed hyperlipidemia E78.2    Obstructive sleep apnea G47.33    Morbid obesity ([facility] E66.01    Benign prostatic hyperplasia without lower urinary tract symptoms N40.0    Venous stasis dermatitis I87.2    Severe aortic stenosis I35.0    Severe mitral valve stenosis I05.0    Anemia in chronic kidney disease, on chronic dialysis (HCC) N18.6, D63.1, Z99.2    Dialysis patient ([facility] Z99.2    Dementia ([facility] F03.90    Major depressive disorder with single episode, in partial remission ([facility] F32.4    Chronic cerebral ischemia I67.82    ESRD (end stage renal disease) on dialysis ([facility]) N18.6, Z99.2    Long-term insulin use ([facility]) Z79.4    BMI 40.0-44.9, adult ([facility]) Z68.41    Controlled type 2 diabetes mellitus with chronic kidney disease on chronic dialysis, with long-term current use of insulin ([facility]) E11.22, N18.6, Z79.4, Z99.2    Controlled type 2 diabetes mellitus with diabetic polyneuropathy, with long-term current use of insulin ([facility]) E11.42, Z79.4    Dermatitis L30.9    Bruising T14. 8XXA    Hematoma of right lower extremity S80.11XA    Hematoma of groin S30. 1XXA    Acute blood loss anemia D62    Accidental fall from wheelchair W05. 0XXA    Traumatic hemorrhagic shock ([facility] T79. 4XXA    Cellulitis L03.90    Open wound T14. 8XXA    Hematoma of right thigh S70.11XA    Wound of right leg S81.801A    Bleeding from wound T14. 8XXA    Open wound of right lower extremity S81.801A    Acute blood loss anemia A29    Complication of arteriovenous dialysis fistula T82. 9XXA    Class 2 severe obesity with serious comorbidity and body mass index (BMI) of 39.0 to 39.9 in adult ([facility]) E66.01, Z68.39    Pneumonia J18.9    Acute on chronic diastolic CHF (congestive heart failure) ([facility]) I50.33       Isolation/Infection:   Isolation          Contact        Patient Infection Status     Infection Onset Added Last Indicated Last Indicated By Review Planned Expiration Resolved Resolved By    MDRO (multi-drug resistant organism)  04/01/19 04/01/19 Ann Morton RN        Right lower leg proteus mirabilis and enterobacter cloacae 3/26/2019    ESBL (Extended Spectrum Beta Lactamase) 03/26/19 03/31/19 03/26/19 Anaerobic And Aerobic Culture              Nurse Assessment:  Last Vital Signs: /73   Pulse 88   Temp 97.8 °F (36.6 °C)   Resp 20   Ht 5' 8\" (1.727 m)   Wt 231 lb 11.3 oz (105.1 kg)   SpO2 95%   BMI 35.23 kg/m²     Last documented pain score (0-10 scale): Pain Level: 0  Last Weight:   Wt Readings from Last 1 Encounters:   12/04/19 231 lb 11.3 oz (105.1 kg)     Mental Status:  oriented and able to concentrate and follow conversation    IV Access:  - None    Nursing Mobility/ADLs:  Walking   Dependent  Transfer  Dependent  Bathing  Dependent  Dressing  Dependent  Toileting  Dependent  Feeding  Assisted  Med Admin  Assisted  Med Delivery   whole    Wound Care Documentation and Therapy:  Wound 03/22/19 Thigh Right;Medial;Proximal (Active)   Number of days: 257       Wound 12/03/19 Pretibial Right; Anterior;Distal (Active)   Wound Image   12/3/2019 11:05 AM   Wound Venous 12/4/2019  4:00 AM   Dressing Status Clean;Dry; Intact 12/4/2019 10:20 AM   Dressing Changed Changed/New 12/3/2019 11:05 AM   Dressing/Treatment Foam 12/4/2019 10:20 AM   Wound Cleansed Rinsed/Irrigated with saline 12/3/2019 11:05 AM   Dressing Change Due 12/05/19 12/4/2019 10:20 AM   Wound Length (cm) 2 cm 12/3/2019 11:05 AM   Wound Width (cm) 2.5 cm 12/3/2019 11:05 AM   Wound Depth (cm) 0.1 cm 12/3/2019 11:05 AM   Wound Surface Area (cm^2) 5 cm^2 12/3/2019 11:05 AM   Wound Volume (cm^3) 0.5 cm^3 12/3/2019 11:05 AM   Wound Assessment Other (Comment) 12/4/2019  4:00 AM   Drainage Amount None 12/4/2019  4:00 AM   Drainage Description Serous 12/3/2019 11:05 AM   Odor None 12/4/2019  4:00 AM   Gwendolyn-wound Assessment Hyperpigmented; Intact;Dry;Pink 12/4/2019 10:20 AM   Red%Wound Bed 100 12/3/2019 11:05 AM   Number of days: 1       Wound 12/03/19 Pretibial Right; Lower;Proximal (Active)   Wound Venous 12/4/2019  4:00 AM   Dressing Status Clean;Dry; Intact 12/4/2019 10:20 AM   Dressing Changed Changed/New 12/3/2019 11:05 AM   Dressing/Treatment Alginate; Foam 12/4/2019 10:20 AM   Wound Cleansed Rinsed/Irrigated with saline 12/3/2019 11:05 AM   Dressing Change Due 12/05/19 12/4/2019 10:20 AM   Wound Length (cm) 3.2 cm 12/3/2019 11:05 AM   Wound Width (cm) 1.8 cm 12/3/2019 11:05 AM   Wound Depth (cm) 0.1 cm 12/3/2019 11:05 AM   Wound Surface Area (cm^2) 5.76 cm^2 12/3/2019 11:05 AM   Wound Volume (cm^3) 0.58 cm^3 12/3/2019 11:05 AM   Wound Assessment Other (Comment) 12/4/2019  4:00 AM   Drainage Amount None 12/4/2019 10:20 AM   Drainage Description Serous 12/3/2019 11:05 AM   Odor None 12/4/2019 10:20 AM   Gwendolyn-wound Assessment Dry; Intact; Hyperpigmented;Pink 12/4/2019 10:20 AM   Lannon%Wound Bed 100 12/3/2019 11:05 AM   Number of days: 1       Wound 12/03/19 Pretibial Left (Active)   Wound Image   12/3/2019 11:05 AM   Wound Traumatic 12/4/2019 10:20 AM   Dressing Status Changed 12/4/2019 10:20 AM   Dressing Changed Changed/New 12/4/2019 10:20 AM   Dressing/Treatment Moist to moist;Dry Dressing 12/4/2019 10:20 AM   Wound Cleansed Rinsed/Irrigated with saline 12/4/2019 10:20 AM   Dressing Change Due 12/04/19 12/4/2019 10:20 AM   Wound Length (cm) 2.5 cm 12/3/2019 11:05 AM   Wound Width (cm) 1.5 cm 12/3/2019 11:05 AM   Wound Surface Area (cm^2) 3.75 cm^2 12/3/2019 11:05 AM   Wound Assessment Red;Dusky 12/4/2019 10:20 AM   Drainage Amount Moderate 12/4/2019 10:20 AM   Drainage Description Sanguinous 12/4/2019 10:20 AM   Odor None 12/4/2019 10:20 AM   Gwendolyn-wound Assessment Dry;Edema; Hyperpigmented; Induration 12/4/2019 10:20 AM   Number of days: 1        Elimination:  Continence:   · Bowel: Yes  · Bladder: Yes  Urinary Catheter: None   Colostomy/Ileostomy/Ileal Conduit: No       Date of Last BM: 12/219    Intake/Output Summary (Last 24 hours) at 12/4/2019 1440  Last data filed at 12/4/2019 1220  Gross per 24 hour   Intake 1230 ml   Output 3300 ml   Net -2070 ml     I/O last 3 completed shifts: In: 18 [P.O.:850; I.V.:10]  Out: -     Safety Concerns:      At Risk for Falls    Impairments/Disabilities:      weaknessd,woundsto legs    Nutrition Therapy:  Current Nutrition Therapy:   - Oral Diet:  General and Renal    Routes of Feeding: Oral  Liquids: No Restrictions  Daily Fluid

## 2019-12-03 NOTE — PROGRESS NOTES
Physical Therapy    Facility/Department: [facility] 4B STEPDOWN  Initial Assessment    NAME: Emmanuel Hill  : 1945  MRN: [id number]    Date of Service: 12/3/2019    Discharge Recommendations:    No further therapy required at discharge. PT Equipment Recommendations  Equipment Needed: No    Assessment   Assessment: Pt is totally dependent with mobility at [facility]. Pt is at baseline function. Pt is not appropriate for continued PT. Prognosis: Fair  Decision Making: Low Complexity  PT Education: Plan of Care;PT Role  Barriers to Learning: Hard of hearing. No Skilled PT: At baseline function  REQUIRES PT FOLLOW UP: No  Activity Tolerance  Activity Tolerance: Patient Tolerated treatment well       Patient Diagnosis(es): There were no encounter diagnoses.      has a past medical history of Anemia in chronic kidney disease (CKD), Arthritis, Bacteremia, Benign prostatic hyperplasia without lower urinary tract symptoms, BMI 40.0-44.9, adult (HCC), Cellulitis of left lower extremity, Chronic cerebral ischemia, Closed fracture of forearm, Controlled type 2 diabetes mellitus with chronic kidney disease on chronic dialysis, with long-term current use of insulin ([address] Controlled type 2 diabetes mellitus with diabetic polyneuropathy, with long-term current use of insulin ([address] Decubital ulcer, Dementia ([address] Dialysis patient ([address] Difficult intravenous access, Difficulty walking, DJD (degenerative joint disease) of knee, Elbow, forearm, and wrist, abrasion or friction burn, without mention of infection, Encephalopathy acute, Encounter regarding vascular access for dialysis for ESRD ([address] ESRD (end stage renal disease) on dialysis ([address] Forgetfulness, H/O transesophageal echocardiography ([facility]) for monitoring, Hemodialysis patient ([address] Hyperlipidemia, Hypertension, Intercritical gout, Joint pain, knee, Long-term insulin use ([address] Major depressive disorder with single episode, in partial remission ([address] Mobility impaired, Morbid obesity ([address] Muscle weakness, Obesity, Obstructive sleep apnea, HEATHER on CPAP, Paroxysmal atrial fibrillation ([address] Respiratory failure ([address] S/P cardiac cath, Seborrhea, Severe aortic stenosis, Severe mitral valve stenosis, Severe sepsis (HCC), Skin rash, Stasis dermatitis, Thyroid disease, Traumatic amputation of thumb, Venous stasis dermatitis, Wears glasses, and Wheelchair bound. has a past surgical history that includes Colonoscopy (11/19/09); Knee arthroscopy (Left, 09/17/99); Hand surgery (Left, 1990 (x10-12 surgeries)); Arm Surgery (Left, 1990); Cardiac catheterization; Nasal septum surgery; Sternotomy (3/18/16); Aortic valve replacement (03/18/2016); Mitral valve replacement (03/18/2016); other surgical history (3/18/16); other surgical history (3/18/16); other surgical history (Right, 01/09/2017); other surgical history (Right, 08/29/2017); pr ligatn angioaccess av fistula (Right, 8/29/2017); pr egd transoral biopsy single/multiple (N/A, 10/17/2017); central venous catheter (Right, 02/21/2018); Colonoscopy (N/A, 10/12/2018); other surgical history (04/05/2018); vascular surgery (12/06/2018); EXPLORATION OF WOUND OF EXTREMITY (Right, 3/10/2019); and EXPLORATION OF WOUND OF EXTREMITY (Right, 3/8/2019).     Restrictions  Restrictions/Precautions  Restrictions/Precautions: Fall Risk  Required Braces or Orthoses?: No  Position Activity Restriction  Other position/activity restrictions: up as tolerated   Vision/Hearing  Vision: Impaired  Vision Exceptions: Wears glasses at all times  Hearing: Exceptions to [facility]  Hearing Exceptions: Hard of hearing/hearing concerns     Subjective  General  Patient assessed for rehabilitation services?: Yes  Additional Pertinent Hx: BLE wounds  Family / Caregiver Present: Yes(Wife)  Follows Commands: Within Functional Limits  Pain Screening  Patient Currently in Pain: Denies  Vital Signs  Patient Currently in Pain: Denies       Orientation  Orientation  Overall Orientation Status: Impaired  Orientation Level: Oriented to place;Oriented to person;Disoriented to time;Disoriented to situation  Social/Functional History  Social/Functional History  Type of Home: Facility  Home Layout: One level  Home Access: Level entry  Bathroom Shower/Tub: Tub/Shower unit  Bathroom Toilet: Standard  Bathroom Equipment: Shower chair  Home Equipment: [employer]  ADL Assistance: Needs assistance  Homemaking Responsibilities: No  Ambulation Assistance: Non ambulatory. Transfer Assistance: Needs assistance. [employer]. Active : No  Patient's  Info: facility provides transportation   Additional Comments: Per wife patient has been at [facility] since [place] 2016. pt has not stood or ambulated since February 2019. Pt is a [facility] to [facility] and is pushed. Pt does not recieve PT or OT. Cognition   Cognition  Overall Cognitive Status: WFL    Objective  PROM RLE (degrees)  RLE PROM: WFL  RLE General PROM: Limited ROM in all joints  PROM LLE (degrees)  LLE General PROM: Limited ROM in all joints  AROM RUE (degrees)  RUE AROM : WFL  AROM LUE (degrees)  LUE AROM : WFL  Strength RLE  Comment: Minimal active movement  Strength LLE  Comment: Minimal active movement  Strength RUE  Comment: Grossly 3+/5  Strength LUE  Comment: Grossly 3+/5  Motor Control  Gross Motor?: Exceptions  Comments: Decreased fine motor skills. pt had difficulty feeding himself. Sensation  Overall Sensation Status: Impaired  Light Touch: Partial deficits in the LLE;Partial deficits in the RLE  Bed mobility  Rolling to Right: Dependent/Total   Attempted to sit patient up but was unable. Plan   Plan  Plan Comment: Pt is at baseline function. Pt is dependent for all mobility.   Safety Devices  Type of devices: Nurse notified, Call light within reach, Left in bed    AM-PAC Score     AM-PAC Inpatient Mobility without Stair Climbing Raw Score : 5 (12/03/19 9013)  AM-PAC Inpatient without Stair Climbing T-Scale Score : 23.59 (12/03/19 1429)  Mobility Inpatient CMS 0-100% Score: 100 (12/03/19 1429)  Mobility Inpatient without Stair CMS G-Code Modifier : CN (12/03/19 1429)       Goals  Patient Goals   Patient goals : Feel better. Breathe better.        Therapy Time   Individual Concurrent Group Co-treatment   Time In 0210         Time Out 0230         Minutes 20         Timed Code Treatment Minutes: [facility],

## 2019-12-03 NOTE — FLOWSHEET NOTE
Assessment:   made initial visit because of a [facility]. Patient, who is [facility], had requested communion. Upon arrival, patient was sitting up in bed with his supper about three-fourths eaten on his tray table. Patient seemed slightly confused or incoherent. When  asked him a question, it took him a little time to formulate an answer. When  asked if he wanted communion, he did not answer right away, as if he had to think about it. Patient also had a congested cough. Intervention:   attempted to provide a listening presence as patient shared about his experiences with dialysis for at least 4 years.  probed patient's feelings, but he did not give much of a response.  provided encouragement and words of comfort. Patient accepted 's offer of prayer. Outcome:   offered to make a return visit tomorrow night to offer communion as patient will not be undergoing dialysis tomorrow. Patient agreed to that. Chaplains will remain available for further support as needed.     Mattie Lamb     12/02/19 2324   Encounter Summary   Services provided to: Patient   Referral/Consult From: Patient   Continue Visiting   (12/2/19)   Complexity of Encounter Low   Length of Encounter 15 minutes   Routine   Type Initial   Assessment Approachable;Coping;Doubtful   Intervention Active listening;Explored feelings, thoughts, concerns;Prayer   Outcome Acceptance;Receptive   Spiritual/Orthodox   Type Spiritual support

## 2019-12-04 ENCOUNTER — APPOINTMENT (OUTPATIENT)
Dept: DIALYSIS | Age: 74
DRG: 291 | End: 2019-12-04
Attending: FAMILY MEDICINE
Payer: MEDICARE

## 2019-12-04 VITALS
WEIGHT: 231.7 LBS | HEART RATE: 84 BPM | SYSTOLIC BLOOD PRESSURE: 140 MMHG | BODY MASS INDEX: 35.12 KG/M2 | RESPIRATION RATE: 20 BRPM | OXYGEN SATURATION: 95 % | TEMPERATURE: 97.8 F | HEIGHT: 68 IN | DIASTOLIC BLOOD PRESSURE: 67 MMHG

## 2019-12-04 LAB
ANION GAP SERPL CALCULATED.3IONS-SCNC: 19 MMOL/L (ref 9–17)
BUN BLDV-MCNC: 74 MG/DL (ref 8–23)
BUN/CREAT BLD: ABNORMAL (ref 9–20)
CALCIUM SERPL-MCNC: 9 MG/DL (ref 8.6–10.4)
CHLORIDE BLD-SCNC: 96 MMOL/L (ref 98–107)
CO2: 24 MMOL/L (ref 20–31)
CREAT SERPL-MCNC: 6.34 MG/DL (ref 0.7–1.2)
GFR AFRICAN AMERICAN: 10 ML/MIN
GFR NON-AFRICAN AMERICAN: 9 ML/MIN
GFR SERPL CREATININE-BSD FRML MDRD: ABNORMAL ML/MIN/{1.73_M2}
GFR SERPL CREATININE-BSD FRML MDRD: ABNORMAL ML/MIN/{1.73_M2}
GLUCOSE BLD-MCNC: 162 MG/DL (ref 75–110)
GLUCOSE BLD-MCNC: 69 MG/DL (ref 75–110)
GLUCOSE BLD-MCNC: 84 MG/DL (ref 70–99)
GLUCOSE BLD-MCNC: 92 MG/DL (ref 75–110)
POTASSIUM SERPL-SCNC: 3.8 MMOL/L (ref 3.7–5.3)
PROCALCITONIN: 0.91 NG/ML
SODIUM BLD-SCNC: 139 MMOL/L (ref 135–144)

## 2019-12-04 PROCEDURE — 6370000000 HC RX 637 (ALT 250 FOR IP): Performed by: FAMILY MEDICINE

## 2019-12-04 PROCEDURE — 94761 N-INVAS EAR/PLS OXIMETRY MLT: CPT

## 2019-12-04 PROCEDURE — 99211 OFF/OP EST MAY X REQ PHY/QHP: CPT

## 2019-12-04 PROCEDURE — 2700000000 HC OXYGEN THERAPY PER DAY

## 2019-12-04 PROCEDURE — 94640 AIRWAY INHALATION TREATMENT: CPT

## 2019-12-04 PROCEDURE — 5A1D70Z PERFORMANCE OF URINARY FILTRATION, INTERMITTENT, LESS THAN 6 HOURS PER DAY: ICD-10-PCS | Performed by: INTERNAL MEDICINE

## 2019-12-04 PROCEDURE — 84145 PROCALCITONIN (PCT): CPT

## 2019-12-04 PROCEDURE — 80048 BASIC METABOLIC PNL TOTAL CA: CPT

## 2019-12-04 PROCEDURE — 6370000000 HC RX 637 (ALT 250 FOR IP): Performed by: NURSE PRACTITIONER

## 2019-12-04 PROCEDURE — 99239 HOSP IP/OBS DSCHRG MGMT >30: CPT | Performed by: FAMILY MEDICINE

## 2019-12-04 PROCEDURE — 6360000002 HC RX W HCPCS: Performed by: FAMILY MEDICINE

## 2019-12-04 PROCEDURE — 90935 HEMODIALYSIS ONE EVALUATION: CPT

## 2019-12-04 PROCEDURE — 82947 ASSAY GLUCOSE BLOOD QUANT: CPT

## 2019-12-04 PROCEDURE — 36415 COLL VENOUS BLD VENIPUNCTURE: CPT

## 2019-12-04 RX ORDER — HYDROCODONE BITARTRATE AND ACETAMINOPHEN 5; 325 MG/1; MG/1
1 TABLET ORAL EVERY 8 HOURS PRN
Qty: 12 TABLET | Refills: 0 | Status: SHIPPED | OUTPATIENT
Start: 2019-12-04 | End: 2019-12-09

## 2019-12-04 RX ORDER — BENZONATATE 100 MG/1
100 CAPSULE ORAL 2 TIMES DAILY PRN
Qty: 20 CAPSULE | Refills: 0 | DISCHARGE
Start: 2019-12-04 | End: 2019-12-14

## 2019-12-04 RX ORDER — GUAIFENESIN 600 MG/1
600 TABLET, EXTENDED RELEASE ORAL 2 TIMES DAILY
Qty: 30 TABLET | Refills: 0 | DISCHARGE
Start: 2019-12-04 | End: 2019-12-19

## 2019-12-04 RX ORDER — GUAIFENESIN 600 MG/1
600 TABLET, EXTENDED RELEASE ORAL 2 TIMES DAILY
Status: DISCONTINUED | OUTPATIENT
Start: 2019-12-04 | End: 2019-12-04 | Stop reason: HOSPADM

## 2019-12-04 RX ORDER — LEVOFLOXACIN 250 MG/1
250 TABLET ORAL DAILY
Qty: 5 TABLET | Refills: 0 | DISCHARGE
Start: 2019-12-04 | End: 2019-12-09

## 2019-12-04 RX ADMIN — INSULIN LISPRO 1 UNITS: 100 INJECTION, SOLUTION INTRAVENOUS; SUBCUTANEOUS at 13:29

## 2019-12-04 RX ADMIN — BUPROPION HYDROCHLORIDE 150 MG: 150 TABLET, EXTENDED RELEASE ORAL at 13:22

## 2019-12-04 RX ADMIN — IPRATROPIUM BROMIDE AND ALBUTEROL SULFATE 1 AMPULE: .5; 3 SOLUTION RESPIRATORY (INHALATION) at 14:12

## 2019-12-04 RX ADMIN — SEVELAMER CARBONATE 1600 MG: 800 TABLET, FILM COATED ORAL at 13:22

## 2019-12-04 RX ADMIN — APIXABAN 5 MG: 5 TABLET, FILM COATED ORAL at 13:21

## 2019-12-04 RX ADMIN — MIDODRINE HYDROCHLORIDE 10 MG: 5 TABLET ORAL at 13:23

## 2019-12-04 RX ADMIN — Medication 81 MG: at 13:20

## 2019-12-04 RX ADMIN — PANTOPRAZOLE SODIUM 40 MG: 40 TABLET, DELAYED RELEASE ORAL at 05:44

## 2019-12-04 RX ADMIN — SERTRALINE 100 MG: 50 TABLET, FILM COATED ORAL at 13:49

## 2019-12-04 RX ADMIN — GUAIFENESIN 600 MG: 600 TABLET, EXTENDED RELEASE ORAL at 13:54

## 2019-12-04 RX ADMIN — FOLIC ACID 1 MG: 1 TABLET ORAL at 13:44

## 2019-12-04 RX ADMIN — LEVOFLOXACIN 250 MG: 5 INJECTION, SOLUTION INTRAVENOUS at 13:54

## 2019-12-04 RX ADMIN — ALPRAZOLAM 0.5 MG: 0.5 TABLET ORAL at 13:42

## 2019-12-04 ASSESSMENT — ENCOUNTER SYMPTOMS
RHINORRHEA: 0
COUGH: 1
CHEST TIGHTNESS: 0
DIARRHEA: 0
BACK PAIN: 0
VOICE CHANGE: 0
WHEEZING: 0
ABDOMINAL PAIN: 0
NAUSEA: 0
SINUS PRESSURE: 0
SHORTNESS OF BREATH: 1
CHOKING: 0
VOMITING: 0
CONSTIPATION: 0

## 2019-12-04 ASSESSMENT — PAIN SCALES - GENERAL
PAINLEVEL_OUTOF10: 0
PAINLEVEL_OUTOF10: 0

## 2019-12-04 NOTE — PLAN OF CARE
Problem: RESPIRATORY  Intervention: Respiratory assessment  Note:   BRONCHOSPASM/BRONCHOCONSTRICTION     [x]         IMPROVE AERATION/BREATH SOUNDS  [x]   ADMINISTER BRONCHODILATOR THERAPY AS APPROPRIATE  [x]   ASSESS BREATH SOUNDS  []   IMPLEMENT AEROSOL/MDI PROTOCOL  [x]   PATIENT EDUCATION AS NEEDED

## 2019-12-04 NOTE — DISCHARGE SUMMARY
[address] [facility] - [place], [place]      Discharge Summary     Patient ID: Christina Navarro  :  1945   MRN: [id number]     ACCOUNT:  [de-identified]   Patient Location : [id number]  Patient's PCP: Willian Black DO  Admit Date: 2019   Discharge Date: 2019     Length of Stay: 2  Code Status:  DNR-CCA  Admitting Physician: Gloria Roa MD  Discharge Physician: Kaitlin Ricci MD     Active Discharge Diagnosis :     Primary Problem  Acute on chronic diastolic CHF (congestive heart failure) [facility])      [place] Problems    Diagnosis Date Noted    Anemia in chronic kidney disease, on chronic dialysis ([facility] [N18.6, D63.1, Z99.2] 2016     Priority: High    Pneumonia [J18.9] 2019    Acute on chronic diastolic CHF (congestive heart failure) ([facility] [I50.33] 2019    Class 2 severe obesity with serious comorbidity and body mass index (BMI) of 39.0 to 39.9 in adult [facility]) [E66.01, Z68.39] 2019    Controlled type 2 diabetes mellitus with chronic kidney disease on chronic dialysis, with long-term current use of insulin ([address] [E11.22, N18.6, Z79.4, Z99.2] 10/07/2018    Controlled type 2 diabetes mellitus with diabetic polyneuropathy, with long-term current use of insulin ([address] [E11.42, Z79.4] 10/07/2018    ESRD (end stage renal disease) on dialysis ([address] [N18.6, Z99.2] 2017    Dementia ([address] [F03.90] 2017    Dialysis patient ([address] [Z99.2] 2017    Essential hypertension [I10]     Obstructive sleep apnea [G47.33]     Morbid obesity ([address] [E66.01]        Admission Condition:  fair     Discharged Condition: stable    Hospital Stay:     Hospital Course:  Christina Navarro is a 75 y.o. male who was admitted for the management of   Acute on chronic diastolic CHF (congestive heart failure) ([address] .    Patient was transferred from [facility] emergency room where he presented after transfer from his ECF [facility] for shortness of breath, wheezing, difficulty breathing for last 2 days.  ECF had attempted bronchodilators to help but did not improve his breathing.  Has been having some yellow productive sputum with cough.  Wife does not report any discharge fever, chills.  Patient has been having fatigue and tired for last few days. [facility] has history of ESRD on hemodialysis.  Patient is bedbound and has been living in ECF for last 3 years. [facility] has history of morbid obesity, obstructive sleep apnea, pulmonary hypertension, aortic, mitral valve repair with bioprosthetic valve.  He has chronic lymphedema with bilateral lower extremity wounds and any mental wound and has been getting wound care therapy at [facility].  Initial evaluation at emergency room by [facility] showed normal white count, chest x-ray suggestive of Bibasilar airspace disease.  Patient was afebrile on presentation.  proB[place] at [address].  Patient was sent to [facility] for further treatment and continuation of dialysis. Patient was treated with broad-spectrum antibiotics for healthcare associated pneumonia with Vanco, Levaquin, cefepime. Blood culture and respiratory cultures were negative. Antibiotics were de-escalated to Levaquin monotherapy. He was also given supportive care with antitussives. Patient remained stable and afebrile. Was discharged back to skilled nursing facility. Patient received dialysis during his stay in hospital.  Wound care was given for abdominal and lower extremity venous ulcers. No wound infection concern at this time      Significant therapeutic interventions:   1. Bilateral lower lobe pneumonia - received  Empiric vancomycin, Levaquin, cefepime. Negative blood culture sensitivity, respiratory cultures.  continue Oxygen.  Bronchodilators.  deescalate antibiotic therapy . Levaquin monotherapy   2. Acute on chronic diastolic CHF -nephrology for dialysis and fluid management.   3. Morbid obesity -   4. ESRD on hemodialysis - TTS schedule . 5. Essential hypertension- well-controlled. 6. Bedbound -PT/OT   7. Type 2 diabetes mellitus with chronic kidney disease stage 5   8. Pulmonary hypertension -continue oxygen support. 9. Bioprosthetic aortic and mitral valve replacement -  10. Lymphedema with lower ext venous ulcers - wound care .      Significant Diagnostic Studies:   Labs / Micro:/Radiology  Recent Labs     12/03/19  0650 12/02/19  0516   WBC 7.4 8.5   HGB 13.4 12.8*   HCT 44.2 39.9*   MCV 96.9 92.5   * 143     Labs Renal Latest Ref Rng & Units 12/4/2019 12/3/2019 12/2/2019 8/3/2019 8/2/2019   BUN 8 - 23 mg/dL 74(H) 47(H) 104(HH) 34(H) 70(H)   Cr 0.70 - 1.20 mg/dL 6.34(HH) 4.72(H) 7.33(HH) 3.68(H) 5.11(HH)   K 3.7 - 5.3 mmol/L 3.8 3.5(L) 3.5(L) 3.7 3.5(L)   Na 135 - 144 mmol/L 139 138 141 133(L) 133(L)     Lab Results   Component Value Date     (H) 03/16/2019    AST 91 (H) 03/16/2019    ALKPHOS 99 03/16/2019    BILITOT 0.66 03/16/2019     Lab Results   Component Value Date    TSH 0.60 03/10/2019     Lab Results   Component Value Date    HEPAIGM NONREACTIVE 03/21/2016    HEPBIGM NONREACTIVE 03/21/2016    HEPBCAB NONREACTIVE 05/24/2016    HEPCAB NONREACTIVE 11/11/2016     Lab Results   Component Value Date    COLORU DELORES 04/27/2016    NITRU NEGATIVE 04/27/2016    GLUCOSEU NEGATIVE 04/27/2016    KETUA NEGATIVE 04/27/2016    UROBILINOGEN Normal 04/27/2016    BILIRUBINUR NEGATIVE 04/27/2016     Lab Results   Component Value Date    LABA1C 5.8 03/09/2019     Lab Results   Component Value Date     03/09/2019     Lab Results   Component Value Date    INR 1.0 08/03/2019    INR 1.0 08/02/2019    INR 1.0 04/30/2019    PROTIME 10.2 08/03/2019    PROTIME 10.8 08/02/2019    PROTIME 10.4 04/30/2019       Xr Chest Standard (2 Vw)    Result Date: 12/3/2019  Similar findings, as above.      Xr Chest Standard (2 Vw)    Result Date: 12/2/2019  Pleuroparenchymal opacities in the lung bases representing small effusions with underlying airspace disease. Fl Modified Barium Swallow W Video    Result Date: 12/3/2019  No evidence of penetration or aspiration. Please see separate speech pathology report for full discussion of findings and recommendations. ,    Consultations:    Consults:     Final Specialist Recommendations/Findings:   IP CONSULT TO SPIRITUAL SERVICES  IP CONSULT TO NEPHROLOGY  PHARMACY TO DOSE VANCOMYCIN      The patient was seen and examined on day of discharge and this discharge summary is in conjunction with any daily progress note from day of discharge. Discharge plan:     Disposition: Skilled nursing facility    Physician Follow Up:     Garrett Iglesias MD  [address] [facility]  [phone]    Schedule an appointment as soon as possible for a visit  left leg hematoma, right leg venous ulcers. [facility]  [address] [facility]  [phone]           Requiring Further Evaluation/Follow Up POST HOSPITALIZATION/Incidental Findings: Follow-up for dialysis TTS schedule    Diet: renal diet    Activity: Limited and bedbound. Instructions to Patient: Physical therapy, dysphagia diet. Wound care lower extremity. Aspiration precautions    Discharge Medications:      Medication List      START taking these medications    benzonatate 100 MG capsule  Commonly known as:  TESSALON  Take 1 capsule by mouth 2 times daily as needed for Cough     guaiFENesin 600 MG extended release tablet  Commonly known as:  MUCINEX  Take 1 tablet by mouth 2 times daily for 15 days     levofloxacin 250 MG tablet  Commonly known as:  LEVAQUIN  Take 1 tablet by mouth daily for 5 days        CHANGE how you take these medications    gabapentin 300 MG capsule  Commonly known as:  NEURONTIN  What changed:  Another medication with the same name was removed. Continue taking this medication, and follow the directions you see here.      HYDROcodone-acetaminophen 5-325 MG per tablet  Commonly known as: NORCO  Take 1 tablet by mouth every 8 hours as needed for Pain for up to 5 days. What changed:  when to take this     insulin glargine 100 UNIT/ML injection vial  Commonly known as:  LANTUS  Inject 25 Units into the skin 2 times daily  What changed:  Another medication with the same name was removed. Continue taking this medication, and follow the directions you see here. CONTINUE taking these medications    albuterol (2.5 MG/3ML) 0.083% nebulizer solution  Commonly known as:  PROVENTIL  Take 3 mLs by nebulization every 8 hours as needed for Wheezing     ALPRAZolam 0.5 MG tablet  Commonly known as:  XANAX     apixaban 5 MG Tabs tablet  Commonly known as:  ELIQUIS  Take 1 tablet by mouth 2 times daily     aspirin 81 MG tablet  Take 1 tablet by mouth daily     atorvastatin 40 MG tablet  Commonly known as:  LIPITOR  Take 1 tablet by mouth nightly     buPROPion 150 MG extended release tablet  Commonly known as:  WELLBUTRIN XL     CLARITIN 10 MG capsule  Generic drug:  loratadine     donepezil 5 MG tablet  Commonly known as:  ARICEPT     DULoxetine 60 MG extended release capsule  Commonly known as:  CYMBALTA     Elastic Bandages & Supports Misc  30-40 mmHg compression stockings to both lower legs, on every morning, off at bedtime.      FIASP FLEXTOUCH SC     fluticasone 50 MCG/ACT nasal spray  Commonly known as:  FLONASE  2 sprays by Nasal route daily     folic acid 1 MG tablet  Commonly known as:  FOLVITE  Take 1 tablet by mouth daily     HYDROCERIN Crea cream     ipratropium-albuterol 0.5-2.5 (3) MG/3ML Soln nebulizer solution  Commonly known as:  DUONEB     ketoconazole 2 % cream  Commonly known as:  NIZORAL     levothyroxine 25 MCG tablet  Commonly known as:  SYNTHROID  Take 1 tablet by mouth Daily     lidocaine 5 % ointment  Commonly known as:  XYLOCAINE     magnesium hydroxide 400 MG/5ML suspension  Commonly known as:  MILK OF MAGNESIA  Take 30 mLs by mouth daily as needed for Constipation     midodrine 10 MG tablet  Commonly known as:  PROAMATINE  Take 1 tablet by mouth 3 times daily     omeprazole 20 MG delayed release capsule  Commonly known as:  PRILOSEC     PRO-STAT PO     RENVELA 800 MG tablet  Generic drug:  sevelamer  Take 2 tablets by mouth 3 times daily Give with snack. sertraline 100 MG tablet  Commonly known as:  ZOLOFT     therapeutic multivitamin-minerals tablet     Wheelchair Misc  Use as directed        STOP taking these medications    lidocaine-prilocaine 2.5-2.5 % cream  Commonly known as:  EMLA           Where to Get Your Medications      You can get these medications from any pharmacy    Bring a paper prescription for each of these medications  · HYDROcodone-acetaminophen 5-325 MG per tablet     Information about where to get these medications is not yet available    Ask your nurse or doctor about these medications  · benzonatate 100 MG capsule  · guaiFENesin 600 MG extended release tablet  · levofloxacin 250 MG tablet         Time Spent on discharge is  36 mins in patient examination, evaluation, counseling as well as medication reconciliation, prescriptions for required medications, discharge plan and follow up. Electronically signed by   Ricky Walter MD  12/4/2019        Thank you Dr. Marysol Lopez DO for the opportunity to be involved in this patient's care.

## 2019-12-04 NOTE — PROGRESS NOTES
[address] [address] Continence Nurse  Follow up      NAME:  Nacho Landry RECORD NUMBER:  [id number]  AGE: 75 y.o. GENDER: male  : 1945  TODAY'S DATE:  2019    Subjective:     Reason for WOCN Evaluation and Assessment: left leg hematoma, right leg venous ulcers in bed of scar tissue.       Bruce Bailey is a 75 y.o. male referred by:   [x] Physician  [] Nursing  [] Other:         SOCIAL HISTORY    Social History     Tobacco Use    Smoking status: Former Smoker     Packs/day: 2.00     Years: 25.00     Pack years: 50.00     Types: Cigarettes     Start date: 3/17/1955     Last attempt to quit: 1980     Years since quittin.9    Smokeless tobacco: Never Used   Substance Use Topics    Alcohol use: No     Alcohol/week: 0.0 standard drinks     Comment: 1-2 drinks per year    Drug use: No       ALLERGIES    Allergies   Allergen Reactions    Percocet [Oxycodone-Acetaminophen] Itching and Rash     Also causes shaking    Ampicillin Rash           Objective:      BP (!) 134/51   Pulse 85   Temp 98.4 °F (36.9 °C)   Resp 20   Ht 5' 8\" (1.727 m)   Wt 238 lb 15.7 oz (108.4 kg)   SpO2 98%   BMI 36.34 kg/m²   Umberto Risk Score: Umberto Scale Score: 15    LABS    CBC:   Lab Results   Component Value Date    WBC 7.4 2019    RBC 4.56 2019    HGB 13.4 2019     CMP:  Albumin:    Lab Results   Component Value Date    LABALBU 2.6 2019     PT/INR:    Lab Results   Component Value Date    PROTIME 10.2 2019    PROTIME 17.4 2018    INR 1.0 2019     HgBA1c:    Lab Results   Component Value Date    LABA1C 5.8 2019     PTT: No components found for: LABPTT      Assessment:     Patient Active Problem List   Diagnosis    Traumatic amputation of thumb    Essential hypertension    Mixed hyperlipidemia    Obstructive sleep apnea    Morbid obesity ([address]    Benign prostatic hyperplasia without lower urinary tract symptoms    Venous stasis dermatitis    Severe aortic stenosis    Severe mitral valve stenosis    Anemia in chronic kidney disease, on chronic dialysis ([facility]    [facility]    D[facility]    Major depressive disorder with single episode, in partial remission [facility]    Chronic cerebral ischemia    ESRD (end stage renal disease) on dialysis [facility]    Long-term insulin use ([facility]    BMI 40.0-44.9, adult ([facility])    Controlled type 2 diabetes mellitus with chronic kidney disease on chronic dialysis, with long-term current use of insulin ([facility])    Controlled type 2 diabetes mellitus with diabetic polyneuropathy, with long-term current use of insulin ([facility])    Dermatitis    Bruising    Hematoma of right lower extremity    Hematoma of groin    Acute blood loss anemia    Accidental fall from wheelchair    Traumatic hemorrhagic shock ([facility])    Cellulitis    Open wound    Hematoma of right thigh    Wound of right leg    Bleeding from wound    Open wound of right lower extremity    Acute blood loss anemia    Complication of arteriovenous dialysis fistula    Class 2 severe obesity with serious comorbidity and body mass index (BMI) of 39.0 to 39.9 in adult ([facility])    Pneumonia    Acute on chronic diastolic CHF (congestive heart failure) ([facility]       Measurements:     12/04/19 1020   Wound 12/03/19 Pretibial Right; Anterior;Distal   Date First Assessed/Time First Assessed: 12/03/19 1105   Present on Hospital Admission: Yes  Primary Wound Type: Venous Ulcer  Location: Pretibial  Wound Location Orientation: Right; Anterior;Distal   Dressing Status Clean;Dry; Intact   Dressing/Treatment Foam   Dressing Change Due 12/05/19   Gwendolyn-wound Assessment Hyperpigmented; Intact;Dry;Pink   Wound 12/03/19 Pretibial Right; Lower;Proximal   Date First Assessed/Time First Assessed: 12/03/19 1105   Present on Hospital Admission: Yes  Primary Wound Type: Venous Ulcer  Location: Pretibial  Wound Location Orientation: Right; Lower;Proximal   Dressing Status

## 2019-12-04 NOTE — PROGRESS NOTES
Renal Progress Note    Patient :  Edison Osman; 75 y.o. MRN# [id number]  Location:  4226/6746-42  Attending:  Alvin Irvin MD  Admit Date:  12/2/2019   Hospital Day: 2      Subjective:     Edison Osman; 75 y.o. male with past medical history of ESRD on HD on MWF schedule, admitted for bilateral lower lobe pneumonia and fluid overload. Patient was seen and examined on HD. Tolerating the procedure well. No new issues reported overnight. Patient seems to be doing better overall clinically today. Outpatient Medications:     Medications Prior to Admission: Elastic Bandages & Supports MISC, 30-40 mmHg compression stockings to both lower legs, on every morning, off at bedtime. (Patient not taking: Reported on 11/26/2019)  gabapentin (NEURONTIN) 300 MG capsule, Take 300 mg by mouth daily. sertraline (ZOLOFT) 100 MG tablet, Take 100 mg by mouth daily  ALPRAZolam (XANAX) 0.5 MG tablet, Take 0.5 mg by mouth 2 times daily. Insulin Aspart, w/Niacinamide, (FIASP FLEXTOUCH SC), Inject into the skin Sliding scale  ketoconazole (NIZORAL) 2 % cream,   [DISCONTINUED] lidocaine-prilocaine (EMLA) 2.5-2.5 % cream, APPLY SMALL AMOUNT TO ACCESS SITE (AVF) 1 TO 2 HOURS BEFORE DIALYSIS. COVER WITH OCCLUSIVE DRESSING (SARAN WRAP)  RENVELA 800 MG tablet, Take 2 tablets by mouth 3 times daily Give with snack.   [DISCONTINUED] BASAGLAR KWIKPEN 100 UNIT/ML injection pen,   DULoxetine (CYMBALTA) 60 MG extended release capsule, Take 60 mg by mouth daily  loratadine (CLARITIN) 10 MG capsule, Take 10 mg by mouth daily  aspirin 81 MG tablet, Take 1 tablet by mouth daily  apixaban (ELIQUIS) 5 MG TABS tablet, Take 1 tablet by mouth 2 times daily  Multiple Vitamins-Minerals (THERAPEUTIC MULTIVITAMIN-MINERALS) tablet, Take 1 tablet by mouth daily  omeprazole (PRILOSEC) 20 MG delayed release capsule, Take 20 mg by mouth daily  atorvastatin (LIPITOR) 40 MG tablet, Take 1 tablet by mouth nightly  midodrine (PROAMATINE) 10 MG tablet, Take 1 tablet by mouth 3 times daily  folic acid (FOLVITE) 1 MG tablet, Take 1 tablet by mouth daily  insulin glargine (LANTUS) 100 UNIT/ML injection vial, Inject 25 Units into the skin 2 times daily  [DISCONTINUED] gabapentin (NEURONTIN) 300 MG capsule, Take 1 capsule by mouth daily for 30 days. Misc. Devices [facility]) MISC, Use as directed (Patient not taking: Reported on 11/26/2019)  buPROPion (WELLBUTRIN XL) 150 MG extended release tablet, Take 150 mg by mouth every morning  lidocaine (XYLOCAINE) 5 % ointment, Apply topically three times a week Apply topically as needed. DIALYSIS DAYS  Amino Acids-Protein Hydrolys (PRO-STAT PO), Take 30 mLs by mouth 3 times daily SUGAR FREE   ipratropium-albuterol (DUONEB) 0.5-2.5 (3) MG/3ML SOLN nebulizer solution, Inhale 1 vial into the lungs every 6 hours as needed   Skin Protectants, Misc.  (HYDROCERIN) CREA cream, Apply topically nightly  donepezil (ARICEPT) 5 MG tablet, Take 5 mg by mouth every evening  levothyroxine (SYNTHROID) 25 MCG tablet, Take 1 tablet by mouth Daily  albuterol (PROVENTIL) (2.5 MG/3ML) 0.083% nebulizer solution, Take 3 mLs by nebulization every 8 hours as needed for Wheezing (Patient not taking: Reported on 11/26/2019)  fluticasone (FLONASE) 50 MCG/ACT nasal spray, 2 sprays by Nasal route daily  magnesium hydroxide (MILK OF MAGNESIA) 400 MG/5ML suspension, Take 30 mLs by mouth daily as needed for Constipation (Patient not taking: Reported on 11/26/2019)    Current Medications:     Scheduled Meds:    guaiFENesin  600 mg Oral BID    ALPRAZolam  0.5 mg Oral BID    apixaban  5 mg Oral BID    aspirin  81 mg Oral Daily    atorvastatin  40 mg Oral Nightly    donepezil  5 mg Oral QPM    buPROPion  150 mg Oral QAM    fluticasone  2 spray Nasal Daily    folic acid  1 mg Oral Daily    gabapentin  300 mg Oral Daily    insulin glargine  25 Units Subcutaneous BID    midodrine  10 mg Oral TID    pantoprazole  40 mg Oral QAM AC    sertraline  100 mg Oral Urine Creatinine:     Lab Results   Component Value Date    LABCREA 95.7 04/27/2016       Radiology:     XR CHEST STANDARD (2 VW)  Narrative: EXAMINATION:  TWO XRAY VIEWS OF THE CHEST    12/3/2019 11:11 am    COMPARISON:  Previous chest x-ray from 12/02/2019    HISTORY:  ORDERING SYSTEM PROVIDED HISTORY: Pneumonia  TECHNOLOGIST PROVIDED HISTORY:  Pneumonia    History of hypertension, cardiac valve disease, end-stage renal disease, type  2 diabetes, morbid obesity and obstructive sleep apnea. FINDINGS:  Midline sternotomy wires, clips and prosthetic cardiac valve(s). Overlying  ECG monitor leads, snaps and respiratory tubing. Are cardiac size mildly enlarged but stable. Mediastinal structures  appropriate for slight rotation to the left and unchanged. Basilar parenchymal and pleural findings, similar by comparison, and better  seen on the lateral projection. No new large focus consolidation or change  in pleural effusion. Moderate DJD spine  Impression: Similar findings, as above. FL MODIFIED BARIUM SWALLOW W VIDEO  Narrative: EXAMINATION:  MODIFIED BARIUM SWALLOW WAS PERFORMED IN CONJUNCTION WITH SPEECH PATHOLOGY  SERVICES    TECHNIQUE:  Fluoroscopic evaluation of the swallowing mechanism was performed with  multiple consistency of barium product. FLUOROSCOPY DOSE AND TYPE OR TIME AND EXPOSURES:  DAP 6.408 Gycm2    3.7 minutes    COMPARISON:  None    HISTORY:  ORDERING SYSTEM PROVIDED HISTORY: pneumonia    FINDINGS:  Premature vallecular spillage, decreased AP transit and significant  vallecular and piriform stasis with all consistencies. 1st trial with pudding consistency demonstrates penetration but is not  reproduced on subsequent 2nd trial.  Otherwise remainder of consistencies  demonstrate no of laryngeal penetration or aspiration. Impression: No evidence of penetration or aspiration.     Please see separate speech pathology report for full discussion of findings  and recommendations. Assessment:     1. ESRD on Hemodialysis. His regular HD days are Monday, Wednesday and Friday at [address] [place] [address] Hemodialysis facility using Rt AVF under my care. Dry weight is 113. 5.2 kg. Admitted with 111.2 kgs. 2. Pneumonia/fluid overload. 3. Anemia of chronic disease  4. Secondary hyperparathyroidism  5. Hypertension    Plan:   1. Patient was seen on HD at bedside. Orders were confirmed with the HD nurse. 2.  Trying for 3 kgs off as tolerated today. 3. Strict Input and Output, Daily weigh and document in the chart. 4. Low Potassium, Low phosphorus and low salt diet. Fluids to be restricted to 1500ml/day. 5. IV Aranesp/Epogen for anemia of chronic disease with HD weekly. 6. IV Zemplar per protocol for secondary hyperparathyroidism with HD thrice a week. 7.  Patient be okay to discharge from nephrology standpoint after dialysis today. 8.  Will follow. Nutrition   Please ensure that patient is on a renal diet/TF. Avoid nephrotoxic drugs/contrast exposure. We will continue to follow along with you. Vikas Howard MD  [facility]     This note is created with the assistance of a speech-recognition program. While intending to generate a document that actually reflects the content of the visit, no guarantees can be provided that every mistake has been identified and corrected by editing.

## 2019-12-04 NOTE — PROGRESS NOTES
Updated CLAUS by refreshing to insert LDA information for care of BLE wounds. Faxed to [address] [facility] at [phone].

## 2019-12-04 NOTE — PROGRESS NOTES
Pt left floor before report was received so pt did not receive any morning medications.  His antibiotic was moved to start at 1pm. Other medications will be marked not given

## 2019-12-04 NOTE — PROGRESS NOTES
Critical creat called this morn. On pt creat 6.34. . this is already suspected as pt is a HD pt, and pt is currently down getting HD

## 2019-12-04 NOTE — PROGRESS NOTES
Transport on floor to take pt down to HD before report was given. Will assess pt when he returns from HD.

## 2019-12-04 NOTE — CARE COORDINATION
Discharge order noted. [facility]ACP transport requested for 3 pm. Patient in dialysis at this time. Notified Michael Phelps at [facility] of transport time. AVS with completed CLAUS to be faxed to [phone]. -- done @ [facility] 6379    RN to call report to [phone].     Discharge [facility] Case Management Department  Written by: Bryan Dunne RN    Patient Name: Emmanuel Hill  Attending Provider: Ronen Wing MD  Admit Date: 2019 10:33 AM  MRN: [id number]  Account: [de-identified]                     : 1945  Discharge Date:  19       Disposition: [facility] of Aylin Jones RN

## 2019-12-04 NOTE — PROGRESS NOTES
[facility] [place]      Daily Progress Note     Admit Date: 12/2/2019  Bed/Room No.  1783/7496-67  Admitting Physician : Sammi Masterson MD  Code Status :[facility] Day:  LOS: 2 days   Chief Complaint:       Shortness of Breath   Cough       Principal Problem:    Acute on chronic diastolic CHF (congestive heart failure) ([address]  Active Problems:    Anemia in chronic kidney disease, on chronic dialysis ([address]    Essential hypertension    Obstructive sleep apnea    Morbid obesity ([address]    Dialysis patient ([address]    Dementia ([address]    ESRD (end stage renal disease) on dialysis ([address]    Controlled type 2 diabetes mellitus with chronic kidney disease on chronic dialysis, with long-term current use of insulin ([address]    Controlled type 2 diabetes mellitus with diabetic polyneuropathy, with long-term current use of insulin ([facility])    Class 2 severe obesity with serious comorbidity and body mass index (BMI) of 39.0 to 39.9 in adult [facility])    Pneumonia  Resolved Problems:    * No resolved hospital problems. *    Subjective : Interval History/Significant events :  12/04/19    Patient seen in hemodialysis. He is still having cough with expectoration. Patient denies any fever, chills, wheezing, nausea, vomiting. He is eating and drinking okay. Tolerating diet. No change in lower extremity wounds. Vitals - Stable afebrile. Temp 99 F   Labs - hypokalemia 3.5 . Normal WBC. CXR showing cardiomegaly and left effusion . Nursing notes , Consults notes reviewed. Overnight events and updates discussed with Nursing staff . Background History:         Mauro Andujar is 75 y.o. male  Who was admitted to the hospital on 12/2/2019 for treatment of Acute on chronic diastolic CHF (congestive heart failure) ([address] Patient was transferred from [facility] emergency room where he presented after transfer from his [facility] for shortness of breath, wheezing, difficulty breathing for last 2 days.   [facility] had attempted bronchodilators to help but did not improve his breathing. Has been having some yellow productive sputum with cough. Wife does not report any discharge fever, chills. Patient has been having fatigue and tired for last few days. He has history of ESRD on hemodialysis. Patient is bedbound and has been living in [facility] for last 3 years. He has history of morbid obesity, obstructive sleep apnea, pulmonary hypertension, aortic, mitral valve repair with bioprosthetic valve. He has chronic lymphedema with bilateral lower extremity wounds and any mental wound and has been getting wound care therapy at [facility]. Initial evaluation at emergency room by [facility] showed normal white count, chest x-ray suggestive of Bibasilar airspace disease. Patient was afebrile on presentation. [place] [address]. Patient was sent to [facility] for further treatment and continuation of dialysis.     PMH:  Past Medical History:   Diagnosis Date    Anemia in chronic kidney disease (CKD) 4/27/2016    Arthritis     Bacteremia 11/11/2016    Benign prostatic hyperplasia without lower urinary tract symptoms     BMI 40.0-44.9, adult ([address] 3/19/2018    Cellulitis of left lower extremity     Chronic cerebral ischemia 1/3/2017    Closed fracture of forearm 8/13/2013    Broken lft forearm     Controlled type 2 diabetes mellitus with chronic kidney disease on chronic dialysis, with long-term current use of insulin ([address]     Controlled type 2 diabetes mellitus with diabetic polyneuropathy, with long-term current use of insulin ([address] 10/7/2018    Decubital ulcer     NON OPEN BUTTOCKS    Dementia ([address]     memory problems    Dialysis patient ([address] 01/03/2017    Goes Tue, Thurs, Sat in [place]    Difficult intravenous access     HAS NEEDED PICC TEAM IN THE PAST    Difficulty walking     WHEELCHAIR BOUND-PIVOTS WITH ASSIST O F 2    DJD (degenerative joint disease) of knee     Elbow, forearm, and wrist, abrasion or friction burn, without mention of infection 8/13/2013    Abrasions lft forearm     Encephalopathy acute 4/27/2016    Encounter regarding vascular access for dialysis for ESRD ([facility] 2/2/2017    ESRD (end stage renal disease) on dialysis ([facility] 1/17/2017    Forgetfulness     HAS SOME SHORT TERM MEMORY LOSS, QUYEN-WIFE IS LEGAL GUARDIAN    H/O transesophageal echocardiography ([facility]ADEO) for monitoring     Hemodialysis patient ([facility] 11/11/2016    tues-thurs-sat [place]---FRESEMIUS. TUNNELED CATHETER RT UPPER CHEST    Hyperlipidemia 1990    on Meds    Hypertension 1990    on Meds    Intercritical gout     on Meds    Joint pain, knee 01/13/2017    baldo knee synvisc-1 injections    Long-term insulin use ([facility] 1/17/2017    Major depressive disorder with single episode, in partial remission ([facility] 1/3/2017    Mobility impaired     Morbid obesity ([facility]     Muscle weakness     GRNERALIZED    Obesity     Obstructive sleep apnea     HEATHER on CPAP     On CPAP-TO BRING DOS    Paroxysmal atrial fibrillation ([address] 9/6/2017    Respiratory failure ([address] 03/2016    with open heart surgery, trached x 5 months    S/P cardiac cath     Seborrhea     Severe aortic stenosis 3/9/2016    Severe mitral valve stenosis 3/9/2016    Severe sepsis ([facility] 4/3/2016    Skin rash     ABD FOLDS    Stasis dermatitis     Thyroid disease     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 lft thumb     Venous stasis dermatitis     Wears glasses     Wheelchair bound     pivots with 2 assists      Allergies:    Allergies   Allergen Reactions    Percocet [Oxycodone-Acetaminophen] Itching and Rash     Also causes shaking    Ampicillin Rash      Medications :  ALPRAZolam, 0.5 mg, Oral, BID  apixaban, 5 mg, Oral, BID  aspirin, 81 mg, Oral, Daily  atorvastatin, 40 mg, Oral, Nightly  donepezil, 5 mg, Oral, QPM  buPROPion, 150 mg, Oral, QAM  fluticasone, 2 spray, Nasal, Daily  folic acid, 1 mg, Oral, Daily  gabapentin, 300 mg, Oral, Daily  insulin glargine, 25 Units, Subcutaneous, BID  midodrine, 10 mg, Oral, TID  pantoprazole, 40 mg, Oral, QAM AC  sertraline, 100 mg, Oral, Daily  sevelamer, 1,600 mg, Oral, TID  sodium chloride flush, 10 mL, Intravenous, 2 times per day  insulin lispro, 0-6 Units, Subcutaneous, TID WC  insulin lispro, 0-3 Units, Subcutaneous, Nightly  levofloxacin, 250 mg, Intravenous, Q48H  ipratropium-albuterol, 1 ampule, Inhalation, 4x daily        Review of Systems   Review of Systems   Constitutional: Positive for appetite change and fatigue. Negative for activity change, fever and unexpected weight change. HENT: Negative for congestion, nosebleeds, rhinorrhea, sinus pressure, sneezing and voice change. Respiratory: Positive for cough and shortness of breath. Negative for choking, chest tightness and wheezing. Cardiovascular: Negative for chest pain, palpitations and leg swelling. Gastrointestinal: Negative for abdominal pain, constipation, diarrhea, nausea and vomiting. Genitourinary: Negative for difficulty urinating, discharge, dysuria, frequency and testicular pain. Musculoskeletal: Negative for back pain. Skin: Negative for rash. Neurological: Negative for dizziness, weakness, light-headedness and headaches. Hematological: Does not bruise/bleed easily. Psychiatric/Behavioral: Negative for agitation, behavioral problems, confusion, self-injury, sleep disturbance and suicidal ideas.      Objective :      Current Vitals : Temp: 98 °F (36.7 °C),  Pulse: 71, Resp: 20, BP: (!) 113/50, SpO2: 98 %  Last 24 Hrs Vitals   Patient Vitals for the past 24 hrs:   BP Temp Temp src Pulse Resp SpO2 Weight   12/04/19 0638 -- -- -- -- -- -- 247 lb 5.7 oz (112.2 kg)   12/04/19 0435 (!) 113/50 98 °F (36.7 °C) Oral 71 20 98 % --   12/04/19 0020 (!) 147/76 97.8 °F (36.6 °C) Oral 77 26 91 % --   12/03/19 2105 -- -- -- -- 21 98 % --   12/03/19 2012 (!) 141/74 97.8 °F (36.6 °C) Oral 80 18 96 % --   12/03/19 1180 137/69 97.9 °F (36.6 °C) Oral 78 20 97 % --   12/03/19 1613 -- -- -- -- 18 93 % --   12/03/19 1419 (!) 114/57 -- -- -- -- -- --   12/03/19 1200 -- -- -- -- 25 90 % --   12/03/19 1138 (!) 134/55 99 °F (37.2 °C) Oral 87 22 92 % --   12/03/19 0800 -- -- -- 83 -- -- --   12/03/19 0758 -- -- -- -- 17 93 % --     Intake / output   12/03 0701 - 12/04 0700  In: 18 [P.O.:850; I.V.:10]  Out: -   Physical Exam:  Physical Exam  Vitals signs and nursing note reviewed. Constitutional:       General: He is not in acute distress. Appearance: He is obese. He is not diaphoretic. Interventions: Nasal cannula in place. HENT:      Head: Normocephalic and atraumatic. Nose:      Right Sinus: No maxillary sinus tenderness or frontal sinus tenderness. Left Sinus: No maxillary sinus tenderness or frontal sinus tenderness. Mouth/Throat:      Pharynx: No oropharyngeal exudate. Eyes:      General: No scleral icterus. Conjunctiva/sclera: Conjunctivae normal.      Pupils: Pupils are equal, round, and reactive to light. Neck:      Musculoskeletal: Full passive range of motion without pain and neck supple. Thyroid: No thyromegaly. Vascular: No JVD. Cardiovascular:      Rate and Rhythm: Normal rate and regular rhythm. Pulses:           Dorsalis pedis pulses are 2+ on the right side and 2+ on the left side. Heart sounds: Normal heart sounds. No murmur. Pulmonary:      Effort: Pulmonary effort is normal.      Breath sounds: Normal breath sounds. No wheezing or rales. Abdominal:      Palpations: Abdomen is soft. There is no mass. Tenderness: There is no tenderness. Lymphadenopathy:      Head:      Right side of head: No submandibular adenopathy. Left side of head: No submandibular adenopathy. Cervical: No cervical adenopathy. Skin:     General: Skin is warm. Neurological:      Mental Status: He is alert and oriented to person, place, and time. Motor: No tremor. Psychiatric:         Behavior: Behavior is cooperative. Lower Extremities : No ankle Edema , No calf Tenderness     Laboratory findings:    Recent Labs     12/02/19  0516 12/03/19  0650   WBC 8.5 7.4   HGB 12.8* 13.4   HCT 39.9* 44.2    117*     Recent Labs     12/02/19  0516 12/03/19  0650    138   K 3.5* 3.5*   CL 94* 97*   CO2 26 23   GLUCOSE 125* 134*   * 47*   CREATININE 7.33* 4.72*   MG 2.7* 2.1   CALCIUM 9.6 9.0     No results for input(s): PROT, LABALBU, LABA1C, J8XWKVS, C2XUNGS, FT4, TSH, AST, ALT, LDH, GGT, ALKPHOS, BILITOT, BILIDIR, AMMONIA, AMYLASE, LIPASE, LACTATE, CHOL, HDL, LDLCHOLESTEROL, CHOLHDLRATIO, TRIG, VLDL, BNP, TROPONINI, CKTOTAL, CKMB, CKMBINDEX, RF, TERESSA in the last 72 hours. Specific Gravity, UA   Date Value Ref Range Status   04/27/2016 1.015 1.005 - 1.030 Final     Protein, UA   Date Value Ref Range Status   04/27/2016 2+ (A) NEG Final     RBC, UA   Date Value Ref Range Status   04/27/2016 10 TO 20 0 - 2 /HPF Final     Bacteria, UA   Date Value Ref Range Status   04/27/2016 MANY (A) NONE Final     Nitrite, Urine   Date Value Ref Range Status   04/27/2016 NEGATIVE NEG Final     WBC, UA   Date Value Ref Range Status   04/27/2016 TOO NUMEROUS TO COUNT 0 - 5 /HPF Final     Leukocyte Esterase, Urine   Date Value Ref Range Status   04/27/2016 LARGE (A) NEG Final       Imaging / Clinical Data :-   Xr Chest Standard (2 Vw)    Result Date: 12/2/2019  Pleuroparenchymal opacities in the lung bases representing small effusions with underlying airspace disease. Clinical Course : gradually improving  Assessment and Plan  :        1. Bilateral lower lobe pneumonia - received  Empiric vancomycin, Levaquin, cefepime. Negative blood culture sensitivity, respiratory cultures. continue Oxygen. Bronchodilators. deescalate antibiotic therapy . Levaquin monotherapy   2. Acute on chronic diastolic CHF -nephrology for dialysis and fluid management.    3. Morbid obesity

## 2019-12-04 NOTE — PROGRESS NOTES
Dialysis Post Treatment Note  Patient tolerated treatment well. Denies complaints at time of discharge. Pre-Weight = 108.4kg  Post-weight = Weight: 231 lb 11.3 oz (105.1 kg)  Total Liters Processed = Total Liters Processed (l/min): 98.2 l/min  Rinseback Volume (mL) = Rinseback Volume (ml): 370 ml  Net Removal (mL) = 2970  Length of treatment=240  Access: fistula r arm flows well, site clean, dry, tx tolerated well.

## 2019-12-04 NOTE — CARE COORDINATION
nHpredict Inpatient Visit    Patient/family seen: No: Patient discharged before tool given. Predict tool arrived at 4:50 pm, patient was discharged at 3:50 pm. 1 hour prior to receiving predict tool     Provided patient/family with copy of nHpredict tool: No:        Current discharge plan: Patient discharged to [facility] completed with case management: No: patient already discharged.  Tool uploaded in media

## 2019-12-05 LAB
CULTURE: ABNORMAL
Lab: ABNORMAL
SPECIMEN DESCRIPTION: ABNORMAL

## 2019-12-08 LAB
CULTURE: NORMAL
Lab: NORMAL
SPECIMEN DESCRIPTION: NORMAL

## 2019-12-11 ENCOUNTER — TELEPHONE (OUTPATIENT)
Dept: INTERNAL MEDICINE | Age: 74
End: 2019-12-11

## 2019-12-20 ENCOUNTER — OUTSIDE SERVICES (OUTPATIENT)
Dept: INTERNAL MEDICINE | Age: 74
End: 2019-12-20
Payer: MEDICARE

## 2019-12-20 DIAGNOSIS — Z79.4 CONTROLLED TYPE 2 DIABETES MELLITUS WITH CHRONIC KIDNEY DISEASE ON CHRONIC DIALYSIS, WITH LONG-TERM CURRENT USE OF INSULIN (HCC): Primary | ICD-10-CM

## 2019-12-20 DIAGNOSIS — F32.4 MAJOR DEPRESSIVE DISORDER WITH SINGLE EPISODE, IN PARTIAL REMISSION (HCC): ICD-10-CM

## 2019-12-20 DIAGNOSIS — Z79.4 LONG-TERM INSULIN USE (HCC): ICD-10-CM

## 2019-12-20 DIAGNOSIS — N18.6 ESRD (END STAGE RENAL DISEASE) ON DIALYSIS (HCC): ICD-10-CM

## 2019-12-20 DIAGNOSIS — E66.01 CLASS 2 SEVERE OBESITY WITH SERIOUS COMORBIDITY AND BODY MASS INDEX (BMI) OF 39.0 TO 39.9 IN ADULT, UNSPECIFIED OBESITY TYPE (HCC): ICD-10-CM

## 2019-12-20 DIAGNOSIS — D63.1 ANEMIA IN CHRONIC KIDNEY DISEASE, ON CHRONIC DIALYSIS (HCC): ICD-10-CM

## 2019-12-20 DIAGNOSIS — Z99.2 ESRD (END STAGE RENAL DISEASE) ON DIALYSIS (HCC): ICD-10-CM

## 2019-12-20 DIAGNOSIS — Z99.2 CONTROLLED TYPE 2 DIABETES MELLITUS WITH CHRONIC KIDNEY DISEASE ON CHRONIC DIALYSIS, WITH LONG-TERM CURRENT USE OF INSULIN (HCC): Primary | ICD-10-CM

## 2019-12-20 DIAGNOSIS — N18.6 ANEMIA IN CHRONIC KIDNEY DISEASE, ON CHRONIC DIALYSIS (HCC): ICD-10-CM

## 2019-12-20 DIAGNOSIS — Z99.2 DIALYSIS PATIENT (HCC): ICD-10-CM

## 2019-12-20 DIAGNOSIS — Z99.2 ANEMIA IN CHRONIC KIDNEY DISEASE, ON CHRONIC DIALYSIS (HCC): ICD-10-CM

## 2019-12-20 DIAGNOSIS — F01.50 VASCULAR DEMENTIA WITHOUT BEHAVIORAL DISTURBANCE (HCC): ICD-10-CM

## 2019-12-20 DIAGNOSIS — E11.42 CONTROLLED TYPE 2 DIABETES MELLITUS WITH DIABETIC POLYNEUROPATHY, WITH LONG-TERM CURRENT USE OF INSULIN (HCC): ICD-10-CM

## 2019-12-20 DIAGNOSIS — I05.0 SEVERE MITRAL VALVE STENOSIS: ICD-10-CM

## 2019-12-20 DIAGNOSIS — I35.0 SEVERE AORTIC STENOSIS: ICD-10-CM

## 2019-12-20 DIAGNOSIS — G47.33 OBSTRUCTIVE SLEEP APNEA SYNDROME: ICD-10-CM

## 2019-12-20 DIAGNOSIS — Z79.4 CONTROLLED TYPE 2 DIABETES MELLITUS WITH DIABETIC POLYNEUROPATHY, WITH LONG-TERM CURRENT USE OF INSULIN (HCC): ICD-10-CM

## 2019-12-20 DIAGNOSIS — N18.6 CONTROLLED TYPE 2 DIABETES MELLITUS WITH CHRONIC KIDNEY DISEASE ON CHRONIC DIALYSIS, WITH LONG-TERM CURRENT USE OF INSULIN (HCC): Primary | ICD-10-CM

## 2019-12-20 DIAGNOSIS — I48.0 PAROXYSMAL ATRIAL FIBRILLATION (HCC): ICD-10-CM

## 2019-12-20 DIAGNOSIS — I10 ESSENTIAL HYPERTENSION: ICD-10-CM

## 2019-12-20 DIAGNOSIS — E11.22 CONTROLLED TYPE 2 DIABETES MELLITUS WITH CHRONIC KIDNEY DISEASE ON CHRONIC DIALYSIS, WITH LONG-TERM CURRENT USE OF INSULIN (HCC): Primary | ICD-10-CM

## 2019-12-20 DIAGNOSIS — E78.2 MIXED HYPERLIPIDEMIA: ICD-10-CM

## 2019-12-20 LAB
CULTURE: NORMAL
DIRECT EXAM: NORMAL
DIRECT EXAM: NORMAL
Lab: NORMAL
Lab: NORMAL
SPECIMEN DESCRIPTION: NORMAL
SPECIMEN DESCRIPTION: NORMAL

## 2019-12-22 PROCEDURE — 99309 SBSQ NF CARE MODERATE MDM 30: CPT | Performed by: INTERNAL MEDICINE

## 2019-12-29 ENCOUNTER — OUTSIDE SERVICES (OUTPATIENT)
Dept: INTERNAL MEDICINE | Age: 74
End: 2019-12-29
Payer: MEDICARE

## 2019-12-29 DIAGNOSIS — I35.0 SEVERE AORTIC STENOSIS: ICD-10-CM

## 2019-12-29 DIAGNOSIS — Z99.2 ANEMIA IN CHRONIC KIDNEY DISEASE, ON CHRONIC DIALYSIS (HCC): ICD-10-CM

## 2019-12-29 DIAGNOSIS — Z99.2 ESRD (END STAGE RENAL DISEASE) ON DIALYSIS (HCC): ICD-10-CM

## 2019-12-29 DIAGNOSIS — E66.01 CLASS 2 SEVERE OBESITY WITH SERIOUS COMORBIDITY AND BODY MASS INDEX (BMI) OF 35.0 TO 35.9 IN ADULT, UNSPECIFIED OBESITY TYPE (HCC): ICD-10-CM

## 2019-12-29 DIAGNOSIS — Z99.2 CONTROLLED TYPE 2 DIABETES MELLITUS WITH CHRONIC KIDNEY DISEASE ON CHRONIC DIALYSIS, WITH LONG-TERM CURRENT USE OF INSULIN (HCC): Primary | ICD-10-CM

## 2019-12-29 DIAGNOSIS — Z79.4 LONG-TERM INSULIN USE (HCC): ICD-10-CM

## 2019-12-29 DIAGNOSIS — N18.6 ANEMIA IN CHRONIC KIDNEY DISEASE, ON CHRONIC DIALYSIS (HCC): ICD-10-CM

## 2019-12-29 DIAGNOSIS — Z79.4 CONTROLLED TYPE 2 DIABETES MELLITUS WITH CHRONIC KIDNEY DISEASE ON CHRONIC DIALYSIS, WITH LONG-TERM CURRENT USE OF INSULIN (HCC): Primary | ICD-10-CM

## 2019-12-29 DIAGNOSIS — F01.50 VASCULAR DEMENTIA WITHOUT BEHAVIORAL DISTURBANCE (HCC): ICD-10-CM

## 2019-12-29 DIAGNOSIS — Z79.4 CONTROLLED TYPE 2 DIABETES MELLITUS WITH DIABETIC POLYNEUROPATHY, WITH LONG-TERM CURRENT USE OF INSULIN (HCC): ICD-10-CM

## 2019-12-29 DIAGNOSIS — N18.6 CONTROLLED TYPE 2 DIABETES MELLITUS WITH CHRONIC KIDNEY DISEASE ON CHRONIC DIALYSIS, WITH LONG-TERM CURRENT USE OF INSULIN (HCC): Primary | ICD-10-CM

## 2019-12-29 DIAGNOSIS — I05.0 SEVERE MITRAL VALVE STENOSIS: ICD-10-CM

## 2019-12-29 DIAGNOSIS — E11.42 CONTROLLED TYPE 2 DIABETES MELLITUS WITH DIABETIC POLYNEUROPATHY, WITH LONG-TERM CURRENT USE OF INSULIN (HCC): ICD-10-CM

## 2019-12-29 DIAGNOSIS — E11.22 CONTROLLED TYPE 2 DIABETES MELLITUS WITH CHRONIC KIDNEY DISEASE ON CHRONIC DIALYSIS, WITH LONG-TERM CURRENT USE OF INSULIN (HCC): Primary | ICD-10-CM

## 2019-12-29 DIAGNOSIS — I48.0 PAROXYSMAL ATRIAL FIBRILLATION (HCC): ICD-10-CM

## 2019-12-29 DIAGNOSIS — D63.1 ANEMIA IN CHRONIC KIDNEY DISEASE, ON CHRONIC DIALYSIS (HCC): ICD-10-CM

## 2019-12-29 DIAGNOSIS — E78.2 MIXED HYPERLIPIDEMIA: ICD-10-CM

## 2019-12-29 DIAGNOSIS — N18.6 ESRD (END STAGE RENAL DISEASE) ON DIALYSIS (HCC): ICD-10-CM

## 2019-12-29 DIAGNOSIS — Z99.2 DIALYSIS PATIENT (HCC): ICD-10-CM

## 2019-12-29 DIAGNOSIS — F32.4 MAJOR DEPRESSIVE DISORDER WITH SINGLE EPISODE, IN PARTIAL REMISSION (HCC): ICD-10-CM

## 2019-12-29 DIAGNOSIS — I10 ESSENTIAL HYPERTENSION: ICD-10-CM

## 2019-12-29 DIAGNOSIS — G47.33 OBSTRUCTIVE SLEEP APNEA SYNDROME: ICD-10-CM

## 2019-12-30 ENCOUNTER — TELEPHONE (OUTPATIENT)
Dept: INTERNAL MEDICINE | Age: 74
End: 2019-12-30

## 2019-12-30 ENCOUNTER — TELEPHONE (OUTPATIENT)
Dept: SURGERY | Age: 74
End: 2019-12-30

## 2019-12-31 ENCOUNTER — HOSPITAL ENCOUNTER (OUTPATIENT)
Age: 74
Setting detail: SPECIMEN
Discharge: HOME | End: 2019-12-31
Payer: MEDICARE

## 2019-12-31 LAB
HCT VFR BLD CALC: 39.2 % (ref 40.7–50.3)
HEMOGLOBIN: 12.2 G/DL (ref 13–17)
MCH RBC QN AUTO: 30.2 PG (ref 25.2–33.5)
MCHC RBC AUTO-ENTMCNC: 31.1 G/DL (ref 25.2–33.5)
MCV RBC AUTO: 97 FL (ref 82.6–102.9)
NRBC AUTOMATED: 0 PER 100 WBC
PDW BLD-RTO: 14.9 % (ref 11.8–14.4)
PLATELET # BLD: 151 K/UL (ref 138–453)
PMV BLD AUTO: 9.7 FL (ref 8.1–13.5)
RBC # BLD: 4.04 M/UL (ref 4.21–5.77)
WBC # BLD: 8.3 K/UL (ref 3.5–11.3)

## 2019-12-31 PROCEDURE — 85027 COMPLETE CBC AUTOMATED: CPT

## 2020-01-01 ENCOUNTER — CARE COORDINATION (OUTPATIENT)
Dept: CARE COORDINATION | Age: 75
End: 2020-01-01

## 2020-01-01 ENCOUNTER — TELEPHONE (OUTPATIENT)
Dept: INTERNAL MEDICINE | Age: 75
End: 2020-01-01

## 2020-01-01 ENCOUNTER — OUTSIDE SERVICES (OUTPATIENT)
Dept: INTERNAL MEDICINE | Age: 75
End: 2020-01-01
Payer: MEDICARE

## 2020-01-01 ENCOUNTER — APPOINTMENT (OUTPATIENT)
Dept: GENERAL RADIOLOGY | Age: 75
End: 2020-01-01
Payer: MEDICARE

## 2020-01-01 ENCOUNTER — HOSPITAL ENCOUNTER (EMERGENCY)
Age: 75
Discharge: HOME | End: 2020-11-04
Attending: EMERGENCY MEDICINE
Payer: MEDICARE

## 2020-01-01 VITALS
RESPIRATION RATE: 22 BRPM | HEIGHT: 68 IN | HEART RATE: 81 BPM | BODY MASS INDEX: 36.53 KG/M2 | SYSTOLIC BLOOD PRESSURE: 153 MMHG | WEIGHT: 241 LBS | OXYGEN SATURATION: 96 % | TEMPERATURE: 97.9 F | DIASTOLIC BLOOD PRESSURE: 72 MMHG

## 2020-01-01 LAB
ABSOLUTE EOS #: 0.06 K/UL (ref 0–0.44)
ABSOLUTE IMMATURE GRANULOCYTE: <0.03 K/UL (ref 0–0.3)
ABSOLUTE LYMPH #: 0.9 K/UL (ref 1.1–3.7)
ABSOLUTE MONO #: 0.69 K/UL (ref 0.1–1.2)
ANION GAP SERPL CALCULATED.3IONS-SCNC: 14 MMOL/L (ref 9–17)
BASOPHILS # BLD: 0 % (ref 0–2)
BASOPHILS ABSOLUTE: <0.03 K/UL (ref 0–0.2)
BUN BLDV-MCNC: 45 MG/DL (ref 8–23)
BUN/CREAT BLD: 8 (ref 9–20)
C-REACTIVE PROTEIN: 90 MG/L (ref 0–5)
CALCIUM SERPL-MCNC: 8.9 MG/DL (ref 8.6–10.4)
CHLORIDE BLD-SCNC: 96 MMOL/L (ref 98–107)
CO2: 29 MMOL/L (ref 20–31)
CREAT SERPL-MCNC: 5.79 MG/DL (ref 0.7–1.2)
CULTURE: NORMAL
CULTURE: NORMAL
DIFFERENTIAL TYPE: ABNORMAL
EKG ATRIAL RATE: 77 BPM
EKG P AXIS: 54 DEGREES
EKG P-R INTERVAL: 312 MS
EKG Q-T INTERVAL: 466 MS
EKG QRS DURATION: 106 MS
EKG QTC CALCULATION (BAZETT): 527 MS
EKG R AXIS: -65 DEGREES
EKG T AXIS: 82 DEGREES
EKG VENTRICULAR RATE: 77 BPM
EOSINOPHILS RELATIVE PERCENT: 1 % (ref 1–4)
FERRITIN: 1520 UG/L (ref 30–400)
GFR AFRICAN AMERICAN: 12 ML/MIN
GFR NON-AFRICAN AMERICAN: 10 ML/MIN
GFR SERPL CREATININE-BSD FRML MDRD: ABNORMAL ML/MIN/{1.73_M2}
GFR SERPL CREATININE-BSD FRML MDRD: ABNORMAL ML/MIN/{1.73_M2}
GLUCOSE BLD-MCNC: 133 MG/DL (ref 70–99)
HCT VFR BLD CALC: 37.9 % (ref 40.7–50.3)
HEMOGLOBIN: 11.8 G/DL (ref 13–17)
IMMATURE GRANULOCYTES: 0 %
LACTATE DEHYDROGENASE: 240 U/L (ref 135–225)
LACTIC ACID: 0.8 MMOL/L (ref 0.5–2.2)
LYMPHOCYTES # BLD: 17 % (ref 24–43)
Lab: NORMAL
Lab: NORMAL
MCH RBC QN AUTO: 29.9 PG (ref 25.2–33.5)
MCHC RBC AUTO-ENTMCNC: 31.1 G/DL (ref 25.2–33.5)
MCV RBC AUTO: 95.9 FL (ref 82.6–102.9)
MONOCYTES # BLD: 13 % (ref 3–12)
NRBC AUTOMATED: 0 PER 100 WBC
PDW BLD-RTO: 14 % (ref 11.8–14.4)
PLATELET # BLD: 141 K/UL (ref 138–453)
PLATELET ESTIMATE: ABNORMAL
PMV BLD AUTO: 9.3 FL (ref 8.1–13.5)
POTASSIUM SERPL-SCNC: 3.6 MMOL/L (ref 3.7–5.3)
PROCALCITONIN: 0.83 NG/ML
RBC # BLD: 3.95 M/UL (ref 4.21–5.77)
RBC # BLD: ABNORMAL 10*6/UL
SEG NEUTROPHILS: 69 % (ref 36–65)
SEGMENTED NEUTROPHILS ABSOLUTE COUNT: 3.62 K/UL (ref 1.5–8.1)
SODIUM BLD-SCNC: 139 MMOL/L (ref 135–144)
SPECIMEN DESCRIPTION: NORMAL
SPECIMEN DESCRIPTION: NORMAL
TROPONIN INTERP: ABNORMAL
TROPONIN INTERP: ABNORMAL
TROPONIN T: ABNORMAL NG/ML
TROPONIN T: ABNORMAL NG/ML
TROPONIN, HIGH SENSITIVITY: 167 NG/L (ref 0–22)
TROPONIN, HIGH SENSITIVITY: 174 NG/L (ref 0–22)
WBC # BLD: 5.3 K/UL (ref 3.5–11.3)
WBC # BLD: ABNORMAL 10*3/UL

## 2020-01-01 PROCEDURE — 93005 ELECTROCARDIOGRAM TRACING: CPT | Performed by: EMERGENCY MEDICINE

## 2020-01-01 PROCEDURE — 85025 COMPLETE CBC W/AUTO DIFF WBC: CPT

## 2020-01-01 PROCEDURE — 71045 X-RAY EXAM CHEST 1 VIEW: CPT

## 2020-01-01 PROCEDURE — 99308 SBSQ NF CARE LOW MDM 20: CPT | Performed by: NURSE PRACTITIONER

## 2020-01-01 PROCEDURE — 86140 C-REACTIVE PROTEIN: CPT

## 2020-01-01 PROCEDURE — 84145 PROCALCITONIN (PCT): CPT

## 2020-01-01 PROCEDURE — 99309 SBSQ NF CARE MODERATE MDM 30: CPT | Performed by: INTERNAL MEDICINE

## 2020-01-01 PROCEDURE — 80048 BASIC METABOLIC PNL TOTAL CA: CPT

## 2020-01-01 PROCEDURE — 84484 ASSAY OF TROPONIN QUANT: CPT

## 2020-01-01 PROCEDURE — 36415 COLL VENOUS BLD VENIPUNCTURE: CPT

## 2020-01-01 PROCEDURE — 87040 BLOOD CULTURE FOR BACTERIA: CPT

## 2020-01-01 PROCEDURE — 83615 LACTATE (LD) (LDH) ENZYME: CPT

## 2020-01-01 PROCEDURE — 83605 ASSAY OF LACTIC ACID: CPT

## 2020-01-01 PROCEDURE — 82728 ASSAY OF FERRITIN: CPT

## 2020-01-01 PROCEDURE — 99285 EMERGENCY DEPT VISIT HI MDM: CPT

## 2020-01-01 RX ORDER — SEVELAMER CARBONATE 800 MG/1
2 TABLET, FILM COATED ORAL 3 TIMES DAILY
Qty: 540 TABLET | Refills: 3 | Status: SHIPPED | OUTPATIENT
Start: 2020-01-01 | End: 2021-01-01 | Stop reason: SDUPTHER

## 2020-01-01 RX ORDER — SEVELAMER CARBONATE 800 MG/1
2 TABLET, FILM COATED ORAL 3 TIMES DAILY
Qty: 540 TABLET | Refills: 3 | Status: SHIPPED | OUTPATIENT
Start: 2020-01-01 | End: 2020-01-01 | Stop reason: SDUPTHER

## 2020-01-01 ASSESSMENT — ENCOUNTER SYMPTOMS
COUGH: 1
ABDOMINAL PAIN: 0
TROUBLE SWALLOWING: 0
SHORTNESS OF BREATH: 1
COUGH: 0
NAUSEA: 0
CHEST TIGHTNESS: 0
SORE THROAT: 0
DIARRHEA: 0
FACIAL SWELLING: 0
VOMITING: 0
SORE THROAT: 0
BLOOD IN STOOL: 0
SHORTNESS OF BREATH: 0
CONSTIPATION: 0
WHEEZING: 0
VOMITING: 0
CONSTIPATION: 0
NAUSEA: 0
ABDOMINAL PAIN: 0
DIARRHEA: 0
RHINORRHEA: 0
COLOR CHANGE: 0
EYE PAIN: 0
TROUBLE SWALLOWING: 0
BLOOD IN STOOL: 0
WHEEZING: 0
BACK PAIN: 0
SINUS PRESSURE: 0

## 2020-01-09 ENCOUNTER — TELEPHONE (OUTPATIENT)
Dept: INTERNAL MEDICINE | Age: 75
End: 2020-01-09

## 2020-01-09 NOTE — TELEPHONE ENCOUNTER
Wife lost patients social security card. She needs, on your letterhead, something in writing stating his name and social security number.   She will  on Friday

## 2020-01-16 ENCOUNTER — TELEPHONE (OUTPATIENT)
Dept: SURGERY | Age: 75
End: 2020-01-16

## 2020-01-26 PROCEDURE — 99309 SBSQ NF CARE MODERATE MDM 30: CPT | Performed by: INTERNAL MEDICINE

## 2020-01-30 ENCOUNTER — OUTSIDE SERVICES (OUTPATIENT)
Dept: INTERNAL MEDICINE | Age: 75
End: 2020-01-30
Payer: MEDICARE

## 2020-01-30 PROBLEM — L03.90 CELLULITIS: Status: RESOLVED | Noted: 2019-03-21 | Resolved: 2020-01-30

## 2020-01-30 PROBLEM — I50.33 ACUTE ON CHRONIC DIASTOLIC CHF (CONGESTIVE HEART FAILURE) (HCC): Status: RESOLVED | Noted: 2019-12-02 | Resolved: 2020-01-30

## 2020-01-30 PROBLEM — T14.8XXA BLEEDING FROM WOUND: Status: RESOLVED | Noted: 2019-04-02 | Resolved: 2020-01-30

## 2020-01-30 PROBLEM — T79.4XXA TRAUMATIC HEMORRHAGIC SHOCK (HCC): Status: RESOLVED | Noted: 2019-03-13 | Resolved: 2020-01-30

## 2020-01-30 PROBLEM — D62 ACUTE BLOOD LOSS ANEMIA: Status: RESOLVED | Noted: 2019-03-11 | Resolved: 2020-01-30

## 2020-01-30 PROBLEM — D62 ACUTE BLOOD LOSS ANEMIA: Status: RESOLVED | Noted: 2019-04-03 | Resolved: 2020-01-30

## 2020-01-30 PROBLEM — J18.9 PNEUMONIA: Status: RESOLVED | Noted: 2019-12-02 | Resolved: 2020-01-30

## 2020-01-30 NOTE — PROGRESS NOTES
DR. Tanesha Patino - [facility] VISIT    DATE OF SERVICE: 1/26/20    NURSING HOME[facility]    CHIEF COMPLAINT/HISTORY OF CHIEF COMPLAINT: This patient is being seen for ongoing evaluation and management of his diabetes mellitus type 2 with nephropathy and neuropathy, end-stage renal disease on hemodialysis with anemia, depression, severe aortic and mitral stenosis, hypertension, hyperlipidemia, obstructive sleep apnea, and morbid obesity. He is on Coumadin for atrial fibrillation. He is on Zoloft for depression. Lately he has been having a lot of pain in his left shoulder. He does not have any pain medication other than Tylenol ordered and he would like to get something stronger for it. There are no new complaints at this time. ALLERGIES:   Allergies   Allergen Reactions    Percocet [Oxycodone-Acetaminophen] Itching and Rash     Also causes shaking    Ampicillin Rash        MEDICATIONS: As noted on the [facility] [place] MAR, referenced and incorporated herein.     PAST MEDICAL HISTORY:   Past Medical History:   Diagnosis Date    Anemia in chronic kidney disease (CKD) 4/27/2016    Arthritis     Bacteremia 11/11/2016    Benign prostatic hyperplasia without lower urinary tract symptoms     BMI 40.0-44.9, adult ([address] 3/19/2018    Cellulitis of left lower extremity     Chronic cerebral ischemia 1/3/2017    Closed fracture of forearm 8/13/2013    Broken lft forearm     Controlled type 2 diabetes mellitus with chronic kidney disease on chronic dialysis, with long-term current use of insulin ([address]     Controlled type 2 diabetes mellitus with diabetic polyneuropathy, with long-term current use of insulin ([address] 10/7/2018    Decubital ulcer     NON OPEN BUTTOCKS    Dementia ([address]     memory problems    Dialysis patient ([address] 01/03/2017    Goes [facility], Sat in [place]    Difficult intravenous access     HAS NEEDED PICC TEAM IN THE PAST    Difficulty walking     WHEELCHAIR BOUND-PIVOTS 10/12/2018    COLONOSCOPY WITH BIOPSY performed by Wesley Soria DO at [address] [facility] Right 3/10/2019    WASHOUT RIGHT LOWER EXTREMITY WITH WOUND VAC EXCHANGE performed by Monie Underwood MD at [address] [facility] Right 3/8/2019    RIGHT LOWER EXTREMITY EXPLORATION, HEMATOMA EVACUATION, WOUND VAC PLACEMENT performed by Monie Underwood MD at [address] (x10-12 surgeries)    several surgeries on left arm    KNEE ARTHROSCOPY Left 99    MITRAL VALVE REPLACEMENT  2016    bioprosthetic     NASAL SEPTUM SURGERY      OTHER SURGICAL HISTORY  3/18/16    Aortic root Enlargement    OTHER SURGICAL HISTORY  3/18/16    ASD Closure    OTHER SURGICAL HISTORY Right 2017    AV fistula creation, wrist    OTHER SURGICAL HISTORY Right 2017    ligation of collateral branch of av fistula right wrist    OTHER SURGICAL HISTORY  2018    FISTULAGRAM WITH DR. Emeka Jacobs    TX EGD TRANSORAL BIOPSY SINGLE/MULTIPLE N/A 10/17/2017    EGD BIOPSY performed by Aden Hercules MD at [address] ANGIOACCESS AV FISTULA Right 2017     RIGHT  LOWER ARM AV FISTULA  LIGATION OF AQUIRED BRANCHES X2 performed by Brigida Landeros DO at [address]  3/18/16    median    VASCULAR SURGERY  2018    Right arm fistulagram,  PTA cephalic vein stenosis  /  DR Ebony Lai       SOCIAL HISTORY:     Tobacco:   Social History     Tobacco Use   Smoking Status Former Smoker    Packs/day: 2.00    Years: 25.00    Pack years: 50.00    Types: Cigarettes    Start date: 3/17/1955    Last attempt to quit: 1980    Years since quittin.1   Smokeless Tobacco Never Used     Alcohol:   Social History     Substance and Sexual Activity   Alcohol Use No    Alcohol/week: 0.0 standard drinks    Comment: 1-2 drinks per year     Drugs:   Social History     Substance and Sexual Activity   Drug Use No       FAMILY HISTORY: family history includes Alzheimer's Disease in his father; Diabetes in his maternal grandmother and mother; High Blood Pressure in his sister; Romaine Hermannr in his mother. REVIEW OF SYSTEMS:     Please see history of chief complaint above; otherwise no new problems with respect to General, HEENT, Cardiovascular, Respiratory, Gastrointestinal, Genitourinary, Endocrinologic, Musculoskeletal, or Neuropsychiatric complaints. PHYSICAL EXAMINATION:    Vitals: Temp: 97.1 deg F. Pulse: 66. Resp: 16. BP: 126/80. General: A 75 y.o.  male. Alert and oriented to person, place and time. He does not appear to be in any acute distress. Skin: Skin color, texture, turgor normal. No rashes. HEENT/Neck: Essentially unremarkable  Chest: There was a chest catheter in place  Lungs: Normal - CTA without rales, rhonchi, or wheezing. Heart: regular rate and rhythm, S1, S2 normal, no murmur, click, rub or gallop No S3 or S4. Abdomen: Obese, soft, non-distended, non-tender, normal active bowel sounds, no masses palpated and no hepatosplenomegaly  Extremities: No clubbing, cyanosis, edema  Neurologic: cranial nerves II-XII are grossly intact    ASSESSMENT:     Diagnosis Orders   1. Acute pain of left shoulder     2. Controlled type 2 diabetes mellitus with chronic kidney disease on chronic dialysis, with long-term current use of insulin ([address]     3. Controlled type 2 diabetes mellitus with diabetic polyneuropathy, with long-term current use of insulin ([address]     4. Long-term insulin use ([address]     5. ESRD (end stage renal disease) on dialysis ([address]     6. Dialysis patient ([address]     7. Anemia in chronic kidney disease, on chronic dialysis ([address]     8. Paroxysmal atrial fibrillation (HCC)     9. Severe aortic stenosis     10. Severe mitral valve stenosis     11. Vascular dementia without behavioral disturbance ([address]     12. Obstructive sleep apnea     13.  Major depressive disorder with single episode, in partial remission ([address] 14. Essential hypertension     15. Mixed hyperlipidemia     16. Traumatic amputation of thumb, left, sequela (HCC)     17. Class 2 severe obesity with serious comorbidity and body mass index (BMI) of 35.0 to 35.9 in adult, unspecified obesity type ([facility]           PLAN:    1. Continue current treatment  2. Nursing home record reviewed and updates summarized and entered into electronic record  3. Nursing home blood sugar logs and diabetic medication administration reviewed. No changes. 4. Coumadin management is ongoing. We are the ones managing his Coumadin. 5. Add Norco 5/325 mg every 4 hours as needed for pain. 6. See nursing home orders and MAR.       Electronically signed by [address] [place] [address] DO Danuta on 1/30/2020 at 3:46 AM  Internal Medicine

## 2020-02-04 ENCOUNTER — TELEPHONE (OUTPATIENT)
Dept: SURGERY | Age: 75
End: 2020-02-04

## 2020-02-04 NOTE — TELEPHONE ENCOUNTER
300 wing nurse from [facility] notified of Lab results (CBC from 1/31/20). HGB 13.6 and HCT 44.6 . Dr. Ulysses Mcburney reviewed CBC and no new orders needed. Continue to watch for blood in stool and call Dr. Ulysses Mcburney.

## 2020-02-07 ENCOUNTER — TELEPHONE (OUTPATIENT)
Dept: INTERNAL MEDICINE | Age: 75
End: 2020-02-07

## 2020-02-13 ENCOUNTER — OUTSIDE SERVICES (OUTPATIENT)
Dept: INTERNAL MEDICINE | Age: 75
End: 2020-02-13
Payer: MEDICARE

## 2020-02-13 PROCEDURE — 99308 SBSQ NF CARE LOW MDM 20: CPT | Performed by: NURSE PRACTITIONER

## 2020-02-13 NOTE — PROGRESS NOTES
02/13/20  Génesis Augustin  1945    Patient Resident of [facility]    Chief Complaint  1. Poor dentition    2. Tooth pain        HPI:  67-year-old male being seen at the request of his wife due to poor dentition and right gum pain. Patient denies any pain on assessment. Pleasantly confused. Wife not present on exam.  Unfortunately staff states their facility [facility] was just out last week and only comes out quarterly. Wife is asking to get him back into the dentist since it has been years since he has been seen. Has been afebrile.         Allergies   Allergen Reactions    Percocet [Oxycodone-Acetaminophen] Itching and Rash     Also causes shaking    Ampicillin Rash       Past Medical History:   Diagnosis Date    Acute blood loss anemia 3/11/2019    Acute on chronic diastolic CHF (congestive heart failure) ([address] 12/2/2019    Anemia in chronic kidney disease (CKD) 4/27/2016    Arthritis     Bacteremia 11/11/2016    Benign prostatic hyperplasia without lower urinary tract symptoms     Bleeding from wound 4/2/2019    BMI 40.0-44.9, adult ([address] 3/19/2018    Cellulitis 3/21/2019    Cellulitis of left lower extremity     Chronic cerebral ischemia 1/3/2017    Closed fracture of forearm 8/13/2013    Broken lft forearm     Controlled type 2 diabetes mellitus with chronic kidney disease on chronic dialysis, with long-term current use of insulin ([address]     Controlled type 2 diabetes mellitus with diabetic polyneuropathy, with long-term current use of insulin ([address] 10/7/2018    Decubital ulcer     NON OPEN BUTTOCKS    Dementia ([address]     memory problems    Dialysis patient ([address] 01/03/2017    Goes Tue, Thurs, Sat in [place]    Difficult intravenous access     HAS NEEDED PICC TEAM IN THE PAST    Difficulty walking     WHEELCHAIR BOUND-PIVOTS WITH ASSIST O F 2    DJD (degenerative joint disease) of knee     Elbow, forearm, and wrist, abrasion or friction burn, without mention of infection EXTREMITY Right 3/10/2019    WASHOUT RIGHT LOWER EXTREMITY WITH WOUND VAC EXCHANGE performed by Sherin Eubanks MD at [address] Right 3/8/2019    RIGHT LOWER EXTREMITY EXPLORATION, HEMATOMA EVACUATION, WOUND VAC PLACEMENT performed by Sherin Eubanks MD at [address] (x10-12 surgeries)    several surgeries on left arm    KNEE ARTHROSCOPY Left 09/17/99    MITRAL VALVE REPLACEMENT  03/18/2016    bioprosthetic     NASAL SEPTUM SURGERY      OTHER SURGICAL HISTORY  3/18/16    Aortic root Enlargement    OTHER SURGICAL HISTORY  3/18/16    ASD Closure    OTHER SURGICAL HISTORY Right 01/09/2017    AV fistula creation, wrist    OTHER SURGICAL HISTORY Right 08/29/2017    ligation of collateral branch of av fistula right wrist    OTHER SURGICAL HISTORY  04/05/2018    FISTULAGRAM WITH DR. Montenegro Males    [place] EGD TRANSORAL BIOPSY SINGLE/MULTIPLE N/A 10/17/2017    EGD BIOPSY performed by Elizabeth Greene MD at [facility]    [place] [address] ANGIOACCESS AV FISTULA Right 8/29/2017     RIGHT  LOWER ARM AV FISTULA  LIGATION OF AQUIRED BRANCHES X2 performed by Chanel Stafford DO at [address]  3/18/16    median    VASCULAR SURGERY  12/06/2018    Right arm fistulagram,  PTA cephalic vein stenosis  /  DR Colt Beltran       Medications as per [facility] Chart /reviewed     Social History     Socioeconomic History    Marital status:      Spouse name: Gabby Barcenas Number of children: 2    Years of education: Not on file    Highest education level: Not on file   Occupational History    Occupation: Retired      Employer: [address] resource strain: Not on file    Food insecurity:     Worry: Not on file     Inability: Not on file    Transportation needs:     Medical: Not on file     Non-medical: Not on file   Tobacco Use    Smoking status: Former Smoker     Packs/day: 2.00     Years: 25.00 Pack years: 50.00     Types: Cigarettes     Start date: 3/17/1955     Last attempt to quit: 1980     Years since quittin.1    Smokeless tobacco: Never Used   Substance and Sexual Activity    Alcohol use: No     Alcohol/week: 0.0 standard drinks     Comment: 1-2 drinks per year    Drug use: No    Sexual activity: Never   Lifestyle    Physical activity:     Days per week: Not on file     Minutes per session: Not on file    Stress: Not on file   Relationships    Social connections:     Talks on phone: Not on file     Gets together: Not on file     Attends Baptism service: Not on file     Active member of club or organization: Not on file     Attends meetings of clubs or organizations: Not on file     Relationship status: Not on file    Intimate partner violence:     Fear of current or ex partner: Not on file     Emotionally abused: Not on file     Physically abused: Not on file     Forced sexual activity: Not on file   Other Topics Concern    Not on file   Social History Narrative    Not on file       Review of Systems   Unable to perform ROS: Dementia       Physical Exam  Vitals signs and nursing note reviewed. Constitutional:       General: He is not in acute distress. Appearance: He is well-developed. He is not diaphoretic. HENT:      Head: Normocephalic and atraumatic. Mouth/Throat:     Eyes:      General:         Right eye: No discharge. Left eye: No discharge. Neck:      Trachea: No tracheal deviation. Cardiovascular:      Rate and Rhythm: Normal rate. Pulmonary:      Effort: Pulmonary effort is normal. No respiratory distress. Abdominal:      Comments: Morbidly obese   Musculoskeletal:         General: No swelling or deformity. Right lower leg: No edema. Skin:     General: Skin is warm and dry. Coloration: Skin is not pale. Neurological:      Mental Status: He is alert and oriented to person, place, and time.       Cranial Nerves: No cranial nerve

## 2020-02-19 ENCOUNTER — OUTSIDE SERVICES (OUTPATIENT)
Dept: INTERNAL MEDICINE | Age: 75
End: 2020-02-19
Payer: MEDICARE

## 2020-02-19 PROCEDURE — 99307 SBSQ NF CARE SF MDM 10: CPT | Performed by: NURSE PRACTITIONER

## 2020-02-19 NOTE — PROGRESS NOTES
patient ([facility] 01/03/2017    Goes Tue, Thurs, Sat in [place] Difficult intravenous access     HAS NEEDED PICC TEAM IN THE PAST    Difficulty walking     WHEELCHAIR BOUND-PIVOTS WITH ASSIST O F 2    DJD (degenerative joint disease) of knee     Elbow, forearm, and wrist, abrasion or friction burn, without mention of infection 8/13/2013    Abrasions lft forearm     Encephalopathy acute 4/27/2016    Encounter regarding vascular access for dialysis for ESRD ([facility] 2/2/2017    ESRD (end stage renal disease) on dialysis ([facility] 1/17/2017    Forgetfulness     HAS SOME SHORT TERM MEMORY LOSS, QUYEN-WIFE IS LEGAL GUARDIAN    H/O transesophageal echocardiography (AMADEO) for monitoring     Hemodialysis patient ([address] 11/11/2016    [place]ues-thurs-[place].  TUNNELED CATHETER RT UPPER CHEST    Hyperlipidemia 1990    on Meds    Hypertension 1990    on Meds    Intercritical gout     on Meds    Joint pain, knee 01/13/2017    baldo knee synvisc-1 injections    Long-term insulin use ([address] 1/17/2017    Major depressive disorder with single episode, in partial remission ([address] 1/3/2017    Mobility impaired     Morbid obesity ([address]     Muscle weakness     GRNERALIZED    Obesity     Obstructive sleep apnea     HEATHER on CPAP     On CPAP-TO BRING DOS    Paroxysmal atrial fibrillation ([address] 9/6/2017    Pneumonia 12/2/2019    Respiratory failure ([address] 03/2016    with open heart surgery, trached x 5 months    S/P cardiac cath     Seborrhea     Severe aortic stenosis 3/9/2016    Severe mitral valve stenosis 3/9/2016    Severe sepsis ([address] 4/3/2016    Skin rash     ABD FOLDS    Stasis dermatitis     Thyroid disease     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 lft thumb     Traumatic hemorrhagic shock ([address] 3/13/2019    Venous stasis dermatitis     Wears glasses     Wheelchair bound     pivots with 2 assists       Past Surgical History:   Procedure Laterality Date    AORTIC VALVE REPLACEMENT 03/18/2016    bioprosthetic    ARM SURGERY Left 1990    CARDIAC CATHETERIZATION      CENTRAL VENOUS CATHETER Right 02/21/2018    DIALYSIS CATHETER Dr Gautam Patel COLONOSCOPY  11/19/09    COLONOSCOPY N/A 10/12/2018    COLONOSCOPY WITH BIOPSY performed by Anupam Du DO at [address] [place] [address] Right 3/10/2019    WASHOUT RIGHT LOWER EXTREMITY WITH WOUND VAC EXCHANGE performed by Ronnie Engle MD at [address] [place] [address] Right 3/8/2019    RIGHT LOWER EXTREMITY EXPLORATION, HEMATOMA EVACUATION, WOUND VAC PLACEMENT performed by Ronnie Engle MD at [address] (x10-12 surgeries)    several surgeries on left arm    KNEE ARTHROSCOPY Left 09/17/99    MITRAL VALVE REPLACEMENT  03/18/2016    bioprosthetic     NASAL SEPTUM SURGERY      OTHER SURGICAL HISTORY  3/18/16    Aortic root Enlargement    OTHER SURGICAL HISTORY  3/18/16    ASD Closure    OTHER SURGICAL HISTORY Right 01/09/2017    AV fistula creation, wrist    OTHER SURGICAL HISTORY Right 08/29/2017    ligation of collateral branch of av fistula right wrist    OTHER SURGICAL HISTORY  04/05/2018    FISTULAGRAM WITH DR. Kayla Shay    [place] EGD TRANSORAL BIOPSY SINGLE/MULTIPLE N/A 10/17/2017    EGD BIOPSY performed by Josiane Markham MD at [facility] ANGIOACCESS AV FISTULA Right 8/29/2017     RIGHT  LOWER ARM AV FISTULA  LIGATION OF AQUIRED BRANCHES X2 performed by Susi Leong DO at [address]  3/18/16    median    VASCULAR SURGERY  12/06/2018    Right arm fistulagram,  PTA cephalic vein stenosis  /  DR Maria       Medications as per [facility] Chart Genesis Pat     Social History     Socioeconomic History    Marital status:      Spouse name: Susanne Nicole Number of children: 2    Years of education: Not on file    Highest education level: Not on file   Occupational History    Occupation: Retired  Skin:     General: Skin is warm and dry. Coloration: Skin is not pale. Comments:   Areas marked to arm and left knee without any redness/ pinkness- flesh toned no warm  Approximate 1 cm in diameter nontender bruise to right upper arm no hard indurated area noted   Neurological:      Mental Status: He is alert. Cranial Nerves: No cranial nerve deficit. Sensory: No sensory deficit. Gait: Gait abnormal.   Psychiatric:         Mood and Affect: Mood normal.         Behavior: Behavior normal.         Vital Signs: Temperature 97.8 °F, blood pressure 128/68, pulse 72, respirations 16, SPO2 98%    Assessment:  1. Bruise  No areas concerning for cellulitis. To monitor bruise, so conservative measures      Plan:  As noted above. Follow up for routine visit. Call sooner with concerns prior.     Electronically signed by SHAMEKA Bynum CNP on 2/19/2020 at 12:14 PM

## 2020-02-23 PROCEDURE — 99310 SBSQ NF CARE HIGH MDM 45: CPT | Performed by: INTERNAL MEDICINE

## 2020-02-26 ENCOUNTER — OUTSIDE SERVICES (OUTPATIENT)
Dept: INTERNAL MEDICINE | Age: 75
End: 2020-02-26
Payer: MEDICARE

## 2020-03-09 ENCOUNTER — TELEPHONE (OUTPATIENT)
Dept: CARDIOLOGY | Age: 75
End: 2020-03-09

## 2020-03-09 NOTE — TELEPHONE ENCOUNTER
Dr Bear Nereida office called to ask if the pt can hold eliquis for 2 days prior to dental work. Also need to know if the pt needs to have pre-med.     Please fax to [phone]    Last Appt:  11/14/2019  Next Appt:   5/14/2020  Med verified in Epic

## 2020-03-13 ENCOUNTER — OFFICE VISIT (OUTPATIENT)
Dept: SURGERY | Age: 75
End: 2020-03-13
Payer: MEDICARE

## 2020-03-13 VITALS
OXYGEN SATURATION: 97 % | HEART RATE: 73 BPM | SYSTOLIC BLOOD PRESSURE: 98 MMHG | DIASTOLIC BLOOD PRESSURE: 68 MMHG | RESPIRATION RATE: 18 BRPM

## 2020-03-13 PROCEDURE — G8417 CALC BMI ABV UP PARAM F/U: HCPCS | Performed by: PLASTIC SURGERY

## 2020-03-13 PROCEDURE — G8427 DOCREV CUR MEDS BY ELIG CLIN: HCPCS | Performed by: PLASTIC SURGERY

## 2020-03-13 PROCEDURE — G8482 FLU IMMUNIZE ORDER/ADMIN: HCPCS | Performed by: PLASTIC SURGERY

## 2020-03-13 PROCEDURE — 99214 OFFICE O/P EST MOD 30 MIN: CPT

## 2020-03-13 PROCEDURE — 1123F ACP DISCUSS/DSCN MKR DOCD: CPT | Performed by: PLASTIC SURGERY

## 2020-03-13 PROCEDURE — 99213 OFFICE O/P EST LOW 20 MIN: CPT | Performed by: PLASTIC SURGERY

## 2020-03-13 PROCEDURE — 4040F PNEUMOC VAC/ADMIN/RCVD: CPT | Performed by: PLASTIC SURGERY

## 2020-03-13 PROCEDURE — 1036F TOBACCO NON-USER: CPT | Performed by: PLASTIC SURGERY

## 2020-03-13 PROCEDURE — 3017F COLORECTAL CA SCREEN DOC REV: CPT | Performed by: PLASTIC SURGERY

## 2020-03-13 RX ORDER — INSULIN ASPART INJECTION 100 [IU]/ML
INJECTION, SOLUTION SUBCUTANEOUS SEE ADMIN INSTRUCTIONS
COMMUNITY
Start: 2020-03-04

## 2020-03-13 RX ORDER — TRIAMCINOLONE ACETONIDE 1 MG/G
CREAM TOPICAL 2 TIMES DAILY
COMMUNITY
Start: 2020-02-29 | End: 2020-06-16

## 2020-03-13 RX ORDER — CLINDAMYCIN HYDROCHLORIDE 150 MG/1
CAPSULE ORAL
COMMUNITY
Start: 2020-03-09 | End: 2020-06-16 | Stop reason: ALTCHOICE

## 2020-03-13 RX ORDER — NYSTATIN 100000 [USP'U]/G
POWDER TOPICAL PRN
COMMUNITY
Start: 2020-02-17 | End: 2020-06-16

## 2020-03-13 RX ORDER — CLINDAMYCIN HYDROCHLORIDE 300 MG/1
CAPSULE ORAL
COMMUNITY
Start: 2020-02-13 | End: 2020-06-16 | Stop reason: ALTCHOICE

## 2020-03-13 RX ORDER — HYDROCODONE BITARTRATE AND ACETAMINOPHEN 5; 325 MG/1; MG/1
1 TABLET ORAL EVERY 4 HOURS PRN
Status: ON HOLD | COMMUNITY
Start: 2020-01-26 | End: 2020-08-06 | Stop reason: HOSPADM

## 2020-03-13 ASSESSMENT — ENCOUNTER SYMPTOMS
COUGH: 0
SHORTNESS OF BREATH: 0
TROUBLE SWALLOWING: 0
ABDOMINAL PAIN: 0

## 2020-03-13 NOTE — PROGRESS NOTES
Patient returns today reportedly for a follow-up to check right leg s/p evacuation and repair of ruptured hematoma on 2/17/2019. Patient denies pain today. He is confined to a gurney, but in good spirits. His wife accompanies him today. I have reviewed the patient's medical history in detail and updated the computerized patient record.

## 2020-03-13 NOTE — PROGRESS NOTES
Subjective:      Patient ID: Oneil Licona is a 75 y.o. male. HPI  Patient returns today for a follow-up to check right leg s/p evacuation and repair of ruptured hematoma on 2/17/2019. Patient denies pain today. He is confined to a gurney, but in good spirits. His wife accompanies him today. Review of Systems   Constitutional: Negative. HENT: Negative for congestion and trouble swallowing. Respiratory: Negative for cough and shortness of breath. Cardiovascular: Negative for chest pain. Gastrointestinal: Negative for abdominal pain. Neurological: Negative for light-headedness and headaches. Psychiatric/Behavioral: Negative for dysphoric mood. Objective:   Physical Exam  Vitals signs and nursing note reviewed. Exam conducted with a chaperone present. Constitutional:       Appearance: Normal appearance. HENT:      Head: Normocephalic and atraumatic. Mouth/Throat:      Mouth: Mucous membranes are moist.   Eyes:      Extraocular Movements: Extraocular movements intact. Conjunctiva/sclera: Conjunctivae normal.      Pupils: Pupils are equal, round, and reactive to light. Pulmonary:      Effort: Pulmonary effort is normal.   Skin:     General: Skin is warm and dry. Neurological:      General: No focal deficit present. Mental Status: He is alert and oriented to person, place, and time. Right leg wound fully healed. Assessment:      Leg wound. Plan:      Continue moisturizers. Using Eucerin. Should be at least twice per day. Avoid trauma. F/U PRN.         Malika Simmons MD

## 2020-03-22 PROCEDURE — 99309 SBSQ NF CARE MODERATE MDM 30: CPT | Performed by: INTERNAL MEDICINE

## 2020-04-03 NOTE — TELEPHONE ENCOUNTER
Spouse, Nadeem Webster states that patient has one month left but will need a new script. She states that [facility] [place] [place] contacted her stating the [facility] required prior authorization.

## 2020-04-06 RX ORDER — SEVELAMER CARBONATE 800 MG/1
2 TABLET, FILM COATED ORAL 3 TIMES DAILY
Qty: 540 TABLET | Refills: 3 | Status: SHIPPED | OUTPATIENT
Start: 2020-04-06 | End: 2020-01-01 | Stop reason: SDUPTHER

## 2020-04-20 ENCOUNTER — OUTSIDE SERVICES (OUTPATIENT)
Dept: INTERNAL MEDICINE | Age: 75
End: 2020-04-20
Payer: MEDICARE

## 2020-04-21 ENCOUNTER — OUTSIDE SERVICES (OUTPATIENT)
Dept: INTERNAL MEDICINE | Age: 75
End: 2020-04-21
Payer: MEDICARE

## 2020-04-21 PROCEDURE — 99308 SBSQ NF CARE LOW MDM 20: CPT | Performed by: NURSE PRACTITIONER

## 2020-04-21 NOTE — PROGRESS NOTES
NEEDED PICC TEAM IN THE PAST    Difficulty walking     WHEELCHAIR BOUND-PIVOTS WITH ASSIST O F 2    DJD (degenerative joint disease) of knee     Elbow, forearm, and wrist, abrasion or friction burn, without mention of infection 8/13/2013    Abrasions lft forearm     Encephalopathy acute 4/27/2016    Encounter regarding vascular access for dialysis for ESRD ([address] 2/2/2017    ESRD (end stage renal disease) on dialysis ([address] 1/17/2017    Forgetfulness     HAS SOME SHORT TERM MEMORY LOSS, QUYEN-WIFE IS LEGAL GUARDIAN    H/O transesophageal echocardiography ([facility]ADEO) for monitoring     Hemodialysis patient ([address] 11/11/2016    [place].  TUNNELED CATHETER RT UPPER CHEST    Hyperlipidemia 1990    on Meds    Hypertension 1990    on Meds    Intercritical gout     on Meds    Joint pain, knee 01/13/2017    baldo knee synvisc-1 injections    Long-term insulin use ([address] 1/17/2017    Major depressive disorder with single episode, in partial remission ([address] 1/3/2017    Mobility impaired     Morbid obesity ([address]     Muscle weakness     GRNERALIZED    Obesity     Obstructive sleep apnea     HEATHER on CPAP     On CPAP-TO BRING DOS    Paroxysmal atrial fibrillation ([address] 9/6/2017    Pneumonia 12/2/2019    Respiratory failure ([address] 03/2016    with open heart surgery, trached x 5 months    S/P cardiac cath     Seborrhea     Severe aortic stenosis 3/9/2016    Severe mitral valve stenosis 3/9/2016    Severe sepsis ([address] 4/3/2016    Skin rash     ABD FOLDS    Stasis dermatitis     Thyroid disease     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 lft thumb     Traumatic hemorrhagic shock ([address] 3/13/2019    Venous stasis dermatitis     Wears glasses     Wheelchair bound     pivots with 2 assists       PAST SURGICAL HISTORY:   Past Surgical History:   Procedure Laterality Date    AORTIC VALVE REPLACEMENT  03/18/2016    bioprosthetic    ARM SURGERY Left 1990    CARDIAC CATHETERIZATION  CENTRAL VENOUS CATHETER Right 2018    DIALYSIS CATHETER Dr Tuyet Gamboa    COLONOSCOPY  09    COLONOSCOPY N/A 10/12/2018    COLONOSCOPY WITH BIOPSY performed by Carla Strickland DO at [facility] Right 3/10/2019    WASHOUT RIGHT LOWER EXTREMITY WITH WOUND VAC EXCHANGE performed by Barbara Sifuentes MD at [address] Right 3/8/2019    RIGHT LOWER EXTREMITY EXPLORATION, HEMATOMA EVACUATION, WOUND VAC PLACEMENT performed by Barbara Sifuentes MD at [address] (x10-12 surgeries)    several surgeries on left arm    KNEE ARTHROSCOPY Left 99    MITRAL VALVE REPLACEMENT  2016    bioprosthetic     NASAL SEPTUM SURGERY      OTHER SURGICAL HISTORY  3/18/16    Aortic root Enlargement    OTHER SURGICAL HISTORY  3/18/16    ASD Closure    OTHER SURGICAL HISTORY Right 2017    AV fistula creation, wrist    OTHER SURGICAL HISTORY Right 2017    ligation of collateral branch of av fistula right wrist    OTHER SURGICAL HISTORY  2018    FISTULAGRAM WITH DR. Shelly Doty    [place] EGD TRANSORAL BIOPSY SINGLE/MULTIPLE N/A 10/17/2017    EGD BIOPSY performed by Meri Daniels MD at [address] ANGIOACCESS AV FISTULA Right 2017     RIGHT  LOWER ARM AV FISTULA  LIGATION OF AQUIRED BRANCHES X2 performed by Trip Xiao DO at [address]  3/18/16    median    VASCULAR SURGERY  2018    Right arm fistulagram,  PTA cephalic vein stenosis  /  DR Dominique Varma       SOCIAL HISTORY:     Tobacco:   Social History     Tobacco Use   Smoking Status Former Smoker    Packs/day: 2.00    Years: 25.00    Pack years: 50.00    Types: Cigarettes    Start date: 3/17/1955   [facility] Last attempt to quit: 1980    Years since quittin.3   Smokeless Tobacco Never Used     Alcohol:   Social History     Substance and Sexual Activity   Alcohol Use No    Alcohol/week: 0.0 standard drinks    Comment: 1-2 drinks per year     Drugs:   Social History     Substance and Sexual Activity   Drug Use No       FAMILY HISTORY: family history includes Alzheimer's Disease in his father; Diabetes in his maternal grandmother and mother; High Blood Pressure in his sister; Tara Steel in his mother. REVIEW OF SYSTEMS:     Please see history of chief complaint above; otherwise no new problems with respect to General, HEENT, Cardiovascular, Respiratory, Gastrointestinal, Genitourinary, Endocrinologic, Musculoskeletal, or Neuropsychiatric complaints. PHYSICAL EXAMINATION:    Vitals: Temp: 97.6 deg F. Pulse: 77. Resp: 16. BP: 123/72. General: A 75 y.o.  male. Alert and oriented to person, place and time. He does not appear to be in any acute distress. Skin: Skin color, texture, turgor normal. No rashes. There was a skin tear on his right forearm. HEENT/Neck: Essentially unremarkable  Chest: There was a chest catheter in place  Lungs: Normal - CTA without rales, rhonchi, or wheezing. Heart: regular rate and rhythm, S1, S2 normal, no murmur, click, rub or gallop No S3 or S4. Abdomen: Obese, soft, non-distended, non-tender, normal active bowel sounds, no masses palpated and no hepatosplenomegaly. There was a moderate-sized epigastric hernia present  Extremities: No clubbing, cyanosis, edema  Neurologic: cranial nerves II-XII are grossly intact    ASSESSMENT:     Diagnosis Orders   1. Controlled type 2 diabetes mellitus with chronic kidney disease on chronic dialysis, with long-term current use of insulin ([address]     2. Controlled type 2 diabetes mellitus with diabetic polyneuropathy, with long-term current use of insulin ([facility])     3. Long-term insulin use ([address]     4. ESRD (end stage renal disease) on dialysis ([address]     5. Dialysis patient ([address]     6. Anemia in chronic kidney disease, on chronic dialysis ([facility])     7. Paroxysmal atrial fibrillation ([address]     8. Severe aortic stenosis     9.

## 2020-04-21 NOTE — PROGRESS NOTES
knee     Elbow, forearm, and wrist, abrasion or friction burn, without mention of infection 8/13/2013    Abrasions lft forearm     Encephalopathy acute 4/27/2016    Encounter regarding vascular access for dialysis for ESRD ([address] 2/2/2017    ESRD (end stage renal disease) on dialysis ([address] 1/17/2017    Forgetfulness     HAS SOME SHORT TERM MEMORY LOSS, QUYEN-WIFE IS LEGAL GUARDIAN    H/O transesophageal echocardiography (AMADEO) for monitoring     Hemodialysis patient ([address] 11/11/2016    tues-thurs-sat defiance---FRESEMIUS.  TUNNELED CATHETER RT UPPER CHEST    Hyperlipidemia 1990    on Meds    Hypertension 1990    on Meds    Intercritical gout     on Meds    Joint pain, knee 01/13/2017    baldo knee synvisc-1 injections    Long-term insulin use ([address] 1/17/2017    Major depressive disorder with single episode, in partial remission ([address] 1/3/2017    Mobility impaired     Morbid obesity ([address]     Muscle weakness     GRNERALIZED    Obesity     Obstructive sleep apnea     HEATHER on CPAP     On CPAP-TO BRING DOS    Paroxysmal atrial fibrillation ([address] 9/6/2017    Pneumonia 12/2/2019    Respiratory failure ([address] 03/2016    with open heart surgery, trached x 5 months    S/P cardiac cath     Seborrhea     Severe aortic stenosis 3/9/2016    Severe mitral valve stenosis 3/9/2016    Severe sepsis ([address] 4/3/2016    Skin rash     ABD FOLDS    Stasis dermatitis     Thyroid disease     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 lft thumb     Traumatic hemorrhagic shock ([address] 3/13/2019    Venous stasis dermatitis     Wears glasses     Wheelchair bound     pivots with 2 assists       Past Surgical History:   Procedure Laterality Date    AORTIC VALVE REPLACEMENT  03/18/2016    bioprosthetic    ARM SURGERY Left 1990    CARDIAC CATHETERIZATION      CENTRAL VENOUS CATHETER Right 02/21/2018    DIALYSIS CATHETER Dr Gael Du COLONOSCOPY  11/19/09    COLONOSCOPY N/A 10/12/2018 COLONOSCOPY WITH BIOPSY performed by Maribell Valenzuela DO at [address] [facility] Right 3/10/2019    WASHOUT RIGHT LOWER EXTREMITY WITH WOUND VAC EXCHANGE performed by Evelin Santana MD at [address] [facility] Right 3/8/2019    RIGHT LOWER EXTREMITY EXPLORATION, HEMATOMA EVACUATION, WOUND VAC PLACEMENT performed by Evelin Santana MD at [address] (x10-12 surgeries)    several surgeries on left arm    KNEE ARTHROSCOPY Left 09/17/99    MITRAL VALVE REPLACEMENT  03/18/2016    bioprosthetic     NASAL SEPTUM SURGERY      OTHER SURGICAL HISTORY  3/18/16    Aortic root Enlargement    OTHER SURGICAL HISTORY  3/18/16    ASD Closure    OTHER SURGICAL HISTORY Right 01/09/2017    AV fistula creation, wrist    OTHER SURGICAL HISTORY Right 08/29/2017    ligation of collateral branch of av fistula right wrist    OTHER SURGICAL HISTORY  04/05/2018    FISTULAGRAM WITH DR. Yasmin Maldonado    [place] E[place] TRANSORAL BIOPSY SINGLE/MULTIPLE N/A 10/17/2017    EGD BIOPSY performed by Ridge Gupta MD at [address] ANGIOACCESS AV FISTULA Right 8/29/2017     RIGHT  LOWER ARM AV FISTULA  LIGATION OF AQUIRED BRANCHES X2 performed by Suzie Chavira DO at [address]  3/18/16    median    VASCULAR SURGERY  12/06/2018    Right arm fistulagram,  PTA cephalic vein stenosis  /  DR Jennifer Jimenes       Medications as per [facility] Chart /reviewed     Social History     Socioeconomic History    Marital status:      Spouse name: Elliot Simmons Number of children: 2    Years of education: Not on file    Highest education level: Not on file   Occupational History    Occupation: Retired      Employer: [address] resource strain: Not on file    Food insecurity     Worry: Not on file     Inability: Not on file    Transportation needs     Medical: Not on file     Non-medical: Not on file Tobacco Use    Smoking status: Former Smoker     Packs/day: 2.00     Years: 25.00     Pack years: 50.00     Types: Cigarettes     Start date: 3/17/1955     Last attempt to quit: 1980     Years since quittin.3    Smokeless tobacco: Never Used   Substance and Sexual Activity    Alcohol use: No     Alcohol/week: 0.0 standard drinks     Comment: 1-2 drinks per year    Drug use: No    Sexual activity: Never   Lifestyle    Physical activity     Days per week: Not on file     Minutes per session: Not on file    Stress: Not on file   Relationships    Social connections     Talks on phone: Not on file     Gets together: Not on file     Attends Mandaeism service: Not on file     Active member of club or organization: Not on file     Attends meetings of clubs or organizations: Not on file     Relationship status: Not on file    Intimate partner violence     Fear of current or ex partner: Not on file     Emotionally abused: Not on file     Physically abused: Not on file     Forced sexual activity: Not on file   Other Topics Concern    Not on file   Social History Narrative    Not on file       Review of Systems   Unable to perform ROS: Dementia       Physical Exam  Vitals signs and nursing note reviewed. Constitutional:       General: He is not in acute distress. Appearance: He is well-developed. He is not diaphoretic. HENT:      Head: Normocephalic and atraumatic. Eyes:      General:         Right eye: No discharge. Left eye: No discharge. Neck:      Trachea: No tracheal deviation. Cardiovascular:      Rate and Rhythm: Normal rate. Pulmonary:      Effort: Pulmonary effort is normal. No respiratory distress. Abdominal:      Comments: obese   Skin:     General: Skin is warm and dry. Coloration: Skin is not pale. Comments: Raised erythemic plaque to bilateral feet   Neurological:      Mental Status: He is alert. Cranial Nerves: No cranial nerve deficit.       Sensory:

## 2020-04-28 PROCEDURE — 99309 SBSQ NF CARE MODERATE MDM 30: CPT | Performed by: INTERNAL MEDICINE

## 2020-04-29 ENCOUNTER — OUTSIDE SERVICES (OUTPATIENT)
Dept: INTERNAL MEDICINE | Age: 75
End: 2020-04-29
Payer: MEDICARE

## 2020-04-30 NOTE — PROGRESS NOTES
ASSIST O F 2    DJD (degenerative joint disease) of knee     Elbow, forearm, and wrist, abrasion or friction burn, without mention of infection 8/13/2013    Abrasions lft forearm     Encephalopathy acute 4/27/2016    Encounter regarding vascular access for dialysis for ESRD ([address] 2/2/2017    ESRD (end stage renal disease) on dialysis ([address] 1/17/2017    Forgetfulness     HAS SOME SHORT TERM MEMORY LOSS, UQYEN-WIFE IS LEGAL GUARDIAN    H/O transesophageal echocardiography (AMADEO) for monitoring     Hemodialysis patient ([address] 11/11/2016    tues-thurs-sat [place]---FRESEMIUS.  TUNNELED CATHETER RT UPPER CHEST    Hyperlipidemia 1990    on Meds    Hypertension 1990    on Meds    Intercritical gout     on Meds    Joint pain, knee 01/13/2017    baldo knee synvisc-1 injections    Long-term insulin use ([address] 1/17/2017    Major depressive disorder with single episode, in partial remission ([address] 1/3/2017    Mobility impaired     Morbid obesity ([address]     Muscle weakness     GRNERALIZED    Obesity     Obstructive sleep apnea     HEATHER on CPAP     On CPAP-TO BRING DOS    Paroxysmal atrial fibrillation ([address] 9/6/2017    Pneumonia 12/2/2019    Respiratory failure ([address] 03/2016    with open heart surgery, trached x 5 months    S/P cardiac cath     Seborrhea     Severe aortic stenosis 3/9/2016    Severe mitral valve stenosis 3/9/2016    Severe sepsis ([address] 4/3/2016    Skin rash     ABD FOLDS    Stasis dermatitis     Thyroid disease     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 lft thumb     Traumatic hemorrhagic shock ([address] 3/13/2019    Venous stasis dermatitis     Wears glasses     Wheelchair bound     pivots with 2 assists       PAST SURGICAL HISTORY:   Past Surgical History:   Procedure Laterality Date    AORTIC VALVE REPLACEMENT  03/18/2016    bioprosthetic    ARM SURGERY Left 1990    CARDIAC CATHETERIZATION      CENTRAL VENOUS CATHETER Right 02/21/2018    DIALYSIS CATHETER Dr Ifrah Soto apnea     12. Major depressive disorder with single episode, in partial remission ([address]     13. Essential hypertension     14. Mixed hyperlipidemia     15. Traumatic amputation of thumb, left, sequela ([address]     16. Class 2 severe obesity with serious comorbidity and body mass index (BMI) of 35.0 to 35.9 in adult, unspecified obesity type ([address]           PLAN:    1. Continue current treatment  2. Nursing home record reviewed and updates summarized and entered into electronic record  3. Nursing home blood sugar logs and diabetic medication administration reviewed. No changes. 4. Coumadin management is ongoing. We are the ones managing his Coumadin. 5. See nursing home orders and MAR.       Electronically signed by Rain Avilez DO on 4/29/2020 at 10:52 PM  Internal Medicine

## 2020-05-15 ENCOUNTER — TELEPHONE (OUTPATIENT)
Dept: INTERNAL MEDICINE | Age: 75
End: 2020-05-15

## 2020-05-19 ENCOUNTER — OUTSIDE SERVICES (OUTPATIENT)
Dept: INTERNAL MEDICINE | Age: 75
End: 2020-05-19
Payer: MEDICARE

## 2020-05-19 PROCEDURE — 99308 SBSQ NF CARE LOW MDM 20: CPT | Performed by: NURSE PRACTITIONER

## 2020-05-19 NOTE — PROGRESS NOTES
 Number of children: 2    Years of education: Not on file    Highest education level: Not on file   Occupational History    Occupation: Retired      Employer: [address] resource strain: Not on file    Food insecurity     Worry: Not on file     Inability: Not on file   [employer] needs     Medical: Not on file     Non-medical: Not on file   Tobacco Use    Smoking status: Former Smoker     Packs/day: 2.00     Years: 25.00     Pack years: 50.00     Types: Cigarettes     Start date: 3/17/1955     Last attempt to quit: 1980     Years since quittin.4    Smokeless tobacco: Never Used   Substance and Sexual Activity    Alcohol use: No     Alcohol/week: 0.0 standard drinks     Comment: 1-2 drinks per year    Drug use: No    Sexual activity: Never   Lifestyle    Physical activity     Days per week: Not on file     Minutes per session: Not on file    Stress: Not on file   Relationships    Social connections     Talks on phone: Not on file     Gets together: Not on file     Attends [employer] service: Not on file     Active member of club or organization: Not on file     Attends meetings of clubs or organizations: Not on file     Relationship status: Not on file    Intimate partner violence     Fear of current or ex partner: Not on file     Emotionally abused: Not on file     Physically abused: Not on file     Forced sexual activity: Not on file   Other Topics Concern    Not on file   Social History Narrative    Not on file       Review of Systems   Unable to perform ROS: Dementia       Physical Exam  Vitals signs and nursing note reviewed. Constitutional:       General: He is not in acute distress. Appearance: He is well-developed. He is obese. He is not ill-appearing, toxic-appearing or diaphoretic. HENT:      Head: Normocephalic and atraumatic. Eyes:      General:         Right eye: No discharge.          Left eye: No

## 2020-05-21 ENCOUNTER — OUTSIDE SERVICES (OUTPATIENT)
Dept: INTERNAL MEDICINE | Age: 75
End: 2020-05-21
Payer: MEDICARE

## 2020-05-21 PROCEDURE — 99308 SBSQ NF CARE LOW MDM 20: CPT | Performed by: NURSE PRACTITIONER

## 2020-05-24 PROCEDURE — 99309 SBSQ NF CARE MODERATE MDM 30: CPT | Performed by: INTERNAL MEDICINE

## 2020-05-28 ENCOUNTER — OUTSIDE SERVICES (OUTPATIENT)
Dept: INTERNAL MEDICINE | Age: 75
End: 2020-05-28
Payer: MEDICARE

## 2020-05-29 NOTE — PROGRESS NOTES
Social History     Substance and Sexual Activity   Drug Use No       FAMILY HISTORY: family history includes Alzheimer's Disease in his father; Diabetes in his maternal grandmother and mother; High Blood Pressure in his sister; Robert Bores in his mother. REVIEW OF SYSTEMS:     Please see history of chief complaint above; otherwise no new problems with respect to General, HEENT, Cardiovascular, Respiratory, Gastrointestinal, Genitourinary, Endocrinologic, Musculoskeletal, or Neuropsychiatric complaints. PHYSICAL EXAMINATION:    Due to this being a telehealth visit, the physical examination was performed by the nurse at the facility. Vitals: Temp: 96.9 deg F. Pulse: 68. Resp: 14. BP: 127/79. General: A 75 y.o.  male. Alert and oriented to person, place and time. He does not appear to be in any acute distress. Skin: Skin color, texture, turgor normal. No rashes or lesions. HEENT/Neck: Essentially unremarkable  Chest: There was a chest catheter in place  Lungs: Normal - CTA without rales, rhonchi, or wheezing. Heart: regular rate and rhythm, S1, S2 normal, no murmur, click, rub or gallop No S3 or S4. Abdomen: Obese, soft, slightly distended, non-tender, normal active bowel sounds, no masses palpated and no hepatosplenomegaly. There was a moderate-sized epigastric hernia present  Extremities: No clubbing, cyanosis, edema  Neurologic: cranial nerves II-XII are grossly intact    ASSESSMENT:     Diagnosis Orders   1. Controlled type 2 diabetes mellitus with chronic kidney disease on chronic dialysis, with long-term current use of insulin ([address]     2. Controlled type 2 diabetes mellitus with diabetic polyneuropathy, with long-term current use of insulin ([facility])     3. Long-term insulin use ([address]     4. ESRD (end stage renal disease) on dialysis ([address]     5. Dialysis patient ([address]     6. Anemia in chronic kidney disease, on chronic dialysis (HCC)     7. Paroxysmal atrial fibrillation ([address]     8.

## 2020-06-15 ENCOUNTER — TELEPHONE (OUTPATIENT)
Dept: INTERNAL MEDICINE | Age: 75
End: 2020-06-15

## 2020-06-16 ENCOUNTER — OUTSIDE SERVICES (OUTPATIENT)
Dept: INTERNAL MEDICINE | Age: 75
End: 2020-06-16
Payer: MEDICARE

## 2020-06-16 VITALS
SYSTOLIC BLOOD PRESSURE: 120 MMHG | DIASTOLIC BLOOD PRESSURE: 57 MMHG | BODY MASS INDEX: 35.73 KG/M2 | TEMPERATURE: 97.6 F | RESPIRATION RATE: 16 BRPM | HEART RATE: 69 BPM | OXYGEN SATURATION: 97 % | WEIGHT: 235 LBS

## 2020-06-16 PROCEDURE — G0439 PPPS, SUBSEQ VISIT: HCPCS | Performed by: NURSE PRACTITIONER

## 2020-06-16 RX ORDER — LIDOCAINE 5% 5 G/100G
CREAM TOPICAL
COMMUNITY

## 2020-06-16 RX ORDER — ONDANSETRON 4 MG/1
4 TABLET, FILM COATED ORAL EVERY 6 HOURS PRN
COMMUNITY

## 2020-06-16 RX ORDER — BUPROPION HYDROCHLORIDE 150 MG/1
150 TABLET, EXTENDED RELEASE ORAL DAILY
COMMUNITY

## 2020-06-16 RX ORDER — DOXYCYCLINE HYCLATE 100 MG
100 TABLET ORAL 2 TIMES DAILY
COMMUNITY
End: 2021-01-01

## 2020-06-16 RX ORDER — GUAIFENESIN 400 MG/1
400 TABLET ORAL EVERY 12 HOURS PRN
COMMUNITY

## 2020-06-16 RX ORDER — IPRATROPIUM BROMIDE AND ALBUTEROL SULFATE 2.5; .5 MG/3ML; MG/3ML
3 SOLUTION RESPIRATORY (INHALATION) EVERY 8 HOURS PRN
COMMUNITY

## 2020-06-16 RX ORDER — BENZONATATE 100 MG/1
100 CAPSULE ORAL EVERY 12 HOURS PRN
COMMUNITY

## 2020-06-16 RX ORDER — TIZANIDINE 2 MG/1
2 TABLET ORAL EVERY 8 HOURS PRN
COMMUNITY

## 2020-06-16 RX ORDER — ALBUTEROL SULFATE 2.5 MG/3ML
2.5 SOLUTION RESPIRATORY (INHALATION) EVERY 8 HOURS PRN
COMMUNITY

## 2020-06-16 ASSESSMENT — LIFESTYLE VARIABLES: HOW OFTEN DO YOU HAVE A DRINK CONTAINING ALCOHOL: 0

## 2020-06-16 NOTE — PROGRESS NOTES
Medicare Annual Wellness Visit  Name: Luis Fernando Trinidad Date: 2020   MRN: [id number] Sex: Male   Age: 75 y.o. Ethnicity: Non-/Non    : 1945 Race: Rosalba Riddle is here for Medicare AWV    Screenings for behavioral, psychosocial and functional/safety risks, and cognitive dysfunction are all negative except as indicated below. These results, as well as other patient data from the [address] form, are documented in Flowsheets linked to this Encounter. Allergies   Allergen Reactions    Percocet [Oxycodone-Acetaminophen] Itching and Rash     Also causes shaking    Ampicillin Rash       Prior to Visit Medications    Medication Sig Taking? Authorizing Provider   Lidocaine 5 % CREA Apply topically Apply to fistula rt arm topically one time a day every Mon, Wed, Fri for dialysis. Historical Provider, MD   buPROPion [facility] SR) 150 MG extended release tablet Take 150 mg by mouth daily  Historical Provider, MD   doxycycline hyclate (VIBRA-TABS) 100 MG tablet Take 100 mg by mouth 2 times daily x10 days, starting 20 for cellulitis.   Historical Provider, MD   albuterol (PROVENTIL) (2.5 MG/3ML) 0.083% nebulizer solution Take 2.5 mg by nebulization every 8 hours as needed for Shortness of Breath  Historical Provider, MD   benzonatate (TESSALON) 100 MG capsule Take 100 mg by mouth every 12 hours as needed for Cough  Historical Provider, MD   ipratropium-albuterol (DUONEB) 0.5-2.5 (3) MG/3ML SOLN nebulizer solution Inhale 3 mLs into the lungs every 8 hours as needed for Shortness of Breath (or wheezing)  Historical Provider, MD   guaiFENesin 400 MG tablet Take 400 mg by mouth every 12 hours as needed (allergies)  Historical Provider, MD   ondansetron (ZOFRAN) 4 MG tablet Take 4 mg by mouth every 6 hours as needed for Nausea or Vomiting  Historical Provider, MD   tiZANidine (ZANAFLEX) 2 MG tablet Take 2 mg by mouth every 8 hours as needed (muscle pain)  Historical Provider, MD   RENVELA 800 MG tablet Take 2 tablets by mouth 3 times daily Give with snack. Octavio Mcarthur,    FIASP FLEXTOUCH 100 UNIT/ML SOPN Inject into the skin See Admin Instructions Per sliding scale: If 150-200 = 2 units; 201-250 = 4 units; 251-300 = 6 units; 301-350 = 8 units; 351-400 = 10 units; 401+ = 15 units, then call MD Varela Provider, MD   HYDROcodone-acetaminophen (NORCO) 5-325 MG per tablet Take 1 tablet by mouth every 4 hours as needed. Historical Provider, MD   Elastic Bandages & Supports MISC 30-40 mmHg compression stockings to both lower legs, on every morning, off at bedtime. Elgin Patiño MD   gabapentin (NEURONTIN) 300 MG capsule Take 300 mg by mouth daily. Historical Provider, MD   ALPRAZolam Judbrandon Chilel) 0.5 MG tablet Take 0.5 mg by mouth 2 times daily. Historical Provider, MD   DULoxetine (CYMBALTA) 60 MG extended release capsule Take 60 mg by mouth daily  Historical Provider, MD   loratadine (CLARITIN) 10 MG capsule Take 10 mg by mouth daily (every other day). Historical Provider, MD   aspirin 81 MG tablet Take 1 tablet by mouth daily  Jeri Burgess MD   apixaban (ELIQUIS) 5 MG TABS tablet Take 1 tablet by mouth 2 times daily  Jeri Burgess MD   Multiple Vitamins-Minerals (THERAPEUTIC MULTIVITAMIN-MINERALS) tablet Take 1 tablet by mouth daily  Historical Provider, MD   omeprazole (PRILOSEC) 20 MG delayed release capsule Take 20 mg by mouth nightly   Historical Provider, MD   atorvastatin (LIPITOR) 40 MG tablet Take 1 tablet by mouth nightly  Sarah Barrow MD   midodrine (PROAMATINE) 10 MG tablet Take 1 tablet by mouth 3 times daily  Patient taking differently: Take 10 mg by mouth 3 times daily Hold if SBP over 150  Sarah Barrow MD   folic acid (FOLVITE) 1 MG tablet Take 1 tablet by mouth daily  Sarah Barrow MD   insulin glargine (LANTUS) 100 UNIT/ML injection vial Inject 25 Units into the skin 2 times daily  Sarah Barrow MD   Misc.  Devices [facility]) [address] Use as directed  Octavio Mcarthur DO   Amino Acids-Protein Hydrolys (PRO-STAT PO) Take 30 mLs by mouth 3 times daily (Prostat SF) for wound healing.   Historical Provider, MD   donepezil (ARICEPT) 5 MG tablet Take 5 mg by mouth every evening  Historical Provider, MD   levothyroxine (SYNTHROID) 25 MCG tablet Take 1 tablet by mouth Daily  Ladoris Cave, APRN - CNP   fluticasone (FLONASE) 50 MCG/ACT nasal spray 2 sprays by Nasal route daily  Octavio Mcarthur DO   magnesium hydroxide (MILK OF MAGNESIA) 400 MG/5ML suspension Take 30 mLs by mouth daily as needed for Constipation  Merissa Poe MD       Past Medical History:   Diagnosis Date    Acute blood loss anemia 3/11/2019    Acute on chronic diastolic CHF (congestive heart failure) ([address] 12/2/2019    Anemia in chronic kidney disease (CKD) 4/27/2016    Arthritis     Bacteremia 11/11/2016    Benign prostatic hyperplasia without lower urinary tract symptoms     Bleeding from wound 4/2/2019    BMI 40.0-44.9, adult ([address] 3/19/2018    Cellulitis 3/21/2019    Cellulitis of left lower extremity     Chronic cerebral ischemia 1/3/2017    Closed fracture of forearm 8/13/2013    Broken lft forearm     Controlled type 2 diabetes mellitus with chronic kidney disease on chronic dialysis, with long-term current use of insulin ([address]     Controlled type 2 diabetes mellitus with diabetic polyneuropathy, with long-term current use of insulin ([address] 10/7/2018    Decubital ulcer     NON OPEN BUTTOCKS    Dementia ([address]     memory problems    Dialysis patient ([address] 01/03/2017    Goes Tue, Thurs, Sat in [place]    Difficult intravenous access     HAS NEEDED PICC TEAM IN THE PAST    Difficulty walking     WHEELCHAIR BOUND-PIVOTS WITH ASSIST O F 2    DJD (degenerative joint disease) of knee     Elbow, forearm, and wrist, abrasion or friction burn, without mention of infection 8/13/2013    Abrasions lft forearm     Encephalopathy acute 4/27/2016

## 2020-06-23 PROCEDURE — 99309 SBSQ NF CARE MODERATE MDM 30: CPT | Performed by: INTERNAL MEDICINE

## 2020-06-30 ENCOUNTER — OUTSIDE SERVICES (OUTPATIENT)
Dept: INTERNAL MEDICINE | Age: 75
End: 2020-06-30
Payer: MEDICARE

## 2020-06-30 NOTE — PROGRESS NOTES
ASSIST O F 2    DJD (degenerative joint disease) of knee     Elbow, forearm, and wrist, abrasion or friction burn, without mention of infection 8/13/2013    Abrasions lft forearm     Encephalopathy acute 4/27/2016    Encounter regarding vascular access for dialysis for ESRD ([address] 2/2/2017    ESRD (end stage renal disease) on dialysis ([address] 1/17/2017    Forgetfulness     HAS SOME SHORT TERM MEMORY LOSS, QUYEN-WIFE IS LEGAL GUARDIAN    H/O transesophageal echocardiography (AMADEO) for monitoring     Hemodialysis patient ([address] 11/11/2016    tues-thurs-sat [place]---FRESEMIUS.  TUNNELED CATHETER RT UPPER CHEST    Hyperlipidemia 1990    on Meds    Hypertension 1990    on Meds    Intercritical gout     on Meds    Joint pain, knee 01/13/2017    baldo knee synvisc-1 injections    Long-term insulin use ([address] 1/17/2017    Major depressive disorder with single episode, in partial remission ([address] 1/3/2017    Mobility impaired     Morbid obesity ([address]     Muscle weakness     GRNERALIZED    Obesity     Obstructive sleep apnea     HEATHER on CPAP     On CPAP-TO BRING DOS    Paroxysmal atrial fibrillation ([address] 9/6/2017    Pneumonia 12/2/2019    Respiratory failure ([address] 03/2016    with open heart surgery, trached x 5 months    S/P cardiac cath     Seborrhea     Severe aortic stenosis 3/9/2016    Severe mitral valve stenosis 3/9/2016    Severe sepsis ([address] 4/3/2016    Skin rash     ABD FOLDS    Stasis dermatitis     Thyroid disease     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 lft thumb     Traumatic hemorrhagic shock ([address] 3/13/2019    Venous stasis dermatitis     Wears glasses     Wheelchair bound     pivots with 2 assists       PAST SURGICAL HISTORY:   Past Surgical History:   Procedure Laterality Date    AORTIC VALVE REPLACEMENT  03/18/2016    bioprosthetic    ARM SURGERY Left 1990    CARDIAC CATHETERIZATION      CENTRAL VENOUS CATHETER Right 02/21/2018    DIALYSIS CATHETER Dr Sydni Butler and Sexual Activity   Drug Use No       FAMILY HISTORY: family history includes Alzheimer's Disease in his father; Diabetes in his maternal grandmother and mother; High Blood Pressure in his sister; Santose Shayyy in his mother. REVIEW OF SYSTEMS:     Please see history of chief complaint above; otherwise no new problems with respect to General, HEENT, Cardiovascular, Respiratory, Gastrointestinal, Genitourinary, Endocrinologic, Musculoskeletal, or Neuropsychiatric complaints. PHYSICAL EXAMINATION:    Vitals: Temp: 97.7 deg F. Pulse: 59. Resp: 16. BP: 123/80. General: A 75 y.o.  male. Alert and oriented to person, place and time. He does not appear to be in any acute distress. Skin: Skin color, texture, turgor normal. No rashes or lesions. HEENT/Neck: Essentially unremarkable  Chest: There was a chest catheter in place  Lungs: Normal - CTA without rales, rhonchi, or wheezing. Heart: regular rate and rhythm, S1, S2 normal, no murmur, click, rub or gallop No S3 or S4. Abdomen: Obese, soft, slightly distended, non-tender, normal active bowel sounds, no masses palpated and no hepatosplenomegaly. There was a moderate-sized epigastric hernia present  Extremities: No clubbing, cyanosis, edema  Neurologic: cranial nerves II-XII are grossly intact    ASSESSMENT:     Diagnosis Orders   1. Controlled type 2 diabetes mellitus with chronic kidney disease on chronic dialysis, with long-term current use of insulin ([address]     2. Controlled type 2 diabetes mellitus with diabetic polyneuropathy, with long-term current use of insulin ([facility])     3. Long-term insulin use ([address]     4. ESRD (end stage renal disease) on dialysis ([address]     5. Dialysis patient ([address]     6. Anemia in chronic kidney disease, on chronic dialysis (HCC)     7. Paroxysmal atrial fibrillation ([address]     8. Severe aortic stenosis     9. Severe mitral valve stenosis     10. Vascular dementia without behavioral disturbance ([address]     11.  Obstructive

## 2020-07-02 ENCOUNTER — OFFICE VISIT (OUTPATIENT)
Dept: CARDIOLOGY | Age: 75
End: 2020-07-02
Payer: MEDICARE

## 2020-07-02 VITALS
HEART RATE: 79 BPM | DIASTOLIC BLOOD PRESSURE: 50 MMHG | OXYGEN SATURATION: 100 % | SYSTOLIC BLOOD PRESSURE: 140 MMHG | RESPIRATION RATE: 16 BRPM

## 2020-07-02 PROCEDURE — 93010 ELECTROCARDIOGRAM REPORT: CPT | Performed by: INTERNAL MEDICINE

## 2020-07-02 PROCEDURE — 3017F COLORECTAL CA SCREEN DOC REV: CPT | Performed by: INTERNAL MEDICINE

## 2020-07-02 PROCEDURE — 99214 OFFICE O/P EST MOD 30 MIN: CPT | Performed by: INTERNAL MEDICINE

## 2020-07-02 PROCEDURE — 93005 ELECTROCARDIOGRAM TRACING: CPT | Performed by: INTERNAL MEDICINE

## 2020-07-02 PROCEDURE — G8427 DOCREV CUR MEDS BY ELIG CLIN: HCPCS | Performed by: INTERNAL MEDICINE

## 2020-07-02 PROCEDURE — 1036F TOBACCO NON-USER: CPT | Performed by: INTERNAL MEDICINE

## 2020-07-02 PROCEDURE — 1123F ACP DISCUSS/DSCN MKR DOCD: CPT | Performed by: INTERNAL MEDICINE

## 2020-07-02 PROCEDURE — G8417 CALC BMI ABV UP PARAM F/U: HCPCS | Performed by: INTERNAL MEDICINE

## 2020-07-02 PROCEDURE — 4040F PNEUMOC VAC/ADMIN/RCVD: CPT | Performed by: INTERNAL MEDICINE

## 2020-07-02 NOTE — PROGRESS NOTES
amputation of thumb, Traumatic hemorrhagic shock ([facility] [address] Venous stasis dermatitis, Wears glasses, and Wheelchair bound. Past Surgical History:   has a past surgical history that includes Colonoscopy (11/19/09); Knee arthroscopy (Left, 09/17/99); Hand surgery (Left, 1990 (x10-12 surgeries)); Arm Surgery (Left, 1990); Cardiac catheterization; Nasal septum surgery; Sternotomy (3/18/16); Aortic valve replacement (03/18/2016); Mitral valve replacement (03/18/2016); other surgical history (3/18/16); other surgical history (3/18/16); other surgical history (Right, 01/09/2017); other surgical history (Right, 08/29/2017); pr ligatn angioaccess av fistula (Right, 8/29/2017); pr egd transoral biopsy single/multiple (N/A, 10/17/2017); central venous catheter (Right, 02/21/2018); Colonoscopy (N/A, 10/12/2018); other surgical history (04/05/2018); vascular surgery (12/06/2018); EXPLORATION OF WOUND OF EXTREMITY (Right, 3/10/2019); and EXPLORATION OF WOUND OF EXTREMITY (Right, 3/8/2019). Home Medications:    Prior to Admission medications    Medication Sig Start Date End Date Taking? Authorizing Provider   Lidocaine 5 % CREA Apply topically Apply to fistula rt arm topically one time a day every Mon, Wed, Fri for dialysis. Historical Provider, MD   buPROPion [facility] FOR [facility]) 150 MG extended release tablet Take 150 mg by mouth daily    Historical Provider, MD   doxycycline hyclate (VIBRA-TABS) 100 MG tablet Take 100 mg by mouth 2 times daily x10 days, starting 6/13/20 for cellulitis.     Historical Provider, MD   albuterol (PROVENTIL) (2.5 MG/3ML) 0.083% nebulizer solution Take 2.5 mg by nebulization every 8 hours as needed for Shortness of Breath    Historical Provider, MD   benzonatate (TESSALON) 100 MG capsule Take 100 mg by mouth every 12 hours as needed for Cough    Historical Provider, MD   ipratropium-albuterol (DUONEB) 0.5-2.5 (3) MG/3ML SOLN nebulizer solution Inhale 3 mLs into the lungs every 8 hours as needed for Shortness of Breath (or wheezing)    Historical Provider, MD   guaiFENesin 400 MG tablet Take 400 mg by mouth every 12 hours as needed (allergies)    Historical Provider, MD   ondansetron (ZOFRAN) 4 MG tablet Take 4 mg by mouth every 6 hours as needed for Nausea or Vomiting    Historical Provider, MD   tiZANidine (ZANAFLEX) 2 MG tablet Take 2 mg by mouth every 8 hours as needed (muscle pain)    Historical Provider, MD   RENVELA 800 MG tablet Take 2 tablets by mouth 3 times daily Give with snack. 4/6/20   Octavio Mcarthur, DO   FIASP FLEXTOUCH 100 UNIT/ML SOPN Inject into the skin See Admin Instructions Per sliding scale: If 150-200 = 2 units; 201-250 = 4 units; 251-300 = 6 units; 301-350 = 8 units; 351-400 = 10 units; 401+ = 15 units, then call MD 3/4/20   Historical Provider, MD   HYDROcodone-acetaminophen (NORCO) 5-325 MG per tablet Take 1 tablet by mouth every 4 hours as needed. 1/26/20   Historical Provider, MD   Elastic Bandages & Supports MISC 30-40 mmHg compression stockings to both lower legs, on every morning, off at bedtime. 11/19/19   Irlanda Vieira MD   gabapentin (NEURONTIN) 300 MG capsule Take 300 mg by mouth daily. Historical Provider, MD   ALPRAZolam [facility]) 0.5 MG tablet Take 0.5 mg by mouth 2 times daily. Historical Provider, MD   DULoxetine (CYMBALTA) 60 MG extended release capsule Take 60 mg by mouth daily    Historical Provider, MD   loratadine (CLARITIN) 10 MG capsule Take 10 mg by mouth daily (every other day).     Historical Provider, MD   aspirin 81 MG tablet Take 1 tablet by mouth daily 4/8/19   Blade Sanchez MD   apixaban (ELIQUIS) 5 MG TABS tablet Take 1 tablet by mouth 2 times daily 4/8/19   Blade Sanchez MD   Multiple Vitamins-Minerals (THERAPEUTIC MULTIVITAMIN-MINERALS) tablet Take 1 tablet by mouth daily    Historical Provider, MD   omeprazole (PRILOSEC) 20 MG delayed release capsule Take 20 mg by mouth nightly     Historical Provider, MD   atorvastatin (LIPITOR) 40 MG tablet Take 1 tablet by mouth nightly 3/24/19   Deidre Ngo MD   midodrine (PROAMATINE) 10 MG tablet Take 1 tablet by mouth 3 times daily  Patient taking differently: Take 10 mg by mouth 3 times daily Hold if SBP over 150 3/19/19   Deidre Ngo MD   folic acid (FOLVITE) 1 MG tablet Take 1 tablet by mouth daily 3/19/19   Deidre Ngo MD   insulin glargine (LANTUS) 100 UNIT/ML injection vial Inject 25 Units into the skin 2 times daily 3/19/19   Deidre Ngo MD   Misc. Devices [facility]) MISC Use as directed 12/12/18   Octavio Mcarthur DO   Amino Acids-Protein Hydrolys (PRO-STAT PO) Take 30 mLs by mouth 3 times daily (Prostat SF) for wound healing. Historical Provider, MD   donepezil (ARICEPT) 5 MG tablet Take 5 mg by mouth every evening 10/26/16   Historical Provider, MD   levothyroxine (SYNTHROID) 25 MCG tablet Take 1 tablet by mouth Daily 10/26/16   SHAMEKA Noe CNP   fluticasone (FLONASE) 50 MCG/ACT nasal spray 2 sprays by Nasal route daily 10/24/16   Octavio Mcarthur DO   magnesium hydroxide (MILK OF MAGNESIA) 400 MG/5ML suspension Take 30 mLs by mouth daily as needed for Constipation 3/29/16   Mariann Hawkins MD       Allergies:  Percocet [oxycodone-acetaminophen] and Ampicillin    Social History:   reports that he quit smoking about 40 years ago. His smoking use included cigarettes. He started smoking about 65 years ago. He has a 50.00 pack-year smoking history. He has never used smokeless tobacco. He reports that he does not drink alcohol or use drugs. Family History:    Family History   Problem Relation Age of Onset    Lung Cancer Mother     Diabetes Mother     Alzheimer's Disease Father     Diabetes Maternal Grandmother     High Blood Pressure Sister        REVIEW OF SYSTEMS:  CONSTITUTIONAL:NEGATIVE  HEENT:NEG  Cardiovascular: No chest pain, Yes dyspnea on exertion, No palpitations.  Lower extremity edema: No  RESPIRATORY: BAHENA  GASTROINTESTINAL: negative  GENITOURINARY:  negative  INTEGUMENT:  negative  MUSCULOSKELETAL:  positive for  pain  NEUROLOGICAL:  negative    PHYSICAL EXAM:      BP (!) 140/50   Pulse 79   Resp 16   SpO2 100%    HEENT: PERRL, no cervical lymphadenopathy. No masses palpable. Cardiovascular:  · The apical impulse is not displaced  · Heart  Sounds:RRR, KAITLIN, HSM LSB  · Jugular venous pulsation Normal  · The carotid upstroke is normal  · Peripheral pulses are symmetrical and full  Respiratory: Good respiratory effort. On auscultation: clear to auscultation bilaterally  Abdomen:  · No masses or tenderness  · Bowel sounds present  Extremities:  ·  No Cyanosis or Clubbing  ·  Lower extremity edema: No  Skin: Warm and dry    Cardiac data:    EKG: Sinus Rhythm  -First degree A-V block   Eufemia = 276  - Old  Extensive anterior-lateral and  Old  Inferior infarct  -Left axis secondary to infarct -consider anterior fascicular block. ABNORMAL       Labs:     CBC: No results for input(s): WBC, HGB, HCT, PLT in the last 72 hours. BMP: No results for input(s): NA, K, CO2, BUN, CREATININE, LABGLOM, GLUCOSE in the last 72 hours. PT/INR: No results for input(s): PROTIME, INR in the last 72 hours. FASTING LIPID PANEL:  Lab Results   Component Value Date    HDL 54 04/27/2015    TRIG 100 03/22/2016     LIVER PROFILE:No results for input(s): AST, ALT, LABALBU in the last 72 hours.     Assessment:    PAF  S/P AVR, MV REPAIR, closure of ASD/foramen ovale with Dacron patch 3/2016    CKD/ESRD ON HD  DM II  MORBID OBESITY  HEATHER  A/c with NOAC  RIGHT LEG WOUND S/P FALL/HEMATOMA    Patient Active Problem List   Diagnosis    Traumatic amputation of thumb, left, sequela (HCC)    Essential hypertension    Mixed hyperlipidemia    Obstructive sleep apnea    Class 2 severe obesity with serious comorbidity and body mass index (BMI) of 35.0 to 35.9 in adult [facility])    Benign prostatic hyperplasia without lower urinary tract symptoms    Venous stasis dermatitis  Severe aortic stenosis    Severe mitral valve stenosis    [facility] in [facility]    Dialysis patient ([facility]    Dementia ([facility]    Major depressive disorder with single episode, in partial remission ([facility]    Chronic cerebral ischemia    ESRD (end stage renal disease) on dialysis ([facility]    Long-term insulin use ([facility]    BMI 40.0-44.9, adult ([facility]    Controlled type 2 diabetes mellitus with chronic kidney disease on chronic dialysis, with long-term current use of insulin (HCC)    Controlled type 2 diabetes mellitus with diabetic polyneuropathy, with long-term current use of insulin (HCC)    Dermatitis    Hematoma of right lower extremity    Hematoma of groin    Accidental fall from wheelchair    Open wound    Hematoma of right thigh    Wound of right leg    Open wound of right lower extremity    Complication of arteriovenous dialysis fistula       Recommendations:  Discussed medication use and possible side effects. Answered all questions and concerns. Refill medications as needed until next visit. RTC appointment is scheduled.         RTC [address] [place] [address] Cardiology Consult           [phone]

## 2020-07-22 ENCOUNTER — HOSPITAL ENCOUNTER (EMERGENCY)
Age: 75
Discharge: HOME | End: 2020-07-22
Attending: EMERGENCY MEDICINE
Payer: MEDICARE

## 2020-07-22 VITALS
DIASTOLIC BLOOD PRESSURE: 61 MMHG | SYSTOLIC BLOOD PRESSURE: 127 MMHG | HEART RATE: 83 BPM | OXYGEN SATURATION: 100 % | TEMPERATURE: 97 F | RESPIRATION RATE: 16 BRPM

## 2020-07-22 PROCEDURE — 99283 EMERGENCY DEPT VISIT LOW MDM: CPT

## 2020-07-22 ASSESSMENT — ENCOUNTER SYMPTOMS
SORE THROAT: 0
VOMITING: 0
TROUBLE SWALLOWING: 0
ABDOMINAL PAIN: 0
DIARRHEA: 0
WHEEZING: 0
SHORTNESS OF BREATH: 0
NAUSEA: 0
BACK PAIN: 0

## 2020-07-22 NOTE — ED NOTES
Report called to [facility][note 3] [address] [facility] [address], [address] [facility]  07/22/20 9122

## 2020-07-22 NOTE — ED PROVIDER NOTES
[facility]  eMERGENCY dEPARTMENT eNCOUnter      Pt Name: Judith Vrea  MRN: [id number]  [place] 1945  Date of evaluation: 7/22/2020      CHIEF COMPLAINT       Chief Complaint   Patient presents with    Other     here for c/o \"hematoma to right elbow and right 3rd toe\" per [facility] staff         HISTORY OF PRESENT ILLNESS    Judith Vera is a 75 y.o. male who presents evaluation of a hematoma of his right elbow and his right third toe that been going on for several days he does bruise easily as he is a dialysis patient had dialysis today of the right arm fistula. He is scheduled for a fistulogram tomorrow he reports no pain no recent injury he is able to move the elbow and the toe without any problem      REVIEW OF SYSTEMS         Review of Systems   Constitutional: Negative for chills and fever. HENT: Negative for congestion, dental problem, sore throat and trouble swallowing. Eyes: Negative for visual disturbance. Respiratory: Negative for shortness of breath and wheezing. Cardiovascular: Negative for chest pain, palpitations and leg swelling. Gastrointestinal: Negative for abdominal pain, diarrhea, nausea and vomiting. Genitourinary: Negative for difficulty urinating, dysuria and testicular pain. End-stage dialysis just dialyzed yesterday   Musculoskeletal: Negative for back pain, joint swelling and neck pain. Skin: Negative for rash. Neurological: Negative for dizziness, syncope, weakness and headaches. Hematological: Negative for adenopathy. Bruises/bleeds easily. Psychiatric/Behavioral: Negative for confusion and suicidal ideas.          PAST MEDICAL HISTORY    has a past medical history of Acute blood loss anemia, Acute on chronic diastolic CHF (congestive heart failure) ([facility] [address] Anemia in chronic kidney disease (CKD), Arthritis, Bacteremia, Benign prostatic hyperplasia without lower urinary tract symptoms, Bleeding from wound, BMI 40.0-44.9, adult ([address] Cellulitis, Cellulitis of left lower extremity, Chronic cerebral ischemia, Closed fracture of forearm, Controlled type 2 diabetes mellitus with chronic kidney disease on chronic dialysis, with long-term current use of insulin ([address] Controlled type 2 diabetes mellitus with diabetic polyneuropathy, with long-term current use of insulin ([address] Decubital ulcer, Dementia ([address] Dialysis patient ([address] Difficult intravenous access, Difficulty walking, DJD (degenerative joint disease) of knee, Elbow, forearm, and wrist, abrasion or friction burn, without mention of infection, Encephalopathy acute, Encounter regarding vascular access for dialysis for ESRD ([address] ESRD (end stage renal disease) on dialysis ([address] Forgetfulness, H/O transesophageal echocardiography ([facility]) for monitoring, Hemodialysis patient ([address] Hyperlipidemia, Hypertension, Intercritical gout, Joint pain, knee, Long-term insulin use ([address] Major depressive disorder with single episode, in partial remission ([address] Mobility impaired, Morbid obesity ([address] Muscle weakness, Obesity, Obstructive sleep apnea, HEATHER on CPAP, Paroxysmal atrial fibrillation ([address] Pneumonia, Respiratory failure ([address] S/P cardiac cath, Seborrhea, Severe aortic stenosis, Severe mitral valve stenosis, Severe sepsis (HCC), Skin rash, Stasis dermatitis, Thyroid disease, Traumatic amputation of thumb, Traumatic hemorrhagic shock ([address] Venous stasis dermatitis, Wears glasses, and Wheelchair bound. SURGICAL HISTORY      has a past surgical history that includes Colonoscopy (11/19/09); Knee arthroscopy (Left, 09/17/99); Hand surgery (Left, 1990 (x10-12 surgeries)); Arm Surgery (Left, 1990); Cardiac catheterization; Nasal septum surgery; Sternotomy (3/18/16); Aortic valve replacement (03/18/2016);  Mitral valve replacement (03/18/2016); other surgical history (3/18/16); other surgical history (3/18/16); other surgical history (Right, 01/09/2017); other surgical history (Right, 08/29/2017); GABAPENTIN (NEURONTIN) 300 MG CAPSULE    Take 300 mg by mouth daily. GUAIFENESIN 400 MG TABLET    Take 400 mg by mouth every 12 hours as needed (allergies)    HYDROCODONE-ACETAMINOPHEN (NORCO) 5-325 MG PER TABLET    Take 1 tablet by mouth every 4 hours as needed. INSULIN GLARGINE (LANTUS) 100 UNIT/ML INJECTION VIAL    Inject 25 Units into the skin 2 times daily    IPRATROPIUM-ALBUTEROL (DUONEB) 0.5-2.5 (3) MG/3ML SOLN NEBULIZER SOLUTION    Inhale 3 mLs into the lungs every 8 hours as needed for Shortness of Breath (or wheezing)    LEVOTHYROXINE (SYNTHROID) 25 MCG TABLET    Take 1 tablet by mouth Daily    LIDOCAINE 5 % CREA    Apply topically Apply to fistula rt arm topically one time a day every Mon, Wed, Fri for dialysis. LORATADINE (CLARITIN) 10 MG CAPSULE    Take 10 mg by mouth daily (every other day). MAGNESIUM HYDROXIDE (MILK OF MAGNESIA) 400 MG/5ML SUSPENSION    Take 30 mLs by mouth daily as needed for Constipation    MIDODRINE (PROAMATINE) 10 MG TABLET    Take 1 tablet by mouth 3 times daily    MISC. DEVICES (WHEELCHAIR) MISC    Use as directed    MULTIPLE VITAMINS-MINERALS (THERAPEUTIC MULTIVITAMIN-MINERALS) TABLET    Take 1 tablet by mouth daily    OMEPRAZOLE (PRILOSEC) 20 MG DELAYED RELEASE CAPSULE    Take 20 mg by mouth nightly     ONDANSETRON (ZOFRAN) 4 MG TABLET    Take 4 mg by mouth every 6 hours as needed for Nausea or Vomiting    RENVELA 800 MG TABLET    Take 2 tablets by mouth 3 times daily Give with snack. TIZANIDINE (ZANAFLEX) 2 MG TABLET    Take 2 mg by mouth every 8 hours as needed (muscle pain)       ALLERGIES     is allergic to percocet [oxycodone-acetaminophen] and ampicillin. FAMILY HISTORY     He indicated that his mother is . He indicated that his father is . He indicated that his sister is alive. He indicated that his maternal grandmother is . He indicated that his maternal grandfather is .      family history includes Alzheimer's Disease in his father; Diabetes in his maternal grandmother and mother; High Blood Pressure in his sister; Love Po in his mother. SOCIAL HISTORY      reports that he quit smoking about 40 years ago. His smoking use included cigarettes. He started smoking about 65 years ago. He has a 50.00 pack-year smoking history. He has never used smokeless tobacco. He reports that he does not drink alcohol or use drugs. PHYSICAL EXAM     INITIAL VITALS:  tympanic temperature is 97 °F (36.1 °C). His blood pressure is 127/61 and his pulse is 83. His respiration is 16 and oxygen saturation is 100%. Physical Exam  Constitutional:       General: He is not in acute distress. Appearance: Normal appearance. He is well-developed. He is obese. He is not ill-appearing. HENT:      Head: Normocephalic and atraumatic. Eyes:      Pupils: Pupils are equal, round, and reactive to light. Neck:      Musculoskeletal: Normal range of motion and neck supple. Cardiovascular:      Rate and Rhythm: Normal rate and regular rhythm. Pulmonary:      Effort: Pulmonary effort is normal.      Breath sounds: Normal breath sounds. Abdominal:      General: Bowel sounds are normal.      Palpations: Abdomen is soft. Musculoskeletal: Normal range of motion. Comments: Patient has extensive superficial bruising he has a small hematoma about centimeter and a half in diameter over the right olecranon he has full range of motion is no evidence of secondary infection he also has a spontaneously drained subungual hematoma on the right third toe there is no bony deformity there is good range of motion of the toes    He does have a palpable thrill on his her right forearm with the dialysis fistula   Skin:     General: Skin is warm and dry. Neurological:      Mental Status: He is alert and oriented to person, place, and time.    Psychiatric:         Behavior: Behavior normal.           DIFFERENTIAL DIAGNOSIS/ MDM:     Hematoma right elbow and right third toe no intervention needed other than local care    DIAGNOSTIC RESULTS     EKG: All EKG's are interpreted by the Emergency Department Physician who either signs or Co-signs this chart in the absence of a cardiologist.        RADIOLOGY:   I directly visualized the following  images and reviewed the radiologist interpretations:          ED BEDSIDE ULTRASOUND:       LABS:  Labs Reviewed - No data to display        EMERGENCY DEPARTMENT COURSE:   Vitals:    Vitals:    07/22/20 1729 07/22/20 1735   BP: 124/89 127/61   Pulse: 93 83   Resp:  16   Temp: 97 °F (36.1 °C)    TempSrc: Tympanic    SpO2:  100%     -------------------------  BP: 127/61, Temp: 97 °F (36.1 °C), Pulse: 83, Resp: 16        Re-evaluation Notes        CRITICAL CARE:   None        CONSULTS:      PROCEDURES:  None    FINAL IMPRESSION      1. Traumatic hematoma of right elbow, initial encounter    2. Subungual hematoma of third toe of right foot, initial encounter          DISPOSITION/PLAN   DISPOSITION Decision To Discharge    Condition on Disposition    Stable    PATIENT REFERRED TO:  Patricia Werner DO  [facility]  [phone]            DISCHARGE MEDICATIONS:  New Prescriptions    No medications on file       (Please note that portions of this note were completed with a voice recognition program.  Efforts were made to edit the dictations but occasionally words are mis-transcribed.)    Zaragoza MD, F.A.A.E.M.   Attending Emergency Physician                          Emmanuel Molina MD  07/22/20 0465

## 2020-07-28 PROCEDURE — 99309 SBSQ NF CARE MODERATE MDM 30: CPT | Performed by: INTERNAL MEDICINE

## 2020-07-31 ENCOUNTER — OUTSIDE SERVICES (OUTPATIENT)
Dept: INTERNAL MEDICINE | Age: 75
End: 2020-07-31
Payer: MEDICARE

## 2020-07-31 ENCOUNTER — TELEPHONE (OUTPATIENT)
Dept: INTERNAL MEDICINE | Age: 75
End: 2020-07-31

## 2020-07-31 NOTE — PROGRESS NOTES
DR. 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 He is on Coumadin for atrial fibrillation. He is on Zoloft for depression. There are no new complaints at this time. ALLERGIES:   Allergies   Allergen Reactions    Percocet [Oxycodone-Acetaminophen] Itching and Rash     Also causes shaking    Ampicillin Rash        MEDICATIONS: As noted on the [facility], referenced and incorporated herein.     PAST MEDICAL HISTORY:   Past Medical History:   Diagnosis Date    Acute blood loss anemia 3/11/2019    Acute on chronic diastolic CHF (congestive heart failure) ([address] 12/2/2019    Anemia in chronic kidney disease (CKD) 4/27/2016    Arthritis     Bacteremia 11/11/2016    Benign prostatic hyperplasia without lower urinary tract symptoms     Bleeding from wound 4/2/2019    BMI 40.0-44.9, adult ([address] 3/19/2018    Cellulitis 3/21/2019    Cellulitis of left lower extremity     Chronic cerebral ischemia 1/3/2017    Closed fracture of forearm 8/13/2013    Broken lft forearm     Controlled type 2 diabetes mellitus with chronic kidney disease on chronic dialysis, with long-term current use of insulin ([address]     Controlled type 2 diabetes mellitus with diabetic polyneuropathy, with long-term current use of insulin ([address] 10/7/2018    Decubital ulcer     NON OPEN BUTTOCKS    Dementia ([address]     memory problems    Dialysis patient ([address] 01/03/2017    Goes Tue, [place], Sat in [place]    Difficult intravenous access     HAS NEEDED PICC TEAM IN THE PAST    Difficulty walking WHEELCHAIR BOUND-PIVOTS WITH ASSIST O F 2    DJD (degenerative joint disease) of knee     Elbow, forearm, and wrist, abrasion or friction burn, without mention of infection 8/13/2013    Abrasions lft forearm     Encephalopathy acute 4/27/2016    Encounter regarding vascular access for dialysis for ESRD ([facility] 2/2/2017    ESRD (end stage renal disease) on dialysis ([address] 1/17/2017    Forgetfulness     HAS SOME SHORT TERM MEMORY LOSS, QUYEN-WIFE IS LEGAL GUARDIAN    H/O transesophageal echocardiography ([facility]) for monitoring     Hemodialysis patient ([address] 11/11/2016    tues-thurs-sat [place]---[facility].  TUNNELED CATHETER RT UPPER CHEST    Hyperlipidemia 1990    on Meds    Hypertension 1990    on Meds    Intercritical gout     on Meds    Joint pain, knee 01/13/2017    baldo knee synvisc-1 injections    Long-term insulin use ([address] 1/17/2017    Major depressive disorder with single episode, in partial remission ([address] 1/3/2017    Mobility impaired     Morbid obesity ([address]     Muscle weakness     GRNERALIZED    Obesity     Obstructive sleep apnea     HEATHER on CPAP     On CPAP-TO BRING DOS    Paroxysmal atrial fibrillation ([address] 9/6/2017    Pneumonia 12/2/2019    Respiratory failure ([address] 03/2016    with open heart surgery, trached x 5 months    S/P cardiac cath     Seborrhea     Severe aortic stenosis 3/9/2016    Severe mitral valve stenosis 3/9/2016    Severe sepsis (HCC) 4/3/2016    Skin rash     ABD FOLDS    Stasis dermatitis     Thyroid disease     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 lft thumb     Traumatic hemorrhagic shock ([address] 3/13/2019    Venous stasis dermatitis     Wears glasses     Wheelchair bound     pivots with 2 assists       PAST SURGICAL HISTORY:   Past Surgical History:   Procedure Laterality Date    AORTIC VALVE REPLACEMENT  03/18/2016    bioprosthetic    ARM SURGERY Left 1990    CARDIAC CATHETERIZATION      CENTRAL VENOUS CATHETER Right 02/21/2018 DIALYSIS CATHETER Dr Lamberto Flores    COLONOSCOPY  09    COLONOSCOPY N/A 10/12/2018    COLONOSCOPY WITH BIOPSY performed by Yang Williamson DO at [address] [place] [address] Right 3/10/2019    WASHOUT RIGHT LOWER EXTREMITY WITH WOUND VAC EXCHANGE performed by Pamela Hancock MD at [address] [place] [address] Right 3/8/2019    RIGHT LOWER EXTREMITY EXPLORATION, HEMATOMA EVACUATION, WOUND VAC PLACEMENT performed by Pamela Hancock MD at [address] (x10-12 surgeries)    several surgeries on left arm    KNEE ARTHROSCOPY Left 99    MITRAL VALVE REPLACEMENT  2016    bioprosthetic     NASAL SEPTUM SURGERY      OTHER SURGICAL HISTORY  3/18/16    Aortic root Enlargement    OTHER SURGICAL HISTORY  3/18/16    ASD Closure    OTHER SURGICAL HISTORY Right 2017    AV fistula creation, wrist    OTHER SURGICAL HISTORY Right 2017    ligation of collateral branch of av fistula right wrist    OTHER SURGICAL HISTORY  2018    FISTULAGRAM WITH DR. Arlene Hernandez    [place] EGD TRANSORAL BIOPSY SINGLE/MULTIPLE N/A 10/17/2017    EGD BIOPSY performed by Luis Velazco MD at [address] ANGIOACCESS AV FISTULA Right 2017     RIGHT  LOWER ARM AV FISTULA  LIGATION OF AQUIRED BRANCHES X2 performed by Nolberto Meza DO at [address]  3/18/16    median    VASCULAR SURGERY  2018    Right arm fistulagram,  PTA cephalic vein stenosis  /  DR Jessica Canela       SOCIAL HISTORY:     Tobacco:   Social History     Tobacco Use   Smoking Status Former Smoker    Packs/day: 2.00    Years: 25.00    Pack years: 50.00    Types: Cigarettes    Start date: 3/17/1955   Nirmal Calvin Last attempt to quit: 1980    Years since quittin.6   Smokeless Tobacco Never Used     Alcohol:   Social History     Substance and Sexual Activity   Alcohol Use No    Alcohol/week: 0.0 standard drinks    Comment: 1-2 drinks per year     Drugs: Social History     Substance and Sexual Activity   Drug Use No       FAMILY HISTORY: family history includes Alzheimer's Disease in his father; Diabetes in his maternal grandmother and mother; High Blood Pressure in his sister; Lynn Ramirez in his mother. REVIEW OF SYSTEMS:     Please see history of chief complaint above; otherwise no new problems with respect to General, HEENT, Cardiovascular, Respiratory, Gastrointestinal, Genitourinary, Endocrinologic, Musculoskeletal, or Neuropsychiatric complaints. PHYSICAL EXAMINATION:    Due to this being a telehealth visit, the physical examination was performed by the nurse at the facility. Vitals: Temp: 96.2 deg F. Pulse: 77. Resp: 16. BP: 126/60. General: A 75 y.o.  male. Alert and oriented to person, place and time. He does not appear to be in any acute distress. Skin: Skin color, texture, turgor normal. No rashes or lesions. HEENT/Neck: Essentially unremarkable  Chest: There was a chest catheter in place  Lungs: Normal - CTA without rales, rhonchi, or wheezing. Heart: regular rate and rhythm, S1, S2 normal, no murmur, click, rub or gallop No S3 or S4. Abdomen: Obese, soft, slightly distended, non-tender, normal active bowel sounds, no masses palpated and no hepatosplenomegaly. There was a moderate-sized epigastric hernia present  Extremities: No clubbing, cyanosis, edema  Neurologic: cranial nerves II-XII are grossly intact    ASSESSMENT:     Diagnosis Orders   1. Controlled type 2 diabetes mellitus with chronic kidney disease on chronic dialysis, with long-term current use of insulin ([address]     2. Controlled type 2 diabetes mellitus with diabetic polyneuropathy, with long-term current use of insulin ([facility])     3. Long-term insulin use ([address]     4. ESRD (end stage renal disease) on dialysis ([address]     5. Dialysis patient ([address]     6. Anemia in chronic kidney disease, on chronic dialysis (HCC)     7. Paroxysmal atrial fibrillation ([address]     8. Severe aortic stenosis     9. Severe mitral valve stenosis     10. Vascular dementia without behavioral disturbance ([address]     11. Obstructive sleep apnea     12. Major depressive disorder with single episode, in partial remission ([address]     13. Essential hypertension     14. Mixed hyperlipidemia     15. Traumatic amputation of thumb, left, sequela ([facility]     16. Class 2 severe obesity with serious comorbidity and body mass index (BMI) of 35.0 to 35.9 in adult, unspecified obesity type ([facility]           PLAN:    1. Continue current treatment  2. Nursing home record reviewed and updates summarized and entered into electronic record  3. Nursing home blood sugar logs and diabetic medication administration reviewed. No changes. 4. Coumadin management is ongoing. We are the ones managing his Coumadin. 5. See nursing home orders and MAR. Pursuant to the emergency declaration under the [address] [place] [address], [facility] waiver authority and the [employer] Act, this TelehHealth visit was conducted, with patient's consent, to reduce the patient's risk of exposure to COVID-19 and provide continuity of care for an established patient. Services were provided through a video synchronous discussion virtually (using [email or web address] me) to substitute for in-person clinic visit. The originating site was the nursing home and the distant site was the provider's home. Patient's identity was verified via name and date of birth.         Electronically signed by Brigid Kolb DO on 7/31/2020 at 3:36 AM  Internal Medicine

## 2020-08-05 ENCOUNTER — HOSPITAL ENCOUNTER (INPATIENT)
Age: 75
LOS: 1 days | Discharge: NURSING, REHAB OR LONG-TERM CARE | DRG: 252 | End: 2020-08-06
Attending: EMERGENCY MEDICINE | Admitting: INTERNAL MEDICINE
Payer: MEDICARE

## 2020-08-05 ENCOUNTER — APPOINTMENT (OUTPATIENT)
Dept: INTERVENTIONAL RADIOLOGY/VASCULAR | Age: 75
DRG: 252 | End: 2020-08-05
Payer: MEDICARE

## 2020-08-05 PROBLEM — I77.0 AVF (ARTERIOVENOUS FISTULA) (HCC): Status: ACTIVE | Noted: 2020-08-05

## 2020-08-05 LAB
ABSOLUTE EOS #: 0.18 K/UL (ref 0–0.44)
ABSOLUTE IMMATURE GRANULOCYTE: <0.03 K/UL (ref 0–0.3)
ABSOLUTE LYMPH #: 0.96 K/UL (ref 1.1–3.7)
ABSOLUTE MONO #: 0.56 K/UL (ref 0.1–1.2)
ANION GAP SERPL CALCULATED.3IONS-SCNC: 23 MMOL/L (ref 9–17)
BASOPHILS # BLD: 1 % (ref 0–2)
BASOPHILS ABSOLUTE: 0.04 K/UL (ref 0–0.2)
BUN BLDV-MCNC: 148 MG/DL (ref 8–23)
BUN/CREAT BLD: ABNORMAL (ref 9–20)
CALCIUM SERPL-MCNC: 9 MG/DL (ref 8.6–10.4)
CHLORIDE BLD-SCNC: 88 MMOL/L (ref 98–107)
CO2: 22 MMOL/L (ref 20–31)
CREAT SERPL-MCNC: 9.9 MG/DL (ref 0.7–1.2)
DIFFERENTIAL TYPE: ABNORMAL
EOSINOPHILS RELATIVE PERCENT: 3 % (ref 1–4)
GFR AFRICAN AMERICAN: 6 ML/MIN
GFR NON-AFRICAN AMERICAN: 5 ML/MIN
GFR SERPL CREATININE-BSD FRML MDRD: ABNORMAL ML/MIN/{1.73_M2}
GFR SERPL CREATININE-BSD FRML MDRD: ABNORMAL ML/MIN/{1.73_M2}
GLUCOSE BLD-MCNC: 133 MG/DL (ref 70–99)
HCT VFR BLD CALC: 31.3 % (ref 40.7–50.3)
HEMOGLOBIN: 10.1 G/DL (ref 13–17)
IMMATURE GRANULOCYTES: 0 %
INR BLD: 1
LYMPHOCYTES # BLD: 13 % (ref 24–43)
MCH RBC QN AUTO: 31.5 PG (ref 25.2–33.5)
MCHC RBC AUTO-ENTMCNC: 32.3 G/DL (ref 28.4–34.8)
MCV RBC AUTO: 97.5 FL (ref 82.6–102.9)
MONOCYTES # BLD: 8 % (ref 3–12)
NRBC AUTOMATED: 0 PER 100 WBC
PARTIAL THROMBOPLASTIN TIME: 32 SEC (ref 20.5–30.5)
PDW BLD-RTO: 14.4 % (ref 11.8–14.4)
PLATELET # BLD: 151 K/UL (ref 138–453)
PLATELET ESTIMATE: ABNORMAL
PMV BLD AUTO: 9.7 FL (ref 8.1–13.5)
POTASSIUM SERPL-SCNC: 4.8 MMOL/L (ref 3.7–5.3)
PROTHROMBIN TIME: 10.3 SEC (ref 9–12)
RBC # BLD: 3.21 M/UL (ref 4.21–5.77)
RBC # BLD: ABNORMAL 10*6/UL
SARS-COV-2, PCR: NORMAL
SARS-COV-2, RAPID: NOT DETECTED
SARS-COV-2: NORMAL
SEG NEUTROPHILS: 75 % (ref 36–65)
SEGMENTED NEUTROPHILS ABSOLUTE COUNT: 5.43 K/UL (ref 1.5–8.1)
SODIUM BLD-SCNC: 133 MMOL/L (ref 135–144)
SOURCE: NORMAL
WBC # BLD: 7.2 K/UL (ref 3.5–11.3)
WBC # BLD: ABNORMAL 10*3/UL

## 2020-08-05 PROCEDURE — 2580000003 HC RX 258: Performed by: STUDENT IN AN ORGANIZED HEALTH CARE EDUCATION/TRAINING PROGRAM

## 2020-08-05 PROCEDURE — C1752 CATH,HEMODIALYSIS,SHORT-TERM: HCPCS

## 2020-08-05 PROCEDURE — 90935 HEMODIALYSIS ONE EVALUATION: CPT

## 2020-08-05 PROCEDURE — 36556 INSERT NON-TUNNEL CV CATH: CPT | Performed by: RADIOLOGY

## 2020-08-05 PROCEDURE — 76937 US GUIDE VASCULAR ACCESS: CPT | Performed by: RADIOLOGY

## 2020-08-05 PROCEDURE — U0002 COVID-19 LAB TEST NON-CDC: HCPCS

## 2020-08-05 PROCEDURE — 2709999900 HC NON-CHARGEABLE SUPPLY

## 2020-08-05 PROCEDURE — 80048 BASIC METABOLIC PNL TOTAL CA: CPT

## 2020-08-05 PROCEDURE — 6360000002 HC RX W HCPCS: Performed by: RADIOLOGY

## 2020-08-05 PROCEDURE — 85025 COMPLETE CBC W/AUTO DIFF WBC: CPT

## 2020-08-05 PROCEDURE — C1894 INTRO/SHEATH, NON-LASER: HCPCS

## 2020-08-05 PROCEDURE — 99285 EMERGENCY DEPT VISIT HI MDM: CPT

## 2020-08-05 PROCEDURE — 85610 PROTHROMBIN TIME: CPT

## 2020-08-05 PROCEDURE — C1769 GUIDE WIRE: HCPCS

## 2020-08-05 PROCEDURE — 93005 ELECTROCARDIOGRAM TRACING: CPT | Performed by: STUDENT IN AN ORGANIZED HEALTH CARE EDUCATION/TRAINING PROGRAM

## 2020-08-05 PROCEDURE — 2060000000 HC ICU INTERMEDIATE R&B

## 2020-08-05 PROCEDURE — 6360000002 HC RX W HCPCS: Performed by: INTERNAL MEDICINE

## 2020-08-05 PROCEDURE — 02H633Z INSERTION OF INFUSION DEVICE INTO RIGHT ATRIUM, PERCUTANEOUS APPROACH: ICD-10-PCS | Performed by: RADIOLOGY

## 2020-08-05 PROCEDURE — 5A1D70Z PERFORMANCE OF URINARY FILTRATION, INTERMITTENT, LESS THAN 6 HOURS PER DAY: ICD-10-PCS | Performed by: INTERNAL MEDICINE

## 2020-08-05 PROCEDURE — 77001 FLUOROGUIDE FOR VEIN DEVICE: CPT | Performed by: RADIOLOGY

## 2020-08-05 PROCEDURE — 85730 THROMBOPLASTIN TIME PARTIAL: CPT

## 2020-08-05 RX ORDER — SODIUM CHLORIDE 0.9 % (FLUSH) 0.9 %
10 SYRINGE (ML) INJECTION EVERY 12 HOURS SCHEDULED
Status: DISCONTINUED | OUTPATIENT
Start: 2020-08-05 | End: 2020-08-06 | Stop reason: HOSPADM

## 2020-08-05 RX ORDER — HEPARIN SODIUM 5000 [USP'U]/ML
INJECTION, SOLUTION INTRAVENOUS; SUBCUTANEOUS
Status: COMPLETED | OUTPATIENT
Start: 2020-08-05 | End: 2020-08-05

## 2020-08-05 RX ORDER — SODIUM CHLORIDE 0.9 % (FLUSH) 0.9 %
10 SYRINGE (ML) INJECTION PRN
Status: DISCONTINUED | OUTPATIENT
Start: 2020-08-05 | End: 2020-08-06 | Stop reason: HOSPADM

## 2020-08-05 RX ORDER — HEPARIN SODIUM 5000 [USP'U]/ML
5000 INJECTION, SOLUTION INTRAVENOUS; SUBCUTANEOUS EVERY 8 HOURS SCHEDULED
Status: CANCELLED | OUTPATIENT
Start: 2020-08-05

## 2020-08-05 RX ORDER — HEPARIN SODIUM 1000 [USP'U]/ML
1100 INJECTION, SOLUTION INTRAVENOUS; SUBCUTANEOUS PRN
Status: DISCONTINUED | OUTPATIENT
Start: 2020-08-05 | End: 2020-08-06 | Stop reason: HOSPADM

## 2020-08-05 RX ORDER — SODIUM CHLORIDE 0.9 % (FLUSH) 0.9 %
10 SYRINGE (ML) INJECTION EVERY 12 HOURS SCHEDULED
Status: CANCELLED | OUTPATIENT
Start: 2020-08-05

## 2020-08-05 RX ORDER — HEPARIN SODIUM 1000 [USP'U]/ML
1400 INJECTION, SOLUTION INTRAVENOUS; SUBCUTANEOUS PRN
Status: DISCONTINUED | OUTPATIENT
Start: 2020-08-05 | End: 2020-08-06 | Stop reason: HOSPADM

## 2020-08-05 RX ORDER — SODIUM CHLORIDE 0.9 % (FLUSH) 0.9 %
10 SYRINGE (ML) INJECTION PRN
Status: CANCELLED | OUTPATIENT
Start: 2020-08-05

## 2020-08-05 RX ORDER — PROMETHAZINE HYDROCHLORIDE 25 MG/1
12.5 TABLET ORAL EVERY 6 HOURS PRN
Status: DISCONTINUED | OUTPATIENT
Start: 2020-08-05 | End: 2020-08-06 | Stop reason: HOSPADM

## 2020-08-05 RX ORDER — POLYETHYLENE GLYCOL 3350 17 G/17G
17 POWDER, FOR SOLUTION ORAL DAILY PRN
Status: DISCONTINUED | OUTPATIENT
Start: 2020-08-05 | End: 2020-08-06 | Stop reason: HOSPADM

## 2020-08-05 RX ORDER — ONDANSETRON 2 MG/ML
4 INJECTION INTRAMUSCULAR; INTRAVENOUS EVERY 6 HOURS PRN
Status: DISCONTINUED | OUTPATIENT
Start: 2020-08-05 | End: 2020-08-06 | Stop reason: HOSPADM

## 2020-08-05 RX ADMIN — SODIUM CHLORIDE, PRESERVATIVE FREE 10 ML: 5 INJECTION INTRAVENOUS at 21:50

## 2020-08-05 RX ADMIN — HEPARIN SODIUM 1100 UNITS: 1000 INJECTION INTRAVENOUS; SUBCUTANEOUS at 20:24

## 2020-08-05 RX ADMIN — HEPARIN SODIUM 1.1 ML: 5000 INJECTION INTRAVENOUS; SUBCUTANEOUS at 16:45

## 2020-08-05 RX ADMIN — HEPARIN SODIUM 5000 UNITS: 5000 INJECTION INTRAVENOUS; SUBCUTANEOUS at 16:46

## 2020-08-05 RX ADMIN — HEPARIN SODIUM 1400 UNITS: 1000 INJECTION INTRAVENOUS; SUBCUTANEOUS at 20:24

## 2020-08-05 ASSESSMENT — PAIN SCALES - GENERAL: PAINLEVEL_OUTOF10: 0

## 2020-08-05 ASSESSMENT — ENCOUNTER SYMPTOMS
ABDOMINAL PAIN: 0
SHORTNESS OF BREATH: 0

## 2020-08-05 NOTE — ED PROVIDER NOTES
([address] Forgetfulness, H/O transesophageal echocardiography ([facility]) for monitoring, Hemodialysis patient ([address] Hyperlipidemia, Hypertension, Intercritical gout, Joint pain, knee, Long-term insulin use ([address] Major depressive disorder with single episode, in partial remission ([address] Mobility impaired, Morbid obesity ([address] Muscle weakness, Obesity, Obstructive sleep apnea, HEATHER on CPAP, Paroxysmal atrial fibrillation ([address] Pneumonia, Respiratory failure ([address] S/P cardiac cath, Seborrhea, Severe aortic stenosis, Severe mitral valve stenosis, Severe sepsis (HCC), Skin rash, Stasis dermatitis, Thyroid disease, Traumatic amputation of thumb, Traumatic hemorrhagic shock ([address] Venous stasis dermatitis, Wears glasses, and Wheelchair bound. has a past surgical history that includes Colonoscopy (11/19/09); Knee arthroscopy (Left, 09/17/99); Hand surgery (Left, 1990 (x10-12 surgeries)); Arm Surgery (Left, 1990); Cardiac catheterization; Nasal septum surgery; Sternotomy (3/18/16); Aortic valve replacement (03/18/2016); Mitral valve replacement (03/18/2016); other surgical history (3/18/16); other surgical history (3/18/16); other surgical history (Right, 01/09/2017); other surgical history (Right, 08/29/2017); pr ligatn angioaccess av fistula (Right, 8/29/2017); pr egd transoral biopsy single/multiple (N/A, 10/17/2017); central venous catheter (Right, 02/21/2018); Colonoscopy (N/A, 10/12/2018); other surgical history (04/05/2018); vascular surgery (12/06/2018); EXPLORATION OF WOUND OF EXTREMITY (Right, 3/10/2019); EXPLORATION OF WOUND OF EXTREMITY (Right, 3/8/2019); and IR NONTUNNELED VASCULAR CATHETER > 5 YEARS (8/5/2020).      Social History     Socioeconomic History    Marital status:      Spouse name: Prince Fisher Number of children: 2    Years of education: Not on file    Highest education level: Not on file   Occupational History    Occupation: Retired      Employer: [employer] Social Needs    Financial resource strain: Not on file    Food insecurity     Worry: Not on file     Inability: Not on file    Transportation needs     Medical: Not on file     Non-medical: Not on file   Tobacco Use    Smoking status: Former Smoker     Packs/day: 2.00     Years: 25.00     Pack years: 50.00     Types: Cigarettes     Start date: 3/17/1955     Last attempt to quit: 1980     Years since quittin.6    Smokeless tobacco: Never Used   Substance and Sexual Activity    Alcohol use: No     Alcohol/week: 0.0 standard drinks     Comment: 1-2 drinks per year    Drug use: No    Sexual activity: Never   Lifestyle    Physical activity     Days per week: Not on file     Minutes per session: Not on file    Stress: Not on file   Relationships    Social connections     Talks on phone: Not on file     Gets together: Not on file     Attends Faith service: Not on file     Active member of club or organization: Not on file     Attends meetings of clubs or organizations: Not on file     Relationship status: Not on file    Intimate partner violence     Fear of current or ex partner: Not on file     Emotionally abused: Not on file     Physically abused: Not on file     Forced sexual activity: Not on file   Other Topics Concern    Not on file   Social History Narrative    Not on file       Family History   Problem Relation Age of Onset    Lung Cancer Mother     Diabetes Mother     Alzheimer's Disease Father     Diabetes Maternal Grandmother     High Blood Pressure Sister         Allergies:  Percocet [oxycodone-acetaminophen] and Ampicillin    Home Medications:  Prior to Admission medications    Medication Sig Start Date End Date Taking? Authorizing Provider   Lidocaine 5 % CREA Apply topically Apply to fistula rt arm topically one time a day every Mon, Wed, Fri for dialysis.     Historical Provider, MD   buPROPion [facility] SR) 150 MG extended release tablet Take 150 mg by mouth daily Historical Provider, MD   doxycycline hyclate (VIBRA-TABS) 100 MG tablet Take 100 mg by mouth 2 times daily x10 days, starting 6/13/20 for cellulitis. Historical Provider, MD   albuterol (PROVENTIL) (2.5 MG/3ML) 0.083% nebulizer solution Take 2.5 mg by nebulization every 8 hours as needed for Shortness of Breath    Historical Provider, MD   benzonatate (TESSALON) 100 MG capsule Take 100 mg by mouth every 12 hours as needed for Cough    Historical Provider, MD   ipratropium-albuterol (DUONEB) 0.5-2.5 (3) MG/3ML SOLN nebulizer solution Inhale 3 mLs into the lungs every 8 hours as needed for Shortness of Breath (or wheezing)    Historical Provider, MD   guaiFENesin 400 MG tablet Take 400 mg by mouth every 12 hours as needed (allergies)    Historical Provider, MD   ondansetron (ZOFRAN) 4 MG tablet Take 4 mg by mouth every 6 hours as needed for Nausea or Vomiting    Historical Provider, MD   tiZANidine (ZANAFLEX) 2 MG tablet Take 2 mg by mouth every 8 hours as needed (muscle pain)    Historical Provider, MD   RENVELA 800 MG tablet Take 2 tablets by mouth 3 times daily Give with snack. 4/6/20   Octavio Mcarthur,    FIASP FLEXTOUCH 100 UNIT/ML SOPN Inject into the skin See Admin Instructions Per sliding scale: If 150-200 = 2 units; 201-250 = 4 units; 251-300 = 6 units; 301-350 = 8 units; 351-400 = 10 units; 401+ = 15 units, then call MD 3/4/20   Historical Provider, MD   HYDROcodone-acetaminophen (NORCO) 5-325 MG per tablet Take 1 tablet by mouth every 4 hours as needed. 1/26/20   Historical Provider, MD   Elastic Bandages & Supports MISC 30-40 mmHg compression stockings to both lower legs, on every morning, off at bedtime. 11/19/19   Arnoldo Claude, MD   gabapentin (NEURONTIN) 300 MG capsule Take 300 mg by mouth daily. Historical Provider, MD   ALPRAZolam Rhoderick Mullet) 0.5 MG tablet Take 0.5 mg by mouth 2 times daily.     Historical Provider, MD   DULoxetine (CYMBALTA) 60 MG extended release capsule Take 60 mg by mouth daily    Historical Provider, MD   loratadine (CLARITIN) 10 MG capsule Take 10 mg by mouth daily (every other day). Historical Provider, MD   aspirin 81 MG tablet Take 1 tablet by mouth daily 4/8/19   Emile Maradiaga MD   apixaban (ELIQUIS) 5 MG TABS tablet Take 1 tablet by mouth 2 times daily 4/8/19   Emile Maradiaga MD   Multiple Vitamins-Minerals (THERAPEUTIC MULTIVITAMIN-MINERALS) tablet Take 1 tablet by mouth daily    Historical Provider, MD   omeprazole (PRILOSEC) 20 MG delayed release capsule Take 20 mg by mouth nightly     Historical Provider, MD   atorvastatin (LIPITOR) 40 MG tablet Take 1 tablet by mouth nightly 3/24/19   Jojo Dan MD   midodrine (PROAMATINE) 10 MG tablet Take 1 tablet by mouth 3 times daily  Patient taking differently: Take 10 mg by mouth 3 times daily Hold if SBP over 150 3/19/19   Jojo Dan MD   folic acid (FOLVITE) 1 MG tablet Take 1 tablet by mouth daily 3/19/19   Jojo Dan MD   insulin glargine (LANTUS) 100 UNIT/ML injection vial Inject 25 Units into the skin 2 times daily 3/19/19   Jojo Dan MD   Misc. Devices [facility]) MISC Use as directed 12/12/18   Octavio Mcarthur DO   Amino Acids-Protein Hydrolys (PRO-STAT PO) Take 30 mLs by mouth 3 times daily (Prostat SF) for wound healing. Historical Provider, MD   donepezil (ARICEPT) 5 MG tablet Take 5 mg by mouth every evening 10/26/16   Historical Provider, MD   levothyroxine (SYNTHROID) 25 MCG tablet Take 1 tablet by mouth Daily 10/26/16   Harmeet Jean, APRN - CNP   fluticasone (FLONASE) 50 MCG/ACT nasal spray 2 sprays by Nasal route daily 10/24/16   Octavio Mcarthur DO   magnesium hydroxide (MILK OF MAGNESIA) 400 MG/5ML suspension Take 30 mLs by mouth daily as needed for Constipation 3/29/16   Maury Collins MD       REVIEW OFSYSTEMS    (2-9 systems for level 4, 10 or more for level 5)      Review of Systems   Constitutional: Negative for fever.    Respiratory: Negative for shortness of breath. Cardiovascular: Negative for chest pain. Gastrointestinal: Negative for abdominal pain. PHYSICAL EXAM   (up to 7 for level 4, 8 or more forlevel 5)      INITIAL VITALS:   ED Triage Vitals [08/05/20 1353]   BP Temp Temp src Pulse Resp SpO2 Height Weight   135/74 98 °F (36.7 °C) -- 85 18 97 % -- 235 lb (106.6 kg)       Physical Exam  Constitutional:       General: He is not in acute distress. Appearance: He is not diaphoretic. Comments: Obese    HENT:      Head: Normocephalic and atraumatic. Mouth/Throat:      Mouth: Mucous membranes are moist.   Eyes:      Extraocular Movements: Extraocular movements intact. Pupils: Pupils are equal, round, and reactive to light. Cardiovascular:      Rate and Rhythm: Normal rate and regular rhythm. Pulses: Normal pulses. Pulmonary:      Effort: Pulmonary effort is normal.      Breath sounds: Normal breath sounds. Abdominal:      General: Abdomen is flat. There is no distension. Tenderness: There is no abdominal tenderness. There is no guarding. Musculoskeletal:      Comments: RUE: AVF present with thrill   Skin:     General: Skin is warm. Neurological:      General: No focal deficit present. Mental Status: He is alert.          DIFFERENTIAL  DIAGNOSIS     PLAN (LABS / IMAGING / EKG):  Orders Placed This Encounter   Procedures    IR NONTUNNELED VASCULAR CATHETER > 5 YEARS    CBC WITH AUTO DIFFERENTIAL    BASIC METABOLIC PANEL    Protime-INR    APTT    COVID-19    Telemetry Monitoring    Patient may use catheter    Inpatient consult to Nephrology    Inpatient consult to IR    Inpatient consult to Internal Medicine    EKG 12 Lead    Hemodialysis inpatient    PATIENT STATUS (FROM ED OR OR/PROCEDURAL) Inpatient       MEDICATIONS ORDERED:  Orders Placed This Encounter   Medications    heparin (porcine) injection    heparin (porcine) injection    heparin (porcine) injection 1,100 Units    heparin (porcine) injection 1,400 Units         Initial MDM/Plan: 75 y.o. male who presents with AV fistula access problem. Patient had stable vital signs on arrival.  On physical exam, he had a AV fistula right forearm with palpable thrill. Plan for basic lab work to determine if the patient needs urgent dialysis and temporary catheter placement. We will also consult nephrology for possible fistulogram.  EKG showing no hyperacute T waves or wide-complex tachycardia concerning for hyperkalemia. DIAGNOSTIC RESULTS / EMERGENCYDEPARTMENT COURSE / MDM     LABS:  Labs Reviewed   CBC WITH AUTO DIFFERENTIAL - Abnormal; Notable for the following components:       Result Value    RBC 3.21 (*)     Hemoglobin 10.1 (*)     Hematocrit 31.3 (*)     Seg Neutrophils 75 (*)     Lymphocytes 13 (*)     Absolute Lymph # 0.96 (*)     All other components within normal limits   BASIC METABOLIC PANEL - Abnormal; Notable for the following components:    Glucose 133 (*)      (*)     CREATININE 9.90 (*)     Sodium 133 (*)     Chloride 88 (*)     Anion Gap 23 (*)     GFR Non- 5 (*)     GFR  6 (*)     All other components within normal limits   APTT - Abnormal; Notable for the following components:    PTT 32.0 (*)     All other components within normal limits   PROTIME-INR   COVID-19         RADIOLOGY:  Ir Nontunneled Vascular Catheter > 5 Years    Result Date: 8/5/2020  PROCEDURE: ULTRASOUND GUIDED VASCULAR ACCESS. FLUOROSCOPY GUIDED PLACEMENT OF A NON-TUNNELED CATHETER. 8/5/2020. HISTORY: ORDERING SYSTEM PROVIDED HISTORY: vascular access problem TECHNOLOGIST PROVIDED HISTORY: vascular access problem Malfunctioning dialysis fistula; end-stage renal disease SEDATION: None FLUOROSCOPY DOSE AND TYPE OR TIME AND EXPOSURES:  centigrays cm squared TECHNIQUE: Informed consent was obtained after a detailed explanation of the procedure including risks, benefits, and alternatives.  Universal protocol NEPHROLOGY  IP CONSULT TO INTERVENTIONAL RADIOLOGY  IP CONSULT TO INTERNAL MEDICINE  IP CONSULT TO CASE MANAGEMENT    CRITICAL CARE:  Please see attending note    FINAL IMPRESSION      1. AVF (arteriovenous fistula) ([facility]          DISPOSITION / PLAN     DISPOSITION Admitted 08/05/2020 03:14:57 PM      PATIENT REFERRED TO:  Faustina Cook DO  [address]   [phone]            DISCHARGE MEDICATIONS:  New Prescriptions    No medications on file       Rebecca Mcgovern MD  Emergency Medicine Resident    (Please note that portions of this note were completed with a voice recognition program.Efforts were made to edit the dictations but occasionally words are mis-transcribed.)       Rebecca Mcgovern MD  Resident  08/05/20 [id number]

## 2020-08-05 NOTE — ED PROVIDER NOTES
[facility] ED     Emergency Department     Faculty Attestation    I performed a history and physical examination of the patient and discussed management with the resident. I reviewed the residents note and agree with the documented findings and plan of care. Any areas of disagreement are noted on the chart. I was personally present for the key portions of any procedures. I have documented in the chart those procedures where I was not present during the key portions. I have reviewed the emergency nurses triage note. I agree with the chief complaint, past medical history, past surgical history, allergies, medications, social and family history as documented unless otherwise noted below. For Physician Assistant/ Nurse Practitioner cases/documentation I have personally evaluated this patient and have completed at least one if not all key elements of the E/M (history, physical exam, and MDM). Additional findings are as noted. This patient was evaluated in the Emergency Department for symptoms described in the history of present illness. He/she was evaluated in the context of the global COVID-19 pandemic, which necessitated consideration that the patient might be at risk for infection with the SARS-CoV-2 virus that causes COVID-19. Institutional protocols and algorithms that pertain to the evaluation of patients at risk for COVID-19 are in a state of rapid change based on information released by regulatory bodies including the CDC and federal and state organizations. These policies and algorithms were followed during the patient's care in the ED. Patient sent to ED for fistula evaluation dialysis patient. Reportedly has had not had dialysis in a week. Patient is unable to provide any history but has no complaints. Did see a call in note from nephrology to have either fistulogram or August placed. Nontoxic well-appearing watching TV.   Fistula in the right forearm does have a palpable thrill strong distal pulses. Will check labs, EKG, call nephrology and IR. EKG interpretation: Atrial fibrillation 72. Left axis. Borderline wide QRS at 110. No acute ST or T changes, no overt peaked T waves. Similar to 7/2/2020.     Critical Care     none    Natalya Kumar MD, Arsalan Morrell  Attending Emergency  Physician             Natalya Kumar MD  08/05/20 [id number]

## 2020-08-05 NOTE — ED NOTES
Pt to ED with a fistula that is not working. Pt was sent from ECF d/t \"continuous issues\" at dialysis. Per report, Dr. Paulo Solano sent patient here for fistulagram or elyse catheter placement. Pt is oriented to self. Baseline disorientation per report. Pt is unsure why he is currently in the hospital. Last dialysis was 1 week ago. Pt placed on full monitor. EKG performed. IV established. Dr. Tay Germain at bedside.      Queen Lebron RN  08/05/20 [id number]

## 2020-08-05 NOTE — BRIEF OP NOTE
Brief Postoperative Note    Ge Boucher  YOB: 1945  [id number]    Pre-operative Diagnosis: Malfunctioning fistula; ESRD    Post-operative Diagnosis: Same    Procedure: Temporary dialysis catheter placement    Anesthesia: Local    Surgeons/Assistants: [place]    Estimated Blood Loss: less than 50     Complications: None    Specimens: Was Not Obtained    Electronically signed by Grace Yee MD on 8/5/2020 at 5:01 PM

## 2020-08-05 NOTE — ED PROVIDER NOTES
[facility] ED  Emergency Department  Faculty Sign-Out Addendum     Care of Deidre Leal was assumed from previous attending and is being seen for Vascular Access Problem (Fistula not working; hasn't had dialysis in a week)  . The patient's initial evaluation and plan have been discussed with the prior provider who initially evaluated the patient. Attestation    I was available and discussed any additional care issues that arose and coordinated the management plans with the resident(s) caring for the patient during my duty period. Any areas of disagreement with residents documentation of care or procedures are noted on the chart. I was personally present for the key portions of any/all procedures during my duty period. I have documented in the chart those procedures where I was not present during the key portions.     EMERGENCY DEPARTMENT COURSE / MEDICAL DECISION MAKING:       MEDICATIONS GIVEN:  Orders Placed This Encounter   Medications    heparin (porcine) injection    heparin (porcine) injection    heparin (porcine) injection 1,100 Units    heparin (porcine) injection 1,400 Units       LABS / RADIOLOGY:     Labs Reviewed   CBC WITH AUTO DIFFERENTIAL - Abnormal; Notable for the following components:       Result Value    RBC 3.21 (*)     Hemoglobin 10.1 (*)     Hematocrit 31.3 (*)     Seg Neutrophils 75 (*)     Lymphocytes 13 (*)     Absolute Lymph # 0.96 (*)     All other components within normal limits   BASIC METABOLIC PANEL - Abnormal; Notable for the following components:    Glucose 133 (*)      (*)     CREATININE 9.90 (*)     Sodium 133 (*)     Chloride 88 (*)     Anion Gap 23 (*)     GFR Non- 5 (*)     GFR  6 (*)     All other components within normal limits   APTT - Abnormal; Notable for the following components:    PTT 32.0 (*)     All other components within normal limits   PROTIME-INR   COVID-19       Ir Nontunneled Vascular / RECOMMENDATIONS:    1.  Disposition made by previous attending and nothing to do      Dejon Barrera MD, [facility]  Attending Emergency Physician  [facility] ED       Alessandra Zhao MD  08/05/20 Stuart Kim

## 2020-08-05 NOTE — ED NOTES
Bed: 44  Expected date:   Expected time:   Means of arrival:   Comments:  First care     Particjoselyn Trujillo RN  08/05/20 1375

## 2020-08-06 ENCOUNTER — APPOINTMENT (OUTPATIENT)
Dept: INTERVENTIONAL RADIOLOGY/VASCULAR | Age: 75
DRG: 252 | End: 2020-08-06
Payer: MEDICARE

## 2020-08-06 VITALS
OXYGEN SATURATION: 96 % | SYSTOLIC BLOOD PRESSURE: 126 MMHG | WEIGHT: 241 LBS | HEIGHT: 67 IN | HEART RATE: 83 BPM | DIASTOLIC BLOOD PRESSURE: 62 MMHG | TEMPERATURE: 98.6 F | BODY MASS INDEX: 37.83 KG/M2 | RESPIRATION RATE: 24 BRPM

## 2020-08-06 LAB
ABSOLUTE EOS #: 0 K/UL (ref 0–0.4)
ABSOLUTE IMMATURE GRANULOCYTE: 0 K/UL (ref 0–0.3)
ABSOLUTE LYMPH #: 0.77 K/UL (ref 1–4.8)
ABSOLUTE MONO #: 0.58 K/UL (ref 0.1–0.8)
ANION GAP SERPL CALCULATED.3IONS-SCNC: 20 MMOL/L (ref 9–17)
BASOPHILS # BLD: 1 % (ref 0–2)
BASOPHILS ABSOLUTE: 0.06 K/UL (ref 0–0.2)
BUN BLDV-MCNC: 84 MG/DL (ref 8–23)
BUN/CREAT BLD: ABNORMAL (ref 9–20)
CALCIUM SERPL-MCNC: 9.4 MG/DL (ref 8.6–10.4)
CHLORIDE BLD-SCNC: 94 MMOL/L (ref 98–107)
CO2: 22 MMOL/L (ref 20–31)
CREAT SERPL-MCNC: 6.85 MG/DL (ref 0.7–1.2)
DIFFERENTIAL TYPE: ABNORMAL
EKG ATRIAL RATE: 65 BPM
EKG Q-T INTERVAL: 454 MS
EKG QRS DURATION: 110 MS
EKG QTC CALCULATION (BAZETT): 497 MS
EKG R AXIS: -56 DEGREES
EKG T AXIS: 76 DEGREES
EKG VENTRICULAR RATE: 72 BPM
EOSINOPHILS RELATIVE PERCENT: 0 % (ref 1–4)
GFR AFRICAN AMERICAN: 10 ML/MIN
GFR NON-AFRICAN AMERICAN: 8 ML/MIN
GFR SERPL CREATININE-BSD FRML MDRD: ABNORMAL ML/MIN/{1.73_M2}
GFR SERPL CREATININE-BSD FRML MDRD: ABNORMAL ML/MIN/{1.73_M2}
GLUCOSE BLD-MCNC: 152 MG/DL (ref 75–110)
GLUCOSE BLD-MCNC: 185 MG/DL (ref 75–110)
GLUCOSE BLD-MCNC: 89 MG/DL (ref 75–110)
GLUCOSE BLD-MCNC: 98 MG/DL (ref 70–99)
HCT VFR BLD CALC: 32.1 % (ref 40.7–50.3)
HEMOGLOBIN: 10 G/DL (ref 13–17)
IMMATURE GRANULOCYTES: 0 %
LYMPHOCYTES # BLD: 12 % (ref 24–44)
MAGNESIUM: 2 MG/DL (ref 1.6–2.6)
MCH RBC QN AUTO: 30.2 PG (ref 25.2–33.5)
MCHC RBC AUTO-ENTMCNC: 31.2 G/DL (ref 28.4–34.8)
MCV RBC AUTO: 97 FL (ref 82.6–102.9)
MONOCYTES # BLD: 9 % (ref 1–7)
MORPHOLOGY: ABNORMAL
NRBC AUTOMATED: 0 PER 100 WBC
PDW BLD-RTO: 14.5 % (ref 11.8–14.4)
PLATELET # BLD: 145 K/UL (ref 138–453)
PLATELET ESTIMATE: ABNORMAL
PMV BLD AUTO: 9 FL (ref 8.1–13.5)
POTASSIUM SERPL-SCNC: 4.4 MMOL/L (ref 3.7–5.3)
RBC # BLD: 3.31 M/UL (ref 4.21–5.77)
RBC # BLD: ABNORMAL 10*6/UL
SEG NEUTROPHILS: 78 % (ref 36–66)
SEGMENTED NEUTROPHILS ABSOLUTE COUNT: 4.99 K/UL (ref 1.8–7.7)
SODIUM BLD-SCNC: 136 MMOL/L (ref 135–144)
WBC # BLD: 6.4 K/UL (ref 3.5–11.3)
WBC # BLD: ABNORMAL 10*3/UL

## 2020-08-06 PROCEDURE — 36558 INSERT TUNNELED CV CATH: CPT | Performed by: RADIOLOGY

## 2020-08-06 PROCEDURE — C1750 CATH, HEMODIALYSIS,LONG-TERM: HCPCS

## 2020-08-06 PROCEDURE — 6360000004 HC RX CONTRAST MEDICATION: Performed by: INTERNAL MEDICINE

## 2020-08-06 PROCEDURE — 76000 FLUOROSCOPY <1 HR PHYS/QHP: CPT | Performed by: RADIOLOGY

## 2020-08-06 PROCEDURE — 36901 INTRO CATH DIALYSIS CIRCUIT: CPT | Performed by: RADIOLOGY

## 2020-08-06 PROCEDURE — 85025 COMPLETE CBC W/AUTO DIFF WBC: CPT

## 2020-08-06 PROCEDURE — 02H633Z INSERTION OF INFUSION DEVICE INTO RIGHT ATRIUM, PERCUTANEOUS APPROACH: ICD-10-PCS | Performed by: RADIOLOGY

## 2020-08-06 PROCEDURE — 05WY37Z REVISION OF AUTOLOGOUS TISSUE SUBSTITUTE IN UPPER VEIN, PERCUTANEOUS APPROACH: ICD-10-PCS | Performed by: RADIOLOGY

## 2020-08-06 PROCEDURE — C1725 CATH, TRANSLUMIN NON-LASER: HCPCS

## 2020-08-06 PROCEDURE — 82947 ASSAY GLUCOSE BLOOD QUANT: CPT

## 2020-08-06 PROCEDURE — 0JH63XZ INSERTION OF TUNNELED VASCULAR ACCESS DEVICE INTO CHEST SUBCUTANEOUS TISSUE AND FASCIA, PERCUTANEOUS APPROACH: ICD-10-PCS | Performed by: RADIOLOGY

## 2020-08-06 PROCEDURE — 99212 OFFICE O/P EST SF 10 MIN: CPT

## 2020-08-06 PROCEDURE — 77001 FLUOROGUIDE FOR VEIN DEVICE: CPT | Performed by: RADIOLOGY

## 2020-08-06 PROCEDURE — 6370000000 HC RX 637 (ALT 250 FOR IP): Performed by: STUDENT IN AN ORGANIZED HEALTH CARE EDUCATION/TRAINING PROGRAM

## 2020-08-06 PROCEDURE — C1894 INTRO/SHEATH, NON-LASER: HCPCS

## 2020-08-06 PROCEDURE — 36415 COLL VENOUS BLD VENIPUNCTURE: CPT

## 2020-08-06 PROCEDURE — 36907 BALO ANGIOP CTR DIALYSIS SEG: CPT | Performed by: RADIOLOGY

## 2020-08-06 PROCEDURE — 2709999900 HC NON-CHARGEABLE SUPPLY

## 2020-08-06 PROCEDURE — 6360000002 HC RX W HCPCS: Performed by: RADIOLOGY

## 2020-08-06 PROCEDURE — 99233 SBSQ HOSP IP/OBS HIGH 50: CPT | Performed by: INTERNAL MEDICINE

## 2020-08-06 PROCEDURE — 2580000003 HC RX 258: Performed by: STUDENT IN AN ORGANIZED HEALTH CARE EDUCATION/TRAINING PROGRAM

## 2020-08-06 PROCEDURE — 99223 1ST HOSP IP/OBS HIGH 75: CPT | Performed by: INTERNAL MEDICINE

## 2020-08-06 PROCEDURE — 83735 ASSAY OF MAGNESIUM: CPT

## 2020-08-06 PROCEDURE — B51W1ZZ FLUOROSCOPY OF DIALYSIS SHUNT/FISTULA USING LOW OSMOLAR CONTRAST: ICD-10-PCS | Performed by: RADIOLOGY

## 2020-08-06 PROCEDURE — 93010 ELECTROCARDIOGRAM REPORT: CPT | Performed by: INTERNAL MEDICINE

## 2020-08-06 PROCEDURE — 80048 BASIC METABOLIC PNL TOTAL CA: CPT

## 2020-08-06 PROCEDURE — C1769 GUIDE WIRE: HCPCS

## 2020-08-06 RX ORDER — BUPROPION HYDROCHLORIDE 150 MG/1
150 TABLET ORAL DAILY
Status: DISCONTINUED | OUTPATIENT
Start: 2020-08-06 | End: 2020-08-06 | Stop reason: HOSPADM

## 2020-08-06 RX ORDER — DEXTROSE MONOHYDRATE 25 G/50ML
12.5 INJECTION, SOLUTION INTRAVENOUS PRN
Status: DISCONTINUED | OUTPATIENT
Start: 2020-08-06 | End: 2020-08-06 | Stop reason: HOSPADM

## 2020-08-06 RX ORDER — NICOTINE POLACRILEX 4 MG
15 LOZENGE BUCCAL PRN
Status: DISCONTINUED | OUTPATIENT
Start: 2020-08-06 | End: 2020-08-06 | Stop reason: HOSPADM

## 2020-08-06 RX ORDER — GABAPENTIN 300 MG/1
300 CAPSULE ORAL DAILY
Status: DISCONTINUED | OUTPATIENT
Start: 2020-08-06 | End: 2020-08-06 | Stop reason: HOSPADM

## 2020-08-06 RX ORDER — INSULIN GLARGINE 100 [IU]/ML
25 INJECTION, SOLUTION SUBCUTANEOUS NIGHTLY
Status: DISCONTINUED | OUTPATIENT
Start: 2020-08-06 | End: 2020-08-06 | Stop reason: HOSPADM

## 2020-08-06 RX ORDER — HEPARIN SODIUM 5000 [USP'U]/ML
INJECTION, SOLUTION INTRAVENOUS; SUBCUTANEOUS
Status: COMPLETED | OUTPATIENT
Start: 2020-08-06 | End: 2020-08-06

## 2020-08-06 RX ORDER — DONEPEZIL HYDROCHLORIDE 5 MG/1
5 TABLET, FILM COATED ORAL EVERY EVENING
Status: DISCONTINUED | OUTPATIENT
Start: 2020-08-06 | End: 2020-08-06 | Stop reason: HOSPADM

## 2020-08-06 RX ORDER — BUPROPION HYDROCHLORIDE 150 MG/1
150 TABLET, EXTENDED RELEASE ORAL DAILY
Status: DISCONTINUED | OUTPATIENT
Start: 2020-08-06 | End: 2020-08-06

## 2020-08-06 RX ORDER — ATORVASTATIN CALCIUM 40 MG/1
40 TABLET, FILM COATED ORAL NIGHTLY
Status: DISCONTINUED | OUTPATIENT
Start: 2020-08-06 | End: 2020-08-06 | Stop reason: HOSPADM

## 2020-08-06 RX ORDER — DEXTROSE MONOHYDRATE 50 MG/ML
100 INJECTION, SOLUTION INTRAVENOUS PRN
Status: DISCONTINUED | OUTPATIENT
Start: 2020-08-06 | End: 2020-08-06 | Stop reason: HOSPADM

## 2020-08-06 RX ORDER — LEVOTHYROXINE SODIUM 0.03 MG/1
25 TABLET ORAL DAILY
Status: DISCONTINUED | OUTPATIENT
Start: 2020-08-06 | End: 2020-08-06 | Stop reason: HOSPADM

## 2020-08-06 RX ORDER — DOXYCYCLINE HYCLATE 100 MG
100 TABLET ORAL 2 TIMES DAILY
Status: DISCONTINUED | OUTPATIENT
Start: 2020-08-06 | End: 2020-08-06 | Stop reason: HOSPADM

## 2020-08-06 RX ORDER — CEFAZOLIN SODIUM 1 G/50ML
1 INJECTION, SOLUTION INTRAVENOUS ONCE
Status: COMPLETED | OUTPATIENT
Start: 2020-08-06 | End: 2020-08-06

## 2020-08-06 RX ADMIN — DOXYCYCLINE HYCLATE 100 MG: 100 TABLET, COATED ORAL at 02:02

## 2020-08-06 RX ADMIN — INSULIN LISPRO 2 UNITS: 100 INJECTION, SOLUTION INTRAVENOUS; SUBCUTANEOUS at 17:10

## 2020-08-06 RX ADMIN — HEPARIN SODIUM 1.9 ML: 5000 INJECTION INTRAVENOUS; SUBCUTANEOUS at 09:35

## 2020-08-06 RX ADMIN — CEFAZOLIN SODIUM 1 G: 1 INJECTION, SOLUTION INTRAVENOUS at 09:14

## 2020-08-06 RX ADMIN — DONEPEZIL HYDROCHLORIDE 5 MG: 5 TABLET, FILM COATED ORAL at 18:37

## 2020-08-06 RX ADMIN — DESMOPRESSIN ACETATE 40 MG: 0.2 TABLET ORAL at 02:02

## 2020-08-06 RX ADMIN — GABAPENTIN 300 MG: 300 CAPSULE ORAL at 10:27

## 2020-08-06 RX ADMIN — LEVOTHYROXINE SODIUM 25 MCG: 25 TABLET ORAL at 05:54

## 2020-08-06 RX ADMIN — INSULIN LISPRO 1 UNITS: 100 INJECTION, SOLUTION INTRAVENOUS; SUBCUTANEOUS at 02:02

## 2020-08-06 RX ADMIN — SODIUM CHLORIDE, PRESERVATIVE FREE 10 ML: 5 INJECTION INTRAVENOUS at 07:40

## 2020-08-06 RX ADMIN — INSULIN GLARGINE 25 UNITS: 100 INJECTION, SOLUTION SUBCUTANEOUS at 02:02

## 2020-08-06 RX ADMIN — DOXYCYCLINE HYCLATE 100 MG: 100 TABLET, COATED ORAL at 10:27

## 2020-08-06 RX ADMIN — BUPROPION HYDROCHLORIDE 150 MG: 150 TABLET, EXTENDED RELEASE ORAL at 10:27

## 2020-08-06 RX ADMIN — IOVERSOL 70 ML: 741 INJECTION INTRA-ARTERIAL; INTRAVENOUS at 10:00

## 2020-08-06 ASSESSMENT — PAIN SCALES - GENERAL
PAINLEVEL_OUTOF10: 0

## 2020-08-06 NOTE — PROGRESS NOTES
Dialysis Post Treatment Note  Vitals:    08/05/20 2016   BP: 100/65   Pulse: 94   Resp: 20   Temp: 97.4 °F (36.3 °C)   SpO2: 100%     Pre-Weight = 112.6kg  Post-weight = Weight: 239 lb 13.8 oz (108.8 kg)  Total Liters Processed = Total Liters Processed (l/min): 51.6 l/min  Rinseback Volume (mL) = Rinseback Volume (ml): 370 ml  Net Removal (mL) = 3430  Patient's dry weight=109kg  Type of access used=Temp catheter  Length of treatment=180    Patient tolerated treatment well with no complaints. Right forearm very bruised, hard, and no thrill but could hear bruit. RN Ramya Escalera notified. Per IR Patient to remain NPO after midnight for fistulagram in AM. Report given to ER Nurse Connie Holloway.

## 2020-08-06 NOTE — PROGRESS NOTES
Occupational Therapy Not Seen Note    DATE: 2020  Name: Terese Alfaro  : 1945  MRN: [id number]    Patient not available for Occupational Therapy due to:    Strict Bedrest, pt off floor in IR.      Next Scheduled Treatment: Recheck 2020    Electronically signed by PRUDENCIO Price on 2020 at 9:12 AM

## 2020-08-06 NOTE — PROGRESS NOTES
Writer gave discharge education to patient and wife at bedside. All questions were answered. IV was removed. Patient's belongings gathered. Writer called report to [employer] and [employer]. Waiting for transport.

## 2020-08-06 NOTE — CONSULTS
Renal Consult Note    Patient :  Kaylee Dwyer; 75 y.o. MRN# [id number]  Location:  7466/3679-74  Attending:  Miriam Laura MD  Admit Date:  8/5/2020   Hospital Day: 1    Reason for Consult:     Asked by Dr Miriam Laura MD to see for JACKELYN/Elevated Creatinine. History Obtained From:     spouse, non-family caregiver - nursing staff, electronic medical record    HD Access:     previous  Right AV fistula, newly placed right tunnel catheter    HD Unit:     [facility]    Nephrologist:     Kan    Dry Weight:     109 Kg    Admission Weight:     109    History of Present Illness:     Kaylee Dwyer; 75 y.o. male with past medical history as mentioned below presented to the hospital with the chief complaint of malfunctioning right AV fistula. Known history of ESRD on hemodialysis Monday Wednesday Friday at the [facility] [place] unit via right upper extremity AV fistula under Dr. Poppy Daugherty. Lately he has had problems with his AV fistula. Has required several fistulogram's in [place] by interventional radiology. Last intervention was in 23 July. At that time he was told that he had some scant of central stenosis and will require recurrent fistulogram's to keep his access patent. Subsequent to the procedure his wife tells me he is continued to struggle with adequate dialysis treatments as the access is continued to malfunction. At times it can be accessed other times it works for a while and then the blood flows declined and he has to be taken off the machine. In the last week he is not had any good dialysis treatments. He was advised to come into the hospital by his nephrologist.  He underwent temporary dialysis catheter placement yesterday and underwent a full complete dialysis treatment. Prior to that BUN was 148. This morning he underwent a fistulogram, had successful angioplasty. There is a large hematoma over the right fistula site. It is felt that the fistula should not be used of the hematoma subsides. In the meantime tunnel catheter was placed. Patient was planned for discharge today however started noticing oozing from the catheter site. He is now going to be taken back to interventional radiology and hopefully achieve adequate hemostasis. Symptom wise patient denies any shortness of breath, chest pain, PND, therapy. No cough phlegm hemoptysis. No fever chills. No nausea vomiting. Past History/Allergies? Social History:     Past Medical History:   Diagnosis Date    Acute blood loss anemia 3/11/2019    Acute on chronic diastolic CHF (congestive heart failure) ([address] 12/2/2019    Anemia in chronic kidney disease (CKD) 4/27/2016    Arthritis     Bacteremia 11/11/2016    Benign prostatic hyperplasia without lower urinary tract symptoms     Bleeding from wound 4/2/2019    BMI 40.0-44.9, adult ([address] 3/19/2018    Cellulitis 3/21/2019    Cellulitis of left lower extremity     Chronic cerebral ischemia 1/3/2017    Closed fracture of forearm 8/13/2013    Broken lft forearm     Controlled type 2 diabetes mellitus with chronic kidney disease on chronic dialysis, with long-term current use of insulin ([address]     Controlled type 2 diabetes mellitus with diabetic polyneuropathy, with long-term current use of insulin ([address] 10/7/2018    Decubital ulcer     NON OPEN BUTTOCKS    Dementia ([address]     memory problems    Dialysis patient ([address] 01/03/2017    Goes [place], Thurs, Sat in [address] Difficult intravenous access     HAS NEEDED PICC TEAM IN THE PAST    Difficulty walking     WHEELCHAIR BOUND-PIVOTS WITH ASSIST O F 2    DJD (degenerative joint disease) of knee     Elbow, forearm, and wrist, abrasion or friction burn, without mention of infection 8/13/2013    Abrasions lft forearm     Encephalopathy acute 4/27/2016    Encounter regarding vascular access for dialysis for ESRD ([address] 2/2/2017    ESRD (end stage renal disease) on dialysis ([address] 1/17/2017    Forgetfulness     HAS SOME SHORT TERM MEMORY Inject 25 Units into the skin 2 times daily  Misc. Devices [facility]) MISC, Use as directed  Amino Acids-Protein Hydrolys (PRO-STAT PO), Take 30 mLs by mouth 3 times daily (Prostat SF) for wound healing. donepezil (ARICEPT) 5 MG tablet, Take 5 mg by mouth every evening  levothyroxine (SYNTHROID) 25 MCG tablet, Take 1 tablet by mouth Daily  fluticasone (FLONASE) 50 MCG/ACT nasal spray, 2 sprays by Nasal route daily  magnesium hydroxide (MILK OF MAGNESIA) 400 MG/5ML suspension, Take 30 mLs by mouth daily as needed for Constipation    Current Medications:     Scheduled Meds:    atorvastatin  40 mg Oral Nightly    donepezil  5 mg Oral QPM    doxycycline hyclate  100 mg Oral BID    gabapentin  300 mg Oral Daily    levothyroxine  25 mcg Oral Daily    insulin glargine  25 Units Subcutaneous Nightly    insulin lispro  0-12 Units Subcutaneous TID WC    insulin lispro  0-6 Units Subcutaneous Nightly    buPROPion  150 mg Oral Daily    sodium chloride flush  10 mL Intravenous 2 times per day     Continuous Infusions:    dextrose       PRN Meds:  glucose, dextrose, glucagon (rDNA), dextrose, sodium chloride flush, polyethylene glycol, promethazine **OR** ondansetron, heparin (porcine), heparin (porcine)    Review of Systems:     Constitutional: No fever, no chills, no lethargy, no weakness. HEENT:  No headache, otalgia, itchy eyes, nasal discharge or sore throat. Cardiac:  No chest pain, dyspnea, orthopnea or PND. Chest:  No cough, phlegm or wheezing. Abdomen:  No abdominal pain, nausea or vomiting. Neuro:  No focal weakness, abnormal movements orseizure like activity. Skin:   No rashes, no itching. :   No hematuria, no pyuria, no dysuria, no flank pain. Extremities:  No swelling or joint pains. ROS was otherwise negative except as mentioned in the [address] Input/Output:     I/O last 3 completed shifts:   In: 370   Out: 3800       Vital Signs:   Temperature:  Temp: 98.2 °F (36.8 °C)  TMax:   Temp 04/27/2016    RBCUA 10 TO 20 04/27/2016    MUCUS NOT REPORTED 04/27/2016    TRICHOMONAS NOT REPORTED 04/27/2016    YEAST NOT REPORTED 04/27/2016    BACTERIA MANY 04/27/2016    SPECGRAV 1.015 04/27/2016    LEUKOCYTESUR LARGE 04/27/2016    UROBILINOGEN Normal 04/27/2016    BILIRUBINUR NEGATIVE 04/27/2016    GLUCOSEU NEGATIVE 04/27/2016    KETUA NEGATIVE 04/27/2016    AMORPHOUS 2+ 04/27/2016       Radiology:     CXR:     Assessment:     1. ESRD on Hemodialysis. His regular HD days are Monday Wednesday Friday at [facility] hemodialysis facility using right AV fistula under Dr Bhumika Foster. His dry weight is 109 kg. Admitted with 109 kg  2. Anemia of chronic disease  3. Secondary hyperparathyroidism  4. Hypertension  5. Right AV fistula malfunction requiring fistulogram and angioplasty, found to have a hematoma on top of it and fistula is being rested, tunnel catheter placed  6. Oozing from tunneled catheter site will be taken back to IR to achieve adequate hemostasis  7. Azotemia due to missed dialysis resolved after dialysis treatment was initiated    Plan:   1. HD tomorrow as per schedule. 2. Strict Input and Output, Daily weigh and document in the chart. 3. Low Potassium, Low phosphorus and low salt diet. Fluids to be restricted to 1500ml/day. 4. IV Aranesp/Epogen for anemia of chronic disease with HD weekly. 5. IV Zemplar per protocol for secondary hyperparathyroidism with HD thrice a week. 6.  Stable for discharge from nephrology standpoint  7. May need vascular surgery follow-up after discharge with his primary vascular surgeon     Nutrition   Please ensure that patient is on a renal diet/TF. Thank you for the consultation. Please do not hesitate to contact us for any further questions/concerns. We will continue to follow along with you.

## 2020-08-06 NOTE — BRIEF OP NOTE
Brief Postoperative Note    Jez Zaragoza  YOB: 1945  [id number]    Pre-operative Diagnosis: Poorly Functioning right    Post-operative Diagnosis: Same    Procedure: Percutaneous Access right AVF and temp cath to perm cath conversion    Anesthesia: Local and moderate     Surgeons/Assistants: Marcos Alejo MD    Estimated Blood Loss: Minimal    Complications: none    Specimens: were not obtained    Large hematoma overlying right fistula. Successful angioplasty of Fistula. Fistula can not be used until hematoma subsides. Temporary dialysis cath conversion to 14 Fr x 23 cm tip to cuff palindrome tunneled HD Catheter placed successfully via the Site:  Right Internal Jugular Vein. Catheter secured to skin, dressing applied. Catheter locked with Heparin. May use HD cath for dialysis.     Electronically signed by YAMILE Forte on 8/6/2020 at 9:49 AM    Tal OVALLE-C8/6/2020 9:20 AM

## 2020-08-06 NOTE — H&P
[address]     Department of Internal Medicine - Staff Internal Medicine Teaching Service          ADMISSION NOTE/HISTORY AND PHYSICAL EXAMINATION   Date: 8/6/2020  Patient Name: Steven Schmidt  Date of admission: 8/5/2020  1:48 PM  YOB: 1945  PCP: Daniela Pinzon DO  History Obtained From: Patient    [facility]     Chief complaint: Vascular access problem: Fistula not working, hasn't had dialysis in a week    HISTORY OF PRESENTING ILLNESS     The patient is a 75 y.o. male presents with PMH of ESRD, atrial fibrillation, type 2 diabetes presented with AV fistula not working. He is a poor historian and presented via EMS from [facility]. According to them her has not been able to have dialysis in a week due to his vascular access problem.   Denies any SOB, cough, abdominal pain, chest pain      Review of Systems:  General ROS:   HEENT ROS: Completed and except as mentioned above were negative   Allergy and Immunology ROS:  Completed and except as mentioned above were negative  Hematological and Lymphatic ROS:  Completed and except as mentioned above were negative  Respiratory ROS:  Completed and except as mentioned above were negative  Cardiovascular ROS:  Completed and except as mentioned above were negative  Gastrointestinal ROS: Completed and except as mentioned above were negative  Genito-Urinary ROS:  Completed and except as mentioned above were negative  Musculoskeletal ROS:  Completed and except as mentioned above were negative  Neurological ROS: not oriented  Skin & Dermatological ROS:  Bruising on right forearm  Psychological ROS:  Completed and except as mentioned above were negative    PAST MEDICAL HISTORY     Past Medical History:   Diagnosis Date    Acute blood loss anemia 3/11/2019    Acute on chronic diastolic CHF (congestive heart failure) ([facility] 12/2/2019    Anemia in chronic kidney disease (CKD) 4/27/2016    Arthritis     Bacteremia 11/11/2016  Benign prostatic hyperplasia without lower urinary tract symptoms     Bleeding from wound 4/2/2019    BMI 40.0-44.9, adult ([address] 3/19/2018    Cellulitis 3/21/2019    Cellulitis of left lower extremity     Chronic cerebral ischemia 1/3/2017    Closed fracture of forearm 8/13/2013    Broken lft forearm     Controlled type 2 diabetes mellitus with chronic kidney disease on chronic dialysis, with long-term current use of insulin ([address]     Controlled type 2 diabetes mellitus with diabetic polyneuropathy, with long-term current use of insulin ([address] 10/7/2018    Decubital ulcer     NON OPEN BUTTOCKS    Dementia ([address]     memory problems    Dialysis patient ([address] 01/03/2017    Goes [facility], Sat in [place]    Difficult intravenous access     HAS NEEDED PICC TEAM IN THE PAST    Difficulty walking     WHEELCHAIR BOUND-PIVOTS WITH ASSIST O F 2    DJD (degenerative joint disease) of knee     Elbow, forearm, and wrist, abrasion or friction burn, without mention of infection 8/13/2013    Abrasions lft forearm     Encephalopathy acute 4/27/2016    Encounter regarding vascular access for dialysis for ESRD ([address] 2/2/2017    ESRD (end stage renal disease) on dialysis ([address] 1/17/2017    Forgetfulness     HAS SOME SHORT TERM MEMORY LOSS, QUYEN-WIFE IS LEGAL GUARDIAN    H/O transesophageal echocardiography ([facility]) for monitoring     Hemodialysis patient ([address] 11/11/2016    tues-thurs-sat [place]---[facility]ESEMIUS.  TUNNELED CATHETER RT UPPER CHEST    Hyperlipidemia 1990    on Meds    Hypertension 1990    on Meds    Intercritical gout     on Meds    Joint pain, knee 01/13/2017    baldo knee synvisc-1 injections    Long-term insulin use ([address] 1/17/2017    Major depressive disorder with single episode, in partial remission ([address] 1/3/2017    Mobility impaired     Morbid obesity (HCC)     Muscle weakness     GRNERALIZED    Obesity     Obstructive sleep apnea     HEATHER on CPAP     On CPAP-TO BRING DOS    wrist    OTHER SURGICAL HISTORY  04/05/2018    FISTULAGRAM WITH DR. Crescencio GUARDADO EGD TRANSORAL BIOPSY SINGLE/MULTIPLE N/A 10/17/2017    EGD BIOPSY performed by Sabra Marcial MD at [address] ANGIOACCESS AV FISTULA Right 8/29/2017     RIGHT  LOWER ARM AV FISTULA  LIGATION OF AQUIRED BRANCHES X2 performed by Janessa Lincoln DO at [address]  3/18/16    median    VASCULAR SURGERY  12/06/2018    Right arm fistulagram,  PTA cephalic vein stenosis  /  DR José Miguel Jaffe       ALLERGIES     Percocet [oxycodone-acetaminophen] and Ampicillin    MEDICATIONS PRIOR TO ADMISSION     Prior to Admission medications    Medication Sig Start Date End Date Taking? Authorizing Provider   Lidocaine 5 % CREA Apply topically Apply to fistula rt arm topically one time a day every Mon, Wed, Fri for dialysis. Historical Provider, MD   buPROPion [facility] SR) 150 MG extended release tablet Take 150 mg by mouth daily    Historical Provider, MD   doxycycline hyclate (VIBRA-TABS) 100 MG tablet Take 100 mg by mouth 2 times daily x10 days, starting 6/13/20 for cellulitis.     Historical Provider, MD   albuterol (PROVENTIL) (2.5 MG/3ML) 0.083% nebulizer solution Take 2.5 mg by nebulization every 8 hours as needed for Shortness of Breath    Historical Provider, MD   benzonatate (TESSALON) 100 MG capsule Take 100 mg by mouth every 12 hours as needed for Cough    Historical Provider, MD   ipratropium-albuterol (DUONEB) 0.5-2.5 (3) MG/3ML SOLN nebulizer solution Inhale 3 mLs into the lungs every 8 hours as needed for Shortness of Breath (or wheezing)    Historical Provider, MD   guaiFENesin 400 MG tablet Take 400 mg by mouth every 12 hours as needed (allergies)    Historical Provider, MD   ondansetron (ZOFRAN) 4 MG tablet Take 4 mg by mouth every 6 hours as needed for Nausea or Vomiting    Historical Provider, MD   tiZANidine (ZANAFLEX) 2 MG tablet Take 2 mg by mouth every 8 hours as needed (muscle pain) Historical Provider, MD   RENVELA 800 MG tablet Take 2 tablets by mouth 3 times daily Give with snack. 4/6/20   Octavio Mcarthur,    FIASP FLEXTOUCH 100 UNIT/ML SOPN Inject into the skin See Admin Instructions Per sliding scale: If 150-200 = 2 units; 201-250 = 4 units; 251-300 = 6 units; 301-350 = 8 units; 351-400 = 10 units; 401+ = 15 units, then call MD 3/4/20   Historical Provider, MD   HYDROcodone-acetaminophen (NORCO) 5-325 MG per tablet Take 1 tablet by mouth every 4 hours as needed. 1/26/20   Historical Provider, MD   Elastic Bandages & Supports MISC 30-40 mmHg compression stockings to both lower legs, on every morning, off at bedtime. 11/19/19   Dayday Blue MD   gabapentin (NEURONTIN) 300 MG capsule Take 300 mg by mouth daily. Historical Provider, MD   ALPRAZolam Loren Casssisi) 0.5 MG tablet Take 0.5 mg by mouth 2 times daily. Historical Provider, MD   DULoxetine (CYMBALTA) 60 MG extended release capsule Take 60 mg by mouth daily    Historical Provider, MD   loratadine (CLARITIN) 10 MG capsule Take 10 mg by mouth daily (every other day).     Historical Provider, MD   aspirin 81 MG tablet Take 1 tablet by mouth daily 4/8/19   Soniya Mayo MD   apixaban (ELIQUIS) 5 MG TABS tablet Take 1 tablet by mouth 2 times daily 4/8/19   Soniya Mayo MD   Multiple Vitamins-Minerals (THERAPEUTIC MULTIVITAMIN-MINERALS) tablet Take 1 tablet by mouth daily    Historical Provider, MD   omeprazole (PRILOSEC) 20 MG delayed release capsule Take 20 mg by mouth nightly     Historical Provider, MD   atorvastatin (LIPITOR) 40 MG tablet Take 1 tablet by mouth nightly 3/24/19   Bertha Valdez MD   midodrine (PROAMATINE) 10 MG tablet Take 1 tablet by mouth 3 times daily  Patient taking differently: Take 10 mg by mouth 3 times daily Hold if SBP over 150 3/19/19   Bertha Valdez MD   folic acid (FOLVITE) 1 MG tablet Take 1 tablet by mouth daily 3/19/19   Betrha Valdez MD   insulin glargine (LANTUS) 100 UNIT/ML injection without murmur, clicks, gallops or rubs. Abdomen: Slightly rounded and soft without organomegaly. Lymphatic: No lymphadenopathy. Musculoskeletal: Normal curvature of the spine. No gross muscle weakness. Extremities:  No lower extremity edema, tenderness, erythema/dermatitis with ulceration on the right lower extremity with no oozing noted, Right forearm very bruised, hard, and thrill present. Skin:  Warm and dry. Good color, turgor and pigmentation. No lesions or scars.   No cyanosis or clubbing  Neurological/Psychiatric: The patient's general behavior, thought content and emotional status is less than normal.          INVESTIGATIONS     Laboratory Testing:     Recent Results (from the past 24 hour(s))   CBC WITH AUTO DIFFERENTIAL    Collection Time: 08/05/20  2:15 PM   Result Value Ref Range    WBC 7.2 3.5 - 11.3 k/uL    RBC 3.21 (L) 4.21 - 5.77 m/uL    Hemoglobin 10.1 (L) 13.0 - 17.0 g/dL    Hematocrit 31.3 (L) 40.7 - 50.3 %    MCV 97.5 82.6 - 102.9 fL    MCH 31.5 25.2 - 33.5 pg    MCHC 32.3 28.4 - 34.8 g/dL    RDW 14.4 11.8 - 14.4 %    Platelets 021 471 - 219 k/uL    MPV 9.7 8.1 - 13.5 fL    NRBC Automated 0.0 0.0 per 100 WBC    Differential Type NOT REPORTED     Seg Neutrophils 75 (H) 36 - 65 %    Lymphocytes 13 (L) 24 - 43 %    Monocytes 8 3 - 12 %    Eosinophils % 3 1 - 4 %    Basophils 1 0 - 2 %    Immature Granulocytes 0 0 %    Segs Absolute 5.43 1.50 - 8.10 k/uL    Absolute Lymph # 0.96 (L) 1.10 - 3.70 k/uL    Absolute Mono # 0.56 0.10 - 1.20 k/uL    Absolute Eos # 0.18 0.00 - 0.44 k/uL    Basophils Absolute 0.04 0.00 - 0.20 k/uL    Absolute Immature Granulocyte <0.03 0.00 - 0.30 k/uL    WBC Morphology NOT REPORTED     RBC Morphology NOT REPORTED     Platelet Estimate NOT REPORTED    BASIC METABOLIC PANEL    Collection Time: 08/05/20  2:15 PM   Result Value Ref Range    Glucose 133 (H) 70 - 99 mg/dL     (HH) 8 - 23 mg/dL    CREATININE 9.90 () 0.70 - 1.20 mg/dL    Bun/Cre Ratio NOT REPORTED 9 - 20    Calcium 9.0 8.6 - 10.4 mg/dL    Sodium 133 (L) 135 - 144 mmol/L    Potassium 4.8 3.7 - 5.3 mmol/L    Chloride 88 (L) 98 - 107 mmol/L    CO2 22 20 - 31 mmol/L    Anion Gap 23 (H) 9 - 17 mmol/L    GFR Non-African American 5 (L) >60 mL/min    GFR  6 (L) >60 mL/min    GFR Comment          GFR Staging NOT REPORTED    Protime-INR    Collection Time: 08/05/20  2:15 PM   Result Value Ref Range    Protime 10.3 9.0 - 12.0 sec    INR 1.0    APTT    Collection Time: 08/05/20  2:15 PM   Result Value Ref Range    PTT 32.0 (H) 20.5 - 30.5 sec   COVID-19    Collection Time: 08/05/20  3:22 PM    Specimen: Other   Result Value Ref Range    SARS-CoV-2          SARS-CoV-2, Rapid Not Detected Not Detected    Source . NASOPHARYNGEAL SWAB     SARS-CoV-2, PCR             Imaging:   Ir Nontunneled Vascular Catheter > 5 Years    Result Date: 8/5/2020  Successful ultrasound and fluoroscopy guided non-tunneled right internal jugular vein 14 Bangl[place]hi by 20 cm dialysis catheter placement. Ready for use. ASSESSMENT & PLAN     ASSESSMENT / PLAN:     IMPRESSION  The patient is a 75 y.o. male presents with PMH of ESRD, atrial fibrillation, type 2 diabetes presented with AV fistula not working. He has not been able to have dialysis in a week due to his vascular access problem. IR consulted    Active Problems:  AVF (arteriovenous fistula)  - Per IR Patient to remain NPO after midnight for fistulagram in AM    ESRD  - received dialysis today 08:19 PM        DVT ppx: Heparin (porcine) injection 1400 PRN      Bhupendra Arteaga MD  Internal Medicine Resident, PGY-1  [facility]; [facility]  8/6/2020, 12:28 AM   Attending Physician Statement  I have discussed the care of Bella Monet, including pertinent history and exam findings,  with the resident. I have seen and examined the patient and the key elements of all parts of the encounter have been performed by me.   I agree with the assessment, plan and orders as documented by the resident with additions . Treatment plan Discussed with nursing staff in detail , all questions answered . Electronically signed by Fred Edge MD on   8/6/20 at 3:51 PM EDT    Please note that this chart was generated using voice recognition Dragon dictation software. Although every effort was made to ensure the accuracy of this automated transcription, some errors in transcription may have occurred.

## 2020-08-06 NOTE — PROGRESS NOTES
Telesitter initiated. Writer walked into patient room and the patient was attempting to get out of bed, with his legs over the side of the bed. Writer double checked and the bed alarm was on, never when off. Writer adjusted the patient, reoriented him and then initiated the telesitter for extra safety measures. Bed remains locked and in lowest position with call light within reach, and bed alarm is on.  Electronically signed by Kareen Chun RN on 8/6/2020 at 7:16 AM

## 2020-08-06 NOTE — CARE COORDINATION
Discharge order noted. Call to [facility], spoke to Larry Amador in [employer], they are able to accept patient back. Notified of anticipated discharge today. [facility] transport requested and confirmed for 7 pm.   AVS with completed CLAUS faxed. RN to call report to [phone].       Discharge [facility] Case Management Department  Written by: Sue Irving RN    Patient Name: Rachel Santana  Attending Provider: Leonardo Donnelly MD  Admit Date: 2020  1:48 PM  MRN: [id number]  Account: [de-identified]                     : 1945  Discharge Date:  20       Disposition: [facility] RN

## 2020-08-06 NOTE — PROGRESS NOTES
Patient arrived to unit from ED via stretcher. VS were obtained. Patient was oriented to room. Bed remains locked and in lowest position with call light in reach. Will continue to monitor.  Electronically signed by Aroldo Engel RN on 8/5/2020 at 10:43 PM

## 2020-08-06 NOTE — CARE COORDINATION
Case Management Initial Discharge Plan  Patti Fitzgerald,             Met with:spouse/SO to discuss discharge plans. Information verified: address, contacts, phone number, , insurance Yes    Emergency Contact/Next of Kin name & number: Keli Burns (spouse) [phone]    PCP: Gayl Saint, DO  Date of last visit: 20 at facility    Insurance Provider: Medicare, [facility]    Discharge Planning    Living Arrangements:  Other (Comment)   Support Systems:  Spouse/Significant Other, Children, ECF/Assisted Living    Home has 1 stories  0 stairs to climb to get into front door, n/a stairs to climb to reach second floor  Location of bedroom/bathroom in home ECF    Patient able to perform ADL's:Assisted    Current Services (outpatient & in home) SNF, OP dialysis  DME equipment:    DME provider:      Receiving oral anticoagulation therapy? Yes - Eliquis    If indicated:   Physician managing anticoagulation treatment: n/a  Where does patient obtain lab work for ATC treatment? n/a      Potential Assistance Needed:  [place]    Patient agreeable to home care: No  [place] of choice provided:  n/a    Prior SNF/Rehab Placement and Facility: Yes - [facility] [place] since 2016  Agreeable to SNF/Rehab: Yes  [place] of choice provided: yes     Evaluation: n/a    Expected Discharge date:  20    Patient expects to be discharged to:  [id number] [place] [address] of [place]  Follow Up Appointment: Best Day/ Time:      Transportation provider: ambulance  Transportation arrangements needed for discharge: Yes     Readmission Risk              Risk of Unplanned Readmission:        22             Does patient have a readmission risk score greater than 14?: Yes  If yes, follow-up appointment must be made within 7 days of discharge.      Goals of Care: dialysis      Discharge Plan: Return to [facility] Dr Jose Brar: [place] (from SNF) and . [facility] (for [employer])      Electronically signed by Bryan Dunne RN on 8/6/20 at 10:14 AM EDT

## 2020-08-06 NOTE — PROGRESS NOTES
[facility] Wound Ostomy  Nurse  Consult Note       NAME:  Kelton Brito  MEDICAL RECORD NUMBER:  [id number]  AGE: 75 y.o. GENDER: male  : 1945  TODAY'S DATE:  2020    Subjective   Reason for [address] Nurse Evaluation and Assessment: Left forearm skin tear (foam dressing left undisturbed) bilateral lower extremity venous stasis and history of venous ulcers; history of right lower leg hematoma and evacuation 2019 / healed. Declined examination of buttocks at this time.       Wound Identification:  Wound Type: venous  Contributing Factors: edema, venous stasis, decreased mobility and anticoagulation therapy          PAST MEDICAL HISTORY        Diagnosis Date    Acute blood loss anemia 3/11/2019    Acute on chronic diastolic CHF (congestive heart failure) ([address] 2019    Anemia in chronic kidney disease (CKD) 2016    Arthritis     Bacteremia 2016    Benign prostatic hyperplasia without lower urinary tract symptoms     Bleeding from wound 2019    BMI 40.0-44.9, adult ([address] 3/19/2018    Cellulitis 3/21/2019    Cellulitis of left lower extremity     Chronic cerebral ischemia 1/3/2017    Closed fracture of forearm 2013    Broken lft forearm     Controlled type 2 diabetes mellitus with chronic kidney disease on chronic dialysis, with long-term current use of insulin ([address]     Controlled type 2 diabetes mellitus with diabetic polyneuropathy, with long-term current use of insulin ([address] 10/7/2018    Decubital ulcer     NON OPEN BUTTOCKS    Dementia ([address]     memory problems    Dialysis patient ([address] 2017    Goes Tue, [place], Sat in [place]    Difficult intravenous access     HAS NEEDED PICC TEAM IN THE PAST    Difficulty walking     WHEELCHAIR BOUND-PIVOTS WITH ASSIST O F 2    DJD (degenerative joint disease) of knee     Elbow, forearm, and wrist, abrasion or friction burn, without mention of infection 2013    Abrasions lft forearm     Encephalopathy acute 4/27/2016    Encounter regarding vascular access for dialysis for ESRD ([facility] 2/2/2017    ESRD (end stage renal disease) on dialysis ([address] 1/17/2017    Forgetfulness     HAS SOME SHORT TERM MEMORY LOSS, QUYEN-WIFE IS LEGAL GUARDIAN    H/O transesophageal echocardiography ([facility]ADEO) for monitoring     Hemodialysis patient ([address] 11/11/2016    tues-thurs-sat [place]---[facility].  TUNNELED CATHETER RT UPPER CHEST    Hyperlipidemia 1990    on Meds    Hypertension 1990    on Meds    Intercritical gout     on Meds    Joint pain, knee 01/13/2017    baldo knee synvisc-1 injections    Long-term insulin use ([address] 1/17/2017    Major depressive disorder with single episode, in partial remission ([address] 1/3/2017    Mobility impaired     Morbid obesity ([address]     Muscle weakness     GRNERALIZED    Obesity     Obstructive sleep apnea     HEATHER on CPAP     On CPAP-TO BRING DOS    Paroxysmal atrial fibrillation ([address] 9/6/2017    Pneumonia 12/2/2019    Respiratory failure ([address] 03/2016    with open heart surgery, trached x 5 months    S/P cardiac cath     Seborrhea     Severe aortic stenosis 3/9/2016    Severe mitral valve stenosis 3/9/2016    Severe sepsis ([address] 4/3/2016    Skin rash     ABD FOLDS    Stasis dermatitis     Thyroid disease     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 lft thumb     Traumatic hemorrhagic shock ([address] 3/13/2019    Venous stasis dermatitis     Wears glasses     Wheelchair bound     pivots with 2 assists       PAST SURGICAL HISTORY    Past Surgical History:   Procedure Laterality Date    AORTIC VALVE REPLACEMENT  03/18/2016    bioprosthetic    ARM SURGERY Left 1990    CARDIAC CATHETERIZATION      CENTRAL VENOUS CATHETER Right 02/21/2018    DIALYSIS CATHETER Dr Gerald Hampton COLONOSCOPY  11/19/09    COLONOSCOPY N/A 10/12/2018    COLONOSCOPY WITH BIOPSY performed by Zia Burrows DO at [facility] Right 3/10/2019    WASHOUT Alcohol/week: 0.0 standard drinks     Comment: 1-2 drinks per year    Drug use: No       ALLERGIES    Allergies   Allergen Reactions    Percocet [Oxycodone-Acetaminophen] Itching and Rash     Also causes shaking    Ampicillin Rash       MEDICATIONS    No current facility-administered medications on file prior to encounter. Current Outpatient Medications on File Prior to Encounter   Medication Sig Dispense Refill    Lidocaine 5 % CREA Apply topically Apply to fistula rt arm topically one time a day every Mon, Wed, Fri for dialysis.  buPROPion (WELLBUTRIN SR) 150 MG extended release tablet Take 150 mg by mouth daily      doxycycline hyclate (VIBRA-TABS) 100 MG tablet Take 100 mg by mouth 2 times daily x10 days, starting 6/13/20 for cellulitis.  albuterol (PROVENTIL) (2.5 MG/3ML) 0.083% nebulizer solution Take 2.5 mg by nebulization every 8 hours as needed for Shortness of Breath      benzonatate (TESSALON) 100 MG capsule Take 100 mg by mouth every 12 hours as needed for Cough      ipratropium-albuterol (DUONEB) 0.5-2.5 (3) MG/3ML SOLN nebulizer solution Inhale 3 mLs into the lungs every 8 hours as needed for Shortness of Breath (or wheezing)      guaiFENesin 400 MG tablet Take 400 mg by mouth every 12 hours as needed (allergies)      ondansetron (ZOFRAN) 4 MG tablet Take 4 mg by mouth every 6 hours as needed for Nausea or Vomiting      tiZANidine (ZANAFLEX) 2 MG tablet Take 2 mg by mouth every 8 hours as needed (muscle pain)      RENVELA 800 MG tablet Take 2 tablets by mouth 3 times daily Give with snack. 540 tablet 3    FIASP FLEXTOUCH 100 UNIT/ML SOPN Inject into the skin See Admin Instructions Per sliding scale: If 150-200 = 2 units; 201-250 = 4 units; 251-300 = 6 units; 301-350 = 8 units; 351-400 = 10 units; 401+ = 15 units, then call MD      Elastic Bandages & Supports MISC 30-40 mmHg compression stockings to both lower legs, on every morning, off at bedtime.  2 each 3    gabapentin (NEURONTIN) 300 MG capsule Take 300 mg by mouth daily.  ALPRAZolam (XANAX) 0.5 MG tablet Take 0.5 mg by mouth 2 times daily.  DULoxetine (CYMBALTA) 60 MG extended release capsule Take 60 mg by mouth daily      loratadine (CLARITIN) 10 MG capsule Take 10 mg by mouth daily (every other day).  aspirin 81 MG tablet Take 1 tablet by mouth daily 30 tablet 3    apixaban (ELIQUIS) 5 MG TABS tablet Take 1 tablet by mouth 2 times daily 60 tablet 2    Multiple Vitamins-Minerals (THERAPEUTIC MULTIVITAMIN-MINERALS) tablet Take 1 tablet by mouth daily      omeprazole (PRILOSEC) 20 MG delayed release capsule Take 20 mg by mouth nightly       atorvastatin (LIPITOR) 40 MG tablet Take 1 tablet by mouth nightly 30 tablet 3    midodrine (PROAMATINE) 10 MG tablet Take 1 tablet by mouth 3 times daily (Patient taking differently: Take 10 mg by mouth 3 times daily Hold if SBP over 150) 90 tablet 3    folic acid (FOLVITE) 1 MG tablet Take 1 tablet by mouth daily 90 tablet 1    insulin glargine (LANTUS) 100 UNIT/ML injection vial Inject 25 Units into the skin 2 times daily 1 vial 3    Misc. Devices [facility]) MISC Use as directed 1 each 0    Amino Acids-Protein Hydrolys (PRO-STAT PO) Take 30 mLs by mouth 3 times daily (Prostat SF) for wound healing.       donepezil (ARICEPT) 5 MG tablet Take 5 mg by mouth every evening      levothyroxine (SYNTHROID) 25 MCG tablet Take 1 tablet by mouth Daily 30 tablet 2    fluticasone (FLONASE) 50 MCG/ACT nasal spray 2 sprays by Nasal route daily 3 Bottle 3    magnesium hydroxide (MILK OF MAGNESIA) 400 MG/5ML suspension Take 30 mLs by mouth daily as needed for Constipation         Objective    /84   Pulse 81   Temp 97.4 °F (36.3 °C) (Axillary)   Resp 20   Ht 5' 7\" (1.702 m)   Wt 241 lb (109.3 kg)   SpO2 98%   BMI 37.75 kg/m²     LABS:  WBC:    Lab Results   Component Value Date    WBC 6.4 08/06/2020     H/H:    Lab Results   Component Value Date    HGB 10.0 (arteriovenous fistula) ([facility] I77.0       Measurements:     08/06/20 1553   Wound 12/03/19 Pretibial Right; Anterior;Distal   Date First Assessed/Time First Assessed: 12/03/19 1105   Present on Hospital Admission: Yes  Primary Wound Type: Venous Ulcer  Location: Pretibial  Wound Location Orientation: Right; Anterior;Distal   Wound Image    Wound Venous   Dressing Changed Changed/New   Dressing/Treatment Silicone border; Foam  (SINGLE LAYER SIZE D TUBIGRIP TOES TO KNEE)   Dressing Change Due 08/09/20   Wound Length (cm)   (SCATTERED DRY ESCHAR)   Wound Assessment Dry;Black; Brown  (SCABS)   Gwendolyn-wound Assessment Yellow-brown;Edema  (SL PEDAL EDEMA)   Wound 12/03/19 Pretibial Left   Date First Assessed/Time First Assessed: 12/03/19 1105   Present on Hospital Admission: Yes  Primary Wound Type: Traumatic  Location: Pretibial  Wound Location Orientation: Left   Dressing/Treatment   (SINGLE LAYER SIZE D TUBIGRIP TOES TO KNEE)   Wound Assessment   (NONE)   Gwendolyn-wound Assessment Yellow-brown   Wound 08/06/20 Arm Left; Lower;Dorsal   Date First Assessed: 08/06/20   Primary Wound Type: Skin Tear  Location: Arm  Wound Location Orientation: Left; Lower;Dorsal   Wound Skin Tear   Dressing Status Intact   Dressing/Treatment Silicone border; Foam   Dressing Change Due 08/09/20   Wound Assessment Red;Fragile;Bleeding   Drainage Amount Small   Drainage Description Serosanguinous     Foam dressing applied over scabbed right lower leg. Single layer tubigrip stockings applied to both legs form toes to knee (Size D)        Response to treatment:  Well tolerated by patient. Plan   Plan of Care:   Silicone bordered foam applied to the right pretibial leg then single layer tubigrip toes to knee. No wounds noted to the left leg; tubigrip applied in same fashion. Remove at bedtime. Elevate legs when seated. Foam dressing maintained to the left forearm skin tear.   Keep undisturbed for 3 days    Buttock wound treated using zinc oxide paste BID. Turn every 2 hours  Float heels off of bed with pillows under calves    Use lift sling to reposition patient to minimize potential for shear injury. Foam sacrum dressing to sacrococcygeal area. Peel back dressing, inspect skin beneath, re-secure. Change every 72 hours and prn wrinkles, soilage. Discontinue Sacral dressing if repeatedly soiled by incontinence. Routine incontinence care with incontinence barrier cloths and zinc oxide cream. Apply zinc oxide cream BID and prn incontinence.    Moisture wicking under pads     Specialty Bed Required : Yes   [x] Low Air Loss   [x] Pressure Redistribution   DreamAir mattress in use  [] Fluid Immersion  [] Bariatric  [] Total Pressure Relief  [] Other:     Current Diet: DIET RENAL;    Discharge Plan:  Placement for patient upon discharge: skilled nursing    Patient appropriate for Outpatient [address]: N/A      Patient/Caregiver Teaching:  Level of patient/caregiver understanding able to:   [] Indicates understanding       [] Needs reinforcement  [] Unsuccessful      [x] Verbal Understanding  [] Demonstrated understanding       [] No evidence of learning  [] Refused teaching         [] N/A     SHARIFA TIWARI, RN, [employer]

## 2020-08-06 NOTE — PROGRESS NOTES
Writer entered room to check on patient and noticed that it looked like the patient had been picking at his IV. Writer attempted to re-dress and flush the IV, but the IV was completely out. The writer attempted to look for another IV on the patient however, due to the patient having an AV fistula the writer could only use one arm and that arm has lots of ecchymosis. The writer notified primary that the patient is without IV access. Primary asked if there was someone ultrasound trained who could attempt to place an IV. The writer contacted [facility] to see is he was available to use the ultrasound. Will continue to monitor.   Electronically signed by Jamaal Le RN on 8/6/2020 at 1:06 AM

## 2020-08-06 NOTE — DISCHARGE INSTR - COC
Continuity of Care Form    Patient Name: Deidre Leal   :  1945  MRN:  [id number]    Admit date:  2020  Discharge date:  2020    Code Status Order: Full Code   Advance Directives:   [facility] Documentation     Date/Time Healthcare Directive Type of Healthcare Directive Copy in [address] Agent's Name Healthcare Agent's Phone Number    [phone]  Unknown, patient unable to respond due to medical condition pt states he doesn't know hx dementia -- -- -- -- --          Admitting Physician:  Cinthia Latham MD  PCP: Zachary Wilson DO    Discharging Nurse: [facility] Unit/Room#: 2302/2133-42  Discharging Unit Phone Number: [phone]    Emergency Contact:   Extended Emergency Contact Information  Primary Emergency Contact: Brandie PEREZ  Address: [address] [facility] [address] [address] [facility] [place] [facility] [address] [address]  Phone: [phone]  Work Phone: [phone]  Mobile Phone: [phone]  Relation: Spouse  Secondary Emergency Contact: Bina Velez  Address: N/A   [facility] [address]  Phone: [phone]  Work Phone: [phone]  Mobile Phone: [phone]  Relation: Child    Past Surgical History:  Past Surgical History:   Procedure Laterality Date    AORTIC VALVE REPLACEMENT  2016    bioprosthetic    ARM SURGERY Left 1990    CARDIAC CATHETERIZATION      CENTRAL VENOUS CATHETER Right 2018    DIALYSIS CATHETER Dr Gerald Hampton COLONOSCOPY  09    COLONOSCOPY N/A 10/12/2018    COLONOSCOPY WITH BIOPSY performed by Zia Burrows DO at [address] [place] [address] Right 3/10/2019    WASHOUT RIGHT LOWER EXTREMITY WITH WOUND VAC EXCHANGE performed by Pricilla Griffin MD at [address] [place] [address] Right 3/8/2019    RIGHT LOWER EXTREMITY EXPLORATION, HEMATOMA EVACUATION, WOUND VAC PLACEMENT performed by Pricilla Griffin MD at [facility] OR    HAND SURGERY Left 1990 (x10-12 surgeries)    several surgeries on left arm    IR NONTUNNELED VASCULAR CATHETER  8/5/2020    IR NONTUNNELED VASCULAR CATHETER 8/5/2020 Sebastian Sawant MD [facility]    KNEE ARTHROSCOPY Left 09/17/99    MITRAL VALVE REPLACEMENT  03/18/2016    bioprosthetic     NASAL SEPTUM SURGERY      OTHER SURGICAL HISTORY  3/18/16    Aortic root Enlargement    OTHER SURGICAL HISTORY  3/18/16    ASD Closure    OTHER SURGICAL HISTORY Right 01/09/2017    AV fistula creation, wrist    OTHER SURGICAL HISTORY Right 08/29/2017    ligation of collateral branch of av fistula right wrist    OTHER SURGICAL HISTORY  04/05/2018    FISTULAGRAM WITH DR. Barbie Anderson    ND EGD TRANSORAL BIOPSY SINGLE/MULTIPLE N/A 10/17/2017    EGD BIOPSY performed by Koko Link MD at [address] ANGIOACCESS AV FISTULA Right 8/29/2017     RIGHT  LOWER ARM AV FISTULA  LIGATION OF AQUIRED BRANCHES X2 performed by Media Lesch, DO at [address] [place] [address]  3/18/16    median    VASCULAR SURGERY  12/06/2018    Right arm fistulagram,  PTA cephalic vein stenosis  /  DR Mitchell Alfaro       Immunization History:   Immunization History   Administered Date(s) Administered    Influenza Virus Vaccine 10/24/2006, 11/18/2013    Influenza Whole 10/24/2006, 10/13/2011, 01/11/2013, 10/12/2015    Influenza, High Dose (Fluzone 65 yrs and older) 11/18/2013, 10/12/2015, 10/04/2017, 10/21/2018, 10/08/2019    Pneumococcal Conjugate 13-valent (Curvin Ruffini) 10/12/2015, 10/25/2017    Pneumococcal Polysaccharide ([id number]) 11/30/2010, 10/12/2015    Tdap (Boostrix, Adacel) 10/27/2015       Active Problems:  Patient Active Problem List   Diagnosis Code    Traumatic amputation of thumb, left, sequela ([facility] S68.012S    Essential hypertension I10    Mixed hyperlipidemia E78.2    Obstructive sleep apnea G47.33    Class 2 severe obesity with serious comorbidity and body mass index (BMI) of 35.0 to 35.9 in adult [facility]) E66.01, Z68.35    Benign prostatic hyperplasia without lower urinary tract symptoms N40.0    Venous stasis dermatitis I87.2    Severe aortic stenosis I35.0    Severe mitral valve stenosis I05.0    Anemia in chronic kidney disease, on chronic dialysis ([facility]) N18.6, D63.1, Z99.2    Dialysis patient ([facility] Z99.2    Dementia ([facility] F03.90    Major depressive disorder with single episode, in partial remission ([facility] F32.4    Chronic cerebral ischemia I67.82    ESRD (end stage renal disease) on dialysis ([facility]) N18.6, Z99.2    Long-term insulin use ([facility]) Z79.4    BMI 40.0-44.9, adult ([facility]) Z68.41    Controlled type 2 diabetes mellitus with chronic kidney disease on chronic dialysis, with long-term current use of insulin ([facility]) E11.22, N18.6, Z79.4, Z99.2    Controlled type 2 diabetes mellitus with diabetic polyneuropathy, with long-term current use of insulin ([facility]) E11.42, Z79.4    Dermatitis L30.9    Hematoma of right lower extremity S80.11XA    Hematoma of groin S30. 1XXA    Accidental fall from wheelchair W05. 0XXA    Open wound T14. 8XXA    Hematoma of right thigh S70.11XA    Wound of right leg S81.801A    Open wound of right lower extremity Y78.142Z    Complication of arteriovenous dialysis fistula T82. 9XXA    AVF (arteriovenous fistula) ([facility]) I77.0       Isolation/Infection:   Isolation          Contact        Patient Infection Status     Infection Onset Added Last Indicated Last Indicated By Review Planned Expiration Resolved Resolved By    MDRO (multi-drug resistant organism)  04/01/19 04/01/19 Awais Alcantar RN        Right lower leg proteus mirabilis and enterobacter cloacae 3/26/2019    ESBL (Extended Spectrum Beta Lactamase) 03/26/19 03/31/19 03/26/19 Anaerobic And Aerobic Culture        Resolved    COVID-19 Rule Out 08/05/20 08/05/20 08/05/20 COVID-19 (Ordered)   08/05/20 Rule-Out Test Resulted          Nurse Assessment:  Last Vital Signs: /72   Pulse 78   Temp 98.2 °F (36.8 °C) (Oral)   Resp 17   Ht 5' 7\" (1.702 m)   Wt 241 lb (109.3 kg)   SpO2 99%   BMI 37.75 kg/m²     Last documented pain score (0-10 scale): Pain Level: 0  Last Weight:   Wt Readings from Last 1 Encounters:   08/06/20 241 lb (109.3 kg)     Mental Status:  alert    IV Access:  - Dialysis Catheter  - site  right and internal jugular, insertion date: 8/6/2020    Nursing Mobility/ADLs:  Walking   Dependent  Transfer  Dependent  Bathing  Dependent  Dressing  Dependent  Toileting  Dependent  Feeding  Assisted  Med Admin  Assisted  Med Delivery   whole    Wound Care Documentation and Therapy:  Wound 03/22/19 Thigh Right;Medial;Proximal (Active)   Number of days: 503       Wound 12/03/19 Pretibial Right; Anterior;Distal (Active)   Wound Venous 08/06/20 1148   Dressing Status Other (Comment) 08/06/20 1148   Dressing/Treatment Open to air 08/06/20 1148   Wound Assessment Clean;Dry; Intact;Pink;Pale 08/06/20 1148   Drainage Amount None 08/06/20 1148   Odor None 08/06/20 1148   Gwendolyn-wound Assessment Clean;Dry; Intact 08/06/20 1148   Number of days: 247       Wound 12/03/19 Pretibial Right; Lower;Proximal (Active)   Number of days: 247       Wound 12/03/19 Pretibial Left (Active)   Wound Venous 08/06/20 1148   Dressing Status Other (Comment) 08/06/20 1148   Dressing/Treatment Open to air 08/06/20 1148   Wound Assessment Clean;Dry; Intact;Fragile 08/06/20 1148   Drainage Amount None 08/06/20 1148   Odor None 08/06/20 1148   Gwendolyn-wound Assessment Clean;Dry; Intact 08/06/20 1148   Number of days: 247       Wound 08/05/20 Buttocks (Active)   Wound Pressure Stage  2 08/06/20 1148   Dressing Status Other (Comment) 08/06/20 1148   Dressing/Treatment Other (comment) 08/06/20 1148   Wound Cleansed Other (Comment) 08/05/20 2100   Wound Assessment Non-blanchable erythema;Bleeding 08/06/20 1148   Drainage Amount None 08/06/20 1148   Odor None 08/06/20 1148   Gwendolyn-wound Assessment Intact 08/06/20 1148   Number of days: 0       Wound 08/06/20 Femoral Right (Active)   Wound Skin Tear 08/06/20 1148   Dressing Status Other (Comment) 08/06/20 1148   Dressing/Treatment Other (comment) 08/06/20 1148   Wound Assessment Dry; Intact 08/06/20 1148   Drainage Amount None 08/06/20 1148   Odor None 08/06/20 1148   Gwendolyn-wound Assessment Clean;Dry; Intact 08/06/20 1148   Number of days: 0        Elimination:  Continence:   · Bowel: No  · Bladder: No  Urinary Catheter: None   Colostomy/Ileostomy/Ileal Conduit: No       Date of Last BM: 8/6/2020    Intake/Output Summary (Last 24 hours) at 8/6/2020 1507  Last data filed at 8/6/2020 0703  Gross per 24 hour   Intake 470 ml   Output 3800 ml   Net -3330 ml     I/O last 3 completed shifts: In: 26 [P.O.:100]  Out: 3800     Safety Concerns: At Risk for Falls    Impairments/Disabilities:      Vision and Hearing    Nutrition Therapy:  Current Nutrition Therapy:   - Oral Diet:  Renal    Routes of Feeding: Oral  Liquids: Thin Liquids  Daily Fluid Restriction: yes - amount 1500mL  Last Modified Barium Swallow with Video (Video Swallowing Test): not done    Treatments at the Time of Hospital Discharge:   Respiratory Treatments:   Oxygen Therapy:  is not on home oxygen therapy.   Ventilator:    - No ventilator support    Rehab Therapies: Physical Therapy, Occupational Therapy and Speech/Language Therapy  Weight Bearing Status/Restrictions: No weight bearing restirctions  Other Medical Equipment (for information only, NOT a DME order):  hospital bed  Other Treatments:     Patient's personal belongings (please select all that are sent with patient):  Shlomo    RN SIGNATURE:  Electronically signed by Lazarus Starch, RN on 8/6/20 at 2:12 PM EDT    CASE MANAGEMENT/SOCIAL WORK SECTION    Inpatient Status Date: 8/5/2020    Readmission Risk Assessment Score:  Readmission Risk              Risk of Unplanned Readmission:        22           Discharging to Facility/ Agency   · Name:    [facility]  · Address:  · Phone: [phone]  · Fax:    [phone]    Dialysis Facility (if applicable)   · Name:  · Address:  · Dialysis Schedule:  · Phone:  · Fax:    / signature: Electronically signed by Equilla Cooks, RN on 8/6/20 at 2:48 PM EDT    PHYSICIAN SECTION    Prognosis: Good    Condition at Discharge: Stable    Rehab Potential (if transferring to Rehab): Good    Recommended Labs or Other Treatments After Discharge: f/u with PCP, nephrology. Physician Certification: I certify the above information and transfer of Edison Osman  is necessary for the continuing treatment of the diagnosis listed and that he requires [facility] for greater 30 days.      Update Admission H&P: No change in H&P    PHYSICIAN SIGNATURE:  Electronically signed by Gold Luz MD on 8/6/20 at 1:50 PM EDT

## 2020-08-13 NOTE — DISCHARGE SUMMARY
[address]     Department of Internal Medicine - Staff Internal Medicine Teaching Service    INPATIENT DISCHARGE SUMMARY      Patient Identification:  Mirna Villeda is a 75 y.o. male. :  1945  MRN: [id number]     Acct: [de-identified]   PCP: Keegan Thacker DO  Admit Date:  2020  Discharge date and time: 2020  8:03 PM   Attending Provider: No att. providers found                                     [address] Problem Lists:  Active Problems:    AVF (arteriovenous fistula) ([address]  Resolved Problems:    * No resolved hospital problems. *      HOSPITAL STAY     Brief Inpatient course: The patient is a 75 y.o. male presents with PMH of ESRD, atrial fibrillation, type 2 diabetes presented with AV fistula not working. He is a poor historian and presented via EMS from [facility]. According to them her has not been able to have dialysis in a week due to his vascular access problem. Denies any SOB, cough, abdominal pain, chest pain    Upon admission patient was AAOx1,(baseline dementia),  Hypertensive initally  and a/ febrile. On evaluation left forearm warm, multiple bruises present, bruit heard, crackles present in chest b/l , B/L LE 2+edema present, LLE stasis dermatitis present, no evidence of cellulitis.       IR guided non-tunneled catheter was placed and patient received hemodialysis with net removal of 3.4 L.       Procedures/ Significant Interventions:    IR guided non-tunneled catheter was placed    Consults:     Consults:     Final Specialist Recommendations/Findings:   IP CONSULT TO NEPHROLOGY  IP CONSULT TO INTERVENTIONAL RADIOLOGY  IP CONSULT TO INTERNAL MEDICINE  IP CONSULT TO CASE MANAGEMENT      Any Hospital Acquired Infections: none    Discharge Functional Status:  stable    DISCHARGE PLAN     Disposition: SNF    Patient Instructions:   Discharge Medication List as of 2020  4:47 PM      CONTINUE these medications which have mouth dailyHistorical Med      loratadine (CLARITIN) 10 MG capsule Take 10 mg by mouth daily (every other day). Historical Med      aspirin 81 MG tablet Take 1 tablet by mouth daily, Disp-30 tablet, R-3Print      apixaban (ELIQUIS) 5 MG TABS tablet Take 1 tablet by mouth 2 times daily, Disp-60 tablet, R-2Print      Multiple Vitamins-Minerals (THERAPEUTIC MULTIVITAMIN-MINERALS) tablet Take 1 tablet by mouth dailyHistorical Med      omeprazole (PRILOSEC) 20 MG delayed release capsule Take 20 mg by mouth nightly Historical Med      atorvastatin (LIPITOR) 40 MG tablet Take 1 tablet by mouth nightly, Disp-30 tablet, R-3Normal      midodrine (PROAMATINE) 10 MG tablet Take 1 tablet by mouth 3 times daily, Disp-90 tablet, P-1CPTDVH      folic acid (FOLVITE) 1 MG tablet Take 1 tablet by mouth daily, Disp-90 tablet, R-1Normal      insulin glargine (LANTUS) 100 UNIT/ML injection vial Inject 25 Units into the skin 2 times daily, Disp-1 vial, R-3Normal      Misc. Devices [facility]) MISC Disp-1 each, R-0, PrintUse as directed      Amino Acids-Protein Hydrolys (PRO-STAT PO) Take 30 mLs by mouth 3 times daily (Prostat SF) for wound healing. Historical Med      donepezil (ARICEPT) 5 MG tablet Take 5 mg by mouth every evening      levothyroxine (SYNTHROID) 25 MCG tablet Take 1 tablet by mouth Daily, Disp-30 tablet, R-2      fluticasone (FLONASE) 50 MCG/ACT nasal spray 2 sprays by Nasal route daily, Disp-3 Bottle, R-3      magnesium hydroxide (MILK OF MAGNESIA) 400 MG/5ML suspension Take 30 mLs by mouth daily as needed for Constipation         STOP taking these medications       HYDROcodone-acetaminophen (NORCO) 5-325 MG per tablet Comments:   Reason for Stopping:               Activity: bedrest    Diet: cardiac diet and renal diet    Follow-up:    DO Rony Pena. [id number] [phone]    In 1 week  hospital f/u    [facility] [place] [address] [address] [place] [address], [address] [facility] [address] [place] [phone]    In 2 weeks        Patient Instructions: f/u with PCP, nephrology, continue HD. Follow up labs: CBC, BMP    Note that over 30 minutes was spent in preparing discharge papers, discussing discharge with patient, medication review, etc.      Korey Ribeiro MD, MD  Internal Medicine Resident, PGY-1  [facility];  [place], [place]  8/13/2020, 10:21 AM

## 2020-08-18 PROCEDURE — 99309 SBSQ NF CARE MODERATE MDM 30: CPT | Performed by: INTERNAL MEDICINE

## 2020-08-24 ENCOUNTER — OUTSIDE SERVICES (OUTPATIENT)
Dept: INTERNAL MEDICINE | Age: 75
End: 2020-08-24
Payer: MEDICARE

## 2020-08-25 NOTE — PROGRESS NOTES
DR. 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 He is on Coumadin for atrial fibrillation. He is on Zoloft for depression. He was just at [address] [place] [address] for a fistulagram. There are no other complaints at this time. ALLERGIES:   Allergies   Allergen Reactions    Percocet [Oxycodone-Acetaminophen] Itching and Rash     Also causes shaking    Ampicillin Rash        MEDICATIONS: As noted on the [facility] MAR, referenced and incorporated herein.     PAST MEDICAL HISTORY:   Past Medical History:   Diagnosis Date    Acute blood loss anemia 3/11/2019    Acute on chronic diastolic CHF (congestive heart failure) ([address] 12/2/2019    Anemia in chronic kidney disease (CKD) 4/27/2016    Arthritis     Bacteremia 11/11/2016    Benign prostatic hyperplasia without lower urinary tract symptoms     Bleeding from wound 4/2/2019    BMI 40.0-44.9, adult ([address] 3/19/2018    Cellulitis 3/21/2019    Cellulitis of left lower extremity     Chronic cerebral ischemia 1/3/2017    Closed fracture of forearm 8/13/2013    Broken lft forearm     Controlled type 2 diabetes mellitus with chronic kidney disease on chronic dialysis, with long-term current use of insulin ([address]     Controlled type 2 diabetes mellitus with diabetic polyneuropathy, with long-term current use of insulin ([address] 10/7/2018    Decubital ulcer     NON OPEN BUTTOCKS    Dementia ([address]     memory problems    Dialysis patient [facility]) 01/03/2017    Goes Jayden Leggett, Sat in [place] Difficult intravenous access     HAS NEEDED PICC TEAM IN THE PAST    Difficulty walking     WHEELCHAIR BOUND-PIVOTS WITH ASSIST O F 2    DJD (degenerative joint disease) of knee     Elbow, forearm, and wrist, abrasion or friction burn, without mention of infection 8/13/2013    Abrasions lft forearm     Encephalopathy acute 4/27/2016    Encounter regarding vascular access for dialysis for ESRD ([facility]) 2/2/2017    ESRD (end stage renal disease) on dialysis ([facility]) 1/17/2017    Forgetfulness     HAS SOME SHORT TERM MEMORY LOSS, QUYEN-WIFE IS LEGAL GUARDIAN    H/O transesophageal echocardiography (AMADEO) for monitoring     Hemodialysis patient ([facility]) 11/11/2016    [place].  TUNNELED CATHETER RT UPPER CHEST    Hyperlipidemia 1990    on Meds    Hypertension 1990    on Meds    Intercritical gout     on Meds    Joint pain, knee 01/13/2017    baldo knee synvisc-1 injections    Long-term insulin use ([facility]) 1/17/2017    Major depressive disorder with single episode, in partial remission ([facility]) 1/3/2017    Mobility impaired     Morbid obesity ([facility])     Muscle weakness     GRNERALIZED    Obesity     Obstructive sleep apnea     HEATHER on CPAP     On CPAP-TO BRING DOS    Paroxysmal atrial fibrillation ([facility]) 9/6/2017    Pneumonia 12/2/2019    Respiratory failure ([facility]) 03/2016    with open heart surgery, trached x 5 months    S/P cardiac cath     Seborrhea     Severe aortic stenosis 3/9/2016    Severe mitral valve stenosis 3/9/2016    Severe sepsis ([facility]) 4/3/2016    Skin rash     ABD FOLDS    Stasis dermatitis     Thyroid disease     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 lft thumb     Traumatic hemorrhagic shock ([facility]) 3/13/2019    Venous stasis dermatitis     Wears glasses     Wheelchair bound     pivots with 2 assists       PAST SURGICAL HISTORY:   Past Surgical History:   Procedure Laterality Date    AORTIC VALVE REPLACEMENT  03/18/2016    bioprosthetic    ARM SURGERY Left 1990    CARDIAC CATHETERIZATION      CENTRAL VENOUS CATHETER Right 02/21/2018    DIALYSIS CATHETER Dr Sd Palumbo    COLONOSCOPY  11/19/09    COLONOSCOPY N/A 10/12/2018    COLONOSCOPY WITH BIOPSY performed by Bruce Salinas DO at [facility] Right 3/10/2019    WASHOUT RIGHT LOWER EXTREMITY WITH WOUND VAC EXCHANGE performed by Jaylen Padron MD at [address] [place] [address] Right 3/8/2019    RIGHT LOWER EXTREMITY EXPLORATION, HEMATOMA EVACUATION, WOUND VAC PLACEMENT performed by Jaylen Padron MD at [address] (x10-12 surgeries)    several surgeries on left arm    IR NONTUNNELED VASCULAR CATHETER  8/5/2020    IR NONTUNNELED VASCULAR CATHETER 8/5/2020 Zion Singh MD [facility]    KNEE ARTHROSCOPY Left 09/17/99    MITRAL VALVE REPLACEMENT  03/18/2016    bioprosthetic     NASAL SEPTUM SURGERY      OTHER SURGICAL HISTORY  3/18/16    Aortic root Enlargement    OTHER SURGICAL HISTORY  3/18/16    ASD Closure    OTHER SURGICAL HISTORY Right 01/09/2017    AV fistula creation, wrist    OTHER SURGICAL HISTORY Right 08/29/2017    ligation of collateral branch of av fistula right wrist    OTHER SURGICAL HISTORY  04/05/2018    FISTULAGRAM WITH DR. Jean Paul Odonnell    [place] EGD TRANSORAL BIOPSY SINGLE/MULTIPLE N/A 10/17/2017    EGD BIOPSY performed by Hilda Latham MD at [address] ANGIOACCESS AV FISTULA Right 8/29/2017     RIGHT  LOWER ARM AV FISTULA  LIGATION OF AQUIRED BRANCHES X2 performed by Shaun San DO at [address] [facility]  3/18/16    median    VASCULAR SURGERY  12/06/2018    Right arm fistulagram,  PTA cephalic vein stenosis  /  DR Josie Freire       SOCIAL HISTORY:     Tobacco:   Social History     Tobacco Use   Smoking Status Former Smoker    Packs/day: 2.00    Years: 25.00    Pack years: 50.00    Types: Cigarettes    Start date: 3/17/1955   [facility] Last attempt to quit: 1/1/1980    Years since ([facility]     4. ESRD (end stage renal disease) on dialysis ([address]     5. Dialysis patient ([address]     6. Anemia in chronic kidney disease, on chronic dialysis (HCC)     7. Paroxysmal atrial fibrillation ([address]     8. Severe aortic stenosis     9. Severe mitral valve stenosis     10. Vascular dementia without behavioral disturbance ([address]     11. Obstructive sleep apnea     12. Major depressive disorder with single episode, in partial remission ([address]     13. Essential hypertension     14. Mixed hyperlipidemia     15. Traumatic amputation of thumb, left, sequela ([address]     16. Class 2 severe obesity with serious comorbidity and body mass index (BMI) of 37.0 to 37.9 in adult, unspecified obesity type ([facility]           PLAN:    1. Continue current treatment  2. Nursing home record reviewed and updates summarized and entered into electronic record  3. Nursing home blood sugar logs and diabetic medication administration reviewed. No changes. 4. Coumadin management is ongoing. We are the ones managing his Coumadin. 5. See nursing home orders and MAR. Pursuant to the emergency declaration under the [facility], [facility] waiver authority and the [employer] and [employer] Act, this TelehHealth visit was conducted, with patient's consent, to reduce the patient's risk of exposure to COVID-19 and provide continuity of care for an established patient. Services were provided through a video synchronous discussion virtually (using doxy. me) to substitute for in-person clinic visit. The originating site was the nursing home and the distant site was the provider's home. Patient's identity was verified via name and date of birth.         Electronically signed by Anne Ding DO on 8/24/2020 at 9:57 PM  Internal Medicine

## 2020-08-28 ENCOUNTER — TELEPHONE (OUTPATIENT)
Dept: INTERNAL MEDICINE | Age: 75
End: 2020-08-28

## 2020-08-28 NOTE — TELEPHONE ENCOUNTER
Received fax from [phone] [facility] see attached scan Clear bilaterally, pupils equal, round and reactive to light.

## 2020-08-31 ENCOUNTER — TELEPHONE (OUTPATIENT)
Dept: INTERNAL MEDICINE | Age: 75
End: 2020-08-31

## 2020-09-14 NOTE — TELEPHONE ENCOUNTER
Dr Hillary Singleton office calling to ask if they can get the ok again for the pt to hold eliquis since they were not able to the extractions because of COVID.  Please call [phone]

## 2020-09-30 NOTE — TELEPHONE ENCOUNTER
Wife picked up Teena from pharmacy. Instructions on bottle are wrong. It should say two tabs to be given with brakfast, lunc, and supper only. States it says,to be given with snacks as well. She states the nursing home will mess this up if instructions are not exact.

## 2020-10-05 NOTE — PROGRESS NOTES
DR. 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 He is on Coumadin for atrial fibrillation. He is on Zoloft for depression. There are no new complaints at this time. ALLERGIES:   Allergies   Allergen Reactions    Percocet [Oxycodone-Acetaminophen] Itching and Rash     Also causes shaking    Ampicillin Rash        MEDICATIONS: As noted on the [facility], referenced and incorporated herein.     PAST MEDICAL HISTORY:   Past Medical History:   Diagnosis Date    Acute blood loss anemia 3/11/2019    Acute on chronic diastolic CHF (congestive heart failure) ([address] 12/2/2019    Anemia in chronic kidney disease (CKD) 4/27/2016    Arthritis     Bacteremia 11/11/2016    Benign prostatic hyperplasia without lower urinary tract symptoms     Bleeding from wound 4/2/2019    BMI 40.0-44.9, adult ([address] 3/19/2018    Cellulitis 3/21/2019    Cellulitis of left lower extremity     Chronic cerebral ischemia 1/3/2017    Closed fracture of forearm 8/13/2013    Broken lft forearm     Controlled type 2 diabetes mellitus with chronic kidney disease on chronic dialysis, with long-term current use of insulin ([address]     Controlled type 2 diabetes mellitus with diabetic polyneuropathy, with long-term current use of insulin ([address] 10/7/2018    Decubital ulcer     NON OPEN BUTTOCKS    Dementia ([address]     memory problems    Dialysis patient ([address] 01/03/2017    Goes Tue, Jayden, Sat in [place]    Difficult intravenous access     HAS NEEDED PICC TEAM IN THE PAST    Difficulty walking WHEELCHAIR BOUND-PIVOTS WITH ASSIST O F 2    DJD (degenerative joint disease) of knee     Elbow, forearm, and wrist, abrasion or friction burn, without mention of infection 8/13/2013    Abrasions lft forearm     Encephalopathy acute 4/27/2016    Encounter regarding vascular access for dialysis for ESRD ([facility] 2/2/2017    ESRD (end stage renal disease) on dialysis ([address] 1/17/2017    Forgetfulness     HAS SOME SHORT TERM MEMORY LOSS, QUYEN-WIFE IS LEGAL GUARDIAN    H/O transesophageal echocardiography (AMADEO) for monitoring     Hemodialysis patient ([address] 11/11/2016    [place].  TUNNELED CATHETER RT UPPER CHEST    Hyperlipidemia 1990    on Meds    Hypertension 1990    on Meds    Intercritical gout     on Meds    Joint pain, knee 01/13/2017    baldo knee synvisc-1 injections    Long-term insulin use ([address] 1/17/2017    Major depressive disorder with single episode, in partial remission ([address] 1/3/2017    Mobility impaired     Morbid obesity ([address]     Muscle weakness     GRNERALIZED    Obesity     Obstructive sleep apnea     HEATHER on CPAP     On CPAP-TO BRING DOS    Paroxysmal atrial fibrillation ([address] 9/6/2017    Pneumonia 12/2/2019    Respiratory failure ([address] 03/2016    with open heart surgery, trached x 5 months    S/P cardiac cath     Seborrhea     Severe aortic stenosis 3/9/2016    Severe mitral valve stenosis 3/9/2016    Severe sepsis (HCC) 4/3/2016    Skin rash     ABD FOLDS    Stasis dermatitis     Thyroid disease     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 lft thumb     Traumatic hemorrhagic shock ([address] 3/13/2019    Venous stasis dermatitis     Wears glasses     Wheelchair bound     pivots with 2 assists       PAST SURGICAL HISTORY:   Past Surgical History:   Procedure Laterality Date    AORTIC VALVE REPLACEMENT  03/18/2016    bioprosthetic    ARM SURGERY Left 1990    CARDIAC CATHETERIZATION      CENTRAL VENOUS CATHETER Right 02/21/2018 DIALYSIS CATHETER Dr Myrtle Alvarez    COLONOSCOPY  09    COLONOSCOPY N/A 10/12/2018    COLONOSCOPY WITH BIOPSY performed by Jose Roberto Jim DO at [facility] Right 3/10/2019    WASHOUT RIGHT LOWER EXTREMITY WITH WOUND VAC EXCHANGE performed by Suha Blas MD at [facility] Right 3/8/2019    RIGHT LOWER EXTREMITY EXPLORATION, HEMATOMA EVACUATION, WOUND VAC PLACEMENT performed by Suha Blas MD at [address] (x10-12 surgeries)    several surgeries on left arm    IR NONTUNNELED VASCULAR CATHETER  2020    IR NONTUNNELED VASCULAR CATHETER 2020 Stephanie Young MD [facility]    KNEE ARTHROSCOPY Left 99    MITRAL VALVE REPLACEMENT  2016    bioprosthetic     NASAL SEPTUM SURGERY      OTHER SURGICAL HISTORY  3/18/16    Aortic root Enlargement    OTHER SURGICAL HISTORY  3/18/16    ASD Closure    OTHER SURGICAL HISTORY Right 2017    AV fistula creation, wrist    OTHER SURGICAL HISTORY Right 2017    ligation of collateral branch of av fistula right wrist    OTHER SURGICAL HISTORY  2018    FISTULAGRAM WITH DR. Danielle Zapata    [place] EGD TRANSORAL BIOPSY SINGLE/MULTIPLE N/A 10/17/2017    EGD BIOPSY performed by Marielos Suarez MD at [address] ANGIOACCESS AV FISTULA Right 2017     RIGHT  LOWER ARM AV FISTULA  LIGATION OF AQUIRED BRANCHES X2 performed by Jeanine Vora DO at [address]  3/18/16    median    VASCULAR SURGERY  2018    Right arm fistulagram,  PTA cephalic vein stenosis  /  DR Liliana Daly       SOCIAL HISTORY:     Tobacco:   Social History     Tobacco Use   Smoking Status Former Smoker    Packs/day: 2.00    Years: 25.00    Pack years: 50.00    Types: Cigarettes    Start date: 3/17/1955   [facility] Last attempt to quit: 1980    Years since quittin.7   Smokeless Tobacco Never Used     Alcohol:   Social History     Substance and Sexual Activity   Alcohol Use No    Alcohol/week: 0.0 standard drinks    Comment: 1-2 drinks per year     Drugs:   Social History     Substance and Sexual Activity   Drug Use No       FAMILY HISTORY: family history includes Alzheimer's Disease in his father; Diabetes in his maternal grandmother and mother; High Blood Pressure in his sister; Rangel Negro in his mother. REVIEW OF SYSTEMS:     Please see history of chief complaint above; otherwise no new problems with respect to General, HEENT, Cardiovascular, Respiratory, Gastrointestinal, Genitourinary, Endocrinologic, Musculoskeletal, or Neuropsychiatric complaints. PHYSICAL EXAMINATION:    Due to this being a telehealth visit, the physical examination was performed by the nurse at the facility. Vitals: Temp: 97.8 deg F. Pulse: 69. Resp: 16. BP: 123/61. General: A 75 y.o.  male. Alert and oriented to person, place and time. He does not appear to be in any acute distress. Skin: Skin color, texture, turgor normal. No rashes or lesions. HEENT/Neck: Essentially unremarkable  Chest: There was a chest catheter in place  Lungs: Normal - CTA without rales, rhonchi, or wheezing. Heart: regular rate and rhythm, S1, S2 normal, no murmur, click, rub or gallop No S3 or S4. Abdomen: Obese, soft, slightly distended, non-tender, normal active bowel sounds, no masses palpated and no hepatosplenomegaly. There was a moderate-sized epigastric hernia present  Extremities: No clubbing, cyanosis, edema  Neurologic: cranial nerves II-XII are grossly intact    ASSESSMENT:     Diagnosis Orders   1. Controlled type 2 diabetes mellitus with chronic kidney disease on chronic dialysis, with long-term current use of insulin ([address]     2. Controlled type 2 diabetes mellitus with diabetic polyneuropathy, with long-term current use of insulin (HCC)     3. Long-term insulin use ([address]     4. ESRD (end stage renal disease) on dialysis ([address]     5. Dialysis patient ([facility]     6. Anemia in chronic kidney disease, on chronic dialysis (HCC)     7. Paroxysmal atrial fibrillation ([address]     8. Severe aortic stenosis     9. Severe mitral valve stenosis     10. Vascular dementia without behavioral disturbance ([facility] [address]     11. Obstructive sleep apnea     12. Major depressive disorder with single episode, in partial remission ([facility] [address] [facility]     13. Essential hypertension     14. Mixed hyperlipidemia     15. Traumatic amputation of thumb, left, sequela ([facility] [address]     16. Class 2 severe obesity with serious comorbidity and body mass index (BMI) of 37.0 to 37.9 in adult, unspecified obesity type ([facility] [address]           PLAN:    1. Continue current treatment  2. Nursing home record reviewed and updates summarized and entered into electronic record  3. Nursing home blood sugar logs and diabetic medication administration reviewed. No changes. 4. See nursing home orders and MAR. Pursuant to the emergency declaration under the [facility]note 2[facility], [facility] waiver authority and the [employer] and [employer] Act, this TelehHealth visit was conducted, with patient's consent, to reduce the patient's risk of exposure to COVID-19 and provide continuity of care for an established patient. Services were provided through a video synchronous discussion virtually (using doxy. me) to substitute for in-person clinic visit. The originating site was the nursing home and the distant site was the provider's home. Patient's identity was verified via name and date of birth.         Electronically signed by Ernie Cornelius DO on 10/4/2020 at 10:57 PM  Internal Medicine

## 2020-11-04 NOTE — ED PROVIDER NOTES
[facility]  eMERGENCY dEPARTMENT eNCOUnter      Pt Name: Leslie Perea  MRN: [id number]  [place] 1945  Date of evaluation: 11/4/2020      CHIEF COMPLAINT       Chief Complaint   Patient presents with    Cough         HISTORY OF PRESENT ILLNESS    Leslie Perea is a 75 y.o. male who presents with chief complaint of cough he was recently tested positive for Covid he has a history of end-stage renal disease is a dialysis he comes in from the nursing home his roommate also has Covid. He denies feeling shortness of breath or any chest pain    REVIEW OF SYSTEMS         Review of Systems   Constitutional: Positive for fatigue. Negative for chills and fever. HENT: Negative for congestion, dental problem, sore throat and trouble swallowing. Eyes: Negative for visual disturbance. Respiratory: Positive for cough and shortness of breath. Negative for wheezing. Cardiovascular: Negative for chest pain, palpitations and leg swelling. Gastrointestinal: Negative for abdominal pain, blood in stool, constipation, diarrhea, nausea and vomiting. Genitourinary: Negative for difficulty urinating, dysuria and testicular pain. End stage renal disease on dialysis   Musculoskeletal: Negative for back pain, joint swelling and neck pain. Skin: Negative for rash. Neurological: Negative for dizziness, syncope, weakness and headaches. Hematological: Negative for adenopathy. Does not bruise/bleed easily. Psychiatric/Behavioral: Negative for confusion and suicidal ideas.           PAST MEDICAL HISTORY    has a past medical history of Acute blood loss anemia, Acute on chronic diastolic CHF (congestive heart failure) ([address] Anemia in chronic kidney disease (CKD), Arthritis, Bacteremia, Benign prostatic hyperplasia without lower urinary tract symptoms, Bleeding from wound, BMI 40.0-44.9, adult ([address] Cellulitis, Cellulitis of left lower extremity, Chronic cerebral ischemia, Closed fracture of forearm, 10/17/2017); central venous catheter (Right, 02/21/2018); Colonoscopy (N/A, 10/12/2018); other surgical history (04/05/2018); vascular surgery (12/06/2018); EXPLORATION OF WOUND OF EXTREMITY (Right, 3/10/2019); EXPLORATION OF WOUND OF EXTREMITY (Right, 3/8/2019); and IR NONTUNNELED VASCULAR CATHETER > 5 YEARS (8/5/2020). CURRENT MEDICATIONS       Previous Medications    ALBUTEROL (PROVENTIL) (2.5 MG/3ML) 0.083% NEBULIZER SOLUTION    Take 2.5 mg by nebulization every 8 hours as needed for Shortness of Breath    ALPRAZOLAM (XANAX) 0.5 MG TABLET    Take 0.5 mg by mouth 2 times daily. AMINO ACIDS-PROTEIN HYDROLYS (PRO-STAT PO)    Take 30 mLs by mouth 3 times daily (Prostat SF) for wound healing. APIXABAN (ELIQUIS) 5 MG TABS TABLET    Take 1 tablet by mouth 2 times daily    ASPIRIN 81 MG TABLET    Take 1 tablet by mouth daily    ATORVASTATIN (LIPITOR) 40 MG TABLET    Take 1 tablet by mouth nightly    BENZONATATE (TESSALON) 100 MG CAPSULE    Take 100 mg by mouth every 12 hours as needed for Cough    BUPROPION (WELLBUTRIN SR) 150 MG EXTENDED RELEASE TABLET    Take 150 mg by mouth daily    DONEPEZIL (ARICEPT) 5 MG TABLET    Take 5 mg by mouth every evening    DOXYCYCLINE HYCLATE (VIBRA-TABS) 100 MG TABLET    Take 100 mg by mouth 2 times daily x10 days, starting 6/13/20 for cellulitis. DULOXETINE (CYMBALTA) 60 MG EXTENDED RELEASE CAPSULE    Take 60 mg by mouth daily    ELASTIC BANDAGES & SUPPORTS MISC    30-40 mmHg compression stockings to both lower legs, on every morning, off at bedtime. FIASP FLEXTOUCH 100 UNIT/ML SOPN    Inject into the skin See Admin Instructions Per sliding scale:  If 150-200 = 2 units; 201-250 = 4 units; 251-300 = 6 units; 301-350 = 8 units; 351-400 = 10 units; 401+ = 15 units, then call MD    FLUTICASONE (FLONASE) 50 MCG/ACT NASAL SPRAY    2 sprays by Nasal route daily    FOLIC ACID (FOLVITE) 1 MG TABLET    Take 1 tablet by mouth daily    GABAPENTIN (NEURONTIN) 300 MG CAPSULE    Take 300 mg by mouth daily. GUAIFENESIN 400 MG TABLET    Take 400 mg by mouth every 12 hours as needed (allergies)    INSULIN GLARGINE (LANTUS) 100 UNIT/ML INJECTION VIAL    Inject 25 Units into the skin 2 times daily    IPRATROPIUM-ALBUTEROL (DUONEB) 0.5-2.5 (3) MG/3ML SOLN NEBULIZER SOLUTION    Inhale 3 mLs into the lungs every 8 hours as needed for Shortness of Breath (or wheezing)    LEVOTHYROXINE (SYNTHROID) 25 MCG TABLET    Take 1 tablet by mouth Daily    LIDOCAINE 5 % CREA    Apply topically Apply to fistula rt arm topically one time a day every Mon, Wed, Fri for dialysis. LORATADINE (CLARITIN) 10 MG CAPSULE    Take 10 mg by mouth daily (every other day). MAGNESIUM HYDROXIDE (MILK OF MAGNESIA) 400 MG/5ML SUSPENSION    Take 30 mLs by mouth daily as needed for Constipation    MIDODRINE (PROAMATINE) 10 MG TABLET    Take 1 tablet by mouth 3 times daily    MISC. DEVICES (WHEELCHAIR) MISC    Use as directed    MULTIPLE VITAMINS-MINERALS (THERAPEUTIC MULTIVITAMIN-MINERALS) TABLET    Take 1 tablet by mouth daily    OMEPRAZOLE (PRILOSEC) 20 MG DELAYED RELEASE CAPSULE    Take 20 mg by mouth nightly     ONDANSETRON (ZOFRAN) 4 MG TABLET    Take 4 mg by mouth every 6 hours as needed for Nausea or Vomiting    RENVELA 800 MG TABLET    Take 2 tablets by mouth 3 times daily with meals only. TIZANIDINE (ZANAFLEX) 2 MG TABLET    Take 2 mg by mouth every 8 hours as needed (muscle pain)       ALLERGIES     is allergic to percocet [oxycodone-acetaminophen] and ampicillin. FAMILY HISTORY     He indicated that his mother is . He indicated that his father is . He indicated that his sister is alive. He indicated that his maternal grandmother is . He indicated that his maternal grandfather is . family history includes Alzheimer's Disease in his father; Diabetes in his maternal grandmother and mother; High Blood Pressure in his sister; Radha Driscoll in his mother.     SOCIAL HISTORY reports that he quit smoking about 40 years ago. His smoking use included cigarettes. He started smoking about 65 years ago. He has a 50.00 pack-year smoking history. He has never used smokeless tobacco. He reports that he does not drink alcohol or use drugs. PHYSICAL EXAM     INITIAL VITALS:  height is 5' 8\" (1.727 m) and weight is 241 lb (109.3 kg). His tympanic temperature is 97.9 °F (36.6 °C). His blood pressure is 126/59 (abnormal) and his pulse is 76. His respiration is 20 and oxygen saturation is 96%. Physical Exam  Constitutional:       Appearance: He is well-developed. HENT:      Head: Normocephalic and atraumatic. Right Ear: External ear normal.      Left Ear: External ear normal.      Mouth/Throat:      Mouth: Mucous membranes are moist.   Eyes:      Pupils: Pupils are equal, round, and reactive to light. Neck:      Musculoskeletal: Normal range of motion and neck supple. Cardiovascular:      Rate and Rhythm: Normal rate and regular rhythm. Pulmonary:      Effort: Pulmonary effort is normal.      Breath sounds: Rhonchi present. Comments: Patient is in no respiratory distress his sat is 97% on room air does have rhonchi though throughout  Abdominal:      General: Bowel sounds are normal.      Palpations: Abdomen is soft. Musculoskeletal: Normal range of motion. Comments: Fistulas of the right arm   Skin:     General: Skin is warm and dry. Neurological:      General: No focal deficit present. Mental Status: He is alert and oriented to person, place, and time.    Psychiatric:         Behavior: Behavior normal.           DIFFERENTIAL DIAGNOSIS/ MDM:     Shortness of breath/Covid,    DIAGNOSTIC RESULTS     EKG: All EKG's are interpreted by the Emergency Department Physician who either signs or Co-signs this chart in the absence of a cardiologist.  EKG  Sinus rhythm rate of 77 bpm NE intervals 312 ms prolonged giving a first-degree AV block, QRS durations 106 ms QT corrected 527 ms axis is -65 there is no acute ST or T wave changes he does have a prolonged QT and left axis deviation     EXAMINATION:    ONE XRAY VIEW OF THE CHEST         11/4/2020 9:37 am         COMPARISON:    Chest radiograph performed 12/03/2019.         HISTORY:    ORDERING SYSTEM PROVIDED HISTORY: shortness of breath    TECHNOLOGIST PROVIDED HISTORY:    shortness of breath    Reason for Exam: Shortness of breath    Acuity: Acute    Type of Exam: Initial         FINDINGS:    There is bilateral congestion.  There is no consolidation or effusion.  There    is no pneumothorax.  The heart is enlarged.  The upper abdomen unremarkable. The extrathoracic soft tissues are unremarkable.              Impression    Cardiomegaly mild congestion.               RADIOLOGY:   I directly visualized the following  images and reviewed the radiologist interpretations:       EXAMINATION:    ONE XRAY VIEW OF THE CHEST         11/4/2020 9:37 am         COMPARISON:    Chest radiograph performed 12/03/2019.         HISTORY:    ORDERING SYSTEM PROVIDED HISTORY: shortness of breath    TECHNOLOGIST PROVIDED HISTORY:    shortness of breath    Reason for Exam: Shortness of breath    Acuity: Acute    Type of Exam: Initial         FINDINGS:    There is bilateral congestion.  There is no consolidation or effusion.  There    is no pneumothorax.  The heart is enlarged.  The upper abdomen unremarkable.     The extrathoracic soft tissues are unremarkable.              Impression    Cardiomegaly mild congestion.                 ED BEDSIDE ULTRASOUND:       LABS:  Labs Reviewed   CBC WITH AUTO DIFFERENTIAL - Abnormal; Notable for the following components:       Result Value    RBC 3.95 (*)     Hemoglobin 11.8 (*)     Hematocrit 37.9 (*)     Seg Neutrophils 69 (*)     Lymphocytes 17 (*)     Monocytes 13 (*)     Absolute Lymph # 0.90 (*)     All other components within normal limits   BASIC METABOLIC PANEL - Abnormal; Notable for the following words are mis-transcribed.)    Otero MD, F.A.A.E.M.   Attending Emergency Physician                          Esau Ornelas MD  11/04/20 [id number]

## 2020-11-04 NOTE — LETTER
[facility] [place] [facility]    [address] [address]  Phone: [phone]  Fax: [phone]    November 5, 2020    [address] [facility] [address]    Dear Kulwant Alba,    This letter is regarding your Emergency Department (ED) visit at [facility] on 11/4/20. Dr.STAVROS JENNIFER Maldonado wanted to make sure that you understand your discharge instructions and that you were able to fill any prescriptions that may have been ordered for you. Please contact the office at the above phone number if the ED advised you to follow up with Shelby Acevedo, or if you have any further questions or needs. Also did you know -                             [facility]) practices can often offer you an appointment on the same day that you call for acute issues. *We have some [facility] offices that offer Walk-in appointments; check our website for availability in your community, [email or web address].      *Evisits are now available for patients for through [address] If you do not have MyChart and are interested, please contact the office and a staff member may assist you or go to [email or web address].     Sincerely,   Toño Robles DO and your [facility]

## 2020-11-05 NOTE — CARE COORDINATION
Shima Cadet was seen [facility] 11/5/2020. He is currently at the [address] [facility]. Called and confirmed. No further outreaches. Episode resolved.

## 2020-11-20 NOTE — PROGRESS NOTES
11/19/20  Kaylee Dwyer  1945    Patient Resident of [facility]    Chief Complaint  1. Dermatitis    2. Vascular dementia without behavioral disturbance ([address]        HPI:  Pleasantly confused 67year-old patient being seen at the request of the nursing staff due to red raised circular areas to his mid upper back. Patient denies any pain. States slightly itches. No current treatment. No change in size since noticed yesterday.     Allergies   Allergen Reactions    Percocet [Oxycodone-Acetaminophen] Itching and Rash     Also causes shaking    Ampicillin Rash       Past Medical History:   Diagnosis Date    Acute blood loss anemia 3/11/2019    Acute on chronic diastolic CHF (congestive heart failure) ([address] 12/2/2019    Anemia in chronic kidney disease (CKD) 4/27/2016    Arthritis     Bacteremia 11/11/2016    Benign prostatic hyperplasia without lower urinary tract symptoms     Bleeding from wound 4/2/2019    BMI 40.0-44.9, adult ([address] 3/19/2018    Cellulitis 3/21/2019    Cellulitis of left lower extremity     Chronic cerebral ischemia 1/3/2017    Closed fracture of forearm 8/13/2013    Broken lft forearm     Controlled type 2 diabetes mellitus with chronic kidney disease on chronic dialysis, with long-term current use of insulin ([address]     Controlled type 2 diabetes mellitus with diabetic polyneuropathy, with long-term current use of insulin ([address] 10/7/2018    Decubital ulcer     NON OPEN BUTTOCKS    Dementia ([address]     memory problems    Dialysis patient ([address] 01/03/2017    [facility], Sat in [address]    Difficult intravenous access     HAS NEEDED PICC TEAM IN THE PAST    Difficulty walking     WHEELCHAIR BOUND-PIVOTS WITH ASSIST O F 2    DJD (degenerative joint disease) of knee     Elbow, forearm, and wrist, abrasion or friction burn, without mention of infection 8/13/2013    Abrasions lft forearm     Encephalopathy acute 4/27/2016    Encounter regarding vascular access for dialysis for ESRD ([address] 2/2/2017    ESRD (end stage renal disease) on dialysis ([address] 1/17/2017    Forgetfulness     HAS SOME SHORT TERM MEMORY LOSS, QUYEN-WIFE IS LEGAL GUARDIAN    H/O transesophageal echocardiography (AMADEO) for monitoring     Hemodialysis patient ([address] 11/11/2016    tues-thurs-sat [place]---[facility].  TUNNELED CATHETER RT UPPER CHEST    Hyperlipidemia 1990    on Meds    Hypertension 1990    on Meds    Intercritical gout     on Meds    Joint pain, knee 01/13/2017    baldo knee synvisc-1 injections    Long-term insulin use ([address] 1/17/2017    Major depressive disorder with single episode, in partial remission ([address] 1/3/2017    Mobility impaired     Morbid obesity ([address]     Muscle weakness     GRNERALIZED    Obesity     Obstructive sleep apnea     HEATHER on CPAP     On CPAP-TO BRING DOS    Paroxysmal atrial fibrillation ([address] 9/6/2017    Pneumonia 12/2/2019    Respiratory failure ([address] 03/2016    with open heart surgery, trached x 5 months    S/P cardiac cath     Seborrhea     Severe aortic stenosis 3/9/2016    Severe mitral valve stenosis 3/9/2016    Severe sepsis ([address] 4/3/2016    Skin rash     ABD FOLDS    Stasis dermatitis     Thyroid disease     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 lft thumb     Traumatic hemorrhagic shock ([address] 3/13/2019    Venous stasis dermatitis     Wears glasses     Wheelchair bound     pivots with 2 assists       Past Surgical History:   Procedure Laterality Date    AORTIC VALVE REPLACEMENT  03/18/2016    bioprosthetic    ARM SURGERY Left 1990    CARDIAC CATHETERIZATION      CENTRAL VENOUS CATHETER Right 02/21/2018    DIALYSIS CATHETER Dr Dereck Simon COLONOSCOPY  11/19/09    COLONOSCOPY N/A 10/12/2018    COLONOSCOPY WITH BIOPSY performed by Susan Pena DO at [facility] Right 3/10/2019    WASHOUT RIGHT LOWER EXTREMITY WITH WOUND VAC EXCHANGE performed by Macho Espana MD at [facility]    EXPLORATION OF WOUND OF EXTREMITY Right 3/8/2019    RIGHT LOWER EXTREMITY EXPLORATION, HEMATOMA EVACUATION, WOUND VAC PLACEMENT performed by Gray Mcclure MD at [address] ([id number] surgeries)    several surgeries on left arm    IR NONTUNNELED VASCULAR CATHETER  8/5/2020    IR NONTUNNELED VASCULAR CATHETER 8/5/2020 Vicky Leon MD [facility]    KNEE ARTHROSCOPY Left 09/17/99    MITRAL VALVE REPLACEMENT  03/18/2016    bioprosthetic     NASAL SEPTUM SURGERY      OTHER SURGICAL HISTORY  3/18/16    Aortic root Enlargement    OTHER SURGICAL HISTORY  3/18/16    ASD Closure    OTHER SURGICAL HISTORY Right 01/09/2017    AV fistula creation, wrist    OTHER SURGICAL HISTORY Right 08/29/2017    ligation of collateral branch of av fistula right wrist    OTHER SURGICAL HISTORY  04/05/2018    FISTULAGRAM WITH DR. Nanda Jiménez    ID EGD TRANSORAL BIOPSY SINGLE/MULTIPLE N/A 10/17/2017    EGD BIOPSY performed by Tiffanie Stein MD at [facility]    ID [address] ANGIOACCESS AV FISTULA Right 8/29/2017     RIGHT  LOWER ARM AV FISTULA  LIGATION OF AQUIRED BRANCHES X2 performed by Larry Alves DO at [address]  3/18/16    median    VASCULAR SURGERY  12/06/2018    Right arm fistulagram,  PTA cephalic vein stenosis  /  DR Maria       Medications as per [facility] Chart /reviewed     Social History     Socioeconomic History    Marital status:      Spouse name: Eri Laguna Number of children: 2    Years of education: Not on file    Highest education level: Not on file   Occupational History    Occupation: Retired      Employer: [address] resource strain: Not on file    Food insecurity     Worry: Not on file     Inability: Not on file    Transportation needs     Medical: Not on file     Non-medical: Not on file   Tobacco Use    Smoking status: Former Smoker     Packs/day: 2.00 Years: 25.00     Pack years: 50.00     Types: Cigarettes     Start date: 3/17/1955     Last attempt to quit: 1980     Years since quittin.9    Smokeless tobacco: Never Used   Substance and Sexual Activity    Alcohol use: No     Alcohol/week: 0.0 standard drinks     Comment: 1-2 drinks per year    Drug use: No    Sexual activity: Never   Lifestyle    Physical activity     Days per week: Not on file     Minutes per session: Not on file    Stress: Not on file   Relationships    Social connections     Talks on phone: Not on file     Gets together: Not on file     Attends Spiritism service: Not on file     Active member of club or organization: Not on file     Attends meetings of clubs or organizations: Not on file     Relationship status: Not on file    Intimate partner violence     Fear of current or ex partner: Not on file     Emotionally abused: Not on file     Physically abused: Not on file     Forced sexual activity: Not on file   Other Topics Concern    Not on file   Social History Narrative    Not on file       Review of Systems   Constitutional: Negative for activity change, appetite change, chills, fatigue, fever and unexpected weight change. HENT: Negative for congestion, dental problem, ear discharge, ear pain, facial swelling, hearing loss, postnasal drip, rhinorrhea, sinus pressure, sore throat and trouble swallowing. Eyes: Negative for pain and visual disturbance. Respiratory: Negative for cough, chest tightness, shortness of breath and wheezing. Cardiovascular: Negative for chest pain, palpitations and leg swelling. Gastrointestinal: Negative for abdominal pain, blood in stool, constipation, diarrhea, nausea and vomiting. Endocrine: Negative for cold intolerance, heat intolerance and polyuria. Genitourinary: Negative for difficulty urinating. Musculoskeletal: Negative for arthralgias, gait problem, myalgias, neck pain and neck stiffness.    Skin: Negative for color change, rash and wound. Neurological: Negative for dizziness, tremors, seizures, weakness, light-headedness, numbness and headaches. Psychiatric/Behavioral: Negative for confusion and hallucinations. The patient is not nervous/anxious. Physical Exam  Vitals signs and nursing note reviewed. Constitutional:       General: He is not in acute distress. Appearance: He is well-developed. He is not diaphoretic. HENT:      Head: Normocephalic and atraumatic. Eyes:      General:         Right eye: No discharge. Left eye: No discharge. Neck:      Trachea: No tracheal deviation. Cardiovascular:      Rate and Rhythm: Normal rate. Pulmonary:      Effort: Pulmonary effort is normal. No respiratory distress. Skin:     General: Skin is warm and dry. Coloration: Skin is not pale. Neurological:      Mental Status: He is alert. Mental status is at baseline. Cranial Nerves: No cranial nerve deficit. Sensory: No sensory deficit. Psychiatric:         Behavior: Behavior normal.         Thought Content: Thought content normal.         Judgment: Judgment normal.         Vital Signs: Temperature stable reviewed in point click care, afebrile    Assessment:  1. Dermatitis  Lotrisone cream as directed. 2. Vascular dementia without behavioral disturbance (HCC)  Stable      Plan:  As noted above. Follow up for routine visit. Call sooner with concerns prior.     Electronically signed by SHAMEKA Nuñez CNP on 11/19/2020 at 8:44 PM

## 2020-11-21 NOTE — PROGRESS NOTES
DR. 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 He is on Eliquis for atrial fibrillation. He is on Zoloft for depression. There are no new complaints. ALLERGIES:   Allergies   Allergen Reactions    Percocet [Oxycodone-Acetaminophen] Itching and Rash     Also causes shaking    Ampicillin Rash        MEDICATIONS: As noted on the [facility], referenced and incorporated herein.     PAST MEDICAL HISTORY:   Past Medical History:   Diagnosis Date    Acute blood loss anemia 3/11/2019    Acute on chronic diastolic CHF (congestive heart failure) ([address] 12/2/2019    Anemia in chronic kidney disease (CKD) 4/27/2016    Arthritis     Bacteremia 11/11/2016    Benign prostatic hyperplasia without lower urinary tract symptoms     Bleeding from wound 4/2/2019    BMI 40.0-44.9, adult ([address] 3/19/2018    Cellulitis 3/21/2019    Cellulitis of left lower extremity     Chronic cerebral ischemia 1/3/2017    Closed fracture of forearm 8/13/2013    Broken lft forearm     Controlled type 2 diabetes mellitus with chronic kidney disease on chronic dialysis, with long-term current use of insulin ([address]     Controlled type 2 diabetes mellitus with diabetic polyneuropathy, with long-term current use of insulin ([address] 10/7/2018    Decubital ulcer     NON OPEN BUTTOCKS    Dementia ([address]     memory problems    Dialysis patient ([address] 01/03/2017    Goes Jayden Leggett, Sat in [address] Difficult intravenous access     HAS NEEDED PICC TEAM IN THE PAST    Difficulty walking     [facility] BOUND-PIVOTS WITH ASSIST O F 2    DJD (degenerative joint disease) of knee     Elbow, forearm, and wrist, abrasion or friction burn, without mention of infection 8/13/2013    Abrasions lft forearm     Encephalopathy acute 4/27/2016    Encounter regarding vascular access for dialysis for ESRD ([facility] 2/2/2017    ESRD (end stage renal disease) on dialysis ([address] 1/17/2017    Forgetfulness     HAS SOME SHORT TERM MEMORY LOSS, QUYEN-WIFE IS LEGAL GUARDIAN    H/O transesophageal echocardiography (AMADEO) for monitoring     Hemodialysis patient ([address] 11/11/2016    [place]-thurs[place]---[facility].  TUNNELED CATHETER RT UPPER CHEST    Hyperlipidemia 1990    on Meds    Hypertension 1990    on Meds    Intercritical gout     on Meds    Joint pain, knee 01/13/2017    baldo knee synvisc-1 injections    Long-term insulin use ([address] 1/17/2017    Major depressive disorder with single episode, in partial remission ([address] 1/3/2017    Mobility impaired     Morbid obesity ([address]     Muscle weakness     GRNERALIZED    Obesity     Obstructive sleep apnea     HEATHER on CPAP     On CPAP-TO BRING DOS    Paroxysmal atrial fibrillation ([address] 9/6/2017    Pneumonia 12/2/2019    Respiratory failure ([address] 03/2016    with open heart surgery, trached x 5 months    S/P cardiac cath     Seborrhea     Severe aortic stenosis 3/9/2016    Severe mitral valve stenosis 3/9/2016    Severe sepsis (HCC) 4/3/2016    Skin rash     ABD FOLDS    Stasis dermatitis     Thyroid disease     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 lft thumb     Traumatic hemorrhagic shock ([address] 3/13/2019    Venous stasis dermatitis     Wears glasses     Wheelchair bound     pivots with 2 assists       PAST SURGICAL HISTORY:   Past Surgical History:   Procedure Laterality Date    AORTIC VALVE REPLACEMENT  03/18/2016    bioprosthetic    ARM SURGERY Left 1990    CARDIAC CATHETERIZATION      CENTRAL VENOUS CATHETER Right 02/21/2018    DIALYSIS CATHETER Dr Addie Guo COLONOSCOPY  09    COLONOSCOPY N/A 10/12/2018    COLONOSCOPY WITH BIOPSY performed by Lillian Dailey DO at [address] [facility] Right 3/10/2019    WASHOUT RIGHT LOWER EXTREMITY WITH WOUND VAC EXCHANGE performed by Sonia Love MD at [address] [place] [address] Right 3/8/2019    RIGHT LOWER EXTREMITY EXPLORATION, HEMATOMA EVACUATION, WOUND VAC PLACEMENT performed by Sonia Love MD at [address] (x10-12 surgeries)    several surgeries on left arm    IR NONTUNNELED VASCULAR CATHETER  2020    IR NONTUNNELED VASCULAR CATHETER 2020 Ofelia Kan MD [facility]    KNEE ARTHROSCOPY Left 99    MITRAL VALVE REPLACEMENT  2016    bioprosthetic     NASAL SEPTUM SURGERY      OTHER SURGICAL HISTORY  3/18/16    Aortic root Enlargement    OTHER SURGICAL HISTORY  3/18/16    ASD Closure    OTHER SURGICAL HISTORY Right 2017    AV fistula creation, wrist    OTHER SURGICAL HISTORY Right 2017    ligation of collateral branch of av fistula right wrist    OTHER SURGICAL HISTORY  2018    FISTULAGRAM WITH DR. Sheila Cross    MD EGD TRANSORAL BIOPSY SINGLE/MULTIPLE N/A 10/17/2017    EGD BIOPSY performed by Lazarus Dover, MD at [address] ANGIOACCESS AV FISTULA Right 2017     RIGHT  LOWER ARM AV FISTULA  LIGATION OF AQUIRED BRANCHES X2 performed by Bisi Kelley DO at [address]  3/18/16    median    VASCULAR SURGERY  2018    Right arm fistulagram,  PTA cephalic vein stenosis  /  DR Edi Curtis       SOCIAL HISTORY:     Tobacco:   Social History     Tobacco Use   Smoking Status Former Smoker    Packs/day: 2.00    Years: 25.00    Pack years: 50.00    Types: Cigarettes    Start date: 3/17/1955    Last attempt to quit: 1980    Years since quittin.9   Smokeless Tobacco Never Used     Alcohol:   Social History Substance and Sexual Activity   Alcohol Use No    Alcohol/week: 0.0 standard drinks    Comment: 1-2 drinks per year     Drugs:   Social History     Substance and Sexual Activity   Drug Use No       FAMILY HISTORY: family history includes Alzheimer's Disease in his father; Diabetes in his maternal grandmother and mother; High Blood Pressure in his sister; Eddye Maillard in his mother. REVIEW OF SYSTEMS:     Please see history of chief complaint above; otherwise no new problems with respect to General, HEENT, Cardiovascular, Respiratory, Gastrointestinal, Genitourinary, Endocrinologic, Musculoskeletal, or Neuropsychiatric complaints. PHYSICAL EXAMINATION:    Due to this being a telehealth visit, the physical examination was performed by the nurse at the facility. Vitals: Temp: 98.2 deg F. Pulse: 80. Resp: 16. BP: 79/49. General: A 75 y.o.  male. Alert and oriented to person, place and time. He does not appear to be in any acute distress. Skin: Skin color, texture, turgor normal. No rashes or lesions. HEENT/Neck: Essentially unremarkable  Chest: There was a chest catheter in place  Lungs: Normal - CTA without rales, rhonchi, or wheezing. Heart: regular rate and rhythm, S1, S2 normal, no murmur, click, rub or gallop No S3 or S4. Abdomen: Obese, soft, slightly distended, non-tender, normal active bowel sounds, no masses palpated and no hepatosplenomegaly. There was a moderate-sized epigastric hernia present  Extremities: No clubbing, cyanosis, edema  Neurologic: cranial nerves II-XII are grossly intact    ASSESSMENT:     Diagnosis Orders   1. Controlled type 2 diabetes mellitus with chronic kidney disease on chronic dialysis, with long-term current use of insulin ([address]     2. Controlled type 2 diabetes mellitus with diabetic polyneuropathy, with long-term current use of insulin ([facility])     3. Long-term insulin use ([address]     4. ESRD (end stage renal disease) on dialysis ([address]     5.  Dialysis patient ([facility]     6. Anemia in chronic kidney disease, on chronic dialysis (HCC)     7. Paroxysmal atrial fibrillation ([facility]     8. Severe aortic stenosis     9. Severe mitral valve stenosis     10. Vascular dementia without behavioral disturbance ([facility]     11. Obstructive sleep apnea     12. Major depressive disorder with single episode, in partial remission ([facility]     13. Essential hypertension     14. Mixed hyperlipidemia     15. Traumatic amputation of thumb, left, sequela ([facility]     16. Class 2 severe obesity with serious comorbidity and body mass index (BMI) of 36.0 to 36.9 in adult, unspecified obesity type ([facility]           PLAN:    1. Continue current treatment  2. Nursing home record reviewed and updates summarized and entered into electronic record  3. Nursing home blood sugar logs and diabetic medication administration reviewed. No changes. 4. See nursing home orders and MAR. Pursuant to the emergency declaration under the [facility], [facility] waiver authority and the [employer] and [employer] Act, this TelehHealth visit was conducted, with patient's consent, to reduce the patient's risk of exposure to COVID-19 and provide continuity of care for an established patient. Services were provided through a video synchronous discussion virtually (using doxy. me) to substitute for in-person clinic visit. The originating site was the nursing home and the distant site was the provider's home. Patient's identity was verified via name and date of birth.         Electronically signed by Daniela Pinzon DO on 11/21/2020 at 1:09 AM  Internal Medicine

## 2020-12-03 NOTE — CARE COORDINATION
[facility]:   Ba Amin LPN    [phone]  Juliette Pena / [address] @ [facility]     Patient Name:         Layne Moeller  :   1945  Discharge Facility:     [address]     Date of discharge:        12/3/20  Services at discharge:  LTC @ [facility],  Ponca Yennifer RN, BSN  Skilled Inpatient Care Coordinator   C: [phone]

## 2020-12-14 NOTE — PROGRESS NOTES
DR. 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 He is on Eliquis for atrial fibrillation. He is on Zoloft for depression. He is recovering from mild COVID-19 illness, which he contracted during the current outbreak at the facility. There are no other complaints. ALLERGIES:   Allergies   Allergen Reactions    Percocet [Oxycodone-Acetaminophen] Itching and Rash     Also causes shaking    Ampicillin Rash        MEDICATIONS: As noted on the [facility], referenced and incorporated herein.     PAST MEDICAL HISTORY:   Past Medical History:   Diagnosis Date    Acute blood loss anemia 3/11/2019    Acute on chronic diastolic CHF (congestive heart failure) ([address] 12/2/2019    Anemia in chronic kidney disease (CKD) 4/27/2016    Arthritis     Bacteremia 11/11/2016    Benign prostatic hyperplasia without lower urinary tract symptoms     Bleeding from wound 4/2/2019    BMI 40.0-44.9, adult ([address] 3/19/2018    Cellulitis 3/21/2019    Cellulitis of left lower extremity     Chronic cerebral ischemia 1/3/2017    Closed fracture of forearm 8/13/2013    Broken lft forearm     Controlled type 2 diabetes mellitus with chronic kidney disease on chronic dialysis, with long-term current use of insulin ([address]     Controlled type 2 diabetes mellitus with diabetic polyneuropathy, with long-term current use of insulin ([address] 10/7/2018    Decubital ulcer     NON OPEN BUTTOCKS    Dementia ([address]     memory problems    Dialysis patient ([address] 01/03/2017 Jayden Solorio, Sat in [address] Difficult intravenous access     HAS NEEDED PICC TEAM IN THE PAST    Difficulty walking     WHEELCHAIR BOUND-PIVOTS WITH ASSIST O F 2    DJD (degenerative joint disease) of knee     Elbow, forearm, and wrist, abrasion or friction burn, without mention of infection 8/13/2013    Abrasions lft forearm     Encephalopathy acute 4/27/2016    Encounter regarding vascular access for dialysis for ESRD ([address] 2/2/2017    ESRD (end stage renal disease) on dialysis ([address] 1/17/2017    Forgetfulness     HAS SOME SHORT TERM MEMORY LOSS, QUYEN-WIFE IS LEGAL GUARDIAN    H/O transesophageal echocardiography ([facility]) for monitoring     Hemodialysis patient ([address] 11/11/2016    [place].  TUNNELED CATHETER RT UPPER CHEST    Hyperlipidemia 1990    on Meds    Hypertension 1990    on Meds    Intercritical gout     on Meds    Joint pain, knee 01/13/2017    baldo knee synvisc-1 injections    Long-term insulin use ([address] 1/17/2017    Major depressive disorder with single episode, in partial remission ([address] 1/3/2017    Mobility impaired     Morbid obesity ([address]     Muscle weakness     GRNERALIZED    Obesity     Obstructive sleep apnea     HEATHER on CPAP     On CPAP-TO BRING DOS    Paroxysmal atrial fibrillation ([address] 9/6/2017    Pneumonia 12/2/2019    Respiratory failure ([address] 03/2016    with open heart surgery, trached x 5 months    S/P cardiac cath     Seborrhea     Severe aortic stenosis 3/9/2016    Severe mitral valve stenosis 3/9/2016    Severe sepsis ([address] 4/3/2016    Skin rash     ABD FOLDS    Stasis dermatitis     Thyroid disease     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 lft thumb     Traumatic hemorrhagic shock ([address] 3/13/2019    Venous stasis dermatitis     Wears glasses     Wheelchair bound     pivots with 2 assists       PAST SURGICAL HISTORY:   Past Surgical History:   Procedure Laterality Date  AORTIC VALVE REPLACEMENT  03/18/2016    bioprosthetic    ARM SURGERY Left 1990    CARDIAC CATHETERIZATION      CENTRAL VENOUS CATHETER Right 02/21/2018    DIALYSIS CATHETER Dr Letty Anglin COLONOSCOPY  11/19/09    COLONOSCOPY N/A 10/12/2018    COLONOSCOPY WITH BIOPSY performed by Arin Fleming DO at [address] Right 3/10/2019    WASHOUT RIGHT LOWER EXTREMITY WITH WOUND VAC EXCHANGE performed by Latrice Cho MD at [address] [place] [address] Right 3/8/2019    RIGHT LOWER EXTREMITY EXPLORATION, HEMATOMA EVACUATION, WOUND VAC PLACEMENT performed by Latrice Cho MD at [address] (x10-12 surgeries)    several surgeries on left arm    IR NONTUNNELED VASCULAR CATHETER  8/5/2020    IR NONTUNNELED VASCULAR CATHETER 8/5/2020 Bisi Castañeda MD [facility]    KNEE ARTHROSCOPY Left 09/17/99    MITRAL VALVE REPLACEMENT  03/18/2016    bioprosthetic     NASAL SEPTUM SURGERY      OTHER SURGICAL HISTORY  3/18/16    Aortic root Enlargement    OTHER SURGICAL HISTORY  3/18/16    ASD Closure    OTHER SURGICAL HISTORY Right 01/09/2017    AV fistula creation, wrist    OTHER SURGICAL HISTORY Right 08/29/2017    ligation of collateral branch of av fistula right wrist    OTHER SURGICAL HISTORY  04/05/2018    FISTULAGRAM WITH DR. Camillo Cogan    SD EGD TRANSORAL BIOPSY SINGLE/MULTIPLE N/A 10/17/2017    EGD [facility]SY [facility] ANGIOACCESS AV FISTULA Right 8/29/2017     RIGHT  LOWER ARM AV FISTULA  LIGATION OF AQUIRED BRANCHES X2 performed by Lori oDrsey DO at [address]  3/18/16    median    VASCULAR SURGERY  12/06/2018    Right arm fistulagram,  PTA cephalic vein stenosis  /  DR Robin Mccabe       SOCIAL HISTORY:     Tobacco:   Social History     Tobacco Use   Smoking Status Former Smoker    Packs/day: 2.00    Years: 25.00    Pack years: 50.00  Types: Cigarettes    Start date: 3/17/1955   Leidy Roche Quit date: 1980    Years since quittin.9   Smokeless Tobacco Never Used     Alcohol:   Social History     Substance and Sexual Activity   Alcohol Use No    Alcohol/week: 0.0 standard drinks    Comment: 1-2 drinks per year     Drugs:   Social History     Substance and Sexual Activity   Drug Use No       FAMILY HISTORY: family history includes Alzheimer's Disease in his father; Diabetes in his maternal grandmother and mother; High Blood Pressure in his sister; Gareth Saenz in his mother. REVIEW OF SYSTEMS:     Please see history of chief complaint above; otherwise no new problems with respect to General, HEENT, Cardiovascular, Respiratory, Gastrointestinal, Genitourinary, Endocrinologic, Musculoskeletal, or Neuropsychiatric complaints. PHYSICAL EXAMINATION:    Due to this being a telehealth visit, the physical examination was performed by the nurse at the facility. Vitals: Temp: 97.8 deg F. Pulse: 76. Resp: 16. BP: 117/58. General: A 67 y.o.  male. Alert and oriented to person, place and time. He does not appear to be in any acute distress. Skin: Skin color, texture, turgor normal. No rashes or lesions. HEENT/Neck: Essentially unremarkable  Chest: There was a chest catheter in place  Lungs: Normal - CTA without rales, rhonchi, or wheezing. Heart: regular rate and rhythm, S1, S2 normal, no murmur, click, rub or gallop No S3 or S4. Abdomen: Obese, soft, slightly distended, non-tender, normal active bowel sounds, no masses palpated and no hepatosplenomegaly. There was a moderate-sized epigastric hernia present  Extremities: No clubbing, cyanosis, edema  Neurologic: cranial nerves II-XII are grossly intact    ASSESSMENT:     Diagnosis Orders   1. History of 2019 novel coronavirus disease (COVID-19)     2.  Controlled type 2 diabetes mellitus with chronic kidney disease on chronic dialysis, with long-term current use of insulin ([facility] 3. Controlled type 2 diabetes mellitus with diabetic polyneuropathy, with long-term current use of insulin ([address]     4. Long-term insulin use ([address]     5. ESRD (end stage renal disease) on dialysis ([address]     6. Dialysis patient ([address]     7. Anemia in chronic kidney disease, on chronic dialysis ([address]     8. Paroxysmal atrial fibrillation (HCC)     9. Severe aortic stenosis     10. Severe mitral valve stenosis     11. Vascular dementia without behavioral disturbance ([address]     12. Obstructive sleep apnea     13. Major depressive disorder with single episode, in partial remission ([address]     14. Essential hypertension     15. Mixed hyperlipidemia     16. Traumatic amputation of thumb, left, sequela (HCC)     17. Class 2 severe obesity with serious comorbidity and body mass index (BMI) of 36.0 to 36.9 in adult, unspecified obesity type ([address]           PLAN:    1. Continue current treatment  2. Nursing home record reviewed and updates summarized and entered into electronic record  3. Nursing home blood sugar logs and diabetic medication administration reviewed. No changes. 4. See nursing home orders and MAR. Pursuant to the emergency declaration under the [facility] waiver authority and the [employer] Act, this TelehHealth visit was conducted, with patient's consent, to reduce the patient's risk of exposure to COVID-19 and provide continuity of care for an established patient. Services were provided through a video synchronous discussion virtually (using doxy. me) to substitute for in-person clinic visit. The originating site was the nursing home and the distant site was the provider's office. Patient's identity was verified via name and date of birth.         Electronically signed by Xavier Cervantes DO on 12/13/2020 at 9:22 PM  Internal Medicine

## 2020-12-29 NOTE — PROGRESS NOTES
DR. 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 He is on Eliquis for atrial fibrillation. He is on Zoloft for depression. He is doing a lot better since getting over his COVID-19 infection. He is doing fairly well with his dialysis. There are no new complaints. ALLERGIES:   Allergies   Allergen Reactions    Percocet [Oxycodone-Acetaminophen] Itching and Rash     Also causes shaking    Ampicillin Rash        MEDICATIONS: As noted on the [facility], referenced and incorporated herein.     PAST MEDICAL HISTORY:   Past Medical History:   Diagnosis Date    Acute blood loss anemia 3/11/2019    Acute on chronic diastolic CHF (congestive heart failure) ([address] 12/2/2019    Anemia in chronic kidney disease (CKD) 4/27/2016    Arthritis     Bacteremia 11/11/2016    Benign prostatic hyperplasia without lower urinary tract symptoms     Bleeding from wound 4/2/2019    BMI 40.0-44.9, adult ([address] 3/19/2018    Cellulitis 3/21/2019    Cellulitis of left lower extremity     Chronic cerebral ischemia 1/3/2017    Closed fracture of forearm 8/13/2013    Broken lft forearm     Controlled type 2 diabetes mellitus with chronic kidney disease on chronic dialysis, with long-term current use of insulin ([address]     Controlled type 2 diabetes mellitus with diabetic polyneuropathy, with long-term current use of insulin ([address] 10/7/2018    Decubital ulcer     NON OPEN BUTTOCKS    Dementia ([address]     memory problems    Dialysis patient ([address] 01/03/2017    Goes Jayden Leggett, Sat in [place]  Difficult intravenous access     HAS NEEDED PICC TEAM IN THE PAST    Difficulty walking     WHEELCHAIR BOUND-PIVOTS WITH ASSIST O F 2    DJD (degenerative joint disease) of knee     Elbow, forearm, and wrist, abrasion or friction burn, without mention of infection 8/13/2013    Abrasions lft forearm     Encephalopathy acute 4/27/2016    Encounter regarding vascular access for dialysis for ESRD ([address] 2/2/2017    ESRD (end stage renal disease) on dialysis ([address] 1/17/2017    Forgetfulness     HAS SOME SHORT TERM MEMORY LOSS, QUYEN-WIFE IS LEGAL GUARDIAN    H/O transesophageal echocardiography ([facility]) for monitoring     Hemodialysis patient ([address] 11/11/2016    tues-thurs-[place]---[facility].  TUNNELED CATHETER RT UPPER CHEST    Hyperlipidemia 1990    on Meds    Hypertension 1990    on Meds    Intercritical gout     on Meds    Joint pain, knee 01/13/2017    baldo knee synvisc-1 injections    Long-term insulin use ([address] 1/17/2017    Major depressive disorder with single episode, in partial remission ([address] 1/3/2017    Mobility impaired     Morbid obesity ([address]     Muscle weakness     GRNERALIZED    Obesity     Obstructive sleep apnea     HEATHER on CPAP     On CPAP-TO BRING DOS    Paroxysmal atrial fibrillation ([address] 9/6/2017    Pneumonia 12/2/2019    Respiratory failure ([address] 03/2016    with open heart surgery, trached x 5 months    S/P cardiac cath     Seborrhea     Severe aortic stenosis 3/9/2016    Severe mitral valve stenosis 3/9/2016    Severe sepsis ([address] 4/3/2016    Skin rash     ABD FOLDS    Stasis dermatitis     Thyroid disease     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 lft thumb     Traumatic hemorrhagic shock ([address] 3/13/2019    Venous stasis dermatitis     Wears glasses     Wheelchair bound     pivots with 2 assists       PAST SURGICAL HISTORY:   Past Surgical History:   Procedure Laterality Date    AORTIC VALVE REPLACEMENT  03/18/2016    bioprosthetic  ARM SURGERY Left 1990    CARDIAC CATHETERIZATION      CENTRAL VENOUS CATHETER Right 02/21/2018    DIALYSIS CATHETER Dr Mai Chakraborty COLONOSCOPY  11/19/09    COLONOSCOPY N/A 10/12/2018    COLONOSCOPY WITH BIOPSY performed by Juice Carr DO at [address] [facility] Right 3/10/2019    WASHOUT RIGHT LOWER EXTREMITY WITH WOUND VAC EXCHANGE performed by Vikram Forde MD at [facility] Right 3/8/2019    RIGHT LOWER EXTREMITY EXPLORATION, HEMATOMA EVACUATION, WOUND VAC PLACEMENT performed by Vikram Forde MD at [address] (x10-12 surgeries)    several surgeries on left arm    IR NONTUNNELED VASCULAR CATHETER  8/5/2020    IR NONTUNNELED VASCULAR CATHETER 8/5/2020 April Trevino MD [facility]    KNEE ARTHROSCOPY Left 09/17/99    MITRAL VALVE REPLACEMENT  03/18/2016    bioprosthetic     NASAL SEPTUM SURGERY      OTHER SURGICAL HISTORY  3/18/16    Aortic root Enlargement    OTHER SURGICAL HISTORY  3/18/16    ASD Closure    OTHER SURGICAL HISTORY Right 01/09/2017    AV fistula creation, wrist    OTHER SURGICAL HISTORY Right 08/29/2017    ligation of collateral branch of av fistula right wrist    OTHER SURGICAL HISTORY  04/05/2018    FISTULAGRAM WITH DR. Kimberly GUARDADO EGD TRANSORAL BIOPSY SINGLE/MULTIPLE N/A 10/17/2017    EGD BIOPSY performed by Gerardine Bumpers, MD at [address] ANGIOACCESS AV FISTULA Right 8/29/2017     RIGHT  LOWER ARM AV FISTULA  LIGATION OF AQUIRED BRANCHES X2 performed by Valdez Mendoza DO at [address]  3/18/16    median    VASCULAR SURGERY  12/06/2018    Right arm fistulagram,  PTA cephalic vein stenosis  /  DR Martina De La Fuente       SOCIAL HISTORY:     Tobacco:   Social History     Tobacco Use   Smoking Status Former Smoker    Packs/day: 2.00    Years: 25.00    Pack years: 50.00    Types: Cigarettes    Start date: 3/17/1955  Quit date: 1980    Years since quittin.0   Smokeless Tobacco Never Used     Alcohol:   Social History     Substance and Sexual Activity   Alcohol Use No    Alcohol/week: 0.0 standard drinks    Comment: 1-2 drinks per year     Drugs:   Social History     Substance and Sexual Activity   Drug Use No       FAMILY HISTORY: family history includes Alzheimer's Disease in his father; Diabetes in his maternal grandmother and mother; High Blood Pressure in his sister; Familia Coughlin in his mother. REVIEW OF SYSTEMS:     Please see history of chief complaint above; otherwise no new problems with respect to General, HEENT, Cardiovascular, Respiratory, Gastrointestinal, Genitourinary, Endocrinologic, Musculoskeletal, or Neuropsychiatric complaints. PHYSICAL EXAMINATION:    Due to this being a telehealth visit, the physical examination was performed by the nurse at the facility. Vitals: Temp: 97.7 deg F. Pulse: 71. Resp: 16. BP: 126/66. General: A 67 y.o.  male. Alert and oriented to person, place and time. He does not appear to be in any acute distress. Skin: Skin color, texture, turgor normal. No rashes or lesions. HEENT/Neck: Essentially unremarkable  Chest: There was a chest catheter in place  Lungs: Normal - CTA without rales, rhonchi, or wheezing. Heart: regular rate and rhythm, S1, S2 normal, no murmur, click, rub or gallop No S3 or S4. Abdomen: Obese, soft, slightly distended, non-tender, normal active bowel sounds, no masses palpated and no hepatosplenomegaly. There was a moderate-sized epigastric hernia present  Extremities: No clubbing, cyanosis, edema  Neurologic: cranial nerves II-XII are grossly intact    ASSESSMENT:     Diagnosis Orders   1.  Controlled type 2 diabetes mellitus with chronic kidney disease on chronic dialysis, with long-term current use of insulin ([address] 2. Controlled type 2 diabetes mellitus with diabetic polyneuropathy, with long-term current use of insulin (HCC)     3. Long-term insulin use ([facility]     4. ESRD (end stage renal disease) on dialysis ([address]     5. Dialysis patient ([address]     6. Anemia in chronic kidney disease, on chronic dialysis (HCC)     7. Paroxysmal atrial fibrillation ([address]     8. Severe aortic stenosis     9. Severe mitral valve stenosis     10. Vascular dementia without behavioral disturbance ([address]     11. Obstructive sleep apnea     12. Major depressive disorder with single episode, in partial remission ([address]     13. Essential hypertension     14. Mixed hyperlipidemia     15. Traumatic amputation of thumb, left, sequela ([address]     16. Class 2 severe obesity with serious comorbidity and body mass index (BMI) of 36.0 to 36.9 in adult, unspecified obesity type ([address]           PLAN:    1. Continue current treatment  2. Nursing home record reviewed and updates summarized and entered into electronic record  3. Nursing home blood sugar logs and diabetic medication administration reviewed. No changes. 4. See nursing home orders and MAR. Pursuant to the emergency declaration under the [facility], [facility] waiver authority and the [employer] and [employer] Act, this TelehHealth visit was conducted, with patient's consent, to reduce the patient's risk of exposure to COVID-19 and provide continuity of care for an established patient. Services were provided through a video synchronous discussion virtually (using [email or web address] me) to substitute for in-person clinic visit. The originating site was the nursing home and the distant site was the provider's office. Patient's identity was verified via name and date of birth.         Electronically signed by Pernell Yu DO on 12/29/2020 at 2:44 AM  Internal Medicine

## 2021-01-01 ENCOUNTER — TELEPHONE (OUTPATIENT)
Dept: INTERNAL MEDICINE | Age: 76
End: 2021-01-01

## 2021-01-01 ENCOUNTER — HOSPITAL ENCOUNTER (INPATIENT)
Age: 76
LOS: 4 days | Discharge: NURSING, REHAB OR LONG-TERM CARE | DRG: 602 | End: 2021-05-12
Attending: INTERNAL MEDICINE | Admitting: FAMILY MEDICINE
Payer: MEDICARE

## 2021-01-01 ENCOUNTER — OUTSIDE SERVICES (OUTPATIENT)
Dept: INTERNAL MEDICINE | Age: 76
End: 2021-01-01
Payer: MEDICARE

## 2021-01-01 ENCOUNTER — APPOINTMENT (OUTPATIENT)
Dept: GENERAL RADIOLOGY | Age: 76
DRG: 393 | End: 2021-01-01
Attending: INTERNAL MEDICINE
Payer: MEDICARE

## 2021-01-01 ENCOUNTER — OFFICE VISIT (OUTPATIENT)
Dept: CARDIOLOGY | Age: 76
End: 2021-01-01
Payer: MEDICARE

## 2021-01-01 ENCOUNTER — HOSPITAL ENCOUNTER (EMERGENCY)
Age: 76
Discharge: ANOTHER HOSPITAL | End: 2021-05-07
Attending: EMERGENCY MEDICINE
Payer: MEDICARE

## 2021-01-01 ENCOUNTER — HOSPITAL ENCOUNTER (EMERGENCY)
Age: 76
Discharge: ANOTHER HOSPITAL | End: 2021-06-13
Attending: EMERGENCY MEDICINE
Payer: MEDICARE

## 2021-01-01 ENCOUNTER — APPOINTMENT (OUTPATIENT)
Dept: DIALYSIS | Age: 76
DRG: 393 | End: 2021-01-01
Attending: INTERNAL MEDICINE
Payer: MEDICARE

## 2021-01-01 ENCOUNTER — HOSPITAL ENCOUNTER (INPATIENT)
Age: 76
LOS: 3 days | Discharge: HOME | DRG: 393 | End: 2021-06-16
Attending: INTERNAL MEDICINE | Admitting: INTERNAL MEDICINE
Payer: MEDICARE

## 2021-01-01 ENCOUNTER — OFFICE VISIT (OUTPATIENT)
Dept: PULMONOLOGY | Age: 76
End: 2021-01-01
Payer: MEDICARE

## 2021-01-01 ENCOUNTER — ANESTHESIA EVENT (OUTPATIENT)
Dept: ENDOSCOPY | Age: 76
DRG: 393 | End: 2021-01-01
Payer: MEDICARE

## 2021-01-01 ENCOUNTER — TELEPHONE (OUTPATIENT)
Dept: INFECTIOUS DISEASES | Age: 76
End: 2021-01-01

## 2021-01-01 ENCOUNTER — OUTSIDE SERVICES (OUTPATIENT)
Dept: INTERNAL MEDICINE | Age: 76
End: 2021-01-01
Payer: COMMERCIAL

## 2021-01-01 ENCOUNTER — ANESTHESIA (OUTPATIENT)
Dept: ENDOSCOPY | Age: 76
DRG: 393 | End: 2021-01-01
Payer: MEDICARE

## 2021-01-01 ENCOUNTER — APPOINTMENT (OUTPATIENT)
Dept: CT IMAGING | Age: 76
End: 2021-01-01
Payer: MEDICARE

## 2021-01-01 ENCOUNTER — CLINICAL DOCUMENTATION (OUTPATIENT)
Dept: INTERNAL MEDICINE | Age: 76
End: 2021-01-01

## 2021-01-01 VITALS
WEIGHT: 217.2 LBS | RESPIRATION RATE: 14 BRPM | HEART RATE: 66 BPM | BODY MASS INDEX: 33.03 KG/M2 | SYSTOLIC BLOOD PRESSURE: 146 MMHG | DIASTOLIC BLOOD PRESSURE: 86 MMHG

## 2021-01-01 VITALS
HEART RATE: 77 BPM | WEIGHT: 217 LBS | SYSTOLIC BLOOD PRESSURE: 125 MMHG | RESPIRATION RATE: 16 BRPM | DIASTOLIC BLOOD PRESSURE: 88 MMHG | OXYGEN SATURATION: 97 % | OXYGEN SATURATION: 93 % | HEIGHT: 68 IN | BODY MASS INDEX: 35.16 KG/M2 | DIASTOLIC BLOOD PRESSURE: 40 MMHG | HEIGHT: 67 IN | HEART RATE: 66 BPM | SYSTOLIC BLOOD PRESSURE: 136 MMHG | WEIGHT: 223.99 LBS | BODY MASS INDEX: 32.89 KG/M2 | TEMPERATURE: 98.7 F

## 2021-01-01 VITALS
RESPIRATION RATE: 18 BRPM | TEMPERATURE: 97.2 F | HEART RATE: 72 BPM | SYSTOLIC BLOOD PRESSURE: 134 MMHG | WEIGHT: 224 LBS | BODY MASS INDEX: 33.95 KG/M2 | OXYGEN SATURATION: 97 % | HEIGHT: 68 IN | DIASTOLIC BLOOD PRESSURE: 64 MMHG

## 2021-01-01 VITALS
RESPIRATION RATE: 24 BRPM | TEMPERATURE: 99.1 F | HEART RATE: 84 BPM | HEIGHT: 68 IN | OXYGEN SATURATION: 96 % | SYSTOLIC BLOOD PRESSURE: 120 MMHG | WEIGHT: 223.55 LBS | BODY MASS INDEX: 33.88 KG/M2 | DIASTOLIC BLOOD PRESSURE: 65 MMHG

## 2021-01-01 VITALS
SYSTOLIC BLOOD PRESSURE: 101 MMHG | RESPIRATION RATE: 27 BRPM | DIASTOLIC BLOOD PRESSURE: 73 MMHG | OXYGEN SATURATION: 100 %

## 2021-01-01 VITALS
BODY MASS INDEX: 38.01 KG/M2 | SYSTOLIC BLOOD PRESSURE: 123 MMHG | HEART RATE: 90 BPM | TEMPERATURE: 97.7 F | RESPIRATION RATE: 18 BRPM | DIASTOLIC BLOOD PRESSURE: 53 MMHG | WEIGHT: 250 LBS | OXYGEN SATURATION: 97 %

## 2021-01-01 VITALS
RESPIRATION RATE: 16 BRPM | SYSTOLIC BLOOD PRESSURE: 146 MMHG | OXYGEN SATURATION: 100 % | DIASTOLIC BLOOD PRESSURE: 64 MMHG | HEART RATE: 62 BPM

## 2021-01-01 DIAGNOSIS — E11.42 CONTROLLED TYPE 2 DIABETES MELLITUS WITH DIABETIC POLYNEUROPATHY, WITH LONG-TERM CURRENT USE OF INSULIN (HCC): ICD-10-CM

## 2021-01-01 DIAGNOSIS — F01.50 VASCULAR DEMENTIA WITHOUT BEHAVIORAL DISTURBANCE (HCC): ICD-10-CM

## 2021-01-01 DIAGNOSIS — Z79.4 CONTROLLED TYPE 2 DIABETES MELLITUS WITH CHRONIC KIDNEY DISEASE ON CHRONIC DIALYSIS, WITH LONG-TERM CURRENT USE OF INSULIN (HCC): Primary | ICD-10-CM

## 2021-01-01 DIAGNOSIS — I10 ESSENTIAL HYPERTENSION: ICD-10-CM

## 2021-01-01 DIAGNOSIS — Z99.2 DIALYSIS PATIENT (HCC): ICD-10-CM

## 2021-01-01 DIAGNOSIS — E11.22 CONTROLLED TYPE 2 DIABETES MELLITUS WITH CHRONIC KIDNEY DISEASE ON CHRONIC DIALYSIS, WITH LONG-TERM CURRENT USE OF INSULIN (HCC): ICD-10-CM

## 2021-01-01 DIAGNOSIS — G47.33 OBSTRUCTIVE SLEEP APNEA SYNDROME: ICD-10-CM

## 2021-01-01 DIAGNOSIS — G47.33 OBSTRUCTIVE SLEEP APNEA: ICD-10-CM

## 2021-01-01 DIAGNOSIS — N18.6 ANEMIA IN CHRONIC KIDNEY DISEASE, ON CHRONIC DIALYSIS (HCC): ICD-10-CM

## 2021-01-01 DIAGNOSIS — E78.2 MIXED HYPERLIPIDEMIA: ICD-10-CM

## 2021-01-01 DIAGNOSIS — I05.0 SEVERE MITRAL VALVE STENOSIS: ICD-10-CM

## 2021-01-01 DIAGNOSIS — Z79.4 CONTROLLED TYPE 2 DIABETES MELLITUS WITH DIABETIC POLYNEUROPATHY, WITH LONG-TERM CURRENT USE OF INSULIN (HCC): ICD-10-CM

## 2021-01-01 DIAGNOSIS — I48.0 PAROXYSMAL ATRIAL FIBRILLATION (HCC): ICD-10-CM

## 2021-01-01 DIAGNOSIS — N25.81 SECONDARY HYPERPARATHYROIDISM OF RENAL ORIGIN (HCC): ICD-10-CM

## 2021-01-01 DIAGNOSIS — D63.1 ANEMIA IN CHRONIC KIDNEY DISEASE, ON CHRONIC DIALYSIS (HCC): ICD-10-CM

## 2021-01-01 DIAGNOSIS — I35.0 SEVERE AORTIC STENOSIS: ICD-10-CM

## 2021-01-01 DIAGNOSIS — Z79.4 CONTROLLED TYPE 2 DIABETES MELLITUS WITH CHRONIC KIDNEY DISEASE ON CHRONIC DIALYSIS, WITH LONG-TERM CURRENT USE OF INSULIN (HCC): ICD-10-CM

## 2021-01-01 DIAGNOSIS — Z79.4 LONG-TERM INSULIN USE (HCC): ICD-10-CM

## 2021-01-01 DIAGNOSIS — N18.6 CONTROLLED TYPE 2 DIABETES MELLITUS WITH CHRONIC KIDNEY DISEASE ON CHRONIC DIALYSIS, WITH LONG-TERM CURRENT USE OF INSULIN (HCC): Primary | ICD-10-CM

## 2021-01-01 DIAGNOSIS — Z99.2 ESRD (END STAGE RENAL DISEASE) ON DIALYSIS (HCC): ICD-10-CM

## 2021-01-01 DIAGNOSIS — N18.6 ESRD (END STAGE RENAL DISEASE) ON DIALYSIS (HCC): ICD-10-CM

## 2021-01-01 DIAGNOSIS — Z99.2 CONTROLLED TYPE 2 DIABETES MELLITUS WITH CHRONIC KIDNEY DISEASE ON CHRONIC DIALYSIS, WITH LONG-TERM CURRENT USE OF INSULIN (HCC): Primary | ICD-10-CM

## 2021-01-01 DIAGNOSIS — F32.4 MAJOR DEPRESSIVE DISORDER WITH SINGLE EPISODE, IN PARTIAL REMISSION (HCC): ICD-10-CM

## 2021-01-01 DIAGNOSIS — E11.22 CONTROLLED TYPE 2 DIABETES MELLITUS WITH CHRONIC KIDNEY DISEASE ON CHRONIC DIALYSIS, WITH LONG-TERM CURRENT USE OF INSULIN (HCC): Primary | ICD-10-CM

## 2021-01-01 DIAGNOSIS — N18.6 CONTROLLED TYPE 2 DIABETES MELLITUS WITH CHRONIC KIDNEY DISEASE ON CHRONIC DIALYSIS, WITH LONG-TERM CURRENT USE OF INSULIN (HCC): ICD-10-CM

## 2021-01-01 DIAGNOSIS — I48.0 PAF (PAROXYSMAL ATRIAL FIBRILLATION) (HCC): Primary | ICD-10-CM

## 2021-01-01 DIAGNOSIS — S68.012S: ICD-10-CM

## 2021-01-01 DIAGNOSIS — E66.01 CLASS 2 SEVERE OBESITY WITH SERIOUS COMORBIDITY AND BODY MASS INDEX (BMI) OF 35.0 TO 35.9 IN ADULT, UNSPECIFIED OBESITY TYPE (HCC): ICD-10-CM

## 2021-01-01 DIAGNOSIS — R21 RASH, SKIN: Primary | ICD-10-CM

## 2021-01-01 DIAGNOSIS — F41.9 ANXIETY: ICD-10-CM

## 2021-01-01 DIAGNOSIS — Z99.2 ANEMIA IN CHRONIC KIDNEY DISEASE, ON CHRONIC DIALYSIS (HCC): ICD-10-CM

## 2021-01-01 DIAGNOSIS — K06.8 BLEEDING GUMS: Primary | ICD-10-CM

## 2021-01-01 DIAGNOSIS — L03.115 CELLULITIS OF RIGHT LOWER EXTREMITY: Primary | ICD-10-CM

## 2021-01-01 DIAGNOSIS — Z99.2 CONTROLLED TYPE 2 DIABETES MELLITUS WITH CHRONIC KIDNEY DISEASE ON CHRONIC DIALYSIS, WITH LONG-TERM CURRENT USE OF INSULIN (HCC): ICD-10-CM

## 2021-01-01 DIAGNOSIS — E66.9 CLASS 1 OBESITY WITH SERIOUS COMORBIDITY AND BODY MASS INDEX (BMI) OF 32.0 TO 32.9 IN ADULT, UNSPECIFIED OBESITY TYPE: ICD-10-CM

## 2021-01-01 DIAGNOSIS — F03.90 DEMENTIA WITHOUT BEHAVIORAL DISTURBANCE, UNSPECIFIED DEMENTIA TYPE: ICD-10-CM

## 2021-01-01 DIAGNOSIS — N18.6 ESRD (END STAGE RENAL DISEASE) ON DIALYSIS (HCC): Primary | ICD-10-CM

## 2021-01-01 DIAGNOSIS — Z99.2 ESRD (END STAGE RENAL DISEASE) ON DIALYSIS (HCC): Primary | ICD-10-CM

## 2021-01-01 DIAGNOSIS — E16.2 HYPOGLYCEMIA: Primary | ICD-10-CM

## 2021-01-01 DIAGNOSIS — L03.115 CELLULITIS OF RIGHT THIGH: Primary | ICD-10-CM

## 2021-01-01 DIAGNOSIS — K59.1 FUNCTIONAL DIARRHEA: Primary | ICD-10-CM

## 2021-01-01 DIAGNOSIS — E66.9 CLASS 1 OBESITY WITH SERIOUS COMORBIDITY AND BODY MASS INDEX (BMI) OF 33.0 TO 33.9 IN ADULT, UNSPECIFIED OBESITY TYPE: ICD-10-CM

## 2021-01-01 DIAGNOSIS — R58 ECCHYMOSIS: Primary | ICD-10-CM

## 2021-01-01 DIAGNOSIS — E66.9 OBESITY (BMI 30-39.9): ICD-10-CM

## 2021-01-01 DIAGNOSIS — Z95.2 S/P AVR (AORTIC VALVE REPLACEMENT): ICD-10-CM

## 2021-01-01 DIAGNOSIS — K62.5 RECTAL BLEEDING: Primary | ICD-10-CM

## 2021-01-01 DIAGNOSIS — G47.33 OBSTRUCTIVE SLEEP APNEA SYNDROME: Primary | ICD-10-CM

## 2021-01-01 DIAGNOSIS — Z98.890 HISTORY OF MITRAL VALVE REPAIR: ICD-10-CM

## 2021-01-01 DIAGNOSIS — T14.8XXA ABRASION: Primary | ICD-10-CM

## 2021-01-01 DIAGNOSIS — K92.2 GASTROINTESTINAL HEMORRHAGE, UNSPECIFIED GASTROINTESTINAL HEMORRHAGE TYPE: ICD-10-CM

## 2021-01-01 LAB
ABSOLUTE EOS #: 0 K/UL (ref 0–0.4)
ABSOLUTE EOS #: 0.14 K/UL (ref 0–0.44)
ABSOLUTE EOS #: 0.21 K/UL (ref 0–0.44)
ABSOLUTE EOS #: 0.39 K/UL (ref 0–0.44)
ABSOLUTE EOS #: 0.49 K/UL (ref 0–0.44)
ABSOLUTE IMMATURE GRANULOCYTE: 0 K/UL (ref 0–0.3)
ABSOLUTE IMMATURE GRANULOCYTE: 0.04 K/UL (ref 0–0.3)
ABSOLUTE IMMATURE GRANULOCYTE: 0.05 K/UL (ref 0–0.3)
ABSOLUTE IMMATURE GRANULOCYTE: <0.03 K/UL (ref 0–0.3)
ABSOLUTE IMMATURE GRANULOCYTE: <0.03 K/UL (ref 0–0.3)
ABSOLUTE LYMPH #: 0.82 K/UL (ref 1.1–3.7)
ABSOLUTE LYMPH #: 1.07 K/UL (ref 1.1–3.7)
ABSOLUTE LYMPH #: 1.08 K/UL (ref 1.1–3.7)
ABSOLUTE LYMPH #: 1.18 K/UL (ref 1.1–3.7)
ABSOLUTE LYMPH #: 1.3 K/UL (ref 1–4.8)
ABSOLUTE MONO #: 0.22 K/UL (ref 0.1–1.2)
ABSOLUTE MONO #: 0.61 K/UL (ref 0.1–1.2)
ABSOLUTE MONO #: 0.62 K/UL (ref 0.1–1.2)
ABSOLUTE MONO #: 0.68 K/UL (ref 0.1–1.2)
ABSOLUTE MONO #: 0.81 K/UL (ref 0.1–1.2)
ALBUMIN SERPL-MCNC: 3.3 G/DL (ref 3.5–5.2)
ALBUMIN SERPL-MCNC: 3.7 G/DL (ref 3.5–5.2)
ALBUMIN/GLOBULIN RATIO: 1.1 (ref 1–2.5)
ALBUMIN/GLOBULIN RATIO: 1.1 (ref 1–2.5)
ALBUMIN/GLOBULIN RATIO: 1.2 (ref 1–2.5)
ALBUMIN/GLOBULIN RATIO: 1.3 (ref 1–2.5)
ALP BLD-CCNC: 114 U/L (ref 40–129)
ALP BLD-CCNC: 132 U/L (ref 40–129)
ALP BLD-CCNC: 157 U/L (ref 40–129)
ALP BLD-CCNC: 172 U/L (ref 40–129)
ALT SERPL-CCNC: 11 U/L (ref 5–41)
ALT SERPL-CCNC: 12 U/L (ref 5–41)
AMYLASE: 45 U/L (ref 28–100)
ANION GAP SERPL CALCULATED.3IONS-SCNC: 10 MMOL/L (ref 9–17)
ANION GAP SERPL CALCULATED.3IONS-SCNC: 11 MMOL/L (ref 9–17)
ANION GAP SERPL CALCULATED.3IONS-SCNC: 13 MMOL/L (ref 9–17)
ANION GAP SERPL CALCULATED.3IONS-SCNC: 16 MMOL/L (ref 9–17)
ANION GAP SERPL CALCULATED.3IONS-SCNC: 17 MMOL/L (ref 9–17)
ANION GAP SERPL CALCULATED.3IONS-SCNC: 18 MMOL/L (ref 9–17)
ANION GAP SERPL CALCULATED.3IONS-SCNC: 19 MMOL/L (ref 9–17)
ANION GAP SERPL CALCULATED.3IONS-SCNC: 20 MMOL/L (ref 9–17)
AST SERPL-CCNC: 15 U/L
AST SERPL-CCNC: 16 U/L
AST SERPL-CCNC: 21 U/L
AST SERPL-CCNC: 22 U/L
BASOPHILS # BLD: 0 % (ref 0–2)
BASOPHILS # BLD: 1 % (ref 0–2)
BASOPHILS ABSOLUTE: 0 K/UL (ref 0–0.2)
BASOPHILS ABSOLUTE: 0.05 K/UL (ref 0–0.2)
BASOPHILS ABSOLUTE: 0.05 K/UL (ref 0–0.2)
BASOPHILS ABSOLUTE: 0.06 K/UL (ref 0–0.2)
BASOPHILS ABSOLUTE: 0.06 K/UL (ref 0–0.2)
BILIRUB SERPL-MCNC: 0.26 MG/DL (ref 0.3–1.2)
BILIRUB SERPL-MCNC: 0.27 MG/DL (ref 0.3–1.2)
BILIRUB SERPL-MCNC: 0.3 MG/DL (ref 0.3–1.2)
BILIRUB SERPL-MCNC: 0.37 MG/DL (ref 0.3–1.2)
BUN BLDV-MCNC: 26 MG/DL (ref 8–23)
BUN BLDV-MCNC: 42 MG/DL (ref 8–23)
BUN BLDV-MCNC: 44 MG/DL (ref 8–23)
BUN BLDV-MCNC: 47 MG/DL (ref 8–23)
BUN BLDV-MCNC: 53 MG/DL (ref 8–23)
BUN BLDV-MCNC: 55 MG/DL (ref 8–23)
BUN BLDV-MCNC: 67 MG/DL (ref 8–23)
BUN BLDV-MCNC: 67 MG/DL (ref 8–23)
BUN BLDV-MCNC: 83 MG/DL (ref 8–23)
BUN BLDV-MCNC: 85 MG/DL (ref 8–23)
BUN/CREAT BLD: 10 (ref 9–20)
BUN/CREAT BLD: 11 (ref 9–20)
BUN/CREAT BLD: ABNORMAL (ref 9–20)
CALCIUM SERPL-MCNC: 8.5 MG/DL (ref 8.6–10.4)
CALCIUM SERPL-MCNC: 8.6 MG/DL (ref 8.6–10.4)
CALCIUM SERPL-MCNC: 8.7 MG/DL (ref 8.6–10.4)
CALCIUM SERPL-MCNC: 8.7 MG/DL (ref 8.6–10.4)
CALCIUM SERPL-MCNC: 8.8 MG/DL (ref 8.6–10.4)
CALCIUM SERPL-MCNC: 8.8 MG/DL (ref 8.6–10.4)
CALCIUM SERPL-MCNC: 8.9 MG/DL (ref 8.6–10.4)
CALCIUM SERPL-MCNC: 9.2 MG/DL (ref 8.6–10.4)
CALCIUM SERPL-MCNC: 9.4 MG/DL (ref 8.6–10.4)
CALCIUM SERPL-MCNC: 9.5 MG/DL (ref 8.6–10.4)
CHLORIDE BLD-SCNC: 89 MMOL/L (ref 98–107)
CHLORIDE BLD-SCNC: 90 MMOL/L (ref 98–107)
CHLORIDE BLD-SCNC: 91 MMOL/L (ref 98–107)
CHLORIDE BLD-SCNC: 91 MMOL/L (ref 98–107)
CHLORIDE BLD-SCNC: 92 MMOL/L (ref 98–107)
CHLORIDE BLD-SCNC: 92 MMOL/L (ref 98–107)
CHLORIDE BLD-SCNC: 93 MMOL/L (ref 98–107)
CHLORIDE BLD-SCNC: 93 MMOL/L (ref 98–107)
CHLORIDE BLD-SCNC: 95 MMOL/L (ref 98–107)
CHLORIDE BLD-SCNC: 98 MMOL/L (ref 98–107)
CO2: 22 MMOL/L (ref 20–31)
CO2: 24 MMOL/L (ref 20–31)
CO2: 24 MMOL/L (ref 20–31)
CO2: 25 MMOL/L (ref 20–31)
CO2: 28 MMOL/L (ref 20–31)
CO2: 32 MMOL/L (ref 20–31)
CO2: 32 MMOL/L (ref 20–31)
CREAT SERPL-MCNC: 2.65 MG/DL (ref 0.7–1.2)
CREAT SERPL-MCNC: 4.25 MG/DL (ref 0.7–1.2)
CREAT SERPL-MCNC: 4.46 MG/DL (ref 0.7–1.2)
CREAT SERPL-MCNC: 5.01 MG/DL (ref 0.7–1.2)
CREAT SERPL-MCNC: 5.17 MG/DL (ref 0.7–1.2)
CREAT SERPL-MCNC: 5.65 MG/DL (ref 0.7–1.2)
CREAT SERPL-MCNC: 5.77 MG/DL (ref 0.7–1.2)
CREAT SERPL-MCNC: 5.96 MG/DL (ref 0.7–1.2)
CREAT SERPL-MCNC: 6 MG/DL (ref 0.7–1.2)
CREAT SERPL-MCNC: 7.15 MG/DL (ref 0.7–1.2)
CULTURE: NORMAL
CULTURE: NORMAL
DIFFERENTIAL TYPE: ABNORMAL
EKG ATRIAL RATE: 65 BPM
EKG P AXIS: -81 DEGREES
EKG P-R INTERVAL: 360 MS
EKG Q-T INTERVAL: 480 MS
EKG QRS DURATION: 112 MS
EKG QTC CALCULATION (BAZETT): 499 MS
EKG R AXIS: -43 DEGREES
EKG T AXIS: 76 DEGREES
EKG VENTRICULAR RATE: 65 BPM
EOSINOPHILS RELATIVE PERCENT: 0 % (ref 1–8)
EOSINOPHILS RELATIVE PERCENT: 2 % (ref 1–4)
EOSINOPHILS RELATIVE PERCENT: 2 % (ref 1–4)
EOSINOPHILS RELATIVE PERCENT: 4 % (ref 1–4)
EOSINOPHILS RELATIVE PERCENT: 7 % (ref 1–4)
GFR AFRICAN AMERICAN: 11 ML/MIN
GFR AFRICAN AMERICAN: 11 ML/MIN
GFR AFRICAN AMERICAN: 12 ML/MIN
GFR AFRICAN AMERICAN: 12 ML/MIN
GFR AFRICAN AMERICAN: 13 ML/MIN
GFR AFRICAN AMERICAN: 14 ML/MIN
GFR AFRICAN AMERICAN: 16 ML/MIN
GFR AFRICAN AMERICAN: 17 ML/MIN
GFR AFRICAN AMERICAN: 29 ML/MIN
GFR AFRICAN AMERICAN: 9 ML/MIN
GFR NON-AFRICAN AMERICAN: 10 ML/MIN
GFR NON-AFRICAN AMERICAN: 10 ML/MIN
GFR NON-AFRICAN AMERICAN: 11 ML/MIN
GFR NON-AFRICAN AMERICAN: 11 ML/MIN
GFR NON-AFRICAN AMERICAN: 13 ML/MIN
GFR NON-AFRICAN AMERICAN: 14 ML/MIN
GFR NON-AFRICAN AMERICAN: 24 ML/MIN
GFR NON-AFRICAN AMERICAN: 8 ML/MIN
GFR NON-AFRICAN AMERICAN: 9 ML/MIN
GFR NON-AFRICAN AMERICAN: 9 ML/MIN
GFR SERPL CREATININE-BSD FRML MDRD: ABNORMAL ML/MIN/{1.73_M2}
GLUCOSE BLD-MCNC: 102 MG/DL (ref 75–110)
GLUCOSE BLD-MCNC: 108 MG/DL (ref 70–99)
GLUCOSE BLD-MCNC: 109 MG/DL (ref 75–110)
GLUCOSE BLD-MCNC: 114 MG/DL (ref 70–99)
GLUCOSE BLD-MCNC: 114 MG/DL (ref 75–110)
GLUCOSE BLD-MCNC: 120 MG/DL (ref 75–110)
GLUCOSE BLD-MCNC: 122 MG/DL (ref 70–99)
GLUCOSE BLD-MCNC: 123 MG/DL (ref 70–99)
GLUCOSE BLD-MCNC: 126 MG/DL (ref 75–110)
GLUCOSE BLD-MCNC: 130 MG/DL (ref 70–99)
GLUCOSE BLD-MCNC: 131 MG/DL (ref 70–99)
GLUCOSE BLD-MCNC: 137 MG/DL (ref 75–110)
GLUCOSE BLD-MCNC: 138 MG/DL (ref 75–110)
GLUCOSE BLD-MCNC: 140 MG/DL (ref 75–110)
GLUCOSE BLD-MCNC: 145 MG/DL (ref 75–110)
GLUCOSE BLD-MCNC: 154 MG/DL (ref 75–110)
GLUCOSE BLD-MCNC: 156 MG/DL (ref 75–110)
GLUCOSE BLD-MCNC: 161 MG/DL (ref 75–110)
GLUCOSE BLD-MCNC: 163 MG/DL (ref 75–110)
GLUCOSE BLD-MCNC: 166 MG/DL (ref 75–110)
GLUCOSE BLD-MCNC: 167 MG/DL (ref 75–110)
GLUCOSE BLD-MCNC: 173 MG/DL (ref 75–110)
GLUCOSE BLD-MCNC: 180 MG/DL (ref 75–110)
GLUCOSE BLD-MCNC: 199 MG/DL (ref 75–110)
GLUCOSE BLD-MCNC: 229 MG/DL (ref 75–110)
GLUCOSE BLD-MCNC: 272 MG/DL (ref 75–110)
GLUCOSE BLD-MCNC: 273 MG/DL (ref 75–110)
GLUCOSE BLD-MCNC: 36 MG/DL (ref 75–110)
GLUCOSE BLD-MCNC: 38 MG/DL (ref 75–110)
GLUCOSE BLD-MCNC: 42 MG/DL (ref 75–110)
GLUCOSE BLD-MCNC: 72 MG/DL (ref 75–110)
GLUCOSE BLD-MCNC: 80 MG/DL (ref 75–110)
GLUCOSE BLD-MCNC: 81 MG/DL (ref 75–110)
GLUCOSE BLD-MCNC: 82 MG/DL (ref 70–99)
GLUCOSE BLD-MCNC: 84 MG/DL (ref 70–99)
GLUCOSE BLD-MCNC: 84 MG/DL (ref 75–110)
GLUCOSE BLD-MCNC: 85 MG/DL (ref 75–110)
GLUCOSE BLD-MCNC: 85 MG/DL (ref 75–110)
GLUCOSE BLD-MCNC: 88 MG/DL (ref 75–110)
GLUCOSE BLD-MCNC: 88 MG/DL (ref 75–110)
GLUCOSE BLD-MCNC: 90 MG/DL (ref 75–110)
GLUCOSE BLD-MCNC: 92 MG/DL (ref 70–99)
GLUCOSE BLD-MCNC: 92 MG/DL (ref 75–110)
GLUCOSE BLD-MCNC: 93 MG/DL (ref 75–110)
GLUCOSE BLD-MCNC: 94 MG/DL (ref 70–99)
HCT VFR BLD CALC: 32.8 % (ref 40.7–50.3)
HCT VFR BLD CALC: 32.9 % (ref 40.7–50.3)
HCT VFR BLD CALC: 33.2 % (ref 40.7–50.3)
HCT VFR BLD CALC: 33.6 % (ref 40.7–50.3)
HCT VFR BLD CALC: 34 % (ref 40.7–50.3)
HCT VFR BLD CALC: 34.2 % (ref 40.7–50.3)
HCT VFR BLD CALC: 34.7 % (ref 40.7–50.3)
HCT VFR BLD CALC: 34.9 % (ref 40.7–50.3)
HCT VFR BLD CALC: 35.5 % (ref 40.7–50.3)
HCT VFR BLD CALC: 36.9 % (ref 40.7–50.3)
HCT VFR BLD CALC: 37.4 % (ref 40.7–50.3)
HCT VFR BLD CALC: 38 % (ref 40.7–50.3)
HCT VFR BLD CALC: 38.3 % (ref 40.7–50.3)
HCT VFR BLD CALC: 38.5 % (ref 40.7–50.3)
HCT VFR BLD CALC: 38.5 % (ref 40.7–50.3)
HCT VFR BLD CALC: 40 % (ref 40.7–50.3)
HEMOGLOBIN: 10 G/DL (ref 13–17)
HEMOGLOBIN: 10.3 G/DL (ref 13–17)
HEMOGLOBIN: 10.3 G/DL (ref 13–17)
HEMOGLOBIN: 10.4 G/DL (ref 13–17)
HEMOGLOBIN: 10.5 G/DL (ref 13–17)
HEMOGLOBIN: 10.7 G/DL (ref 13–17)
HEMOGLOBIN: 11 G/DL (ref 13–17)
HEMOGLOBIN: 11 G/DL (ref 13–17)
HEMOGLOBIN: 11.1 G/DL (ref 13–17)
HEMOGLOBIN: 11.4 G/DL (ref 13–17)
HEMOGLOBIN: 11.4 G/DL (ref 13–17)
HEMOGLOBIN: 11.6 G/DL (ref 13–17)
HEMOGLOBIN: 12 G/DL (ref 13–17)
HEMOGLOBIN: 12.1 G/DL (ref 13–17)
HEMOGLOBIN: 12.3 G/DL (ref 13–17)
HEMOGLOBIN: 12.7 G/DL (ref 13–17)
IMMATURE GRANULOCYTES: 0 %
IMMATURE GRANULOCYTES: 1 %
INR BLD: 1.5
IRON SATURATION: 20 % (ref 20–55)
IRON: 36 UG/DL (ref 59–158)
LACTIC ACID, SEPSIS WHOLE BLOOD: NORMAL MMOL/L (ref 0.5–1.9)
LACTIC ACID, SEPSIS: 1.2 MMOL/L (ref 0.5–1.9)
LYMPHOCYTES # BLD: 12 % (ref 15–43)
LYMPHOCYTES # BLD: 13 % (ref 24–43)
LYMPHOCYTES # BLD: 16 % (ref 24–43)
LYMPHOCYTES # BLD: 16 % (ref 24–43)
LYMPHOCYTES # BLD: 7 % (ref 24–43)
Lab: NORMAL
Lab: NORMAL
MCH RBC QN AUTO: 29.7 PG (ref 25.2–33.5)
MCH RBC QN AUTO: 29.8 PG (ref 25.2–33.5)
MCH RBC QN AUTO: 30.3 PG (ref 25.2–33.5)
MCH RBC QN AUTO: 30.3 PG (ref 25.2–33.5)
MCH RBC QN AUTO: 30.4 PG (ref 25.2–33.5)
MCH RBC QN AUTO: 30.5 PG (ref 25.2–33.5)
MCH RBC QN AUTO: 30.7 PG (ref 25.2–33.5)
MCHC RBC AUTO-ENTMCNC: 29.8 G/DL (ref 28.4–34.8)
MCHC RBC AUTO-ENTMCNC: 30.1 G/DL (ref 28.4–34.8)
MCHC RBC AUTO-ENTMCNC: 30.5 G/DL (ref 25.2–33.5)
MCHC RBC AUTO-ENTMCNC: 30.5 G/DL (ref 28.4–34.8)
MCHC RBC AUTO-ENTMCNC: 31 G/DL (ref 28.4–34.8)
MCHC RBC AUTO-ENTMCNC: 31.2 G/DL (ref 25.2–33.5)
MCHC RBC AUTO-ENTMCNC: 31.3 G/DL (ref 28.4–34.8)
MCHC RBC AUTO-ENTMCNC: 31.6 G/DL (ref 28.4–34.8)
MCHC RBC AUTO-ENTMCNC: 31.8 G/DL (ref 28.4–34.8)
MCV RBC AUTO: 100 FL (ref 82.6–102.9)
MCV RBC AUTO: 96.7 FL (ref 82.6–102.9)
MCV RBC AUTO: 97.1 FL (ref 82.6–102.9)
MCV RBC AUTO: 97.1 FL (ref 82.6–102.9)
MCV RBC AUTO: 97.9 FL (ref 82.6–102.9)
MCV RBC AUTO: 98.5 FL (ref 82.6–102.9)
MCV RBC AUTO: 98.8 FL (ref 82.6–102.9)
MCV RBC AUTO: 99.2 FL (ref 82.6–102.9)
MCV RBC AUTO: 99.7 FL (ref 82.6–102.9)
MONOCYTES # BLD: 10 % (ref 3–12)
MONOCYTES # BLD: 2 % (ref 6–14)
MONOCYTES # BLD: 7 % (ref 3–12)
MONOCYTES # BLD: 7 % (ref 3–12)
MONOCYTES # BLD: 9 % (ref 3–12)
MORPHOLOGY: ABNORMAL
MORPHOLOGY: ABNORMAL
NRBC AUTOMATED: 0 PER 100 WBC
PDW BLD-RTO: 13.8 % (ref 11.8–14.4)
PDW BLD-RTO: 13.8 % (ref 11.8–14.4)
PDW BLD-RTO: 14.1 % (ref 11.8–14.4)
PDW BLD-RTO: 14.2 % (ref 11.8–14.4)
PLATELET # BLD: 128 K/UL (ref 138–453)
PLATELET # BLD: 137 K/UL (ref 138–453)
PLATELET # BLD: 141 K/UL (ref 138–453)
PLATELET # BLD: 145 K/UL (ref 138–453)
PLATELET # BLD: 146 K/UL (ref 138–453)
PLATELET # BLD: 155 K/UL (ref 138–453)
PLATELET # BLD: 155 K/UL (ref 138–453)
PLATELET # BLD: 156 K/UL (ref 138–453)
PLATELET # BLD: 174 K/UL (ref 138–453)
PLATELET ESTIMATE: ABNORMAL
PMV BLD AUTO: 8.8 FL (ref 8.1–13.5)
PMV BLD AUTO: 8.9 FL (ref 8.1–13.5)
PMV BLD AUTO: 8.9 FL (ref 8.1–13.5)
PMV BLD AUTO: 9.1 FL (ref 8.1–13.5)
PMV BLD AUTO: 9.1 FL (ref 8.1–13.5)
PMV BLD AUTO: 9.2 FL (ref 8.1–13.5)
PMV BLD AUTO: 9.2 FL (ref 8.1–13.5)
PMV BLD AUTO: 9.3 FL (ref 8.1–13.5)
PMV BLD AUTO: 9.4 FL (ref 8.1–13.5)
POTASSIUM SERPL-SCNC: 3.7 MMOL/L (ref 3.7–5.3)
POTASSIUM SERPL-SCNC: 3.7 MMOL/L (ref 3.7–5.3)
POTASSIUM SERPL-SCNC: 4 MMOL/L (ref 3.7–5.3)
POTASSIUM SERPL-SCNC: 4.1 MMOL/L (ref 3.7–5.3)
POTASSIUM SERPL-SCNC: 4.1 MMOL/L (ref 3.7–5.3)
POTASSIUM SERPL-SCNC: 4.3 MMOL/L (ref 3.7–5.3)
POTASSIUM SERPL-SCNC: 4.4 MMOL/L (ref 3.7–5.3)
PROTHROMBIN TIME: 17 SEC (ref 11.5–14.2)
RBC # BLD: 3.28 M/UL (ref 4.21–5.77)
RBC # BLD: 3.39 M/UL (ref 4.21–5.77)
RBC # BLD: 3.39 M/UL (ref 4.21–5.77)
RBC # BLD: 3.46 M/UL (ref 4.21–5.77)
RBC # BLD: 3.46 M/UL (ref 4.21–5.77)
RBC # BLD: 3.61 M/UL (ref 4.21–5.77)
RBC # BLD: 3.7 M/UL (ref 4.21–5.77)
RBC # BLD: 3.83 M/UL (ref 4.21–5.77)
RBC # BLD: 3.91 M/UL (ref 4.21–5.77)
RBC # BLD: ABNORMAL 10*6/UL
SEG NEUTROPHILS: 67 % (ref 36–65)
SEG NEUTROPHILS: 71 % (ref 36–65)
SEG NEUTROPHILS: 74 % (ref 36–65)
SEG NEUTROPHILS: 84 % (ref 36–65)
SEG NEUTROPHILS: 86 % (ref 44–74)
SEGMENTED NEUTROPHILS ABSOLUTE COUNT: 10.15 K/UL (ref 1.5–8.1)
SEGMENTED NEUTROPHILS ABSOLUTE COUNT: 4.54 K/UL (ref 1.5–8.1)
SEGMENTED NEUTROPHILS ABSOLUTE COUNT: 4.76 K/UL (ref 1.5–8.1)
SEGMENTED NEUTROPHILS ABSOLUTE COUNT: 6.55 K/UL (ref 1.5–8.1)
SEGMENTED NEUTROPHILS ABSOLUTE COUNT: 9.28 K/UL (ref 1.5–8.1)
SODIUM BLD-SCNC: 130 MMOL/L (ref 135–144)
SODIUM BLD-SCNC: 133 MMOL/L (ref 135–144)
SODIUM BLD-SCNC: 134 MMOL/L (ref 135–144)
SODIUM BLD-SCNC: 134 MMOL/L (ref 135–144)
SODIUM BLD-SCNC: 135 MMOL/L (ref 135–144)
SODIUM BLD-SCNC: 137 MMOL/L (ref 135–144)
SODIUM BLD-SCNC: 140 MMOL/L (ref 135–144)
SPECIMEN DESCRIPTION: NORMAL
SPECIMEN DESCRIPTION: NORMAL
TOTAL IRON BINDING CAPACITY: 178 UG/DL (ref 250–450)
TOTAL PROTEIN: 5.9 G/DL (ref 6.4–8.3)
TOTAL PROTEIN: 6 G/DL (ref 6.4–8.3)
TOTAL PROTEIN: 6.2 G/DL (ref 6.4–8.3)
TOTAL PROTEIN: 7.2 G/DL (ref 6.4–8.3)
TROPONIN INTERP: ABNORMAL
TROPONIN T: ABNORMAL NG/ML
TROPONIN, HIGH SENSITIVITY: 177 NG/L (ref 0–22)
UNSATURATED IRON BINDING CAPACITY: 142 UG/DL (ref 112–347)
VANCOMYCIN TROUGH DATE LAST DOSE: NORMAL
VANCOMYCIN TROUGH DOSE AMOUNT: NORMAL
VANCOMYCIN TROUGH TIME LAST DOSE: NORMAL
VANCOMYCIN TROUGH: 15.5 UG/ML (ref 10–20)
WBC # BLD: 10.8 K/UL (ref 3.5–11.3)
WBC # BLD: 12.1 K/UL (ref 3.5–11.3)
WBC # BLD: 6.7 K/UL (ref 3.5–11.3)
WBC # BLD: 6.8 K/UL (ref 3.5–11.3)
WBC # BLD: 7.2 K/UL (ref 3.5–11.3)
WBC # BLD: 7.4 K/UL (ref 3.5–11.3)
WBC # BLD: 7.8 K/UL (ref 3.5–11.3)
WBC # BLD: 8.8 K/UL (ref 3.5–11.3)
WBC # BLD: 9.5 K/UL (ref 3.5–11.3)
WBC # BLD: ABNORMAL 10*3/UL

## 2021-01-01 PROCEDURE — 99231 SBSQ HOSP IP/OBS SF/LOW 25: CPT | Performed by: INTERNAL MEDICINE

## 2021-01-01 PROCEDURE — 96365 THER/PROPH/DIAG IV INF INIT: CPT

## 2021-01-01 PROCEDURE — 4040F PNEUMOC VAC/ADMIN/RCVD: CPT | Performed by: NURSE PRACTITIONER

## 2021-01-01 PROCEDURE — 6360000002 HC RX W HCPCS: Performed by: CLINICAL NURSE SPECIALIST

## 2021-01-01 PROCEDURE — 6370000000 HC RX 637 (ALT 250 FOR IP): Performed by: INTERNAL MEDICINE

## 2021-01-01 PROCEDURE — 2580000003 HC RX 258: Performed by: CLINICAL NURSE SPECIALIST

## 2021-01-01 PROCEDURE — 90935 HEMODIALYSIS ONE EVALUATION: CPT | Performed by: INTERNAL MEDICINE

## 2021-01-01 PROCEDURE — 99308 SBSQ NF CARE LOW MDM 20: CPT | Performed by: INTERNAL MEDICINE

## 2021-01-01 PROCEDURE — 99233 SBSQ HOSP IP/OBS HIGH 50: CPT | Performed by: INTERNAL MEDICINE

## 2021-01-01 PROCEDURE — 85025 COMPLETE CBC W/AUTO DIFF WBC: CPT

## 2021-01-01 PROCEDURE — 1036F TOBACCO NON-USER: CPT | Performed by: NURSE PRACTITIONER

## 2021-01-01 PROCEDURE — 6370000000 HC RX 637 (ALT 250 FOR IP): Performed by: CLINICAL NURSE SPECIALIST

## 2021-01-01 PROCEDURE — 93005 ELECTROCARDIOGRAM TRACING: CPT | Performed by: INTERNAL MEDICINE

## 2021-01-01 PROCEDURE — 76937 US GUIDE VASCULAR ACCESS: CPT

## 2021-01-01 PROCEDURE — 99232 SBSQ HOSP IP/OBS MODERATE 35: CPT | Performed by: FAMILY MEDICINE

## 2021-01-01 PROCEDURE — 85018 HEMOGLOBIN: CPT

## 2021-01-01 PROCEDURE — 99309 SBSQ NF CARE MODERATE MDM 30: CPT | Performed by: NURSE PRACTITIONER

## 2021-01-01 PROCEDURE — 6360000002 HC RX W HCPCS: Performed by: INTERNAL MEDICINE

## 2021-01-01 PROCEDURE — 99308 SBSQ NF CARE LOW MDM 20: CPT | Performed by: NURSE PRACTITIONER

## 2021-01-01 PROCEDURE — 2580000003 HC RX 258: Performed by: INTERNAL MEDICINE

## 2021-01-01 PROCEDURE — 90935 HEMODIALYSIS ONE EVALUATION: CPT

## 2021-01-01 PROCEDURE — 1036F TOBACCO NON-USER: CPT | Performed by: INTERNAL MEDICINE

## 2021-01-01 PROCEDURE — 85014 HEMATOCRIT: CPT

## 2021-01-01 PROCEDURE — C9113 INJ PANTOPRAZOLE SODIUM, VIA: HCPCS | Performed by: CLINICAL NURSE SPECIALIST

## 2021-01-01 PROCEDURE — APPSS30 APP SPLIT SHARED TIME 16-30 MINUTES: Performed by: INTERNAL MEDICINE

## 2021-01-01 PROCEDURE — 5A1D70Z PERFORMANCE OF URINARY FILTRATION, INTERMITTENT, LESS THAN 6 HOURS PER DAY: ICD-10-PCS | Performed by: INTERNAL MEDICINE

## 2021-01-01 PROCEDURE — 83540 ASSAY OF IRON: CPT

## 2021-01-01 PROCEDURE — 82947 ASSAY GLUCOSE BLOOD QUANT: CPT

## 2021-01-01 PROCEDURE — 36415 COLL VENOUS BLD VENIPUNCTURE: CPT

## 2021-01-01 PROCEDURE — 1200000000 HC SEMI PRIVATE

## 2021-01-01 PROCEDURE — 93005 ELECTROCARDIOGRAM TRACING: CPT | Performed by: EMERGENCY MEDICINE

## 2021-01-01 PROCEDURE — 85027 COMPLETE CBC AUTOMATED: CPT

## 2021-01-01 PROCEDURE — 84484 ASSAY OF TROPONIN QUANT: CPT

## 2021-01-01 PROCEDURE — 74176 CT ABD & PELVIS W/O CONTRAST: CPT

## 2021-01-01 PROCEDURE — 82150 ASSAY OF AMYLASE: CPT

## 2021-01-01 PROCEDURE — 99309 SBSQ NF CARE MODERATE MDM 30: CPT | Performed by: INTERNAL MEDICINE

## 2021-01-01 PROCEDURE — 80048 BASIC METABOLIC PNL TOTAL CA: CPT

## 2021-01-01 PROCEDURE — 99214 OFFICE O/P EST MOD 30 MIN: CPT | Performed by: NURSE PRACTITIONER

## 2021-01-01 PROCEDURE — 99232 SBSQ HOSP IP/OBS MODERATE 35: CPT | Performed by: INTERNAL MEDICINE

## 2021-01-01 PROCEDURE — 80053 COMPREHEN METABOLIC PANEL: CPT

## 2021-01-01 PROCEDURE — 2060000000 HC ICU INTERMEDIATE R&B

## 2021-01-01 PROCEDURE — C9113 INJ PANTOPRAZOLE SODIUM, VIA: HCPCS | Performed by: INTERNAL MEDICINE

## 2021-01-01 PROCEDURE — 83605 ASSAY OF LACTIC ACID: CPT

## 2021-01-01 PROCEDURE — G8417 CALC BMI ABV UP PARAM F/U: HCPCS | Performed by: INTERNAL MEDICINE

## 2021-01-01 PROCEDURE — 99222 1ST HOSP IP/OBS MODERATE 55: CPT | Performed by: FAMILY MEDICINE

## 2021-01-01 PROCEDURE — 99239 HOSP IP/OBS DSCHRG MGMT >30: CPT | Performed by: INTERNAL MEDICINE

## 2021-01-01 PROCEDURE — 4040F PNEUMOC VAC/ADMIN/RCVD: CPT | Performed by: INTERNAL MEDICINE

## 2021-01-01 PROCEDURE — 74018 RADEX ABDOMEN 1 VIEW: CPT

## 2021-01-01 PROCEDURE — 83550 IRON BINDING TEST: CPT

## 2021-01-01 PROCEDURE — G8427 DOCREV CUR MEDS BY ELIG CLIN: HCPCS | Performed by: INTERNAL MEDICINE

## 2021-01-01 PROCEDURE — 99221 1ST HOSP IP/OBS SF/LOW 40: CPT | Performed by: INTERNAL MEDICINE

## 2021-01-01 PROCEDURE — 2580000003 HC RX 258: Performed by: NURSE ANESTHETIST, CERTIFIED REGISTERED

## 2021-01-01 PROCEDURE — 99284 EMERGENCY DEPT VISIT MOD MDM: CPT

## 2021-01-01 PROCEDURE — 80202 ASSAY OF VANCOMYCIN: CPT

## 2021-01-01 PROCEDURE — 96372 THER/PROPH/DIAG INJ SC/IM: CPT

## 2021-01-01 PROCEDURE — 1123F ACP DISCUSS/DSCN MKR DOCD: CPT | Performed by: INTERNAL MEDICINE

## 2021-01-01 PROCEDURE — 3700000001 HC ADD 15 MINUTES (ANESTHESIA): Performed by: INTERNAL MEDICINE

## 2021-01-01 PROCEDURE — 85610 PROTHROMBIN TIME: CPT

## 2021-01-01 PROCEDURE — G8427 DOCREV CUR MEDS BY ELIG CLIN: HCPCS | Performed by: NURSE PRACTITIONER

## 2021-01-01 PROCEDURE — 2709999900 HC NON-CHARGEABLE SUPPLY: Performed by: INTERNAL MEDICINE

## 2021-01-01 PROCEDURE — 7100000011 HC PHASE II RECOVERY - ADDTL 15 MIN: Performed by: INTERNAL MEDICINE

## 2021-01-01 PROCEDURE — 99285 EMERGENCY DEPT VISIT HI MDM: CPT

## 2021-01-01 PROCEDURE — 3609155600 HC COLONOSCOPY WITH DECOMPRESSION: Performed by: INTERNAL MEDICINE

## 2021-01-01 PROCEDURE — 99214 OFFICE O/P EST MOD 30 MIN: CPT | Performed by: INTERNAL MEDICINE

## 2021-01-01 PROCEDURE — 6370000000 HC RX 637 (ALT 250 FOR IP): Performed by: EMERGENCY MEDICINE

## 2021-01-01 PROCEDURE — 94761 N-INVAS EAR/PLS OXIMETRY MLT: CPT

## 2021-01-01 PROCEDURE — 1123F ACP DISCUSS/DSCN MKR DOCD: CPT | Performed by: NURSE PRACTITIONER

## 2021-01-01 PROCEDURE — APPSS45 APP SPLIT SHARED TIME 31-45 MINUTES: Performed by: NURSE PRACTITIONER

## 2021-01-01 PROCEDURE — 87040 BLOOD CULTURE FOR BACTERIA: CPT

## 2021-01-01 PROCEDURE — 7100000010 HC PHASE II RECOVERY - FIRST 15 MIN: Performed by: INTERNAL MEDICINE

## 2021-01-01 PROCEDURE — 45378 DIAGNOSTIC COLONOSCOPY: CPT | Performed by: INTERNAL MEDICINE

## 2021-01-01 PROCEDURE — G8482 FLU IMMUNIZE ORDER/ADMIN: HCPCS | Performed by: INTERNAL MEDICINE

## 2021-01-01 PROCEDURE — 3700000000 HC ANESTHESIA ATTENDED CARE: Performed by: INTERNAL MEDICINE

## 2021-01-01 PROCEDURE — 6360000002 HC RX W HCPCS: Performed by: EMERGENCY MEDICINE

## 2021-01-01 PROCEDURE — 0DJD8ZZ INSPECTION OF LOWER INTESTINAL TRACT, VIA NATURAL OR ARTIFICIAL OPENING ENDOSCOPIC: ICD-10-PCS | Performed by: INTERNAL MEDICINE

## 2021-01-01 PROCEDURE — 99213 OFFICE O/P EST LOW 20 MIN: CPT | Performed by: NURSE PRACTITIONER

## 2021-01-01 PROCEDURE — 93010 ELECTROCARDIOGRAM REPORT: CPT | Performed by: INTERNAL MEDICINE

## 2021-01-01 PROCEDURE — 6360000002 HC RX W HCPCS: Performed by: NURSE ANESTHETIST, CERTIFIED REGISTERED

## 2021-01-01 PROCEDURE — 99222 1ST HOSP IP/OBS MODERATE 55: CPT | Performed by: INTERNAL MEDICINE

## 2021-01-01 PROCEDURE — 2580000003 HC RX 258: Performed by: EMERGENCY MEDICINE

## 2021-01-01 PROCEDURE — G8417 CALC BMI ABV UP PARAM F/U: HCPCS | Performed by: NURSE PRACTITIONER

## 2021-01-01 PROCEDURE — 99223 1ST HOSP IP/OBS HIGH 75: CPT | Performed by: CLINICAL NURSE SPECIALIST

## 2021-01-01 RX ORDER — ALPRAZOLAM 0.5 MG/1
0.5 TABLET ORAL 2 TIMES DAILY
COMMUNITY
Start: 2021-01-01

## 2021-01-01 RX ORDER — DONEPEZIL HYDROCHLORIDE 5 MG/1
5 TABLET, FILM COATED ORAL EVERY EVENING
Status: DISCONTINUED | OUTPATIENT
Start: 2021-01-01 | End: 2021-01-01 | Stop reason: HOSPADM

## 2021-01-01 RX ORDER — NICOTINE POLACRILEX 4 MG
15 LOZENGE BUCCAL PRN
Status: DISCONTINUED | OUTPATIENT
Start: 2021-01-01 | End: 2021-01-01 | Stop reason: HOSPADM

## 2021-01-01 RX ORDER — MIDODRINE HYDROCHLORIDE 5 MG/1
10 TABLET ORAL 3 TIMES DAILY
Status: DISCONTINUED | OUTPATIENT
Start: 2021-01-01 | End: 2021-01-01

## 2021-01-01 RX ORDER — SODIUM CHLORIDE, SODIUM LACTATE, POTASSIUM CHLORIDE, CALCIUM CHLORIDE 600; 310; 30; 20 MG/100ML; MG/100ML; MG/100ML; MG/100ML
INJECTION, SOLUTION INTRAVENOUS CONTINUOUS PRN
Status: DISCONTINUED | OUTPATIENT
Start: 2021-01-01 | End: 2021-01-01 | Stop reason: SDUPTHER

## 2021-01-01 RX ORDER — LEVOTHYROXINE SODIUM 0.03 MG/1
25 TABLET ORAL DAILY
Status: DISCONTINUED | OUTPATIENT
Start: 2021-01-01 | End: 2021-01-01 | Stop reason: HOSPADM

## 2021-01-01 RX ORDER — ONDANSETRON 4 MG/1
4 TABLET, FILM COATED ORAL EVERY 6 HOURS PRN
Status: DISCONTINUED | OUTPATIENT
Start: 2021-01-01 | End: 2021-01-01 | Stop reason: HOSPADM

## 2021-01-01 RX ORDER — VANCOMYCIN HYDROCHLORIDE 1 G/200ML
1000 INJECTION, SOLUTION INTRAVENOUS
Status: DISCONTINUED | OUTPATIENT
Start: 2021-01-01 | End: 2021-01-01 | Stop reason: HOSPADM

## 2021-01-01 RX ORDER — ACETAMINOPHEN 325 MG/1
650 TABLET ORAL EVERY 6 HOURS PRN
Status: DISCONTINUED | OUTPATIENT
Start: 2021-01-01 | End: 2021-01-01 | Stop reason: HOSPADM

## 2021-01-01 RX ORDER — CETIRIZINE HYDROCHLORIDE 10 MG/1
5 TABLET ORAL DAILY
Status: DISCONTINUED | OUTPATIENT
Start: 2021-01-01 | End: 2021-01-01 | Stop reason: HOSPADM

## 2021-01-01 RX ORDER — PROPOFOL 10 MG/ML
INJECTION, EMULSION INTRAVENOUS PRN
Status: DISCONTINUED | OUTPATIENT
Start: 2021-01-01 | End: 2021-01-01 | Stop reason: SDUPTHER

## 2021-01-01 RX ORDER — ONDANSETRON 2 MG/ML
4 INJECTION INTRAMUSCULAR; INTRAVENOUS EVERY 6 HOURS PRN
Status: DISCONTINUED | OUTPATIENT
Start: 2021-01-01 | End: 2021-01-01 | Stop reason: HOSPADM

## 2021-01-01 RX ORDER — SEVELAMER CARBONATE 800 MG/1
1600 TABLET, FILM COATED ORAL 3 TIMES DAILY
Status: DISCONTINUED | OUTPATIENT
Start: 2021-01-01 | End: 2021-01-01

## 2021-01-01 RX ORDER — IPRATROPIUM BROMIDE AND ALBUTEROL SULFATE 2.5; .5 MG/3ML; MG/3ML
3 SOLUTION RESPIRATORY (INHALATION) EVERY 6 HOURS PRN
Status: DISCONTINUED | OUTPATIENT
Start: 2021-01-01 | End: 2021-01-01 | Stop reason: HOSPADM

## 2021-01-01 RX ORDER — DEXTROSE MONOHYDRATE 25 G/50ML
12.5 INJECTION, SOLUTION INTRAVENOUS PRN
Status: DISCONTINUED | OUTPATIENT
Start: 2021-01-01 | End: 2021-01-01 | Stop reason: HOSPADM

## 2021-01-01 RX ORDER — INSULIN GLARGINE 100 [IU]/ML
25 INJECTION, SOLUTION SUBCUTANEOUS 2 TIMES DAILY
Status: DISCONTINUED | OUTPATIENT
Start: 2021-01-01 | End: 2021-01-01 | Stop reason: HOSPADM

## 2021-01-01 RX ORDER — ASPIRIN 81 MG/1
81 TABLET ORAL DAILY
Status: DISCONTINUED | OUTPATIENT
Start: 2021-01-01 | End: 2021-01-01 | Stop reason: HOSPADM

## 2021-01-01 RX ORDER — PANTOPRAZOLE SODIUM 40 MG/10ML
40 INJECTION, POWDER, LYOPHILIZED, FOR SOLUTION INTRAVENOUS 2 TIMES DAILY
Status: DISCONTINUED | OUTPATIENT
Start: 2021-01-01 | End: 2021-01-01 | Stop reason: HOSPADM

## 2021-01-01 RX ORDER — PROMETHAZINE HYDROCHLORIDE 12.5 MG/1
12.5 TABLET ORAL EVERY 6 HOURS PRN
Status: DISCONTINUED | OUTPATIENT
Start: 2021-01-01 | End: 2021-01-01 | Stop reason: HOSPADM

## 2021-01-01 RX ORDER — BENZONATATE 100 MG/1
100 CAPSULE ORAL EVERY 12 HOURS PRN
Status: DISCONTINUED | OUTPATIENT
Start: 2021-01-01 | End: 2021-01-01 | Stop reason: HOSPADM

## 2021-01-01 RX ORDER — FOLIC ACID 1 MG/1
1 TABLET ORAL DAILY
Status: DISCONTINUED | OUTPATIENT
Start: 2021-01-01 | End: 2021-01-01 | Stop reason: HOSPADM

## 2021-01-01 RX ORDER — LIDOCAINE 40 MG/G
CREAM TOPICAL PRN
Status: DISCONTINUED | OUTPATIENT
Start: 2021-01-01 | End: 2021-01-01 | Stop reason: HOSPADM

## 2021-01-01 RX ORDER — BUPROPION HYDROCHLORIDE 150 MG/1
150 TABLET, EXTENDED RELEASE ORAL DAILY
Status: DISCONTINUED | OUTPATIENT
Start: 2021-01-01 | End: 2021-01-01 | Stop reason: HOSPADM

## 2021-01-01 RX ORDER — DEXTROSE MONOHYDRATE 50 MG/ML
100 INJECTION, SOLUTION INTRAVENOUS PRN
Status: DISCONTINUED | OUTPATIENT
Start: 2021-01-01 | End: 2021-01-01 | Stop reason: HOSPADM

## 2021-01-01 RX ORDER — ALBUTEROL SULFATE 2.5 MG/3ML
2.5 SOLUTION RESPIRATORY (INHALATION) EVERY 8 HOURS PRN
Status: DISCONTINUED | OUTPATIENT
Start: 2021-01-01 | End: 2021-01-01 | Stop reason: HOSPADM

## 2021-01-01 RX ORDER — M-VIT,TX,IRON,MINS/CALC/FOLIC 27MG-0.4MG
1 TABLET ORAL DAILY
Status: DISCONTINUED | OUTPATIENT
Start: 2021-01-01 | End: 2021-01-01 | Stop reason: HOSPADM

## 2021-01-01 RX ORDER — SEVELAMER CARBONATE FOR ORAL SUSPENSION 800 MG/1
0.8 POWDER, FOR SUSPENSION ORAL ONCE
Status: DISCONTINUED | OUTPATIENT
Start: 2021-01-01 | End: 2021-01-01

## 2021-01-01 RX ORDER — TIZANIDINE 2 MG/1
2 TABLET ORAL EVERY 8 HOURS PRN
Status: DISCONTINUED | OUTPATIENT
Start: 2021-01-01 | End: 2021-01-01 | Stop reason: HOSPADM

## 2021-01-01 RX ORDER — SEVELAMER CARBONATE 800 MG/1
1600 TABLET, FILM COATED ORAL 3 TIMES DAILY
Status: DISCONTINUED | OUTPATIENT
Start: 2021-01-01 | End: 2021-01-01 | Stop reason: HOSPADM

## 2021-01-01 RX ORDER — SEVELAMER CARBONATE 800 MG/1
1600 TABLET, FILM COATED ORAL
Status: DISCONTINUED | OUTPATIENT
Start: 2021-01-01 | End: 2021-01-01 | Stop reason: HOSPADM

## 2021-01-01 RX ORDER — SEVELAMER CARBONATE FOR ORAL SUSPENSION 800 MG/1
0.8 POWDER, FOR SUSPENSION ORAL
Status: DISCONTINUED | OUTPATIENT
Start: 2021-01-01 | End: 2021-01-01 | Stop reason: HOSPADM

## 2021-01-01 RX ORDER — SODIUM CHLORIDE 9 MG/ML
25 INJECTION, SOLUTION INTRAVENOUS PRN
Status: DISCONTINUED | OUTPATIENT
Start: 2021-01-01 | End: 2021-01-01 | Stop reason: HOSPADM

## 2021-01-01 RX ORDER — PANTOPRAZOLE SODIUM 40 MG/1
40 TABLET, DELAYED RELEASE ORAL
Status: DISCONTINUED | OUTPATIENT
Start: 2021-01-01 | End: 2021-01-01

## 2021-01-01 RX ORDER — MIDODRINE HYDROCHLORIDE 5 MG/1
10 TABLET ORAL
Status: DISCONTINUED | OUTPATIENT
Start: 2021-01-01 | End: 2021-01-01 | Stop reason: HOSPADM

## 2021-01-01 RX ORDER — DULOXETIN HYDROCHLORIDE 30 MG/1
60 CAPSULE, DELAYED RELEASE ORAL DAILY
Status: DISCONTINUED | OUTPATIENT
Start: 2021-01-01 | End: 2021-01-01 | Stop reason: HOSPADM

## 2021-01-01 RX ORDER — IPRATROPIUM BROMIDE AND ALBUTEROL SULFATE 2.5; .5 MG/3ML; MG/3ML
3 SOLUTION RESPIRATORY (INHALATION) EVERY 8 HOURS PRN
Status: DISCONTINUED | OUTPATIENT
Start: 2021-01-01 | End: 2021-01-01 | Stop reason: HOSPADM

## 2021-01-01 RX ORDER — SODIUM CHLORIDE 0.9 % (FLUSH) 0.9 %
5-40 SYRINGE (ML) INJECTION PRN
Status: DISCONTINUED | OUTPATIENT
Start: 2021-01-01 | End: 2021-01-01 | Stop reason: HOSPADM

## 2021-01-01 RX ORDER — ONDANSETRON 4 MG/1
4 TABLET, ORALLY DISINTEGRATING ORAL EVERY 8 HOURS PRN
Status: DISCONTINUED | OUTPATIENT
Start: 2021-01-01 | End: 2021-01-01 | Stop reason: HOSPADM

## 2021-01-01 RX ORDER — SODIUM CHLORIDE 0.9 % (FLUSH) 0.9 %
10 SYRINGE (ML) INJECTION PRN
Status: DISCONTINUED | OUTPATIENT
Start: 2021-01-01 | End: 2021-01-01 | Stop reason: HOSPADM

## 2021-01-01 RX ORDER — MIDODRINE HYDROCHLORIDE 5 MG/1
10 TABLET ORAL 3 TIMES DAILY
Status: DISCONTINUED | OUTPATIENT
Start: 2021-01-01 | End: 2021-01-01 | Stop reason: HOSPADM

## 2021-01-01 RX ORDER — GUAIFENESIN 200 MG/1
400 TABLET ORAL EVERY 12 HOURS PRN
Status: DISCONTINUED | OUTPATIENT
Start: 2021-01-01 | End: 2021-01-01 | Stop reason: HOSPADM

## 2021-01-01 RX ORDER — ATORVASTATIN CALCIUM 40 MG/1
40 TABLET, FILM COATED ORAL NIGHTLY
Status: DISCONTINUED | OUTPATIENT
Start: 2021-01-01 | End: 2021-01-01 | Stop reason: HOSPADM

## 2021-01-01 RX ORDER — SEVELAMER CARBONATE 800 MG/1
2 TABLET, FILM COATED ORAL 3 TIMES DAILY
Qty: 540 TABLET | Refills: 3 | Status: SHIPPED | OUTPATIENT
Start: 2021-01-01

## 2021-01-01 RX ORDER — NICOTINE 21 MG/24HR
1 PATCH, TRANSDERMAL 24 HOURS TRANSDERMAL DAILY PRN
Status: DISCONTINUED | OUTPATIENT
Start: 2021-01-01 | End: 2021-01-01 | Stop reason: HOSPADM

## 2021-01-01 RX ORDER — DULOXETIN HYDROCHLORIDE 60 MG/1
60 CAPSULE, DELAYED RELEASE ORAL DAILY
Qty: 30 CAPSULE | Refills: 5 | Status: SHIPPED | OUTPATIENT
Start: 2021-01-01

## 2021-01-01 RX ORDER — FLUTICASONE PROPIONATE 50 MCG
2 SPRAY, SUSPENSION (ML) NASAL DAILY
Status: DISCONTINUED | OUTPATIENT
Start: 2021-01-01 | End: 2021-01-01 | Stop reason: HOSPADM

## 2021-01-01 RX ORDER — SODIUM CHLORIDE 0.9 % (FLUSH) 0.9 %
5-40 SYRINGE (ML) INJECTION EVERY 12 HOURS SCHEDULED
Status: DISCONTINUED | OUTPATIENT
Start: 2021-01-01 | End: 2021-01-01 | Stop reason: HOSPADM

## 2021-01-01 RX ORDER — ACETAMINOPHEN 650 MG/1
650 SUPPOSITORY RECTAL EVERY 6 HOURS PRN
Status: DISCONTINUED | OUTPATIENT
Start: 2021-01-01 | End: 2021-01-01 | Stop reason: HOSPADM

## 2021-01-01 RX ORDER — PANTOPRAZOLE SODIUM 40 MG/1
40 TABLET, DELAYED RELEASE ORAL DAILY
Qty: 60 TABLET | Refills: 0 | Status: SHIPPED | OUTPATIENT
Start: 2021-01-01

## 2021-01-01 RX ORDER — SODIUM CHLORIDE 9 MG/ML
10 INJECTION INTRAVENOUS DAILY
Status: DISCONTINUED | OUTPATIENT
Start: 2021-01-01 | End: 2021-01-01 | Stop reason: HOSPADM

## 2021-01-01 RX ORDER — MAGNESIUM SULFATE 1 G/100ML
1000 INJECTION INTRAVENOUS PRN
Status: DISCONTINUED | OUTPATIENT
Start: 2021-01-01 | End: 2021-01-01

## 2021-01-01 RX ORDER — POLYETHYLENE GLYCOL 3350 17 G/17G
17 POWDER, FOR SOLUTION ORAL DAILY PRN
Status: DISCONTINUED | OUTPATIENT
Start: 2021-01-01 | End: 2021-01-01 | Stop reason: HOSPADM

## 2021-01-01 RX ADMIN — PANTOPRAZOLE SODIUM 40 MG: 40 INJECTION, POWDER, LYOPHILIZED, FOR SOLUTION INTRAVENOUS at 21:17

## 2021-01-01 RX ADMIN — FLUTICASONE PROPIONATE 2 SPRAY: 50 SPRAY, METERED NASAL at 08:36

## 2021-01-01 RX ADMIN — Medication 1 TABLET: at 15:36

## 2021-01-01 RX ADMIN — INSULIN GLARGINE 25 UNITS: 100 INJECTION, SOLUTION SUBCUTANEOUS at 22:34

## 2021-01-01 RX ADMIN — INSULIN LISPRO 2 UNITS: 100 INJECTION, SOLUTION INTRAVENOUS; SUBCUTANEOUS at 07:55

## 2021-01-01 RX ADMIN — PANTOPRAZOLE SODIUM 40 MG: 40 INJECTION, POWDER, LYOPHILIZED, FOR SOLUTION INTRAVENOUS at 21:21

## 2021-01-01 RX ADMIN — INSULIN HUMAN 6 UNITS: 100 INJECTION, SOLUTION PARENTERAL at 19:33

## 2021-01-01 RX ADMIN — Medication 1 TABLET: at 09:59

## 2021-01-01 RX ADMIN — DESMOPRESSIN ACETATE 40 MG: 0.2 TABLET ORAL at 21:21

## 2021-01-01 RX ADMIN — SODIUM CHLORIDE, PRESERVATIVE FREE 10 ML: 5 INJECTION INTRAVENOUS at 20:55

## 2021-01-01 RX ADMIN — DESMOPRESSIN ACETATE 40 MG: 0.2 TABLET ORAL at 20:54

## 2021-01-01 RX ADMIN — BUPROPION HYDROCHLORIDE 150 MG: 150 TABLET, EXTENDED RELEASE ORAL at 09:58

## 2021-01-01 RX ADMIN — BUPROPION HYDROCHLORIDE 150 MG: 150 TABLET, EXTENDED RELEASE ORAL at 14:05

## 2021-01-01 RX ADMIN — SODIUM CHLORIDE, PRESERVATIVE FREE 10 ML: 5 INJECTION INTRAVENOUS at 10:15

## 2021-01-01 RX ADMIN — FLUTICASONE PROPIONATE 2 SPRAY: 50 SPRAY, METERED NASAL at 10:00

## 2021-01-01 RX ADMIN — PANTOPRAZOLE SODIUM 40 MG: 40 INJECTION, POWDER, LYOPHILIZED, FOR SOLUTION INTRAVENOUS at 11:24

## 2021-01-01 RX ADMIN — SODIUM CHLORIDE, POTASSIUM CHLORIDE, SODIUM LACTATE AND CALCIUM CHLORIDE: 600; 310; 30; 20 INJECTION, SOLUTION INTRAVENOUS at 10:08

## 2021-01-01 RX ADMIN — FOLIC ACID 1 MG: 1 TABLET ORAL at 15:35

## 2021-01-01 RX ADMIN — Medication 1 TABLET: at 12:16

## 2021-01-01 RX ADMIN — BUPROPION HYDROCHLORIDE 150 MG: 150 TABLET, EXTENDED RELEASE ORAL at 08:37

## 2021-01-01 RX ADMIN — DULOXETINE HYDROCHLORIDE 60 MG: 30 CAPSULE, DELAYED RELEASE ORAL at 14:06

## 2021-01-01 RX ADMIN — SODIUM CHLORIDE, PRESERVATIVE FREE 10 ML: 5 INJECTION INTRAVENOUS at 21:17

## 2021-01-01 RX ADMIN — Medication 1 TABLET: at 09:17

## 2021-01-01 RX ADMIN — PANTOPRAZOLE SODIUM 40 MG: 40 TABLET, DELAYED RELEASE ORAL at 08:54

## 2021-01-01 RX ADMIN — SODIUM CHLORIDE, PRESERVATIVE FREE 10 ML: 5 INJECTION INTRAVENOUS at 08:36

## 2021-01-01 RX ADMIN — MIDODRINE HYDROCHLORIDE 10 MG: 5 TABLET ORAL at 09:17

## 2021-01-01 RX ADMIN — CETIRIZINE HYDROCHLORIDE 5 MG: 10 TABLET ORAL at 08:37

## 2021-01-01 RX ADMIN — INSULIN GLARGINE 25 UNITS: 100 INJECTION, SOLUTION SUBCUTANEOUS at 21:01

## 2021-01-01 RX ADMIN — MIDODRINE HYDROCHLORIDE 10 MG: 5 TABLET ORAL at 21:18

## 2021-01-01 RX ADMIN — CETIRIZINE HYDROCHLORIDE 5 MG: 10 TABLET ORAL at 09:58

## 2021-01-01 RX ADMIN — SODIUM CHLORIDE, PRESERVATIVE FREE 10 ML: 5 INJECTION INTRAVENOUS at 21:22

## 2021-01-01 RX ADMIN — POLYETHYLENE GLYCOL 3350, SODIUM SULFATE ANHYDROUS, SODIUM BICARBONATE, SODIUM CHLORIDE, POTASSIUM CHLORIDE 4000 ML: 236; 22.74; 6.74; 5.86; 2.97 POWDER, FOR SOLUTION ORAL at 18:21

## 2021-01-01 RX ADMIN — SEVELAMER CARBONATE 1600 MG: 800 TABLET, FILM COATED ORAL at 12:16

## 2021-01-01 RX ADMIN — BUPROPION HYDROCHLORIDE 150 MG: 150 TABLET, EXTENDED RELEASE ORAL at 15:36

## 2021-01-01 RX ADMIN — FOLIC ACID 1 MG: 1 TABLET ORAL at 09:17

## 2021-01-01 RX ADMIN — SODIUM CHLORIDE, PRESERVATIVE FREE 10 ML: 5 INJECTION INTRAVENOUS at 09:17

## 2021-01-01 RX ADMIN — SEVELAMER CARBONATE 1600 MG: 800 TABLET, FILM COATED ORAL at 16:20

## 2021-01-01 RX ADMIN — LEVOTHYROXINE SODIUM 25 MCG: 25 TABLET ORAL at 08:00

## 2021-01-01 RX ADMIN — BUPROPION HYDROCHLORIDE 150 MG: 150 TABLET, EXTENDED RELEASE ORAL at 09:17

## 2021-01-01 RX ADMIN — MIDODRINE HYDROCHLORIDE 10 MG: 5 TABLET ORAL at 08:40

## 2021-01-01 RX ADMIN — INSULIN GLARGINE 25 UNITS: 100 INJECTION, SOLUTION SUBCUTANEOUS at 08:37

## 2021-01-01 RX ADMIN — POLYETHYLENE GLYCOL 3350, SODIUM SULFATE ANHYDROUS, SODIUM BICARBONATE, SODIUM CHLORIDE, POTASSIUM CHLORIDE 4000 ML: 236; 22.74; 6.74; 5.86; 2.97 POWDER, FOR SOLUTION ORAL at 20:21

## 2021-01-01 RX ADMIN — PANTOPRAZOLE SODIUM 40 MG: 40 TABLET, DELAYED RELEASE ORAL at 08:37

## 2021-01-01 RX ADMIN — APIXABAN 5 MG: 5 TABLET, FILM COATED ORAL at 22:34

## 2021-01-01 RX ADMIN — BUPROPION HYDROCHLORIDE 150 MG: 150 TABLET, EXTENDED RELEASE ORAL at 12:33

## 2021-01-01 RX ADMIN — LEVOTHYROXINE SODIUM 25 MCG: 25 TABLET ORAL at 08:37

## 2021-01-01 RX ADMIN — Medication 81 MG: at 08:54

## 2021-01-01 RX ADMIN — DEXTROSE MONOHYDRATE 12.5 G: 25 INJECTION, SOLUTION INTRAVENOUS at 21:35

## 2021-01-01 RX ADMIN — Medication 1 TABLET: at 08:36

## 2021-01-01 RX ADMIN — SEVELAMER CARBONATE 1600 MG: 800 TABLET, FILM COATED ORAL at 08:40

## 2021-01-01 RX ADMIN — Medication 81 MG: at 08:41

## 2021-01-01 RX ADMIN — APIXABAN 5 MG: 5 TABLET, FILM COATED ORAL at 20:57

## 2021-01-01 RX ADMIN — Medication 81 MG: at 09:58

## 2021-01-01 RX ADMIN — SODIUM CHLORIDE, PRESERVATIVE FREE 10 ML: 5 INJECTION INTRAVENOUS at 09:30

## 2021-01-01 RX ADMIN — SEVELAMER CARBONATE 1600 MG: 800 TABLET, FILM COATED ORAL at 14:25

## 2021-01-01 RX ADMIN — SEVELAMER CARBONATE 1600 MG: 800 TABLET, FILM COATED ORAL at 13:35

## 2021-01-01 RX ADMIN — SEVELAMER CARBONATE 1600 MG: 800 TABLET, FILM COATED ORAL at 17:30

## 2021-01-01 RX ADMIN — SODIUM CHLORIDE, PRESERVATIVE FREE 10 ML: 5 INJECTION INTRAVENOUS at 08:00

## 2021-01-01 RX ADMIN — APIXABAN 5 MG: 5 TABLET, FILM COATED ORAL at 08:37

## 2021-01-01 RX ADMIN — FOLIC ACID 1 MG: 1 TABLET ORAL at 14:04

## 2021-01-01 RX ADMIN — INSULIN LISPRO 2 UNITS: 100 INJECTION, SOLUTION INTRAVENOUS; SUBCUTANEOUS at 17:30

## 2021-01-01 RX ADMIN — SEVELAMER CARBONATE 1600 MG: 800 TABLET, FILM COATED ORAL at 20:57

## 2021-01-01 RX ADMIN — SODIUM CHLORIDE, PRESERVATIVE FREE 10 ML: 5 INJECTION INTRAVENOUS at 09:18

## 2021-01-01 RX ADMIN — DULOXETINE HYDROCHLORIDE 60 MG: 30 CAPSULE, DELAYED RELEASE ORAL at 09:17

## 2021-01-01 RX ADMIN — Medication 1 TABLET: at 08:40

## 2021-01-01 RX ADMIN — FLUTICASONE PROPIONATE 2 SPRAY: 50 SPRAY, METERED NASAL at 09:19

## 2021-01-01 RX ADMIN — Medication 1 TABLET: at 14:04

## 2021-01-01 RX ADMIN — SODIUM CHLORIDE, PRESERVATIVE FREE 10 ML: 5 INJECTION INTRAVENOUS at 21:16

## 2021-01-01 RX ADMIN — DULOXETINE HYDROCHLORIDE 60 MG: 30 CAPSULE, DELAYED RELEASE ORAL at 14:28

## 2021-01-01 RX ADMIN — DEXTROSE MONOHYDRATE 12.5 G: 25 INJECTION, SOLUTION INTRAVENOUS at 06:17

## 2021-01-01 RX ADMIN — FLUTICASONE PROPIONATE 2 SPRAY: 50 SPRAY, METERED NASAL at 11:23

## 2021-01-01 RX ADMIN — Medication 81 MG: at 08:37

## 2021-01-01 RX ADMIN — SODIUM CHLORIDE, PRESERVATIVE FREE 10 ML: 5 INJECTION INTRAVENOUS at 07:48

## 2021-01-01 RX ADMIN — MIDODRINE HYDROCHLORIDE 10 MG: 5 TABLET ORAL at 15:36

## 2021-01-01 RX ADMIN — APIXABAN 5 MG: 5 TABLET, FILM COATED ORAL at 02:13

## 2021-01-01 RX ADMIN — DONEPEZIL HYDROCHLORIDE 5 MG: 5 TABLET, FILM COATED ORAL at 17:30

## 2021-01-01 RX ADMIN — INSULIN GLARGINE 25 UNITS: 100 INJECTION, SOLUTION SUBCUTANEOUS at 21:03

## 2021-01-01 RX ADMIN — DULOXETINE HYDROCHLORIDE 60 MG: 30 CAPSULE, DELAYED RELEASE ORAL at 08:54

## 2021-01-01 RX ADMIN — DONEPEZIL HYDROCHLORIDE 5 MG: 5 TABLET, FILM COATED ORAL at 18:01

## 2021-01-01 RX ADMIN — INSULIN LISPRO 1 UNITS: 100 INJECTION, SOLUTION INTRAVENOUS; SUBCUTANEOUS at 21:26

## 2021-01-01 RX ADMIN — PANTOPRAZOLE SODIUM 40 MG: 40 TABLET, DELAYED RELEASE ORAL at 08:00

## 2021-01-01 RX ADMIN — VANCOMYCIN HYDROCHLORIDE 1000 MG: 1 INJECTION, SOLUTION INTRAVENOUS at 11:37

## 2021-01-01 RX ADMIN — SEVELAMER CARBONATE 1600 MG: 800 TABLET, FILM COATED ORAL at 08:54

## 2021-01-01 RX ADMIN — SEVELAMER CARBONATE 1600 MG: 800 TABLET, FILM COATED ORAL at 15:35

## 2021-01-01 RX ADMIN — SEVELAMER CARBONATE 1600 MG: 800 TABLET, FILM COATED ORAL at 09:59

## 2021-01-01 RX ADMIN — CETIRIZINE HYDROCHLORIDE 5 MG: 10 TABLET ORAL at 08:41

## 2021-01-01 RX ADMIN — SEVELAMER CARBONATE 1600 MG: 800 TABLET, FILM COATED ORAL at 08:36

## 2021-01-01 RX ADMIN — SEVELAMER CARBONATE 1600 MG: 800 TABLET, FILM COATED ORAL at 12:48

## 2021-01-01 RX ADMIN — SEVELAMER CARBONATE 1600 MG: 800 TABLET, FILM COATED ORAL at 22:33

## 2021-01-01 RX ADMIN — FOLIC ACID 1 MG: 1 TABLET ORAL at 08:37

## 2021-01-01 RX ADMIN — DONEPEZIL HYDROCHLORIDE 5 MG: 5 TABLET, FILM COATED ORAL at 17:32

## 2021-01-01 RX ADMIN — DESMOPRESSIN ACETATE 40 MG: 0.2 TABLET ORAL at 02:13

## 2021-01-01 RX ADMIN — ONDANSETRON 4 MG: 2 INJECTION INTRAMUSCULAR; INTRAVENOUS at 10:00

## 2021-01-01 RX ADMIN — APIXABAN 5 MG: 5 TABLET, FILM COATED ORAL at 08:42

## 2021-01-01 RX ADMIN — LEVOTHYROXINE SODIUM 25 MCG: 25 TABLET ORAL at 07:49

## 2021-01-01 RX ADMIN — DESMOPRESSIN ACETATE 40 MG: 0.2 TABLET ORAL at 20:58

## 2021-01-01 RX ADMIN — DONEPEZIL HYDROCHLORIDE 5 MG: 5 TABLET, FILM COATED ORAL at 18:48

## 2021-01-01 RX ADMIN — DONEPEZIL HYDROCHLORIDE 5 MG: 5 TABLET, FILM COATED ORAL at 20:54

## 2021-01-01 RX ADMIN — DESMOPRESSIN ACETATE 40 MG: 0.2 TABLET ORAL at 23:14

## 2021-01-01 RX ADMIN — MIDODRINE HYDROCHLORIDE 10 MG: 5 TABLET ORAL at 13:35

## 2021-01-01 RX ADMIN — FLUTICASONE PROPIONATE 2 SPRAY: 50 SPRAY, METERED NASAL at 10:37

## 2021-01-01 RX ADMIN — INSULIN GLARGINE 25 UNITS: 100 INJECTION, SOLUTION SUBCUTANEOUS at 15:36

## 2021-01-01 RX ADMIN — PANTOPRAZOLE SODIUM 40 MG: 40 INJECTION, POWDER, LYOPHILIZED, FOR SOLUTION INTRAVENOUS at 15:37

## 2021-01-01 RX ADMIN — SEVELAMER CARBONATE 1600 MG: 800 TABLET, FILM COATED ORAL at 21:17

## 2021-01-01 RX ADMIN — SODIUM CHLORIDE, PRESERVATIVE FREE 10 ML: 5 INJECTION INTRAVENOUS at 11:24

## 2021-01-01 RX ADMIN — LEVOTHYROXINE SODIUM 25 MCG: 25 TABLET ORAL at 12:16

## 2021-01-01 RX ADMIN — INSULIN LISPRO 2 UNITS: 100 INJECTION, SOLUTION INTRAVENOUS; SUBCUTANEOUS at 17:32

## 2021-01-01 RX ADMIN — FOLIC ACID 1 MG: 1 TABLET ORAL at 09:58

## 2021-01-01 RX ADMIN — SEVELAMER CARBONATE 1600 MG: 800 TABLET, FILM COATED ORAL at 16:50

## 2021-01-01 RX ADMIN — APIXABAN 5 MG: 5 TABLET, FILM COATED ORAL at 20:54

## 2021-01-01 RX ADMIN — LEVOTHYROXINE SODIUM 25 MCG: 25 TABLET ORAL at 07:41

## 2021-01-01 RX ADMIN — APIXABAN 5 MG: 5 TABLET, FILM COATED ORAL at 23:14

## 2021-01-01 RX ADMIN — DULOXETINE HYDROCHLORIDE 60 MG: 30 CAPSULE, DELAYED RELEASE ORAL at 08:36

## 2021-01-01 RX ADMIN — SEVELAMER CARBONATE 1600 MG: 800 TABLET, FILM COATED ORAL at 20:54

## 2021-01-01 RX ADMIN — FOLIC ACID 1 MG: 1 TABLET ORAL at 14:28

## 2021-01-01 RX ADMIN — FOLIC ACID 1 MG: 1 TABLET ORAL at 08:40

## 2021-01-01 RX ADMIN — APIXABAN 5 MG: 5 TABLET, FILM COATED ORAL at 08:54

## 2021-01-01 RX ADMIN — PANTOPRAZOLE SODIUM 40 MG: 40 TABLET, DELAYED RELEASE ORAL at 07:41

## 2021-01-01 RX ADMIN — Medication 1 TABLET: at 08:55

## 2021-01-01 RX ADMIN — PANTOPRAZOLE SODIUM 40 MG: 40 INJECTION, POWDER, LYOPHILIZED, FOR SOLUTION INTRAVENOUS at 09:17

## 2021-01-01 RX ADMIN — CETIRIZINE HYDROCHLORIDE 5 MG: 10 TABLET ORAL at 08:54

## 2021-01-01 RX ADMIN — DEXTROSE 15 G: 15 GEL ORAL at 06:27

## 2021-01-01 RX ADMIN — DESMOPRESSIN ACETATE 40 MG: 0.2 TABLET ORAL at 21:18

## 2021-01-01 RX ADMIN — DULOXETINE HYDROCHLORIDE 60 MG: 30 CAPSULE, DELAYED RELEASE ORAL at 09:58

## 2021-01-01 RX ADMIN — DULOXETINE HYDROCHLORIDE 60 MG: 30 CAPSULE, DELAYED RELEASE ORAL at 08:40

## 2021-01-01 RX ADMIN — DEXTROSE 15 G: 15 GEL ORAL at 21:32

## 2021-01-01 RX ADMIN — SODIUM CHLORIDE, PRESERVATIVE FREE 10 ML: 5 INJECTION INTRAVENOUS at 21:30

## 2021-01-01 RX ADMIN — PANTOPRAZOLE SODIUM 40 MG: 40 INJECTION, POWDER, LYOPHILIZED, FOR SOLUTION INTRAVENOUS at 23:21

## 2021-01-01 RX ADMIN — VANCOMYCIN HYDROCHLORIDE 1000 MG: 1 INJECTION, SOLUTION INTRAVENOUS at 12:30

## 2021-01-01 RX ADMIN — DESMOPRESSIN ACETATE 40 MG: 0.2 TABLET ORAL at 22:34

## 2021-01-01 RX ADMIN — LEVOTHYROXINE SODIUM 25 MCG: 25 TABLET ORAL at 15:36

## 2021-01-01 RX ADMIN — SODIUM CHLORIDE, PRESERVATIVE FREE 10 ML: 5 INJECTION INTRAVENOUS at 20:58

## 2021-01-01 RX ADMIN — SEVELAMER CARBONATE 1600 MG: 800 TABLET, FILM COATED ORAL at 21:21

## 2021-01-01 RX ADMIN — INSULIN GLARGINE 25 UNITS: 100 INJECTION, SOLUTION SUBCUTANEOUS at 09:53

## 2021-01-01 RX ADMIN — MIDODRINE HYDROCHLORIDE 10 MG: 5 TABLET ORAL at 09:59

## 2021-01-01 RX ADMIN — FLUTICASONE PROPIONATE 2 SPRAY: 50 SPRAY, METERED NASAL at 14:06

## 2021-01-01 RX ADMIN — MIDODRINE HYDROCHLORIDE 10 MG: 5 TABLET ORAL at 20:57

## 2021-01-01 RX ADMIN — SEVELAMER CARBONATE 1600 MG: 800 TABLET, FILM COATED ORAL at 14:54

## 2021-01-01 RX ADMIN — PANTOPRAZOLE SODIUM 40 MG: 40 TABLET, DELAYED RELEASE ORAL at 09:59

## 2021-01-01 RX ADMIN — PANTOPRAZOLE SODIUM 40 MG: 40 INJECTION, POWDER, LYOPHILIZED, FOR SOLUTION INTRAVENOUS at 07:48

## 2021-01-01 RX ADMIN — BUPROPION HYDROCHLORIDE 150 MG: 150 TABLET, EXTENDED RELEASE ORAL at 08:54

## 2021-01-01 RX ADMIN — FOLIC ACID 1 MG: 1 TABLET ORAL at 08:54

## 2021-01-01 RX ADMIN — PIPERACILLIN SODIUM AND TAZOBACTAM SODIUM 4500 MG: 4; .5 INJECTION, POWDER, LYOPHILIZED, FOR SOLUTION INTRAVENOUS at 12:35

## 2021-01-01 RX ADMIN — LEVOTHYROXINE SODIUM 25 MCG: 25 TABLET ORAL at 08:54

## 2021-01-01 RX ADMIN — MIDODRINE HYDROCHLORIDE 10 MG: 5 TABLET ORAL at 21:21

## 2021-01-01 RX ADMIN — BUPROPION HYDROCHLORIDE 150 MG: 150 TABLET, EXTENDED RELEASE ORAL at 08:41

## 2021-01-01 RX ADMIN — DONEPEZIL HYDROCHLORIDE 5 MG: 5 TABLET, FILM COATED ORAL at 18:30

## 2021-01-01 RX ADMIN — PROPOFOL 300 MG: 10 INJECTION, EMULSION INTRAVENOUS at 10:17

## 2021-01-01 RX ADMIN — VANCOMYCIN HYDROCHLORIDE 1500 MG: 10 INJECTION, POWDER, LYOPHILIZED, FOR SOLUTION INTRAVENOUS at 02:13

## 2021-01-01 RX ADMIN — LEVOTHYROXINE SODIUM 25 MCG: 25 TABLET ORAL at 09:59

## 2021-01-01 RX ADMIN — SEVELAMER CARBONATE 1600 MG: 800 TABLET, FILM COATED ORAL at 14:05

## 2021-01-01 RX ADMIN — DULOXETINE HYDROCHLORIDE 60 MG: 30 CAPSULE, DELAYED RELEASE ORAL at 15:36

## 2021-01-01 RX ADMIN — MIDODRINE HYDROCHLORIDE 10 MG: 5 TABLET ORAL at 20:54

## 2021-01-01 RX ADMIN — SODIUM CHLORIDE, PRESERVATIVE FREE 10 ML: 5 INJECTION INTRAVENOUS at 08:41

## 2021-01-01 RX ADMIN — Medication 10 ML: at 02:14

## 2021-01-01 ASSESSMENT — PAIN SCALES - WONG BAKER

## 2021-01-01 ASSESSMENT — PAIN SCALES - GENERAL
PAINLEVEL_OUTOF10: 0

## 2021-01-01 ASSESSMENT — ENCOUNTER SYMPTOMS
CONSTIPATION: 0
SORE THROAT: 0
SHORTNESS OF BREATH: 0
VOMITING: 0
SORE THROAT: 0
VOMITING: 0
WHEEZING: 0
NAUSEA: 0
COUGH: 0
ABDOMINAL PAIN: 0
WHEEZING: 0
DIARRHEA: 0
EYE PAIN: 0
ABDOMINAL DISTENTION: 0
SHORTNESS OF BREATH: 0
CHEST TIGHTNESS: 0
SHORTNESS OF BREATH: 0
EYE PAIN: 0
CONSTIPATION: 0
COUGH: 0
BLOOD IN STOOL: 0
RHINORRHEA: 0
FACIAL SWELLING: 0
TROUBLE SWALLOWING: 0
SINUS PRESSURE: 0
SORE THROAT: 0
BACK PAIN: 0
SHORTNESS OF BREATH: 0
DIARRHEA: 0
SHORTNESS OF BREATH: 0
ABDOMINAL PAIN: 0
DIARRHEA: 0
TROUBLE SWALLOWING: 0
RHINORRHEA: 0
NAUSEA: 0
RHINORRHEA: 0
NAUSEA: 0
COLOR CHANGE: 1
DIARRHEA: 0
TACHYPNEA: 1
ABDOMINAL DISTENTION: 0
SINUS PRESSURE: 0
ABDOMINAL PAIN: 0
COLOR CHANGE: 1
WHEEZING: 0
BLOOD IN STOOL: 1
CONSTIPATION: 0
VOMITING: 0
ABDOMINAL PAIN: 0
APNEA: 0
ABDOMINAL PAIN: 0
COUGH: 0
VOMITING: 0
TROUBLE SWALLOWING: 0
SORE THROAT: 0
COLOR CHANGE: 0
SINUS PRESSURE: 0
BLOOD IN STOOL: 0
ABDOMINAL DISTENTION: 0
BACK PAIN: 0
ABDOMINAL PAIN: 0
TROUBLE SWALLOWING: 0
COLOR CHANGE: 1
VOMITING: 0
RHINORRHEA: 0

## 2021-01-01 ASSESSMENT — PAIN - FUNCTIONAL ASSESSMENT: PAIN_FUNCTIONAL_ASSESSMENT: 0-10

## 2021-02-11 NOTE — PROGRESS NOTES
Today's Date: 2/11/2021  Patient Name: Shelly Condon  Patient's age: 67 y. o., 1945        CC: the patient is a 67 y.o.  male is in the office for PAF. He is a nursing home resident since last 4 years. Morbidly obese and is wheel chair bound   Not getting any more PT  From cardiac standpoint, he is fairly stable, denies any chest pain, dyspnea at rest, no le edema, no palpitations or dizziness. He is back in atrial fibrillation but rate is controlled. Past Medical History:   has a past medical history of Acute blood loss anemia, Acute on chronic diastolic CHF (congestive heart failure) ([address] Anemia in chronic kidney disease (CKD), Arthritis, Bacteremia, Benign prostatic hyperplasia without lower urinary tract symptoms, Bleeding from wound, BMI 40.0-44.9, adult ([address] Cellulitis, Cellulitis of left lower extremity, Chronic cerebral ischemia, Closed fracture of forearm, Controlled type 2 diabetes mellitus with chronic kidney disease on chronic dialysis, with long-term current use of insulin ([address] Controlled type 2 diabetes mellitus with diabetic polyneuropathy, with long-term current use of insulin ([address] Decubital ulcer, Dementia ([address] Dialysis patient ([facility] Difficult intravenous access, Difficulty walking, DJD (degenerative joint disease) of knee, Elbow, forearm, and wrist, abrasion or friction burn, without mention of infection, Encephalopathy acute, Encounter regarding vascular access for dialysis for ESRD ([facility] ESRD (end stage renal disease) on dialysis ([address] Forgetfulness, H/O transesophageal echocardiography ([facility]) for monitoring, Hemodialysis patient ([facility] Hyperlipidemia, Hypertension, Intercritical gout, Joint pain, knee, Long-term insulin use ([address] Major depressive disorder with single episode, in partial remission ([address] Mobility impaired, Morbid obesity ([address] Muscle weakness, Obesity, Obstructive sleep apnea, HEATHER on CPAP, Paroxysmal atrial fibrillation ([address] Pneumonia, Respiratory failure ([address] S/P cardiac cath, Seborrhea, Severe aortic stenosis, Severe mitral valve stenosis, Severe sepsis (HCC), Skin rash, Stasis dermatitis, Thyroid disease, Traumatic amputation of thumb, Traumatic hemorrhagic shock ([address] Venous stasis dermatitis, Wears glasses, and Wheelchair bound. Past Surgical History:   has a past surgical history that includes Colonoscopy (11/19/09); Knee arthroscopy (Left, 09/17/99); Hand surgery (Left, 1990 (x10-12 surgeries)); Arm Surgery (Left, 1990); Cardiac catheterization; Nasal septum surgery; Sternotomy (3/18/16); Aortic valve replacement (03/18/2016); Mitral valve replacement (03/18/2016); other surgical history (3/18/16); other surgical history (3/18/16); other surgical history (Right, 01/09/2017); other surgical history (Right, 08/29/2017); pr ligatn angioaccess av fistula (Right, 8/29/2017); pr egd transoral biopsy single/multiple (N/A, 10/17/2017); central venous catheter (Right, 02/21/2018); Colonoscopy (N/A, 10/12/2018); other surgical history (04/05/2018); vascular surgery (12/06/2018); EXPLORATION OF WOUND OF EXTREMITY (Right, 3/10/2019); EXPLORATION OF WOUND OF EXTREMITY (Right, 3/8/2019); and IR NONTUNNELED VASCULAR CATHETER > 5 YEARS (8/5/2020). Home Medications:    Prior to Admission medications    Medication Sig Start Date End Date Taking? Authorizing Provider   RENVELA 800 MG tablet Take 2 tablets by mouth 3 times daily with meals only. 10/1/20  Yes Octavio Mcarthur, DO   Lidocaine 5 % CREA Apply topically Apply to fistula rt arm topically one time a day every Mon, Wed, Fri for dialysis.    Yes Historical Provider, MD   buPROPion (WELLBUTRIN SR) 150 MG extended release tablet Take 150 mg by mouth daily   Yes Historical Provider, MD   albuterol (PROVENTIL) (2.5 MG/3ML) 0.083% nebulizer solution Take 2.5 mg by nebulization every 8 hours as needed for Shortness of Breath   Yes Historical Provider, MD   benzonatate (TESSALON) 100 MG capsule Take 100 mg by mouth every 12 hours as needed for Cough   Yes Historical Provider, MD   ipratropium-albuterol (DUONEB) 0.5-2.5 (3) MG/3ML SOLN nebulizer solution Inhale 3 mLs into the lungs every 8 hours as needed for Shortness of Breath (or wheezing)   Yes Historical Provider, MD guaiFENesin 400 MG tablet Take 400 mg by mouth every 12 hours as needed (allergies)   Yes Historical Provider, MD   ondansetron (ZOFRAN) 4 MG tablet Take 4 mg by mouth every 6 hours as needed for Nausea or Vomiting   Yes Historical Provider, MD   tiZANidine (ZANAFLEX) 2 MG tablet Take 2 mg by mouth every 8 hours as needed (muscle pain)   Yes Historical Provider, MD   FIASP FLEXTOUCH 100 UNIT/ML SOPN Inject into the skin See Admin Instructions Per sliding scale: If 150-200 = 2 units; 201-250 = 4 units; 251-300 = 6 units; 301-350 = 8 units; 351-400 = 10 units; 401+ = 15 units, then call MD 3/4/20  Yes Historical Provider, MD   Elastic Bandages & Supports MISC 30-40 mmHg compression stockings to both lower legs, on every morning, off at bedtime. 11/19/19  Yes Antonieta Nieves MD   gabapentin (NEURONTIN) 300 MG capsule Take 300 mg by mouth daily. Yes Historical Provider, MD   ALPRAZolam Ayleen ) 0.5 MG tablet Take 0.5 mg by mouth 2 times daily. Yes Historical Provider, MD   DULoxetine (CYMBALTA) 60 MG extended release capsule Take 60 mg by mouth daily   Yes Historical Provider, MD   loratadine (CLARITIN) 10 MG capsule Take 10 mg by mouth daily (every other day).    Yes Historical Provider, MD   aspirin 81 MG tablet Take 1 tablet by mouth daily 4/8/19  Yes Kaitlyn Cadet MD   apixaban (ELIQUIS) 5 MG TABS tablet Take 1 tablet by mouth 2 times daily 4/8/19  Yes Kaitlyn Cadet MD   Multiple Vitamins-Minerals (THERAPEUTIC MULTIVITAMIN-MINERALS) tablet Take 1 tablet by mouth daily   Yes Historical Provider, MD   omeprazole (PRILOSEC) 20 MG delayed release capsule Take 20 mg by mouth nightly    Yes Historical Provider, MD   atorvastatin (LIPITOR) 40 MG tablet Take 1 tablet by mouth nightly 3/24/19  Yes Tyler Archer MD   midodrine (PROAMATINE) 10 MG tablet Take 1 tablet by mouth 3 times daily  Patient taking differently: Take 10 mg by mouth 3 times daily Hold if SBP over 150 3/19/19  Yes Tyler Archer MD folic acid (FOLVITE) 1 MG tablet Take 1 tablet by mouth daily 3/19/19  Yes Anna Kennedy MD   insulin glargine (LANTUS) 100 UNIT/ML injection vial Inject 25 Units into the skin 2 times daily 3/19/19  Yes Anna Kennedy MD   Misc. Devices [facility]) MISC Use as directed 12/12/18  Yes Octavio Mcarthur DO   Amino Acids-Protein Hydrolys (PRO-STAT PO) Take 30 mLs by mouth 3 times daily (Prostat SF) for wound healing. Yes Historical Provider, MD   donepezil (ARICEPT) 5 MG tablet Take 5 mg by mouth every evening 10/26/16  Yes Historical Provider, MD   levothyroxine (SYNTHROID) 25 MCG tablet Take 1 tablet by mouth Daily 10/26/16  Yes SHAMEKA Bahena CNP   fluticasone (FLONASE) 50 MCG/ACT nasal spray 2 sprays by Nasal route daily 10/24/16  Yes Octavio Mcarthur DO   magnesium hydroxide (MILK OF MAGNESIA) 400 MG/5ML suspension Take 30 mLs by mouth daily as needed for Constipation 3/29/16  Yes Jeri Tobar MD       Allergies:  Percocet [oxycodone-acetaminophen] and Ampicillin    Social History:   reports that he quit smoking about 41 years ago. His smoking use included cigarettes. He started smoking about 65 years ago. He has a 50.00 pack-year smoking history. He has never used smokeless tobacco. He reports that he does not drink alcohol or use drugs. Family History:    Family History   Problem Relation Age of Onset    Lung Cancer Mother     Diabetes Mother     Alzheimer's Disease Father     Diabetes Maternal Grandmother     High Blood Pressure Sister        REVIEW OF SYSTEMS:  CONSTITUTIONAL:NEGATIVE  HEENT:NEG  Cardiovascular: No chest pain, no dyspnea on exertion at rest (he is immobile and mostly bed bound) , No palpitations.  Lower extremity edema: No  RESPIRATORY: BAHENA  GASTROINTESTINAL:  negative  GENITOURINARY:  negative  INTEGUMENT:  negative  MUSCULOSKELETAL:  positive for  pain  NEUROLOGICAL:  negative    PHYSICAL EXAM: BP (!) 146/86   Pulse 66   Resp 14   Wt 217 lb 3.2 oz (98.5 kg)   BMI 33.03 kg/m²    HEENT: PERRL, no cervical lymphadenopathy. No masses palpable. Cardiovascular:  · The apical impulse is not displaced  · Heart  Sounds: irregularly irregular heart rate   · Jugular venous pulsation Normal  · The carotid upstroke is normal  · Peripheral pulses are symmetrical and full  Respiratory: Good respiratory effort. On auscultation: clear to auscultation bilaterally, no rales. Abdomen:  · No masses or tenderness  · Bowel sounds present  Extremities:  ·  No Cyanosis or Clubbing  ·  Lower extremity edema: No  Skin: Warm and dry    Cardiac data:    EKG 2/11/21: Atrial fibrillation, anterior fascicular block. Labs:     CBC: No results for input(s): WBC, HGB, HCT, PLT in the last 72 hours. BMP: No results for input(s): NA, K, CO2, BUN, CREATININE, LABGLOM, GLUCOSE in the last 72 hours. PT/INR: No results for input(s): PROTIME, INR in the last 72 hours.   FASTING LIPID PANEL:  Lab Results   Component Value Date    HDL 54 04/27/2015    TRIG 100 03/22/2016       Assessment:    · PAF, currently in a-fib and rate controlled, asymptomatic    · Hx of AVR and MV REPAIR, closure of ASD/foramen ovale with Dacron patch 3/2016    · ESRD ON HD  · DM II  · MORBID OBESITY  · HEATHER        Patient Active Problem List   Diagnosis    Traumatic amputation of thumb, left, sequela ([facility]    Essential hypertension    Mixed hyperlipidemia    Obstructive sleep apnea    Class 2 severe obesity with serious comorbidity and body mass index (BMI) of 37.0 to 37.9 in [facility])    Benign prostatic hyperplasia without lower urinary tract symptoms    Venous stasis dermatitis    Severe aortic stenosis    Severe mitral valve stenosis    Anemia in chronic kidney disease, on chronic dialysis ([address]    Dialysis patient ([address]    Dementia ([address]    Major depressive disorder with single episode, in partial remission ([address]  Chronic cerebral ischemia    ESRD (end stage renal disease) on dialysis ([facility]    Long-term insulin use ([facility])    BMI 40.0-44.9, adult ([facility])    Controlled type 2 diabetes mellitus with chronic kidney disease on chronic dialysis, with long-term current use of insulin ([facility])    Controlled type 2 diabetes mellitus with diabetic polyneuropathy, with long-term current use of insulin ([facility])    Dermatitis    Hematoma of right lower extremity    Hematoma of groin    Accidental fall from wheelchair    Open wound    Hematoma of right thigh    Wound of right leg    Open wound of right lower extremity    Complication of arteriovenous dialysis fistula    AVF (arteriovenous fistula) ([facility])       Recommendations:  Continue anticoagulation with eliquis 5 mg bid   On asa and lipitor  Volume control per HD   Discussed medication use and possible side effects. Answered all questions and concerns.       RTC 6  MONTHS or earlier if needed       Sonia Champion [id number] Cardiology Consult           [phone]

## 2021-02-16 NOTE — PROGRESS NOTES
02/16/21  Zada Bread  1945    Patient Resident of [facility]    Chief Complaint  1. Functional diarrhea    2. Vascular dementia without behavioral disturbance (HCC)        HPI:  78-year-old patient with progressive dementia being seen at the request of the nursing staff due to persistent diarrhea. Previous stool cultures per staff with no acute findings. States this has been months to a year. States always has had loose stools. Asking for medication to help bulk stools. No abdominal pain. No nausea or vomiting.   Continues to eat and drink without difficulty      Allergies   Allergen Reactions    Percocet [Oxycodone-Acetaminophen] Itching and Rash     Also causes shaking    Ampicillin Rash       Past Medical History:   Diagnosis Date    Acute blood loss anemia 3/11/2019    Acute on chronic diastolic CHF (congestive heart failure) ([address] 12/2/2019    Anemia in chronic kidney disease (CKD) 4/27/2016    Arthritis     Bacteremia 11/11/2016    Benign prostatic hyperplasia without lower urinary tract symptoms     Bleeding from wound 4/2/2019    BMI 40.0-44.9, adult ([address] 3/19/2018    Cellulitis 3/21/2019    Cellulitis of left lower extremity     Chronic cerebral ischemia 1/3/2017    Closed fracture of forearm 8/13/2013    Broken lft forearm     Controlled type 2 diabetes mellitus with chronic kidney disease on chronic dialysis, with long-term current use of insulin ([address]     Controlled type 2 diabetes mellitus with diabetic polyneuropathy, with long-term current use of insulin ([address] 10/7/2018    Decubital ulcer     NON OPEN BUTTOCKS    Dementia ([address]     memory problems    Dialysis patient ([address] 01/03/2017    Goes Tue, Thurs, Sat in [place]    Difficult intravenous access     HAS NEEDED PICC TEAM IN THE PAST    Difficulty walking     WHEELCHAIR BOUND-PIVOTS WITH ASSIST O F 2    DJD (degenerative joint disease) of knee  Elbow, forearm, and wrist, abrasion or friction burn, without mention of infection 8/13/2013    Abrasions lft forearm     Encephalopathy acute 4/27/2016    Encounter regarding vascular access for dialysis for ESRD ([address] 2/2/2017    ESRD (end stage renal disease) on dialysis ([address] 1/17/2017    Forgetfulness     HAS SOME SHORT TERM MEMORY LOSS, QUYEN-WIFE IS LEGAL GUARDIAN    H/O transesophageal echocardiography (AMADEO) for monitoring     Hemodialysis patient ([address] 11/11/2016    [place]ues-thurs-[place].  TUNNELED CATHETER RT UPPER CHEST    Hyperlipidemia 1990    on Meds    Hypertension 1990    on Meds    Intercritical gout     on Meds    Joint pain, knee 01/13/2017    baldo knee synvisc-1 injections    Long-term insulin use ([address] 1/17/2017    Major depressive disorder with single episode, in partial remission ([address] 1/3/2017    Mobility impaired     Morbid obesity ([address]     Muscle weakness     GRNERALIZED    Obesity     Obstructive sleep apnea     HEATHER on CPAP     On CPAP-TO BRING DOS    Paroxysmal atrial fibrillation ([address] 9/6/2017    Pneumonia 12/2/2019    Respiratory failure ([address] 03/2016    with open heart surgery, trached x 5 months    S/P cardiac cath     Seborrhea     Severe aortic stenosis 3/9/2016    Severe mitral valve stenosis 3/9/2016    Severe sepsis ([address] 4/3/2016    Skin rash     ABD FOLDS    Stasis dermatitis     Thyroid disease     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 lft thumb     Traumatic hemorrhagic shock ([address] 3/13/2019    Venous stasis dermatitis     Wears glasses     Wheelchair bound     pivots with 2 assists       Past Surgical History:   Procedure Laterality Date    AORTIC VALVE REPLACEMENT  03/18/2016    bioprosthetic    ARM SURGERY Left 1990    CARDIAC CATHETERIZATION      CENTRAL VENOUS CATHETER Right 02/21/2018    DIALYSIS CATHETER Dr Aleksandr Fonseca COLONOSCOPY  11/19/09    COLONOSCOPY N/A 10/12/2018 COLONOSCOPY WITH BIOPSY performed by Torres Patiño DO at [address] [facility] [address] Right 3/10/2019    WASHOUT RIGHT LOWER EXTREMITY WITH WOUND VAC EXCHANGE performed by Ilya Alonzo MD at [address] [facility] [address] Right 3/8/2019    RIGHT LOWER EXTREMITY EXPLORATION, HEMATOMA EVACUATION, WOUND VAC PLACEMENT performed by Ilya Alonzo MD at [address] (x10-12 surgeries)    several surgeries on left arm    IR NONTUNNELED VASCULAR CATHETER  8/5/2020    IR NONTUNNELED VASCULAR CATHETER 8/5/2020 Gila Sahu MD [facility]    KNEE ARTHROSCOPY Left 09/17/99    MITRAL VALVE REPLACEMENT  03/18/2016    bioprosthetic     NASAL SEPTUM SURGERY      OTHER SURGICAL HISTORY  3/18/16    Aortic root Enlargement    OTHER SURGICAL HISTORY  3/18/16    ASD Closure    OTHER SURGICAL HISTORY Right 01/09/2017    AV fistula creation, wrist    OTHER SURGICAL HISTORY Right 08/29/2017    ligation of collateral branch of av fistula right wrist    OTHER SURGICAL HISTORY  04/05/2018    FISTULAGRAM WITH DR. Spencer Lerner    [place] EGD TRANSORAL BIOPSY SINGLE/MULTIPLE N/A 10/17/2017    EGD BIOPSY performed by Oliva Boeck, MD at [address] ANGIOACCESS AV FISTULA Right 8/29/2017     RIGHT  LOWER ARM AV FISTULA  LIGATION OF AQUIRED BRANCHES X2 performed by Meggan Miramontes DO at [address]  3/18/16    median    VASCULAR SURGERY  12/06/2018    Right arm fistulagram,  PTA cephalic vein stenosis  /  DR Chapito Son       Medications as per [facility] Chart /reviewed     Social History     Socioeconomic History    Marital status:      Spouse name: Rolando Chung Number of children: 2    Years of education: Not on file    Highest education level: Not on file   Occupational History    Occupation: Retired      Employer: [address] resource strain: Not on file  Food insecurity     Worry: Not on file     Inability: Not on file    Transportation needs     Medical: Not on file     Non-medical: Not on file   Tobacco Use    Smoking status: Former Smoker     Packs/day: 2.00     Years: 25.00     Pack years: 50.00     Types: Cigarettes     Start date: 3/17/1955     Quit date: 1980     Years since quittin.1    Smokeless tobacco: Never Used   Substance and Sexual Activity    Alcohol use: No     Alcohol/week: 0.0 standard drinks     Comment: 1-2 drinks per year    Drug use: No    Sexual activity: Never   Lifestyle    Physical activity     Days per week: Not on file     Minutes per session: Not on file    Stress: Not on file   Relationships    Social connections     Talks on phone: Not on file     Gets together: Not on file     Attends Jainism service: Not on file     Active member of club or organization: Not on file     Attends meetings of clubs or organizations: Not on file     Relationship status: Not on file    Intimate partner violence     Fear of current or ex partner: Not on file     Emotionally abused: Not on file     Physically abused: Not on file     Forced sexual activity: Not on file   Other Topics Concern    Not on file   Social History Narrative    Not on file       Review of Systems   Unable to perform ROS: Dementia       Physical Exam  Vitals signs and nursing note reviewed. Constitutional:       General: He is not in acute distress. Appearance: He is well-developed. He is not diaphoretic. HENT:      Head: Normocephalic and atraumatic. Right Ear: External ear normal.      Left Ear: External ear normal.   Eyes:      General: Lids are normal.         Right eye: No discharge. Left eye: No discharge. Conjunctiva/sclera: Conjunctivae normal.      Pupils: Pupils are equal, round, and reactive to light. Neck:      Musculoskeletal: Normal range of motion and neck supple. No edema or erythema. Trachea: No tracheal deviation. Cardiovascular:      Rate and Rhythm: Normal rate and regular rhythm. Heart sounds: Normal heart sounds. No murmur. No friction rub. No gallop. Pulmonary:      Effort: Pulmonary effort is normal. No accessory muscle usage or respiratory distress. Breath sounds: Normal breath sounds. No wheezing or rales. Abdominal:      General: Bowel sounds are normal. There is no distension. Palpations: Abdomen is soft. Abdomen is not rigid. Comments: Obese   Musculoskeletal: Normal range of motion. Skin:     General: Skin is warm and dry. Capillary Refill: Capillary refill takes less than 2 seconds. Coloration: Skin is not pale. Findings: No erythema or rash. Neurological:      Mental Status: He is alert and oriented to person, place, and time. Cranial Nerves: No cranial nerve deficit. Sensory: No sensory deficit. Coordination: Coordination normal.   Psychiatric:         Behavior: Behavior normal.         Thought Content: Thought content normal.         Judgment: Judgment normal.         Vital Signs: Temperature 97.5 °F, blood pressure 134/80, pulse 74, respirations 16, SPO2 91% on room air    Assessment:  1. Functional diarrhea  No acute weight loss. Per staff negative stool cultures. We will add fiber daily. Notify if persistent loose stools    2. Vascular dementia without behavioral disturbance (HCC)  Stable at present      Plan:  As noted above. Follow up for routine visit. Call sooner with concerns prior.     Electronically signed by SHAMEKA Lim CNP on 2/16/2021 at 4:03 PM

## 2021-02-23 PROBLEM — N25.81 SECONDARY HYPERPARATHYROIDISM OF RENAL ORIGIN (HCC): Status: ACTIVE | Noted: 2021-01-01

## 2021-02-23 PROBLEM — I77.0 AVF (ARTERIOVENOUS FISTULA) (HCC): Status: RESOLVED | Noted: 2020-08-05 | Resolved: 2021-01-01

## 2021-02-23 NOTE — PROGRESS NOTES
DR. Edgar Sutherland - TELEHEALTH NURSING HOME VISIT    DATE OF SERVICE: 1/24/21    NURSING HOME: [facility]    CHIEF COMPLAINT/HISTORY OF CHIEF COMPLAINT: This patient is being seen via telehealth for ongoing evaluation and management of his diabetes mellitus type 2 with nephropathy and neuropathy, end-stage renal disease on hemodialysis with anemia, depression, severe aortic and mitral stenosis, hypertension, hyperlipidemia, obstructive sleep apnea, and morbid obesity. His diabetes has been fairly well controlled lately. He is on Eliquis for atrial fibrillation. He is on Zoloft for depression. He continues to do well with his dialysis, which he gets for end-stage renal disease. There are no new complaints. ALLERGIES:   Allergies   Allergen Reactions    Percocet [Oxycodone-Acetaminophen] Itching and Rash     Also causes shaking    Ampicillin Rash        MEDICATIONS: As noted on the [facility] MAR, referenced and incorporated herein.     PAST MEDICAL HISTORY:   Past Medical History:   Diagnosis Date    Acute blood loss anemia 3/11/2019    Acute on chronic diastolic CHF (congestive heart failure) ([address] 12/2/2019    Anemia in chronic kidney disease (CKD) 4/27/2016    Arthritis     Bacteremia 11/11/2016    Benign prostatic hyperplasia without lower urinary tract symptoms     Bleeding from wound 4/2/2019    BMI 40.0-44.9, adult ([address] 3/19/2018    Cellulitis 3/21/2019    Cellulitis of left lower extremity     Chronic cerebral ischemia 1/3/2017    Closed fracture of forearm 8/13/2013    Broken lft forearm     Controlled type 2 diabetes mellitus with chronic kidney disease on chronic dialysis, with long-term current use of insulin ([address]     Controlled type 2 diabetes mellitus with diabetic polyneuropathy, with long-term current use of insulin ([address] 10/7/2018    Decubital ulcer     NON OPEN BUTTOCKS    Dementia ([address]     memory problems    Dialysis patient ([address] 01/03/2017    Goes [facility], Sat in [place] Difficult intravenous access     HAS NEEDED PICC TEAM IN THE PAST    Difficulty walking     WHEELCHAIR BOUND-PIVOTS WITH ASSIST O F 2    DJD (degenerative joint disease) of knee     Elbow, forearm, and wrist, abrasion or friction burn, without mention of infection 8/13/2013    Abrasions lft forearm     Encephalopathy acute 4/27/2016    Encounter regarding vascular access for dialysis for ESRD ([address] 2/2/2017    ESRD (end stage renal disease) on dialysis ([address] 1/17/2017    Forgetfulness     HAS SOME SHORT TERM MEMORY LOSS, QUYEN-WIFE IS LEGAL GUARDIAN    H/O transesophageal echocardiography ([facility]ADEO) for monitoring     Hemodialysis patient ([address] 11/11/2016    [place].  TUNNELED CATHETER RT UPPER CHEST    Hyperlipidemia 1990    on Meds    Hypertension 1990    on Meds    Intercritical gout     on Meds    Joint pain, knee 01/13/2017    baldo knee synvisc-1 injections    Long-term insulin use ([address] 1/17/2017    Major depressive disorder with single episode, in partial remission ([address] 1/3/2017    Mobility impaired     Morbid obesity ([address]     Muscle weakness     GRNERALIZED    Obesity     Obstructive sleep apnea     HEATHER on CPAP     On CPAP-TO BRING DOS    Paroxysmal atrial fibrillation ([address] 9/6/2017    Pneumonia 12/2/2019    Respiratory failure ([address] 03/2016    with open heart surgery, trached x 5 months    S/P cardiac cath     Seborrhea     Severe aortic stenosis 3/9/2016    Severe mitral valve stenosis 3/9/2016    Severe sepsis ([address] 4/3/2016    Skin rash     ABD FOLDS    Stasis dermatitis     Thyroid disease     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 lft thumb     Traumatic hemorrhagic shock ([address] 3/13/2019    Venous stasis dermatitis     Wears glasses     Wheelchair bound     pivots with 2 assists       PAST SURGICAL HISTORY:   Past Surgical History:   Procedure Laterality Date    AORTIC VALVE REPLACEMENT  03/18/2016 bioprosthetic    ARM SURGERY Left 1990    CARDIAC CATHETERIZATION      CENTRAL VENOUS CATHETER Right 02/21/2018    DIALYSIS CATHETER Dr David Santoro COLONOSCOPY  11/19/09    COLONOSCOPY N/A 10/12/2018    COLONOSCOPY WITH BIOPSY performed by Milton Henson DO at [address] [facility] Right 3/10/2019    WASHOUT RIGHT LOWER EXTREMITY WITH WOUND VAC EXCHANGE performed by Samantha Daley MD at [address] [facility] Right 3/8/2019    RIGHT LOWER EXTREMITY EXPLORATION, HEMATOMA EVACUATION, WOUND VAC PLACEMENT performed by Samantha Daley MD at [address] (x10-12 surgeries)    several surgeries on left arm    IR NONTUNNELED VASCULAR CATHETER  8/5/2020    IR NONTUNNELED VASCULAR CATHETER 8/5/2020 Diane Allen MD [facility]    KNEE ARTHROSCOPY Left 09/17/99    MITRAL VALVE REPLACEMENT  03/18/2016    bioprosthetic     NASAL SEPTUM SURGERY      OTHER SURGICAL HISTORY  3/18/16    Aortic root Enlargement    OTHER SURGICAL HISTORY  3/18/16    ASD Closure    OTHER SURGICAL HISTORY Right 01/09/2017    AV fistula creation, wrist    OTHER SURGICAL HISTORY Right 08/29/2017    ligation of collateral branch of av fistula right wrist    OTHER SURGICAL HISTORY  04/05/2018    FISTULAGRAM WITH DR. Jatin Daniel    [place] EGD TRANSORAL BIOPSY SINGLE/MULTIPLE N/A 10/17/2017    EGD BIOPSY performed by Sonya Bloom MD at [address] ANGIOACCESS AV FISTULA Right 8/29/2017     RIGHT  LOWER ARM AV FISTULA  LIGATION OF AQUIRED BRANCHES X2 performed by Vikki Amezcua DO at [address]  3/18/16    median    VASCULAR SURGERY  12/06/2018    Right arm fistulagram,  PTA cephalic vein stenosis  /  DR Walda Goldmann       SOCIAL HISTORY:     Tobacco:   Social History     Tobacco Use   Smoking Status Former Smoker    Packs/day: 2.00    Years: 25.00    Pack years: 50.00    Types: Cigarettes    Start date: 3/17/1956 3. Long-term insulin use ([address]     4. ESRD (end stage renal disease) on dialysis ([address]     5. Dialysis patient ([address]     6. Secondary hyperparathyroidism of renal origin ([address]     7. Anemia in chronic kidney disease, on chronic dialysis ([address]     8. Paroxysmal atrial fibrillation (HCC)     9. Severe aortic stenosis     10. Severe mitral valve stenosis     11. Obstructive sleep apnea     12. Major depressive disorder with single episode, in partial remission ([address]     13. Essential hypertension     14. Mixed hyperlipidemia     15. Traumatic amputation of thumb, left, sequela ([address]     16. Class 1 obesity with serious comorbidity and body mass index (BMI) of 33.0 to 33.9 in adult, unspecified obesity type           PLAN:    1. Continue current treatment  2. Nursing home record reviewed and updates summarized and entered into electronic record  3. Nursing home blood sugar logs and diabetic medication administration reviewed. No changes. 4. See nursing home orders and MAR. Pursuant to the emergency declaration under the [facility] waiver authority and the [employer] Act, this TelehHealth visit was conducted, with patient's consent, to reduce the patient's risk of exposure to COVID-19 and provide continuity of care for an established patient. Services were provided through a video synchronous discussion virtually (using [email or web address] me) to substitute for in-person clinic visit. The originating site was the nursing home and the distant site was the provider's office. Patient's identity was verified via name and date of birth.         Electronically signed by Walter Reyez DO on 2/23/2021 at 1:57 AM  Internal Medicine

## 2021-03-05 NOTE — PROGRESS NOTES
03/02/21  Mega Go  1945    Patient Resident of [facility]    Chief Complaint  1. Hypoglycemia    2. Controlled type 2 diabetes mellitus with chronic kidney disease on chronic dialysis, with long-term current use of insulin ([address]    3. ESRD (end stage renal disease) on dialysis [facility])        HPI:  76-year-old patient being seen at the request of the nursing staff due to episode of blood sugar dropping down to 48. Glucagon given when blood sugar went up to 60 patient was alert enough to eat. Blood sugar went up to 74. States he is feeling better. Denies any concerns at present. He has been afebrile. No recent medication adjustments. Continues to eat normal dietary intake. Review of blood sugar showed no further hypoglycemic episodes. He continues on dialysis 3 days a week without difficulty.     Allergies   Allergen Reactions    Percocet [Oxycodone-Acetaminophen] Itching and Rash     Also causes shaking    Ampicillin Rash       Past Medical History:   Diagnosis Date    Acute blood loss anemia 3/11/2019    Acute on chronic diastolic CHF (congestive heart failure) ([address] 12/2/2019    Anemia in chronic kidney disease (CKD) 4/27/2016    Arthritis     Bacteremia 11/11/2016    Benign prostatic hyperplasia without lower urinary tract symptoms     Bleeding from wound 4/2/2019    BMI 40.0-44.9, adult ([address] 3/19/2018    Cellulitis 3/21/2019    Cellulitis of left lower extremity     Chronic cerebral ischemia 1/3/2017    Closed fracture of forearm 8/13/2013    Broken lft forearm     Controlled type 2 diabetes mellitus with chronic kidney disease on chronic dialysis, with long-term current use of insulin ([address]     Controlled type 2 diabetes mellitus with diabetic polyneuropathy, with long-term current use of insulin ([address] 10/7/2018    Decubital ulcer     NON OPEN BUTTOCKS    Dementia ([address]     memory problems    Dialysis patient ([address] 01/03/2017    Goes Jayden Leggett, Sat in [place]  Difficult intravenous access     HAS NEEDED PICC TEAM IN THE PAST    Difficulty walking     WHEELCHAIR BOUND-PIVOTS WITH ASSIST O F 2    DJD (degenerative joint disease) of knee     Elbow, forearm, and wrist, abrasion or friction burn, without mention of infection 8/13/2013    Abrasions lft forearm     Encephalopathy acute 4/27/2016    Encounter regarding vascular access for dialysis for ESRD ([address] 2/2/2017    ESRD (end stage renal disease) on dialysis ([address] 1/17/2017    Forgetfulness     HAS SOME SHORT TERM MEMORY LOSS, QUYEN-WIFE IS LEGAL GUARDIAN    H/O transesophageal echocardiography (AMADEO) for monitoring     Hemodialysis patient ([address] 11/11/2016    tues-thurs-sat [place]---FRESEMIUS.  TUNNELED CATHETER RT UPPER CHEST    Hyperlipidemia 1990    on Meds    Hypertension 1990    on Meds    Intercritical gout     on Meds    Joint pain, knee 01/13/2017    baldo knee synvisc-1 injections    Long-term insulin use ([address] 1/17/2017    Major depressive disorder with single episode, in partial remission ([address] 1/3/2017    Mobility impaired     Morbid obesity ([address]     Muscle weakness     GRNERALIZED    Obesity     Obstructive sleep apnea     HEATHER on CPAP     On CPAP-TO BRING DOS    Paroxysmal atrial fibrillation ([address] 9/6/2017    Pneumonia 12/2/2019    Respiratory failure ([address] 03/2016    with open heart surgery, trached x 5 months    S/P cardiac cath     Seborrhea     Severe aortic stenosis 3/9/2016    Severe mitral valve stenosis 3/9/2016    Severe sepsis ([address] 4/3/2016    Skin rash     ABD FOLDS    Stasis dermatitis     Thyroid disease     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 lft thumb     Traumatic hemorrhagic shock ([address] 3/13/2019    Venous stasis dermatitis     Wears glasses     Wheelchair bound     pivots with 2 assists       Past Surgical History:   Procedure Laterality Date    AORTIC VALVE REPLACEMENT  03/18/2016    bioprosthetic    ARM SURGERY Left 1990  CARDIAC CATHETERIZATION      CENTRAL VENOUS CATHETER Right 02/21/2018    DIALYSIS CATHETER Dr Precious Baugh    COLONOSCOPY  11/19/09    COLONOSCOPY N/A 10/12/2018    COLONOSCOPY WITH BIOPSY performed by Fabrice Interiano DO at [address] [facility] Right 3/10/2019    WASHOUT RIGHT LOWER EXTREMITY WITH WOUND VAC EXCHANGE performed by Merlene Dewitt MD at [address] [facility] Right 3/8/2019    RIGHT LOWER EXTREMITY EXPLORATION, HEMATOMA EVACUATION, WOUND VAC PLACEMENT performed by Merlene Dewitt MD at [address] (x10-12 surgeries)    several surgeries on left arm    IR NONTUNNELED VASCULAR CATHETER  8/5/2020    IR NONTUNNELED VASCULAR CATHETER 8/5/2020 Hannah Louis MD [facility] SPECIAL PROCEDURES    KNEE ARTHROSCOPY Left 09/17/99    MITRAL VALVE REPLACEMENT  03/18/2016    bioprosthetic     NASAL SEPTUM SURGERY      OTHER SURGICAL HISTORY  3/18/16    Aortic root Enlargement    OTHER SURGICAL HISTORY  3/18/16    ASD Closure    OTHER SURGICAL HISTORY Right 01/09/2017    AV fistula creation, wrist    OTHER SURGICAL HISTORY Right 08/29/2017    ligation of collateral branch of av fistula right wrist    OTHER SURGICAL HISTORY  04/05/2018    FISTULAGRAM WITH DR. Dana Vieira    AL EGD TRANSORAL BIOPSY SINGLE/MULTIPLE N/A 10/17/2017    EGD BIOPSY performed by Aman Gonzalez MD at [address] ANGIOACCESS AV FISTULA Right 8/29/2017     RIGHT  LOWER ARM AV FISTULA  LIGATION OF AQUIRED BRANCHES X2 performed by Holly Cranker, DO at [address]  3/18/16    median    VASCULAR SURGERY  12/06/2018    Right arm fistulagram,  PTA cephalic vein stenosis  /  DR Baron Cooley       Medications as per [facility] Chart Jennifer Farooq     Social History     Socioeconomic History    Marital status:      Spouse name: Shu Bond Number of children: 2    Years of education: Not on file  Highest education level: Not on file   Occupational History    Occupation: Retired      Employer: [address] resource strain: Not on file    Food insecurity     Worry: Not on file     Inability: Not on file   [employer] needs     Medical: Not on file     Non-medical: Not on file   Tobacco Use    Smoking status: Former Smoker     Packs/day: 2.00     Years: 25.00     Pack years: 50.00     Types: Cigarettes     Start date: 3/17/1955     Quit date: 1980     Years since quittin.2    Smokeless tobacco: Never Used   Substance and Sexual Activity    Alcohol use: No     Alcohol/week: 0.0 standard drinks     Comment: 1-2 drinks per year    Drug use: No    Sexual activity: Never   Lifestyle    Physical activity     Days per week: Not on file     Minutes per session: Not on file    Stress: Not on file   Relationships    Social connections     Talks on phone: Not on file     Gets together: Not on file     Attends Faith service: Not on file     Active member of club or organization: Not on file     Attends meetings of clubs or organizations: Not on file     Relationship status: Not on file    Intimate partner violence     Fear of current or ex partner: Not on file     Emotionally abused: Not on file     Physically abused: Not on file     Forced sexual activity: Not on file   Other Topics Concern    Not on file   Social History Narrative    Not on file       Review of Systems   Constitutional: Negative for activity change, appetite change, chills, fatigue, fever and unexpected weight change. HENT: Negative for congestion, dental problem, ear discharge, ear pain, facial swelling, hearing loss, postnasal drip, rhinorrhea, sinus pressure, sore throat and trouble swallowing. Eyes: Negative for pain and visual disturbance. Respiratory: Negative for cough, chest tightness, shortness of breath and wheezing. Cardiovascular: Negative for chest pain, palpitations and leg swelling. Gastrointestinal: Negative for abdominal pain, blood in stool, constipation, diarrhea, nausea and vomiting. Endocrine: Negative for cold intolerance, heat intolerance and polyuria. Genitourinary: Negative for difficulty urinating. Musculoskeletal: Negative for arthralgias, gait problem, myalgias, neck pain and neck stiffness. Skin: Negative for color change, rash and wound. Neurological: Negative for dizziness, tremors, seizures, weakness, light-headedness, numbness and headaches. Psychiatric/Behavioral: Negative for confusion and hallucinations. The patient is not nervous/anxious. Physical Exam  Vitals signs and nursing note reviewed. Constitutional:       General: He is not in acute distress. Appearance: He is well-developed. He is not diaphoretic. HENT:      Head: Normocephalic and atraumatic. Eyes:      General:         Right eye: No discharge. Left eye: No discharge. Neck:      Trachea: No tracheal deviation. Cardiovascular:      Rate and Rhythm: Normal rate. Pulmonary:      Effort: Pulmonary effort is normal. No respiratory distress. Skin:     General: Skin is warm and dry. Coloration: Skin is not pale. Neurological:      Mental Status: He is alert and oriented to person, place, and time. Cranial Nerves: No cranial nerve deficit. Sensory: No sensory deficit. Psychiatric:         Behavior: Behavior normal.         Thought Content: Thought content normal.         Judgment: Judgment normal.         Vital Signs: Temperature 97.2 °F, blood pressure 128/60, pulse 92, respirations 18, SPO2 RA      Assessment:  1.  Hypoglycemia Time 1 episode. No further episodes noted in chart. We will have him continue on his Lantus 25 units twice daily. Protein snack at at bedtime. Notify of any reoccurrence. Is noted he has no A1c's in place we will have him schedule every 3 months hemoglobin A1c's    2. Controlled type 2 diabetes mellitus with chronic kidney disease on chronic dialysis, with long-term current use of insulin ([facility]  As noted above    3. ESRD (end stage renal disease) on dialysis ([facility]  Stable on dialysis      Plan:  As noted above. Follow up for routine visit. Call sooner with concerns prior.     Electronically signed by SHAMEKA Kinsey CNP on 3/5/2021 at 11:28 AM

## 2021-03-11 NOTE — PROGRESS NOTES
([address] 10/7/2018    Decubital ulcer     NON OPEN BUTTOCKS    Dementia ([address]     memory problems    Dialysis patient ([address] 01/03/2017    Goes Tue, Jayden, Sat in [place] Difficult intravenous access     HAS NEEDED PICC TEAM IN THE PAST    Difficulty walking     WHEELCHAIR BOUND-PIVOTS WITH ASSIST O F 2    DJD (degenerative joint disease) of knee     Elbow, forearm, and wrist, abrasion or friction burn, without mention of infection 8/13/2013    Abrasions lft forearm     Encephalopathy acute 4/27/2016    Encounter regarding vascular access for dialysis for ESRD ([address] 2/2/2017    ESRD (end stage renal disease) on dialysis ([address] 1/17/2017    Forgetfulness     HAS SOME SHORT TERM MEMORY LOSS, QUYEN-WIFE IS LEGAL GUARDIAN    H/O transesophageal echocardiography (AMADEO) for monitoring     Hemodialysis patient ([address] 11/11/2016    tues[place]thurs-[place]---FRESEMIUS.  TUNNELED CATHETER RT UPPER CHEST    Hyperlipidemia 1990    on Meds    Hypertension 1990    on Meds    Intercritical gout     on Meds    Joint pain, knee 01/13/2017    baldo knee synvisc-1 injections    Long-term insulin use ([address] 1/17/2017    Major depressive disorder with single episode, in partial remission ([address] 1/3/2017    Mobility impaired     Morbid obesity ([address]     Muscle weakness     GRNERALIZED    Obesity     Obstructive sleep apnea     HEATHER on CPAP     On CPAP-TO BRING DOS    Paroxysmal atrial fibrillation ([address] 9/6/2017    Pneumonia 12/2/2019    Respiratory failure ([address] 03/2016    with open heart surgery, trached x 5 months    S/P cardiac cath     Seborrhea     Severe aortic stenosis 3/9/2016    Severe mitral valve stenosis 3/9/2016    Severe sepsis ([address] 4/3/2016    Skin rash     ABD FOLDS    Stasis dermatitis     Thyroid disease     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 lft thumb     Traumatic hemorrhagic shock ([address] 3/13/2019    Venous stasis dermatitis     Wears glasses     Wheelchair bound pivots with 2 assists       Past Surgical History:   Procedure Laterality Date    AORTIC VALVE REPLACEMENT  03/18/2016    bioprosthetic    ARM SURGERY Left 1990    CARDIAC CATHETERIZATION      CENTRAL VENOUS CATHETER Right 02/21/2018    DIALYSIS CATHETER Dr Verito Hernandez    COLONOSCOPY  11/19/09    COLONOSCOPY N/A 10/12/2018    COLONOSCOPY WITH BIOPSY performed by Hiral Maza DO at [address] [facility] Right 3/10/2019    WASHOUT RIGHT LOWER EXTREMITY WITH WOUND VAC EXCHANGE performed by Jhonatan Willis MD at [address] [facility] Right 3/8/2019    RIGHT LOWER EXTREMITY EXPLORATION, HEMATOMA EVACUATION, WOUND VAC PLACEMENT performed by Jhonatan Willis MD at [address] (x10-12 surgeries)    several surgeries on left arm    IR NONTUNNELED VASCULAR CATHETER  8/5/2020    IR NONTUNNELED VASCULAR CATHETER 8/5/2020 Juliane Mari MD [facility] SPECIAL PROCEDURES    KNEE ARTHROSCOPY Left 09/17/99    MITRAL VALVE REPLACEMENT  03/18/2016    bioprosthetic     NASAL SEPTUM SURGERY      OTHER SURGICAL HISTORY  3/18/16    Aortic root Enlargement    OTHER SURGICAL HISTORY  3/18/16    ASD Closure    OTHER SURGICAL HISTORY Right 01/09/2017    AV fistula creation, wrist    OTHER SURGICAL HISTORY Right 08/29/2017    ligation of collateral branch of av fistula right wrist    OTHER SURGICAL HISTORY  04/05/2018    FISTULAGRAM WITH DR. Jeffries Sample    [place] EGD TRANSORAL BIOPSY SINGLE/MULTIPLE N/A 10/17/2017    EGD BIOPSY performed by Paul Kennedy MD at [address] [facility] [address] [facility] [address] ANGIOACCESS AV FISTULA Right 8/29/2017     RIGHT  LOWER ARM AV FISTULA  LIGATION OF AQUIRED BRANCHES X2 performed by Deidre Trinh DO at [address]  3/18/16    median    VASCULAR SURGERY  12/06/2018    Right arm fistulagram,  PTA cephalic vein stenosis  /  DR Jasmine Henriquez       Medications as per [facility] Chart Vee Singh     Social History Socioeconomic History    Marital status:      Spouse name: Hazel Waite Number of children: 2    Years of education: Not on file    Highest education level: Not on file   Occupational History    Occupation: Retired      Employer: [address] resource strain: Not on file    Food insecurity     Worry: Not on file     Inability: Not on file   [employer] needs     Medical: Not on file     Non-medical: Not on file   Tobacco Use    Smoking status: Former Smoker     Packs/day: 2.00     Years: 25.00     Pack years: 50.00     Types: Cigarettes     Start date: 3/17/1955     Quit date: 1980     Years since quittin.2    Smokeless tobacco: Never Used   Substance and Sexual Activity    Alcohol use: No     Alcohol/week: 0.0 standard drinks     Comment: 1-2 drinks per year    Drug use: No    Sexual activity: Never   Lifestyle    Physical activity     Days per week: Not on file     Minutes per session: Not on file    Stress: Not on file   Relationships    Social connections     Talks on phone: Not on file     Gets together: Not on file     Attends Judaism service: Not on file     Active member of club or organization: Not on file     Attends meetings of clubs or organizations: Not on file     Relationship status: Not on file    Intimate partner violence     Fear of current or ex partner: Not on file     Emotionally abused: Not on file     Physically abused: Not on file     Forced sexual activity: Not on file   Other Topics Concern    Not on file   Social History Narrative    Not on file       Review of Systems   Unable to perform ROS: Dementia       Physical Exam  Vitals signs and nursing note reviewed. Constitutional:       General: He is not in acute distress. Appearance: He is well-developed. He is not diaphoretic. HENT:      Head: Normocephalic and atraumatic.       Nose:     Eyes:      General:         Right eye: No discharge. Left eye: No discharge. Neck:      Trachea: No tracheal deviation. Cardiovascular:      Rate and Rhythm: Normal rate. Pulmonary:      Effort: Pulmonary effort is normal. No respiratory distress. Skin:     General: Skin is warm and dry. Coloration: Skin is not pale. Neurological:      Mental Status: He is alert and oriented to person, place, and time. Cranial Nerves: No cranial nerve deficit. Sensory: No sensory deficit. Psychiatric:         Behavior: Behavior normal.         Thought Content: Thought content normal.         Judgment: Judgment normal.         Vital Signs: Temperature 97.3 °F, blood pressure 140/79, pulse 90, respirations 14, SPO2 99% on room air    Assessment:  1. Abrasion  2. Vascular dementia without behavioral disturbance (HCC)  3. Paroxysmal atrial fibrillation (HCC)  Conservative measures. Frequent reminding not to pick at his lips. Carmax twice daily and as needed      Plan:  As noted above. Follow up for routine visit. Call sooner with concerns prior.     Electronically signed by SHAMEKA Tim CNP on 3/10/2021 at 10:45 PM

## 2021-03-18 NOTE — PROGRESS NOTES
03/16/21  Abhishek Boyd  1945    Patient Resident of [facility]    Chief Complaint  1. Bleeding gums    2. Vascular dementia without behavioral disturbance (HCC)    3. Paroxysmal atrial fibrillation ([address]    4. ESRD (end stage renal disease) on dialysis [facility])        HPI:  48-year-old male with history of vascular dementia A. fib end-stage renal disease with recent teeth extraction being seen at the request of the nursing staff due to persistent bleeding in mouth large clots were removed from his mouth. They state patient continues to take with his fingers causing more bleeding. Patient politely declines that he is picking at his gums. He denies any pain. On exam moderate amount of old dried blood appreciated. He is currently on Eliquis for the atrial fib. Denies any heart palpitations racing heart. Continues on dialysis 3 times a week for his chronic kidney disease.     Allergies   Allergen Reactions    Percocet [Oxycodone-Acetaminophen] Itching and Rash     Also causes shaking    Ampicillin Rash       Past Medical History:   Diagnosis Date    Acute blood loss anemia 3/11/2019    Acute on chronic diastolic CHF (congestive heart failure) ([address] 12/2/2019    Anemia in chronic kidney disease (CKD) 4/27/2016    Arthritis     Bacteremia 11/11/2016    Benign prostatic hyperplasia without lower urinary tract symptoms     Bleeding from wound 4/2/2019    BMI 40.0-44.9, adult ([address] 3/19/2018    Cellulitis 3/21/2019    Cellulitis of left lower extremity     Chronic cerebral ischemia 1/3/2017    Closed fracture of forearm 8/13/2013    Broken lft forearm     Controlled type 2 diabetes mellitus with chronic kidney disease on chronic dialysis, with long-term current use of insulin ([address]     Controlled type 2 diabetes mellitus with diabetic polyneuropathy, with long-term current use of insulin ([address] 10/7/2018    Decubital ulcer     NON OPEN BUTTOCKS    Dementia ([address]     memory problems    Dialysis patient ([address] 01/03/2017    Goes Tue, Thurs, Sat in [place] Difficult intravenous access     HAS NEEDED PICC TEAM IN THE PAST    Difficulty walking     WHEELCHAIR BOUND-PIVOTS WITH ASSIST O F 2    DJD (degenerative joint disease) of knee     Elbow, forearm, and wrist, abrasion or friction burn, without mention of infection 8/13/2013    Abrasions lft forearm     Encephalopathy acute 4/27/2016    Encounter regarding vascular access for dialysis for ESRD ([address] 2/2/2017    ESRD (end stage renal disease) on dialysis ([address] 1/17/2017    Forgetfulness     HAS SOME SHORT TERM MEMORY LOSS, QUYEN-WIFE IS LEGAL GUARDIAN    H/O transesophageal echocardiography (AMADEO) for monitoring     Hemodialysis patient ([address] 11/11/2016    [place]ues-thurs-[place].  TUNNELED CATHETER RT UPPER CHEST    Hyperlipidemia 1990    on Meds    Hypertension 1990    on Meds    Intercritical gout     on Meds    Joint pain, knee 01/13/2017    baldo knee synvisc-1 injections    Long-term insulin use ([address] 1/17/2017    Major depressive disorder with single episode, in partial remission ([address] 1/3/2017    Mobility impaired     Morbid obesity ([address]     Muscle weakness     GRNERALIZED    Obesity     Obstructive sleep apnea     HEATHER on CPAP     On CPAP-TO BRING DOS    Paroxysmal atrial fibrillation ([address] 9/6/2017    Pneumonia 12/2/2019    Respiratory failure ([address] 03/2016    with open heart surgery, trached x 5 months    S/P cardiac cath     Seborrhea     Severe aortic stenosis 3/9/2016    Severe mitral valve stenosis 3/9/2016    Severe sepsis ([address] 4/3/2016    Skin rash     ABD FOLDS    Stasis dermatitis     Thyroid disease     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 lft thumb     Traumatic hemorrhagic shock ([address] 3/13/2019    Venous stasis dermatitis     Wears glasses     Wheelchair bound     pivots with 2 assists       Past Surgical History:   Procedure Laterality Date    AORTIC VALVE  Years of education: Not on file    Highest education level: Not on file   Occupational History    Occupation: Retired      Employer: [address] resource strain: Not on file    Food insecurity     Worry: Not on file     Inability: Not on file   [place] [employer] needs     Medical: Not on file     Non-medical: Not on file   Tobacco Use    Smoking status: Former Smoker     Packs/day: 2.00     Years: 25.00     Pack years: 50.00     Types: Cigarettes     Start date: 3/17/1955     Quit date: 1980     Years since quittin.2    Smokeless tobacco: Never Used   Substance and Sexual Activity    Alcohol use: No     Alcohol/week: 0.0 standard drinks     Comment: 1-2 drinks per year    Drug use: No    Sexual activity: Never   Lifestyle    Physical activity     Days per week: Not on file     Minutes per session: Not on file    Stress: Not on file   Relationships    Social connections     Talks on phone: Not on file     Gets together: Not on file     Attends Taoist service: Not on file     Active member of club or organization: Not on file     Attends meetings of clubs or organizations: Not on file     Relationship status: Not on file    Intimate partner violence     Fear of current or ex partner: Not on file     Emotionally abused: Not on file     Physically abused: Not on file     Forced sexual activity: Not on file   Other Topics Concern    Not on file   Social History Narrative    Not on file       Review of Systems   Reason unable to perform ROS: Patient declines any acute concerns. Nursing staff regarding oral bleeding. Physical Exam  Nursing note reviewed. Constitutional:       General: He is not in acute distress. Appearance: He is well-developed. He is not diaphoretic. HENT:      Head: Normocephalic and atraumatic. Mouth/Throat:      Mouth: Mucous membranes are moist. No oral lesions.       Dentition: Normal dentition. Tongue: No lesions. Palate: No mass. Pharynx: Oropharynx is clear. Comments: Moderate amount of old dried blood to teeth. One clot noted to the right lower gumline  Eyes:      General:         Right eye: No discharge. Left eye: No discharge. Neck:      Trachea: No tracheal deviation. Cardiovascular:      Rate and Rhythm: Normal rate. Rhythm irregular. Pulmonary:      Effort: Pulmonary effort is normal. No respiratory distress. Skin:     General: Skin is warm and dry. Coloration: Skin is not pale. Neurological:      Mental Status: He is alert and oriented to person, place, and time. Cranial Nerves: No cranial nerve deficit. Sensory: No sensory deficit. Psychiatric:         Behavior: Behavior normal.         Thought Content: Thought content normal.         Judgment: Judgment normal.         Vital Signs: Temperature 97.7 °F, blood pressure 114/58, pulse 98, respirations 18, SPO2 97% on room air    Assessment:  1. Bleeding gums  Hold Eliquis for 48 hours. Call Dr. Dominique Sanchez office, dentist for further discussion regarding cauterization. Salt water rinses 3 times a day and as needed    2. Vascular dementia without behavioral disturbance (HCC)  Stable at present. Politely remind patient not to be digging in his mouth    3. Paroxysmal atrial fibrillation (HCC)  Stable at present. Due to persistent oral bleeding we will hold anticoagulation for 48 hours    4. ESRD (end stage renal disease) on dialysis ([facility]  Continues on dialysis      Plan:  Hold Eliquis for 48 hours  Salt water gargles TID and prn  Get ahold of Dr Pamela Ruiz ( oral surgeon) for possible cautery     Follow up for routine visit. Call sooner with concerns prior.     Electronically signed by SHAMEKA Lugo CNP on 3/17/2021 at 9:24 PM

## 2021-03-30 NOTE — PROGRESS NOTES
DR. 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 His diabetes has remained fairly well controlled recently. He is on Eliquis for atrial fibrillation. He is on Zoloft for depression. He continues to do well with his dialysis, which he gets for end-stage renal disease. There are no new complaints. ALLERGIES:   Allergies   Allergen Reactions    Percocet [Oxycodone-Acetaminophen] Itching and Rash     Also causes shaking    Ampicillin Rash        MEDICATIONS: As noted on the [facility], referenced and incorporated herein.     PAST MEDICAL HISTORY:   Past Medical History:   Diagnosis Date    Acute blood loss anemia 3/11/2019    Acute on chronic diastolic CHF (congestive heart failure) ([address] 12/2/2019    Anemia in chronic kidney disease (CKD) 4/27/2016    Arthritis     Bacteremia 11/11/2016    Benign prostatic hyperplasia without lower urinary tract symptoms     Bleeding from wound 4/2/2019    BMI 40.0-44.9, adult ([address] 3/19/2018    Cellulitis 3/21/2019    Cellulitis of left lower extremity     Chronic cerebral ischemia 1/3/2017    Closed fracture of forearm 8/13/2013    Broken lft forearm     Controlled type 2 diabetes mellitus with chronic kidney disease on chronic dialysis, with long-term current use of insulin ([address]     Controlled type 2 diabetes mellitus with diabetic polyneuropathy, with long-term current use of insulin ([address] 10/7/2018    Decubital ulcer     NON OPEN BUTTOCKS    Dementia ([address]     memory problems    Dialysis patient ([address] 01/03/2017    Goes [facility], Sat in [place]    Difficult intravenous access     HAS NEEDED PICC TEAM IN THE PAST    Difficulty walking     WHEELCHAIR BOUND-PIVOTS WITH ASSIST O F 2    DJD (degenerative joint disease) of knee     Elbow, forearm, and wrist, abrasion or friction burn, without mention of infection 8/13/2013    Abrasions lft forearm     Encephalopathy acute 4/27/2016    Encounter regarding vascular access for dialysis for ESRD ([address] 2/2/2017    ESRD (end stage renal disease) on dialysis ([address] 1/17/2017    Forgetfulness     HAS SOME SHORT TERM MEMORY LOSS, QUYEN-WIFE IS LEGAL GUARDIAN    H/O transesophageal echocardiography (AMADEO) for monitoring     Hemodialysis patient ([address] 11/11/2016    [place]ues-thurs-[place].  TUNNELED CATHETER RT UPPER CHEST    Hyperlipidemia 1990    on Meds    Hypertension 1990    on Meds    Intercritical gout     on Meds    Joint pain, knee 01/13/2017    baldo knee synvisc-1 injections    Long-term insulin use ([address] 1/17/2017    Major depressive disorder with single episode, in partial remission ([address] 1/3/2017    Mobility impaired     Morbid obesity ([address]     Muscle weakness     GRNERALIZED    Obesity     Obstructive sleep apnea     HEATHER on CPAP     On CPAP-TO BRING DOS    Paroxysmal atrial fibrillation ([address] 9/6/2017    Pneumonia 12/2/2019    Respiratory failure ([address] 03/2016    with open heart surgery, trached x 5 months    S/P cardiac cath     Seborrhea     Severe aortic stenosis 3/9/2016    Severe mitral valve stenosis 3/9/2016    Severe sepsis ([address] 4/3/2016    Skin rash     ABD FOLDS    Stasis dermatitis     Thyroid disease     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 lft thumb     Traumatic hemorrhagic shock ([address] 3/13/2019    Venous stasis dermatitis     Wears glasses     Wheelchair bound     pivots with 2 assists       PAST SURGICAL HISTORY:   Past Surgical History:   Procedure Laterality Date    AORTIC VALVE REPLACEMENT  03/18/2016    bioprosthetic    ARM SURGERY Left 1990    CARDIAC CATHETERIZATION      CENTRAL VENOUS CATHETER Right 02/21/2018    DIALYSIS CATHETER Dr Rafael Virgen    COLONOSCOPY  11/19/09    COLONOSCOPY N/A 10/12/2018    COLONOSCOPY WITH BIOPSY performed by Jean Paul Gonzalez DO at [address] [facility] Right 3/10/2019    WASHOUT RIGHT LOWER EXTREMITY WITH WOUND VAC EXCHANGE performed by Anjelica Marin MD at [address] [facility] Right 3/8/2019    RIGHT LOWER EXTREMITY EXPLORATION, HEMATOMA EVACUATION, WOUND VAC PLACEMENT performed by Anjelica Marin MD at [address] (x10-12 surgeries)    several surgeries on left arm    IR NONTUNNELED VASCULAR CATHETER  8/5/2020    IR NONTUNNELED VASCULAR CATHETER 8/5/2020 Aamri Romero MD [facility]    KNEE ARTHROSCOPY Left 09/17/99    MITRAL VALVE REPLACEMENT  03/18/2016    bioprosthetic     NASAL SEPTUM SURGERY      OTHER SURGICAL HISTORY  3/18/16    Aortic root Enlargement    OTHER SURGICAL HISTORY  3/18/16    ASD Closure    OTHER SURGICAL HISTORY Right 01/09/2017    AV fistula creation, wrist    OTHER SURGICAL HISTORY Right 08/29/2017    ligation of collateral branch of av fistula right wrist    OTHER SURGICAL HISTORY  04/05/2018    FISTULAGRAM WITH DR. April Mcneil    NM EGD TRANSORAL BIOPSY SINGLE/MULTIPLE N/A 10/17/2017    EGD BIOPSY performed by Parvin Lopez MD at [address] [facility] [address] ANGIOACCESS AV FISTULA Right 8/29/2017     RIGHT  LOWER ARM AV FISTULA  LIGATION OF AQUIRED BRANCHES X2 performed by Kodak Barrera DO at [address]  3/18/16    median    VASCULAR SURGERY  12/06/2018    Right arm fistulagram,  PTA cephalic vein stenosis  /  DR Diane Copeland       SOCIAL HISTORY:     Tobacco:   Social History     Tobacco Use   Smoking Status Former Smoker    Packs/day: 2.00    Years: 25.00    Pack years: 50.00    Types: Cigarettes    Start date: 3/17/1955   [facility] Quit date: stage renal disease) on dialysis ([facility]     7. Dialysis patient ([address]     8. Secondary hyperparathyroidism of renal origin ([address]     9. Anemia in chronic kidney disease, on chronic dialysis ([address]     10. Paroxysmal atrial fibrillation (HCC)     11. Severe aortic stenosis     12. Severe mitral valve stenosis     13. Vascular dementia without behavioral disturbance ([address]     14. Obstructive sleep apnea     15. Major depressive disorder with single episode, in partial remission ([facility]     16. Traumatic amputation of thumb, left, sequela (HCC)     17. Class 1 obesity with serious comorbidity and body mass index (BMI) of 33.0 to 33.9 in adult, unspecified obesity type           PLAN:    1. Continue current treatment  2. Nursing home record reviewed and updates summarized and entered into electronic record  3. Nursing home blood sugar logs and diabetic medication administration reviewed. No changes. 4. See nursing home orders and MAR.         Electronically signed by Kenya Strong DO on 3/30/2021 at 2:57 PM  Internal Medicine

## 2021-04-25 NOTE — PROGRESS NOTES
Subjective:      Patient ID: Lana Rich is a 67 y.o. male. HPI  Here for follow-up for HEATHER, patient was last seen in the office in November 2019 per Dr. Carney Service. Patient past medical history is significant for morbid obesity, end-stage renal disease, s/p bioprosthetic aortic and mitral valve replacement, require tracheostomy after heart surgery. Patient is a resident at [address] [place] [address]. He is accompanied by his wife. Currently PAP therapy compliance is minimal due to facility not placing machine on and patient removing the machine during the night. Lengthy discussion with patient and with wife risks of untreated apnea and would encourage patient to be compliant with Pap therapy for at least a 4-hour. Every night. Order written to louisa to confirm this as well in addition to provide the chip from his machine to the wife so we can have it reviewed for actual compliance. Discussed with patient and wife that if patient is unable to be compliant with Pap therapy would encourage patient to sleep in an upright lateral decubitus position. Medications:   Albuterol neb:  DuoNeb:    PRIOR WORKUP:  PFT:  PFT 3/2/2016: Negative for obstructive defect with no significant bronchodilator response. FVC is reduced at 2.06 L (53% of predicted), FEV1 reduced at 1.59 L (61% of predicted), total lung capacity by plethysmography and gas dilution technique is reduced moderately. Diffusion capacity is unremarkable. Final impression negative for obstructive lung disease, significant restrictive defect moderate in severity. CT Imaging:  None on chart    Echocardiogram 3/14/2019: LVEF 60-65%. Mild left ventricular hypertrophy. Dilatation of left atrium and right atrium. Bioprosthetic AVR is seen in aortic position. Mild aortic insufficiency. Bioprosthetic MVR seen in mitral position. Average mean gradient of 7 mmHg is mildly elevated and is suggestive of some mitral stenosis.   Mild tricuspid regurgitation. RVSP of 33 mmHg. Sleep Study:  Sleep study 5/10/2012: Patient had an apnea-hypopnea index of 8.8 events per hour with the longest event lasting 25 seconds. SPO2 desaturation to 91% with lowest 75% during current study. No periodic leg movements are noted. Recommended for CPAP titration    Laboratory Evaluation:    Immunizations:   Immunization History   Administered Date(s) Administered    COVID-19, Pfizer, PF, 30mcg/0.3mL 01/04/2021, 01/25/2021    Influenza Virus Vaccine 10/24/2006, 11/18/2013    Influenza Whole 10/24/2006, 10/13/2011, 01/11/2013, 10/12/2015    Influenza, High Dose (Fluzone 65 yrs and older) 11/18/2013, 10/12/2015, 10/04/2017, 10/21/2018, 10/08/2019, 09/25/2020    Pneumococcal Conjugate 13-valent ([id number]) 10/12/2015, 10/25/2017    Pneumococcal Polysaccharide ([id number]) 11/30/2010, 10/12/2015    Tdap (Boostrix, Adacel) 10/27/2015        No flowsheet data found.     /88 (Site: Right Upper Arm, Position: Sitting, Cuff Size: Medium Adult)   Pulse 77   Ht 5' 8\" (1.727 m)   Wt 217 lb (98.4 kg)   SpO2 93%   BMI 32.99 kg/m²     Past Medical History:   Diagnosis Date    Acute blood loss anemia 3/11/2019    Acute on chronic diastolic CHF (congestive heart failure) ([address] 12/2/2019    Anemia in chronic kidney disease (CKD) 4/27/2016    Arthritis     Bacteremia 11/11/2016    Benign prostatic hyperplasia without lower urinary tract symptoms     Bleeding from wound 4/2/2019    BMI 40.0-44.9, adult ([address] 3/19/2018    Cellulitis 3/21/2019    Cellulitis of left lower extremity     Chronic cerebral ischemia 1/3/2017    Closed fracture of forearm 8/13/2013    Broken lft forearm     Controlled type 2 diabetes mellitus with chronic kidney disease on chronic dialysis, with long-term current use of insulin ([address]     Controlled type 2 diabetes mellitus with diabetic polyneuropathy, with long-term current use of insulin ([address] 10/7/2018    Decubital ulcer     NON OPEN BUTTOCKS    Dementia ([address]     memory problems    Dialysis patient ([address] 01/03/2017    Goes Tue, [place], Sat in [place] Difficult intravenous access     HAS NEEDED PICC TEAM IN THE PAST    Difficulty walking     WHEELCHAIR BOUND-PIVOTS WITH ASSIST O F 2    DJD (degenerative joint disease) of knee     Elbow, forearm, and wrist, abrasion or friction burn, without mention of infection 8/13/2013    Abrasions lft forearm     Encephalopathy acute 4/27/2016    Encounter regarding vascular access for dialysis for ESRD ([address] 2/2/2017    ESRD (end stage renal disease) on dialysis ([address] 1/17/2017    Forgetfulness     HAS SOME SHORT TERM MEMORY LOSS, QUYEN-WIFE IS LEGAL GUARDIAN    H/O transesophageal echocardiography (AMADEO) for monitoring     Hemodialysis patient ([address] 11/11/2016    tues-thurs-sat [place]---[facility].  TUNNELED CATHETER RT UPPER CHEST    Hyperlipidemia 1990    on Meds    Hypertension 1990    on Meds    Intercritical gout     on Meds    Joint pain, knee 01/13/2017    baldo knee synvisc-1 injections    Long-term insulin use ([address] 1/17/2017    Major depressive disorder with single episode, in partial remission ([address] 1/3/2017    Mobility impaired     Morbid obesity ([address]     Muscle weakness     GRNERALIZED    Obesity     Obstructive sleep apnea     HEATHER on CPAP     On CPAP-TO BRING DOS    Paroxysmal atrial fibrillation ([address] 9/6/2017    Pneumonia 12/2/2019    Respiratory failure ([address] 03/2016    with open heart surgery, trached x 5 months    S/P cardiac cath     Seborrhea     Severe aortic stenosis 3/9/2016    Severe mitral valve stenosis 3/9/2016    Severe sepsis ([address] 4/3/2016    Skin rash     ABD FOLDS    Stasis dermatitis     Thyroid disease     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 lft thumb     Traumatic hemorrhagic shock ([address] 3/13/2019    Venous stasis dermatitis     Wears glasses     Wheelchair bound     pivots with 2 assists     Past Surgical History:   Procedure Laterality Date    AORTIC VALVE REPLACEMENT  03/18/2016    bioprosthetic    ARM SURGERY Left 1990    CARDIAC CATHETERIZATION      CENTRAL VENOUS CATHETER Right 02/21/2018    DIALYSIS CATHETER Dr Jerry Michelle    COLONOSCOPY  11/19/09    COLONOSCOPY N/A 10/12/2018    COLONOSCOPY WITH BIOPSY performed by Efra Nix DO at [facility] Right 3/10/2019    WASHOUT RIGHT LOWER EXTREMITY WITH WOUND VAC EXCHANGE performed by Boris Ghotra MD at [address] [place] [address] Right 3/8/2019    RIGHT LOWER EXTREMITY EXPLORATION, HEMATOMA EVACUATION, WOUND VAC PLACEMENT performed by Boris Ghotra MD at [address] (x10-12 surgeries)    several surgeries on left arm    IR NONTUNNELED VASCULAR CATHETER  8/5/2020    IR NONTUNNELED VASCULAR CATHETER 8/5/2020 Luis Enrique Lopez MD [facility]    KNEE ARTHROSCOPY Left 09/17/99    MITRAL VALVE REPLACEMENT  03/18/2016    bioprosthetic     NASAL SEPTUM SURGERY      OTHER SURGICAL HISTORY  3/18/16    Aortic root Enlargement    OTHER SURGICAL HISTORY  3/18/16    ASD Closure    OTHER SURGICAL HISTORY Right 01/09/2017    AV fistula creation, wrist    OTHER SURGICAL HISTORY Right 08/29/2017    ligation of collateral branch of av fistula right wrist    OTHER SURGICAL HISTORY  04/05/2018    FISTULAGRAM WITH DR. Freddie Bourgeois    DC EGD TRANSORAL BIOPSY SINGLE/MULTIPLE N/A 10/17/2017    EGD BIOPSY performed by Tono Peace MD at [address] [facility] [facility] [address] ANGIOACCESS AV FISTULA Right 8/29/2017     RIGHT  LOWER ARM AV FISTULA  LIGATION OF AQUIRED BRANCHES X2 performed by Lyndsay Mccarthy DO at [address]  3/18/16    median    VASCULAR SURGERY  12/06/2018    Right arm fistulagram,  PTA cephalic vein stenosis  /  DR Basilio Quant     Family History   Problem Relation Age of Onset    Lung Cancer Mother     Diabetes Mother     Alzheimer's Disease Father reactive, extraocular eye movements intact  Ears - not examined  Nose - normal and patent, no erythema, discharge or polyps  Mouth - mucous membranes moist, pharynx normal without lesions  Neck - supple, no significant adenopathy  Chest - clear to auscultation, no wheezes, rales or rhonchi, symmetric air entry  Heart -normal rate, regular rhythm, normal S1, S2, no murmurs, rubs, clicks or gallops  Abdomen - soft, nontender, nondistended, no masses or organomegaly  Neuro- alert, oriented, normal speech, no focal findings or movement disorder noted}  Extremities - peripheral pulses normal, no pedal edema, no clubbing or cyanosis, right forearm AV fistula with positive thrill/bruit  Skin - normal coloration and turgor, no rashes, no suspicious skin lesions noted     Wt Readings from Last 3 Encounters:   04/27/21 217 lb (98.4 kg)   02/11/21 217 lb 3.2 oz (98.5 kg)   11/04/20 241 lb (109.3 kg)       Results for orders placed or performed during the hospital encounter of 11/04/20   Culture, Blood 1    Specimen: Blood   Result Value Ref Range    Specimen Description . BLOOD LHAND 9ML NURSE DRAW     Special Requests NOT REPORTED     Culture NO GROWTH 6 DAYS    Culture, Blood 1    Specimen: Blood   Result Value Ref Range    Specimen Description . BLOOD LHAND 6ML NURSE DRAW     Special Requests NOT REPORTED     Culture NO GROWTH 6 DAYS    CBC Auto Differential   Result Value Ref Range    WBC 5.3 3.5 - 11.3 k/uL    RBC 3.95 (L) 4.21 - 5.77 m/uL    Hemoglobin 11.8 (L) 13.0 - 17.0 g/dL    Hematocrit 37.9 (L) 40.7 - 50.3 %    MCV 95.9 82.6 - 102.9 fL    MCH 29.9 25.2 - 33.5 pg    MCHC 31.1 25.2 - 33.5 g/dL    RDW 14.0 11.8 - 14.4 %    Platelets 942 137 - 960 k/uL    MPV 9.3 8.1 - 13.5 fL    NRBC Automated 0.0 0.0 per 100 WBC    Differential Type NOT REPORTED     Seg Neutrophils 69 (H) 36 - 65 %    Lymphocytes 17 (L) 24 - 43 %    Monocytes 13 (H) 3 - 12 %    Eosinophils % 1 1 - 4 %    Basophils 0 0 - 2 %    Immature Granulocytes 0 0 dialysis ([facility]    3. Vascular dementia without behavioral disturbance (HCC)    4. Obesity (BMI 30-39. 9)          Plan:      1. Medications reviewed, on as needed albuterol. 2. Educated and clarified the medication use. 3. Recommend flu vaccination in the fall annually. Up-to-date  4. Patient is up-to-date with vaccinations from pulmonary perspective. Up-to-date including Covid vaccine  5. Maintain an active lifestyle. 6. Patient's questions were answered to his satisfaction. 7. No compliance data available for my review, per patient spouse report patient is not compliant. Discussed with patient and spouse at length that patient would benefit from at least 4 hours of Pap therapy nocturnally  at length  8.  We'll see the patient back in 12 months          Electronically signed by SHAMEKA Min CNP on 4/27/2021 at 2:37 PM

## 2021-05-07 PROBLEM — L03.90 CELLULITIS: Status: ACTIVE | Noted: 2021-01-01

## 2021-05-07 NOTE — ED PROVIDER NOTES
[facility]  eMERGENCY dEPARTMENT eNCOUnter      Pt Name: Aaliyah Manning  MRN: [id number]  [place] 1945  Date of evaluation: 5/7/2021      CHIEF COMPLAINT       Chief Complaint   Patient presents with    Leg Pain         HISTORY OF PRESENT ILLNESS    Aaliyah Manning is a 67 y.o. male who presents with chief complaint of leg pain he is developed redness to his right leg the nursing home marked it out yesterday today when he went to dialysis the redness was quite a bit larger patient says it hurts a little bit he has had a history of cellulitis in the past after completing dialysis he was sent over here there is been no documented fevers chills no nausea or vomiting he is a dialysis patient he is a diabetic he had Covid in the past and recovered      REVIEW OF SYSTEMS         Review of Systems   Constitutional: Negative for chills and fever. HENT: Negative for congestion, dental problem, sore throat and trouble swallowing. Eyes: Negative for visual disturbance. Respiratory: Negative for shortness of breath and wheezing. Cardiovascular: Negative for chest pain, palpitations and leg swelling. Gastrointestinal: Negative for abdominal pain, blood in stool, constipation, diarrhea, nausea and vomiting. Genitourinary: Negative for difficulty urinating, dysuria and testicular pain. Patient is end-stage renal disease dialysis patient   Musculoskeletal: Negative for back pain, joint swelling and neck pain. Patient's had amputations of digits to left hand he has the cellulitis to his right leg also some skin breakdown in the left groin   Skin: Negative for rash. Neurological: Negative for dizziness, syncope, weakness and headaches. Hematological: Negative for adenopathy. Does not bruise/bleed easily. Psychiatric/Behavioral: Negative for confusion and suicidal ideas.          PAST MEDICAL HISTORY    has a past medical history of Acute blood loss anemia, Acute on chronic diastolic CHF (congestive heart failure) ([address] Anemia in chronic kidney disease (CKD), Arthritis, Bacteremia, Benign prostatic hyperplasia without lower urinary tract symptoms, Bleeding from wound, BMI 40.0-44.9, adult ([address] Cellulitis, Cellulitis of left lower extremity, Chronic cerebral ischemia, Closed fracture of forearm, Controlled type 2 diabetes mellitus with chronic kidney disease on chronic dialysis, with long-term current use of insulin ([address] Controlled type 2 diabetes mellitus with diabetic polyneuropathy, with long-term current use of insulin ([address] Decubital ulcer, Dementia ([address] Dialysis patient ([address] Difficult intravenous access, Difficulty walking, DJD (degenerative joint disease) of knee, Elbow, forearm, and wrist, abrasion or friction burn, without mention of infection, Encephalopathy acute, Encounter regarding vascular access for dialysis for ESRD ([address] ESRD (end stage renal disease) on dialysis ([address] Forgetfulness, H/O transesophageal echocardiography ([facility]) for monitoring, Hemodialysis patient ([address] Hyperlipidemia, Hypertension, Intercritical gout, Joint pain, knee, Long-term insulin use ([address] Major depressive disorder with single episode, in partial remission ([address] Mobility impaired, Morbid obesity ([address] Muscle weakness, Obesity, Obstructive sleep apnea, HEATHER on CPAP, Paroxysmal atrial fibrillation ([address] Pneumonia, Respiratory failure ([address] S/P cardiac cath, Seborrhea, Severe aortic stenosis, Severe mitral valve stenosis, Severe sepsis (HCC), Skin rash, Stasis dermatitis, Thyroid disease, Traumatic amputation of thumb, Traumatic hemorrhagic shock ([address] Venous stasis dermatitis, Wears glasses, and Wheelchair bound. SURGICAL HISTORY      has a past surgical history that includes Colonoscopy (11/19/09); Knee arthroscopy (Left, 09/17/99); Hand surgery (Left, 1990 (x10-12 surgeries)); Arm Surgery (Left, 1990); Cardiac catheterization; Nasal septum surgery; Sternotomy (3/18/16);  Aortic valve replacement (03/18/2016); Mitral valve replacement (03/18/2016); other surgical history (3/18/16); other surgical history (3/18/16); other surgical history (Right, 01/09/2017); other surgical history (Right, 08/29/2017); pr ligatn angioaccess av fistula (Right, 8/29/2017); pr egd transoral biopsy single/multiple (N/A, 10/17/2017); central venous catheter (Right, 02/21/2018); Colonoscopy (N/A, 10/12/2018); other surgical history (04/05/2018); vascular surgery (12/06/2018); EXPLORATION OF WOUND OF EXTREMITY (Right, 3/10/2019); EXPLORATION OF WOUND OF EXTREMITY (Right, 3/8/2019); and IR NONTUNNELED VASCULAR CATHETER > 5 YEARS (8/5/2020). CURRENT MEDICATIONS       Discharge Medication List as of 5/7/2021 11:25 PM      CONTINUE these medications which have NOT CHANGED    Details   RENVELA 800 MG tablet Take 2 tablets by mouth 3 times daily with meals only. , Disp-540 tablet,R-3,DAWNormal      Lidocaine 5 % CREA Apply topically Apply to fistula rt arm topically one time a day every Mon, Wed, Fri for dialysis. Historical Med      buPROPion (WELLBUTRIN SR) 150 MG extended release tablet Take 150 mg by mouth dailyHistorical Med      albuterol (PROVENTIL) (2.5 MG/3ML) 0.083% nebulizer solution Take 2.5 mg by nebulization every 8 hours as needed for Shortness of BreathHistorical Med      benzonatate (TESSALON) 100 MG capsule Take 100 mg by mouth every 12 hours as needed for CoughHistorical Med      ipratropium-albuterol (DUONEB) 0.5-2.5 (3) MG/3ML SOLN nebulizer solution Inhale 3 mLs into the lungs every 8 hours as needed for Shortness of Breath (or wheezing)Historical Med      guaiFENesin 400 MG tablet Take 400 mg by mouth every 12 hours as needed (allergies)Historical Med      ondansetron (ZOFRAN) 4 MG tablet Take 4 mg by mouth every 6 hours as needed for Nausea or VomitingHistorical Med      tiZANidine (ZANAFLEX) 2 MG tablet Take 2 mg by mouth every 8 hours as needed (muscle pain)Historical [facility] 100 UNIT/ML SOPN Inject into the skin See Admin Instructions Per sliding scale: If 150-200 = 2 units; 201-250 = 4 units; 251-300 = 6 units; 301-350 = 8 units; 351-400 = 10 units; 401+ = 15 units, then call LUSIANA GARRIDOHistorical Med      Elastic Bandages & Supports MISC Disp-2 each, R-3, LUISANA, [id number] mmHg compression stockings to both lower legs, on every morning, off at bedtime. gabapentin (NEURONTIN) 300 MG capsule Take 300 mg by mouth daily. Historical Med      ALPRAZolam (XANAX) 0.5 MG tablet Take 0.5 mg by mouth 2 times daily. Historical Med      DULoxetine (CYMBALTA) 60 MG extended release capsule Take 60 mg by mouth dailyHistorical Med      loratadine (CLARITIN) 10 MG capsule Take 10 mg by mouth daily (every other day). Historical Med      aspirin 81 MG tablet Take 1 tablet by mouth daily, Disp-30 tablet, R-3Print      apixaban (ELIQUIS) 5 MG TABS tablet Take 1 tablet by mouth 2 times daily, Disp-60 tablet, R-2Print      Multiple Vitamins-Minerals (THERAPEUTIC MULTIVITAMIN-MINERALS) tablet Take 1 tablet by mouth dailyHistorical Med      omeprazole (PRILOSEC) 20 MG delayed release capsule Take 20 mg by mouth nightly Historical Med      atorvastatin (LIPITOR) 40 MG tablet Take 1 tablet by mouth nightly, Disp-30 tablet, R-3Normal      midodrine (PROAMATINE) 10 MG tablet Take 1 tablet by mouth 3 times daily, Disp-90 tablet, I-2EAUGLS      folic acid (FOLVITE) 1 MG tablet Take 1 tablet by mouth daily, Disp-90 tablet, R-1Normal      insulin glargine (LANTUS) 100 UNIT/ML injection vial Inject 25 Units into the skin 2 times daily, Disp-1 vial, R-3Normal      Misc. Devices [facility]) MISC Disp-1 each, R-0, PrintUse as directed      Amino Acids-Protein Hydrolys (PRO-STAT PO) Take 30 mLs by mouth 3 times daily (Prostat SF) for wound healing. Historical Med      donepezil (ARICEPT) 5 MG tablet Take 5 mg by mouth every evening      levothyroxine (SYNTHROID) 25 MCG tablet Take 1 tablet by mouth Daily, Disp-30 tablet, R-2 fluticasone (FLONASE) 50 MCG/ACT nasal spray 2 sprays by Nasal route daily, Disp-3 Bottle, R-3      magnesium hydroxide (MILK OF MAGNESIA) 400 MG/5ML suspension Take 30 mLs by mouth daily as needed for Constipation             ALLERGIES     is allergic to percocet [oxycodone-acetaminophen] and ampicillin. FAMILY HISTORY     He indicated that his mother is . He indicated that his father is . He indicated that his sister is alive. He indicated that his maternal grandmother is . He indicated that his maternal grandfather is . family history includes Alzheimer's Disease in his father; Diabetes in his maternal grandmother and mother; High Blood Pressure in his sister; Meg Harris in his mother. SOCIAL HISTORY      reports that he quit smoking about 41 years ago. His smoking use included cigarettes. He started smoking about 66 years ago. He has a 50.00 pack-year smoking history. He has never used smokeless tobacco. He reports that he does not drink alcohol or use drugs. PHYSICAL EXAM     INITIAL VITALS:  weight is 250 lb (113.4 kg). His temperature is 97.7 °F (36.5 °C). His blood pressure is 123/53 (abnormal) and his pulse is 90. His respiration is 18 and oxygen saturation is 97%. Physical Exam  Constitutional:       Appearance: He is well-developed. He is obese. HENT:      Head: Normocephalic and atraumatic. Right Ear: External ear normal.      Left Ear: External ear normal.   Eyes:      Pupils: Pupils are equal, round, and reactive to light. Neck:      Musculoskeletal: Normal range of motion and neck supple. Cardiovascular:      Rate and Rhythm: Normal rate and regular rhythm. Pulmonary:      Effort: Pulmonary effort is normal.      Breath sounds: Normal breath sounds. Abdominal:      General: Bowel sounds are normal.      Palpations: Abdomen is soft. Musculoskeletal: Normal range of motion.       Comments: Examination reveals cellulitis of the right leg from the calf all the way up to the thigh the well beyond the pen marks from yesterday and extends to now the lateral right hip there is no pain with range of motion the hip there is no obvious skin breakdown on that side on the left side he has got a couple creases that are broken down under his pannus that are bleeding  There is no crepitus or subcu emphysema examination of the upper extremities has had a fistula in the right arm he has had several amputated digits on the left hand including his thumb     Skin:     General: Skin is warm and dry. Capillary Refill: Capillary refill takes less than 2 seconds. Neurological:      General: No focal deficit present. Mental Status: He is alert and oriented to person, place, and time. Psychiatric:         Behavior: Behavior normal.           DIFFERENTIAL DIAGNOSIS/ MDM:     Cellulitis getting worse.   Patient with a history of resistant infections, also dialysis patient will need to be started on antibiotics and transferred to hospital the can continue antibiotics and dialysis    DIAGNOSTIC RESULTS     EKG: All EKG's are interpreted by the Emergency Department Physician who either signs or Co-signs this chart in the absence of a cardiologist.        RADIOLOGY:   I directly visualized the following  images and reviewed the radiologist interpretations:          ED BEDSIDE ULTRASOUND:       LABS:  Labs Reviewed   CBC WITH AUTO DIFFERENTIAL - Abnormal; Notable for the following components:       Result Value    RBC 3.91 (*)     Hemoglobin 12.0 (*)     Hematocrit 38.5 (*)     Monocytes 2 (*)     Lymphocytes 12 (*)     Seg Neutrophils 86 (*)     Eosinophils % 0 (*)     Segs Absolute 9.28 (*)     All other components within normal limits   COMPREHENSIVE METABOLIC PANEL - Abnormal; Notable for the following components:    Glucose 131 (*)     BUN 26 (*)     CREATININE 2.65 (*)     Chloride 95 (*)     CO2 32 (*)     Alkaline Phosphatase 132 (*)     GFR Non- 24 (*)     GFR  29 (*)     All other components within normal limits   POC GLUCOSE FINGERSTICK - Abnormal; Notable for the following components:    POC Glucose 145 (*)     All other components within normal limits   POC GLUCOSE FINGERSTICK - Abnormal; Notable for the following components:    POC Glucose 273 (*)     All other components within normal limits   CULTURE, BLOOD 1   CULTURE, BLOOD 1   LACTATE, SEPSIS           EMERGENCY DEPARTMENT COURSE:   Vitals:    Vitals:    05/07/21 1700 05/07/21 1930 05/07/21 2030 05/07/21 2130   BP: (!) 123/59 (!) 132/58 133/76 (!) 123/53   Pulse:  92 87 90   Resp:  18 18 18   Temp:       SpO2:  97% 96% 97%   Weight:         -------------------------  BP: (!) 123/53, Temp: 97.7 °F (36.5 °C), Pulse: 90, Resp: 18        Re-evaluation Notes    Resting comfortably antibiotic infusing    CRITICAL CARE:   None        CONSULTS: Hospitalist at [facility] Dr. Blackman contacted he will accept the patient in transfer      PROCEDURES:  None    FINAL IMPRESSION      1. Cellulitis of right lower extremity          DISPOSITION/PLAN   DISPOSITION transferred in stable condition    Condition on Disposition    Stable    PATIENT REFERRED TO:  No follow-up provider specified. DISCHARGE MEDICATIONS:  Discharge Medication List as of 5/7/2021 11:25 PM          (Please note that portions of this note were completed with a voice recognition program.  Efforts were made to edit the dictations but occasionally words are mis-transcribed.)    Saavedra MD, F.A.A.E.M.   Attending Emergency Physician                          Néstor Borjas MD  05/09/21 [id number]

## 2021-05-07 NOTE — ED TRIAGE NOTES
PT TO ED VIA EMS. NURSING HOME STS THAT HE BEGAN HAVING REDNESS ON HIS RT UPPER THIGH. ST THAT IT WAS WARM TO TOUCH. FACILITY OUTLINED THE REDNESS THEN SENT HIM TO DIALYSIS. EMS TRANSPORTS TO ED AFTER DIALYSIS. [facility]

## 2021-05-08 NOTE — H&P
[facility]  Office: [address], DO, Familia Mar, DO, Margaritajennifer Velez, DO, Per Jenkins Hiral, DO, Kirsten Paz MD, Юлия Reddy MD, Juana Zamarripa MD, Bonnielee Nissen, MD, Malathi Liang MD, Lizzie Holden MD, Leanne Tatum MD, Maura Saleh MD, Javan Espitia, DO, Rama Mixon MD, Gilbert Lancaster DO, Shila Chin MD,  Mark Jacobs DO, Edita Corbett MD, Olman Mckenna MD, Brielle Barrera MD, Mai Collins MD, Britany Aceves, [facility], [facility] Henri, CNP, Mary Mcconnell, CNP, Gemma Rincon, CNS, Kyara Bernardo, CNP, Bimal Rogers, CNP, Jamie Johnson, CNP, Jocelyne Montalvo, CNP, Coco Vasquez, CNP, Fracisco Plascencia PA-C, Pascual Pederson, [facility], Mariam Rangel, CNP, Kwame Smith, CNP, Livan Andersen, CNP, Jeronimo Delaney, CNP, Imani Arias, CNP, Anne Hernandez, [address]    HISTORY AND PHYSICAL EXAMINATION            Date:   5/8/2021  Patient name:  Jerod Vivas  Date of admission:  5/8/2021 12:39 AM  MRN:   [id number]  Account:  [de-identified]  YOB: 1945  PCP:    Aminata Del Rio DO  Room:   0877/1269-84  Code Status:    Full Code    Chief Complaint:     RLE pain    History Obtained From:     patient, electronic medical record    History of Present Illness:     Jerod Vivas is a 67 y.o. Non-/non  male who presents with No chief complaint on file. and is admitted to the hospital for the management of Cellulitis. This is a 79-year-old male with underlying history of dementia, PAF on Eliquis, CHF, hyperlipidemia, HEATHER, type 2 diabetes, s/p AVR and MVR, previous history of cellulitis, and ESRD on HD presents initially to [facility] with right leg pain. Patient is resident of long-term care facility and apparently developed redness to the right upper thigh 2 days ago. Patient was at dialysis yesterday, and erythema noted to have extended.  Patient is unaccompanied is a very poor historian, most of the HPI is collected from the chart. Nursing facility staff denied any fevers, myalgias or arthralgias, nausea/vomiting, cough, or shortness of breath. He does have personal history of COVID-19 but has since returned to baseline. Pertinent labs include: BUN: 26, creatinine: 2.65, GFR: 24, glucose: 136, WBC: 10.8, hemoglobin: 12.0, blood culture x1 pending. Patient was started on IV vancomycin and is transferred to our facility for further management of cellulitis.      Past Medical History:     Past Medical History:   Diagnosis Date    Acute blood loss anemia 3/11/2019    Acute on chronic diastolic CHF (congestive heart failure) ([address] 12/2/2019    Anemia in chronic kidney disease (CKD) 4/27/2016    Arthritis     Bacteremia 11/11/2016    Benign prostatic hyperplasia without lower urinary tract symptoms     Bleeding from wound 4/2/2019    BMI 40.0-44.9, adult ([address] 3/19/2018    Cellulitis 3/21/2019    Cellulitis of left lower extremity     Chronic cerebral ischemia 1/3/2017    Closed fracture of forearm 8/13/2013    Broken lft forearm     Controlled type 2 diabetes mellitus with chronic kidney disease on chronic dialysis, with long-term current use of insulin ([address]     Controlled type 2 diabetes mellitus with diabetic polyneuropathy, with long-term current use of insulin ([address] 10/7/2018    Decubital ulcer     NON OPEN BUTTOCKS    Dementia ([address]     memory problems    Dialysis patient ([address] 01/03/2017    Goes [facility], Sat in [address] Difficult intravenous access     HAS NEEDED PICC TEAM IN THE PAST    Difficulty walking     WHEELCHAIR BOUND-PIVOTS WITH ASSIST O F 2    DJD (degenerative joint disease) of knee     Elbow, forearm, and wrist, abrasion or friction burn, without mention of infection 8/13/2013    Abrasions lft forearm     Encephalopathy acute 4/27/2016    Encounter regarding vascular access for dialysis for ESRD ([address] 2/2/2017    ESRD (end stage renal disease) on dialysis ([address] 1/17/2017    Forgetfulness     HAS SOME SHORT TERM MEMORY LOSS, QUYEN-WIFE IS LEGAL GUARDIAN    H/O transesophageal echocardiography ([facility]ADEO) for monitoring     Hemodialysis patient ([address] 11/11/2016    tues-thurs-sat [place]---[facility].  TUNNELED CATHETER RT UPPER CHEST    Hyperlipidemia 1990    on Meds    Hypertension 1990    on Meds    Intercritical gout     on Meds    Joint pain, knee 01/13/2017    baldo knee synvisc-1 injections    Long-term insulin use ([address] 1/17/2017    Major depressive disorder with single episode, in partial remission ([address] 1/3/2017    Mobility impaired     Morbid obesity ([address]     Muscle weakness     GRNERALIZED    Obesity     Obstructive sleep apnea     HEATHER on CPAP     On CPAP-TO BRING DOS    Paroxysmal atrial fibrillation ([address] 9/6/2017    Pneumonia 12/2/2019    Respiratory failure ([address] 03/2016    with open heart surgery, trached x 5 months    S/P cardiac cath     Seborrhea     Severe aortic stenosis 3/9/2016    Severe mitral valve stenosis 3/9/2016    Severe sepsis ([address] 4/3/2016    Skin rash     ABD FOLDS    Stasis dermatitis     Thyroid disease     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 lft thumb     Traumatic hemorrhagic shock ([address] 3/13/2019    Venous stasis dermatitis     Wears glasses     Wheelchair bound     pivots with 2 assists        Past Surgical History:     Past Surgical History:   Procedure Laterality Date    AORTIC VALVE REPLACEMENT  03/18/2016    bioprosthetic    ARM SURGERY Left 1990    CARDIAC CATHETERIZATION      CENTRAL VENOUS CATHETER Right 02/21/2018    DIALYSIS CATHETER Dr Dominic Ponce COLONOSCOPY  11/19/09    COLONOSCOPY N/A 10/12/2018    COLONOSCOPY WITH BIOPSY performed by Chica Rosario DO at [facility] Right 3/10/2019    WASHOUT RIGHT LOWER EXTREMITY WITH WOUND VAC EXCHANGE performed by Davidson Barrientos MD at [address] Provider, MD   benzonatate (TESSALON) 100 MG capsule Take 100 mg by mouth every 12 hours as needed for Cough    Historical Provider, MD   ipratropium-albuterol (DUONEB) 0.5-2.5 (3) MG/3ML SOLN nebulizer solution Inhale 3 mLs into the lungs every 8 hours as needed for Shortness of Breath (or wheezing)    Historical Provider, MD   guaiFENesin 400 MG tablet Take 400 mg by mouth every 12 hours as needed (allergies)    Historical Provider, MD   ondansetron (ZOFRAN) 4 MG tablet Take 4 mg by mouth every 6 hours as needed for Nausea or Vomiting    Historical Provider, MD   tiZANidine (ZANAFLEX) 2 MG tablet Take 2 mg by mouth every 8 hours as needed (muscle pain)    Historical Provider, MD   FIASP FLEXTOUCH 100 UNIT/ML SOPN Inject into the skin See Admin Instructions Per sliding scale: If 150-200 = 2 units; 201-250 = 4 units; 251-300 = 6 units; 301-350 = 8 units; 351-400 = 10 units; 401+ = 15 units, then call MD 3/4/20   Historical Provider, MD   Elastic Bandages & Supports MISC 30-40 mmHg compression stockings to both lower legs, on every morning, off at bedtime. 11/19/19   Anurag Hanks MD   gabapentin (NEURONTIN) 300 MG capsule Take 300 mg by mouth daily. Historical Provider, MD   ALPRAZolam Drucella Alice) 0.5 MG tablet Take 0.5 mg by mouth 2 times daily. Historical Provider, MD   DULoxetine (CYMBALTA) 60 MG extended release capsule Take 60 mg by mouth daily    Historical Provider, MD   loratadine (CLARITIN) 10 MG capsule Take 10 mg by mouth daily (every other day).     Historical Provider, MD   aspirin 81 MG tablet Take 1 tablet by mouth daily 4/8/19   Jose Mauricio MD   apixaban (ELIQUIS) 5 MG TABS tablet Take 1 tablet by mouth 2 times daily 4/8/19   Jose Mauricio MD   Multiple Vitamins-Minerals (THERAPEUTIC MULTIVITAMIN-MINERALS) tablet Take 1 tablet by mouth daily    Historical Provider, MD   omeprazole (PRILOSEC) 20 MG delayed release capsule Take 20 mg by mouth nightly     Historical Provider, MD   atorvastatin (LIPITOR) 40 MG tablet Take 1 tablet by mouth nightly 3/24/19   Markos May MD   midodrine (PROAMATINE) 10 MG tablet Take 1 tablet by mouth 3 times daily  Patient taking differently: Take 10 mg by mouth 3 times daily Hold if SBP over 150 3/19/19   Markos May MD   folic acid (FOLVITE) 1 MG tablet Take 1 tablet by mouth daily 3/19/19   Markos May MD   insulin glargine (LANTUS) 100 UNIT/ML injection vial Inject 25 Units into the skin 2 times daily 3/19/19   Markos May MD   Misc. Devices [facility]) MISC Use as directed 12/12/18   Octavio Mcarthur DO   Amino Acids-Protein Hydrolys (PRO-STAT PO) Take 30 mLs by mouth 3 times daily (Prostat SF) for wound healing. Historical Provider, MD   donepezil (ARICEPT) 5 MG tablet Take 5 mg by mouth every evening 10/26/16   Historical Provider, MD   levothyroxine (SYNTHROID) 25 MCG tablet Take 1 tablet by mouth Daily 10/26/16   SHAMEKA Taylor - CNP   fluticasone (FLONASE) 50 MCG/ACT nasal spray 2 sprays by Nasal route daily 10/24/16   Octvaio Mcarthur DO   magnesium hydroxide (MILK OF MAGNESIA) 400 MG/5ML suspension Take 30 mLs by mouth daily as needed for Constipation 3/29/16   Celia Negrete MD        Allergies:     Percocet [oxycodone-acetaminophen] and Ampicillin    Social History:     Tobacco:    reports that he quit smoking about 41 years ago. His smoking use included cigarettes. He started smoking about 66 years ago. He has a 50.00 pack-year smoking history. He has never used smokeless tobacco.  Alcohol:      reports no history of alcohol use. Drug Use:  reports no history of drug use. Family History:     Family History   Problem Relation Age of Onset    Lung Cancer Mother     Diabetes Mother     Alzheimer's Disease Father     Diabetes Maternal Grandmother     High Blood Pressure Sister        Review of Systems:     Positive and Negative as described in HPI.     Review of Systems   Unable to perform ROS: Dementia (Denies any current complaints, but answers no to all questions)       Physical Exam:   BP (!) 119/50   Pulse 86   Temp 98.5 °F (36.9 °C) (Oral)   Resp 18   Ht 5' 7\" (1.702 m)   Wt 227 lb 1.6 oz (103 kg)   SpO2 100%   BMI 35.57 kg/m²   Temp (24hrs), Av.1 °F (36.7 °C), Min:97.7 °F (36.5 °C), Max:98.5 °F (36.9 °C)    Recent Labs     21  1508 21  1900   POCGLU 145* 273*     No intake or output data in the 24 hours ending 21 0358    Physical Exam  Vitals signs and nursing note reviewed. Constitutional:       General: He is not in acute distress. Appearance: He is obese. He is not ill-appearing or diaphoretic. Comments: Oriented to self only   HENT:      Head: Normocephalic and atraumatic. Right Ear: External ear normal.      Left Ear: External ear normal.      Nose: Nose normal. No congestion or rhinorrhea. Mouth/Throat:      Mouth: Mucous membranes are dry. Pharynx: Oropharynx is clear. Eyes:      Extraocular Movements: Extraocular movements intact. Conjunctiva/sclera: Conjunctivae normal.      Pupils: Pupils are equal, round, and reactive to light. Neck:      Musculoskeletal: Normal range of motion and neck supple. No neck rigidity or muscular tenderness. Cardiovascular:      Rate and Rhythm: Normal rate and regular rhythm. Pulses: Normal pulses. Heart sounds: Normal heart sounds. No friction rub. No gallop. Comments: S/p AVR and MVR  Pulmonary:      Effort: Pulmonary effort is normal. No respiratory distress. Breath sounds: Normal breath sounds. No wheezing, rhonchi or rales. Abdominal:      General: Bowel sounds are normal. There is no distension. Palpations: Abdomen is soft. There is no mass. Tenderness: There is no abdominal tenderness. Comments: Obese   Musculoskeletal:         General: No tenderness or signs of injury. Right lower leg: No edema. Left lower leg: No edema.       Comments: Left upper extremity AVF   Skin:     General: Skin is warm. Capillary Refill: Capillary refill takes less than 2 seconds. Findings: Erythema present. Comments: Area of erythema noted to the right upper thigh and mild swelling   Neurological:      General: No focal deficit present. Mental Status: He is alert. Mental status is at baseline. Comments: Oriented to self only   Psychiatric:         Mood and Affect: Mood normal.         Behavior: Behavior normal.         Thought Content:  Thought content normal.         Judgment: Judgment normal.         Investigations:      Laboratory Testing:  Recent Results (from the past 24 hour(s))   CBC Auto Differential    Collection Time: 05/07/21 11:14 AM   Result Value Ref Range    WBC 10.8 3.5 - 11.3 k/uL    RBC 3.91 (L) 4.21 - 5.77 m/uL    Hemoglobin 12.0 (L) 13.0 - 17.0 g/dL    Hematocrit 38.5 (L) 40.7 - 50.3 %    MCV 98.5 82.6 - 102.9 fL    MCH 30.7 25.2 - 33.5 pg    MCHC 31.2 25.2 - 33.5 g/dL    RDW 14.2 11.8 - 14.4 %    Platelets 881 536 - 126 k/uL    MPV 9.1 8.1 - 13.5 fL    NRBC Automated 0.0 0.0 per 100 WBC    Differential Type NOT REPORTED     WBC Morphology NOT REPORTED     RBC Morphology NOT REPORTED     Platelet Estimate NOT REPORTED     Monocytes 2 (L) 6 - 14 %    Lymphocytes 12 (L) 15 - 43 %    Seg Neutrophils 86 (H) 44 - 74 %    Eosinophils % 0 (L) 1 - 8 %    Basophils 0 0 - 2 %    Immature Granulocytes 0 0 %    Absolute Mono # 0.22 0.1 - 1.2 k/uL    Absolute Lymph # 1.30 1.0 - 4.8 k/uL    Segs Absolute 9.28 (H) 1.50 - 8.10 k/uL    Absolute Eos # 0.00 0.0 - 0.4 k/uL    Basophils Absolute 0.00 0.0 - 0.2 k/uL    Absolute Immature Granulocyte 0.00 0.00 - 0.30 k/uL    Morphology RBC morphology normal.     Morphology Platelet count adequate    Comprehensive Metabolic Panel    Collection Time: 05/07/21 11:14 AM   Result Value Ref Range    Glucose 131 (H) 70 - 99 mg/dL    BUN 26 (H) 8 - 23 mg/dL    CREATININE 2.65 (H) 0.70 - 1.20 mg/dL    Bun/Cre Ratio 10 9 -

## 2021-05-08 NOTE — PLAN OF CARE
Problem: Skin Integrity:  Goal: Will show no infection signs and symptoms  Description: Will show no infection signs and symptoms  Outcome: Ongoing  Goal: Absence of new skin breakdown  Description: Absence of new skin breakdown  Outcome: Ongoing  Note: Skin assessment completed. Repositioned pt Q2 hrs and prn per protocol. Pt tolerated without S&S of discomfort observed. Gwendolyn  care performed at this time. Will continue to monitor. Call light in place. Problem: Falls - Risk of:  Goal: Will remain free from falls  Description: Will remain free from falls  Outcome: Ongoing  Note: No falls to date. Bed in lowest position with call light within reach. Floor free from obstacles and pt verbalizes understanding to call out with needs. Falls risk score evaluated-high risk. Bed alarm activated and falling star posted. Will continue to monitor additional needs.       Goal: Absence of physical injury  Description: Absence of physical injury  Outcome: Ongoing

## 2021-05-08 NOTE — CARE COORDINATION
Case Management Initial Discharge Plan  Elmer Mirza,             Met with:spouse/SO to discuss discharge plans. Information verified: address, contacts, phone number, , insurance Yes    Emergency Contact/Next of Kin name & number: spouse and children as per facesheet    PCP: Harper Tovar DO  Date of last visit: follows at [facility]    Insurance Provider: medicare    Discharge Planning    Living Arrangements:    Lives at the [address] [facility] in [address] [place] [address] Ave:       Home has 1 stories  no stairs to climb to get into front door, no stairs to climb to reach second floor  Location of bedroom/bathroom in home main    Patient able to perform ADL's:Dependent    Current Services (outpatient & in home) DME  DME equipment: did use wheelchair in past, now mostly in bed  DME provider: is diabetic, insulin dependent    Receiving oral anticoagulation therapy? Yes    If indicated: Eliquis  Physician managing anticoagulation treatment: SNF  Where does patient obtain lab work for ATC treatment? SNF      Potential Assistance Needed:       Patient agreeable to home care: No  [place] of choice provided:  n/a    Prior SNF/Rehab Placement and Facility: [address] to SNF/Rehab: Yes  [place] of choice provided: spouse would like [place] [facility] [place]: no    Expected Discharge date:       Patient expects to be discharged to: Follow Up Appointment: Best Day/ Time:      Transportation provider: [address] [place] [address]  Transportation arrangements needed for discharge: Yes    Readmission Risk              Risk of Unplanned Readmission:        22             Does patient have a readmission risk score greater than 14?: Yes  If yes, follow-up appointment must be made within 7 days of discharge.      Goals of Care: comfort care      Discharge Plan: Return to [facility] in [place], Diabetic insulin dep, dialysis m-w[facility]man in [place], has sleep apnea machine spouse will  from [address] [place] [address]

## 2021-05-08 NOTE — DISCHARGE INSTR - COC
Continuity of Care Form    Patient Name: Raudel Gaspar   :  1945  MRN:  [id number]    [facility] date:  2021  Discharge date: 2021    Code Status Order: Full Code   Advance Directives:      Admitting Physician:  Butch Cuellar MD  PCP: Merlene Ashraf DO    Discharging Nurse: PRAKASH [facility] Unit/Room#: 9170/1952-17  Discharging Unit Phone Number: [phone]    Emergency Contact:   Extended Emergency Contact Information  Primary Emergency Contact: Nati PEREZ  Address: [address] [facility] [address] Phone: [phone]  Work Phone: [phone]  Mobile Phone: [phone]  Relation: Spouse  Secondary Emergency Contact: Davidceciliamariana Wetzel  Address: N/A   [facility] [address] Phone: [phone]  Work Phone: [phone]  Mobile Phone: [phone]  Relation: Child    Past Surgical History:  Past Surgical History:   Procedure Laterality Date    AORTIC VALVE REPLACEMENT  2016    bioprosthetic    ARM SURGERY Left     CARDIAC CATHETERIZATION      CENTRAL VENOUS CATHETER Right 2018    DIALYSIS CATHETER Dr Demetrio Guerra    COLONOSCOPY  09    COLONOSCOPY N/A 10/12/2018    COLONOSCOPY WITH BIOPSY performed by Nica Barcenas DO at  [address] Right 3/10/2019    WASHOUT RIGHT LOWER EXTREMITY WITH WOUND VAC EXCHANGE performed by Carlos Hernandez MD at  [address] Right 3/8/2019    RIGHT LOWER EXTREMITY EXPLORATION, HEMATOMA EVACUATION, WOUND VAC PLACEMENT performed by Carlos Hernandez MD at [address] [place] [address] (x10-12 surgeries)    several surgeries on left arm    IR NONTUNNELED VASCULAR CATHETER  2020    IR NONTUNNELED VASCULAR CATHETER 2020 Sherron Carrasco MD [facility]    KNEE ARTHROSCOPY Left 99    MITRAL VALVE REPLACEMENT  2016    bioprosthetic     NASAL SEPTUM SURGERY      OTHER SURGICAL HISTORY  3/18/16    Aortic root Enlargement    OTHER SURGICAL HISTORY  3/18/16    ASD Closure    OTHER SURGICAL HISTORY Right 01/09/2017    AV fistula creation, wrist    OTHER SURGICAL HISTORY Right 08/29/2017    ligation of collateral branch of av fistula right wrist    OTHER SURGICAL HISTORY  04/05/2018    FISTULAGRAM WITH DR. Iqra GUARDADO EGD TRANSORAL BIOPSY SINGLE/MULTIPLE N/A 10/17/2017    EGD BIOPSY performed by Jihan Villagomez MD at [address] ANGIOACCESS AV FISTULA Right 8/29/2017     RIGHT  LOWER ARM AV FISTULA  LIGATION OF AQUIRED BRANCHES X2 performed by Danya Marte DO at [address]  3/18/16    median    VASCULAR SURGERY  12/06/2018    Right arm fistulagram,  PTA cephalic vein stenosis  /  DR Neyda Montalvo       Immunization History:   Immunization History   Administered Date(s) Administered    COVID-19, Pfizer, PF, 30mcg/0.3mL 01/04/2021, 01/25/2021    Influenza Virus Vaccine 10/24/2006, 11/18/2013    Influenza Whole 10/24/2006, 10/13/2011, 01/11/2013, 10/12/2015    Influenza, High Dose (Fluzone 65 yrs and older) 11/18/2013, 10/12/2015, 10/04/2017, 10/21/2018, 10/08/2019, 09/25/2020    Pneumococcal Conjugate 13-valent (Delorise Eylena) 10/12/2015, 10/25/2017    Pneumococcal Polysaccharide ([id number]) 11/30/2010, 10/12/2015    Tdap (Boostrix, Adacel) 10/27/2015       Active Problems:  Patient Active Problem List   Diagnosis Code    Traumatic amputation of thumb, left, sequela ([facility] S68.012S    Essential hypertension I10    Mixed hyperlipidemia E78.2    Obstructive sleep apnea G47.33    Class 1 obesity with serious comorbidity and body mass index (BMI) of 33.0 to 33.9 in adult E66.9, Z68.33    Benign prostatic hyperplasia without lower urinary tract symptoms N40.0    Venous stasis dermatitis I87.2    Severe aortic stenosis I35.0    Severe mitral valve stenosis I05.0    Anemia in chronic kidney disease, on chronic dialysis (HCC) N18.6, D63.1, Z99.2    Dialysis patient ([facility] Z99.2    Dementia ([facility] F03.90    Major depressive disorder with single episode, in partial remission ([facility] F32.4    Chronic cerebral ischemia I67.82    ESRD (end stage renal disease) on dialysis ([facility]) N18.6, Z99.2    Long-term insulin use ([facility] Z79.4    Controlled type 2 diabetes mellitus with chronic kidney disease on chronic dialysis, with long-term current use of insulin ([facility]) E11.22, N18.6, Z79.4, Z99.2    Controlled type 2 diabetes mellitus with diabetic polyneuropathy, with long-term current use of insulin ([facility]) E11.42, Z79.4    Dermatitis L30.9    Hematoma of right lower extremity S80.11XA    Hematoma of groin S30. 1XXA    Accidental fall from wheelchair W05. 0XXA    Open wound T14. 8XXA    Hematoma of right thigh S70.11XA    Wound of right leg S81.801A    Open wound of right lower extremity F84.636Y    Complication of arteriovenous dialysis fistula T82. 9XXA    Secondary hyperparathyroidism of renal origin ([facility] N25.81    Cellulitis L03.90       Isolation/Infection:   Isolation            Contact          Patient Infection Status       Infection Onset Added Last Indicated Last Indicated By Review Planned Expiration Resolved Resolved By    MDRO (multi-drug resistant organism)  04/01/19 04/01/19 Eli Elliott RN        Right lower leg proteus mirabilis and enterobacter cloacae 3/26/2019    ESBL (Extended Spectrum Beta Lactamase) 03/26/19 03/31/19 03/26/19 Anaerobic And Aerobic Culture        Resolved    COVID-19 Rule Out 08/05/20 08/05/20 08/05/20 COVID-19 (Ordered)   08/05/20 Rule-Out Test Resulted            Nurse Assessment:  Last Vital Signs: BP (!) 108/53   Pulse 77   Temp 97.8 °F (36.6 °C) (Oral)   Resp 16   Ht 5' 7\" (1.702 m)   Wt 227 lb 1.6 oz (103 kg)   SpO2 98%   BMI 35.57 kg/m²     Last documented pain score (0-10 scale): Pain Level: 0  Last Weight:   Wt Readings from Last 1 Encounters:   05/08/21 227 lb 1.6 oz (103 kg)     Mental Status:  disoriented and alert    IV Access:  - None    Nursing Mobility/ADLs:  Walking   Dependent  Transfer  Dependent  Bathing  Dependent  Dressing  Assisted  Toileting  Dependent  Feeding  Assisted  Med Admin  Dependent  Med Delivery   whole    Wound Care Documentation and Therapy:  Wound 03/22/19 Thigh Right;Medial;Proximal (Active)   Number of days: 777       Wound 12/03/19 Pretibial Right; Anterior;Distal (Active)   Number of days: 521       Wound 12/03/19 Pretibial Right; Lower;Proximal (Active)   Number of days: 521       Wound 12/03/19 Pretibial Left (Active)   Number of days: 521       Wound 08/05/20 Buttocks (Active)   Number of days: 275       Wound 08/06/20 Femoral Right (Active)   Number of days: 275       Wound 08/06/20 Arm Left; Lower;Dorsal (Active)   Number of days: 275        Elimination:  Continence: Bowel: No  Bladder: No  Urinary Catheter: None   Colostomy/Ileostomy/Ileal Conduit: No       Date of Last BM: ***  No intake or output data in the 24 hours ending 05/08/21 1045  No intake/output data recorded. Safety Concerns: At Risk for Falls    Impairments/Disabilities:      Vision and Hearing    Nutrition Therapy:  Current Nutrition Therapy:   - Oral Diet:  Carb Control 4 carbs/meal (1800kcals/day) and Renal    Routes of Feeding: Oral  Liquids: No Restrictions  Daily Fluid Restriction: no  Last Modified Barium Swallow with Video (Video Swallowing Test): not done    Treatments at the Time of Hospital Discharge:   Respiratory Treatments: ***  Oxygen Therapy:  is not on home oxygen therapy.   Ventilator:    - No ventilator support    Rehab Therapies: Physical Therapy and Occupational Therapy  Weight Bearing Status/Restrictions: No weight bearing restirctions  Other Medical Equipment (for information only, NOT a DME order):  wheelchair  Other Treatments: skilled nursing assessment    Patient's personal belongings (please select all that are sent with patient):  Shlomo    RN SIGNATURE:  Electronically signed by Annalise Velez RN on 5/12/21

## 2021-05-08 NOTE — CONSULTS
Nephrology ESRD Consult Note    Reason for Consult:  Fluid and hypertension management for pt with ESRD     Requesting Physician:   Blood    History Obtained From:  patient, electronic medical record    HD Unit:       [facility]    Dry Weight:     Reported 109 kg    Chief Complaint:  Erythema to right lower extremity    History of Present Illness: This is a 67 y.o. male with history of demential, DM, PAF, on Eliquis, and end stage renal disease on hemodialysis [facility] in [facility] under Dr Hal Garcia via right AVF, who presents to the hospital for evaluation of right lower extremity pain. He lives at [facility] in [place]. He apparently developed redness several days ago to right thigh and dialysis RN yesterday felt the erythema worsened. He was started on IV Vanco and transferred to [place] [address] for management of cellulitis. Renal labs on admission stable. His outpatient history and dialysis orders, usual dry wt  and out pt dialysis run sheets were reviewed.      Past Medical History:        Diagnosis Date    Acute blood loss anemia 3/11/2019    Acute on chronic diastolic CHF (congestive heart failure) ([address] 12/2/2019    Anemia in chronic kidney disease (CKD) 4/27/2016    Arthritis     Bacteremia 11/11/2016    Benign prostatic hyperplasia without lower urinary tract symptoms     Bleeding from wound 4/2/2019    BMI 40.0-44.9, adult ([address] 3/19/2018    Cellulitis 3/21/2019    Cellulitis of left lower extremity     Chronic cerebral ischemia 1/3/2017    Closed fracture of forearm 8/13/2013    Broken lft forearm     Controlled type 2 diabetes mellitus with chronic kidney disease on chronic dialysis, with long-term current use of insulin ([address]     Controlled type 2 diabetes mellitus with diabetic polyneuropathy, with long-term current use of insulin ([address] 10/7/2018    Decubital ulcer     NON OPEN BUTTOCKS    Dementia ([address]     memory problems    Dialysis patient ([address] 01/03/2017    Goes [facility], Sat in [place] Difficult intravenous access     HAS NEEDED PICC TEAM IN THE PAST    Difficulty walking     WHEELCHAIR BOUND-PIVOTS WITH ASSIST O F 2    DJD (degenerative joint disease) of knee     Elbow, forearm, and wrist, abrasion or friction burn, without mention of infection 8/13/2013    Abrasions lft forearm     Encephalopathy acute 4/27/2016    Encounter regarding vascular access for dialysis for ESRD ([address] 2/2/2017    ESRD (end stage renal disease) on dialysis ([address] 1/17/2017    Forgetfulness     HAS SOME SHORT TERM MEMORY LOSS, QUYEN-WIFE IS LEGAL GUARDIAN    H/O transesophageal echocardiography (AMADEO) for monitoring     Hemodialysis patient ([address] 11/11/2016    scar-sat [place]---FRESEMIUS.  TUNNELED CATHETER RT UPPER CHEST    Hyperlipidemia 1990    on Meds    Hypertension 1990    on Meds    Intercritical gout     on Meds    Joint pain, knee 01/13/2017    baldo knee synvisc-1 injections    Long-term insulin use ([address] 1/17/2017    Major depressive disorder with single episode, in partial remission ([address] 1/3/2017    Mobility impaired     Morbid obesity ([address]     Muscle weakness     GRNERALIZED    Obesity     Obstructive sleep apnea     HEATHER on CPAP     On CPAP-TO BRING DOS    Paroxysmal atrial fibrillation ([address] 9/6/2017    Pneumonia 12/2/2019    Respiratory failure ([address] 03/2016    with open heart surgery, trached x 5 months    S/P cardiac cath     Seborrhea     Severe aortic stenosis 3/9/2016    Severe mitral valve stenosis 3/9/2016    Severe sepsis ([address] 4/3/2016    Skin rash     ABD FOLDS    Stasis dermatitis     Thyroid disease     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 lft thumb     Traumatic hemorrhagic shock ([address] 3/13/2019    Venous stasis dermatitis     Wears glasses     Wheelchair bound     pivots with 2 assists       Access:  Right AVF + thrill and bruit    Past Surgical History:        Procedure Laterality Date  AORTIC VALVE REPLACEMENT  03/18/2016    bioprosthetic    ARM SURGERY Left 1990    CARDIAC CATHETERIZATION      CENTRAL VENOUS CATHETER Right 02/21/2018    DIALYSIS CATHETER Dr Cece Anne COLONOSCOPY  11/19/09    COLONOSCOPY N/A 10/12/2018    COLONOSCOPY WITH BIOPSY performed by Jordana Zendejas DO at [address] Right 3/10/2019    WASHOUT RIGHT LOWER EXTREMITY WITH WOUND VAC EXCHANGE performed by Livan Sanchez MD at [address] Right 3/8/2019    RIGHT LOWER EXTREMITY EXPLORATION, HEMATOMA EVACUATION, WOUND VAC PLACEMENT performed by Livan Sanchez MD at [address] (x10-12 surgeries)    several surgeries on left arm    IR NONTUNNELED VASCULAR CATHETER  8/5/2020    IR NONTUNNELED VASCULAR CATHETER 8/5/2020 Wojciech العلي MD [facility]    KNEE ARTHROSCOPY Left 09/17/99    MITRAL VALVE REPLACEMENT  03/18/2016    bioprosthetic     NASAL SEPTUM SURGERY      OTHER SURGICAL HISTORY  3/18/16    Aortic root Enlargement    OTHER SURGICAL HISTORY  3/18/16    ASD Closure    OTHER SURGICAL HISTORY Right 01/09/2017    AV fistula creation, wrist    OTHER SURGICAL HISTORY Right 08/29/2017    ligation of collateral branch of av fistula right wrist    OTHER SURGICAL HISTORY  04/05/2018    FISTULAGRAM WITH DR. Tamar New    NC EGD TRANSORAL BIOPSY SINGLE/MULTIPLE N/A 10/17/2017    EGD BIOPSY performed by Birgit Vega MD at [address] [facility] [address] ANGIOACCESS AV FISTULA Right 8/29/2017     RIGHT  LOWER ARM AV FISTULA  LIGATION OF AQUIRED BRANCHES X2 performed by Marylu Richmond DO at [address]  3/18/16    median    VASCULAR SURGERY  12/06/2018    Right arm fistulagram,  PTA cephalic vein stenosis  /  DR Maria       Outpatient Medications:     Medications Prior to Admission: RENVELA 800 MG tablet, Take 2 tablets by mouth 3 times daily with meals only.   Lidocaine 5 % CREA, Apply topically Apply to fistula rt arm topically one time a day every Mon, Wed, Fri for dialysis. buPROPion (WELLBUTRIN SR) 150 MG extended release tablet, Take 150 mg by mouth daily  albuterol (PROVENTIL) (2.5 MG/3ML) 0.083% nebulizer solution, Take 2.5 mg by nebulization every 8 hours as needed for Shortness of Breath  benzonatate (TESSALON) 100 MG capsule, Take 100 mg by mouth every 12 hours as needed for Cough  ipratropium-albuterol (DUONEB) 0.5-2.5 (3) MG/3ML SOLN nebulizer solution, Inhale 3 mLs into the lungs every 8 hours as needed for Shortness of Breath (or wheezing)  guaiFENesin 400 MG tablet, Take 400 mg by mouth every 12 hours as needed (allergies)  ondansetron (ZOFRAN) 4 MG tablet, Take 4 mg by mouth every 6 hours as needed for Nausea or Vomiting  tiZANidine (ZANAFLEX) 2 MG tablet, Take 2 mg by mouth every 8 hours as needed (muscle pain)  FIASP FLEXTOUCH 100 UNIT/ML SOPN, Inject into the skin See Admin Instructions Per sliding scale: If 150-200 = 2 units; 201-250 = 4 units; 251-300 = 6 units; 301-350 = 8 units; 351-400 = 10 units; 401+ = 15 units, then call MD  Elastic Bandages & Supports MISC, 30-40 mmHg compression stockings to both lower legs, on every morning, off at bedtime. gabapentin (NEURONTIN) 300 MG capsule, Take 300 mg by mouth daily. ALPRAZolam (XANAX) 0.5 MG tablet, Take 0.5 mg by mouth 2 times daily. DULoxetine (CYMBALTA) 60 MG extended release capsule, Take 60 mg by mouth daily  loratadine (CLARITIN) 10 MG capsule, Take 10 mg by mouth daily (every other day).   aspirin 81 MG tablet, Take 1 tablet by mouth daily  apixaban (ELIQUIS) 5 MG TABS tablet, Take 1 tablet by mouth 2 times daily  Multiple Vitamins-Minerals (THERAPEUTIC MULTIVITAMIN-MINERALS) tablet, Take 1 tablet by mouth daily  omeprazole (PRILOSEC) 20 MG delayed release capsule, Take 20 mg by mouth nightly   atorvastatin (LIPITOR) 40 MG tablet, Take 1 tablet by mouth nightly  midodrine (PROAMATINE) 10 MG tablet, Take 1 tablet by mouth 3 times daily (Patient taking differently: Take 10 mg by mouth 3 times daily Hold if SBP over 174)  folic acid (FOLVITE) 1 MG tablet, Take 1 tablet by mouth daily  insulin glargine (LANTUS) 100 UNIT/ML injection vial, Inject 25 Units into the skin 2 times daily  Misc. Devices [facility]) MISC, Use as directed  Amino Acids-Protein Hydrolys (PRO-STAT PO), Take 30 mLs by mouth 3 times daily (Prostat SF) for wound healing.   donepezil (ARICEPT) 5 MG tablet, Take 5 mg by mouth every evening  levothyroxine (SYNTHROID) 25 MCG tablet, Take 1 tablet by mouth Daily  fluticasone (FLONASE) 50 MCG/ACT nasal spray, 2 sprays by Nasal route daily  magnesium hydroxide (MILK OF MAGNESIA) 400 MG/5ML suspension, Take 30 mLs by mouth daily as needed for Constipation    Current Medications:    magnesium hydroxide (MILK OF MAGNESIA) 400 MG/5ML suspension 30 mL, Daily PRN  fluticasone (FLONASE) 50 MCG/ACT nasal spray 2 spray, Daily  levothyroxine (SYNTHROID) tablet 25 mcg, Daily  donepezil (ARICEPT) tablet 5 mg, QPM  midodrine (PROAMATINE) tablet 10 mg, TID  folic acid (FOLVITE) tablet 1 mg, Daily  insulin glargine (LANTUS) injection vial 25 Units, BID  atorvastatin (LIPITOR) tablet 40 mg, Nightly  therapeutic multivitamin-minerals 1 tablet, Daily  pantoprazole (PROTONIX) tablet 40 mg, QAM AC  aspirin EC tablet 81 mg, Daily  apixaban (ELIQUIS) tablet 5 mg, BID  cetirizine (ZYRTEC) tablet 5 mg, Daily  DULoxetine (CYMBALTA) extended release capsule 60 mg, Daily  lidocaine (LMX) 4 % cream, PRN  buPROPion (WELLBUTRIN SR) extended release tablet 150 mg, Daily  albuterol (PROVENTIL) nebulizer solution 2.5 mg, Q8H PRN  benzonatate (TESSALON) capsule 100 mg, Q12H PRN  ipratropium-albuterol (DUONEB) nebulizer solution 3 mL, Q8H PRN  guaiFENesin tablet 400 mg, Q12H PRN  tiZANidine (ZANAFLEX) tablet 2 mg, Q8H PRN  sevelamer (RENVELA) tablet 1,600 mg, TID  sodium chloride flush 0.9 % injection 5-40 mL, 2 times per day  sodium chloride flush 0.9 % injection 10 mL, PRN  0.9 % sodium chloride infusion, PRN  promethazine (PHENERGAN) tablet 12.5 mg, Q6H PRN    Or  ondansetron (ZOFRAN) injection 4 mg, Q6H PRN  polyethylene glycol (GLYCOLAX) packet 17 g, Daily PRN  nicotine (NICODERM CQ) 21 MG/24HR 1 patch, Daily PRN  acetaminophen (TYLENOL) tablet 650 mg, Q6H PRN    Or  acetaminophen (TYLENOL) suppository 650 mg, Q6H PRN  glucose (GLUTOSE) 40 % oral gel 15 g, PRN  dextrose 50 % IV solution, PRN  glucagon (rDNA) injection 1 mg, PRN  dextrose 5 % solution, PRN  vancomycin (VANCOCIN) intermittent dosing (placeholder), RX Placeholder  [START ON 5/10/2021] vancomycin (VANCOCIN) 1000 mg in dextrose 5% 200 mL IVPB, Q MWF        Allergies:  Percocet [oxycodone-acetaminophen] and Ampicillin    Social History:    Social History     Socioeconomic History    Marital status:      Spouse name: Malika Jimenez Number of children: 2    Years of education: Not on file    Highest education level: Not on file   Occupational History    Occupation: Retired      Employer: [employer] resource strain: Not on file    Food insecurity     Worry: Not on file     Inability: Not on file   [employer] needs     Medical: Not on file     Non-medical: Not on file   Tobacco Use    Smoking status: Former Smoker     Packs/day: 2.00     Years: 25.00     Pack years: 50.00     Types: Cigarettes     Start date: 3/17/1955     Quit date: 1980     Years since quittin.3    Smokeless tobacco: Never Used   Substance and Sexual Activity    Alcohol use: No     Alcohol/week: 0.0 standard drinks     Comment: 1-2 drinks per year    Drug use: No    Sexual activity: Never   Lifestyle    Physical activity     Days per week: Not on file     Minutes per session: Not on file    Stress: Not on file   Relationships    Social connections     Talks on phone: Not on file     Gets together: Not on file     Attends Spiritism light, EOMI. Neck:   No JVD, no thyromegaly, no lymphadenopathy. Chest:              Bilateral vesicular breath sounds, no rales or wheezes. Cardiac:  S1 S2 RR, no murmurs, gallops or rubs . Abdomen: Soft, non-tender, no masses or organomegaly, BS audible. :   No suprapubic or flank tenderness. Neuro:  AAO x 3, No FND. SKIN:  Right thigh erythema firm  Extremities:  No edema, palpable peripheral pulses, no calf tenderness  Right arm AVF + thrill and bruit    Labs:  PTH: No results found for: IPTH  CBC:   Recent Labs     05/07/21  1114 05/08/21  1006   WBC 10.8 9.5   RBC 3.91* 3.70*   HGB 12.0* 11.0*   HCT 38.5* 36.9*   MCV 98.5 99.7   MCH 30.7 29.7   MCHC 31.2 29.8   RDW 14.2 14.1    146   MPV 9.1 9.2      BMP:   Recent Labs     05/07/21  1114 05/08/21  1006    134*   K 4.3 4.4   CL 95* 93*   CO2 32* 28   BUN 26* 42*   CREATININE 2.65* 4.25*   GLUCOSE 131* 114*   CALCIUM 9.5 9.4      IRON :IRON:    Lab Results   Component Value Date    IRON 173 03/12/2019     Iron Saturation:  No components found for: PERCENTFE  TIBC:    Lab Results   Component Value Date    TIBC  03/12/2019     Unable to calculate due to low iron binding result. FERRITIN:    Lab Results   Component Value Date    FERRITIN 1,520 11/04/2020       Albumin:   Recent Labs     05/07/21  1114   LABALBU 3.7     Assessment:  1. ESRD: On  Hemodialysis. patient's regular HD days are [facility] under Dr Regina Aguilar via right AVF. Admitted with cellulitis. Dry weight reported as 109kg. Admitting weight 103kg. 2. Cellulitis: Started on Vanco per pharmacy to manage  3. Secondary hyperparathyroidism:  4. Anemia of chronic disease :  5. Dementia  6. DM  7. S/P AVR and MVR  8. PAF: On Eliquis  9. Hypotension: On ProAmatine    Plan:  1. HD as per schedule. Follow renal labs   2. Strict Input and Output, Daily weigh   3. Low Potassium, Low phosphorus and low salt diet. Fluids  restricted to 1500ml/day.      Thank you for the consultation. Please do not hesitate to call with questions. Electronically signed by LESLEY Zepeda on 5/8/2021 at 12:14 PM     Attending Physician Statement  I have discussed the care of Bryan Serrano, including pertinent history and exam findings with the CMP. I have reviewed the key elements of all parts of the encounter with the CNP. I have seen and examined the patient. I agree with the assessment and plan and status of the problem list as documented. Addiitionally I recommend next planned dialysis will be Monday, 5/10/2021.     eRba Dailey MD  Nephrology Attending Physician  [facility]

## 2021-05-08 NOTE — PROGRESS NOTES
Pharmacy Note  Vancomycin Consult    Lima Cortes is a 67 y.o. male started on Vancomycin for SSTI; consult received from Dr. Arianna Valenzuela Blood, DO  to manage therapy.      Patient Active Problem List   Diagnosis    Traumatic amputation of thumb, left, sequela ([address]    Essential hypertension    Mixed hyperlipidemia    Obstructive sleep apnea    Class 1 obesity with serious comorbidity and body mass index (BMI) of 33.0 to 33.9 in adult    Benign prostatic hyperplasia without lower urinary tract symptoms    Venous stasis dermatitis    Severe aortic stenosis    Severe mitral valve stenosis    Anemia in chronic kidney disease, on chronic dialysis ([address]    Dialysis patient ([address]    Dementia ([address]    Major depressive disorder with single episode, in partial remission ([address]    Chronic cerebral ischemia    ESRD (end stage renal disease) on dialysis ([address]    Long-term insulin use ([address]    Controlled type 2 diabetes mellitus with chronic kidney disease on chronic dialysis, with long-term current use of insulin ([address]    Controlled type 2 diabetes mellitus with diabetic polyneuropathy, with long-term current use of insulin ([facility])    Dermatitis    Hematoma of right lower extremity    Hematoma of groin    Accidental fall from wheelchair    Open wound    Hematoma of right thigh    Wound of right leg    Open wound of right lower extremity    Complication of arteriovenous dialysis fistula    Secondary hyperparathyroidism of renal origin ([address]    Cellulitis     Allergies:  Percocet [oxycodone-acetaminophen] and Ampicillin     Temp max:98.5 F    Recent Labs     05/07/21  1114   BUN 26*   CREATININE 2.65*   WBC 10.8     No intake or output data in the 24 hours ending 05/08/21 0133  Culture Date      Source                       Results  See micro    Ht Readings from Last 1 Encounters:   05/08/21 5' 7\" (1.702 m)        Wt Readings from Last 1 Encounters:   05/08/21 227 lb 1.6 oz (103 kg)       Body mass index is 35.57

## 2021-05-08 NOTE — PROGRESS NOTES
PHARMACY NOTE:    The electrolyte replacement protocol for potassium/magnesium has been discontinued per P&T guidelines because the patient has reduced renal function (CrCl < 30 mL/min). The patient's most recent potassium & magnesium levels are:  Recent Labs     05/07/21  1114   K 4.3     Estimated Creatinine Clearance: 29 mL/min (A) (based on SCr of 2.65 mg/dL (H)). For patients with decreased renal function (below 30ml/min) needing potassium/magnesium supplementation, please order individual bolus doses with appropriate monitoring. Please contact the inpatient pharmacy with any concerns. Thank you.     [facility]  Pharmacist  [phone]

## 2021-05-09 NOTE — PROGRESS NOTES
[facility]  Office: [address], DO, Leslee eller, DO, Lola Vasquez, DO, Arnold Jocye Blood, DO, Delisa Klein MD, Barron Curtis MD, Temi Rodgers MD, Shravan Sánchez MD, Janet Javed MD, Exie Ahumada, MD, Tyree Ray MD, Leeroy Doyle MD, Fred Garibay, DO, Robert Carreon MD, Jairo Harris DO, Lupillo Coughlin MD,  Asia Batres DO, Jaen Castañeda MD, Itz Hung MD, Mlee Kent MD, Marysol Frank MD, Singh Lu, Philly Asp, CNP, Josh Stone, [facility], Екатерина Cespedes, CNS, Brijesh Coughlin, CNP, Rod Maureret, CNP, Vivian Whiteside, CNP, Eduardo Driscoll, CNP, Luis Baig, CNP, Andrea Orellana, PA-C, Trip Del Valle, [facility], Etelvina Solis, CNP, Steffany Palma, CNP, Adilson Lincoln, CNP, Kp Kennedy, CNP, Eduard Hurtado, CNP, Rayo Preston, [address] [place] [address]    Progress Note    5/9/2021    7:58 AM    Name:   Connie Bunn  MRN:     [id number]     Acct:      [de-identified]   Room:   [facility]1607-22   Day:  1  Admit Date:  5/8/2021 12:39 AM    PCP:   Alyssa King DO  Code Status:  Full Code    Subjective:     C/C: right upper thigh rash      Interval History Status: not changed. Pt was seen and examined this morning  No acute events overnight  Resting comfortably in bed at this time and eating breakfast   Denies having any complaints       Review of Systems:     12 point ROS performed and negative for anything other htan what was stated in subjective     Medications: Allergies:     Allergies   Allergen Reactions    Percocet [Oxycodone-Acetaminophen] Itching and Rash     Also causes shaking    Ampicillin Rash       Current Meds:   Scheduled Meds:    fluticasone  2 spray Nasal Daily    levothyroxine  25 mcg Oral Daily    donepezil  5 mg Oral QPM    midodrine  10 mg Oral TID    folic acid  1 mg Oral Daily    insulin glargine  25 Units Subcutaneous BID    atorvastatin  40 mg Oral Nightly  therapeutic multivitamin-minerals  1 tablet Oral Daily    pantoprazole  40 mg Oral QAM AC    aspirin  81 mg Oral Daily    apixaban  5 mg Oral BID    cetirizine  5 mg Oral Daily    DULoxetine  60 mg Oral Daily    buPROPion  150 mg Oral Daily    sevelamer  1,600 mg Oral TID    sodium chloride flush  5-40 mL Intravenous 2 times per day    vancomycin (VANCOCIN) intermittent dosing (placeholder)   Other RX Placeholder    [START ON 5/10/2021] vancomycin  1,000 mg Intravenous Q MWF     Continuous Infusions:    sodium chloride      dextrose       PRN Meds: magnesium hydroxide, lidocaine, albuterol, benzonatate, ipratropium-albuterol, guaiFENesin, tiZANidine, sodium chloride flush, sodium chloride, promethazine **OR** ondansetron, polyethylene glycol, nicotine, acetaminophen **OR** acetaminophen, glucose, dextrose, glucagon (rDNA), dextrose    Data:     Past Medical History:   has a past medical history of Acute blood loss anemia, Acute on chronic diastolic CHF (congestive heart failure) ([facility] Anemia in chronic kidney disease (CKD), Arthritis, Bacteremia, Benign prostatic hyperplasia without lower urinary tract symptoms, Bleeding from wound, BMI 40.0-44.9, adult ([facility]), Cellulitis, Cellulitis of left lower extremity, Chronic cerebral ischemia, Closed fracture of forearm, Controlled type 2 diabetes mellitus with chronic kidney disease on chronic dialysis, with long-term current use of insulin ([address] Controlled type 2 diabetes mellitus with diabetic polyneuropathy, with long-term current use of insulin ([facility] Decubital ulcer, Dementia ([address] Dialysis patient ([address] Difficult intravenous access, Difficulty walking, DJD (degenerative joint disease) of knee, Elbow, forearm, and wrist, abrasion or friction burn, without mention of infection, Encephalopathy acute, Encounter regarding vascular access for dialysis for ESRD ([address] ESRD (end stage renal disease) on dialysis ([address] Forgetfulness, H/O transesophageal echocardiography ([facility]ADEO) for monitoring, Hemodialysis patient ([facility] Hyperlipidemia, Hypertension, Intercritical gout, Joint pain, knee, Long-term insulin use ([facility] Major depressive disorder with single episode, in partial remission ([facility] Mobility impaired, Morbid obesity ([facility] Muscle weakness, Obesity, Obstructive sleep apnea, HEATHER on CPAP, Paroxysmal atrial fibrillation ([facility] Pneumonia, Respiratory failure ([facility] S/P cardiac cath, Seborrhea, Severe aortic stenosis, Severe mitral valve stenosis, Severe sepsis (HCC), Skin rash, Stasis dermatitis, Thyroid disease, Traumatic amputation of thumb, Traumatic hemorrhagic shock ([facility] Venous stasis dermatitis, Wears glasses, and Wheelchair bound. Social History:   reports that he quit smoking about 41 years ago. His smoking use included cigarettes. He started smoking about 66 years ago. He has a 50.00 pack-year smoking history. He has never used smokeless tobacco. He reports that he does not drink alcohol or use drugs. Family History:   Family History   Problem Relation Age of Onset    Lung Cancer Mother     Diabetes Mother     Alzheimer's Disease Father     Diabetes Maternal Grandmother     High Blood Pressure Sister        Vitals:  BP (!) 147/62   Pulse 72   Temp 98.2 °F (36.8 °C) (Oral)   Resp 18   Ht 5' 7\" (1.702 m)   Wt 227 lb 1.6 oz (103 kg)   SpO2 99%   BMI 35.57 kg/m²   Temp (24hrs), Av.1 °F (36.7 °C), Min:97.8 °F (36.6 °C), Max:98.2 °F (36.8 °C)    Recent Labs     21  1508 21  1900 21  2100   POCGLU 145* 273* 163*       I/O (24Hr):     Intake/Output Summary (Last 24 hours) at 2021 0758  Last data filed at 2021 0558  Gross per 24 hour   Intake 130 ml   Output --   Net 130 ml       Labs:  Hematology:  Recent Labs     21  1114 21  1006   WBC 10.8 9.5   RBC 3.91* 3.70*   HGB 12.0* 11.0*   HCT 38.5* 36.9*   MCV 98.5 99.7   MCH 30.7 29.7   MCHC 31.2 29.8   RDW 14.2 14.1    146   MPV 9.1 9.2 Dementia ([facility] 5/8/2021 Yes    Overview Addendum 1/17/2017  7:50 PM by Kaylee Portillo, DO     Mild, mostly short term memory loss, wife is POA         Controlled type 2 diabetes mellitus with chronic kidney disease on chronic dialysis, with long-term current use of insulin ([facility] 5/8/2021 Yes          Plan:        1. Rt upper thigh cellulitis  - continue IV vancomycin with dialysis   - wbc count normal   - blood cultures NGTD   - afebrile   - ID consulted as the family has expressed concern given the recurrent nature of the patients cellulitis. 2. ESRD   - mgmt per nephrology   - HD on M/W/Fr    3. Dementia   - continue aricept     4. DMII   - accu checks ac/hs with ISS   - continue lantus 25 units BID     5. Paroxysmal atrial fib   - sinus rhythm   - telemetry   - eliquis for anti coagulation     6. HLD   - continue statin     7. Hypothyroidism   - continue home synthroid     8.  GERD   - protonix         Irene Gonzales MD  5/9/2021  7:58 AM

## 2021-05-09 NOTE — CONSULTS
10.0    Micro:  Blood Cx 5/7: no growth thus far. Imaging:    I have personally reviewed the past medical history, past surgical history, medications, social history, and family history, and I haveupdated the database accordingly. Allergies:   Percocet [oxycodone-acetaminophen] and Ampicillin     Review of Systems:     Review of Systems   Constitutional: Negative for chills and fever. HENT: Negative for rhinorrhea and sinus pressure. Eyes: Negative for visual disturbance. Cardiovascular: Negative for chest pain and leg swelling. Gastrointestinal: Negative for abdominal distention and abdominal pain. Endocrine: Negative for polyuria. Genitourinary: Negative for dysuria and hematuria. Musculoskeletal: Negative for myalgias and neck pain. Skin: Positive for color change. Allergic/Immunologic: Negative for immunocompromised state. Neurological: Negative for dizziness and facial asymmetry. Hematological: Negative for adenopathy. Psychiatric/Behavioral: Negative for agitation. Physical Examination :       Physical Exam  Constitutional:       General: He is not in acute distress. Appearance: Normal appearance. He is obese. He is not ill-appearing. HENT:      Head: Normocephalic and atraumatic. Nose: No congestion or rhinorrhea. Mouth/Throat:      Mouth: Mucous membranes are moist.   Eyes:      General: No scleral icterus. Pupils: Pupils are equal, round, and reactive to light. Neck:      Musculoskeletal: No neck rigidity or muscular tenderness. Cardiovascular:      Rate and Rhythm: Normal rate and regular rhythm. Heart sounds: Normal heart sounds. No murmur. Pulmonary:      Effort: Pulmonary effort is normal.      Breath sounds: No wheezing or rhonchi. Abdominal:      General: There is no distension. Palpations: Abdomen is soft. Tenderness: There is no abdominal tenderness.    Genitourinary:     Comments: No rosado  Musculoskeletal: General: No swelling or deformity. Skin:     General: Skin is warm. Findings: Rash present. Comments: Streaking erythema of the right upper lower extremity wrapping from the thigh to the posterior quad area. No open wounds noted. Neurological:      General: No focal deficit present. Mental Status: He is alert.       Comments: dementic   Psychiatric:         Mood and Affect: Mood normal.         Past Medical History:     Past Medical History:   Diagnosis Date    Acute blood loss anemia 3/11/2019    Acute on chronic diastolic CHF (congestive heart failure) ([address] 12/2/2019    Anemia in chronic kidney disease (CKD) 4/27/2016    Arthritis     Bacteremia 11/11/2016    Benign prostatic hyperplasia without lower urinary tract symptoms     Bleeding from wound 4/2/2019    BMI 40.0-44.9, adult ([address] 3/19/2018    Cellulitis 3/21/2019    Cellulitis of left lower extremity     Chronic cerebral ischemia 1/3/2017    Closed fracture of forearm 8/13/2013    Broken lft forearm     Controlled type 2 diabetes mellitus with chronic kidney disease on chronic dialysis, with long-term current use of insulin ([address]     Controlled type 2 diabetes mellitus with diabetic polyneuropathy, with long-term current use of insulin ([address] 10/7/2018    Decubital ulcer     NON OPEN BUTTOCKS    Dementia ([address]     memory problems    Dialysis patient ([address] 01/03/2017    Goes Tue, [place], Sat in [address] Difficult intravenous access     HAS NEEDED PICC TEAM IN THE PAST    Difficulty walking     WHEELCHAIR BOUND-PIVOTS WITH ASSIST O F 2    DJD (degenerative joint disease) of knee     Elbow, forearm, and wrist, abrasion or friction burn, without mention of infection 8/13/2013    Abrasions lft forearm     Encephalopathy acute 4/27/2016    Encounter regarding vascular access for dialysis for ESRD ([address] 2/2/2017    ESRD (end stage renal disease) on dialysis ([address] 1/17/2017    Forgetfulness     HAS SOME SHORT TERM MEMORY LOSS, QUYEN-WIFE IS LEGAL GUARDIAN    H/O transesophageal echocardiography (AMADEO) for monitoring     Hemodialysis patient ([address] 11/11/2016    tues-thurs-sat [place]---[facility]ESEMIUS.  TUNNELED CATHETER RT UPPER CHEST    Hyperlipidemia 1990    on Meds    Hypertension 1990    on Meds    Intercritical gout     on Meds    Joint pain, knee 01/13/2017    baldo knee synvisc-1 injections    Long-term insulin use ([address] 1/17/2017    Major depressive disorder with single episode, in partial remission ([address] 1/3/2017    Mobility impaired     Morbid obesity ([address]     Muscle weakness     GRNERALIZED    Obesity     Obstructive sleep apnea     HEATHER on CPAP     On CPAP-TO BRING DOS    Paroxysmal atrial fibrillation ([address] 9/6/2017    Pneumonia 12/2/2019    Respiratory failure ([address] 03/2016    with open heart surgery, trached x 5 months    S/P cardiac cath     Seborrhea     Severe aortic stenosis 3/9/2016    Severe mitral valve stenosis 3/9/2016    Severe sepsis ([address] 4/3/2016    Skin rash     ABD FOLDS    Stasis dermatitis     Thyroid disease     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 lft thumb     Traumatic hemorrhagic shock ([address] 3/13/2019    Venous stasis dermatitis     Wears glasses     Wheelchair bound     pivots with 2 assists       Past Surgical  History:     Past Surgical History:   Procedure Laterality Date    AORTIC VALVE REPLACEMENT  03/18/2016    bioprosthetic    ARM SURGERY Left 1990    CARDIAC CATHETERIZATION      CENTRAL VENOUS CATHETER Right 02/21/2018    DIALYSIS CATHETER Dr Steph Saenz COLONOSCOPY  11/19/09    COLONOSCOPY N/A 10/12/2018    COLONOSCOPY WITH BIOPSY performed by Rosemary Tamayo DO at [address] [place] [address] Right 3/10/2019    WASHOUT RIGHT LOWER EXTREMITY WITH WOUND VAC EXCHANGE performed by Eamon Hernandez MD at [address] [place] [address] Right 3/8/2019    RIGHT LOWER EXTREMITY EXPLORATION, HEMATOMA EVACUATION, WOUND VAC PLACEMENT performed by Luis Kelly MD at [address] (F54-23 surgeries)    several surgeries on left arm    IR NONTUNNELED VASCULAR CATHETER  8/5/2020    IR NONTUNNELED VASCULAR CATHETER 8/5/2020 Daysi Martell MD [facility] SPECIAL PROCEDURES    KNEE ARTHROSCOPY Left 09/17/99    MITRAL VALVE REPLACEMENT  03/18/2016    bioprosthetic     NASAL SEPTUM SURGERY      OTHER SURGICAL HISTORY  3/18/16    Aortic root Enlargement    OTHER SURGICAL HISTORY  3/18/16    ASD Closure    OTHER SURGICAL HISTORY Right 01/09/2017    AV fistula creation, wrist    OTHER SURGICAL HISTORY Right 08/29/2017    ligation of collateral branch of av fistula right wrist    OTHER SURGICAL HISTORY  04/05/2018    FISTULAGRAM WITH DR. Ailyn Zheng    VT EGD TRANSORAL BIOPSY SINGLE/MULTIPLE N/A 10/17/2017    EGD BIOPSY performed by Roberta Dalal MD at [facility] Endoscopy    VT LIGATN ANGIOACCESS AV FISTULA Right 8/29/2017     RIGHT  LOWER ARM AV FISTULA  LIGATION OF AQUIRED BRANCHES X2 performed by Lalitha Sullivan DO at [address]  3/18/16    median    VASCULAR SURGERY  12/06/2018    Right arm fistulagram,  PTA cephalic vein stenosis  /  DR Robbin Alfred       Medications:      fluticasone  2 spray Nasal Daily    levothyroxine  25 mcg Oral Daily    donepezil  5 mg Oral QPM    midodrine  10 mg Oral TID    folic acid  1 mg Oral Daily    insulin glargine  25 Units Subcutaneous BID    atorvastatin  40 mg Oral Nightly    therapeutic multivitamin-minerals  1 tablet Oral Daily    pantoprazole  40 mg Oral QAM AC    aspirin  81 mg Oral Daily    apixaban  5 mg Oral BID    cetirizine  5 mg Oral Daily    DULoxetine  60 mg Oral Daily    buPROPion  150 mg Oral Daily    sevelamer  1,600 mg Oral TID    sodium chloride flush  5-40 mL Intravenous 2 times per day    vancomycin (VANCOCIN) intermittent dosing (placeholder)   Other RX Placeholder    [START ON 5/10/2021] vancomycin  1,000 mg Intravenous Q MWF       Social History:     Social History     Socioeconomic History    Marital status:      Spouse name: Buck Egan Number of children: 2    Years of education: Not on file    Highest education level: Not on file   Occupational History    Occupation: Retired      Employer: [address] resource strain: Not on file    Food insecurity     Worry: Not on file     Inability: Not on file   [employer] needs     Medical: Not on file     Non-medical: Not on file   Tobacco Use    Smoking status: Former Smoker     Packs/day: 2.00     Years: 25.00     Pack years: 50.00     Types: Cigarettes     Start date: 3/17/1955     Quit date: 1980     Years since quittin.3    Smokeless tobacco: Never Used   Substance and Sexual Activity    Alcohol use: No     Alcohol/week: 0.0 standard drinks     Comment: 1-2 drinks per year    Drug use: No    Sexual activity: Never   Lifestyle    Physical activity     Days per week: Not on file     Minutes per session: Not on file    Stress: Not on file   Relationships    Social connections     Talks on phone: Not on file     Gets together: Not on file     Attends Islam service: Not on file     Active member of club or organization: Not on file     Attends meetings of clubs or organizations: Not on file     Relationship status: Not on file    Intimate partner violence     Fear of current or ex partner: Not on file     Emotionally abused: Not on file     Physically abused: Not on file     Forced sexual activity: Not on file   Other Topics Concern    Not on file   Social History Narrative    Not on file       Family History:     Family History   Problem Relation Age of Onset    Lung Cancer Mother     Diabetes Mother     Alzheimer's Disease Father     Diabetes Maternal Grandmother     High Blood Pressure Sister       Medical Decision Making:   I have independently reviewed/ordered the following labs:    CBC with Differential:   Recent Labs     05/07/21  1114 05/07/21  1114 05/09/21  0744 05/09/21  1017   WBC 10.8   < > 7.4 8.8   HGB 12.0*   < > 10.3* 10.0*   HCT 38.5*   < > 34.2* 32.8*      < > 145 137*   LYMPHOPCT 12*  --   --  13*   MONOPCT 2*  --   --  7    < > = values in this interval not displayed. BMP:  Recent Labs     05/09/21  0744 05/09/21  1017   * 130*   K 4.1 3.7   CL 92* 90*   CO2 25 24   BUN 67* 67*   CREATININE 5.96* 6.00*     Hepatic Function Panel:   Recent Labs     05/07/21  1114 05/09/21  1017   PROT 7.2 5.9*   LABALBU 3.7 3.3*   BILITOT 0.37 0.27*   ALKPHOS 132* 114   ALT 12 12   AST 21 15     No results for input(s): RPR in the last 72 hours. No results for input(s): HIV in the last 72 hours. No results for input(s): BC in the last 72 hours. Lab Results   Component Value Date    CREATININE 6.00 05/09/2021    GLUCOSE 122 05/09/2021       Detailed results: Thank you for allowing us to participate in the care of this patient. Please call with questions. This note is created with the assistance of a speech recognition program.  While intending to generate adocument that actually reflects the content of the visit, the document can still have some errors including those of syntax and sound a like substitutions which may escape proof reading. It such instances, actual meaningcan be extrapolated by contextual diversion. Yolanda Noland MD  Internal Medicine Resident, PGY-3  [facility]; [facility]  5/9/2021, 12:49 PM            I have discussed the care of the patient, including pertinent history and exam findings,  with the resident. I have seen and examined the patient and the key elements of all parts of the encounter have been performed by me. I agree with the assessment, plan and orders as documented by the resident.     Brenna Edmond, Infectious Diseases

## 2021-05-09 NOTE — PROGRESS NOTES
Renal Progress Note    Patient :  Adriana Coates; 67 y.o. MRN# [id number]  Location:  [address] [place], [place] [id number]  Attending:  Wilfred Carey MD  Admit Date:  5/8/2021   Hospital Day: 1      Subjective: Following for ESRD MWF in [place] under Dr Regina Aguilar, admitted with cellulitis. Resides at [address] [place] [address] in [place]. Started on Vanco on dialysis days. Blood pressure stable with proamatine. Erythema appears to have slight improvement from yesterday. Denies dyspnea. Outpatient Medications:     Medications Prior to Admission: RENVELA 800 MG tablet, Take 2 tablets by mouth 3 times daily with meals only. Lidocaine 5 % CREA, Apply topically Apply to fistula rt arm topically one time a day every Mon, Wed, Fri for dialysis. buPROPion (WELLBUTRIN SR) 150 MG extended release tablet, Take 150 mg by mouth daily  albuterol (PROVENTIL) (2.5 MG/3ML) 0.083% nebulizer solution, Take 2.5 mg by nebulization every 8 hours as needed for Shortness of Breath  benzonatate (TESSALON) 100 MG capsule, Take 100 mg by mouth every 12 hours as needed for Cough  ipratropium-albuterol (DUONEB) 0.5-2.5 (3) MG/3ML SOLN nebulizer solution, Inhale 3 mLs into the lungs every 8 hours as needed for Shortness of Breath (or wheezing)  guaiFENesin 400 MG tablet, Take 400 mg by mouth every 12 hours as needed (allergies)  ondansetron (ZOFRAN) 4 MG tablet, Take 4 mg by mouth every 6 hours as needed for Nausea or Vomiting  tiZANidine (ZANAFLEX) 2 MG tablet, Take 2 mg by mouth every 8 hours as needed (muscle pain)  FIASP FLEXTOUCH 100 UNIT/ML SOPN, Inject into the skin See Admin Instructions Per sliding scale: If 150-200 = 2 units; 201-250 = 4 units; 251-300 = 6 units; 301-350 = 8 units; 351-400 = 10 units; 401+ = 15 units, then call MD  Elastic Bandages & Supports MISC, 30-40 mmHg compression stockings to both lower legs, on every morning, off at bedtime. gabapentin (NEURONTIN) 300 MG capsule, Take 300 mg by mouth daily.   ALPRAZolam (XANAX) 0.5 MG tablet, Take 0.5 mg by mouth 2 times daily. DULoxetine (CYMBALTA) 60 MG extended release capsule, Take 60 mg by mouth daily  loratadine (CLARITIN) 10 MG capsule, Take 10 mg by mouth daily (every other day). aspirin 81 MG tablet, Take 1 tablet by mouth daily  apixaban (ELIQUIS) 5 MG TABS tablet, Take 1 tablet by mouth 2 times daily  Multiple Vitamins-Minerals (THERAPEUTIC MULTIVITAMIN-MINERALS) tablet, Take 1 tablet by mouth daily  omeprazole (PRILOSEC) 20 MG delayed release capsule, Take 20 mg by mouth nightly   atorvastatin (LIPITOR) 40 MG tablet, Take 1 tablet by mouth nightly  midodrine (PROAMATINE) 10 MG tablet, Take 1 tablet by mouth 3 times daily (Patient taking differently: Take 10 mg by mouth 3 times daily Hold if SBP over 499)  folic acid (FOLVITE) 1 MG tablet, Take 1 tablet by mouth daily  insulin glargine (LANTUS) 100 UNIT/ML injection vial, Inject 25 Units into the skin 2 times daily  Misc. Devices [facility]) MISC, Use as directed  Amino Acids-Protein Hydrolys (PRO-STAT PO), Take 30 mLs by mouth 3 times daily (Prostat SF) for wound healing.   donepezil (ARICEPT) 5 MG tablet, Take 5 mg by mouth every evening  levothyroxine (SYNTHROID) 25 MCG tablet, Take 1 tablet by mouth Daily  fluticasone (FLONASE) 50 MCG/ACT nasal spray, 2 sprays by Nasal route daily  magnesium hydroxide (MILK OF MAGNESIA) 400 MG/5ML suspension, Take 30 mLs by mouth daily as needed for Constipation    Current Medications:     Scheduled Meds:    fluticasone  2 spray Nasal Daily    levothyroxine  25 mcg Oral Daily    donepezil  5 mg Oral QPM    midodrine  10 mg Oral TID    folic acid  1 mg Oral Daily    insulin glargine  25 Units Subcutaneous BID    atorvastatin  40 mg Oral Nightly    therapeutic multivitamin-minerals  1 tablet Oral Daily    pantoprazole  40 mg Oral QAM AC    aspirin  81 mg Oral Daily    apixaban  5 mg Oral BID    cetirizine  5 mg Oral Daily    DULoxetine  60 mg Oral Daily    buPROPion  150 mg Oral Daily    sevelamer  1,600 mg Oral TID    sodium chloride flush  5-40 mL Intravenous 2 times per day    vancomycin (VANCOCIN) intermittent dosing (placeholder)   Other RX Placeholder    [START ON 5/10/2021] vancomycin  1,000 mg Intravenous Q MWF     Continuous Infusions:    sodium chloride      dextrose       PRN Meds:  magnesium hydroxide, lidocaine, albuterol, benzonatate, ipratropium-albuterol, guaiFENesin, tiZANidine, sodium chloride flush, sodium chloride, promethazine **OR** ondansetron, polyethylene glycol, nicotine, acetaminophen **OR** acetaminophen, glucose, dextrose, glucagon (rDNA), dextrose    Input/Output:       I/O last 3 completed shifts: In: 130 [P.O.:120; I.V.:10]  Out: - .      Patient Vitals for the past 96 hrs (Last 3 readings):   Weight   21 0110 227 lb 1.6 oz (103 kg)       Vital Signs:   Temperature:  Temp: 98.2 °F (36.8 °C)  TMax:   Temp (24hrs), Av.1 °F (36.7 °C), Min:97.8 °F (36.6 °C), Max:98.2 °F (36.8 °C)    Respirations:  Resp: 18  Pulse:   Pulse: 72  BP:    BP: (!) 147/62  BP Range: Systolic (57KYH), PFB:911 , Min:112 , DEU:301       Diastolic (47TWK), AQO:54, Min:54, Max:62      Physical Examination:     General:  Oriented to self  HEENT: Atraumatic, normocephalic, no throat congestion, moist mucosa. Eyes:   Pupils equal, round and reactive to light, EOMI. Neck:   No JVD, no thyromegaly, no lymphadenopathy. Chest:   Bilateral vesicular breath sounds, no rales or wheezes. Cardiac:  S1 S2 RR, no murmurs, gallops or rubs, JVP not raised. Abdomen: Soft, non-tender, no masses or organomegaly, BS audible. :   No suprapubic or flank tenderness. SKIN:  Erythema to right thigh  Extremities:  No edema, palpable peripheral pulses, no calf tenderness.   Right arm AVF + thrill and bruit    Labs:       Recent Labs     21  1006 21  0744 21  1017   WBC 9.5 7.4 8.8   RBC 3.70* 3.46* 3.28*   HGB 11.0* 10.3* 10.0*   HCT 36.9* 34.2* 32.8*   MCV 99.7 98.8 100.0   MCH 29.7 29.8 30.5   MCHC 29.8 30.1 30.5   RDW 14.1 14.2 14.2    145 137*   MPV 9.2 9.2 9.1      BMP:   Recent Labs     05/08/21  1006 05/09/21  0744 05/09/21  1017   * 133* 130*   K 4.4 4.1 3.7   CL 93* 92* 90*   CO2 28 25 24   BUN 42* 67* 67*   CREATININE 4.25* 5.96* 6.00*   GLUCOSE 114* 82 122*   CALCIUM 9.4 8.9 8.6        Recent Labs     05/07/21  1114 05/09/21  1017   LABALBU 3.7 3.3*     BNP:    No results found for: BNP  TERESSA:      Lab Results   Component Value Date    TERESSA NEGATIVE 03/21/2016     SPEP:  Lab Results   Component Value Date    PROT 5.9 05/09/2021    ALBCAL 3.0 03/21/2016    ALBPCT 65 03/21/2016    LABALPH 0.3 03/21/2016    LABALPH 0.6 03/21/2016    A1PCT 6 03/21/2016    A2PCT 12 03/21/2016    LABBETA 0.4 03/21/2016    BETAPCT 9 03/21/2016    GAMGLOB 0.4 03/21/2016    GGPCT 8 03/21/2016    PATH ELECTRONICALLY SIGNED.  Jamee Pereyra M.D. 03/21/2016     UPEP:     Lab Results   Component Value Date    LABPE NORMAL ELECTROPHORETIC PATTERN 03/21/2016     C3:     Lab Results   Component Value Date    C3 50 03/21/2016     C4:     Lab Results   Component Value Date    C4 21 03/21/2016     Hep BsAg:         Lab Results   Component Value Date    HEPBSAG NONREACTIVE 05/24/2016     Hep C AB:          Lab Results   Component Value Date    HEPCAB NONREACTIVE 11/11/2016       Urinalysis/Chemistries:      Lab Results   Component Value Date    NITRU NEGATIVE 04/27/2016    COLORU DELORES 04/27/2016    PHUR 5.0 04/27/2016    WBCUA TOO NUMEROUS TO COUNT 04/27/2016    RBCUA 10 TO 20 04/27/2016    MUCUS NOT REPORTED 04/27/2016    TRICHOMONAS NOT REPORTED 04/27/2016    YEAST NOT REPORTED 04/27/2016    BACTERIA MANY 04/27/2016    SPECGRAV 1.015 04/27/2016    LEUKOCYTESUR LARGE 04/27/2016    UROBILINOGEN Normal 04/27/2016    BILIRUBINUR NEGATIVE 04/27/2016    GLUCOSEU NEGATIVE 04/27/2016    KETUA NEGATIVE 04/27/2016    AMORPHOUS 2+ 04/27/2016     Urine Sodium:     Lab Results   Component Value Date    RAY 20 04/27/2016     Urine Osmolarity:   Lab Results   Component Value Date    OSMOU 403 03/26/2016     Urine Protein:   No components found for: TOTALPROTEIN, URINE   Urine Creatinine:     Lab Results   Component Value Date    LABCREA 95.7 04/27/2016       Radiology:     CXR:     Assessment:     1. ESRD: On  Hemodialysis. patient's regular HD days are [facility] under Dr No Sutherland via right AVF. Admitted with cellulitis. Dry weight reported as 109kg. Admitting weight 103kg. 2. Cellulitis: Started on Vanco per pharmacy to manage  3. Secondary hyperparathyroidism:  4. Anemia of chronic disease :  5. Dementia  6. DM  7. S/P AVR and MVR  8. PAF: On Eliquis  9. Hypotension: On ProAmatine       Plan:   1. Plan HD tomorrow  2. Strict Input and Output, Daily weigh   3. Low Potassium, Low phosphorus and low salt diet. Fluids  restricted to 1500ml/day. 4. Vanco on HD days. Nutrition   Please ensure that patient is on a renal diet/TF. Avoid nephrotoxic drugs/contrast exposure. We will continue to follow along with you. Cristina Elizabeth CNP    Attending Physician Statement  I have discussed the care of Connie Bunn, including pertinent history and exam findings with the CNP. I have reviewed the key elements of all parts of the encounter with the CNP. I have seen and examined the patient. I agree with the assessment and plan and status of the problem list as documented. Addiitionally I recommend hemodialysis tomorrow, 5/10/2021.     Angela Calix MD  Nephrology Attending Physician  [facility]

## 2021-05-10 NOTE — PROGRESS NOTES
Dialysis Post Treatment Note  Vitals:    05/10/21 1326   BP: (!) 128/58   Pulse: 79   Resp: 18   Temp: 97.9 °F (36.6 °C)   SpO2:      Pre-Weight = 105.6kg  Post-weight = 105.3kg  Total Liters Processed = Total Liters Processed (l/min): 94.1 l/min  Rinseback Volume (mL) = Rinseback Volume (ml): 300 ml  Net Removal (mL) = NET Removed (ml): 700 ml  Patient's dry weight=109kg  Type of access used=avf 15gx2  Length of treatment=210    sabine well no issues

## 2021-05-10 NOTE — PROGRESS NOTES
[facility]  Office: Liang Lawson, DO, Lulu Dangelo, DO, Anamaria Strauss, DO, Anne Marie Ashton Blood, DO, Alejandro Rebolledo MD, Apple Sen MD, Jessica Corrales MD, Renato Tamayo MD, Melisa Goldmann, MD, Humera Harris MD, Pernell Kirkland MD, Samara Rolle MD, Jean Osman DO, Juliana Ramey MD, Kt Lopez DO, Billy Reynolds MD,  Caitlin Cleaning DO, Jennifer Joseph MD, Dia Kimball MD, Jesse Rey MD, Barney Epstein MD, Matt Booker, Marcy Lincoln, CNP, Nathan Parikh, CNP, Lev Gordon, CNS, Carolyn Day, CNP, Veena Hendrickson, CNP, Nory Boo, CNP, Reva Kelley, CNP, Alvin Garsia, CNP, Carey Foster PA-C, Josh Vital, [facility], Felipe Graham, CNP, Soledad Navarro, CNP, Brady Spencer, CNP, Kathy Hernández, CNP, Faraz Jose, CNP, Claudette David, [address] [place] [address]    Progress Note    5/10/2021    8:07 AM    Name:   Aaliyah Manning  MRN:     [id number]     Acct:      [de-identified]   Room:   [address] [place] [address] Day:  2  Admit Date:  5/8/2021 12:39 AM    PCP:   Heath Hester DO  Code Status:  Full Code    Subjective:     C/C: rash on thigh     Interval History Status: improved. Pt was seen and examined this morning  No acute events overnight   Resting comfortably in bed at this time   Eating breakfast  Denies having any complaints     Brief History:     67 y.o. male with history of demential, DM, PAF, on Eliquis, and end stage renal disease on hemodialysis [facility] in  [facility] under Dr Lorenza Espana via right AVF, who presents to the hospital for evaluation of right lower extremity pain. He lives at [facility] in [place]. He apparently developed redness several days ago to right thigh and dialysis RN yesterday felt the erythema worsened. He was started on IV Vanco and transferred to [facility] for management of cellulitis.       ID on board per family request given the chronic recurrence of cellulitis. See below for full A/P     Review of Systems:     12 point ROS performed and negative for anything other than what was stated in subjective     Medications: Allergies:     Allergies   Allergen Reactions    Percocet [Oxycodone-Acetaminophen] Itching and Rash     Also causes shaking    Ampicillin Rash       Current Meds:   Scheduled Meds:    fluticasone  2 spray Nasal Daily    levothyroxine  25 mcg Oral Daily    donepezil  5 mg Oral QPM    midodrine  10 mg Oral TID    folic acid  1 mg Oral Daily    insulin glargine  25 Units Subcutaneous BID    atorvastatin  40 mg Oral Nightly    therapeutic multivitamin-minerals  1 tablet Oral Daily    pantoprazole  40 mg Oral QAM AC    aspirin  81 mg Oral Daily    apixaban  5 mg Oral BID    cetirizine  5 mg Oral Daily    DULoxetine  60 mg Oral Daily    buPROPion  150 mg Oral Daily    sevelamer  1,600 mg Oral TID    sodium chloride flush  5-40 mL Intravenous 2 times per day    vancomycin (VANCOCIN) intermittent dosing (placeholder)   Other RX Placeholder    vancomycin  1,000 mg Intravenous Q MWF     Continuous Infusions:    sodium chloride      dextrose       PRN Meds: magnesium hydroxide, lidocaine, albuterol, benzonatate, ipratropium-albuterol, guaiFENesin, tiZANidine, sodium chloride flush, sodium chloride, promethazine **OR** ondansetron, polyethylene glycol, nicotine, acetaminophen **OR** acetaminophen, glucose, dextrose, glucagon (rDNA), dextrose    Data:     Past Medical History:   has a past medical history of Acute blood loss anemia, Acute on chronic diastolic CHF (congestive heart failure) ([facility] [address] Anemia in chronic kidney disease (CKD), Arthritis, Bacteremia, Benign prostatic hyperplasia without lower urinary tract symptoms, Bleeding from wound, BMI 40.0-44.9, adult (HCC), Cellulitis, Cellulitis of left lower extremity, Chronic cerebral ischemia, Closed fracture of forearm, Controlled type 2 diabetes mellitus with chronic kidney disease on chronic dialysis, with long-term current use of insulin ([facility] [address] Controlled type 2 diabetes mellitus with diabetic polyneuropathy, with long-term current use of insulin ([facility] [address] Decubital ulcer, Dementia ([facility] [address] Dialysis patient ([facility] [address] Difficult intravenous access, Difficulty walking, DJD (degenerative joint disease) of knee, Elbow, forearm, and wrist, abrasion or friction burn, without mention of infection, Encephalopathy acute, Encounter regarding vascular access for dialysis for ESRD ([facility] [address] ESRD (end stage renal disease) on dialysis ([facility] [address] Forgetfulness, H/O transesophageal echocardiography ([facility]ADEO) for monitoring, Hemodialysis patient ([facility] [address] Hyperlipidemia, Hypertension, Intercritical gout, Joint pain, knee, Long-term insulin use ([facility] [address] Major depressive disorder with single episode, in partial remission ([facility] [address] Mobility impaired, Morbid obesity ([facility] [address] Muscle weakness, Obesity, Obstructive sleep apnea, HEATHER on CPAP, Paroxysmal atrial fibrillation ([address] Pneumonia, Respiratory failure ([facility] [address] S/P cardiac cath, Seborrhea, Severe aortic stenosis, Severe mitral valve stenosis, Severe sepsis ([facility]), Skin rash, Stasis dermatitis, Thyroid disease, Traumatic amputation of thumb, Traumatic hemorrhagic shock ([address] Venous stasis dermatitis, Wears glasses, and Wheelchair bound. Social History:   reports that he quit smoking about 41 years ago. His smoking use included cigarettes. He started smoking about 66 years ago. He has a 50.00 pack-year smoking history. He has never used smokeless tobacco. He reports that he does not drink alcohol or use drugs.      Family History:   Family History   Problem Relation Age of Onset    Lung Cancer Mother     Diabetes Mother     Alzheimer's Disease Father     Diabetes Maternal Grandmother     High Blood Pressure Sister        Vitals:  /71   Pulse 72   Temp 98.3 °F (36.8 °C)   Resp 16   Ht 5' 7\" (1.702 m)   Wt 227 lb 1.6 oz (103 kg)   SpO2 99%   BMI 35.57 kg/m²   Temp (24hrs), Av.2 °F (36.8 °C), Min:98.1 °F (36.7 °C), Max:98.3 °F (36.8 °C)    Recent Labs     21  0757 21  1206 05/09/21  2053 05/10/21  0737   POCGLU 80 126* 272* 84       I/O (24Hr): Intake/Output Summary (Last 24 hours) at 5/10/2021 0807  Last data filed at 5/10/2021 0511  Gross per 24 hour   Intake 110 ml   Output --   Net 110 ml       Labs:  Hematology:  Recent Labs     21  0744 05/09/21  1017 05/10/21  0645   WBC 7.4 8.8 7.8   RBC 3.46* 3.28* 3.39*   HGB 10.3* 10.0* 10.4*   HCT 34.2* 32.8* 32.9*   MCV 98.8 100.0 97.1   MCH 29.8 30.5 30.7   MCHC 30.1 30.5 31.6   RDW 14.2 14.2 14.1    137* 156   MPV 9.2 9.1 9.3     Chemistry:  Recent Labs     05/09/21  0744 05/09/21  1017 05/10/21  0645   * 130* 135   K 4.1 3.7 4.3   CL 92* 90* 93*   CO2 25 24 24   GLUCOSE 82 122* 92   BUN 67* 67* 83*   CREATININE 5.96* 6.00* 7.15*   ANIONGAP 16 16 18*   LABGLOM 9* 9* 8*   GFRAA 11* 11* 9*   CALCIUM 8.9 8.6 8.7     Recent Labs     21  1114 21  1114 21  1900 21  2100 21  0757 21  1017 21  1206 05/09/21  2053 05/10/21  0737   PROT 7.2  --   --   --   --  5.9*  --   --   --    LABALBU 3.7  --   --   --   --  3.3*  --   --   --    AST 21  --   --   --   --  15  --   --   --    ALT 12  --   --   --   --  12  --   --   --    ALKPHOS 132*  --   --   --   --  114  --   --   --    BILITOT 0.37  --   --   --   --  0.27*  --   --   --    POCGLU  --    < > 273* 163* 80  --  126* 272* 84    < > = values in this interval not displayed.      ABG:  Lab Results   Component Value Date    POCPH 7.558 03/10/2019    POCPCO2 34.3 03/10/2019    POCPO2 127.3 03/10/2019    POCHCO3 30.6 03/10/2019    NBEA NOT REPORTED 03/10/2019    PBEA 8 03/10/2019    RXF9IUN 32 03/10/2019    NHTP5IHE 99 03/10/2019    FIO2 30.0 03/10/2019     Lab Results   Component Value Date/Time    SPECIAL NOT REPORTED 2021 11:22 AM     Lab Results   Component Value Date/Time    CULTURE NO GROWTH 3 DAYS 05/07/2021 11:22 AM       Radiology:  No results found. Physical Examination:        General appearance:  alert, cooperative and no distress  Mental Status:  oriented to person, place and time and normal affect  Lungs:  clear to auscultation bilaterally, normal effort  Heart:  regular rate and rhythm, no murmur  Abdomen:  soft, nontender, nondistended, normal bowel sounds  Extremities: no clubbing/cyanosis/edema  Skin:   Right anterior/medial thigh erythema improved     Assessment:        Hospital Problems           Last Modified POA    * (Principal) Cellulitis 5/8/2021 Yes    Anemia in chronic kidney disease, on chronic dialysis ([facility] [address] 5/8/2021 Yes    Obstructive sleep apnea 5/8/2021 Yes    Class 1 obesity with serious comorbidity and body mass index (BMI) of 33.0 to 33.9 in adult 5/8/2021 Yes    Dialysis patient ([facility] [address] 5/8/2021 Yes    Overview Addendum 1/17/2017  7:51 PM by Pancho Pulliam DO     Goes [facility], Sat in [facility]) 5/8/2021 Yes    Overview Addendum 1/17/2017  7:50 PM by Pancho Pluliam DO     Mild, mostly short term memory loss, wife is POA         Controlled type 2 diabetes mellitus with chronic kidney disease on chronic dialysis, with long-term current use of insulin ([facility] [address] 5/8/2021 Yes          Plan:        1. Rt upper thigh cellulitis  - continue IV vancomycin with dialysis   - wbc count normal   - blood cultures NGTD   - afebrile   - ID on board, will f/u final antibiotic recommendations   - cellulitis appears much improved this morning      2. ESRD   - mgmt per nephrology   - HD on M/W/Fr     3. Dementia   - continue aricept      4. DMII   - accu checks ac/hs with ISS   - continue lantus 25 units BID      5. Paroxysmal atrial fib   - sinus rhythm   - telemetry   - eliquis for anti coagulation      6. HLD   - continue statin      7. Hypothyroidism   - continue home synthroid      8.  GERD   - protonix     Starr Kaur MD  5/10/2021  8:07 AM

## 2021-05-10 NOTE — CARE COORDINATION
TRANSITIONAL CARE PLANNING/ 2 [facility] [place] Day: 2    Reason for Admission: Cellulitis [L03.90]     Called [facility] in [place] to confirm pt has a bed hold at their facility. Spoke with Martha who confirms there is a bed hold and requests we call with updates regarding discharge. Pt in dialysis today.  Currently on vanco, awaiting results of blood cultures

## 2021-05-10 NOTE — PROGRESS NOTES
NEPHROLOGY DIALYSIS NOTE    PROCEDURE    Patient seen on Hemodialysis  5/10/2021 at 12:44 PM  Access cannulated without problems      TREATMENT ORDERS   See dialysis flowsheet for specifics on access, blood flow rate, dialysate baths, duration of dialysis, anticoagulation and other technical information. Dialysis Bath  K+ (Potassium): 3  Ca+ (Calcium): 2.25  Na+ (Sodium): 140  HCO3 (Bicarb): 35       INVESTIGATIONS     Last 3 CMP:    Recent Labs     05/09/21  0744 05/09/21 1017 05/10/21  0645   * 130* 135   K 4.1 3.7 4.3   CL 92* 90* 93*   CO2 25 24 24   BUN 67* 67* 83*   CREATININE 5.96* 6.00* 7.15*   CALCIUM 8.9 8.6 8.7   PROT  --  5.9*  --    LABALBU  --  3.3*  --    BILITOT  --  0.27*  --    ALKPHOS  --  114  --    AST  --  15  --    ALT  --  12  --        Last 3 CBC:  Recent Labs     05/09/21 0744 05/09/21 1017 05/10/21  0645   WBC 7.4 8.8 7.8   RBC 3.46* 3.28* 3.39*   HGB 10.3* 10.0* 10.4*   HCT 34.2* 32.8* 32.9*   MCV 98.8 100.0 97.1   MCH 29.8 30.5 30.7   MCHC 30.1 30.5 31.6   RDW 14.2 14.2 14.1    137* 156   MPV 9.2 9.1 9.3         GFR: Estimated Creatinine Clearance: 10 mL/min (A) (based on SCr of 7.15 mg/dL (HH)). Phosphorus:  No results for input(s): PHOS in the last 72 hours. Magnesium: No results for input(s): MG in the last 72 hours.   Albumin:   Recent Labs     05/09/21  1017   LABALBU 3.3*     PTH                :No results found for: PTH           MEDICATIONS     Scheduled Meds:    fluticasone  2 spray Nasal Daily    levothyroxine  25 mcg Oral Daily    donepezil  5 mg Oral QPM    midodrine  10 mg Oral TID    folic acid  1 mg Oral Daily    insulin glargine  25 Units Subcutaneous BID    atorvastatin  40 mg Oral Nightly    therapeutic multivitamin-minerals  1 tablet Oral Daily    pantoprazole  40 mg Oral QAM AC    aspirin  81 mg Oral Daily    apixaban  5 mg Oral BID    cetirizine  5 mg Oral Daily    DULoxetine  60 mg Oral Daily    buPROPion  150 mg Oral Daily    sevelamer  1,600 mg Oral TID    sodium chloride flush  5-40 mL Intravenous 2 times per day    vancomycin (VANCOCIN) intermittent dosing (placeholder)   Other RX Placeholder    vancomycin  1,000 mg Intravenous Q MWF     Continuous Infusions:    sodium chloride      dextrose       PRN Meds:  magnesium hydroxide, lidocaine, albuterol, benzonatate, ipratropium-albuterol, guaiFENesin, tiZANidine, sodium chloride flush, sodium chloride, promethazine **OR** ondansetron, polyethylene glycol, nicotine, acetaminophen **OR** acetaminophen, glucose, dextrose, glucagon (rDNA), dextrose  Home Meds:                Medications Prior to Admission: RENVELA 800 MG tablet, Take 2 tablets by mouth 3 times daily with meals only. Lidocaine 5 % CREA, Apply topically Apply to fistula rt arm topically one time a day every Mon, Wed, Fri for dialysis. buPROPion (WELLBUTRIN SR) 150 MG extended release tablet, Take 150 mg by mouth daily  albuterol (PROVENTIL) (2.5 MG/3ML) 0.083% nebulizer solution, Take 2.5 mg by nebulization every 8 hours as needed for Shortness of Breath  benzonatate (TESSALON) 100 MG capsule, Take 100 mg by mouth every 12 hours as needed for Cough  ipratropium-albuterol (DUONEB) 0.5-2.5 (3) MG/3ML SOLN nebulizer solution, Inhale 3 mLs into the lungs every 8 hours as needed for Shortness of Breath (or wheezing)  guaiFENesin 400 MG tablet, Take 400 mg by mouth every 12 hours as needed (allergies)  ondansetron (ZOFRAN) 4 MG tablet, Take 4 mg by mouth every 6 hours as needed for Nausea or Vomiting  tiZANidine (ZANAFLEX) 2 MG tablet, Take 2 mg by mouth every 8 hours as needed (muscle pain)  FIASP FLEXTOUCH 100 UNIT/ML SOPN, Inject into the skin See Admin Instructions Per sliding scale:  If 150-200 = 2 units; 201-250 = 4 units; 251-300 = 6 units; 301-350 = 8 units; 351-400 = 10 units; 401+ = 15 units, then call MD  Elastic Bandages & Supports MISC, 30-40 mmHg compression stockings to both lower legs, on every morning, off at  Hemodialysis. patient's regular HD days [id number] at [address] [place] under Dr Jamilah Armas via right AVF.  Admitted with cellulitis.  Dry weight reported as 109kg.  Admitting weight 103kg. 2. Cellulitis: Started on Vanco per pharmacy to manage  3. Secondary hyperparathyroidism:  4. Anemia of chronic disease :  5. Dementia  6. DM  7. S/P AVR and MVR  8. PAF: On Eliquis    The patient was seen and examined while on dialysis. Professional oversight of the patients dialysis care,access care and dialysis related co-morbidities were addressed as necessary with the patient and /or staff.     This note is created with the assistance of a speech-recognition program. While intending to generate a document that actually reflects the content of the visit, no guarantees can be provided that every mistake has been identified and corrected by editing    Yessenia Jim MD, MRCP Ethan Baker, [facility]   5/10/2021 12:44 PM    [facility]

## 2021-05-11 NOTE — PROGRESS NOTES
[facility]  Office: [address], DO, Misael Seat, DO, Sindy Ruben, DO, Amelia Blackman, DO, Evelin Horvath MD, Yamil Baird MD, Yashira Engle MD, Parrish Alvarez MD, Delisa Rick MD, Lizz Granados MD, Alex Zavala MD, Carrie Moeller MD, Oswaldo Torres, DO, Raysa Benito MD, Meri Cuevas, DO, Marco Antonio Singh MD,  Josué Olmos, DO, Mariia See MD, Eri Simmons MD, Christopher Gonzalez MD, Silvia Benton MD, Liz Diaz, Pastor Cheng, CNP, Farooq Valentino, CNP, Esther Castillo, CNS, Dionte Aguilar, CNP, Jean Rodriguez, CNP, Rocky Ham, CNP, Huber Valero, CNP, Kamron Presley, CNP, MILEY GarciaC, Tonya Naik, [facility], Harris Banda, CNP, Benjamin Rawls, CNP, John Lane, CNP, Juan Pablo Shi, CNP, Brooklynn Anthony, CNP, Krystle Phoenix, [address] [place] [address]    Progress Note    5/11/2021    2:34 PM    Name:   Omid Barrett  MRN:     [id number]     Acct:      [de-identified]   Room:   [address] [place] [address] Day:  3  Admit Date:  5/8/2021 12:39 AM    PCP:   Mahi Ly DO  Code Status:  Full Code    Subjective:     C/C: cellulitis   Interval History Status: not changed. Patient seen and examined at bedside. Patient's wife is also at bedside. Patient has history of dementia and is currently only oriented to person. Denies any chest pain, shortness of breath constantly breathing, nausea, vomiting, diarrhea. Brief History:     67 y. o. male with history of demential, DM, PAF, on Eliquis, and end stage renal disease on hemodialysis [id number] in [facility] [place] under Dr Elias Blackman via right AVF, who presents to the hospital for evaluation of right lower extremity pain.  He lives at [facility] in [address] apparently developed redness several days ago to right thigh and dialysis RN yesterday felt the erythema worsened. [facility] was started on IV Vanco until 5/19.        Review of Systems: Constitutional:  negative for chills, fevers, sweats  Respiratory:  negative for cough, dyspnea on exertion, shortness of breath, wheezing  Cardiovascular:  negative for chest pain, chest pressure/discomfort, lower extremity edema, palpitations  Gastrointestinal:  negative for abdominal pain, constipation, diarrhea, nausea, vomiting  Neurological:  negative for dizziness, headache    Medications: Allergies:     Allergies   Allergen Reactions    Percocet [Oxycodone-Acetaminophen] Itching and Rash     Also causes shaking    Ampicillin Rash       Current Meds:   Scheduled Meds:    midodrine  10 mg Oral TID WC    sevelamer  1,600 mg Oral TID WC    fluticasone  2 spray Nasal Daily    levothyroxine  25 mcg Oral Daily    donepezil  5 mg Oral QPM    folic acid  1 mg Oral Daily    insulin glargine  25 Units Subcutaneous BID    atorvastatin  40 mg Oral Nightly    therapeutic multivitamin-minerals  1 tablet Oral Daily    pantoprazole  40 mg Oral QAM AC    aspirin  81 mg Oral Daily    apixaban  5 mg Oral BID    cetirizine  5 mg Oral Daily    DULoxetine  60 mg Oral Daily    buPROPion  150 mg Oral Daily    sodium chloride flush  5-40 mL Intravenous 2 times per day    vancomycin (VANCOCIN) intermittent dosing (placeholder)   Other RX Placeholder    vancomycin  1,000 mg Intravenous Q MWF     Continuous Infusions:    sodium chloride      dextrose       PRN Meds: magnesium hydroxide, lidocaine, albuterol, benzonatate, ipratropium-albuterol, guaiFENesin, tiZANidine, sodium chloride flush, sodium chloride, promethazine **OR** ondansetron, polyethylene glycol, nicotine, acetaminophen **OR** acetaminophen, glucose, dextrose, glucagon (rDNA), dextrose    Data:     Past Medical History:   has a past medical history of Acute blood loss anemia, Acute on chronic diastolic CHF (congestive heart failure) ([facility] [address] Anemia in chronic kidney disease (CKD), Arthritis, Bacteremia, Benign prostatic hyperplasia without lower urinary tract symptoms, Bleeding from wound, BMI 40.0-44.9, adult ([facility]), Cellulitis, Cellulitis of left lower extremity, Chronic cerebral ischemia, Closed fracture of forearm, Controlled type 2 diabetes mellitus with chronic kidney disease on chronic dialysis, with long-term current use of insulin ([facility] [address] Controlled type 2 diabetes mellitus with diabetic polyneuropathy, with long-term current use of insulin ([facility] [address] Decubital ulcer, Dementia ([facility] [address] Dialysis patient ([facility] [address] Difficult intravenous access, Difficulty walking, DJD (degenerative joint disease) of knee, Elbow, forearm, and wrist, abrasion or friction burn, without mention of infection, Encephalopathy acute, Encounter regarding vascular access for dialysis for ESRD ([facility] [address] ESRD (end stage renal disease) on dialysis ([facility] [address] Forgetfulness, H/O transesophageal echocardiography ([facility]) for monitoring, Hemodialysis patient ([facility] [address] Hyperlipidemia, Hypertension, Intercritical gout, Joint pain, knee, Long-term insulin use ([facility] [address] Major depressive disorder with single episode, in partial remission ([facility] [address] Mobility impaired, Morbid obesity ([facility] [address] Muscle weakness, Obesity, Obstructive sleep apnea, HEATHER on CPAP, Paroxysmal atrial fibrillation ([address] Pneumonia, Respiratory failure ([address] S/P cardiac cath, Seborrhea, Severe aortic stenosis, Severe mitral valve stenosis, Severe sepsis ([facility]), Skin rash, Stasis dermatitis, Thyroid disease, Traumatic amputation of thumb, Traumatic hemorrhagic shock ([facility] [address] Venous stasis dermatitis, Wears glasses, and Wheelchair bound. Social History:   reports that he quit smoking about 41 years ago. His smoking use included cigarettes. He started smoking about 66 years ago. He has a 50.00 pack-year smoking history. He has never used smokeless tobacco. He reports that he does not drink alcohol or use drugs.      Family History:   Family History   Problem Relation Age of Onset    Lung Cancer Mother     Diabetes Mother     Alzheimer's Disease Father  Diabetes Maternal Grandmother     High Blood Pressure Sister        Vitals:  BP (!) 148/66   Pulse 72   Temp 97.4 °F (36.3 °C) (Oral)   Resp 18   Ht 5' 7\" (1.702 m)   Wt 232 lb 2.3 oz (105.3 kg)   SpO2 99%   BMI 36.36 kg/m²   Temp (24hrs), Av.6 °F (36.4 °C), Min:97.4 °F (36.3 °C), Max:98 °F (36.7 °C)    Recent Labs     05/10/21  0737 05/10/21  2048 05/11/21  0817 21  1117   POCGLU 84 166* 85 88       I/O (24Hr):   No intake or output data in the 24 hours ending 21 1434    Labs:  Hematology:  Recent Labs     21  0744 21  1017 05/10/21  0645   WBC 7.4 8.8 7.8   RBC 3.46* 3.28* 3.39*   HGB 10.3* 10.0* 10.4*   HCT 34.2* 32.8* 32.9*   MCV 98.8 100.0 97.1   MCH 29.8 30.5 30.7   MCHC 30.1 30.5 31.6   RDW 14.2 14.2 14.1    137* 156   MPV 9.2 9.1 9.3     Chemistry:  Recent Labs     21  0744 21  1017 05/10/21  0645   * 130* 135   K 4.1 3.7 4.3   CL 92* 90* 93*   CO2 25 24 24   GLUCOSE 82 122* 92   BUN 67* 67* 83*   CREATININE 5.96* 6.00* 7.15*   ANIONGAP 16 16 18*   LABGLOM 9* 9* 8*   GFRAA 11* 11* 9*   CALCIUM 8.9 8.6 8.7     Recent Labs     21  1017 21  1206 05/09/21  2053 05/10/21  0737 05/10/21  2048 05/11/21  0817 21  1117   PROT 5.9*  --   --   --   --   --   --    LABALBU 3.3*  --   --   --   --   --   --    AST 15  --   --   --   --   --   --    ALT 12  --   --   --   --   --   --    ALKPHOS 114  --   --   --   --   --   --    BILITOT 0.27*  --   --   --   --   --   --    POCGLU  --  126* 272* 84 166* 85 88     ABG:  Lab Results   Component Value Date    POCPH 7.558 03/10/2019    POCPCO2 34.3 03/10/2019    POCPO2 127.3 03/10/2019    POCHCO3 30.6 03/10/2019    NBEA NOT REPORTED 03/10/2019    PBEA 8 03/10/2019    PAX7VMN 32 03/10/2019    LTRV0JVR 99 03/10/2019    FIO2 30.0 03/10/2019     Lab Results   Component Value Date/Time    SPECIAL NOT REPORTED 2021 11:22 AM     Lab Results   Component Value Date/Time    CULTURE NO GROWTH 4 with CM, will need to make sure pt able to get HD tomorrow prior to d/c.     Dell Miranda DO  5/11/2021  2:34 PM

## 2021-05-11 NOTE — CARE COORDINATION
Transition planning   Unable to get transportation arranged for transfer to [address] [place] [address] and they also could not arrange transport in am for dialysis.  Transfer set up for tomorrow at 4pm will follow Nursing report number [phone] - Jolene from [address] [place] [address] notified

## 2021-05-11 NOTE — PROGRESS NOTES
[facility] -   Infectious diseases evaluation  admission date 5/8/2021    reason for consultation:   Cellulitis  Impression :   Current:  · Right lower extremity cellulitis    Other:  · Dm type II  · PAF on AC  · ESRD on HD  · Hyponatremia/Hypochloremia. Discussion / summary of stay / plan of care   · Patient presents from a long term care facility for the treatment of cellulitis of the upper right lower extremity. · Patient on HD for ESRD. History of chronic venous insufficiency. Recommendations   · Keep iv Vancomycin. w HD-  Total of 7-10 days  · Stop vanco on 5/19  · Will follow  · Chart reconsiled -ok for DC    Infection Control Recommendations   · [place] Precautions  · Contact Isolation     Antimicrobial Stewardship Recommendations   · Simplification of therapy  · Targeted therapy    Coordination ofOutpatient Care:   · Estimated Length of IV antimicrobials:  · Patient will need Midline / picc Catheter Insertion:   · Patient will need SNF:  · Patient will need outpatient wound care:     History of Present Illness:   Initial history:  PMH: PAF on AC, CHF, HLD, HEATHER, Type II DM, AVR & MVR, ESRD on HD, chronic venous stasis with history of skin grafts. Raudel Gaspar is a 67y.o.-year-old male presented as a resident from a long term care facility after developing erythema to the right upper thigh x 2 days. Progressive erythema expanding at this time. Denies fevers, chills, chest pain, shortness of breath, abdominal pain, or N/V/D. Patient afebrile and hemodynamically stable. Hyponatremia/hypochloremia. No leukocytosis. Hemoglobin stable. Interval changes    Patient Vitals for the past 8 hrs:   BP Temp Temp src Pulse Resp SpO2   05/11/21 0810 (!) 148/66 97.4 °F (36.3 °C) Oral 72 18 99 %   Seen in hemodialysis stable, right thigh cellulitis drastically improved, he has no fever chills.     No positive cultures    Summary of relevant labs:  Labs:  Na 130  Cr 6.0    WBC 10.8->9.5->8.8-7.8   Hgb 10.0    Micro:  Blood Cx 5/7: no growth thus far    Imaging:    I have personally reviewed the past medical history, past surgical history, medications, social history, and family history, and I haveupdated the database accordingly. Allergies:   Percocet [oxycodone-acetaminophen] and Ampicillin     Review of Systems:     Review of Systems   Constitutional: Negative for appetite change, diaphoresis and fever. HENT: Negative for congestion, rhinorrhea and trouble swallowing. Eyes: Negative for visual disturbance. Respiratory: Negative for cough and shortness of breath. Cardiovascular: Negative for chest pain and leg swelling. Gastrointestinal: Negative for abdominal distention, abdominal pain and vomiting. Endocrine: Negative for polyphagia. Genitourinary: Negative for dysuria, flank pain and hematuria. Musculoskeletal: Negative for myalgias and neck pain. Skin: Positive for color change. Allergic/Immunologic: Negative for immunocompromised state. Neurological: Negative for dizziness, syncope, light-headedness and numbness. Hematological: Negative for adenopathy. Psychiatric/Behavioral: Negative for agitation. Physical Examination :       Physical Exam  Constitutional:       Appearance: Normal appearance. He is obese. He is not ill-appearing or toxic-appearing. HENT:      Head: Normocephalic and atraumatic. Nose: No congestion or rhinorrhea. Mouth/Throat:      Mouth: Mucous membranes are moist.   Eyes:      General: No scleral icterus. Pupils: Pupils are equal, round, and reactive to light. Neck:      Musculoskeletal: No neck rigidity or muscular tenderness. Cardiovascular:      Rate and Rhythm: Normal rate and regular rhythm. Heart sounds: Normal heart sounds. No murmur. No friction rub. Pulmonary:      Effort: Pulmonary effort is normal. No respiratory distress. Breath sounds: No wheezing or rhonchi. Abdominal:      General: There is no distension. Palpations: Abdomen is soft. Tenderness: There is no abdominal tenderness. Genitourinary:     Comments: No rosado  Musculoskeletal:         General: Signs of injury present. No swelling, tenderness or deformity. Skin:     General: Skin is warm. Findings: Rash present. Comments: Streaking erythema of the right upper lower extremity wrapping from the thigh to the posterior quad area. No open wounds noted. Neurological:      General: No focal deficit present. Mental Status: He is alert.       Comments: dementia   Psychiatric:         Mood and Affect: Mood normal.         Past Medical History:     Past Medical History:   Diagnosis Date    Acute blood loss anemia 3/11/2019    Acute on chronic diastolic CHF (congestive heart failure) ([address] 12/2/2019    Anemia in chronic kidney disease (CKD) 4/27/2016    Arthritis     Bacteremia 11/11/2016    Benign prostatic hyperplasia without lower urinary tract symptoms     Bleeding from wound 4/2/2019    BMI 40.0-44.9, adult ([address] 3/19/2018    Cellulitis 3/21/2019    Cellulitis of left lower extremity     Chronic cerebral ischemia 1/3/2017    Closed fracture of forearm 8/13/2013    Broken lft forearm     Controlled type 2 diabetes mellitus with chronic kidney disease on chronic dialysis, with long-term current use of insulin ([address]     Controlled type 2 diabetes mellitus with diabetic polyneuropathy, with long-term current use of insulin ([address] 10/7/2018    Decubital ulcer     NON OPEN BUTTOCKS    Dementia ([address]     memory problems    Dialysis patient ([address] 01/03/2017    Goes Tue, [place], Sat in [place]    Difficult intravenous access     HAS NEEDED PICC TEAM IN THE PAST    Difficulty walking     WHEELCHAIR BOUND-PIVOTS WITH ASSIST O F 2    DJD (degenerative joint disease) of knee     Elbow, forearm, and wrist, abrasion or friction burn, without mention of infection 8/13/2013 Abrasions lft forearm     Encephalopathy acute 4/27/2016    Encounter regarding vascular access for dialysis for ESRD ([facility] 2/2/2017    ESRD (end stage renal disease) on dialysis ([address] 1/17/2017    Forgetfulness     HAS SOME SHORT TERM MEMORY LOSS, QUYEN-WIFE IS LEGAL GUARDIAN    H/O transesophageal echocardiography ([facility]) for monitoring     Hemodialysis patient ([address] 11/11/2016    tues-thurs-sat [place]---[facility]ESEMIUS.  TUNNELED CATHETER RT UPPER CHEST    Hyperlipidemia 1990    on Meds    Hypertension 1990    on Meds    Intercritical gout     on Meds    Joint pain, knee 01/13/2017    baldo knee synvisc-1 injections    Long-term insulin use ([address] 1/17/2017    Major depressive disorder with single episode, in partial remission ([address] 1/3/2017    Mobility impaired     Morbid obesity ([address]     Muscle weakness     GRNERALIZED    Obesity     Obstructive sleep apnea     HEATHER on CPAP     On CPAP-TO BRING DOS    Paroxysmal atrial fibrillation ([address] 9/6/2017    Pneumonia 12/2/2019    Respiratory failure ([address] 03/2016    with open heart surgery, trached x 5 months    S/P cardiac cath     Seborrhea     Severe aortic stenosis 3/9/2016    Severe mitral valve stenosis 3/9/2016    Severe sepsis (HCC) 4/3/2016    Skin rash     ABD FOLDS    Stasis dermatitis     Thyroid disease     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 lft thumb     Traumatic hemorrhagic shock ([address] 3/13/2019    Venous stasis dermatitis     Wears glasses     Wheelchair bound     pivots with 2 assists       Past Surgical  History:     Past Surgical History:   Procedure Laterality Date    AORTIC VALVE REPLACEMENT  03/18/2016    bioprosthetic    ARM SURGERY Left 1990    CARDIAC CATHETERIZATION      CENTRAL VENOUS CATHETER Right 02/21/2018    DIALYSIS CATHETER Dr Nyle Ahumada COLONOSCOPY  11/19/09    COLONOSCOPY N/A 10/12/2018    COLONOSCOPY WITH BIOPSY performed by Cristina Tapia DO at [address] [place] [address] WOUND OF EXTREMITY Right 3/10/2019    WASHOUT RIGHT LOWER EXTREMITY WITH WOUND VAC EXCHANGE performed by Lesli Leblanc MD at [address] Right 3/8/2019    RIGHT LOWER EXTREMITY EXPLORATION, HEMATOMA EVACUATION, WOUND VAC PLACEMENT performed by Lesli Leblanc MD at [address] (x10-12 surgeries)    several surgeries on left arm    IR NONTUNNELED VASCULAR CATHETER  8/5/2020    IR NONTUNNELED VASCULAR CATHETER 8/5/2020 Julia Angulo MD [facility] SPECIAL PROCEDURES    KNEE ARTHROSCOPY Left 09/17/99    MITRAL VALVE REPLACEMENT  03/18/2016    bioprosthetic     NASAL SEPTUM SURGERY      OTHER SURGICAL HISTORY  3/18/16    Aortic root Enlargement    OTHER SURGICAL HISTORY  3/18/16    ASD Closure    OTHER SURGICAL HISTORY Right 01/09/2017    AV fistula creation, wrist    OTHER SURGICAL HISTORY Right 08/29/2017    ligation of collateral branch of av fistula right wrist    OTHER SURGICAL HISTORY  04/05/2018    FISTULAGRAM WITH DR. Kevin Erazo    MS EGD TRANSORAL BIOPSY SINGLE/MULTIPLE N/A 10/17/2017    EGD BIOPSY performed by Shereen So MD at [facility]    MS LIGATN ANGIOACCESS AV FISTULA Right 8/29/2017     RIGHT  LOWER ARM AV FISTULA  LIGATION OF AQUIRED BRANCHES X2 performed by Brayden Chambers DO at [address]  3/18/16    median    VASCULAR SURGERY  12/06/2018    Right arm fistulagram,  PTA cephalic vein stenosis  /  DR Rod Graft       Medications:      fluticasone  2 spray Nasal Daily    levothyroxine  25 mcg Oral Daily    donepezil  5 mg Oral QPM    midodrine  10 mg Oral TID    folic acid  1 mg Oral Daily    insulin glargine  25 Units Subcutaneous BID    atorvastatin  40 mg Oral Nightly    therapeutic multivitamin-minerals  1 tablet Oral Daily    pantoprazole  40 mg Oral QAM AC    aspirin  81 mg Oral Daily    apixaban  5 mg Oral BID    cetirizine  5 mg Oral Daily    DULoxetine  60 mg Oral Daily    buPROPion  150 mg Oral Cancer Mother     Diabetes Mother     Alzheimer's Disease Father     Diabetes Maternal Grandmother     High Blood Pressure Sister       Medical Decision Making:   I have independently reviewed/ordered the following labs:    CBC with Differential:   Recent Labs     05/09/21  1017 05/10/21  0645   WBC 8.8 7.8   HGB 10.0* 10.4*   HCT 32.8* 32.9*   * 156   LYMPHOPCT 13*  --    MONOPCT 7  --      BMP:  Recent Labs     05/09/21  1017 05/10/21  0645   * 135   K 3.7 4.3   CL 90* 93*   CO2 24 24   BUN 67* 83*   CREATININE 6.00* 7.15*     Hepatic Function Panel:   Recent Labs     05/09/21  1017   PROT 5.9*   LABALBU 3.3*   BILITOT 0.27*   ALKPHOS 114   ALT 12   AST 15     No results for input(s): RPR in the last 72 hours. No results for input(s): HIV in the last 72 hours. No results for input(s): BC in the last 72 hours. Lab Results   Component Value Date    CREATININE 7.15 05/10/2021    GLUCOSE 92 05/10/2021       Detailed results: Thank you for allowing us to participate in the care of this patient. Please call with questions. This note is created with the assistance of a speech recognition program.  While intending to generate adocument that actually reflects the content of the visit, the document can still have some errors including those of syntax and sound a like substitutions which may escape proof reading. It such instances, actual meaningcan be extrapolated by contextual diversion.     Taye Jc MD. Infectious Diseases

## 2021-05-11 NOTE — PROGRESS NOTES
NEPHROLOGY PROGRESS NOTE      SUBJECTIVE     Underwent hemodialysis yesterday. 700 net removal.  Tolerated procedure well with no issues. Infectious disease input noted. Blood cultures so far negative. On antibiotics for lower extremity cellulitis. OBJECTIVE     Vitals:    05/10/21 1326 05/10/21 1847 05/10/21 2030 05/11/21 0810   BP: (!) 128/58 (!) 128/59 133/67 (!) 148/66   Pulse: 79 69 71 72   Resp: 18 18 19 18   Temp: 97.9 °F (36.6 °C) 98 °F (36.7 °C) 97.5 °F (36.4 °C) 97.4 °F (36.3 °C)   TempSrc:  Oral Oral Oral   SpO2:  95% 99% 99%   Weight: 232 lb 2.3 oz (105.3 kg)      Height:         24HR INTAKE/OUTPUT:      Intake/Output Summary (Last 24 hours) at 5/11/2021 1045  Last data filed at 5/10/2021 1326  Gross per 24 hour   Intake 300 ml   Output 1000 ml   Net -700 ml       General appearance:Awake, alert, in no acute distress  HEENT: PERRLA  Respiratory::vesicular breath sounds,no wheeze/crackles  Cardiovascular:S1 S2 normal,no gallop or organic murmur. Abdomen:Non tender/non distended. Bowel sounds present  Extremities: No Cyanosis or Clubbing, present lower extremity edema  Neurological:Alert and oriented. No abnormalities of mood, affect, memory, mentation, or behavior are noted      MEDICATIONS     Scheduled Meds:    fluticasone  2 spray Nasal Daily    levothyroxine  25 mcg Oral Daily    donepezil  5 mg Oral QPM    midodrine  10 mg Oral TID    folic acid  1 mg Oral Daily    insulin glargine  25 Units Subcutaneous BID    atorvastatin  40 mg Oral Nightly    therapeutic multivitamin-minerals  1 tablet Oral Daily    pantoprazole  40 mg Oral QAM AC    aspirin  81 mg Oral Daily    apixaban  5 mg Oral BID    cetirizine  5 mg Oral Daily    DULoxetine  60 mg Oral Daily    buPROPion  150 mg Oral Daily    sevelamer  1,600 mg Oral TID    sodium chloride flush  5-40 mL Intravenous 2 times per day    vancomycin (VANCOCIN) intermittent dosing (placeholder)   Other RX Placeholder    vancomycin  1,000 mg Intravenous Q MWF     Continuous Infusions:    sodium chloride      dextrose       PRN Meds:  magnesium hydroxide, lidocaine, albuterol, benzonatate, ipratropium-albuterol, guaiFENesin, tiZANidine, sodium chloride flush, sodium chloride, promethazine **OR** ondansetron, polyethylene glycol, nicotine, acetaminophen **OR** acetaminophen, glucose, dextrose, glucagon (rDNA), dextrose  Home Meds:                Medications Prior to Admission: RENVELA 800 MG tablet, Take 2 tablets by mouth 3 times daily with meals only. Lidocaine 5 % CREA, Apply topically Apply to fistula rt arm topically one time a day every Mon, Wed, Fri for dialysis. buPROPion (WELLBUTRIN SR) 150 MG extended release tablet, Take 150 mg by mouth daily  albuterol (PROVENTIL) (2.5 MG/3ML) 0.083% nebulizer solution, Take 2.5 mg by nebulization every 8 hours as needed for Shortness of Breath  benzonatate (TESSALON) 100 MG capsule, Take 100 mg by mouth every 12 hours as needed for Cough  ipratropium-albuterol (DUONEB) 0.5-2.5 (3) MG/3ML SOLN nebulizer solution, Inhale 3 mLs into the lungs every 8 hours as needed for Shortness of Breath (or wheezing)  guaiFENesin 400 MG tablet, Take 400 mg by mouth every 12 hours as needed (allergies)  ondansetron (ZOFRAN) 4 MG tablet, Take 4 mg by mouth every 6 hours as needed for Nausea or Vomiting  tiZANidine (ZANAFLEX) 2 MG tablet, Take 2 mg by mouth every 8 hours as needed (muscle pain)  FIASP FLEXTOUCH 100 UNIT/ML SOPN, Inject into the skin See Admin Instructions Per sliding scale: If 150-200 = 2 units; 201-250 = 4 units; 251-300 = 6 units; 301-350 = 8 units; 351-400 = 10 units; 401+ = 15 units, then call MD  Elastic Bandages & Supports MISC, 30-40 mmHg compression stockings to both lower legs, on every morning, off at bedtime. gabapentin (NEURONTIN) 300 MG capsule, Take 300 mg by mouth daily. ALPRAZolam (XANAX) 0.5 MG tablet, Take 0.5 mg by mouth 2 times daily.   DULoxetine (CYMBALTA) 60 MG extended release capsule, Take 60 mg by mouth daily  loratadine (CLARITIN) 10 MG capsule, Take 10 mg by mouth daily (every other day). aspirin 81 MG tablet, Take 1 tablet by mouth daily  apixaban (ELIQUIS) 5 MG TABS tablet, Take 1 tablet by mouth 2 times daily  Multiple Vitamins-Minerals (THERAPEUTIC MULTIVITAMIN-MINERALS) tablet, Take 1 tablet by mouth daily  omeprazole (PRILOSEC) 20 MG delayed release capsule, Take 20 mg by mouth nightly   atorvastatin (LIPITOR) 40 MG tablet, Take 1 tablet by mouth nightly  midodrine (PROAMATINE) 10 MG tablet, Take 1 tablet by mouth 3 times daily (Patient taking differently: Take 10 mg by mouth 3 times daily Hold if SBP over 847)  folic acid (FOLVITE) 1 MG tablet, Take 1 tablet by mouth daily  insulin glargine (LANTUS) 100 UNIT/ML injection vial, Inject 25 Units into the skin 2 times daily  Misc. Devices [facility]) MISC, Use as directed  Amino Acids-Protein Hydrolys (PRO-STAT PO), Take 30 mLs by mouth 3 times daily (Prostat SF) for wound healing.   donepezil (ARICEPT) 5 MG tablet, Take 5 mg by mouth every evening  levothyroxine (SYNTHROID) 25 MCG tablet, Take 1 tablet by mouth Daily  fluticasone (FLONASE) 50 MCG/ACT nasal spray, 2 sprays by Nasal route daily  magnesium hydroxide (MILK OF MAGNESIA) 400 MG/5ML suspension, Take 30 mLs by mouth daily as needed for Constipation    INVESTIGATIONS     Last 3 CMP:    Recent Labs     05/09/21  0744 05/09/21  1017 05/10/21  0645   * 130* 135   K 4.1 3.7 4.3   CL 92* 90* 93*   CO2 25 24 24   BUN 67* 67* 83*   CREATININE 5.96* 6.00* 7.15*   CALCIUM 8.9 8.6 8.7   PROT  --  5.9*  --    LABALBU  --  3.3*  --    BILITOT  --  0.27*  --    ALKPHOS  --  114  --    AST  --  15  --    ALT  --  12  --        Last 3 CBC:  Recent Labs     05/09/21  0744 05/09/21  1017 05/10/21  0645   WBC 7.4 8.8 7.8   RBC 3.46* 3.28* 3.39*   HGB 10.3* 10.0* 10.4*   HCT 34.2* 32.8* 32.9*   MCV 98.8 100.0 97.1   MCH 29.8 30.5 30.7   MCHC 30.1 30.5 31.6   RDW 14.2 14.2 14.1   PLT

## 2021-05-11 NOTE — PROGRESS NOTES
[facility] -   Infectious diseases evaluation  admission date 5/8/2021    reason for consultation:   Cellulitis  Impression :   Current:  · Right lower extremity cellulitis    Other:  · Dm type II  · PAF on AC  · ESRD on HD  · Hyponatremia/Hypochloremia. Discussion / summary of stay / plan of care   · Patient presents from a long term care facility for the treatment of cellulitis of the upper right lower extremity. · Patient on HD for ESRD. History of chronic venous insufficiency. Recommendations   · Keep iv Vancomycin. Total of 7-10 days  · Await the blood cx  · Will follow    Infection Control Recommendations   · [place] Precautions  · Contact Isolation     Antimicrobial Stewardship Recommendations   · Simplification of therapy  · Targeted therapy    Coordination ofOutpatient Care:   · Estimated Length of IV antimicrobials:  · Patient will need Midline / picc Catheter Insertion:   · Patient will need SNF:  · Patient will need outpatient wound care:     History of Present Illness:   Initial history:  PMH: PAF on AC, CHF, HLD, HEATHER, Type II DM, AVR & MVR, ESRD on HD, chronic venous stasis with history of skin grafts. Flaquita Lacy is a 67y.o.-year-old male presented as a resident from a long term care facility after developing erythema to the right upper thigh x 2 days. Progressive erythema expanding at this time. Denies fevers, chills, chest pain, shortness of breath, abdominal pain, or N/V/D. Patient afebrile and hemodynamically stable. Hyponatremia/hypochloremia. No leukocytosis. Hemoglobin stable. Interval changes    Patient Vitals for the past 8 hrs:   BP Temp Temp src Pulse Resp SpO2   05/10/21 2030 133/67 97.5 °F (36.4 °C) Oral 71 19 99 %   05/10/21 1847 (!) 128/59 98 °F (36.7 °C) Oral 69 18 95 %   Seen in hemodialysis stable, right thigh cellulitis drastically improved, he has no fever chills.     No positive cultures    Summary of relevant labs:  Labs:  Na 130  Cr 6.0    WBC 10.8->9.5->8.8-7.8  Hgb 10.0    Micro:  Blood Cx 5/7: no growth thus far. Imaging:    I have personally reviewed the past medical history, past surgical history, medications, social history, and family history, and I haveupdated the database accordingly. Allergies:   Percocet [oxycodone-acetaminophen] and Ampicillin     Review of Systems:     Review of Systems   Constitutional: Negative for chills and fever. HENT: Negative for congestion, rhinorrhea and sinus pressure. Eyes: Negative for visual disturbance. Respiratory: Negative for apnea. Cardiovascular: Negative for chest pain and leg swelling. Gastrointestinal: Negative for abdominal distention and abdominal pain. Endocrine: Negative for polyuria. Genitourinary: Negative for dysuria and hematuria. Musculoskeletal: Negative for myalgias and neck pain. Skin: Positive for color change. Allergic/Immunologic: Negative for immunocompromised state. Neurological: Negative for dizziness, facial asymmetry, light-headedness and numbness. Hematological: Negative for adenopathy. Psychiatric/Behavioral: Negative for agitation. Physical Examination :       Physical Exam  Constitutional:       General: He is not in acute distress. Appearance: Normal appearance. He is obese. He is not ill-appearing. HENT:      Head: Normocephalic and atraumatic. Nose: No congestion or rhinorrhea. Mouth/Throat:      Mouth: Mucous membranes are moist.   Eyes:      General: No scleral icterus. Pupils: Pupils are equal, round, and reactive to light. Neck:      Musculoskeletal: No neck rigidity or muscular tenderness. Cardiovascular:      Rate and Rhythm: Normal rate and regular rhythm. Heart sounds: Normal heart sounds. No murmur. Pulmonary:      Effort: Pulmonary effort is normal.      Breath sounds: No wheezing or rhonchi. Abdominal:      General: There is no distension. Palpations: Abdomen is soft. Tenderness: There is no abdominal tenderness. Genitourinary:     Comments: No rosado  Musculoskeletal:         General: Signs of injury present. No swelling, tenderness or deformity. Skin:     General: Skin is warm. Findings: Rash present. Comments: Streaking erythema of the right upper lower extremity wrapping from the thigh to the posterior quad area. No open wounds noted. Neurological:      General: No focal deficit present. Mental Status: He is alert.       Comments: dementic   Psychiatric:         Mood and Affect: Mood normal.         Past Medical History:     Past Medical History:   Diagnosis Date    Acute blood loss anemia 3/11/2019    Acute on chronic diastolic CHF (congestive heart failure) ([address] 12/2/2019    Anemia in chronic kidney disease (CKD) 4/27/2016    Arthritis     Bacteremia 11/11/2016    Benign prostatic hyperplasia without lower urinary tract symptoms     Bleeding from wound 4/2/2019    BMI 40.0-44.9, adult ([address] 3/19/2018    Cellulitis 3/21/2019    Cellulitis of left lower extremity     Chronic cerebral ischemia 1/3/2017    Closed fracture of forearm 8/13/2013    Broken lft forearm     Controlled type 2 diabetes mellitus with chronic kidney disease on chronic dialysis, with long-term current use of insulin ([address]     Controlled type 2 diabetes mellitus with diabetic polyneuropathy, with long-term current use of insulin ([address] 10/7/2018    Decubital ulcer     NON OPEN BUTTOCKS    Dementia ([address]     memory problems    Dialysis patient ([address] 01/03/2017    Goes Tue, Thurs, Sat in [place]    Difficult intravenous access     HAS NEEDED PICC TEAM IN THE PAST    Difficulty walking     WHEELCHAIR BOUND-PIVOTS WITH ASSIST O F 2    DJD (degenerative joint disease) of knee     Elbow, forearm, and wrist, abrasion or friction burn, without mention of infection 8/13/2013    Abrasions lft forearm     Encephalopathy acute 4/27/2016    Encounter regarding vascular access for dialysis for ESRD ([facility] 2/2/2017    ESRD (end stage renal disease) on dialysis ([facility] 1/17/2017    Forgetfulness     HAS SOME SHORT TERM MEMORY LOSS, QUYEN-WIFE IS LEGAL GUARDIAN    H/O transesophageal echocardiography ([facility]) for monitoring     Hemodialysis patient ([address] 11/11/2016    tues-thurs-sat [place]---FRESEMIUS.  TUNNELED CATHETER RT UPPER CHEST    Hyperlipidemia 1990    on Meds    Hypertension 1990    on Meds    Intercritical gout     on Meds    Joint pain, knee 01/13/2017    baldo knee synvisc-1 injections    Long-term insulin use ([address] 1/17/2017    Major depressive disorder with single episode, in partial remission ([address] 1/3/2017    Mobility impaired     Morbid obesity ([address]     Muscle weakness     GRNERALIZED    Obesity     Obstructive sleep apnea     HEATHER on CPAP     On CPAP-TO BRING DOS    Paroxysmal atrial fibrillation ([address] 9/6/2017    Pneumonia 12/2/2019    Respiratory failure ([address] 03/2016    with open heart surgery, trached x 5 months    S/P cardiac cath     Seborrhea     Severe aortic stenosis 3/9/2016    Severe mitral valve stenosis 3/9/2016    Severe sepsis (HCC) 4/3/2016    Skin rash     ABD FOLDS    Stasis dermatitis     Thyroid disease     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 lft thumb     Traumatic hemorrhagic shock ([address] 3/13/2019    Venous stasis dermatitis     Wears glasses     Wheelchair bound     pivots with 2 assists       Past Surgical  History:     Past Surgical History:   Procedure Laterality Date    AORTIC VALVE REPLACEMENT  03/18/2016    bioprosthetic    ARM SURGERY Left 1990    CARDIAC CATHETERIZATION      CENTRAL VENOUS CATHETER Right 02/21/2018    DIALYSIS CATHETER Dr Rizwana Aviles COLONOSCOPY  11/19/09    COLONOSCOPY N/A 10/12/2018    COLONOSCOPY WITH BIOPSY performed by Vivi Bass DO at [facility] Right 3/10/2019    WASHOUT RIGHT LOWER EXTREMITY WITH WOUND VAC EXCHANGE performed by Aleica Pablo MD at [address] Right 3/8/2019    RIGHT LOWER EXTREMITY EXPLORATION, HEMATOMA EVACUATION, WOUND VAC PLACEMENT performed by Alecia Pablo MD at [address] ([address] surgeries)    several surgeries on left arm    IR NONTUNNELED VASCULAR CATHETER  8/5/2020    IR NONTUNNELED VASCULAR CATHETER 8/5/2020 Linda Walker MD [facility] SPECIAL PROCEDURES    KNEE ARTHROSCOPY Left 09/17/99    MITRAL VALVE REPLACEMENT  03/18/2016    bioprosthetic     NASAL SEPTUM SURGERY      OTHER SURGICAL HISTORY  3/18/16    Aortic root Enlargement    OTHER SURGICAL HISTORY  3/18/16    ASD Closure    OTHER SURGICAL HISTORY Right 01/09/2017    AV fistula creation, wrist    OTHER SURGICAL HISTORY Right 08/29/2017    ligation of collateral branch of av fistula right wrist    OTHER SURGICAL HISTORY  04/05/2018    FISTULAGRAM WITH DR. Mai Zuluaga    TX EGD TRANSORAL BIOPSY SINGLE/MULTIPLE N/A 10/17/2017    EGD BIOPSY performed by Rita Brar MD at [facility]    TX LIGATN ANGIOACCESS AV FISTULA Right 8/29/2017     RIGHT  LOWER ARM AV FISTULA  LIGATION OF AQUIRED BRANCHES X2 performed by Luis Cuellar DO at [address] [place] [place]  3/18/16    median    VASCULAR SURGERY  12/06/2018    Right arm fistulagram,  PTA cephalic vein stenosis  /  DR Rafqi Mcqueen       Medications:      fluticasone  2 spray Nasal Daily    levothyroxine  25 mcg Oral Daily    donepezil  5 mg Oral QPM    midodrine  10 mg Oral TID    folic acid  1 mg Oral Daily    insulin glargine  25 Units Subcutaneous BID    atorvastatin  40 mg Oral Nightly    therapeutic multivitamin-minerals  1 tablet Oral Daily    pantoprazole  40 mg Oral QAM AC    aspirin  81 mg Oral Daily    apixaban  5 mg Oral BID    cetirizine  5 mg Oral Daily    DULoxetine  60 mg Oral Daily    buPROPion  150 mg Oral Daily    sevelamer  1,600 mg Oral TID    sodium Alzheimer's Disease Father     Diabetes Maternal Grandmother     High Blood Pressure Sister       Medical Decision Making:   I have independently reviewed/ordered the following labs:    CBC with Differential:   Recent Labs     05/09/21  1017 05/10/21  0645   WBC 8.8 7.8   HGB 10.0* 10.4*   HCT 32.8* 32.9*   * 156   LYMPHOPCT 13*  --    MONOPCT 7  --      BMP:  Recent Labs     05/09/21  1017 05/10/21  0645   * 135   K 3.7 4.3   CL 90* 93*   CO2 24 24   BUN 67* 83*   CREATININE 6.00* 7.15*     Hepatic Function Panel:   Recent Labs     05/09/21  1017   PROT 5.9*   LABALBU 3.3*   BILITOT 0.27*   ALKPHOS 114   ALT 12   AST 15     No results for input(s): RPR in the last 72 hours. No results for input(s): HIV in the last 72 hours. No results for input(s): BC in the last 72 hours. Lab Results   Component Value Date    CREATININE 7.15 05/10/2021    GLUCOSE 92 05/10/2021       Detailed results: Thank you for allowing us to participate in the care of this patient. Please call with questions. This note is created with the assistance of a speech recognition program.  While intending to generate adocument that actually reflects the content of the visit, the document can still have some errors including those of syntax and sound a like substitutions which may escape proof reading. It such instances, actual meaningcan be extrapolated by contextual diversion.     Danielle Alas MD. Infectious Diseases

## 2021-05-12 NOTE — PROGRESS NOTES
dextrose       PRN Meds:  magnesium hydroxide, lidocaine, albuterol, benzonatate, ipratropium-albuterol, guaiFENesin, tiZANidine, sodium chloride flush, sodium chloride, promethazine **OR** ondansetron, polyethylene glycol, nicotine, acetaminophen **OR** acetaminophen, glucose, dextrose, glucagon (rDNA), dextrose  Home Meds:                Medications Prior to Admission: RENVELA 800 MG tablet, Take 2 tablets by mouth 3 times daily with meals only. Lidocaine 5 % CREA, Apply topically Apply to fistula rt arm topically one time a day every Mon, Wed, Fri for dialysis. buPROPion (WELLBUTRIN SR) 150 MG extended release tablet, Take 150 mg by mouth daily  albuterol (PROVENTIL) (2.5 MG/3ML) 0.083% nebulizer solution, Take 2.5 mg by nebulization every 8 hours as needed for Shortness of Breath  benzonatate (TESSALON) 100 MG capsule, Take 100 mg by mouth every 12 hours as needed for Cough  ipratropium-albuterol (DUONEB) 0.5-2.5 (3) MG/3ML SOLN nebulizer solution, Inhale 3 mLs into the lungs every 8 hours as needed for Shortness of Breath (or wheezing)  guaiFENesin 400 MG tablet, Take 400 mg by mouth every 12 hours as needed (allergies)  ondansetron (ZOFRAN) 4 MG tablet, Take 4 mg by mouth every 6 hours as needed for Nausea or Vomiting  tiZANidine (ZANAFLEX) 2 MG tablet, Take 2 mg by mouth every 8 hours as needed (muscle pain)  FIASP FLEXTOUCH 100 UNIT/ML SOPN, Inject into the skin See Admin Instructions Per sliding scale: If 150-200 = 2 units; 201-250 = 4 units; 251-300 = 6 units; 301-350 = 8 units; 351-400 = 10 units; 401+ = 15 units, then call MD  Elastic Bandages & Supports MISC, 30-40 mmHg compression stockings to both lower legs, on every morning, off at bedtime. gabapentin (NEURONTIN) 300 MG capsule, Take 300 mg by mouth daily. ALPRAZolam (XANAX) 0.5 MG tablet, Take 0.5 mg by mouth 2 times daily.   DULoxetine (CYMBALTA) 60 MG extended release capsule, Take 60 mg by mouth daily  loratadine (CLARITIN) 10 MG capsule, Take 10 mg by mouth daily (every other day). aspirin 81 MG tablet, Take 1 tablet by mouth daily  apixaban (ELIQUIS) 5 MG TABS tablet, Take 1 tablet by mouth 2 times daily  Multiple Vitamins-Minerals (THERAPEUTIC MULTIVITAMIN-MINERALS) tablet, Take 1 tablet by mouth daily  omeprazole (PRILOSEC) 20 MG delayed release capsule, Take 20 mg by mouth nightly   atorvastatin (LIPITOR) 40 MG tablet, Take 1 tablet by mouth nightly  midodrine (PROAMATINE) 10 MG tablet, Take 1 tablet by mouth 3 times daily (Patient taking differently: Take 10 mg by mouth 3 times daily Hold if SBP over 308)  folic acid (FOLVITE) 1 MG tablet, Take 1 tablet by mouth daily  insulin glargine (LANTUS) 100 UNIT/ML injection vial, Inject 25 Units into the skin 2 times daily  Misc. Devices [facility]) MISC, Use as directed  Amino Acids-Protein Hydrolys (PRO-STAT PO), Take 30 mLs by mouth 3 times daily (Prostat SF) for wound healing. donepezil (ARICEPT) 5 MG tablet, Take 5 mg by mouth every evening  levothyroxine (SYNTHROID) 25 MCG tablet, Take 1 tablet by mouth Daily  fluticasone (FLONASE) 50 MCG/ACT nasal spray, 2 sprays by Nasal route daily  magnesium hydroxide (MILK OF MAGNESIA) 400 MG/5ML suspension, Take 30 mLs by mouth daily as needed for Constipation    EXAMINATION     Vitals /66   Pulse 67   Temp 96.8 °F (36 °C)   Resp 20   Ht 5' 7\" (1.702 m)   Wt 227 lb 8.2 oz (103.2 kg)   SpO2 97%   BMI 35.63 kg/m²   LE edema  Present , no icterus,clubbing,JVP not elevated  CVS :S1 S2 normal,no gallop or organic murmur  RS:Vesicular breath sounds,no crackles/wheeze  CNS:awake,alert  PA: non tender,non distended,Bowel sounds present    ASSESSMENT AND PLAN     1. ESRD: On  Hemodialysis. patient's regular HD days are [facility] under Dr Jamilah Armas via right AVF.  Admitted with cellulitis.  Dry weight reported as 109kg.  Admitting weight 103kg. 2. Rt LE Cellulitis:  3. Secondary hyperparathyroidism:  4.  Anemia of chronic disease :  5. Dementia  6. DM  7. S/P AVR and MVR  8. PAF: On Eliquis    The patient was seen and examined while on dialysis. Professional oversight of the patients dialysis care,access care and dialysis related co-morbidities were addressed as necessary with the patient and /or staff  Okay to discharge from nephrology standpoint.     This note is created with the assistance of a speech-recognition program. While intending to generate a document that actually reflects the content of the visit, no guarantees can be provided that every mistake has been identified and corrected by editing    Ary Daugherty MD, [facility] Fermin Bello   5/12/2021 11:52 AM    [facility] [place]

## 2021-05-12 NOTE — CARE COORDINATION
Transitional planning. Informed Martha with admissions at [address] [place] [address] that pt will be transporting to their facility today at 1600. No COVID, no HENS needed  Fax AVS/CLAUS/Scripts to  [phone]  Report [phone] [address] [place] [address] nurse    4536 Confirmed with Debi with [address] that pt will be going transporting to [address] [place] [address] at 1600 today. [phone] faxed AVS/CLAUS/script to [phone], received confirmation that fax was sent .

## 2021-05-12 NOTE — DISCHARGE SUMMARY
[facility]  Office: [address], DO, Joe Jay, DO, Sycamorechey Cadena, DO, Barbara Blackman, DO, Shoaib Peters MD, Wesley Mulligan MD, Everett Doty MD, Deedee Brunner, MD, iBll Zuleta MD, Neeru Liu MD, Rd Decker MD, Brittnee Maddox MD, Derick Daley DO, Antonieta Gorman MD, William Villanueva DO, Elizabeth Winters MD,  Sami Rodriguez DO, Jose Alanis MD, Anitra Darling MD, Milvia Coreas MD, Domingo Horvath MD, Stacie Mckinley, [facility], [facility], [facility], [facility], [facility], Francois Cuenca, CNS, Samuel Dubon, CNP, Nola Phalen, CNP, Samuel Webb, CNP, Primo Santizo, CNP, Vinny Lubin, CNP, Get Major PA-C, Jackelyn Ramirez DNP, Laura Adler, CNP, Mele Bean, CNP, Parmjit Corbin, CNP, Mariana Tejada, CNP, Facundo Ferrer, CNP, Alexandra Smith, [address] [facility]. [facility]    Discharge Summary     Patient ID: Erik Wilson  :  1945   MRN: [id number]     ACCOUNT:  [de-identified]   Patient's PCP: Izabella Kowalski DO  Admit Date: 2021   Discharge Date: 2021     Length of Stay: 4  Code Status:  Full Code  Admitting Physician: Jose Alanis MD  Discharge Physician: Baron Tita DO     Active Discharge Diagnoses:     Hospital Problem Lists:  Principal Problem:    Cellulitis  Active Problems:    Anemia in chronic kidney disease, on chronic dialysis ([facility]    Obstructive sleep apnea    Class 1 obesity with serious comorbidity and body mass index (BMI) of 33.0 to 33.9 in adult    Dialysis patient ([facility]    Dementia ([facility]    Controlled type 2 diabetes mellitus with chronic kidney disease on chronic dialysis, with long-term current use of insulin (HCC)    Cellulitis of right thigh  Resolved Problems:    * No resolved hospital problems.  *      Admission Condition:  stable     Discharged Condition: stable    Hospital Stay:     Hospital Course:  Erik Wilson is a 67 y.o. male who was admitted for the management of   Cellulitis , presented to ER with right leg pain. Pt was evaluated by infectious disease who started pt on Vancomycin with HD. Remainder of patient's hospitalization was unremarkable. Patient symptoms improved. Was recommended that patient remain on vancomycin until 5/19/2021. Blood cultures remain negative during his hospitalization. Currently, patient medically stable for discharge. He will need follow-up with his primary care physician within 1 week of discharge. Patient also need follow-up with nephrology for continued hemodialysis. Significant therapeutic interventions: see above    Significant Diagnostic Studies:   Labs / Micro:  CBC:   Lab Results   Component Value Date    WBC 7.2 05/12/2021    RBC 3.39 05/12/2021    HGB 10.3 05/12/2021    HCT 33.2 05/12/2021    MCV 97.9 05/12/2021    MCH 30.4 05/12/2021    MCHC 31.0 05/12/2021    RDW 14.1 05/12/2021     05/12/2021     BMP:    Lab Results   Component Value Date    GLUCOSE 123 05/12/2021     05/12/2021    K 3.7 05/12/2021    CL 98 05/12/2021    CO2 25 05/12/2021    ANIONGAP 17 05/12/2021    BUN 47 05/12/2021    CREATININE 5.65 05/12/2021    BUNCRER NOT REPORTED 05/12/2021    CALCIUM 8.8 05/12/2021    LABGLOM 10 05/12/2021    GFRAA 12 05/12/2021    GFR      05/12/2021    GFR NOT REPORTED 05/12/2021        Radiology:  No results found. Consultations:    Consults:     Final Specialist Recommendations/Findings:   PHARMACY TO DOSE VANCOMYCIN  IP CONSULT TO NEPHROLOGY  IP CONSULT TO INFECTIOUS DISEASES  IP CONSULT TO IV TEAM      The patient was seen and examined on day of discharge and this discharge summary is in conjunction with any daily progress note from day of discharge. Discharge plan:     Disposition:  To a non-[facility] facility    Physician Follow Up:     Antonieta Miller, [facility],[address]  [address] [place] [address]  [phone]    In 3 days  Hospital follow up    Olman Eldridge MD  [address] [place] [address]  [id number]  [phone]      Friday at [address] [place] [place] under Dr Bhavesh Waters via right AVF       Requiring Further Evaluation/Follow Up POST HOSPITALIZATION/Incidental Findings: -Follow up with PCP within one week of discharge  -Follow up with nephrology for hemodialysis  -Follow-up with infectious disease for continued treatment of your cellulitis. -Return to the hospital develop worsening fevers, chills, nausea, vomiting, diarrhea.  -Take all medication as prescribed    Diet: renal diet    Activity: As tolerated    Instructions to Patient: see above    Discharge Medications:      Medication List      START taking these medications    vancomycin  infusion  Commonly known as: VANCOCIN  Infuse 1,000 mg intravenously three times a week for 2 doses Total 2 doses - till 5/15/21  w hemodialysis   Compound per protocol. CHANGE how you take these medications    midodrine 10 MG tablet  Commonly known as: PROAMATINE  Take 1 tablet by mouth 3 times daily  What changed: additional instructions        CONTINUE taking these medications    albuterol (2.5 MG/3ML) 0.083% nebulizer solution  Commonly known as: PROVENTIL     apixaban 5 MG Tabs tablet  Commonly known as: ELIQUIS  Take 1 tablet by mouth 2 times daily     aspirin 81 MG tablet  Take 1 tablet by mouth daily     atorvastatin 40 MG tablet  Commonly known as: LIPITOR  Take 1 tablet by mouth nightly     benzonatate 100 MG capsule  Commonly known as: TESSALON     buPROPion 150 MG extended release tablet  Commonly known as: WELLBUTRIN SR     Claritin 10 MG capsule  Generic drug: loratadine     donepezil 5 MG tablet  Commonly known as: ARICEPT     DULoxetine 60 MG extended release capsule  Commonly known as: CYMBALTA     Elastic Bandages & Supports Misc  30-40 mmHg compression stockings to both lower legs, on every morning, off at bedtime.      Fiasp FlexTouch 100 UNIT/ML Sopn  Generic drug: Insulin Aspart (w/Niacinamide)     fluticasone 50 MCG/ACT nasal spray  Commonly

## 2021-05-12 NOTE — PROGRESS NOTES
Dialysis Post Treatment Note  Vitals:    05/12/21 1300   BP: (!) 141/69   Pulse: 71   Resp: 16   Temp: 97.5 °F (36.4 °C)   SpO2:      Pre-Weight = 103.2kg  Post-weight = Weight: 223 lb 15.8 oz (101.6 kg)  Total Liters Processed = Total Liters Processed (l/min): 90 l/min  Rinseback Volume (mL) = Rinseback Volume (ml): 360 ml  Net Removal (mL) = NET Removed (ml): 1650 ml  Patient's dry weight=  Type of access used= R arm AVF  Length of treatment= 3.5 hrs    Pt tolerated HD well. Pt extremities ecchymotic and cool. Pt somewhat confused. Pt oozing from skin tear on R hand. Re enforced w tefla. Held access site after pulling needles due to bleeding. Report called to floor RN. Pt transported back to room via stretcher.  No issues

## 2021-05-12 NOTE — PROGRESS NOTES
wheezing  Cardiovascular:  negative for chest pain, chest pressure/discomfort, lower extremity edema, palpitations  Gastrointestinal:  negative for abdominal pain, constipation, diarrhea, nausea, vomiting  Neurological:  negative for dizziness, headache    Medications: Allergies:     Allergies   Allergen Reactions    Percocet [Oxycodone-Acetaminophen] Itching and Rash     Also causes shaking    Ampicillin Rash       Current Meds:   Scheduled Meds:    midodrine  10 mg Oral TID WC    sevelamer  1,600 mg Oral TID WC    fluticasone  2 spray Nasal Daily    levothyroxine  25 mcg Oral Daily    donepezil  5 mg Oral QPM    folic acid  1 mg Oral Daily    insulin glargine  25 Units Subcutaneous BID    atorvastatin  40 mg Oral Nightly    therapeutic multivitamin-minerals  1 tablet Oral Daily    aspirin  81 mg Oral Daily    apixaban  5 mg Oral BID    cetirizine  5 mg Oral Daily    DULoxetine  60 mg Oral Daily    buPROPion  150 mg Oral Daily    sodium chloride flush  5-40 mL Intravenous 2 times per day    vancomycin (VANCOCIN) intermittent dosing (placeholder)   Other RX Placeholder    vancomycin  1,000 mg Intravenous Q MWF     Continuous Infusions:    sodium chloride      dextrose       PRN Meds: magnesium hydroxide, lidocaine, albuterol, benzonatate, ipratropium-albuterol, guaiFENesin, tiZANidine, sodium chloride flush, sodium chloride, promethazine **OR** ondansetron, polyethylene glycol, nicotine, acetaminophen **OR** acetaminophen, glucose, dextrose, glucagon (rDNA), dextrose    Data:     Past Medical History:   has a past medical history of Acute blood loss anemia, Acute on chronic diastolic CHF (congestive heart failure) ([facility] Anemia in chronic kidney disease (CKD), Arthritis, Bacteremia, Benign prostatic hyperplasia without lower urinary tract symptoms, Bleeding from wound, BMI 40.0-44.9, adult ([facility] Cellulitis, Cellulitis of left lower extremity, Chronic cerebral ischemia, Closed fracture of forearm, Controlled type 2 diabetes mellitus with chronic kidney disease on chronic dialysis, with long-term current use of insulin ([facility] Controlled type 2 diabetes mellitus with diabetic polyneuropathy, with long-term current use of insulin ([facility] Decubital ulcer, Dementia ([facility] Dialysis patient ([facility] Difficult intravenous access, Difficulty walking, DJD (degenerative joint disease) of knee, Elbow, forearm, and wrist, abrasion or friction burn, without mention of infection, Encephalopathy acute, Encounter regarding vascular access for dialysis for ESRD ([facility] ESRD (end stage renal disease) on dialysis ([facility] Forgetfulness, H/O transesophageal echocardiography (AMADEO) for monitoring, Hemodialysis patient ([facility] Hyperlipidemia, Hypertension, Intercritical gout, Joint pain, knee, Long-term insulin use ([facility] Major depressive disorder with single episode, in partial remission ([facility] Mobility impaired, Morbid obesity ([facility] Muscle weakness, Obesity, Obstructive sleep apnea, HEATHER on CPAP, Paroxysmal atrial fibrillation ([facility] Pneumonia, Respiratory failure ([facility] S/P cardiac cath, Seborrhea, Severe aortic stenosis, Severe mitral valve stenosis, Severe sepsis (HCC), Skin rash, Stasis dermatitis, Thyroid disease, Traumatic amputation of thumb, Traumatic hemorrhagic shock ([facility] Venous stasis dermatitis, Wears glasses, and Wheelchair bound. Social History:   reports that he quit smoking about 41 years ago. His smoking use included cigarettes. He started smoking about 66 years ago. He has a 50.00 pack-year smoking history. He has never used smokeless tobacco. He reports that he does not drink alcohol or use drugs.      Family History:   Family History   Problem Relation Age of Onset    Lung Cancer Mother     Diabetes Mother     Alzheimer's Disease Father     Diabetes Maternal Grandmother     High Blood Pressure Sister        Vitals:  BP (!) 125/40   Pulse 66   Temp 98.7 °F (37.1 °C) (Oral)   Resp 16   Ht 5' 7\" (1.702 m)   Wt 223 lb 15.8 oz (101.6 kg)   SpO2 97%   BMI 35.08 kg/m²   Temp (24hrs), Av.3 °F (36.3 °C), Min:96.8 °F (36 °C), Max:98.7 °F (37.1 °C)    Recent Labs     21  0615 21  0646 21  0755 21  1400   POCGLU 38* 93 114* 138*       I/O (24Hr): Intake/Output Summary (Last 24 hours) at 2021 1537  Last data filed at 2021 1300  Gross per 24 hour   Intake 150 ml   Output 2010 ml   Net -1860 ml       Labs:  Hematology:  Recent Labs     05/10/21  0645 21  0828   WBC 7.8 7.2   RBC 3.39* 3.39*   HGB 10.4* 10.3*   HCT 32.9* 33.2*   MCV 97.1 97.9   MCH 30.7 30.4   MCHC 31.6 31.0   RDW 14.1 14.1    128*   MPV 9.3 8.9     Chemistry:  Recent Labs     05/10/21  0645 21  0828    140   K 4.3 3.7   CL 93* 98   CO2 24 25   GLUCOSE 92 123*   BUN 83* 47*   CREATININE 7.15* 5.65*   ANIONGAP 18* 17   LABGLOM 8* 10*   GFRAA 9* 12*   CALCIUM 8.7 8.8     Recent Labs     21  1646 21  2151 21  0615 21  0646 21  0755 21  1400   POCGLU 120* 85 38* 93 114* 138*     ABG:  Lab Results   Component Value Date    POCPH 7.558 03/10/2019    POCPCO2 34.3 03/10/2019    POCPO2 127.3 03/10/2019    POCHCO3 30.6 03/10/2019    NBEA NOT REPORTED 03/10/2019    PBEA 8 03/10/2019    VTF9AZL 32 03/10/2019    WJDZ8UXB 99 03/10/2019    FIO2 30.0 03/10/2019     Lab Results   Component Value Date/Time    SPECIAL NOT REPORTED 2021 11:22 AM     Lab Results   Component Value Date/Time    CULTURE NO GROWTH 5 DAYS 2021 11:22 AM       Radiology:  No results found.     Physical Examination:        General appearance:  alert, cooperative and no distress  Mental Status:  oriented to person, not place and time appears slightly confused  Lungs:  clear to auscultation bilaterally, normal effort  Heart:  regular rate and rhythm, no murmur  Abdomen:  soft, nontender, nondistended, normal bowel sounds, no masses, hepatomegaly, splenomegaly  Extremities:  no edema, redness, tenderness in the calves  Skin:  no gross lesions, rashes, induration    Assessment:        Hospital Problems           Last Modified POA    * (Principal) Cellulitis 5/8/2021 Yes    Anemia in chronic kidney disease, on chronic dialysis ([facility] 5/8/2021 Yes    Obstructive sleep apnea 5/8/2021 Yes    Class 1 obesity with serious comorbidity and body mass index (BMI) of 33.0 to 33.9 in adult 5/8/2021 Yes    Dialysis patient ([facility] 5/8/2021 Yes    Overview Addendum 1/17/2017  7:51 PM by Kaylee Portillo DO     Goes Tue, Thurs, Sat in [facility]) 5/8/2021 Yes    Overview Addendum 1/17/2017  7:50 PM by Kaylee Portillo DO     Mild, mostly short term memory loss, wife is POA         Controlled type 2 diabetes mellitus with chronic kidney disease on chronic dialysis, with long-term current use of insulin ([facility] 5/8/2021 Yes    Cellulitis of right thigh 5/11/2021 Yes          Plan:        1. Right upper thigh cellulitis: Improving. Stable for d/c from ID perspective on IV vancomycin with HD until 5/19/21. Blood cultures negative  2. ESRD on HD: Next HD 5/14/21  3. Dementia : Continue Aricept  4. DMII with hyperglycemia: Continue Lantus 25 units BID. Hypoglycemia protocol. 5. Paroxysmal atrial fibrillation: Continue Eliquis  6. Hyperlipidemia: continue statin  7. Hypothyroidism: Continue Synthroid  8. Obesity BMI 36: recommend weight loss and lifestyle modifications  9. GERD: continue Protonix  10. PT/OT  11. DVT ppx      Discharge planning: stable for discharge today. Spoke with CM, will need to make sure pt able to get HD tomorrow prior to d/c.     Yasmine Strickland DO  5/12/2021  3:37 PM

## 2021-05-12 NOTE — PROGRESS NOTES
(36.2 °C) Oral 69 16 --   Seen in hemodialysis stable, right thigh cellulitis drastically improved, he has no fever chills. No positive cultures    [facility] [place] [facility] 5[facility]12    Summary of relevant labs:  Labs:  Na 130  Cr 6.0    WBC 10.8->9.5->8.8-7.8 -> 7.2  Hgb 10.0    Micro:  Blood Cx 5/7: no growth thus far    Imaging:    I have personally reviewed the past medical history, past surgical history, medications, social history, and family history, and I haveupdated the database accordingly. Allergies:   Percocet [oxycodone-acetaminophen] and Ampicillin     Review of Systems:     Review of Systems   Constitutional: Negative for appetite change, diaphoresis and fever. HENT: Negative for congestion, rhinorrhea and sore throat. Eyes: Negative for pain and visual disturbance. Respiratory: Negative for cough and shortness of breath. Cardiovascular: Negative for chest pain and leg swelling. Gastrointestinal: Negative for abdominal distention, abdominal pain, diarrhea and vomiting. Endocrine: Negative for polyphagia. Genitourinary: Negative for dysuria, flank pain and hematuria. Musculoskeletal: Negative for myalgias and neck pain. Skin: Positive for color change. Allergic/Immunologic: Negative for immunocompromised state. Neurological: Negative for dizziness, syncope, light-headedness and numbness. Hematological: Negative for adenopathy. Psychiatric/Behavioral: Positive for confusion. Negative for agitation. Physical Examination :       Physical Exam  Constitutional:       Appearance: Normal appearance. He is obese. He is not ill-appearing or toxic-appearing. HENT:      Head: Normocephalic and atraumatic. Right Ear: External ear normal.      Left Ear: External ear normal.      Nose: No congestion or rhinorrhea. Mouth/Throat:      Mouth: Mucous membranes are moist.   Eyes:      General: No scleral icterus.      Pupils: Pupils are equal, round, and reactive to light. Neck:      Musculoskeletal: No neck rigidity or muscular tenderness. Cardiovascular:      Rate and Rhythm: Normal rate and regular rhythm. Heart sounds: Normal heart sounds. No murmur. No friction rub. Pulmonary:      Effort: Pulmonary effort is normal. No respiratory distress. Breath sounds: No wheezing or rhonchi. Abdominal:      General: There is no distension. Palpations: Abdomen is soft. Tenderness: There is no abdominal tenderness. There is no guarding. Genitourinary:     Comments: No rosado  Musculoskeletal:         General: No swelling, tenderness or deformity. Skin:     General: Skin is warm. Findings: Rash present. Comments: Erythema of medial, anterior and posterior thigh is improved. Neurological:      General: No focal deficit present. Mental Status: He is alert.       Comments: dementia   Psychiatric:         Mood and Affect: Mood normal.         Past Medical History:     Past Medical History:   Diagnosis Date    Acute blood loss anemia 3/11/2019    Acute on chronic diastolic CHF (congestive heart failure) ([address] 12/2/2019    Anemia in chronic kidney disease (CKD) 4/27/2016    Arthritis     Bacteremia 11/11/2016    Benign prostatic hyperplasia without lower urinary tract symptoms     Bleeding from wound 4/2/2019    BMI 40.0-44.9, adult ([address] 3/19/2018    Cellulitis 3/21/2019    Cellulitis of left lower extremity     Chronic cerebral ischemia 1/3/2017    Closed fracture of forearm 8/13/2013    Broken lft forearm     Controlled type 2 diabetes mellitus with chronic kidney disease on chronic dialysis, with long-term current use of insulin ([address]     Controlled type 2 diabetes mellitus with diabetic polyneuropathy, with long-term current use of insulin ([address] 10/7/2018    Decubital ulcer     NON OPEN BUTTOCKS    Dementia ([address]     memory problems    Dialysis patient ([address] 01/03/2017    Goes [facility] Leggett, Sat in [facility] intravenous access     HAS NEEDED PICC TEAM IN THE PAST    Difficulty walking     WHEELCHAIR BOUND-PIVOTS WITH ASSIST O F 2    DJD (degenerative joint disease) of knee     Elbow, forearm, and wrist, abrasion or friction burn, without mention of infection 8/13/2013    Abrasions lft forearm     Encephalopathy acute 4/27/2016    Encounter regarding vascular access for dialysis for ESRD ([address] 2/2/2017    ESRD (end stage renal disease) on dialysis ([address] 1/17/2017    Forgetfulness     HAS SOME SHORT TERM MEMORY LOSS, QUYEN-WIFE IS LEGAL GUARDIAN    H/O transesophageal echocardiography (AMADEO) for monitoring     Hemodialysis patient ([address] 11/11/2016    tues-thurs-sat [place]---[facility].  TUNNELED CATHETER RT UPPER CHEST    Hyperlipidemia 1990    on Meds    Hypertension 1990    on Meds    Intercritical gout     on Meds    Joint pain, knee 01/13/2017    baldo knee synvisc-1 injections    Long-term insulin use ([address] 1/17/2017    Major depressive disorder with single episode, in partial remission ([address] 1/3/2017    Mobility impaired     Morbid obesity ([address]     Muscle weakness     GRNERALIZED    Obesity     Obstructive sleep apnea     HEATHER on CPAP     On CPAP-TO BRING DOS    Paroxysmal atrial fibrillation ([address] 9/6/2017    Pneumonia 12/2/2019    Respiratory failure ([address] 03/2016    with open heart surgery, trached x 5 months    S/P cardiac cath     Seborrhea     Severe aortic stenosis 3/9/2016    Severe mitral valve stenosis 3/9/2016    Severe sepsis ([address] 4/3/2016    Skin rash     ABD FOLDS    Stasis dermatitis     Thyroid disease     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 lft thumb     Traumatic hemorrhagic shock ([address] 3/13/2019    Venous stasis dermatitis     Wears glasses     Wheelchair bound     pivots with 2 assists       Past Surgical  History:     Past Surgical History:   Procedure Laterality Date    AORTIC VALVE REPLACEMENT  03/18/2016    bioprosthetic    ARM SURGERY Left [address] VENOUS CATHETER Right 02/21/2018    DIALYSIS CATHETER Dr Markos Varela    COLONOSCOPY  11/19/09    COLONOSCOPY N/A 10/12/2018    COLONOSCOPY WITH BIOPSY performed by Cristina Tapia DO at [address] [facility] Right 3/10/2019    WASHOUT RIGHT LOWER EXTREMITY WITH WOUND VAC EXCHANGE performed by Jaky Ordoñez MD at [address] Right 3/8/2019    RIGHT LOWER EXTREMITY EXPLORATION, HEMATOMA EVACUATION, WOUND VAC PLACEMENT performed by Jaky Ordoñez MD at [address] (x10-12 surgeries)    several surgeries on left arm    IR NONTUNNELED VASCULAR CATHETER  8/5/2020    IR NONTUNNELED VASCULAR CATHETER 8/5/2020 Mabel Harrington MD [facility]    KNEE ARTHROSCOPY Left 09/17/99    MITRAL VALVE REPLACEMENT  03/18/2016    bioprosthetic     NASAL SEPTUM SURGERY      OTHER SURGICAL HISTORY  3/18/16    Aortic root Enlargement    OTHER SURGICAL HISTORY  3/18/16    ASD Closure    OTHER SURGICAL HISTORY Right 01/09/2017    AV fistula creation, wrist    OTHER SURGICAL HISTORY Right 08/29/2017    ligation of collateral branch of av fistula right wrist    OTHER SURGICAL HISTORY  04/05/2018    FISTULAGRAM WITH DR. Mae Guadarrama    HI EGD TRANSORAL BIOPSY SINGLE/MULTIPLE N/A 10/17/2017    EGD BIOPSY performed by Lorri Burton MD at [address] ANGIOACCESS AV FISTULA Right 8/29/2017     RIGHT  LOWER ARM AV FISTULA  LIGATION OF AQUIRED BRANCHES X2 [place] Michael Paris DO [place] [address]  3/18/16    median    VASCULAR SURGERY  12/06/2018    Right arm fistulagram,  PTA cephalic vein stenosis  /  DR Maria       Medications:      midodrine  10 mg Oral TID WC    sevelamer  1,600 mg Oral TID WC    fluticasone  2 spray Nasal Daily    levothyroxine  25 mcg Oral Daily    donepezil  5 mg Oral QPM    folic acid  1 mg Oral Daily    insulin glargine  25 Units Subcutaneous BID    atorvastatin  40 mg Oral Nightly    therapeutic multivitamin-minerals  1 tablet Oral Daily    pantoprazole  40 mg Oral QAM AC    aspirin  81 mg Oral Daily    apixaban  5 mg Oral BID    cetirizine  5 mg Oral Daily    DULoxetine  60 mg Oral Daily    buPROPion  150 mg Oral Daily    sodium chloride flush  5-40 mL Intravenous 2 times per day    vancomycin (VANCOCIN) intermittent dosing (placeholder)   Other RX Placeholder    vancomycin  1,000 mg Intravenous Q MWF       Social History:     Social History     Socioeconomic History    Marital status:      Spouse name: Angela Simon Number of children: 2    Years of education: Not on file    Highest education level: Not on file   Occupational History    Occupation: Retired      Employer: [employer]    Financial resource strain: Not on file    Food insecurity     Worry: Not on file     Inability: Not on file   [employer] needs     Medical: Not on file     Non-medical: Not on file   Tobacco Use    Smoking status: Former Smoker     Packs/day: 2.00     Years: 25.00     Pack years: 50.00     Types: Cigarettes     Start date: 3/17/1955     Quit date: 1980     Years since quittin.3    Smokeless tobacco: Never Used   Substance and Sexual Activity    Alcohol use: No     Alcohol/week: 0.0 standard drinks     Comment: 1-2 drinks per year    Drug use: No    Sexual activity: Never   Lifestyle    Physical activity     Days per week: Not on file     Minutes per session: Not on file    Stress: Not on file   Relationships    Social connections     Talks on phone: Not on file     Gets together: Not on file     Attends Latter day service: Not on file     Active member of club or organization: Not on file     Attends meetings of clubs or organizations: Not on file     Relationship status: Not on file    Intimate partner violence     Fear of current or ex partner: Not on file     Emotionally abused: Not on file     Physically abused: Not on file     Forced sexual activity: Not on file   Other Topics Concern    Not on file   Social History Narrative    Not on file       Family History:     Family History   Problem Relation Age of Onset    Lung Cancer Mother     Diabetes Mother     Alzheimer's Disease Father     Diabetes Maternal Grandmother     High Blood Pressure Sister       Medical Decision Making:   I have independently reviewed/ordered the following labs:    CBC with Differential:   Recent Labs     05/10/21  0645 05/12/21  0828   WBC 7.8 7.2   HGB 10.4* 10.3*   HCT 32.9* 33.2*    128*     BMP:  Recent Labs     05/10/21  0645 05/12/21  0828    140   K 4.3 3.7   CL 93* 98   CO2 24 25   BUN 83* 47*   CREATININE 7.15* 5.65*     Hepatic Function Panel:   No results for input(s): PROT, LABALBU, BILIDIR, IBILI, BILITOT, ALKPHOS, ALT, AST in the last 72 hours. No results for input(s): RPR in the last 72 hours. No results for input(s): HIV in the last 72 hours. No results for input(s): BC in the last 72 hours. Lab Results   Component Value Date    CREATININE 5.65 05/12/2021    GLUCOSE 123 05/12/2021       Detailed results: Thank you for allowing us to participate in the care of this patient. Please call with questions. This note is created with the assistance of a speech recognition program.  While intending to generate adocument that actually reflects the content of the visit, the document can still have some errors including those of syntax and sound a like substitutions which may escape proof reading. It such instances, actual meaningcan be extrapolated by contextual diversion.     Meggan Andrews MD. Infectious Diseases

## 2021-05-12 NOTE — PLAN OF CARE
Problem: Skin Integrity:  Goal: Will show no infection signs and symptoms  Description: Will show no infection signs and symptoms  Outcome: Completed  Goal: Absence of new skin breakdown  Description: Absence of new skin breakdown  Outcome: Completed  Goal: Status of oral mucous membranes will improve  Description: Status of oral mucous membranes will improve  Outcome: Completed  Goal: Skin integrity will be maintained  Description: Skin integrity will be maintained  Outcome: Completed     Problem: Falls - Risk of:  Goal: Will remain free from falls  Description: Will remain free from falls  Outcome: Completed  Goal: Absence of physical injury  Description: Absence of physical injury  Outcome: Completed     Problem:  Activity:  Goal: Fatigue will decrease  Description: Fatigue will decrease  Outcome: Completed  Goal: Risk for activity intolerance will decrease  Description: Risk for activity intolerance will decrease  Outcome: Completed     Problem: Coping:  Goal: Ability to cope will improve  Description: Ability to cope will improve  Outcome: Completed     Problem: Fluid Volume:  Goal: Will show no signs or symptoms of fluid imbalance  Description: Will show no signs or symptoms of fluid imbalance  Outcome: Completed     Problem: Health Behavior:  Goal: Ability to manage health-related needs will improve  Description: Ability to manage health-related needs will improve  Outcome: Completed  Goal: Identification of resources available to assist in meeting health care needs will improve  Description: Identification of resources available to assist in meeting health care needs will improve  Outcome: Completed     Problem: Nutritional:  Goal: Ability to identify appropriate dietary choices will improve  Description: Ability to identify appropriate dietary choices will improve  Outcome: Completed     Problem: Physical Regulation:  Goal: Ability to maintain clinical measurements within normal limits will improve  Description: Ability to maintain clinical measurements within normal limits will improve  Outcome: Completed  Goal: Complications related to the disease process, condition or treatment will be avoided or minimized  Description: Complications related to the disease process, condition or treatment will be avoided or minimized  Outcome: Completed     Problem: Sensory:  Goal: General experience of comfort will improve  Description: General experience of comfort will improve  Outcome: Completed

## 2021-05-12 NOTE — PROGRESS NOTES
Pharmacy Note  Stress Ulcer Prophylaxis Discontinuation    Pharmacist assessment of stress ulcer prophylaxis therapy for Raudel Gaspar, 67 y.o. male    Patient Active Problem List   Diagnosis    Traumatic amputation of thumb, left, sequela ([address]    Essential hypertension    Mixed hyperlipidemia    Obstructive sleep apnea    Class 1 obesity with serious comorbidity and body mass index (BMI) of 33.0 to 33.9 in adult    Benign prostatic hyperplasia without lower urinary tract symptoms    Venous stasis dermatitis    Severe aortic stenosis    Severe mitral valve stenosis    Anemia in chronic kidney disease, on chronic dialysis ([address]    Dialysis patient ([address]    Dementia ([address]    Major depressive disorder with single episode, in partial remission ([address]    Chronic cerebral ischemia    ESRD (end stage renal disease) on dialysis ([address]    Long-term insulin use ([address]    Controlled type 2 diabetes mellitus with chronic kidney disease on chronic dialysis, with long-term current use of insulin ([address]    Controlled type 2 diabetes mellitus with diabetic polyneuropathy, with long-term current use of insulin (formerly [facility])    Dermatitis    Hematoma of right lower extremity    Hematoma of groin    Accidental fall from wheelchair    Open wound    Hematoma of right thigh    Wound of right leg    Open wound of right lower extremity    Complication of arteriovenous dialysis fistula    Secondary hyperparathyroidism of renal origin ([address]    Cellulitis    Cellulitis of right thigh     Past Medical History:   Diagnosis Date    Acute blood loss anemia 3/11/2019    Acute on chronic diastolic CHF (congestive heart failure) ([address] 12/2/2019    Anemia in chronic kidney disease (CKD) 4/27/2016    Arthritis     Bacteremia 11/11/2016    Benign prostatic hyperplasia without lower urinary tract symptoms     Bleeding from wound 4/2/2019    BMI 40.0-44.9, adult ([address] 3/19/2018    Cellulitis 3/21/2019    Cellulitis of left lower extremity     Chronic cerebral ischemia 1/3/2017    Closed fracture of forearm 8/13/2013    Broken lft forearm     Controlled type 2 diabetes mellitus with chronic kidney disease on chronic dialysis, with long-term current use of insulin ([address]     Controlled type 2 diabetes mellitus with diabetic polyneuropathy, with long-term current use of insulin ([address] 10/7/2018    Decubital ulcer     NON OPEN BUTTOCKS    Dementia ([address]     memory problems    Dialysis patient ([address] 01/03/2017    Goes [facility], Sat in [place]    Difficult intravenous access     HAS NEEDED PICC TEAM IN THE PAST    Difficulty walking     WHEELCHAIR BOUND-PIVOTS WITH ASSIST O F 2    DJD (degenerative joint disease) of knee     Elbow, forearm, and wrist, abrasion or friction burn, without mention of infection 8/13/2013    Abrasions lft forearm     Encephalopathy acute 4/27/2016    Encounter regarding vascular access for dialysis for ESRD ([address] 2/2/2017    ESRD (end stage renal disease) on dialysis ([address] 1/17/2017    Forgetfulness     HAS SOME SHORT TERM MEMORY LOSS, QUYEN-WIFE IS LEGAL GUARDIAN    H/O transesophageal echocardiography (AMADEO) for monitoring     Hemodialysis patient ([address] 11/11/2016    [place]---FRESEMIUS.  TUNNELED CATHETER RT UPPER CHEST    Hyperlipidemia 1990    on Meds    Hypertension 1990    on Meds    Intercritical gout     on Meds    Joint pain, knee 01/13/2017    baldo knee synvisc-1 injections    Long-term insulin use ([address] 1/17/2017    Major depressive disorder with single episode, in partial remission ([address] 1/3/2017    Mobility impaired     Morbid obesity (HCC)     Muscle weakness     GRNERALIZED    Obesity     Obstructive sleep apnea     HEATHER on CPAP     On CPAP-TO BRING DOS    Paroxysmal atrial fibrillation ([address] 9/6/2017    Pneumonia 12/2/2019    Respiratory failure ([address] 03/2016    with open heart surgery, trached x 5 months    S/P cardiac cath     Seborrhea     Severe aortic stenosis 3/9/2016    Severe mitral valve stenosis 3/9/2016 Severe sepsis ([facility] 4/3/2016    Skin rash     ABD FOLDS    Stasis dermatitis     Thyroid disease     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 lft thumb     Traumatic hemorrhagic shock ([facility] 3/13/2019    Venous stasis dermatitis     Wears glasses     Wheelchair bound     pivots with 2 assists     No results for input(s): INR in the last 72 hours. Recent Labs     05/10/21  0645 05/12/21  0828   HGB 10.4* 10.3*   HCT 32.9* 33.2*    128*       Per the [facility] stress ulcer prophylaxis criteria, the following has been discontinued per P&T Guidelines:    Pantoprazole                                                                    Stress ulcer prophylaxis criteria:   Any one of the following major risk factors:   Mechanical ventilation ? 48 hours  Coagulopathy (platelets <30,340 mm3, INR >1.5, or aPTT >2 times control, not on anticoagulation)  History of gastrointestinal (GI) ulcerations or GI bleed within past year   Traumatic brain or spinal cord injury   [place] Coma Scale (GCS) ? 10 or inability to obey simple commands  Burn injuries affecting >35% body surface area    At least two of the following minor risk factors:   Length of ICU stay ?7 days  Occult GI bleeding (lasting 6 days or longer)  Sepsis/septic shock (vasopressor support and/or positive microbiologic cultures/suspected infection)  High dose corticosteroid use (>250 mg/day hydrocortisone or equivalent)  Hepatic failure [total bilirubin level >5 mg/dL, AST or ALT >150 U/L (3× ULN)] or partial hepatectomy  Acute renal failure   Transplantation perioperatively in the ICU  Multiple trauma; trauma sustained to more than one body region (injury severity score >15)  _________________________________________________________________________    If the prescriber doesn't feel this discontinuation is appropriate, re-order the medication and place \"SUP appropriate per prescriber\" in comments of the order.     If you would

## 2021-05-12 NOTE — PLAN OF CARE
Problem: Skin Integrity:  Goal: Will show no infection signs and symptoms  Description: Will show no infection signs and symptoms  Outcome: Ongoing  Goal: Absence of new skin breakdown  Description: Absence of new skin breakdown  Outcome: Ongoing  Goal: Status of oral mucous membranes will improve  Description: Status of oral mucous membranes will improve  Outcome: Ongoing  Goal: Skin integrity will be maintained  Description: Skin integrity will be maintained  Outcome: Ongoing     Problem: Skin Integrity:  Goal: Will show no infection signs and symptoms  Description: Will show no infection signs and symptoms  Outcome: Ongoing  Goal: Absence of new skin breakdown  Description: Absence of new skin breakdown  Outcome: Ongoing  Goal: Status of oral mucous membranes will improve  Description: Status of oral mucous membranes will improve  Outcome: Ongoing  Goal: Skin integrity will be maintained  Description: Skin integrity will be maintained  Outcome: Ongoing     Problem: Skin Integrity:  Goal: Will show no infection signs and symptoms  Description: Will show no infection signs and symptoms  Outcome: Ongoing  Goal: Absence of new skin breakdown  Description: Absence of new skin breakdown  Outcome: Ongoing  Goal: Status of oral mucous membranes will improve  Description: Status of oral mucous membranes will improve  Outcome: Ongoing  Goal: Skin integrity will be maintained  Description: Skin integrity will be maintained  Outcome: Ongoing     Problem: Skin Integrity:  Goal: Will show no infection signs and symptoms  Description: Will show no infection signs and symptoms  Outcome: Ongoing  Goal: Absence of new skin breakdown  Description: Absence of new skin breakdown  Outcome: Ongoing  Goal: Status of oral mucous membranes will improve  Description: Status of oral mucous membranes will improve  Outcome: Ongoing  Goal: Skin integrity will be maintained  Description: Skin integrity will be maintained  Outcome: Ongoing

## 2021-05-12 NOTE — PROGRESS NOTES
Pharmacy Vancomycin Consult     Vancomycin Day: 5  Current Dosin mg MWF post HD  Current indication: CELLULITIS    Temp max:  98    Recent Labs     21  1017 05/10/21  0645   BUN 67* 83*   CREATININE 6.00* 7.15*   WBC 8.8 7.8     No intake or output data in the 24 hours ending 21 0804  Culture Date      Source                       Results  SEE MICRO    Ht Readings from Last 1 Encounters:   21 5' 7\" (1.702 m)        Wt Readings from Last 1 Encounters:   05/10/21 232 lb 2.3 oz (105.3 kg)       Body mass index is 36.36 kg/m². Estimated Creatinine Clearance: 10 mL/min (A) (based on SCr of 7.15 mg/dL [facility])).     Trough: 15.5    Assessment/Plan:  Trough in range, will continue current plan and monitor closely  Thank you for your consult    Pia Nicholas, PharmD, BCPS

## 2021-05-13 NOTE — PROGRESS NOTES
05/13/21  Cherre Rash  1945    Patient Resident of [facility]    Chief Complaint  1. Cellulitis of right thigh    2. Anemia in chronic kidney disease, on chronic dialysis ([address]    3. Controlled type 2 diabetes mellitus with chronic kidney disease on chronic dialysis, with long-term current use of insulin ([address]    4. ESRD (end stage renal disease) on dialysis ([address]    5. Vascular dementia without behavioral disturbance [facility])        HPI:  48-year-old patient with history of end-stage renal disease, continues on dialysis,Tuesday Thursday Saturday, progressive dementia, anemia being seen for transition back to [facility] after hospitalization at [facility]. Chelleent's 5/8/2021 through 5/12/2021 for cellulitis to the right leg. He was seen by infectious disease. Blood cultures were negative. Was placed on vancomycin through hemodialysis. He is to remain on vancomycin through dialysis until 5/19/2021. Patient pleasantly confused on exam.  Denies any pain. Nursing staff state he has been afebrile. Vital signs stable.   No acute concerns      Allergies   Allergen Reactions    Percocet [Oxycodone-Acetaminophen] Itching and Rash     Also causes shaking    Ampicillin Rash       Past Medical History:   Diagnosis Date    Acute blood loss anemia 3/11/2019    Acute on chronic diastolic CHF (congestive heart failure) ([address] 12/2/2019    Anemia in chronic kidney disease (CKD) 4/27/2016    Arthritis     Bacteremia 11/11/2016    Benign prostatic hyperplasia without lower urinary tract symptoms     Bleeding from wound 4/2/2019    BMI 40.0-44.9, adult ([address] 3/19/2018    Cellulitis 3/21/2019    Cellulitis of left lower extremity     Chronic cerebral ischemia 1/3/2017    Closed fracture of forearm 8/13/2013    Broken lft forearm     Controlled type 2 diabetes mellitus with chronic kidney disease on chronic dialysis, with long-term current use of insulin ([address]     Controlled type 2 diabetes mellitus with diabetic polyneuropathy, with long-term current use of insulin ([address] 10/7/2018    Decubital ulcer     NON OPEN BUTTOCKS    Dementia ([address]     memory problems    Dialysis patient ([address] 01/03/2017    Goes [facility], Sat in [place] Difficult intravenous access     HAS NEEDED PICC TEAM IN THE PAST    Difficulty walking     WHEELCHAIR BOUND-PIVOTS WITH ASSIST O F 2    DJD (degenerative joint disease) of knee     Elbow, forearm, and wrist, abrasion or friction burn, without mention of infection 8/13/2013    Abrasions lft forearm     Encephalopathy acute 4/27/2016    Encounter regarding vascular access for dialysis for ESRD ([address] 2/2/2017    ESRD (end stage renal disease) on dialysis ([address] 1/17/2017    Forgetfulness     HAS SOME SHORT TERM MEMORY LOSS, QUYEN-WIFE IS LEGAL GUARDIAN    H/O transesophageal echocardiography (AMADEO) for monitoring     Hemodialysis patient ([address] 11/11/2016    [place]---[facility]EMI[facility].  TUNNELED CATHETER RT UPPER CHEST    Hyperlipidemia 1990    on Meds    Hypertension 1990    on Meds    Intercritical gout     on Meds    Joint pain, knee 01/13/2017    baldo knee synvisc-1 injections    Long-term insulin use ([address] 1/17/2017    Major depressive disorder with single episode, in partial remission ([address] 1/3/2017    Mobility impaired     Morbid obesity ([address]     Muscle weakness     GRNERALIZED    Obesity     Obstructive sleep apnea     HEATHER on CPAP     On CPAP-TO BRING DOS    Paroxysmal atrial fibrillation ([address] 9/6/2017    Pneumonia 12/2/2019    Respiratory failure ([address] 03/2016    with open heart surgery, trached x 5 months    S/P cardiac cath     Seborrhea     Severe aortic stenosis 3/9/2016    Severe mitral valve stenosis 3/9/2016    Severe sepsis ([address] 4/3/2016    Skin rash     ABD FOLDS    Stasis dermatitis     Thyroid disease     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 lft thumb     Traumatic hemorrhagic shock ([address] 3/13/2019    Venous stasis dermatitis     Wears glasses     Wheelchair bound     pivots with 2 assists       Past Surgical History:   Procedure Laterality Date    AORTIC VALVE REPLACEMENT  03/18/2016    bioprosthetic    ARM SURGERY Left 1990    CARDIAC CATHETERIZATION      CENTRAL VENOUS CATHETER Right 02/21/2018    DIALYSIS CATHETER Dr Xuan Esparza    COLONOSCOPY  11/19/09    COLONOSCOPY N/A 10/12/2018    COLONOSCOPY WITH BIOPSY performed by Stephany Carbajal DO at [address] [facility] Right 3/10/2019    WASHOUT RIGHT LOWER EXTREMITY WITH WOUND VAC EXCHANGE performed by Virgil Garcia MD at [address] [facility] Right 3/8/2019    RIGHT LOWER EXTREMITY EXPLORATION, HEMATOMA EVACUATION, WOUND VAC PLACEMENT performed by Virgil Garcia MD at [address] (x10-12 surgeries)    several surgeries on left arm    IR NONTUNNELED VASCULAR CATHETER  8/5/2020    IR NONTUNNELED VASCULAR CATHETER 8/5/2020 Jesus Toney MD [facility]    KNEE ARTHROSCOPY Left 09/17/99    MITRAL VALVE REPLACEMENT  03/18/2016    bioprosthetic     NASAL SEPTUM SURGERY      OTHER SURGICAL HISTORY  3/18/16    Aortic root Enlargement    OTHER SURGICAL HISTORY  3/18/16    ASD Closure    OTHER SURGICAL HISTORY Right 01/09/2017    AV fistula creation, wrist    OTHER SURGICAL HISTORY Right 08/29/2017    ligation of collateral branch of av fistula right wrist    OTHER SURGICAL HISTORY  04/05/2018    FISTULAGRAM WITH DR. Josh Pena    AL EGD TRANSORAL BIOPSY SINGLE/MULTIPLE N/A 10/17/2017    EGD BIOPSY performed by Grace Lund MD at [address] ANGIOACCESS AV FISTULA Right 8/29/2017     RIGHT  LOWER ARM AV FISTULA  LIGATION OF AQUIRED BRANCHES X2 performed by Shanika M[place] [address]  [place]18[place]16    median    VASCULAR SURGERY  12/06/2018    Right arm fistulagram,  PTA cephalic vein stenosis  /  DR David Reydon       Medications as ingrid Bo[facility] Chart /reviewed     Social History     Socioeconomic History    Marital status:      Spouse name: Bailey Gupta Number of children: 2    Years of education: Not on file    Highest education level: Not on file   Occupational History    Occupation: Retired      Employer: [address] resource strain: Not on file    Food insecurity     Worry: Not on file     Inability: Not on file   [employer] needs     Medical: Not on file     Non-medical: Not on file   Tobacco Use    Smoking status: Former Smoker     Packs/day: 2.00     Years: 25.00     Pack years: 50.00     Types: Cigarettes     Start date: 3/17/1955     Quit date: 1980     Years since quittin.3    Smokeless tobacco: Never Used   Substance and Sexual Activity    Alcohol use: No     Alcohol/week: 0.0 standard drinks     Comment: 1-2 drinks per year    Drug use: No    Sexual activity: Never   Lifestyle    Physical activity     Days per week: Not on file     Minutes per session: Not on file    Stress: Not on file   Relationships    Social connections     Talks on phone: Not on file     Gets together: Not on file     Attends Ca[employer] service: Not on file     Active member of club or organization: Not on file     Attends meetings of clubs or organizations: Not on file     Relationship status: Not on file    Intimate partner violence     Fear of current or ex partner: Not on file     Emotionally abused: Not on file     Physically abused: Not on file     Forced sexual activity: Not on file   Other Topics Concern    Not on file   Social History Narrative    Not on file       Review of Systems   Unable to perform ROS: Dementia       Physical Exam  Vitals signs and nursing note reviewed. Constitutional:       General: He is not in acute distress. Appearance: Normal appearance. He is not ill-appearing. HENT:      Head: Normocephalic and atraumatic. Right Ear: External ear normal.      Left Ear: External ear normal.      Nose: Nose normal. No congestion. Eyes:      General:         Right eye: No discharge. Left eye: No discharge. Conjunctiva/sclera: Conjunctivae normal.   Neck:      Musculoskeletal: Normal range of motion. Pulmonary:      Effort: Pulmonary effort is normal. No respiratory distress. Skin:     General: Skin is warm and dry. Coloration: Skin is not jaundiced or pale. Findings: No bruising or erythema. Neurological:      General: No focal deficit present. Mental Status: He is alert and oriented to person, place, and time. Mental status is at baseline. Cranial Nerves: No cranial nerve deficit. Psychiatric:         Mood and Affect: Mood normal.         Behavior: Behavior normal.         Thought Content: Thought content normal.         Judgment: Judgment normal.         Vital Signs: Temperature 98.7 °F, blood pressure 143/64, pulse 76, respirations 16, SPO2 97% on room air    Assessment:  1. Cellulitis of right thigh  Continue on the vancomycin through dialysis until the 19th as ordered by ID. 2. Anemia in chronic kidney disease, on chronic dialysis ([facility]  Labs stable in hospital.  Continue to follow with labs through the dialysis center    3. Controlled type 2 diabetes mellitus with chronic kidney disease on chronic dialysis, with long-term current use of insulin (HCC)  Stable, continues on Lantus and Humalog    4. ESRD (end stage renal disease) on dialysis ([address]  Continues to follow with nephrology. Dialysis Tuesday Thursday and Saturday    5. Vascular dementia without behavioral disturbance (HCC)  No behavioral outburst.  Continues on Aricept      Plan:  As noted above. Follow up for routine visit. Call sooner with concerns prior.     Electronically signed by SHAMEKA Munoz CNP on 5/13/2021 at 1:17 PM

## 2021-06-02 NOTE — PROGRESS NOTES
[place]    Difficult intravenous access     HAS NEEDED PICC TEAM IN THE PAST    Difficulty walking     WHEELCHAIR BOUND-PIVOTS WITH ASSIST O F 2    DJD (degenerative joint disease) of knee     Elbow, forearm, and wrist, abrasion or friction burn, without mention of infection 8/13/2013    Abrasions lft forearm     Encephalopathy acute 4/27/2016    Encounter regarding vascular access for dialysis for ESRD ([address] 2/2/2017    ESRD (end stage renal disease) on dialysis ([address] 1/17/2017    Forgetfulness     HAS SOME SHORT TERM MEMORY LOSS, QUYEN-WIFE IS LEGAL GUARDIAN    H/O transesophageal echocardiography ([facility]ADEO) for monitoring     Hemodialysis patient ([address] 11/11/2016    tues-thurs-sat [place]---FRESEMIUS.  TUNNELED CATHETER RT UPPER CHEST    Hyperlipidemia 1990    on Meds    Hypertension 1990    on Meds    Intercritical gout     on Meds    Joint pain, knee 01/13/2017    baldo knee synvisc-1 injections    Long-term insulin use ([address] 1/17/2017    Major depressive disorder with single episode, in partial remission ([address] 1/3/2017    Mobility impaired     Morbid obesity ([address]     Muscle weakness     GRNERALIZED    Obesity     Obstructive sleep apnea     HEATHER on CPAP     On CPAP-TO BRING DOS    Paroxysmal atrial fibrillation ([address] 9/6/2017    Pneumonia 12/2/2019    Respiratory failure ([address] 03/2016    with open heart surgery, trached x 5 months    S/P cardiac cath     Seborrhea     Severe aortic stenosis 3/9/2016    Severe mitral valve stenosis 3/9/2016    Severe sepsis ([address] 4/3/2016    Skin rash     ABD FOLDS    Stasis dermatitis     Thyroid disease     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 lft thumb     Traumatic hemorrhagic shock ([address] 3/13/2019    Venous stasis dermatitis     Wears glasses     Wheelchair bound     pivots with 2 assists       PAST SURGICAL HISTORY:   Past Surgical History:   Procedure Laterality Date    AORTIC VALVE REPLACEMENT  03/18/2016    bioprosthetic    ARM SURGERY Left 1990    CARDIAC CATHETERIZATION      CENTRAL VENOUS CATHETER Right 02/21/2018    DIALYSIS CATHETER Dr Tim Pena    COLONOSCOPY  11/19/09    COLONOSCOPY N/A 10/12/2018    [facility]SY [facility] Right 3/10/2019    WASHOUT RIGHT LOWER EXTREMITY WITH WOUND VAC EXCHANGE performed by Betty Morrow MD at [facility] Right 3/8/2019    RIGHT LOWER EXTREMITY EXPLORATION, HEMATOMA EVACUATION, WOUND VAC PLACEMENT performed by Betty Morrow MD at [address] (x10-12 surgeries)    several surgeries on left arm    IR NONTUNNELED VASCULAR CATHETER  8/5/2020    IR NONTUNNELED VASCULAR CATHETER 8/5/2020 Marly Escobar MD [facility]    KNEE ARTHROSCOPY Left 09/17/99    MITRAL VALVE REPLACEMENT  03/18/2016    bioprosthetic     NASAL SEPTUM SURGERY      OTHER SURGICAL HISTORY  3/18/16    Aortic root Enlargement    OTHER SURGICAL HISTORY  3/18/16    ASD Closure    OTHER SURGICAL HISTORY Right 01/09/2017    AV fistula creation, wrist    OTHER SURGICAL HISTORY Right 08/29/2017    ligation of collateral branch of av fistula right wrist    OTHER SURGICAL HISTORY  04/05/2018    FISTULAGRAM WITH DR. Mariza Dueñas    NM EGD TRANSORAL BIOPSY SINGLE/MULTIPLE N/A 10/17/2017    EGD BIOPSY performed by Woodrow Rangel MD at [address] ANGIOACCESS AV FISTULA Right 8/29/2017     RIGHT  LOWER ARM AV FISTULA  LIGATION OF AQUIRED BRANCHES X2 performed by Ulysses Medina DO at [address]  3/18/16    median    VASCULAR SURGERY  12/06/2018    Right arm fistulagram,  PTA cephalic vein stenosis  /  DR Arnoldo Ayon       SOCIAL HISTORY:     Tobacco:   Social History     Tobacco Use   Smoking Status Former Smoker    Packs/day: 2.00    Years: 25.00    Pack years: 50.00    Types: Cigarettes    Start date: 3/17/1955   Emil Diaz Quit date: 1980    Years since quittin.4   Smokeless Tobacco Never Used     Alcohol:   Social History     Substance and Sexual Activity   Alcohol Use No    Alcohol/week: 0.0 standard drinks    Comment: 1-2 drinks per year     Drugs:   Social History     Substance and Sexual Activity   Drug Use No       FAMILY HISTORY: family history includes Alzheimer's Disease in his father; Diabetes in his maternal grandmother and mother; High Blood Pressure in his sister; Donzell Formosa in his mother. REVIEW OF SYSTEMS:     Please see history of chief complaint above; otherwise no new problems with respect to General, HEENT, Cardiovascular, Respiratory, Gastrointestinal, Genitourinary, Endocrinologic, Musculoskeletal, or Neuropsychiatric complaints. PHYSICAL EXAMINATION:    Vitals: Temp: 97.3 deg F. Pulse: 61. Resp: 16. BP: 130/66. General: A 67 y.o.  male. Alert and oriented to person and place but not time. He does not appear to be in any acute distress. Skin: Skin color, texture, turgor normal. No rashes or lesions. HEENT/Neck: Essentially unremarkable  Chest: There was a chest catheter in place  Lungs: Normal - CTA without rales, rhonchi, or wheezing. Heart: regular rate and rhythm, S1, S2 normal, no murmur, click, rub or gallop No S3 or S4. Abdomen: Obese, soft, slightly distended, non-tender, normal active bowel sounds, no masses palpated and no hepatosplenomegaly. There was a moderate-sized epigastric hernia present  Extremities: No clubbing, cyanosis, edema  Neurologic: cranial nerves II-XII are grossly intact    ASSESSMENT:     Diagnosis Orders   1. Controlled type 2 diabetes mellitus with chronic kidney disease on chronic dialysis, with long-term current use of insulin ([address]     2. Controlled type 2 diabetes mellitus with diabetic polyneuropathy, with long-term current use of insulin ([facility])     3. Long-term insulin use ([address]     4. Essential hypertension     5. Mixed hyperlipidemia     6.  ESRD (end

## 2021-06-12 PROBLEM — K92.2 LOWER GI BLEED: Status: ACTIVE | Noted: 2021-01-01

## 2021-06-12 NOTE — ED PROVIDER NOTES
[facility]  eMERGENCY dEPARTMENT eNCOUnter      Pt Name: Riki Engel  MRN: [id number]  [place] 1945  Date of evaluation: 6/12/2021      CHIEF COMPLAINT       Chief Complaint   Patient presents with    Rectal Bleeding     Bright red to dark brown stools for unknown amount of time. Pt unaware of this report. Mild confusion which is his baseline. HISTORY OF PRESENT ILLNESS    Riki Engel is a 67 y.o. male who presents rectal bleeding he has had bright red blood in his stools according to nursing home and then dark blood today no nausea no vomiting no fevers or chills he is a dialysis patient he denies any pain  He does bruise easily as he is on blood thinner  Patient last had dialysis yesterday, patient is on aspirin and Eliquis  REVIEW OF SYSTEMS         Review of Systems   Constitutional: Negative for chills and fever. HENT: Negative for congestion, dental problem, sore throat and trouble swallowing. Eyes: Negative for visual disturbance. Respiratory: Negative for shortness of breath and wheezing. Cardiovascular: Negative for chest pain, palpitations and leg swelling. Gastrointestinal: Positive for blood in stool. Negative for abdominal pain, constipation, diarrhea, nausea and vomiting. Genitourinary: Negative for difficulty urinating and dysuria. Patient is end-stage dialysis last dialysis was yesterday he has a fistula in the right arm   Musculoskeletal: Negative for back pain, joint swelling and neck pain. Patient had his left hand caught in the machine years ago he had a finger transposed after losing his thumb   Skin: Negative for rash. Neurological: Negative for dizziness, syncope, weakness and headaches. Hematological: Negative for adenopathy. Bruises/bleeds easily. Psychiatric/Behavioral: Negative for confusion and suicidal ideas.          PAST MEDICAL HISTORY    has a past medical history of Acute blood loss anemia, Acute on chronic valve replacement (03/18/2016); Mitral valve replacement (03/18/2016); other surgical history (3/18/16); other surgical history (3/18/16); other surgical history (Right, 01/09/2017); other surgical history (Right, 08/29/2017); pr ligatn angioaccess av fistula (Right, 8/29/2017); pr egd transoral biopsy single/multiple (N/A, 10/17/2017); central venous catheter (Right, 02/21/2018); Colonoscopy (N/A, 10/12/2018); other surgical history (04/05/2018); vascular surgery (12/06/2018); EXPLORATION OF WOUND OF EXTREMITY (Right, 3/10/2019); EXPLORATION OF WOUND OF EXTREMITY (Right, 3/8/2019); and IR NONTUNNELED VASCULAR CATHETER > 5 YEARS (8/5/2020). CURRENT MEDICATIONS       Discharge Medication List as of 6/13/2021  8:45 AM      CONTINUE these medications which have NOT CHANGED    Details   sevelamer (RENVELA) 800 MG tablet Take 2 tablets by mouth 3 times daily with meals only. , Disp-540 tablet, R-3Please dispense generic sevelamer carbonate. Normal      Lidocaine 5 % CREA Apply topically Apply to fistula rt arm topically one time a day every Mon, Wed, Fri for dialysis. Historical Med      buPROPion (WELLBUTRIN SR) 150 MG extended release tablet Take 150 mg by mouth dailyHistorical Med      albuterol (PROVENTIL) (2.5 MG/3ML) 0.083% nebulizer solution Take 2.5 mg by nebulization every 8 hours as needed for Shortness of BreathHistorical Med      benzonatate (TESSALON) 100 MG capsule Take 100 mg by mouth every 12 hours as needed for CoughHistorical Med      ipratropium-albuterol (DUONEB) 0.5-2.5 (3) MG/3ML SOLN nebulizer solution Inhale 3 mLs into the lungs every 8 hours as needed for Shortness of Breath (or wheezing)Historical Med      guaiFENesin 400 MG tablet Take 400 mg by mouth every 12 hours as needed (allergies)Historical Med      ondansetron (ZOFRAN) 4 MG tablet Take 4 mg by mouth every 6 hours as needed for Nausea or VomitingHistorical Med      tiZANidine (ZANAFLEX) 2 MG tablet Take 2 mg by mouth every 8 hours as needed (muscle pain)Historical Med      FIASP FLEXTOUCH 100 UNIT/ML SOPN Inject into the skin See Admin Instructions Per sliding scale: If 150-200 = 2 units; 201-250 = 4 units; 251-300 = 6 units; 301-350 = 8 units; 351-400 = 10 units; 401+ = 15 units, then call LUISANA GARRIDOHistorical Med      Elastic Bandages & Supports MISC Disp-2 each, R-3, LUISANA, [id number] mmHg compression stockings to both lower legs, on every morning, off at bedtime. gabapentin (NEURONTIN) 300 MG capsule Take 300 mg by mouth daily. Historical Med      DULoxetine (CYMBALTA) 60 MG extended release capsule Take 60 mg by mouth dailyHistorical Med      loratadine (CLARITIN) 10 MG capsule Take 10 mg by mouth daily (every other day). Historical Med      aspirin 81 MG tablet Take 1 tablet by mouth daily, Disp-30 tablet, R-3Print      apixaban (ELIQUIS) 5 MG TABS tablet Take 1 tablet by mouth 2 times daily, Disp-60 tablet, R-2Print      Multiple Vitamins-Minerals (THERAPEUTIC MULTIVITAMIN-MINERALS) tablet Take 1 tablet by mouth dailyHistorical Med      omeprazole (PRILOSEC) 20 MG delayed release capsule Take 20 mg by mouth nightly Historical Med      atorvastatin (LIPITOR) 40 MG tablet Take 1 tablet by mouth nightly, Disp-30 tablet, R-3Normal      midodrine (PROAMATINE) 10 MG tablet Take 1 tablet by mouth 3 times daily, Disp-90 tablet, O-3PQAUSJ      folic acid (FOLVITE) 1 MG tablet Take 1 tablet by mouth daily, Disp-90 tablet, R-1Normal      insulin glargine (LANTUS) 100 UNIT/ML injection vial Inject 25 Units into the skin 2 times daily, Disp-1 vial, R-3Normal      Misc. Devices [facility]) MISC Disp-1 each, R-0, PrintUse as directed      Amino Acids-Protein Hydrolys (PRO-STAT PO) Take 30 mLs by mouth 3 times daily (Prostat SF) for wound healing. Historical Med      donepezil (ARICEPT) 5 MG tablet Take 5 mg by mouth every evening      levothyroxine (SYNTHROID) 25 MCG tablet Take 1 tablet by mouth Daily, Disp-30 tablet, R-2      fluticasone (FLONASE) 50 MCG/ACT nasal spray 2 sprays by Nasal route daily, Disp-3 Bottle, R-3      magnesium hydroxide (MILK OF MAGNESIA) 400 MG/5ML suspension Take 30 mLs by mouth daily as needed for Constipation             ALLERGIES     is allergic to percocet [oxycodone-acetaminophen] and ampicillin. FAMILY HISTORY     He indicated that his mother is . He indicated that his father is . He indicated that his sister is alive. He indicated that his maternal grandmother is . He indicated that his maternal grandfather is . family history includes Alzheimer's Disease in his father; Diabetes in his maternal grandmother and mother; High Blood Pressure in his sister; Den Goldstein in his mother. SOCIAL HISTORY      reports that he quit smoking about 41 years ago. His smoking use included cigarettes. He started smoking about 66 years ago. He has a 50.00 pack-year smoking history. He has never used smokeless tobacco. He reports that he does not drink alcohol and does not use drugs. PHYSICAL EXAM     INITIAL VITALS:  height is 5' 8\" (1.727 m) and weight is 224 lb (101.6 kg). His tympanic temperature is 97.2 °F (36.2 °C). His blood pressure is 134/64 and his pulse is 72. His respiration is 18 and oxygen saturation is 97%. Physical Exam  Constitutional:       Appearance: He is well-developed. He is obese. He is not ill-appearing or diaphoretic. HENT:      Head: Normocephalic and atraumatic. Right Ear: External ear normal.      Left Ear: External ear normal.   Eyes:      Pupils: Pupils are equal, round, and reactive to light. Cardiovascular:      Rate and Rhythm: Normal rate and regular rhythm. Pulmonary:      Effort: Pulmonary effort is normal.      Breath sounds: Normal breath sounds. Abdominal:      General: Bowel sounds are normal.      Palpations: Abdomen is soft. Musculoskeletal:         General: Normal range of motion. Cervical back: Normal range of motion and neck supple. Comments: Patient has a fistula right forearm with a palpable thrill  Left hand is got postsurgical changes and he is missing a thumb   Skin:     General: Skin is warm and dry. Neurological:      Mental Status: He is alert and oriented to person, place, and time. Psychiatric:         Behavior: Behavior normal.           DIFFERENTIAL DIAGNOSIS/ MDM:     Rectal bleeding in a dialysis patient who is on anticoagulation will do work-up including a CT without contrast of the abdomen plan is transfer    DIAGNOSTIC RESULTS     EKG: All EKG's are interpreted by the Emergency Department Physician who either signs or Co-signs this chart in the absence of a cardiologist.  Rate is has a regular rhythm rate of 65 bpm appears to be sinus with a first-degree AV block PA interval 360 ms QRS durations 112 ms QT corrected 499 ms axis is -43 there is no acute ST elevation or depression seen poor R wave progression is noted suggestive of an anterior infarct in the past      RADIOLOGY:   I directly visualized the following  images and reviewed the radiologist interpretations:       EXAMINATION:   CT OF THE ABDOMEN AND PELVIS WITHOUT CONTRAST 6/12/2021 2:02 pm       TECHNIQUE:   CT of the abdomen and pelvis was performed without the administration of   intravenous contrast. Multiplanar reformatted images are provided for review.    Dose modulation, iterative reconstruction, and/or weight based adjustment of   the mA/kV was utilized to reduce the radiation dose to as low as reasonably   achievable.       COMPARISON:   03/24/2019       HISTORY:   ORDERING SYSTEM PROVIDED HISTORY: Rectal bleeding   TECHNOLOGIST PROVIDED HISTORY:       Rectal bleeding   Decision Support Exception - unselect if not a suspected or confirmed   emergency medical condition->Emergency Medical Condition (MA)   Reason for Exam: rectal bleeding he has had bright red blood in his stools   according to nursing home and then dark blood today no nausea no vomiting no   fevers or chills he is a dialysis patient he denies any pain       FINDINGS:   Lower Chest: There is bibasilar dependent airspace disease with volume loss. Prior sternal splitting procedure.  Prosthetic aortic and mitral valves.  The   heart is enlarged and unchanged.       Organs: Radiolucent gallstones suspected.  No pericholecystic fluid.  The   liver, spleen, and adrenal glands are grossly with the limitations of a   noncontrast study.  There are unchanged pancreatic calcifications suggesting   calcific pancreatitis. Donneta Roller is an unchanged 2.5 cm cystic lesion in the   uncinate process of the pancreas.  The bilateral kidneys are atrophic. Prominent renal sinus lipomatosis.       GI/Bowel: The colon is redundant. Donneta Roller is a moderate to large stool load in   the right, transverse and descending colon.  The splenic flexure is markedly   elongated extending to the anterior upper abdomen.  The splenic flexure is   dilated measuring up to 9.5 cm in diameter.  The distal sigmoid colon tapers   to a collapsed rectal vault.  There is no twisting of the sigmoid mesentery   to suggest a volvulus.       Pelvis: Urinary bladder is tethered superiorly and to the right likely   secondary to the position of the sigmoid flexure. .       Peritoneum/Retroperitoneum: There is no adenopathy, free air or free fluid. Pelvic lipomatosis.       Bones/Soft Tissues:  There is anterior abdominal wall laxity in the upper   abdomen.  There are adjacent postsurgical changes.  There is no acute bone   finding.           Impression   The colon is redundant with an a markedly elongated splenic flexure.  The   dilated sigmoid flexure measuring up to 9.5 cm tapers to a collapsed rectal   vault with no evidence of a volvulus at this time.  The elongated sigmoid   flexure predisposes the patient to development of a volvulus and intermittent   volvulus should be considered.       There is a moderate to large stool load in the right, transverse and   descending       Radiolucent gallstones suspected.       Bibasilar dependent airspace disease with volume loss, likely atelectasis. Pneumonia not excluded.            ED BEDSIDE ULTRASOUND:       LABS:  Labs Reviewed   CBC WITH AUTO DIFFERENTIAL - Abnormal; Notable for the following components:       Result Value    RBC 3.83 (*)     Hemoglobin 11.6 (*)     Hematocrit 38.0 (*)     Seg Neutrophils 67 (*)     Lymphocytes 16 (*)     Eosinophils % 7 (*)     Absolute Lymph # 1.08 (*)     Absolute Eos # 0.49 (*)     All other components within normal limits   COMPREHENSIVE METABOLIC PANEL - Abnormal; Notable for the following components:    Glucose 108 (*)     BUN 55 (*)     CREATININE 5.01 (*)     Sodium 134 (*)     Chloride 91 (*)     CO2 32 (*)     Alkaline Phosphatase 172 (*)     Total Bilirubin 0.26 (*)     Total Protein 6.2 (*)     Albumin 3.3 (*)     GFR Non- 11 (*)     GFR  14 (*)     All other components within normal limits   PROTIME-INR - Abnormal; Notable for the following components:    Protime 17.0 (*)     All other components within normal limits   TROPONIN - Abnormal; Notable for the following components:    Troponin, High Sensitivity 177 (*)     All other components within normal limits   POC GLUCOSE FINGERSTICK - Abnormal; Notable for the following components:    POC Glucose 199 (*)     All other components within normal limits   AMYLASE       His troponin is elevated consistent with his prior troponins most likely from his renal failure BUN/creatinine elevated, potassium is normal    EMERGENCY DEPARTMENT COURSE:   Vitals:    Vitals:    06/12/21 1318 06/12/21 1513 06/13/21 0556 06/13/21 0558   BP: 137/73 (!) 144/65 134/64    Pulse: 66 68 72    Resp: 16 15 18    Temp: 96.4 °F (35.8 °C)   97.2 °F (36.2 °C)   TempSrc: Tympanic   Tympanic   SpO2: 96% 97% 97%    Weight: 224 lb (101.6 kg)      Height: 5' 8\" (1.727 m)        -------------------------  BP: 134/64, Temp: 97.2 °F (36.2 °C), Pulse: 72, Resp: 18        Re-evaluation Notes    Resting comfortably patient did have 1 small bloody stool last night but none since  There was a delay in getting his bed at [facility] we spent the night here on reexam in the morning he has no complaints feels okay no further bleeding  CRITICAL CARE:   None        CONSULTS: Case was discussed with the nurse practitioner Vikki Torres for the hospitalist service patient will be admitted to Dr. Castanon [place]:  None    FINAL IMPRESSION      1. Rectal bleeding          DISPOSITION/PLAN   DISPOSITION transferred to [facility] on Disposition    Stable    PATIENT REFERRED TO:  No follow-up provider specified. DISCHARGE MEDICATIONS:  Discharge Medication List as of 6/13/2021  8:45 AM          (Please note that portions of this note were completed with a voice recognition program.  Efforts were made to edit the dictations but occasionally words are mis-transcribed.)    Serg Bee MD,, MD, F.A.A.E.M.   Attending Emergency Physician                          Serg Bee MD  06/13/21 [id number]

## 2021-06-12 NOTE — ED NOTES
Pt transferred to hospital bed due to long wait for transfer. Gwendolyn care and brief change completed as well. Red liquid stool noted on brief. Pt tolerated well. Resp even and NL.      Kristine Paris RN  06/12/21 [id number]

## 2021-06-12 NOTE — ED NOTES
Wife brought supper for patient. Patient assisted to sitting upright. Pt denies need or distress at this time. Resp even and NL.      Romy Solan RN  06/12/21 [id number]

## 2021-06-13 PROBLEM — R93.3 ABNORMAL CT SCAN, SIGMOID COLON: Chronic | Status: ACTIVE | Noted: 2021-01-01

## 2021-06-13 NOTE — CONSULTS
Initial GI Consult Note  Today's Date and Time: 6/13/2021, 1:46 PM      Chief complain/Reason for consultation:   Rectal bleeding    History of Present Illness:   Anna Murdock is a 67y.o.-year-old  male who was initially admitted on 6/13/2021. Patient seen at the request of  for rectal bleeding started yesterday. Patient is in nursing home with poor functional status since his CABG and needs assistance with all the activities of daily living. He had episode of rectal bleeding last night and early this morning which was bright red in color. He denies any abdominal pain. He had episode of rectal bleeding in the past over a year ago. He had upper endoscopy with Dr. Beti Gil in 2017 which showed some gastritis and small hiatal hernia. He and his wife could not recall his last colonoscopy and states that it has been at least over 5 years ago. I could not find any colonoscopy in our electronic medical record. He is on aspirin and Eliquis and Eliquis has been held due to bleeding. I have personally reviewed the past medical history, past surgical history, medications, social history, and family history, and I have updated the database accordingly.   Past Medical History:     Past Medical History:   Diagnosis Date    Acute blood loss anemia 3/11/2019    Acute on chronic diastolic CHF (congestive heart failure) ([address] 12/2/2019    Anemia in chronic kidney disease (CKD) 4/27/2016    Arthritis     Bacteremia 11/11/2016    Benign prostatic hyperplasia without lower urinary tract symptoms     Bleeding from wound 4/2/2019    BMI 40.0-44.9, adult ([address] 3/19/2018    Cellulitis 3/21/2019    Cellulitis of left lower extremity     Chronic cerebral ischemia 1/3/2017    Closed fracture of forearm 8/13/2013    Broken lft forearm     Controlled type 2 diabetes mellitus with chronic kidney disease on chronic dialysis, with long-term current use of insulin ([address]     Controlled type 2 diabetes mellitus with diabetic polyneuropathy, with long-term current use of insulin ([address] 10/7/2018    Decubital ulcer     NON OPEN BUTTOCKS    Dementia ([address]     memory problems    Dialysis patient ([address] 01/03/2017    [facility], Sat in [place] Difficult intravenous access     HAS NEEDED PICC TEAM IN THE PAST    Difficulty walking     WHEELCHAIR BOUND-PIVOTS WITH ASSIST O F 2    DJD (degenerative joint disease) of knee     Elbow, forearm, and wrist, abrasion or friction burn, without mention of infection 8/13/2013    Abrasions lft forearm     Encephalopathy acute 4/27/2016    Encounter regarding vascular access for dialysis for ESRD ([address] 2/2/2017    ESRD (end stage renal disease) on dialysis ([address] 1/17/2017    Forgetfulness     HAS SOME SHORT TERM MEMORY LOSS, QUYEN-WIFE IS LEGAL GUARDIAN    H/O transesophageal echocardiography ([facility]) for monitoring     Hemodialysis patient ([address] 11/11/2016    [email or web address].  TUNNELED CATHETER RT UPPER CHEST    Hyperlipidemia 1990    on Meds    Hypertension 1990    on Meds    Intercritical gout     on Meds    Joint pain, knee 01/13/2017    baldo knee synvisc-1 injections    Long-term insulin use ([address] 1/17/2017    Major depressive disorder with single episode, in partial remission ([address] 1/3/2017    Mobility impaired     Morbid obesity ([address]     Muscle weakness     GRNERALIZED    Obesity     Obstructive sleep apnea     HEATHER on CPAP     On CPAP-TO BRING DOS    Paroxysmal atrial fibrillation ([address] 9/6/2017    Pneumonia 12/2/2019    Respiratory failure ([address] 03/2016    with open heart surgery, trached x 5 months    S/P cardiac cath     Seborrhea     Severe aortic stenosis 3/9/2016    Severe mitral valve stenosis 3/9/2016    Severe sepsis ([address] 4/3/2016    Skin rash     ABD FOLDS    Stasis dermatitis     Thyroid disease     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 lft thumb     Traumatic hemorrhagic shock ([address] 3/13/2019  Venous stasis dermatitis     Wears glasses     Wheelchair bound     pivots with 2 assists       Past Surgical  History:     Past Surgical History:   Procedure Laterality Date    AORTIC VALVE REPLACEMENT  03/18/2016    bioprosthetic    ARM SURGERY Left 1990    CARDIAC CATHETERIZATION      CENTRAL VENOUS CATHETER Right 02/21/2018    DIALYSIS CATHETER Dr Min Blanco    COLONOSCOPY  11/19/09    COLONOSCOPY N/A 10/12/2018    COLONOSCOPY WITH BIOPSY performed by Christopher Ward DO at [facility] Right 3/10/2019    WASHOUT RIGHT LOWER EXTREMITY WITH WOUND VAC EXCHANGE performed by Luis Kelly MD at [facility] Right 3/8/2019    RIGHT LOWER EXTREMITY EXPLORATION, HEMATOMA EVACUATION, WOUND VAC PLACEMENT performed by Luis Kelly MD at [id number] (x10-12 surgeries)    several surgeries on left arm    IR NONTUNNELED VASCULAR CATHETER  8/5/2020    IR NONTUNNELED VASCULAR CATHETER 8/5/2020 Daysi Martell MD [facility]    KNEE ARTHROSCOPY Left 09/17/99    MITRAL VALVE REPLACEMENT  03/18/2016    bioprosthetic     NASAL SEPTUM SURGERY      OTHER SURGICAL HISTORY  3/18/16    Aortic root Enlargement    OTHER SURGICAL HISTORY  3/18/16    ASD Closure    OTHER SURGICAL HISTORY Right 01/09/2017    AV fistula creation, wrist    OTHER SURGICAL HISTORY Right 08/29/2017    ligation of collateral branch of av fistula right wrist    OTHER SURGICAL HISTORY  04/05/2018    FISTULAGRAM WITH DR. Ailyn GUARDADO EGD TRANSORAL BIOPSY SINGLE/MULTIPLE N/A 10/17/2017    EGD BIOPSY performed by Roberta Dalal MD at [address] [facility] [address] ANGIOACCESS AV FISTULA Right 8/29/2017     RIGHT  LOWER ARM AV FISTULA  LIGATION OF AQUIRED BRANCHES X2 performed by Lalitha Sullivan DO [place] [facility]  3/18/16    median    VASCULAR SURGERY  12/06/2018    Right arm fistulagram,  PTA cephalic vein stenosis  /  DR Maria       Medications:      [Held by provider] apixaban  5 mg Oral BID    [Held by provider] aspirin  81 mg Oral Daily    atorvastatin  40 mg Oral Nightly    buPROPion  150 mg Oral Daily    donepezil  5 mg Oral QPM    DULoxetine  60 mg Oral Daily    fluticasone  2 spray Nasal Daily    folic acid  1 mg Oral Daily    insulin glargine  25 Units Subcutaneous BID    insulin lispro  0-12 Units Subcutaneous TID WC    insulin lispro  0-6 Units Subcutaneous Nightly    levothyroxine  25 mcg Oral Daily    midodrine  10 mg Oral TID    therapeutic multivitamin-minerals  1 tablet Oral Daily    pantoprazole  40 mg Intravenous BID    And    sodium chloride (PF)  10 mL Intravenous Daily    sevelamer  1,600 mg Oral TID    sodium chloride flush  5-40 mL Intravenous 2 times per day    polyethylene glycol  4,000 mL Oral Once       Social History:     Social History     Socioeconomic History    Marital status:      Spouse name: Mulu Guerrero Number of children: 2    Years of education: Not on file    Highest education level: Not on file   Occupational History    Occupation: Retired      Employer: [employer]   Tobacco Use    Smoking status: Former Smoker     Packs/day: 2.00     Years: 25.00     Pack years: 50.00     Types: Cigarettes     Start date: 3/17/1955     Quit date: 1980     Years since quittin.4    Smokeless tobacco: Never Used   Vaping Use    Vaping Use: Never used   Substance and Sexual Activity    Alcohol use: No     Alcohol/week: 0.0 standard drinks     Comment: 1-2 drinks per year    Drug use: No    Sexual activity: Never   Other Topics Concern    Not on file   Social History Narrative    Not on file     Social Determinants of Health     Financial Resource Strain:     Difficulty of Paying Living Expenses:    Food Insecurity:     Worried About Running Out of Food in the Last Year:     [address] in the Last Year: BMP:   Recent Labs     06/12/21  1327   *   K 4.1   CL 91*   CO2 32*   BUN 55*   CREATININE 5.01*     Hepatic Function Panel:  @LABRCNT(PROT:2,LABALBU:2,BILIDIR:2,IBILI:2,BILITOT:2,ALKPHOS:2,ALT:2,AST:2)  )  Lab Results   Component Value Date    MUCUS NOT REPORTED 04/27/2016    RBC 3.83 06/12/2021    TRICHOMONAS NOT REPORTED 04/27/2016    WBC 6.8 06/12/2021    YEAST NOT REPORTED 04/27/2016    TURBIDITY TURBID 04/27/2016     Lab Results   Component Value Date    CREATININE 5.01 06/12/2021    GLUCOSE 108 06/12/2021       Imaging: CT of the abdomen: The colon is redundant with an a markedly elongated splenic flexure.  The   dilated sigmoid flexure measuring up to 9.5 cm tapers to a collapsed rectal   vault with no evidence of a volvulus at this time.  The elongated sigmoid   flexure predisposes the patient to development of a volvulus and intermittent   volvulus should be considered.       There is a moderate to large stool load in the right, transverse and   descending       Radiolucent gallstones suspected.       Bibasilar dependent airspace disease with volume loss, likely atelectasis. Pneumonia not excluded. Impression :   Lower GI bleeding  Abnormal CT of the abdomen showing dilated sigmoid colon  History of CABG and TIA on aspirin and Eliquis  End-stage renal disease on hemodialysis  Wheelchair-bound poor functional status requiring assistance with activities of daily living    Recommendations   Family wants to pursue a colonoscopy. Patient has already been vaccinated for COVID-19 at his nursing home in January and February. If patient can drink his bowel prep we will plan for colonoscopy tomorrow. I agree with holding Eliquis for now. Thank you for allowing us to participate in the care of this patient. Please call with questions.   Katherene Primrose, MD, [facility]

## 2021-06-13 NOTE — CONSULTS
Nephrology ESRD Consult Note    Reason for Consult:  Fluid and hypertension management for pt with ESRD     Requesting Physician: Hospitalist    History Obtained From:  patient, electronic medical record    HD Unit:       [address] under [address] Venecia Styles    Dry Weight:       109 kg    Chief Complaint: Bloody stools    History of Present Illness: This is a 67 y.o. male with end stage renal disease on hemodialysis [facility] who presents to the hospital for evaluation of GIB. Patient presented to the emergency room with bright red blood per stool according to the nursing home stays at [address]. No nausea no vomiting no fevers. Chronic dialysis had last dialysis on Friday. Monday Wednesday Friday patient. Fistula in right arm positive thrill positive bruit. Multiple bruises noted palpation upon examination blood in stool. Patient on aspirin and Eliquis per home med review. His outpatient history and dialysis orders, usual dry wt  and out pt dialysis run sheets were reviewed.      Past Medical History:        Diagnosis Date    Acute blood loss anemia 3/11/2019    Acute on chronic diastolic CHF (congestive heart failure) ([address] 12/2/2019    Anemia in chronic kidney disease (CKD) 4/27/2016    Arthritis     Bacteremia 11/11/2016    Benign prostatic hyperplasia without lower urinary tract symptoms     Bleeding from wound 4/2/2019    BMI 40.0-44.9, adult ([address] 3/19/2018    Cellulitis 3/21/2019    Cellulitis of left lower extremity     Chronic cerebral ischemia 1/3/2017    Closed fracture of forearm 8/13/2013    Broken lft forearm     Controlled type 2 diabetes mellitus with chronic kidney disease on chronic dialysis, with long-term current use of insulin ([address]     Controlled type 2 diabetes mellitus with diabetic polyneuropathy, with long-term current use of insulin ([address] 10/7/2018    Decubital ulcer     NON OPEN BUTTOCKS    Dementia ([address]     memory problems    Dialysis patient ([address] 01/03/2017    Goes Tue, [place], Sat in [place] Difficult intravenous access     HAS NEEDED PICC TEAM IN THE PAST    Difficulty walking     WHEELCHAIR BOUND-PIVOTS WITH ASSIST O F 2    DJD (degenerative joint disease) of knee     Elbow, forearm, and wrist, abrasion or friction burn, without mention of infection 8/13/2013    Abrasions lft forearm     Encephalopathy acute 4/27/2016    Encounter regarding vascular access for dialysis for ESRD ([address] 2/2/2017    ESRD (end stage renal disease) on dialysis ([address] 1/17/2017    Forgetfulness     HAS SOME SHORT TERM MEMORY LOSS, QUYEN-WIFE IS LEGAL GUARDIAN    H/O transesophageal echocardiography (AMADEO) for monitoring     Hemodialysis patient ([address] 11/11/2016    tues-thurs-sat [place]---[facility].  TUNNELED CATHETER RT UPPER CHEST    Hyperlipidemia 1990    on Meds    Hypertension 1990    on Meds    Intercritical gout     on Meds    Joint pain, knee 01/13/2017    baldo knee synvisc-1 injections    Long-term insulin use ([address] 1/17/2017    Major depressive disorder with single episode, in partial remission ([address] 1/3/2017    Mobility impaired     Morbid obesity ([address]     Muscle weakness     GRNERALIZED    Obesity     Obstructive sleep apnea     HEATHER on CPAP     On CPAP-TO BRING DOS    Paroxysmal atrial fibrillation ([address] 9/6/2017    Pneumonia 12/2/2019    Respiratory failure ([address] 03/2016    with open heart surgery, trached x 5 months    S/P cardiac cath     Seborrhea     Severe aortic stenosis 3/9/2016    Severe mitral valve stenosis 3/9/2016    Severe sepsis ([address] 4/3/2016    Skin rash     ABD FOLDS    Stasis dermatitis     Thyroid disease     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 lft thumb     Traumatic hemorrhagic shock ([address] 3/13/2019    Venous stasis dermatitis     Wears glasses     Wheelchair bound     pivots with 2 assists       Access:  previous  Right AV fistula positive thrill positive bruit    Past Surgical History: Procedure Laterality Date    AORTIC VALVE REPLACEMENT  03/18/2016    bioprosthetic    ARM SURGERY Left 1990    CARDIAC CATHETERIZATION      CENTRAL VENOUS CATHETER Right 02/21/2018    DIALYSIS CATHETER Dr Denis Biggs COLONOSCOPY  11/19/09    COLONOSCOPY N/A 10/12/2018    COLONOSCOPY WITH BIOPSY performed by Romulo Lal DO at [address] Right 3/10/2019    WASHOUT RIGHT LOWER EXTREMITY WITH WOUND VAC EXCHANGE performed by Betty Morrow MD at [address] Right 3/8/2019    RIGHT LOWER EXTREMITY EXPLORATION, HEMATOMA EVACUATION, WOUND VAC PLACEMENT performed by Betty Morrow MD at [address] (x10-12 surgeries)    several surgeries on left arm    IR NONTUNNELED VASCULAR CATHETER  8/5/2020    IR NONTUNNELED VASCULAR CATHETER 8/5/2020 [facility]    KNEE ARTHROSCOPY Left 09/17/99    MITRAL VALVE REPLACEMENT  03/18/2016    bioprosthetic     NASAL SEPTUM SURGERY      OTHER SURGICAL HISTORY  3/18/16    Aortic root Enlargement    OTHER SURGICAL HISTORY  3/18/16    ASD Closure    OTHER SURGICAL HISTORY Right 01/09/2017    AV fistula creation, wrist    OTHER SURGICAL HISTORY Right 08/29/2017    ligation of collateral branch of av fistula right wrist    OTHER SURGICAL HISTORY  04/05/2018    FISTULAGRAM WITH DR. Mariza Dueñas    PA EGD TRANSORAL BIOPSY SINGLE/MULTIPLE N/A 10/17/2017    EGD BIOPSY performed by Woodrow Rangel MD at [address] [facility] [address] ANGIOACCESS AV FISTULA Right 8/29/2017     RIGHT  LOWER ARM AV FISTULA  LIGATION OF AQUIRED BRANCHES X2 performed by Ulysses Medina DO at [address] [facility]  3/18/16    median    VASCULAR SURGERY  12/06/2018    Right arm fistulagram,  PTA cephalic vein stenosis  /  DR Maria       Outpatient Medications:     Medications Prior to Admission: sevelamer (RENVELA) 800 MG tablet, Take 2 tablets by mouth 3 times daily with meals only. Lidocaine 5 % CREA, Apply topically Apply to fistula rt arm topically one time a day every Mon, Wed, Fri for dialysis. buPROPion (WELLBUTRIN SR) 150 MG extended release tablet, Take 150 mg by mouth daily  albuterol (PROVENTIL) (2.5 MG/3ML) 0.083% nebulizer solution, Take 2.5 mg by nebulization every 8 hours as needed for Shortness of Breath  benzonatate (TESSALON) 100 MG capsule, Take 100 mg by mouth every 12 hours as needed for Cough  ipratropium-albuterol (DUONEB) 0.5-2.5 (3) MG/3ML SOLN nebulizer solution, Inhale 3 mLs into the lungs every 8 hours as needed for Shortness of Breath (or wheezing)  guaiFENesin 400 MG tablet, Take 400 mg by mouth every 12 hours as needed (allergies)  ondansetron (ZOFRAN) 4 MG tablet, Take 4 mg by mouth every 6 hours as needed for Nausea or Vomiting  tiZANidine (ZANAFLEX) 2 MG tablet, Take 2 mg by mouth every 8 hours as needed (muscle pain)  FIASP FLEXTOUCH 100 UNIT/ML SOPN, Inject into the skin See Admin Instructions Per sliding scale: If 150-200 = 2 units; 201-250 = 4 units; 251-300 = 6 units; 301-350 = 8 units; 351-400 = 10 units; 401+ = 15 units, then call MD  Elastic Bandages & Supports MISC, 30-40 mmHg compression stockings to both lower legs, on every morning, off at bedtime. gabapentin (NEURONTIN) 300 MG capsule, Take 300 mg by mouth daily. DULoxetine (CYMBALTA) 60 MG extended release capsule, Take 60 mg by mouth daily  loratadine (CLARITIN) 10 MG capsule, Take 10 mg by mouth daily (every other day).   aspirin 81 MG tablet, Take 1 tablet by mouth daily  apixaban (ELIQUIS) 5 MG TABS tablet, Take 1 tablet by mouth 2 times daily  Multiple Vitamins-Minerals (THERAPEUTIC MULTIVITAMIN-MINERALS) tablet, Take 1 tablet by mouth daily  omeprazole (PRILOSEC) 20 MG delayed release capsule, Take 20 mg by mouth nightly   atorvastatin (LIPITOR) 40 MG tablet, Take 1 tablet by mouth nightly  midodrine (PROAMATINE) 10 MG tablet, Take 1 tablet by mouth 3 times flush 0.9 % injection 5-40 mL, PRN  0.9 % sodium chloride infusion, PRN  ondansetron (ZOFRAN-ODT) disintegrating tablet 4 mg, Q8H PRN   Or  ondansetron (ZOFRAN) injection 4 mg, Q6H PRN  acetaminophen (TYLENOL) tablet 650 mg, Q6H PRN   Or  acetaminophen (TYLENOL) suppository 650 mg, Q6H PRN        Allergies:  Percocet [oxycodone-acetaminophen] and Ampicillin    Social History:    Social History     Socioeconomic History    Marital status:      Spouse name: Triston Coats Number of children: 2    Years of education: Not on file    Highest education level: Not on file   Occupational History    Occupation: Retired      Employer: [employer] Use    Smoking status: Former Smoker     Packs/day: 2.00     Years: 25.00     Pack years: 50.00     Types: Cigarettes     Start date: 3/17/1955     Quit date: 1980     Years since quittin.4    Smokeless tobacco: Never Used   Vaping Use    Vaping Use: Never used   Substance and Sexual Activity    Alcohol use: No     Alcohol/week: 0.0 standard drinks     Comment: 1-2 drinks per year    Drug use: No    Sexual activity: Never   Other Topics Concern    Not on file   Social History Narrative    Not on file     Social Determinants of Health     Financial Resource Strain:     Difficulty of Paying Living Expenses:    Food Insecurity:     Worried About Running Out of Food in the Last Year:     Ran Out of Food in the Last Year:    Transportation Needs:     Lack of Transportation (Medical):      Lack of Transportation (Non-Medical):    Physical Activity:     Days of Exercise per Week:     Minutes of Exercise per Session:    Stress:     Feeling of Stress :    Social Connections:     Frequency of Communication with Friends and Family:     Frequency of Social Gatherings with Friends and Family:     Attends Mu-ism Services:     Active Member of Clubs or Organizations:     Attends Club or Organization Meetings:    Willam Verma Marital Status:    Intimate Partner Violence:     Fear of Current or Ex-Partner:     Emotionally Abused:     Physically Abused:     Sexually Abused:        Family History:   Family History   Problem Relation Age of Onset    Lung Cancer Mother     Diabetes Mother     Alzheimer's Disease Father     Diabetes Maternal Grandmother     High Blood Pressure Sister        Review of Systems:    Constitutional: No fever, no chills, no lethargy, no weakness. HEENT:  No headache, otalgia, itchy eyes, nasal discharge or sore throat. Cardiac:  No chest pain, dyspnea, orthopnea or PND. Chest:              No cough, phlegm or wheezing. Abdomen:  No abdominal pain, nausea or vomiting. +be blood from rectum. Neuro:  No focal weakness, abnormal movements orseizure like activity. Skin:   No rashes, no itching. :   No hematuria, no pyuria, no dysuria, no flank pain. Extremities:  No swelling or joint pains. ROS was otherwise negative except as mentioned in the [address] Objective:  Constitutional:    CURRENT TEMPERATURE:  Temp: 98.6 °F (37 °C)  MAXIMUM TEMPERATURE OVER 24HRS:  Temp (24hrs), Av.4 °F (36.3 °C), Min:96.4 °F (35.8 °C), Max:98.6 °F (37 °C)    CURRENT RESPIRATORY RATE:  Resp: 18  CURRENT PULSE:  Pulse: 73  CURRENT BLOOD PRESSURE:  BP: (!) 159/66  24HR BLOOD PRESSURE RANGE:  Systolic (36HUY), DHP:991 , Min:134 , KTO:252   ; Diastolic (60EOO), MARCO:35, Min:64, Max:73    24HR INTAKE/OUTPUT:  No intake or output data in the 24 hours ending 21 1141      Physical Exam:  AAO x 3,          speaking in full sentences, no accessory muscle use. HEENT: Atraumatic, normocephalic, no throat congestion, moist mucosa. Eyes:   Pupils equal, round and reactive to light, EOMI. Neck:   Supple. Chest:              Bilateral vesicular breath sounds, no rales or wheezes. Cardiac:  S1 S2 RR, no murmurs, gallops or rubs . Abdomen: Soft, slight abdomen distention, non-tender, no masses or organomegaly, BS audible. :   No suprapubic or flank tenderness. Neuro:  AAO x 3, No FND. SKIN:  No rashes, good skin turgor. Extremities:  + Trace edema. Labs:  CBC:   Recent Labs     06/12/21  1327 06/13/21  1058   WBC 6.8  --    RBC 3.83*  --    HGB 11.6* 12.1*   HCT 38.0* 40.0*   MCV 99.2  --    MCH 30.3  --    MCHC 30.5  --    RDW 14.1  --      --    MPV 8.8  --       BMP:   Recent Labs     06/12/21  1327   *   K 4.1   CL 91*   CO2 32*   BUN 55*   CREATININE 5.01*   GLUCOSE 108*   CALCIUM 9.2      IRON :IRON:    Lab Results   Component Value Date    IRON 173 03/12/2019     TIBC:    Lab Results   Component Value Date    TIBC  03/12/2019     Unable to calculate due to low iron binding result. FERRITIN:    Lab Results   Component Value Date    FERRITIN 1,520 11/04/2020     Albumin:   Recent Labs     06/12/21  1327   LABALBU 3.3*     Assessment:  1. ESRD: On  Hemodialysis. patient's regular HD days are [facility] at [facility] under Dr Rosaura Swann via right AVF.    2. GIB with positive blood from rectum-hemoglobin stable 12.1.  3. Secondary hyperparathyroidism  4. Anemia of chronic disease  5. Dementia  6. DM  7. S/P AVR and MVR  8. PAF: On Eliquis and aspirin  9. Wheelchair-bound. Plan:  1. Will get regular dialysis in a.m. as per [facility] schedule. 2. Strict Input and Output, Daily weigh   3. Low Potassium, Low phosphorus and low salt diet. Fluids  restricted to 1500ml/day. 4. IV Aranesp/Epogen for anemia of chronic disease with HD weekly. 5. IV Zemplar per protocol for secondary hyperparathyroidism   6. Transfuse as necessary when under 7.  7.  Strict I's and O's and daily weights  8. Colonoscopy in a.m. per GI.  9.  BMP in AM.  10.  Will follow. Thank you for the consultation. Please do not hesitate to call with questions.     Electronically signed by Charlie Leonardo, DONNA, APRN - CNP on 6/13/2021 at 11:41 AM    Attending Physician Statement  I have discussed the care of this patient, including pertinent history and exam findings, with the Resident/CNP. I have reviewed and edited the key elements of all parts of the encounter with the Resident/CNP. I agree with the assessment, plan and orders as documented by the Resident/CNP. Vikas Lamb MD   Nephrology [address] [place] [address] Drive    This note is created with the assistance of a speech-recognition program. While intending to generate a document that actually reflects the content of the visit, no guarantees can be provided that every mistake has been identified and corrected by editing.

## 2021-06-13 NOTE — H&P
1990    on [address] Hypertension 1990    on [address] gout     on Meds    Joint pain, knee 01/13/2017    baldo knee synvisc-1 injections    Long-term insulin use ([address] 1/17/2017    Major depressive disorder with single episode, in partial remission ([address] 1/3/2017    Mobility impaired     Morbid obesity ([address]     Muscle weakness     GRNERALIZED    Obesity     Obstructive sleep apnea     HEATHER on CPAP     On CPAP-TO BRING DOS    Paroxysmal atrial fibrillation ([address] 9/6/2017    Pneumonia 12/2/2019    Respiratory failure ([address] 03/2016    with open heart surgery, trached x 5 months    S/P cardiac cath     Seborrhea     Severe aortic stenosis 3/9/2016    Severe mitral valve stenosis 3/9/2016    Severe sepsis ([address] 4/3/2016    Skin rash     ABD FOLDS    Stasis dermatitis     Thyroid disease     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 lft thumb     Traumatic hemorrhagic shock ([address] 3/13/2019    Venous stasis dermatitis     Wears glasses     Wheelchair bound     pivots with 2 assists        Past Surgical History:     Past Surgical History:   Procedure Laterality Date    AORTIC VALVE REPLACEMENT  03/18/2016    bioprosthetic    ARM SURGERY Left 1990    CARDIAC CATHETERIZATION      CENTRAL VENOUS CATHETER Right 02/21/2018    DIALYSIS CATHETER Dr Ela Boateng COLONOSCOPY  11/19/09    COLONOSCOPY N/A 10/12/2018    COLONOSCOPY WITH BIOPSY performed by Dariel Andersen DO at [address] [place] [address] Right 3/10/2019    WASHOUT RIGHT LOWER EXTREMITY WITH WOUND VAC EXCHANGE performed by Alecia Pablo MD at [address] [place] [address] Right 3/8/2019    RIGHT LOWER EXTREMITY EXPLORATION, HEMATOMA EVACUATION, WOUND VAC PLACEMENT performed by Alecia Pablo MD at [address] (x10-12 surgeries)    several surgeries on left arm    IR NONTUNNELED VASCULAR CATHETER  8/5/2020    IR NONTUNNELED VASCULAR CATHETER 8/5/2020 Bharat Orourke MD [facility] SPECIAL PROCEDURES    KNEE ARTHROSCOPY Left 09/17/99    MITRAL VALVE REPLACEMENT  03/18/2016    bioprosthetic     NASAL SEPTUM SURGERY      OTHER SURGICAL HISTORY  3/18/16    Aortic root Enlargement    OTHER SURGICAL HISTORY  3/18/16    ASD Closure    OTHER SURGICAL HISTORY Right 01/09/2017    AV fistula creation, wrist    OTHER SURGICAL HISTORY Right 08/29/2017    ligation of collateral branch of av fistula right wrist    OTHER SURGICAL HISTORY  04/05/2018    FISTULAGRAM WITH DR. Lucina Ahn    OR EGD TRANSORAL BIOPSY SINGLE/MULTIPLE N/A 10/17/2017    EGD BIOPSY performed by Mae Miranda MD at [address] [facility] [address] ANGIOACCESS AV FISTULA Right 8/29/2017     RIGHT  LOWER ARM AV FISTULA  LIGATION OF AQUIRED BRANCHES X2 performed by Shellie Sandhoff, DO at [address] [place] [address]  3/18/16    median    VASCULAR SURGERY  12/06/2018    Right arm fistulagram,  PTA cephalic vein stenosis  /  DR Xenia Hinojosa        Medications Prior to Admission:     Prior to Admission medications    Medication Sig Start Date End Date Taking? Authorizing Provider   sevelamer (RENVELA) 800 MG tablet Take 2 tablets by mouth 3 times daily with meals only. 5/28/21   Octavio Mcarthur DO   Lidocaine 5 % CREA Apply topically Apply to fistula rt arm topically one time a day every Mon, Wed, Fri for dialysis.     Historical Provider, MD   buPROPion (WELLBUTRIN SR) 150 MG extended release tablet Take 150 mg by mouth daily    Historical Provider, MD   albuterol (PROVENTIL) (2.5 MG/3ML) 0.083% nebulizer solution Take 2.5 mg by nebulization every 8 hours as needed for Shortness of Breath    Historical Provider, MD   benzonatate (TESSALON) 100 MG capsule Take 100 mg by mouth every 12 hours as needed for Cough    Historical Provider, MD   ipratropium-albuterol (DUONEB) 0.5-2.5 (3) MG/3ML SOLN nebulizer solution Inhale 3 mLs into the lungs every 8 hours as needed for Shortness of Breath (or wheezing) Historical Provider, MD   guaiFENesin 400 MG tablet Take 400 mg by mouth every 12 hours as needed (allergies)    Historical Provider, MD   ondansetron (ZOFRAN) 4 MG tablet Take 4 mg by mouth every 6 hours as needed for Nausea or Vomiting    Historical Provider, MD   tiZANidine (ZANAFLEX) 2 MG tablet Take 2 mg by mouth every 8 hours as needed (muscle pain)    Historical Provider, MD   FIASP FLEXTOUCH 100 UNIT/ML SOPN Inject into the skin See Admin Instructions Per sliding scale: If 150-200 = 2 units; 201-250 = 4 units; 251-300 = 6 units; 301-350 = 8 units; 351-400 = 10 units; 401+ = 15 units, then call MD 3/4/20   Historical Provider, MD   Elastic Bandages & Supports MISC 30-40 mmHg compression stockings to both lower legs, on every morning, off at bedtime. 11/19/19   Anurag Hanks MD   gabapentin (NEURONTIN) 300 MG capsule Take 300 mg by mouth daily. Historical Provider, MD   DULoxetine (CYMBALTA) 60 MG extended release capsule Take 60 mg by mouth daily    Historical Provider, MD   loratadine (CLARITIN) 10 MG capsule Take 10 mg by mouth daily (every other day).     Historical Provider, MD   aspirin 81 MG tablet Take 1 tablet by mouth daily 4/8/19   Jose Mauricio MD   apixaban (ELIQUIS) 5 MG TABS tablet Take 1 tablet by mouth 2 times daily 4/8/19   Jose Mauricio MD   Multiple Vitamins-Minerals (THERAPEUTIC MULTIVITAMIN-MINERALS) tablet Take 1 tablet by mouth daily    Historical Provider, MD   omeprazole (PRILOSEC) 20 MG delayed release capsule Take 20 mg by mouth nightly     Historical Provider, MD   atorvastatin (LIPITOR) 40 MG tablet Take 1 tablet by mouth nightly 3/24/19   Mitchell Brooks MD   midodrine (PROAMATINE) 10 MG tablet Take 1 tablet by mouth 3 times daily  Patient taking differently: Take 10 mg by mouth 3 times daily Hold if SBP over 150 3/19/19   Mitchell Brooks MD   folic acid (FOLVITE) 1 MG tablet Take 1 tablet by mouth daily 3/19/19   Mitchell Brooks MD   insulin glargine (LANTUS) 100 UNIT/ML 06/13/21 10:58 AM   Result Value Ref Range    Hemoglobin 12.1 (L) 13.0 - 17.0 g/dL    Hematocrit 40.0 (L) 40.7 - 50.3 %   Hemoglobin and Hematocrit, Blood    Collection Time: 06/13/21  3:17 PM   Result Value Ref Range    Hemoglobin 12.3 (L) 13.0 - 17.0 g/dL    Hematocrit 38.3 (L) 40.7 - 50.3 %       Imaging/Diagnostics:    CT ABDOMEN PELVIS WO CONTRAST  Result Date: 6/12/2021  The colon is redundant with an a markedly elongated splenic flexure. The dilated sigmoid flexure measuring up to 9.5 cm tapers to a collapsed rectal vault with no evidence of a volvulus at this time. The elongated sigmoid flexure predisposes the patient to development of a volvulus and intermittent volvulus should be considered. There is a moderate to large stool load in the right, transverse and descending Radiolucent gallstones suspected. Bibasilar dependent airspace disease with volume loss, likely atelectasis. Pneumonia not excluded.        Assessment :      Hospital Problems         Last Modified POA    * (Principal) Lower GI bleed 6/13/2021 Yes    Anemia in chronic kidney disease, on chronic dialysis ([address] 6/13/2021 Yes    Essential hypertension 6/13/2021 Yes    Obstructive sleep apnea 6/13/2021 Yes    Class 2 severe obesity with serious comorbidity and body mass index (BMI) of 35.0 to 35.9 in adult ([address] 6/13/2021 Yes    Dementia ([address] 6/13/2021 Yes    Overview Addendum 1/17/2017  7:50 PM by Geronimo Knott, DO     Mild, mostly short term memory loss, wife is POA         ESRD (end stage renal disease) on dialysis ([address] 6/13/2021 Yes    Paroxysmal atrial fibrillation ([address] 6/13/2021 Yes    Controlled type 2 diabetes mellitus with diabetic polyneuropathy, with long-term current use of insulin ([address] 6/13/2021 Yes    Abnormal CT scan, sigmoid colon (Chronic) 6/13/2021 Yes    Overview Signed 6/13/2021  4:13 PM by SHAMEKA Cornelius - CNS     6/12/2021 CT abdomen with elongated sigmoid flexure which predisposes patient to development of volvulus and intermittent volvulus               Plan:     Patient status inpatient in the Progressive Unit/Step down    1. Consult GI, Eliquis and ASA on hold  2. Clear liquid diet pending GI plan  3. Protonix 40 mg twice daily  4. Consult nephrology for dialysis management, CMP in the a.m.  5. H&H every 6 hours, iron studies in the a.m.  6. Lantus 25 units twice daily, sliding scale insulin before meals and at bedtime  7. CPAP nightly       Consultations:   IP CONSULT TO GI  IP CONSULT TO NEPHROLOGY    Patient is admitted as inpatient status because of co-morbidities listed above, severity of signs and symptoms as outlined, requirement for current medical therapies and most importantly because of direct risk to patient if care not provided in a hospital setting. Expected length of stay > 48 hours.     SHAMEKA Powers  6/13/2021  4:13 PM    Copy sent to Dr. Juan Perez DO

## 2021-06-13 NOTE — PROGRESS NOTES
Per Patient's wife Graeme Sanabria, patient does not make urine.      Electronically signed by Linnette Rodriguez RN on 6/13/2021 at 1:14 PM

## 2021-06-14 NOTE — CARE COORDINATION
Case Management Initial Discharge Plan  Demond Bojorquez,             Met with:spouse/SO to discuss discharge plans. Information verified: address, contacts, phone number, , insurance Yes  Insurance Provider: Medicare/[employer]/Medicaid    Emergency Contact/Next of Kin name & number: jairo Benton [phone]  Who are involved in patient's support system? Wife and son    PCP: Cl Bustamante DO  Date of last visit: facility doctor      Discharge Planning    Living Arrangements:  Other (Comment)     Home-[facility]    Patient able to perform ADL's:Dependent    Current Services (outpatient & in home) none  DME equipment: none  DME provider: n/a    Is patient receiving oral anticoagulation therapy? Yes-PO eliquis          Potential Assistance Needed:  Extended Care Facility    Patient agreeable to home care: No  [place] of choice provided:  n/a    Prior SNF/Rehab Placement and Facility: [facility]  Agreeable to SNF/Rehab: Yes  [place] of choice provided: yes     Evaluation: n/a    Expected Discharge date:  21    Patient expects to be discharged to:  [address] [place] [address]    If home: is the family and/or caregiver wiling & able to provide support at home? Not home-long term care at [address] [place] [address]      Follow Up Appointment: Best Day/ Time:      Transportation provider: ambulance  Transportation arrangements needed for discharge: Yes    Readmission Risk              Risk of Unplanned Readmission:  29             Does patient have a readmission risk score greater than 14?: Yes  If yes, follow-up appointment must be made within 7 days of discharge.      Goals of Care: no bleed      Educated wife on transitional options, provided freedom of choice and are agreeable with plan      Discharge Plan: [facility]    Dialysis MWF [facility]      Electronically signed by Danielle Fabian RN on 21 at 2:55 PM EDT

## 2021-06-14 NOTE — PROGRESS NOTES
Dialysis Time Out  To be done by RN and tech or 2 RNs  Staff Names: Garett Galaviz RN    [x]  Identity of the patient using 2 patient identifiers  [x]  Consent for treatment  [x]  Equipment-proper machine and dialyzer  [x]  B-Hep B status  [x]  Orders- to include bath, blood flow, dialyzer, time and fluid removal  [x]  Access-Correct site and in working order  [x]  Time for patient to ask questions.

## 2021-06-14 NOTE — TELEPHONE ENCOUNTER
Patient is admitted to heart center room 2015 wife called to inform. Did not know if you wanted to see him since he will be missing his follow up with you?

## 2021-06-14 NOTE — PLAN OF CARE
Plan was for colonoscopy today. Patient reported to have completed 3L of bowel prep and was having formed stool. Colonoscopy to be rescheduled to tomorrow. Shar Mcgraw, APRN - CNP

## 2021-06-14 NOTE — PROGRESS NOTES
[facility]  Office: [address], DO, Evon Winn, DO, Tray Garcia, DO, Marlo Kanner Blood, DO, Abdirizak Robertson MD, Nasima Singleton MD, Tootie Briones MD, Louann Major MD, Bethanie Lees MD, Ela Osman MD, Angelica Ochoa MD, Juan Ricci MD, Alyssa Cao DO, Max Berman MD, Aislinn Ivan, DO, Jorgito Hernandes MD,  Margarita Aguirre, DO, Kenia Zhao MD, Dayday Virgen MD, Giuliana Dudley MD, Amparo Melara MD, Kirt Torres, Hadley Bowman, CNP, Gerson Chew, CNP, Emily Adler, CNS, Juice Dumont, CNP, Fredy Santo, CNP, Peg Mann, CNP, Anne Marie Laswon, CNP, Jett Euceda, CNP, Miryam Cardenas PA-C, Isaias Dutta, [facility], Aranza Jean, CNP, Magdi Hernandez, CNP, Spencer Morales, CNP, Terry Gaspar, CNP, Dina Ellington, CNP, Evangelista Richardson, [address] [place] [address]    Progress Note    6/14/2021    5:05 PM    Name:   James Mendez  MRN:     [id number]     [place]:      [de-identified]   Room:   2015/2015-01   Day:  1  Admit Date:  6/13/2021  9:54 AM    PCP:   Shamir Meade DO  Code Status:  Full Code    Subjective:     C/C: rectal bleeding    Interval History Status: improved. Patient seen examined this afternoon. He is underwent hemodialysis today. Unable to complete colon prep. Patient pleasantly confused. Answers questions appropriately. No further episodes of bleeding. Blood pressure remained stable. First-degree heart block noted on telemetry and EKG. Brief History:     James Mendez is a 67 y.o. male who presents with active bleeding. He was transferred from [facility] ED June 13 for the management of Lower GI bleed.       Patient is wheelchair-bound and lives in a nursing home. He has dementia with very poor short-term memory. He is also ESRD on dialysis MWF via right arm aVF.   Staff sent him to ED 6/12 to eval rectal bleeding with bright red blood and some dark blood in stools on 6/12. He is on aspirin and Eliquis for PAF.     CT abdomen revealed moderate to large stool in the right transverse and descending colon. The colon is redundant with dilated sigmoid flexure but no evidence of volvulus. Radiologist notes that because of the elongated sigmoid flexure he is at risk for intermittent volvulus     Hgb 11.6, platelets 597, K 4.1, amylase 45, PT 17, INR 1.5    Review of Systems:     Constitutional:  negative for chills, fevers, sweats  Respiratory:  negative for cough, dyspnea on exertion, shortness of breath, wheezing  Cardiovascular:  negative for chest pain, chest pressure/discomfort, lower extremity edema, palpitations  Gastrointestinal: Denies further episodes of blood in stool. Negative for abdominal pain, constipation, diarrhea, nausea, vomiting  Neurological:  negative for dizziness, headache    Medications: Allergies:     Allergies   Allergen Reactions    Percocet [Oxycodone-Acetaminophen] Itching and Rash     Also causes shaking    Ampicillin Rash       Current Meds:   Scheduled Meds:    polyethylene glycol  4,000 mL Oral Once    [Held by provider] apixaban  5 mg Oral BID    [Held by provider] aspirin  81 mg Oral Daily    atorvastatin  40 mg Oral Nightly    buPROPion  150 mg Oral Daily    donepezil  5 mg Oral QPM    DULoxetine  60 mg Oral Daily    fluticasone  2 spray Nasal Daily    folic acid  1 mg Oral Daily    insulin glargine  25 Units Subcutaneous BID    insulin lispro  0-12 Units Subcutaneous TID WC    insulin lispro  0-6 Units Subcutaneous Nightly    levothyroxine  25 mcg Oral Daily    midodrine  10 mg Oral TID    therapeutic multivitamin-minerals  1 tablet Oral Daily    pantoprazole  40 mg Intravenous BID    And    sodium chloride (PF)  10 mL Intravenous Daily    sevelamer  1,600 mg Oral TID    sodium chloride flush  5-40 mL Intravenous 2 times per day     Continuous Infusions:    sodium chloride       PRN Meds: albuterol, History:   reports that he quit smoking about 41 years ago. His smoking use included cigarettes. He started smoking about 66 years ago. He has a 50.00 pack-year smoking history. He has never used smokeless tobacco. He reports that he does not drink alcohol and does not use drugs. Family History:   Family History   Problem Relation Age of Onset    Lung Cancer Mother     Diabetes Mother     Alzheimer's Disease Father     Diabetes Maternal Grandmother     High Blood Pressure Sister        Vitals:  /65   Pulse 84   Temp 97.8 °F (36.6 °C)   Resp 18   Ht 5' 8\" (1.727 m)   Wt 222 lb 14.2 oz (101.1 kg)   SpO2 95%   BMI 33.89 kg/m²   Temp (24hrs), Av.9 °F (36.6 °C), Min:97.5 °F (36.4 °C), Max:98.8 °F (37.1 °C)    Recent Labs     21  1621 21  2318 21  0638 21  1637   POCGLU 102 88 90 156*       I/O (24Hr):     Intake/Output Summary (Last 24 hours) at 2021 1705  Last data filed at 2021 1355  Gross per 24 hour   Intake 300 ml   Output 3500 ml   Net -3200 ml       Labs:  Hematology:  Recent Labs     21  1327 21  0341 21  1134 21  1607   WBC 6.8  --   --   --    RBC 3.83*  --   --   --    HGB 11.6* 11.0* 11.4* 12.7*   HCT 38.0* 34.7* 35.5* 38.5*   MCV 99.2  --   --   --    MCH 30.3  --   --   --    MCHC 30.5  --   --   --    RDW 14.1  --   --   --      --   --   --    MPV 8.8  --   --   --    INR 1.5  --   --   --      Chemistry:  Recent Labs     21  1327 21  0341   * 133*   K 4.1 4.3   CL 91* 89*   CO2 32* 25   GLUCOSE 108* 94   BUN 55* 85*   CREATININE 5.01* 5.77*   ANIONGAP 11 19*   LABGLOM 11* 10*   GFRAA 14* 12*   CALCIUM 9.2 8.5*   TROPHS 177*  --      Recent Labs     21  1327 21  2231 21  1121 21  1621 21  2318 21  0341 21  0638 21  1637   PROT 6.2*  --   --   --   --  6.0*  --   --    LABALBU 3.3*  --   --   --   --  3.3*  --   --    AST 22  --   --   --   --  16  -- --    ALT 12  --   --   --   --  11  --   --    ALKPHOS 172*  --   --   --   --  157*  --   --    BILITOT 0.26*  --   --   --   --  0.30  --   --    AMYLASE 45  --   --   --   --   --   --   --    POCGLU  --  199* 92 102 88  --  90 156*     ABG:  Lab Results   Component Value Date    POCPH 7.558 03/10/2019    POCPCO2 34.3 03/10/2019    POCPO2 127.3 03/10/2019    POCHCO3 30.6 03/10/2019    NBEA NOT REPORTED 03/10/2019    PBEA 8 03/10/2019    FVV7SSC 32 03/10/2019    BSZA3DEK 99 03/10/2019    FIO2 30.0 03/10/2019     Lab Results   Component Value Date/Time    SPECIAL NOT REPORTED 05/07/2021 11:22 AM     Lab Results   Component Value Date/Time    CULTURE NO GROWTH 6 DAYS 05/07/2021 11:22 AM       Radiology:  CT ABDOMEN PELVIS WO CONTRAST Additional Contrast? None    Result Date: 6/12/2021  The colon is redundant with an a markedly elongated splenic flexure. The dilated sigmoid flexure measuring up to 9.5 cm tapers to a collapsed rectal vault with no evidence of a volvulus at this time. The elongated sigmoid flexure predisposes the patient to development of a volvulus and intermittent volvulus should be considered. There is a moderate to large stool load in the right, transverse and descending Radiolucent gallstones suspected. Bibasilar dependent airspace disease with volume loss, likely atelectasis. Pneumonia not excluded. Physical Examination:        General appearance:  alert, cooperative and no distress  Mental Status:  oriented to person, place and time and normal affect  Lungs:  clear to auscultation bilaterally, normal effort  Heart:  regular rate and rhythm, no murmur  Abdomen:  soft, nontender, protuberant, nondistended, normal bowel sounds, no masses, hepatomegaly, splenomegaly  Extremities:  no edema, redness, tenderness in the calves right wrist, AV fistula  Skin: Multiple skin ecchymoses noted on the bilateral upper extremities.     Assessment:        Hospital Problems         Last Modified POA    * (Principal) Lower GI bleed 6/13/2021 Yes    Anemia in chronic kidney disease, on chronic dialysis ([address] 6/13/2021 Yes    Essential hypertension 6/13/2021 Yes    Obstructive sleep apnea 6/13/2021 Yes    Class 2 severe obesity with serious comorbidity and body mass index (BMI) of 35.0 to 35.9 in adult [facility]) 6/13/2021 Yes    Dementia ([address] 6/13/2021 Yes    Overview Addendum 1/17/2017  7:50 PM by Mai Christensen DO     Mild, mostly short term memory loss, wife is POA         ESRD (end stage renal disease) on dialysis ([address] 6/13/2021 Yes    Paroxysmal atrial fibrillation ([address] 6/13/2021 Yes    Controlled type 2 diabetes mellitus with diabetic polyneuropathy, with long-term current use of insulin ([address] 6/13/2021 Yes    Abnormal CT scan, sigmoid colon (Chronic) 6/13/2021 Yes    Overview Signed 6/13/2021  4:13 PM by SHAMEKA Pemberton     6/12/2021 CT abdomen with elongated sigmoid flexure which predisposes patient to development of volvulus and intermittent volvulus               Plan:        1. Hematocheziaunable to tolerate full colon prep overnight. GI plan to reattempt colonoscopy tomorrow. N.p.o. after midnight. Clear liquid diet. Avoid red dye. Hold anticoagulants at this time. 2. ESRDnephrology following  3. Hemoglobins every 12 hours  4. Type 2 diabetes mellituscontinue Lantus 25 units daily and insulin sliding scale. Hold tonight's dose of Lantus. 5. Continue other home meds as written for  6. Plan for colonoscopy tomorrow. Possible discharge in the evening if colonoscopy is uneventful and unrevealing.     [address] DO  6/14/2021  5:05 PM

## 2021-06-14 NOTE — PROGRESS NOTES
PATIENT REFUSES TO WEAR BIPAP     [x] Risks and benefits explained to patient   [x] Patient refuses to wear Bipap stating \"I don't want to\"   [x] Patient verbalizes understanding of information presented.

## 2021-06-14 NOTE — PROGRESS NOTES
extended release tablet 150 mg, Daily  donepezil (ARICEPT) tablet 5 mg, QPM  DULoxetine (CYMBALTA) extended release capsule 60 mg, Daily  fluticasone (FLONASE) 50 MCG/ACT nasal spray 2 spray, Daily  folic acid (FOLVITE) tablet 1 mg, Daily  insulin glargine (LANTUS) injection vial 25 Units, BID  insulin lispro (HUMALOG) injection vial 0-12 Units, TID WC  insulin lispro (HUMALOG) injection vial 0-6 Units, Nightly  ipratropium-albuterol (DUONEB) nebulizer solution 3 mL, Q6H PRN  levothyroxine (SYNTHROID) tablet 25 mcg, Daily  midodrine (PROAMATINE) tablet 10 mg, TID  therapeutic multivitamin-minerals 1 tablet, Daily  pantoprazole (PROTONIX) injection 40 mg, BID   And  sodium chloride (PF) 0.9 % injection 10 mL, Daily  ondansetron (ZOFRAN) tablet 4 mg, Q6H PRN  sevelamer (RENVELA) tablet 1,600 mg, TID  tiZANidine (ZANAFLEX) tablet 2 mg, Q8H PRN  sodium chloride flush 0.9 % injection 5-40 mL, 2 times per day  sodium chloride flush 0.9 % injection 5-40 mL, PRN  0.9 % sodium chloride infusion, PRN  ondansetron (ZOFRAN-ODT) disintegrating tablet 4 mg, Q8H PRN   Or  ondansetron (ZOFRAN) injection 4 mg, Q6H PRN  acetaminophen (TYLENOL) tablet 650 mg, Q6H PRN   Or  acetaminophen (TYLENOL) suppository 650 mg, Q6H PRN      Labs:   CBC:   Recent Labs     06/12/21  1327 06/13/21  2130 06/14/21  0341 06/14/21  1134   WBC 6.8  --   --   --    RBC 3.83*  --   --   --    HGB 11.6* 11.4* 11.0* 11.4*   HCT 38.0* 37.4* 34.7* 35.5*     --   --   --    MPV 8.8  --   --   --       BMP:   Recent Labs     06/12/21  1327 06/14/21  0341   * 133*   K 4.1 4.3   CL 91* 89*   CO2 32* 25   BUN 55* 85*   CREATININE 5.01* 5.77*   GLUCOSE 108* 94   CALCIUM 9.2 8.5*        Phosphorus:  No results for input(s): PHOS in the last 72 hours. Magnesium: No results for input(s): MG in the last 72 hours.   Albumin:   Recent Labs     06/12/21  1327 06/14/21  0341   LABALBU 3.3* 3.3*       Dialysis bath: Dialysis Bath  K+ (Potassium): 3  Ca+ (Calcium): 3  Na+ (Sodium): 138  HCO3 (Bicarb): 35    Radiology:  Reviewed as available. Assessment:  1 ESRD on hemodialysis Monday Wednesday Friday at the [facility] [place] unit:dialysis bath reviewed with nurse. 2.HTN: Stable blood pressure   3 DM: Stable  4 EDEMA : Minimal  5. ANEMIA OF CHRONIC DISEASE: Additional element from blood loss  6. SECONDARY HYPERPARATHYROIDISM: Stable  7. Lower GI bleed positive fresh blood per rectum hemoglobin stable was on anticoagulation at home    Plan:  1. Wt removal and dialysis orders reviewed. 2.  Follow hemoglobin closely  3. Cont pt on aranesp and zemplar per protocol. 4. Follow up labs ordered. 5.  GI work-up in progress  6. We will follow      Please do not hesitate to call with questions.     Electronically signed by Lubna Jernigan MD on 6/14/2021 at 1:20 PM

## 2021-06-14 NOTE — PLAN OF CARE
Problem: Skin Integrity:  Goal: Will show no infection signs and symptoms  Description: Will show no infection signs and symptoms  Outcome: Ongoing  Goal: Absence of new skin breakdown  Description: Absence of new skin breakdown  Outcome: Ongoing     Problem: Falls - Risk of:  Goal: Will remain free from falls  Description: Will remain free from falls  Outcome: Ongoing  Goal: Absence of physical injury  Description: Absence of physical injury  Outcome: Ongoing     Problem: Infection:  Goal: Will remain free from infection  Description: Will remain free from infection  Outcome: Ongoing     Problem: Safety:  Goal: Free from accidental physical injury  Description: Free from accidental physical injury  Outcome: Ongoing  Goal: Free from intentional harm  Description: Free from intentional harm  Outcome: Ongoing     Problem: Skin Integrity:  Goal: Skin integrity will stabilize  Description: Skin integrity will stabilize  Outcome: Ongoing     Problem: Pain:  Goal: Patient's pain/discomfort is manageable  Description: Patient's pain/discomfort is manageable  Outcome: Ongoing     Problem: Daily Care:  Goal: Daily care needs are met  Description: Daily care needs are met  Outcome: Ongoing

## 2021-06-14 NOTE — FLOWSHEET NOTE
Patient drank about 2/3 of the bowel prep. Patient vomited approx 200 ml clear fluid.   Patient now refuses to drink any additional fluid,

## 2021-06-14 NOTE — PLAN OF CARE
Problem: Skin Integrity:  Goal: Will show no infection signs and symptoms  Description: Will show no infection signs and symptoms  6/14/2021 1108 by Rasta Hanson RN  Outcome: Ongoing  6/14/2021 0539 by Thom Rosario RN  Outcome: Ongoing  Goal: Absence of new skin breakdown  Description: Absence of new skin breakdown  6/14/2021 1108 by Rasta Hanson RN  Outcome: Ongoing  6/14/2021 0539 by Thom Rosario RN  Outcome: Ongoing     Problem: Falls - Risk of:  Goal: Will remain free from falls  Description: Will remain free from falls  6/14/2021 1108 by Rasta Hanson RN  Outcome: Ongoing  6/14/2021 0539 by Thom Rosario RN  Outcome: Ongoing  Goal: Absence of physical injury  Description: Absence of physical injury  6/14/2021 1108 by Rasta Hanson RN  Outcome: Ongoing  6/14/2021 0539 by Thom Rosario RN  Outcome: Ongoing     Problem: Infection:  Goal: Will remain free from infection  Description: Will remain free from infection  6/14/2021 1108 by Rasta Hanson RN  Outcome: Ongoing  6/14/2021 0539 by Thom Rosario RN  Outcome: Ongoing     Problem: Safety:  Goal: Free from accidental physical injury  Description: Free from accidental physical injury  6/14/2021 1108 by Rasta Hanson RN  Outcome: Ongoing  6/14/2021 0539 by Thom Rosario RN  Outcome: Ongoing  Goal: Free from intentional harm  Description: Free from intentional harm  6/14/2021 1108 by Rasta Hanson RN  Outcome: Ongoing  6/14/2021 0539 by Thom Rosario RN  Outcome: Ongoing     Problem: Daily Care:  Goal: Daily care needs are met  Description: Daily care needs are met  6/14/2021 1108 by Rasta Hanson RN  Outcome: Ongoing  6/14/2021 0539 by Thom Rosario RN  Outcome: Ongoing     Problem: Pain:  Goal: Patient's pain/discomfort is manageable  Description: Patient's pain/discomfort is manageable  6/14/2021 1108 by Rasta Hanson RN  Outcome: Ongoing  6/14/2021 0539 by Thom Rosario RN  Outcome: Ongoing     Problem: Skin Integrity:  Goal: Skin integrity will stabilize  Description: Skin integrity will stabilize  6/14/2021 1108 by Nazario Green RN  Outcome: Ongoing  6/14/2021 0539 by Lupe Loredo RN  Outcome: Ongoing     Problem: Discharge Planning:  Goal: Patients continuum of care needs are met  Description: Patients continuum of care needs are met  6/14/2021 1108 by Nazario Green RN  Outcome: Ongoing  6/14/2021 0539 by Lupe Loredo RN  Outcome: Ongoing

## 2021-06-14 NOTE — FLOWSHEET NOTE
Patient had his first bowel movement since starting bowel prep. Incontinent of large amount of soft stool. Skin care given. Bed sheets changed. Patient  Turned and repositioned.

## 2021-06-14 NOTE — PROGRESS NOTES
Dialysis Post Treatment Note  Vitals:    06/14/21 1355   BP: (!) 142/69   Pulse: 79   Resp: 16   Temp: 97.8 °F (36.6 °C)   SpO2:      Pre-Weight = 102.9kg  Post-weight = 101.4kg  Total Liters Processed = Total Liters Processed (l/min): 890 l/min  Rinseback Volume (mL) = Rinseback Volume (ml): 300 ml  Net Removal (mL) = NET Removed (ml): 3200 ml  Patient's dry weight=103kg  Type of access used=perm catheter  Length of treatment=210    Patient tolerated tx well with no complaints.

## 2021-06-15 NOTE — PROGRESS NOTES
2330-writer reached out to Dr. Gan Necessary him that patient will likely not be able to complete his prep d/t him vomiting multiple times and asked if he wanted to try another medication to complete the bowel prep as patient is still brown, but liquid stool. He responded saying \"will see him tomorrow. Nothing else. \" When asked for clarification if he wanted me to continue with current prep, he said he wanted me to reach out to primary team regarding the issue and to stop texting him. 0015-writer informed primary team NP covering and she stated that it would be GI's call. Will continue with current prep as patient tolerates.

## 2021-06-15 NOTE — PROGRESS NOTES
[facility]  Office: [address], DO, Andrew Brady, DO, Aishwarya Lopez, DO, Yashira Stack Blood, DO, Tamanna Dennis MD, Verónica Mckinney MD, Maryann Atwood MD, Auburn Boas, MD, Gerda Pimentel MD, Garland Montoya MD, Bruce Botello MD, Radha Dean MD, Annabelle Bradley, DO, John Fernandez MD, Corey Dickson, DO, Ena Lieberman MD,  Nhung Curry, DO, Yahaira Pradhan MD, Ryan Pena MD, Tony Wharton MD, Debbie Mata MD, Lucien Vincent, Ruth Sewell, CNP, Behzad Scott, CNP, Eduar Torrez, CNS, Grover Nathan, CNP, Kristin Flores, CNP, Jazmin Soni, CNP, Alisha Duval, CNP, Justin Farley, CNP, Osiris Amaya PA-C, Devon Helms [facility], Venkatesh Chacon, CNP, Samantha Ramos, CNP, Mar Junior, CNP, Mario Florian, CNP, Yuli Alonzo, CNP, Jami Obregon, [address] [place] [address]    Progress Note    6/15/2021    2:31 PM    Name:   Barbara Angulo  MRN:     [id number]     [place]:      [de-identified]   Room:   2015/2015-01   Day:  2  Admit Date:  6/13/2021  9:54 AM    PCP:   Jero Kim DO  Code Status:  DNR-CCA    Subjective:     C/C: rectal bleeding    Interval History Status: improved. Patient seen examined this afternoon. Pt underwent colonoscopy this morning. Reports feeling well. Denies any complaints. Brief History:     Barbara Angulo is a 67 y.o. male who presents with active bleeding. He was transferred from [facility] ED June 13 for the management of Lower GI bleed.       Patient is wheelchair-bound and lives in a nursing home. He has dementia with very poor short-term memory. He is also ESRD on dialysis MWF via right arm aVF. Staff sent him to ED 6/12 to eval rectal bleeding with bright red blood and some dark blood in stools on 6/12. He is on aspirin and Eliquis for PAF.     CT abdomen revealed moderate to large stool in the right transverse and descending colon.   The colon is redundant with dilated sigmoid flexure but no evidence of volvulus. Radiologist notes that because of the elongated sigmoid flexure he is at risk for intermittent volvulus     Hgb 11.6, platelets 209, K 4.1, amylase 45, PT 17, INR 1.5    Review of Systems:     Constitutional:  negative for chills, fevers, sweats  Respiratory:  negative for cough, dyspnea on exertion, shortness of breath, wheezing  Cardiovascular:  negative for chest pain, chest pressure/discomfort, lower extremity edema, palpitations  Gastrointestinal: Denies further episodes of blood in stool. Negative for abdominal pain, constipation, diarrhea, nausea, vomiting  Neurological:  negative for dizziness, headache    Medications: Allergies:     Allergies   Allergen Reactions    Percocet [Oxycodone-Acetaminophen] Itching and Rash     Also causes shaking    Ampicillin Rash       Current Meds:   Scheduled Meds:    [Held by provider] apixaban  5 mg Oral BID    [Held by provider] aspirin  81 mg Oral Daily    atorvastatin  40 mg Oral Nightly    buPROPion  150 mg Oral Daily    donepezil  5 mg Oral QPM    DULoxetine  60 mg Oral Daily    fluticasone  2 spray Nasal Daily    folic acid  1 mg Oral Daily    [Held by provider] insulin glargine  25 Units Subcutaneous BID    insulin lispro  0-12 Units Subcutaneous TID WC    insulin lispro  0-6 Units Subcutaneous Nightly    levothyroxine  25 mcg Oral Daily    midodrine  10 mg Oral TID    therapeutic multivitamin-minerals  1 tablet Oral Daily    pantoprazole  40 mg Intravenous BID    And    sodium chloride (PF)  10 mL Intravenous Daily    sevelamer  1,600 mg Oral TID    sodium chloride flush  5-40 mL Intravenous 2 times per day     Continuous Infusions:    dextrose      sodium chloride       PRN Meds: glucose, dextrose, glucagon (rDNA), dextrose, albuterol, ipratropium-albuterol, ondansetron, tiZANidine, sodium chloride flush, sodium chloride, ondansetron **OR** ondansetron, acetaminophen **OR** acetaminophen    Data:     Past Medical History:   has a past medical history of Acute blood loss anemia, Acute on chronic diastolic CHF (congestive heart failure) ([facility] Anemia in chronic kidney disease (CKD), Arthritis, Bacteremia, Benign prostatic hyperplasia without lower urinary tract symptoms, Bleeding from wound, BMI 40.0-44.9, adult ([facility] [address] [facility] Cellulitis, Cellulitis of left lower extremity, Chronic cerebral ischemia, Closed fracture of forearm, Controlled type 2 diabetes mellitus with chronic kidney disease on chronic dialysis, with long-term current use of insulin ([facility] [address] [facility] Controlled type 2 diabetes mellitus with diabetic polyneuropathy, with long-term current use of insulin ([facility] [address] [facility] Decubital ulcer, Dementia ([facility] [address] Dialysis patient ([facility] [address] [facility] Difficult intravenous access, Difficulty walking, DJD (degenerative joint disease) of knee, Elbow, forearm, and wrist, abrasion or friction burn, without mention of infection, Encephalopathy acute, Encounter regarding vascular access for dialysis for ESRD ([facility] [address] ESRD (end stage renal disease) on dialysis ([facility] [address] [facility] Forgetfulness, H/O transesophageal echocardiography ([facility]) for monitoring, Hemodialysis patient ([facility] [address] [facility] Hyperlipidemia, Hypertension, Intercritical gout, Joint pain, knee, Long-term insulin use ([facility] [address] [facility] Major depressive disorder with single episode, in partial remission ([address] Mobility impaired, Morbid obesity ([facility] [address] Muscle weakness, Obesity, Obstructive sleep apnea, HEATHER on CPAP, Paroxysmal atrial fibrillation ([address] Pneumonia, Respiratory failure ([address] S/P cardiac cath, Seborrhea, Severe aortic stenosis, Severe mitral valve stenosis, Severe sepsis ([facility]), Skin rash, Stasis dermatitis, Thyroid disease, Traumatic amputation of thumb, Traumatic hemorrhagic shock ([address] Venous stasis dermatitis, Wears glasses, and Wheelchair bound. Social History:   reports that he quit smoking about 41 years ago. His smoking use included cigarettes. He started smoking about 66 years ago.  He has a 50.00 pack-year smoking history. He has never used smokeless tobacco. He reports that he does not drink alcohol and does not use drugs. Family History:   Family History   Problem Relation Age of Onset    Lung Cancer Mother     Diabetes Mother     Alzheimer's Disease Father     Diabetes Maternal Grandmother     High Blood Pressure Sister        Vitals:  BP (!) 134/58   Pulse 65   Temp 97.5 °F (36.4 °C) (Oral)   Resp 16   Ht 5' 8\" (1.727 m)   Wt 226 lb 6.6 oz (102.7 kg)   SpO2 98%   BMI 34.43 kg/m²   Temp (24hrs), Av.9 °F (36.6 °C), Min:97.5 °F (36.4 °C), Max:98.4 °F (36.9 °C)    Recent Labs     21  1637 06/14/21  2006 06/15/21  0636 06/15/21  1225   POCGLU 156* 180* 81 72*       I/O (24Hr):     Intake/Output Summary (Last 24 hours) at 6/15/2021 1431  Last data filed at 6/15/2021 1138  Gross per 24 hour   Intake 2800 ml   Output    Net 2800 ml       Labs:  Hematology:  Recent Labs     21  1134 21  1607 06/15/21  0537   WBC  --   --  6.7   RBC  --   --  3.46*   HGB 11.4* 12.7* 10.5*   HCT 35.5* 38.5* 33.6*   MCV  --   --  97.1   MCH  --   --  30.3   MCHC  --   --  31.3   RDW  --   --  13.8   PLT  --   --  155   MPV  --   --  8.9     Chemistry:  Recent Labs     06/14/21  0341 06/15/21  0537   * 133*   K 4.3 4.3   CL 89* 92*   CO2 25 25   GLUCOSE 94 84   BUN 85* 44*   CREATININE 5.77* 4.46*   ANIONGAP 19* 16   LABGLOM 10* 13*   GFRAA 12* 16*   CALCIUM 8.5* 8.8     Recent Labs     21  2318 21  0638 21  1637 06/14/21  2006 06/15/21  0636 06/15/21  1225   PROT  --  6.0*  --   --   --   --   --    LABALBU  --  3.3*  --   --   --   --   --    AST  --  16  --   --   --   --   --    ALT  --  11  --   --   --   --   --    ALKPHOS  --  157*  --   --   --   --   --    BILITOT  --  0.30  --   --   --   --   --    POCGLU 88  --  90 156* 180* 81 72*     ABG:  Lab Results   Component Value Date    POCPH 7.558 03/10/2019    POCPCO2 34.3 03/10/2019 POCPO2 127.3 03/10/2019    POCHCO3 30.6 03/10/2019    NBEA NOT REPORTED 03/10/2019    PBEA 8 03/10/2019    JFA1GJB 32 03/10/2019    TFVD7JWN 99 03/10/2019    FIO2 30.0 03/10/2019     Lab Results   Component Value Date/Time    SPECIAL NOT REPORTED 05/07/2021 11:22 AM     Lab Results   Component Value Date/Time    CULTURE NO GROWTH 6 DAYS 05/07/2021 11:22 AM       Radiology:  CT ABDOMEN PELVIS WO CONTRAST Additional Contrast? None    Result Date: 6/12/2021  The colon is redundant with an a markedly elongated splenic flexure. The dilated sigmoid flexure measuring up to 9.5 cm tapers to a collapsed rectal vault with no evidence of a volvulus at this time. The elongated sigmoid flexure predisposes the patient to development of a volvulus and intermittent volvulus should be considered. There is a moderate to large stool load in the right, transverse and descending Radiolucent gallstones suspected. Bibasilar dependent airspace disease with volume loss, likely atelectasis. Pneumonia not excluded. Physical Examination:        General appearance:  alert, cooperative and no distress  Mental Status:  oriented to person, place and time and normal affect  Lungs:  clear to auscultation bilaterally, normal effort  Heart:  regular rate and rhythm, no murmur  Abdomen:  soft, nontender, protuberant, nondistended, normal bowel sounds, no masses, hepatomegaly, splenomegaly  Extremities:  no edema, redness, tenderness in the calves right wrist, AV fistula  Skin: Multiple skin ecchymoses noted on the bilateral upper extremities.     Assessment:        Hospital Problems         Last Modified POA    * (Principal) Lower GI bleed 6/13/2021 Yes    Anemia in chronic kidney disease, on chronic dialysis ([facility] 6/13/2021 Yes    Essential hypertension 6/13/2021 Yes    Obstructive sleep apnea 6/13/2021 Yes    Class 2 severe obesity with serious comorbidity and body mass index (BMI) of 35.0 to 35.9 in adult [facility]) 6/13/2021 Yes    Dementia ([facility] [address] 6/13/2021 Yes    Overview Addendum 1/17/2017  7:50 PM by Federico Seymour DO     Mild, mostly short term memory loss, wife is POA         ESRD (end stage renal disease) on dialysis ([address] 6/13/2021 Yes    Paroxysmal atrial fibrillation ([address] 6/13/2021 Yes    Controlled type 2 diabetes mellitus with diabetic polyneuropathy, with long-term current use of insulin ([address] 6/13/2021 Yes    Abnormal CT scan, sigmoid colon (Chronic) 6/13/2021 Yes    Overview Signed 6/13/2021  4:13 PM by SHAMEKA Powers - CNS     6/12/2021 CT abdomen with elongated sigmoid flexure which predisposes patient to development of volvulus and intermittent volvulus               Plan:        1. Hematochezia Underwent colonoscopy this morning. Noted to have small area of ulcerated mucosa near diverticuli in the sigmoid colon with large blood clot likely source of recent bleeding. KUB to rule out volvolus. Ok for liquid diet. Flexible sigmoidoscopy in 2 months. Hold eliquis for 5 days. Ok for antiplatelet. Monitor H/H. Hgb dropped from 12.7 to 10.5. 2. ESRDnephrology following  3. Hemoglobins every 12 hours  4. Type 2 diabetes mellituscontinue Lantus 25 units daily and insulin sliding scale. 5. Hyponatremia - Monitor with dialysis.    6. Continue other home meds as written for    Patti Solorzano MD  6/15/2021  2:31 PM

## 2021-06-15 NOTE — PROGRESS NOTES
Renal Progress Note    Patient :  Martha Bal; 67 y.o. MRN# [id number]  Location:  2015/2015-01  Attending:  Nova Cooper MD  Admit Date:  6/13/2021   Hospital Day: 2      Subjective:     Patient was seen and examined. No new issues reported overnight. Patient had his regular dialysis yesterday ran for to 10 minutes and got about 3.2 L removed. Status post EGD done today 6/15/2021: Found to have moderate amount of diverticuli in the sigmoid colon. Small area of ulcerative mucosa near the diverticuli in the sigmoid colon at 60 cm from anal verge with a large blood clot likely the source of recent bleeding. Area was irrigated aggressively by GI and no active bleeding was noted. To get possible KUB and flex sigmoidoscopy in 2 months. Electrolytes stable. Hemoglobin 10.5. Outpatient Medications:     Medications Prior to Admission: sevelamer (RENVELA) 800 MG tablet, Take 2 tablets by mouth 3 times daily with meals only. Lidocaine 5 % CREA, Apply topically Apply to fistula rt arm topically one time a day every Mon, Wed, Fri for dialysis. buPROPion (WELLBUTRIN SR) 150 MG extended release tablet, Take 150 mg by mouth daily  albuterol (PROVENTIL) (2.5 MG/3ML) 0.083% nebulizer solution, Take 2.5 mg by nebulization every 8 hours as needed for Shortness of Breath  benzonatate (TESSALON) 100 MG capsule, Take 100 mg by mouth every 12 hours as needed for Cough  ipratropium-albuterol (DUONEB) 0.5-2.5 (3) MG/3ML SOLN nebulizer solution, Inhale 3 mLs into the lungs every 8 hours as needed for Shortness of Breath (or wheezing)  guaiFENesin 400 MG tablet, Take 400 mg by mouth every 12 hours as needed (allergies)  ondansetron (ZOFRAN) 4 MG tablet, Take 4 mg by mouth every 6 hours as needed for Nausea or Vomiting  tiZANidine (ZANAFLEX) 2 MG tablet, Take 2 mg by mouth every 8 hours as needed (muscle pain)  FIASP FLEXTOUCH 100 UNIT/ML SOPN, Inject into the skin See Admin Instructions Per sliding scale:  If 150-200 = 2 units; spray Nasal Daily    folic acid  1 mg Oral Daily    [Held by provider] insulin glargine  25 Units Subcutaneous BID    insulin lispro  0-12 Units Subcutaneous TID WC    insulin lispro  0-6 Units Subcutaneous Nightly    levothyroxine  25 mcg Oral Daily    midodrine  10 mg Oral TID    therapeutic multivitamin-minerals  1 tablet Oral Daily    pantoprazole  40 mg Intravenous BID    And    sodium chloride (PF)  10 mL Intravenous Daily    sevelamer  1,600 mg Oral TID    sodium chloride flush  5-40 mL Intravenous 2 times per day     Continuous Infusions:    dextrose      sodium chloride       PRN Meds:  glucose, dextrose, glucagon (rDNA), dextrose, albuterol, ipratropium-albuterol, ondansetron, tiZANidine, sodium chloride flush, sodium chloride, ondansetron **OR** ondansetron, acetaminophen **OR** acetaminophen    Input/Output:       I/O last 3 completed shifts: In: 2300 [P.O.:2000]  Out: 3500 . Patient Vitals for the past 96 hrs (Last 3 readings):   Weight   06/15/21 0726 226 lb 6.6 oz (102.7 kg)   21 1355 222 lb 14.2 oz (101.1 kg)   21 1017 226 lb 13.7 oz (102.9 kg)       Vital Signs:   Temperature:  Temp: 97.5 °F (36.4 °C)  TMax:   Temp (24hrs), Av.8 °F (36.6 °C), Min:97.5 °F (36.4 °C), Max:98.4 °F (36.9 °C)    Respirations:  Resp: 19  Pulse:   Pulse: 64  BP:    BP: (!) 150/59  BP Range: Systolic (48KES), QBX:531 , Min:101 , FBZ:447       Diastolic (45JOV), EMILY:84, Min:56, Max:118      Physical Examination:     General:  AAO x 3, speaking in full sentences, no accessory muscle use. HEENT: Atraumatic, normocephalic, no throat congestion, moist mucosa. Eyes:   Pupils equal, round and reactive to light, EOMI. Neck:   Supple  Chest:   Bilateral vesicular breath sounds, no rales or wheezes. Cardiac:  S1 S2 RR, no murmurs, gallops or rubs. Abdomen: Soft, non-tender, no masses or organomegaly, BS audible. :   No suprapubic or flank tenderness. Neuro:  AAO x 3, No FND.   SKIN:  No rashes, good skin turgor. Extremities:  +ve trace edema. Labs:       Recent Labs     06/12/21  1327 06/14/21  1134 06/14/21  1607 06/15/21  0537   WBC 6.8  --   --  6.7   RBC 3.83*  --   --  3.46*   HGB 11.6* 11.4* 12.7* 10.5*   HCT 38.0* 35.5* 38.5* 33.6*   MCV 99.2  --   --  97.1   MCH 30.3  --   --  30.3   MCHC 30.5  --   --  31.3   RDW 14.1  --   --  13.8     --   --  155   MPV 8.8  --   --  8.9      BMP:   Recent Labs     06/12/21  1327 06/14/21  0341 06/15/21  0537   * 133* 133*   K 4.1 4.3 4.3   CL 91* 89* 92*   CO2 32* 25 25   BUN 55* 85* 44*   CREATININE 5.01* 5.77* 4.46*   GLUCOSE 108* 94 84   CALCIUM 9.2 8.5* 8.8      Albumin:    Recent Labs     06/12/21  1327 06/14/21  0341   LABALBU 3.3* 3.3*     TERESSA:      Lab Results   Component Value Date    TERESSA NEGATIVE 03/21/2016     SPEP:  Lab Results   Component Value Date    PROT 6.0 06/14/2021    ALBCAL 3.0 03/21/2016    ALBPCT 65 03/21/2016    LABALPH 0.3 03/21/2016    LABALPH 0.6 03/21/2016    A1PCT 6 03/21/2016    A2PCT 12 03/21/2016    LABBETA 0.4 03/21/2016    BETAPCT 9 03/21/2016    GAMGLOB 0.4 03/21/2016    GGPCT 8 03/21/2016    PATH ELECTRONICALLY SIGNED.  Escobar Funes M.D. 03/21/2016     UPEP:     Lab Results   Component Value Date    LABPE NORMAL ELECTROPHORETIC PATTERN 03/21/2016     C3:     Lab Results   Component Value Date    C3 50 03/21/2016     C4:     Lab Results   Component Value Date    C4 21 03/21/2016     Hep BsAg:         Lab Results   Component Value Date    HEPBSAG NONREACTIVE 05/24/2016     Hep C AB:          Lab Results   Component Value Date    HEPCAB NONREACTIVE 11/11/2016     Urinalysis/Chemistries:      Lab Results   Component Value Date    NITRU NEGATIVE 04/27/2016    COLORU DELORES 04/27/2016    PHUR 5.0 04/27/2016    WBCUA TOO NUMEROUS TO COUNT 04/27/2016    RBCUA 10 TO 20 04/27/2016    MUCUS NOT REPORTED 04/27/2016    TRICHOMONAS NOT REPORTED 04/27/2016    YEAST NOT REPORTED 04/27/2016    BACTERIA MANY 04/27/2016    SPECGRAV 1.015 04/27/2016    LEUKOCYTESUR LARGE 04/27/2016    UROBILINOGEN Normal 04/27/2016    BILIRUBINUR NEGATIVE 04/27/2016    GLUCOSEU NEGATIVE 04/27/2016    KETUA NEGATIVE 04/27/2016    AMORPHOUS 2+ 04/27/2016     Urine Sodium:     Lab Results   Component Value Date    RAY 20 04/27/2016     Urine Osmolarity:   Lab Results   Component Value Date    OSMOU 403 03/26/2016      Urine Creatinine:     Lab Results   Component Value Date    LABCREA 95.7 04/27/2016     Radiology:     Reviewed. Assessment:     1. ESRD: On  Hemodialysis. patient's regular HD days are [facility]isdkprlz-Fccnap at [facility] under Dr Rhett Hendrickson via right AVF.    2. GIB with positive blood from rectum-hemoglobin stable 12.1.  3. Secondary hyperparathyroidism  4. Anemia of chronic disease  5. Dementia  6. DM  7. S/P AVR and MVR  8. PAF: On Eliquis and aspirin  9. Wheelchair-bound. Plan:   1. No acute need for dialysis today. Will get regular dialysis in a.m. as per F schedule. 2.  Follow-up with GI as scheduled. 3.  KUB has been ordered by GI. 4.  Patient should be okay to get discharge from nephrology standpoint. Nutrition   Please ensure that patient is on a renal diet/TF. Avoid nephrotoxic drugs/contrast exposure. We will continue to follow along with you. Vikas Hendrickson MD  [facility]     This note is created with the assistance of a speech-recognition program. While intending to generate a document that actually reflects the content of the visit, no guarantees can be provided that every mistake has been identified and corrected by editing. Yes

## 2021-06-15 NOTE — OP NOTE
[address] Endoscopy        COLONOSCOPY    Patient:   Addi Trotter    :    1945    Referring/PCP: William Chaparro DO  Facility:  [facility]  Procedure:   Colonoscopy --diagnostic  Date:     6/15/2021  Endoscopist:  Yari Lima MD, [facility]    Indication: Rectal bleeding, abnormal CT of the abdomen showing distended sigmoid colon. Postprocedure diagnosis: Distended sigmoid colon, decompressed, ulcerated mucosa around the diverticuli at 60 cm from anal verge with a blood clot likely the source of recent bleeding. Anesthesia: MAC    Complications:  None    EBL: None from the procedure    Specimen: None    Description of Procedure:  Informed consent was obtained from the patient after explanation of the procedure including indications, description of the procedure,  benefits and possible risks and complications of the procedure, and alternatives. Questions were answered. The patient's history was reviewed and a directed physical examination was performed prior to the procedure. Patient was monitored throughout the procedure with pulse oximetry and periodic assessment of vital signs. Patient was sedated as noted above. With the patient initially in the left lateral decubitus position, a digital rectal examination was performed and revealed negative without mass, lesions or tenderness. The Olympus video colonoscope was placed in the patient's rectum and advanced without difficulty  to the cecum, which was identified by the ileocecal valve and appendiceal orifice. The prep was fair. Examination of the mucosa was performed during both introduction and withdrawal of the colonoscope. Retroflexed view of the rectum was performed. The patient  was taken to the recovery area in good condition. Findings:     1. Moderate amount of diverticuli in the sigmoid colon which was small in size.   2.  A small area of ulcerated mucosa near the diverticuli in the sigmoid colon at 60 cm from anal verge with a large blood clot likely the source of recent bleeding. This area was irrigated aggressively and no active bleeding was noted at the time of colonoscopy. This may represent area of sigmoid volvulus and ulcerated area from this and less likely from diverticulitis. 3.  Distended sigmoid colon-which was decompressed. 4.  The rest of the colon otherwise appeared normal within the limitation of bowel prep. Recommendations: Check KUB  Okay for liquid diet and advance as tolerated  Consider flexible sigmoidoscopy in 2 months to check healing of this ulcerated area. Once tolerates diet, okay to discharge from GI standpoint. Hold Eliquis or any other form of anticoagulation for at least for 5 days. Okay for antiplatelet therapy.       Fabiola Marquez MD, [facility]

## 2021-06-15 NOTE — ANESTHESIA POSTPROCEDURE EVALUATION
Department of Anesthesiology  Postprocedure Note    Patient: James Mendez  MRN: [id number]  YOB: 1945  Date of evaluation: 6/15/2021  Time:  11:48 AM     Procedure Summary     Date: 06/15/21 Room / Location: [address] [place] [address] 04 / [address] [facility]    Anesthesia Start: 1011 Anesthesia Stop: 4750    Procedure: COLONOSCOPY FLEXIBLE W/ DECOMPRESSION (N/A ) Diagnosis: (RECTAL BLEEDING)    Surgeons: Tran Zamudio MD Responsible Provider: Dario Raymond MD    Anesthesia Type: MAC ASA Status: 4          Anesthesia Type: MAC    Sonny Phase I: Sonny Score: 10    Sonny Phase II: Sonny Score: 10    Last vitals: Reviewed and per EMR flowsheets.        Anesthesia Post Evaluation    Patient location during evaluation: PACU  Patient participation: complete - patient participated  Level of consciousness: awake  Pain score: 1  Airway patency: patent  Nausea & Vomiting: no nausea and no vomiting  Complications: no  Cardiovascular status: blood pressure returned to baseline and hemodynamically stable  Respiratory status: acceptable  Hydration status: euvolemic

## 2021-06-15 NOTE — ANESTHESIA PRE PROCEDURE
Department of Anesthesiology  Preprocedure Note       Name:  Joseph Fu   Age:  67 y.o.  :  1945                                          MRN:  [id number]         Date:  6/15/2021      Surgeon: Collette Soto):  Daniel Webb MD    Procedure: Procedure(s):  EGD BIOPSY    Medications prior to admission:   Prior to Admission medications    Medication Sig Start Date End Date Taking? Authorizing Provider   sevelamer (RENVELA) 800 MG tablet Take 2 tablets by mouth 3 times daily with meals only. 21   Octavio Mcarthur DO   Lidocaine 5 % CREA Apply topically Apply to fistula rt arm topically one time a day every Mon, Wed, Fri for dialysis. Historical Provider, MD   buPROPion (WELLBUTRIN SR) 150 MG extended release tablet Take 150 mg by mouth daily    Historical Provider, MD   albuterol (PROVENTIL) (2.5 MG/3ML) 0.083% nebulizer solution Take 2.5 mg by nebulization every 8 hours as needed for Shortness of Breath    Historical Provider, MD   benzonatate (TESSALON) 100 MG capsule Take 100 mg by mouth every 12 hours as needed for Cough    Historical Provider, MD   ipratropium-albuterol (DUONEB) 0.5-2.5 (3) MG/3ML SOLN nebulizer solution Inhale 3 mLs into the lungs every 8 hours as needed for Shortness of Breath (or wheezing)    Historical Provider, MD   guaiFENesin 400 MG tablet Take 400 mg by mouth every 12 hours as needed (allergies)    Historical Provider, MD   ondansetron (ZOFRAN) 4 MG tablet Take 4 mg by mouth every 6 hours as needed for Nausea or Vomiting    Historical Provider, MD   tiZANidine (ZANAFLEX) 2 MG tablet Take 2 mg by mouth every 8 hours as needed (muscle pain)    Historical Provider, MD   FIASP FLEXTOUCH 100 UNIT/ML SOPN Inject into the skin See Admin Instructions Per sliding scale:  If 150-200 = 2 units; 201-250 = 4 units; 251-300 = 6 units; 301-350 = 8 units; 351-400 = 10 units; 401+ = 15 units, then call MD 3/4/20   Historical Provider, MD   Elastic Bandages & Supports MISC 30-40 mmHg compression stockings to both lower legs, on every morning, off at bedtime. 11/19/19   Kathy Gu MD   gabapentin (NEURONTIN) 300 MG capsule Take 300 mg by mouth daily. Historical Provider, MD   DULoxetine (CYMBALTA) 60 MG extended release capsule Take 60 mg by mouth daily    Historical Provider, MD   loratadine (CLARITIN) 10 MG capsule Take 10 mg by mouth daily (every other day). Historical Provider, MD   aspirin 81 MG tablet Take 1 tablet by mouth daily 4/8/19   Mei May MD   apixaban (ELIQUIS) 5 MG TABS tablet Take 1 tablet by mouth 2 times daily 4/8/19   Mei May MD   Multiple Vitamins-Minerals (THERAPEUTIC MULTIVITAMIN-MINERALS) tablet Take 1 tablet by mouth daily    Historical Provider, MD   omeprazole (PRILOSEC) 20 MG delayed release capsule Take 20 mg by mouth nightly     Historical Provider, MD   atorvastatin (LIPITOR) 40 MG tablet Take 1 tablet by mouth nightly 3/24/19   Criselda Del Rio MD   midodrine (PROAMATINE) 10 MG tablet Take 1 tablet by mouth 3 times daily  Patient taking differently: Take 10 mg by mouth 3 times daily Hold if SBP over 150 3/19/19   Criselda Del Rio MD   folic acid (FOLVITE) 1 MG tablet Take 1 tablet by mouth daily 3/19/19   Criselda Del Rio MD   insulin glargine (LANTUS) 100 UNIT/ML injection vial Inject 25 Units into the skin 2 times daily 3/19/19   Criselda Del Rio MD   [facility]. Devices [facility] [place] Use as directed 12/12/18   Octavio Mcarthur DO   Amino Acids-Protein Hydrolys (PRO-STAT PO) Take 30 mLs by mouth 3 times daily (Prostat SF) for wound healing.     Historical Provider, MD   donepezil (ARICEPT) 5 MG tablet Take 5 mg by mouth every evening 10/26/16   Historical Provider, MD   levothyroxine (SYNTHROID) 25 MCG tablet Take 1 tablet by mouth Daily 10/26/16   SHAMEKA Lara CNP   fluticasone (FLONASE) 50 MCG/ACT nasal spray 2 sprays by Nasal route daily 10/24/16   Octavio Mcarthur DO   magnesium hydroxide (MILK OF MAGNESIA) 400 MG/5ML suspension Take 30 mLs by mouth daily as needed for Constipation 3/29/16   João Philip MD       Current medications:    No current facility-administered medications for this visit. No current outpatient medications on file.      Facility-Administered Medications Ordered in Other Visits   Medication Dose Route Frequency Provider Last Rate Last Admin    glucose (GLUTOSE) 40 % oral gel 15 g  15 g Oral PRN Verl  Hauger, DO        dextrose 50 % IV solution  12.5 g Intravenous PRN Verl  Hauger, DO        glucagon (rDNA) injection 1 mg  1 mg Intramuscular PRN Verl  Hauger, DO        dextrose 5 % solution  100 mL/hr Intravenous PRN Verl  Hauger, DO        albuterol (PROVENTIL) nebulizer solution 2.5 mg  2.5 mg Nebulization Q8H PRN SHAMEKA Shrestha - CNS        [Held by provider] apixaban (ELIQUIS) tablet 5 mg  5 mg Oral BID SHAMEKA Shrestha - CNS        [Held by provider] aspirin EC tablet 81 mg  81 mg Oral Daily Gabriele Novak APRN - HARLAN        atorvastatin (LIPITOR) tablet 40 mg  40 mg Oral Nightly Gabriele Noavk APRN - CNS   40 mg at 06/14/21 2121    buPROPion Loraine Appl SR) extended release tablet 150 mg  150 mg Oral Daily SHAMEKA Shrestha - CNS   150 mg at 06/14/21 1536    donepezil (ARICEPT) tablet 5 mg  5 mg Oral QPM Gabriele Novak APRN - CNS   5 mg at 06/14/21 1830    DULoxetine (CYMBALTA) extended release capsule 60 mg  60 mg Oral Daily SHAMEKA Shrestha - CNS   60 mg at 06/14/21 1536    fluticasone (FLONASE) 50 MCG/ACT nasal spray 2 spray  2 spray Nasal Daily Gabriele Novak APRN - CNS   2 spray at 72/63/29 6611    folic acid (FOLVITE) tablet 1 mg  1 mg Oral Daily SHAMEKA Shrestha - CNS   1 mg at 06/14/21 1535    [Held by provider] insulin glargine (LANTUS) injection vial 25 Units  25 Units Subcutaneous BID SHAMEKA Shrestha - CNS   25 Units at 06/14/21 1536    insulin lispro (HUMALOG) injection vial 0-12 Units 0-12 Units Subcutaneous TID WC Marilyn Bonds, APRN - CNS   2 Units at 06/14/21 1730    insulin lispro (HUMALOG) injection vial 0-6 Units  0-6 Units Subcutaneous Nightly Marilyn Bonds, APRN - CNS   1 Units at 06/14/21 2126    ipratropium-albuterol (DUONEB) nebulizer solution 3 mL  3 mL Inhalation Q6H PRN Marilyn Bonds, APRN - CNS        levothyroxine (SYNTHROID) tablet 25 mcg  25 mcg Oral Daily [facility], APRN - CNS   25 mcg at 06/14/21 1536    midodrine (PROAMATINE) tablet 10 mg  10 mg Oral TID [facility], APRN - CNS   10 mg at 06/14/21 2121    therapeutic multivitamin-minerals 1 tablet  1 tablet Oral Daily [facility], APRN - CNS   1 tablet at 06/14/21 1536    pantoprazole (PROTONIX) injection 40 mg  40 mg Intravenous BID [facility], APRN - CNS   40 mg at 06/14/21 2121    And    sodium chloride (PF) 0.9 % injection 10 mL  10 mL Intravenous Daily [facility], APRN - CNS   10 mL at 06/13/21 1124    ondansetron (ZOFRAN) tablet 4 mg  4 mg Oral Q6H PRN [facility]sa, APRN - CNS        sevelamer (RENVELA) tablet 1,600 mg  1,600 mg Oral TID [facility], APRN - CNS   1,600 mg at 06/14/21 2121    tiZANidine (ZANAFLEX) tablet 2 mg  2 mg Oral Q8H PRN Marilyn Bonds, APRN - CNS        sodium chloride flush 0.9 % injection 5-40 mL  5-40 mL Intravenous 2 times per day Marilyn Bonds, APRN - CNS   10 mL at 06/14/21 2122    sodium chloride flush 0.9 % injection 5-40 mL  5-40 mL Intravenous PRN Marilyn Bonds, APRN - CNS        0.9 % sodium chloride infusion  25 mL Intravenous PRN Marilyn Bonds, APRN - CNS        ondansetron (ZOFRAN-ODT) disintegrating tablet 4 mg  4 mg Oral Q8H PRN Marilyn Bonds, APRN - CNS        Or    ondansetron (ZOFRAN) injection 4 mg  4 mg Intravenous Q6H PRN Marilyn Bonds, APRN - CNS        acetaminophen (TYLENOL) tablet 650 mg  650 mg Oral Q6H PRN Marilyn Bonds, APRN - CNS        Or    acetaminophen (TYLENOL) suppository 650 mg  650 mg Rectal Q6H PRN Inetta Kelton, APRN - CNS           Allergies: Allergies   Allergen Reactions    Percocet [Oxycodone-Acetaminophen] Itching and Rash     Also causes shaking    Ampicillin Rash       Problem List:    Patient Active Problem List   Diagnosis Code    Traumatic amputation of thumb, left, sequela ([facility] S68.012S    Essential hypertension I10    Mixed hyperlipidemia E78.2    Obstructive sleep apnea G47.33    Class 2 severe obesity with serious comorbidity and body mass index (BMI) of 35.0 to 35.9 in adult ([facility]) E66.01, Z68.35    Benign prostatic hyperplasia without lower urinary tract symptoms N40.0    Venous stasis dermatitis I87.2    Severe aortic stenosis I35.0    Severe mitral valve stenosis I05.0    Anemia in chronic kidney disease, on chronic dialysis ([facility]) N18.6, D63.1, Z99.2    Dialysis patient ([facility] Z99.2    Dementia ([facility] F03.90    Major depressive disorder with single episode, in partial remission ([facility] F32.4    Chronic cerebral ischemia I67.82    ESRD (end stage renal disease) on dialysis ([facility]) N18.6, Z99.2    Long-term insulin use ([facility]) Z79.4    Paroxysmal atrial fibrillation ([facility]) I48.0    Controlled type 2 diabetes mellitus with chronic kidney disease on chronic dialysis, with long-term current use of insulin ([facility]) E11.22, N18.6, Z79.4, Z99.2    Controlled type 2 diabetes mellitus with diabetic polyneuropathy, with long-term current use of insulin ([facility]) E11.42, Z79.4    Dermatitis L30.9    Hematoma of right lower extremity S80.11XA    Hematoma of groin S30. 1XXA    Accidental fall from wheelchair W05. 0XXA    Open wound T14. 8XXA    Hematoma of right thigh S70.11XA    Wound of right leg S81.801A    Open wound of right lower extremity B05.225Q    Complication of arteriovenous dialysis fistula T82. 9XXA    Secondary hyperparathyroidism of renal origin ([facility] N25.81    Cellulitis L03.90    Cellulitis of right thigh L03.115    Lower GI bleed K92.2    Abnormal CT scan, sigmoid colon R93.3       Past Medical History:        Diagnosis Date    Acute blood loss anemia 3/11/2019    Acute on chronic diastolic CHF (congestive heart failure) ([address] 12/2/2019    Anemia in chronic kidney disease (CKD) 4/27/2016    Arthritis     Bacteremia 11/11/2016    Benign prostatic hyperplasia without lower urinary tract symptoms     Bleeding from wound 4/2/2019    BMI 40.0-44.9, adult ([address] 3/19/2018    Cellulitis 3/21/2019    Cellulitis of left lower extremity     Chronic cerebral ischemia 1/3/2017    Closed fracture of forearm 8/13/2013    Broken lft forearm     Controlled type 2 diabetes mellitus with chronic kidney disease on chronic dialysis, with long-term current use of insulin ([address]     Controlled type 2 diabetes mellitus with diabetic polyneuropathy, with long-term current use of insulin ([address] 10/7/2018    Decubital ulcer     NON OPEN BUTTOCKS    Dementia ([address]     memory problems    Dialysis patient ([address] 01/03/2017    Goes [facility], Sat in [address] Difficult intravenous access     HAS NEEDED PICC TEAM IN THE PAST    Difficulty walking     WHEELCHAIR BOUND-PIVOTS WITH ASSIST O F 2    DJD (degenerative joint disease) of knee     Elbow, forearm, and wrist, abrasion or friction burn, without mention of infection 8/13/2013    Abrasions lft forearm     Encephalopathy acute 4/27/2016    Encounter regarding vascular access for dialysis for ESRD ([address] 2/2/2017    ESRD (end stage renal disease) on dialysis ([address] 1/17/2017    Forgetfulness     HAS SOME SHORT TERM MEMORY LOSS, QUYEN-WIFE IS LEGAL GUARDIAN    H/O transesophageal echocardiography ([facility]) for monitoring     Hemodialysis patient ([address] 11/11/2016    scar-sat [place]---FRESEMIUS.  TUNNELED CATHETER RT UPPER CHEST    Hyperlipidemia 1990    on Meds    Hypertension 1990    on Meds    Intercritical gout     on Meds    Joint pain, knee 01/13/2017    baldo knee synvisc-1 injections    Long-term insulin use CONCLUSIONS     Summary  Technically difficult study due to patients body habitus. Left ventricle is normal in size with hyperdynamic systolic function. Estimated ejection fraction is 60-65 % . Mildly increased velocities are seen in the LV cavity likely secondary to  the hypercontracility of the left ventricle. Mild left ventricular hypertrophy. Left atrium is moderately dilated. Right atrial dilatation. Bioprosthetic AVR is seen in aortic position. Mild aortic insufficiency. Bioprosthetic MVR is seen in mitral position. Averaged mean gradient of 7 mmHg is seen which is mildly elevated and may  suggests some mitral stenosis. Mild tricuspid regurgitation. RVSP of 33.   Trivial pulmonic insufficiency      Aden Davidson MD   6/15/2021    Last HD yesterday, K DOS 4.3. fistula LUE    Electronically signed by Aden Davidson MD on 6/15/2021 at 6:53 AM

## 2021-06-16 PROBLEM — R93.3 ABNORMAL CT SCAN, SIGMOID COLON: Chronic | Status: RESOLVED | Noted: 2021-01-01 | Resolved: 2021-01-01

## 2021-06-16 PROBLEM — K92.2 LOWER GI BLEED: Status: RESOLVED | Noted: 2021-01-01 | Resolved: 2021-01-01

## 2021-06-16 PROBLEM — I48.0 PAROXYSMAL ATRIAL FIBRILLATION (HCC): Status: RESOLVED | Noted: 2017-09-06 | Resolved: 2021-01-01

## 2021-06-16 NOTE — PROGRESS NOTES
Occupational [facility]  Occupational Therapy Not Seen Note    DATE: 2021    NAME: Indra Breen  MRN: [id number]   : 1945      Patient not seen this date for Occupational Therapy due to:    Hemodialysis: chk back later as able or tomorrow .      Next Scheduled Treatment: 21    Electronically signed by Ernestine LOJA/MARIO ALBERTO on 2021 at 9:26 AM

## 2021-06-16 NOTE — PROGRESS NOTES
Renal Progress Note    Patient :  Lima Cortes; 67 y.o. MRN# [id number]  Location:  2015/2015-01  Attending:  Patti Solorzano MD  Admit Date:  6/13/2021   Hospital Day: 3      Subjective:     Patient was seen and examined on HD. Tolerated procedure well. No new issues reported overnight. Status post EGD done today 6/15/2021: Found to have moderate amount of diverticuli in the sigmoid colon. Small area of ulcerative mucosa near the diverticuli in the sigmoid colon at 60 cm from anal verge with a large blood clot likely the source of recent bleeding. Area was irrigated aggressively by GI and no active bleeding was noted. To get possible flex sigmoidoscopy in 2 months. Electrolytes stable. Hemoglobin 11.1. Outpatient Medications:     Medications Prior to Admission: sevelamer (RENVELA) 800 MG tablet, Take 2 tablets by mouth 3 times daily with meals only. Lidocaine 5 % CREA, Apply topically Apply to fistula rt arm topically one time a day every Mon, Wed, Fri for dialysis. buPROPion (WELLBUTRIN SR) 150 MG extended release tablet, Take 150 mg by mouth daily  albuterol (PROVENTIL) (2.5 MG/3ML) 0.083% nebulizer solution, Take 2.5 mg by nebulization every 8 hours as needed for Shortness of Breath  benzonatate (TESSALON) 100 MG capsule, Take 100 mg by mouth every 12 hours as needed for Cough  ipratropium-albuterol (DUONEB) 0.5-2.5 (3) MG/3ML SOLN nebulizer solution, Inhale 3 mLs into the lungs every 8 hours as needed for Shortness of Breath (or wheezing)  guaiFENesin 400 MG tablet, Take 400 mg by mouth every 12 hours as needed (allergies)  ondansetron (ZOFRAN) 4 MG tablet, Take 4 mg by mouth every 6 hours as needed for Nausea or Vomiting  tiZANidine (ZANAFLEX) 2 MG tablet, Take 2 mg by mouth every 8 hours as needed (muscle pain)  FIASP FLEXTOUCH 100 UNIT/ML SOPN, Inject into the skin See Admin Instructions Per sliding scale:  If 150-200 = 2 units; 201-250 = 4 units; 251-300 = 6 units; 301-350 = 8 units; 351-400 = 10 units; 401+ = 15 units, then call MD  Elastic Bandages & Supports MISC, 30-40 mmHg compression stockings to both lower legs, on every morning, off at bedtime. gabapentin (NEURONTIN) 300 MG capsule, Take 300 mg by mouth daily. DULoxetine (CYMBALTA) 60 MG extended release capsule, Take 60 mg by mouth daily  loratadine (CLARITIN) 10 MG capsule, Take 10 mg by mouth daily (every other day). aspirin 81 MG tablet, Take 1 tablet by mouth daily  Multiple Vitamins-Minerals (THERAPEUTIC MULTIVITAMIN-MINERALS) tablet, Take 1 tablet by mouth daily  [DISCONTINUED] omeprazole (PRILOSEC) 20 MG delayed release capsule, Take 20 mg by mouth nightly   atorvastatin (LIPITOR) 40 MG tablet, Take 1 tablet by mouth nightly  midodrine (PROAMATINE) 10 MG tablet, Take 1 tablet by mouth 3 times daily (Patient taking differently: Take 10 mg by mouth 3 times daily Hold if SBP over 193)  folic acid (FOLVITE) 1 MG tablet, Take 1 tablet by mouth daily  insulin glargine (LANTUS) 100 UNIT/ML injection vial, Inject 25 Units into the skin 2 times daily  Misc. Devices [facility] MISC, Use as directed  Amino Acids-Protein Hydrolys (PRO-STAT PO), Take 30 mLs by mouth 3 times daily (Prostat SF) for wound healing.   donepezil (ARICEPT) 5 MG tablet, Take 5 mg by mouth every evening  levothyroxine (SYNTHROID) 25 MCG tablet, Take 1 tablet by mouth Daily  fluticasone (FLONASE) 50 MCG/ACT nasal spray, 2 sprays by Nasal route daily  magnesium hydroxide (MILK OF MAGNESIA) 400 MG/5ML suspension, Take 30 mLs by mouth daily as needed for Constipation    Current Medications:     Scheduled Meds:    [Held by provider] apixaban  5 mg Oral BID    [Held by provider] aspirin  81 mg Oral Daily    atorvastatin  40 mg Oral Nightly    buPROPion  150 mg Oral Daily    donepezil  5 mg Oral QPM    DULoxetine  60 mg Oral Daily    fluticasone  2 spray Nasal Daily    folic acid  1 mg Oral Daily    [Held by provider] insulin glargine  25 Units Subcutaneous BID    insulin lispro  0-12 Units Subcutaneous TID     insulin lispro  0-6 Units Subcutaneous Nightly    levothyroxine  25 mcg Oral Daily    midodrine  10 mg Oral TID    therapeutic multivitamin-minerals  1 tablet Oral Daily    pantoprazole  40 mg Intravenous BID    And    sodium chloride (PF)  10 mL Intravenous Daily    sevelamer  1,600 mg Oral TID    sodium chloride flush  5-40 mL Intravenous 2 times per day     Continuous Infusions:    dextrose      sodium chloride       PRN Meds:  glucose, dextrose, glucagon (rDNA), dextrose, albuterol, ipratropium-albuterol, ondansetron, tiZANidine, sodium chloride flush, sodium chloride, ondansetron **OR** ondansetron, acetaminophen **OR** acetaminophen    Input/Output:       I/O last 3 completed shifts: In: 7569 [P.O.:1440; I.V.:800]  Out: - .      Patient Vitals for the past 96 hrs (Last 3 readings):   Weight   21 0913 228 lb 6.3 oz (103.6 kg)   21 0700 242 lb 8.1 oz (110 kg)   06/15/21 0726 226 lb 6.6 oz (102.7 kg)       Vital Signs:   Temperature:  Temp: 98.6 °F (37 °C)  TMax:   Temp (24hrs), Av °F (36.7 °C), Min:97.3 °F (36.3 °C), Max:98.6 °F (37 °C)    Respirations:  Resp: 20  Pulse:   Pulse: 87  BP:    BP: 136/77  BP Range: Systolic (33BVQ), YGA:358 , Min:134 , PN       Diastolic (84DZD), YFQ:14, Min:52, Max:88      Physical Examination:     General:  AAO x 3, speaking in full sentences, no accessory muscle use. HEENT: Atraumatic, normocephalic, no throat congestion, moist mucosa. Eyes:   Pupils equal, round and reactive to light, EOMI. Neck:   Supple  Chest:   Bilateral vesicular breath sounds, no rales or wheezes. Cardiac:  S1 S2 RR, no murmurs, gallops or rubs. Abdomen: Soft, non-tender, no masses or organomegaly, BS audible. :   No suprapubic or flank tenderness. Neuro:  AAO x 3, No FND. SKIN:  No rashes, good skin turgor. Extremities:  +ve trace edema.     Labs:       Recent Labs     06/15/21  0537 06/15/21  1733 21  0551 WBC 6.7  --  12.1*   RBC 3.46*  --  3.61*   HGB 10.5* 10.7* 11.1*   HCT 33.6* 34.0* 34.9*   MCV 97.1  --  96.7   MCH 30.3  --  30.7   MCHC 31.3  --  31.8   RDW 13.8  --  13.8     --  174   MPV 8.9  --  9.4      BMP:   Recent Labs     06/14/21  0341 06/15/21  0537 06/16/21  0551   * 133* 133*   K 4.3 4.3 4.0   CL 89* 92* 91*   CO2 25 25 22   BUN 85* 44* 53*   CREATININE 5.77* 4.46* 5.17*   GLUCOSE 94 84 130*   CALCIUM 8.5* 8.8 8.7      Albumin:    Recent Labs     06/14/21  0341   LABALBU 3.3*     TERESSA:      Lab Results   Component Value Date    TERESSA NEGATIVE 03/21/2016     SPEP:  Lab Results   Component Value Date    PROT 6.0 06/14/2021    ALBCAL 3.0 03/21/2016    ALBPCT 65 03/21/2016    LABALPH 0.3 03/21/2016    LABALPH 0.6 03/21/2016    A1PCT 6 03/21/2016    A2PCT 12 03/21/2016    LABBETA 0.4 03/21/2016    BETAPCT 9 03/21/2016    GAMGLOB 0.4 03/21/2016    GGPCT 8 03/21/2016    PATH ELECTRONICALLY SIGNED.  Escobar Funes M.D. 03/21/2016     UPEP:     Lab Results   Component Value Date    LABPE NORMAL ELECTROPHORETIC PATTERN 03/21/2016     C3:     Lab Results   Component Value Date    C3 50 03/21/2016     C4:     Lab Results   Component Value Date    C4 21 03/21/2016     Hep BsAg:         Lab Results   Component Value Date    HEPBSAG NONREACTIVE 05/24/2016     Hep C AB:          Lab Results   Component Value Date    HEPCAB NONREACTIVE 11/11/2016     Urinalysis/Chemistries:      Lab Results   Component Value Date    NITRU NEGATIVE 04/27/2016    COLORU DELORES 04/27/2016    PHUR 5.0 04/27/2016    WBCUA TOO NUMEROUS TO COUNT 04/27/2016    RBCUA 10 TO 20 04/27/2016    MUCUS NOT REPORTED 04/27/2016    TRICHOMONAS NOT REPORTED 04/27/2016    YEAST NOT REPORTED 04/27/2016    BACTERIA MANY 04/27/2016    SPECGRAV 1.015 04/27/2016    LEUKOCYTESUR LARGE 04/27/2016    UROBILINOGEN Normal 04/27/2016    BILIRUBINUR NEGATIVE 04/27/2016    GLUCOSEU NEGATIVE 04/27/2016    KETUA NEGATIVE 04/27/2016    AMORPHOUS 2+ 04/27/2016     Urine Sodium:     Lab Results   Component Value Date    RAY 20 04/27/2016     Urine Osmolarity:   Lab Results   Component Value Date    OSMOU 403 03/26/2016      Urine Creatinine:     Lab Results   Component Value Date    LABCREA 95.7 04/27/2016     Radiology:     Reviewed. Assessment:     1. ESRD: On  Hemodialysis. patient's regular HD days are [facility] [place] [facility] under Dr Fly Blankenship via right AVF.    2. GIB with positive blood from rectum-hemoglobin stable 12.1.  3. Secondary hyperparathyroidism  4. Anemia of chronic disease  5. Dementia  6. DM  7. S/P AVR and MVR  8. PAF: On Eliquis and aspirin  9. Wheelchair-bound. Plan:   1. See the written copy of this report in the patient's paper medical record. These results did not interface directly into the electronic medical record and are summarized here. 2.  Follow-up with GI outpatient. 3.  Okay to discharge from nephrology standpoint postdialysis today. Patient will be going to [facility] per  note. Nutrition   Please ensure that patient is on a renal diet/TF. Avoid nephrotoxic drugs/contrast exposure. We will continue to follow along with you. Vikas Blankenship MD  [facility]     This note is created with the assistance of a speech-recognition program. While intending to generate a document that actually reflects the content of the visit, no guarantees can be provided that every mistake has been identified and corrected by editing.

## 2021-06-16 NOTE — PROGRESS NOTES
Physical Therapy        Physical Therapy Cancel Note      DATE: 2021    NAME: Lana Rich  MRN: [id number]   : 1945      Patient not seen this date for Physical Therapy due to:    Hemodialysis:      Electronically signed by Shanell Cartagena PT on 2021 at 10:10 AM

## 2021-06-16 NOTE — CARE COORDINATION
Discharge [address] Case Management Department  Written by: Aura Cage RN    Patient Name: Raudel Gaspar  Attending Provider: No att. providers found  Admit Date: 2021  9:54 AM  MRN: [id number]  Account: [de-identified]                     : 1945  Discharge Date: 2021      Disposition: long term care facility-[facility] Garfield Rojas RN

## 2021-06-16 NOTE — PROGRESS NOTES
[facility]. [facility] Gastroenterology Progress Note    Martha Bal is a 67 y.o. male patient. Hospitalization Day:3      Chief consult reason:  Rectal bleeding      Subjective: Patient seen and examined in dialysis unit. Patient denies any abdominal pain. Report tolerating diet. He does not recall having bowel movement this morning. Hemoglobin trending upward. Currently 11.1 g/dL    Objective:   VITALS:  BP (!) 171/84   Pulse 75   Temp 98.6 °F (37 °C)   Resp 20   Ht 5' 8\" (1.727 m)   Wt 228 lb 6.3 oz (103.6 kg)   SpO2 96%   BMI 34.73 kg/m²   TEMPERATURE:  Current - Temp: 98.6 °F (37 °C);  Max - Temp  Av.9 °F (36.6 °C)  Min: 97.3 °F (36.3 °C)  Max: 98.6 °F (37 °C)    Physical Assessment:  General appearance:  alert, cooperative and no distress  Mental Status:  oriented to person, place and time and normal affect  Lungs:  clear to auscultation bilaterally, normal effort  Heart:  regular rate and rhythm, no murmur  Abdomen:  soft, morbidly obese, nontender, nondistended, normal bowel sounds Extremities:  no edema, no redness, No clubbing  Skin:  warm, dry, no gross lesions or rashes    CURRENT MEDICATIONS:  Scheduled Meds:   [Held by provider] apixaban  5 mg Oral BID    [Held by provider] aspirin  81 mg Oral Daily    atorvastatin  40 mg Oral Nightly    buPROPion  150 mg Oral Daily    donepezil  5 mg Oral QPM    DULoxetine  60 mg Oral Daily    fluticasone  2 spray Nasal Daily    folic acid  1 mg Oral Daily    [Held by provider] insulin glargine  25 Units Subcutaneous BID    insulin lispro  0-12 Units Subcutaneous TID WC    insulin lispro  0-6 Units Subcutaneous Nightly    levothyroxine  25 mcg Oral Daily    midodrine  10 mg Oral TID    therapeutic multivitamin-minerals  1 tablet Oral Daily    pantoprazole  40 mg Intravenous BID    And    sodium chloride (PF)  10 mL Intravenous Daily    sevelamer  1,600 mg Oral TID    sodium chloride flush  5-40 mL Intravenous 2 times per day suggesting calcific pancreatitis. There is an unchanged 2.5 cm cystic lesion in the uncinate process of the pancreas. The bilateral kidneys are atrophic. Prominent renal sinus lipomatosis. GI/Bowel: The colon is redundant. There is a moderate to large stool load in the right, transverse and descending colon. The splenic flexure is markedly elongated extending to the anterior upper abdomen. The splenic flexure is dilated measuring up to 9.5 cm in diameter. The distal sigmoid colon tapers to a collapsed rectal vault. There is no twisting of the sigmoid mesentery to suggest a volvulus. Pelvis: Urinary bladder is tethered superiorly and to the right likely secondary to the position of the sigmoid flexure. . Peritoneum/Retroperitoneum: There is no adenopathy, free air or free fluid. Pelvic lipomatosis. Bones/Soft Tissues: There is anterior abdominal wall laxity in the upper abdomen. There are adjacent postsurgical changes. There is no acute bone finding. The colon is redundant with an a markedly elongated splenic flexure. The dilated sigmoid flexure measuring up to 9.5 cm tapers to a collapsed rectal vault with no evidence of a volvulus at this time. The elongated sigmoid flexure predisposes the patient to development of a volvulus and intermittent volvulus should be considered. There is a moderate to large stool load in the right, transverse and descending Radiolucent gallstones suspected. Bibasilar dependent airspace disease with volume loss, likely atelectasis. Pneumonia not excluded. XR ABDOMEN (KUB) (SINGLE AP VIEW)    Result Date: 6/15/2021  EXAMINATION: ONE SUPINE XRAY VIEW(S) OF THE ABDOMEN 6/15/2021 2:15 pm COMPARISON: CT abdomen and pelvis performed 06/12/2021. HISTORY: ORDERING SYSTEM PROVIDED HISTORY: Post colonoscopy decompression TECHNOLOGIST PROVIDED HISTORY: Post colonoscopy decompression Reason for Exam: supine port abd FINDINGS: There is a nonobstructive bowel gas pattern.   There is no intraperitoneal free air. There are no suspicious calcifications. There is no acute osseous abnormality. The surrounding soft tissues are unremarkable. Unremarkable abdominal radiographs without acute abnormality. ENDOSCOPY     Procedure:                 Colonoscopy --diagnostic  Date:                           6/15/2021  Endoscopist:             Vadim Angel MD, [facility]     Indication: Rectal bleeding, abnormal CT of the abdomen showing distended sigmoid colon.     Postprocedure diagnosis: Distended sigmoid colon, decompressed, ulcerated mucosa around the diverticuli at 60 cm from anal verge with a blood clot likely the source of recent bleeding. Findings:      1. Moderate amount of diverticuli in the sigmoid colon which was small in size. 2.  A small area of ulcerated mucosa near the diverticuli in the sigmoid colon at 60 cm from anal verge with a large blood clot likely the source of recent bleeding. This area was irrigated aggressively and no active bleeding was noted at the time of colonoscopy. This may represent area of sigmoid volvulus and ulcerated area from this and less likely from diverticulitis. 3.  Distended sigmoid colon-which was decompressed. 4.  The rest of the colon otherwise appeared normal within the limitation of bowel prep. Recommendations: Check KUB  Okay for liquid diet and advance as tolerated  Consider flexible sigmoidoscopy in 2 months to check healing of this ulcerated area. Once tolerates diet, okay to discharge from GI standpoint. Hold Eliquis or any other form of anticoagulation for at least for 5 days. Okay for antiplatelet therapy.        Vadim Angel MD, [facility]        Principal Problem (Resolved):     Lower GI bleed  Active Problems:    Anemia in chronic kidney disease, on chronic dialysis [facility])    Essential hypertension    Obstructive sleep apnea    Class 2 severe obesity with serious comorbidity and body mass index (BMI) of 35.0 to 35.9 in adult ([facility]    Dementia ([facility]    ESRD (end stage renal disease) on dialysis ([facility]    Controlled type 2 diabetes mellitus with diabetic polyneuropathy, with long-term current use of insulin ([address]  Resolved Problems:    Paroxysmal atrial fibrillation (HCC)    Abnormal CT scan, sigmoid colon       GI Assessment:    1. Rectal bleeding - 06/15/2021-small area of ulcerated mucosa near the diverticula in the sigmoid colon with large clot likely the source of recent bleeding. Possibly representing area of sigmoid volvulus and less likely from diverticulitis  2. Anemia-hemoglobin trending upward. Currently 11.1 g/dL      Plan of care:  1. Diet as tolerated  2. Consider flex sigmoidoscopy in 2 months to assess for healing of ulcerated area. Discussed with wife. She will talk with  and family for considerations in the future. 3. Hold Eliquis or any other form of anticoagulation for at least 5 days post colonoscopy  4. Okay for antiplatelet therapy  5. GI will sign off    Please feel free to contact me with any questions or concerns. Thank you for allowing me to participate in the care of your patient. SHAMEKA Weathers - CNP on 6/16/2021 at 9:58 AM   [facility] Gastroenterology    Please note that this note was generated using a voice recognition dictation software. Although every effort was made to ensure the accuracy of this automated transcription, some errors in transcription may have occurred.

## 2021-06-16 NOTE — DISCHARGE INSTR - COC
Continuity of Care Form    Patient Name: Lore Lind   :  1945  MRN:  [id number]    Admit date:  2021  Discharge date:  21    Code Status Order: DNR-CCA   Advance Directives:   [facility] Documentation       Date/Time Healthcare Directive Type of Healthcare Directive Copy in [address] Agent's Name Healthcare Agent's Phone Number    21 1007  Yes, patient has an advance directive for healthcare treatment  Durable power of  for health care  No, copy requested from family  Healthcare power of   [address]            Admitting Physician:  Gabe Winston DO  PCP: eLvar Gonzalez DO    Discharging Nurse: [address] [place] [address] Unit/Room#:   Discharging Unit Phone Number: [phone]    Emergency Contact:   Extended Emergency Contact Information  Primary Emergency Contact: Amaya Azar PEREZ  [place] [address] [place] [place] [facility] [address] Phone: [phone]  Work Phone: [phone]  Mobile Phone: [phone]  Relation: Spouse  Secondary Emergency Contact: Mac Boudreaux  Address: N/A   [address] Phone: [phone]  Work Phone: [phone]  Mobile Phone: [phone]  Relation: Child    Past Surgical History:  Past Surgical History:   Procedure Laterality Date    AORTIC VALVE REPLACEMENT  2016    bioprosthetic    ARM SURGERY Left     CARDIAC CATHETERIZATION      CENTRAL VENOUS CATHETER Right 2018    DIALYSIS CATHETER Dr Dana Hernandez COLONOSCOPY  09    COLONOSCOPY N/A 10/12/2018    COLONOSCOPY WITH BIOPSY performed by Tray Moore DO at [address] [place] [address] Right 3/10/2019    WASHOUT RIGHT LOWER EXTREMITY WITH WOUND VAC EXCHANGE performed by Errol nKox MD at [address] [place] [address] Right 3/8/2019    RIGHT LOWER EXTREMITY EXPLORATION, HEMATOMA EVACUATION, WOUND VAC PLACEMENT performed by Lesli Leblanc MD at [address] (R30-71 surgeries)    several surgeries on left arm    IR NONTUNNELED VASCULAR CATHETER  8/5/2020    IR NONTUNNELED VASCULAR CATHETER 8/5/2020 Julia Angulo MD [facility]    KNEE ARTHROSCOPY Left 09/17/99    MITRAL VALVE REPLACEMENT  03/18/2016    bioprosthetic     NASAL SEPTUM SURGERY      OTHER SURGICAL HISTORY  3/18/16    Aortic root Enlargement    OTHER SURGICAL HISTORY  3/18/16    ASD Closure    OTHER SURGICAL HISTORY Right 01/09/2017    AV fistula creation, wrist    OTHER SURGICAL HISTORY Right 08/29/2017    ligation of collateral branch of av fistula right wrist    OTHER SURGICAL HISTORY  04/05/2018    FISTULAGRAM WITH DR. Kevin Erazo    AL EGD TRANSORAL BIOPSY SINGLE/MULTIPLE N/A 10/17/2017    EGD BIOPSY performed by Shereen So MD at [address] ANGIOACCESS AV FISTULA Right 8/29/2017     RIGHT  LOWER ARM AV FISTULA  LIGATION OF AQUIRED BRANCHES X2 performed by Brayden Chambers DO at [address] [facility] [place]  3/18/16    median    VASCULAR SURGERY  12/06/2018    Right arm fistulagram,  PTA cephalic vein stenosis  /  DR Rod Graft       Immunization History:   Immunization History   Administered Date(s) Administered    COVID-19, Pfizer, PF, 30mcg/0.3mL 01/04/2021, 01/25/2021    Influenza Virus Vaccine 10/24/2006, 11/18/2013    Influenza Whole 10/24/2006, 10/13/2011, 01/11/2013, 10/12/2015    Influenza, High Dose (Fluzone 65 yrs and older) 11/18/2013, 10/12/2015, 10/04/2017, 10/21/2018, 10/08/2019, 09/25/2020    Pneumococcal Conjugate 13-valent ([id number]) 10/12/2015, 10/25/2017    Pneumococcal Polysaccharide ([id number]) 11/30/2010, 10/12/2015    Tdap (Boostrix, Adacel) 10/27/2015       Active Problems:  Patient Active Problem List   Diagnosis Code    Traumatic amputation of thumb, left, 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    Essential hypertension I10    Resolved    COVID-19 Rule Out 08/05/20 08/05/20 08/05/20 COVID-19 (Ordered)   08/05/20 Rule-Out Test Resulted            Nurse Assessment:  Last Vital Signs: BP (!) 151/75   Pulse 70   Temp 98.4 °F (36.9 °C) (Oral)   Resp 17   Ht 5' 8\" (1.727 m)   Wt 242 lb 8.1 oz (110 kg)   SpO2 96%   BMI 36.87 kg/m²     Last documented pain score (0-10 scale): Pain Level: 0  Last Weight:   Wt Readings from Last 1 Encounters:   06/16/21 242 lb 8.1 oz (110 kg)     Mental Status:  oriented    IV Access:  - None    Nursing Mobility/ADLs:  Walking   Dependent  Transfer  Dependent  Bathing  Dependent  Dressing  Dependent  Toileting  Dependent  Feeding  Assisted  Med Admin  Dependent  Med Delivery   none    Wound Care Documentation and Therapy:  Wound 05/11/21 Sacrum skin tear (Active)   Number of days: 36       Wound 05/11/21 Thigh Proximal;Right;Posterior skin tear (Active)   Number of days: 36        Elimination:  Continence:   · Bowel: Yes  · Bladder: Yes  Urinary Catheter: None   Colostomy/Ileostomy/Ileal Conduit: No       Date of Last BM: 06/15/21    Intake/Output Summary (Last 24 hours) at 6/16/2021 0823  Last data filed at 6/15/2021 2200  Gross per 24 hour   Intake 2240 ml   Output    Net 2240 ml     I/O last 3 completed shifts: In: 6906 [P.O.:1440; I.V.:800]  Out: -     Safety Concerns: At Risk for Falls    Impairments/Disabilities:      Hearing    Nutrition Therapy:  Current Nutrition Therapy:   - Oral Diet:  Renal    Routes of Feeding: Oral  Liquids: No Restrictions  Daily Fluid Restriction: no  Last Modified Barium Swallow with Video (Video Swallowing Test): not done    Treatments at the Time of Hospital Discharge:   Respiratory Treatments: none  Oxygen Therapy:  is not on home oxygen therapy.   Ventilator:    - No ventilator support    Rehab Therapies: Physical Therapy and Occupational Therapy  Weight Bearing Status/Restrictions: No weight bearing restirctions  Other Medical Equipment (for information only, NOT a DME order):  wheelchair  Other Treatments:     Patient's personal belongings (please select all that are sent with patient):  Glasses    RN SIGNATURE:  Electronically signed by Loren Biggs RN on 6/16/21 at 10:16 AM EDT    CASE MANAGEMENT/SOCIAL WORK SECTION    Inpatient Status Date: 6-    Readmission Risk Assessment Score:  Readmission Risk              Risk of Unplanned Readmission:  27           Discharging to Facility/ Agency   · Name: [facility] [place]  · Address:  · Phone:  · Fax:    Dialysis Facility (if applicable)   · Name:  · Address:  · Dialysis Schedule:  · Phone:  · Fax:    / signature: Electronically signed by Darlene Shaw RN on 6/16/21 at 10:07 AM EDT    PHYSICIAN SECTION    Prognosis: Fair    Condition at Discharge: Stable    Rehab Potential (if transferring to Rehab): Fair    Recommended Labs or Other Treatments After Discharge: Resume taking Eliquis on 2/27/4102    Physician Certification: I certify the above information and transfer of Martha Bal  is necessary for the continuing treatment of the diagnosis listed and that he requires [facility] for greater 30 days.      Update Admission H&P: No change in H&P    PHYSICIAN SIGNATURE:  Electronically signed by Nova Cooper MD on 6/16/21 at 8:24 AM EDT

## 2021-06-16 NOTE — DISCHARGE SUMMARY
[facility]  Office: [address], DO, Annabelle Brewer, DO, Lynn Zhu, DO, Wendiealysa Torrez Blood, DO, Franki Mehta MD, Ester Miranda MD, Fanta Watts MD, Shannon Sullivan MD, Sunshine Mcclure MD, Shamir Biwsas MD, Joel Jimenez MD, Karen Schuster MD, Erfem Oden DO, Marti Daugherty MD, Quincy Lopez DO, Raphael Owens MD,  Valerie Mcclendon, DO, Ida Damico MD, Patti Solorzano MD, Mellisa Banegas MD, Evan Johansen MD, Mallika Hemphill Cha, CNP, Elie Riddle, CNP, Wilner Sapp, CNS, Surekha Irahtea, CNP, Marianna Angela, CNP, Tess Ricos, CNP, Mina Rosa, CNP, Chitra Causey, CNP, Milissa Pallas, PA-C, Trudi Soulier, [facility], Noemi Allen, CNP, Alice Dickson, CNP, Angeles Watts, CNP, Nadir Curran, CNP, Mehran Nolasco, CNP, [facility], [facility]    Discharge Summary     Patient ID: Lima Cortes  :  1945   MRN: [id number]     ACCOUNT:  [de-identified]   Patient's PCP: Juan Perez DO  Admit Date: 2021   Discharge Date: 2021     Length of Stay: 3  Code Status:  DNR-CCA  Admitting Physician: Grover Aguilar DO  Discharge Physician: Patti Solorzano MD     Active Discharge Diagnoses:     Hospital Problem Lists:  Principal Problem (Resolved):     Lower GI bleed  Active Problems:    Anemia in chronic kidney disease, on chronic dialysis (HCC)    Essential hypertension    Obstructive sleep apnea    Class 2 severe obesity with serious comorbidity and body mass index (BMI) of 35.0 to 35.9 in adult ([facility]    Dementia (HCC)    ESRD (end stage renal disease) on dialysis ([facility]    Controlled type 2 diabetes mellitus with diabetic polyneuropathy, with long-term current use of insulin ([facility])  Resolved Problems:    Paroxysmal atrial fibrillation (HCC)    Abnormal CT scan, sigmoid colon      Admission Condition:  poor     Discharged Condition: fair    Hospital Stay:     RILEY BA Course:      76-year-old male who was presented after having active bleeding. He is a wheelchair-bound resident at a nursing home. Patient also has dementia and end-stage renal disease on dialysis Mondays Wednesdays and Fridays. He was sent for evaluation of rectal bleeding. Patient is on aspirin and Eliquis. CT abdomen showed moderate to large stool in the right transverse and descending colon. Colon was redundant with dilated sigmoid flexure but no evidence of volvulus. Due to elongated sigmoid flexure patient was considered to be at risk for intermittent volvulus. Patient underwent colonoscopy which showed patient has small area of ulcerated mucosa near diverticuli in the sigmoid colon with large blood clot likely source of recent bleeding. KUB was done to rule out volvulus which was unremarkable. Patient was okay to be advanced for his diet. Patient is recommended to get flexible sigmoidoscopy in 2 months. Patient is to hold Eliquis for 5 days starting yesterday. He may resume his Eliquis on 6/20/2021. Patient is okay to be continued on aspirin. Patient also received dialysis while he was admitted here. He has been cleared for discharge.       Significant therapeutic interventions: Colonoscopy    Significant Diagnostic Studies:   Labs / Micro:  CBC:   Lab Results   Component Value Date    WBC 12.1 06/16/2021    RBC 3.61 06/16/2021    HGB 11.1 06/16/2021    HCT 34.9 06/16/2021    MCV 96.7 06/16/2021    MCH 30.7 06/16/2021    MCHC 31.8 06/16/2021    RDW 13.8 06/16/2021     06/16/2021     BMP:    Lab Results   Component Value Date    GLUCOSE 130 06/16/2021     06/16/2021    K 4.0 06/16/2021    CL 91 06/16/2021    CO2 22 06/16/2021    ANIONGAP 20 06/16/2021    BUN 53 06/16/2021    CREATININE 5.17 06/16/2021    BUNCRER NOT REPORTED 06/16/2021    CALCIUM 8.7 06/16/2021    LABGLOM 11 06/16/2021    GFRAA 13 06/16/2021    GFR      06/16/2021    GFR NOT REPORTED 06/16/2021     HFP:    Lab donepezil 5 MG tablet  Commonly known as: ARICEPT     DULoxetine 60 MG extended release capsule  Commonly known as: CYMBALTA     Elastic Bandages & Supports Misc  30-40 mmHg compression stockings to both lower legs, on every morning, off at bedtime. Fiasp FlexTouch 100 UNIT/ML Sopn  Generic drug: Insulin Aspart (w/Niacinamide)     fluticasone 50 MCG/ACT nasal spray  Commonly known as: FLONASE  2 sprays by Nasal route daily     folic acid 1 MG tablet  Commonly known as: FOLVITE  Take 1 tablet by mouth daily     gabapentin 300 MG capsule  Commonly known as: NEURONTIN     guaiFENesin 400 MG tablet     insulin glargine 100 UNIT/ML injection vial  Commonly known as: LANTUS  Inject 25 Units into the skin 2 times daily     ipratropium-albuterol 0.5-2.5 (3) MG/3ML Soln nebulizer solution  Commonly known as: DUONEB     levothyroxine 25 MCG tablet  Commonly known as: Synthroid  Take 1 tablet by mouth Daily     Lidocaine 5 % Crea     magnesium hydroxide 400 MG/5ML suspension  Commonly known as: MILK OF MAGNESIA  Take 30 mLs by mouth daily as needed for Constipation     ondansetron 4 MG tablet  Commonly known as: ZOFRAN     PRO-STAT PO     sevelamer 800 MG tablet  Commonly known as: Renvela  Take 2 tablets by mouth 3 times daily with meals only. therapeutic multivitamin-minerals tablet     tiZANidine 2 MG tablet  Commonly known as: Kaushal Graff     Wheelchair Misc  Use as directed        STOP taking these medications    omeprazole 20 MG delayed release capsule  Commonly known as: PRILOSEC           Where to Get Your Medications      These medications were sent to [facility] [address] [place] [address], [address] [place] [address]  [address] [facility] [address]    Phone: [phone]   · pantoprazole 40 MG tablet     You can get these medications from any pharmacy    Bring a paper prescription for each of these medications  · apixaban 5 MG Tabs tablet         No discharge procedures on file. Time Spent on discharge is  40 minutes in patient examination, evaluation, counseling as well as medication reconciliation, prescriptions for required medications, discharge plan and follow up. Electronically signed by   Inocente Strong MD  6/16/2021  10:08 AM      Thank you Dr. Stefano Levy DO for the opportunity to be involved in this patient's care.

## 2021-06-21 NOTE — PROGRESS NOTES
06/17/21  Star Drilling  1945    Patient Resident of [facility]    Chief Complaint  1. Ecchymosis    2. Vascular dementia without behavioral disturbance (HCC)    3. Paroxysmal atrial fibrillation (HCC)        HPI:  76-year-old patient being seen at the request of the night shift nurse concerning for cellulitis to his abdomen and trunk. Patient with a history of truncal cellulitis. Review of patient with dayshift nurse Aruna Aviles showed no erythema to trunk or under abdominal folds. Is noted that patient has some areas of bruising to lower abdomen where it appears the brief was too tight. Patient is on anticoagulation due to the atrial fib. We discussed making sure the brief is loose, monitoring the site. No signs of cellulitis at present.   Has been afebrile, vital signs have been stable    Allergies   Allergen Reactions    Percocet [Oxycodone-Acetaminophen] Itching and Rash     Also causes shaking    Ampicillin Rash       Past Medical History:   Diagnosis Date    Acute blood loss anemia 3/11/2019    Acute on chronic diastolic CHF (congestive heart failure) ([address] 12/2/2019    Anemia in chronic kidney disease (CKD) 4/27/2016    Arthritis     Bacteremia 11/11/2016    Benign prostatic hyperplasia without lower urinary tract symptoms     Bleeding from wound 4/2/2019    BMI 40.0-44.9, adult ([address] 3/19/2018    Cellulitis 3/21/2019    Cellulitis of left lower extremity     Chronic cerebral ischemia 1/3/2017    Closed fracture of forearm 8/13/2013    Broken lft forearm     Controlled type 2 diabetes mellitus with chronic kidney disease on chronic dialysis, with long-term current use of insulin ([address]     Controlled type 2 diabetes mellitus with diabetic polyneuropathy, with long-term current use of insulin ([address] 10/7/2018    Decubital ulcer     NON OPEN BUTTOCKS    Dementia ([address]     memory problems    Dialysis patient ([address] 01/03/2017    Goes Jayden Leggett, Sat in [facility] intravenous access     HAS NEEDED PICC TEAM IN THE PAST    Difficulty walking     WHEELCHAIR BOUND-PIVOTS WITH ASSIST O F 2    DJD (degenerative joint disease) of knee     Elbow, forearm, and wrist, abrasion or friction burn, without mention of infection 8/13/2013    Abrasions lft forearm     Encephalopathy acute 4/27/2016    Encounter regarding vascular access for dialysis for ESRD ([address] 2/2/2017    ESRD (end stage renal disease) on dialysis ([address] 1/17/2017    Forgetfulness     HAS SOME SHORT TERM MEMORY LOSS, QUYEN-WIFE IS LEGAL GUARDIAN    H/O transesophageal echocardiography ([facility]) for monitoring     Hemodialysis patient ([address] 11/11/2016    [place].  TUNNELED CATHETER RT UPPER CHEST    Hyperlipidemia 1990    on Meds    Hypertension 1990    on Meds    Intercritical gout     on Meds    Joint pain, knee 01/13/2017    baldo knee synvisc-1 injections    Long-term insulin use ([address] 1/17/2017    Major depressive disorder with single episode, in partial remission ([address] 1/3/2017    Mobility impaired     Morbid obesity ([address]     Muscle weakness     GRNERALIZED    Obesity     Obstructive sleep apnea     HEATHER on CPAP     On CPAP-TO BRING DOS    Paroxysmal atrial fibrillation ([address] 9/6/2017    Pneumonia 12/2/2019    Respiratory failure ([address] 03/2016    with open heart surgery, trached x 5 months    S/P cardiac cath     Seborrhea     Severe aortic stenosis 3/9/2016    Severe mitral valve stenosis 3/9/2016    Severe sepsis ([address] 4/3/2016    Skin rash     ABD FOLDS    Stasis dermatitis     Thyroid disease     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 lft thumb     Traumatic hemorrhagic shock ([address] 3/13/2019    Venous stasis dermatitis     Wears glasses     Wheelchair bound     pivots with 2 assists       Past Surgical History:   Procedure Laterality Date    AORTIC VALVE REPLACEMENT  03/18/2016    bioprosthetic    ARM SURGERY Left 1990    CARDIAC CATHETERIZATION      CENTRAL VENOUS CATHETER Right 02/21/2018    DIALYSIS CATHETER Dr Fermin Morrow    COLONOSCOPY  11/19/09    COLONOSCOPY N/A 10/12/2018    COLONOSCOPY WITH BIOPSY performed by Loren Lopez DO at [address] [facility] COLONOSCOPY N/A 6/15/2021    COLONOSCOPY FLEXIBLE W/ DECOMPRESSION performed by Ravindra Hanley MD at [address] [facility] Right 3/10/2019    WASHOUT RIGHT LOWER EXTREMITY WITH WOUND VAC EXCHANGE performed by Bre Wong MD at [address] Right 3/8/2019    RIGHT LOWER EXTREMITY EXPLORATION, HEMATOMA EVACUATION, WOUND VAC PLACEMENT performed by Bre Wong MD at [address] (x10-12 surgeries)    several surgeries on left arm    IR NONTUNNELED VASCULAR CATHETER  8/5/2020    IR NONTUNNELED VASCULAR CATHETER 8/5/2020 Kai Foster MD [facility]    KNEE ARTHROSCOPY Left 09/17/99    MITRAL VALVE REPLACEMENT  03/18/2016    bioprosthetic     NASAL SEPTUM SURGERY      OTHER SURGICAL HISTORY  3/18/16    Aortic root Enlargement    OTHER SURGICAL HISTORY  3/18/16    ASD Closure    OTHER SURGICAL HISTORY Right 01/09/2017    AV fistula creation, wrist    OTHER SURGICAL HISTORY Right 08/29/2017    ligation of collateral branch of av fistula right wrist    OTHER SURGICAL HISTORY  04/05/2018    FISTULAGRAM WITH DR. Kaykay Damico    [place] EGD TRANSORAL BIOPSY SINGLE/MULTIPLE N/A 10/17/2017    EGD BIOPSY performed by Murphy Quinones MD at [address] [facility] [address] ANGIOACCESS AV FISTULA Right 8/29/2017     RIGHT  LOWER ARM AV FISTULA  LIGATION OF AQUIRED BRANCHES X2 performed by Michael Dawson DO at [address]  3/18/16    median    VASCULAR SURGERY  12/06/2018    Right arm fistulagram,  PTA cephalic vein stenosis  /  DR Mary Boyd       Medications as per [facility] Chart /reviewed     Social History     Socioeconomic History    Marital status:  Spouse name: Heather El Number of children: 2    Years of education: Not on file    Highest education level: Not on file   Occupational History    Occupation: Retired      Employer: [employer] [address]    Smoking status: Former Smoker     Packs/day: 2.00     Years: 25.00     Pack years: 50.00     Types: Cigarettes     Start date: 3/17/1955     Quit date: 1980     Years since quittin.4    Smokeless tobacco: Never Used   Vaping Use    Vaping Use: Never used   Substance and Sexual Activity    Alcohol use: No     Alcohol/week: 0.0 standard drinks     Comment: 1-2 drinks per year    Drug use: No    Sexual activity: Never   Other Topics Concern    Not on file   Social History Narrative    Not on file     Social Determinants of Health     Financial Resource Strain:     Difficulty of Paying Living Expenses:    Food Insecurity:     Worried About Running Out of Food in the Last Year:     [address] in the Last Year:    Transportation Needs:     Lack of Transportation (Medical):  Lack of Transportation (Non-Medical):    Physical Activity:     Days of Exercise per Week:     Minutes of Exercise per Session:    Stress:     Feeling of Stress :    Social Connections:     Frequency of Communication with Friends and Family:     Frequency of Social Gatherings with Friends and Family:     Attends Buddhist Services:     Active Member of Clubs or Organizations:     Attends Club or Organization Meetings:     Marital Status:    Intimate Partner Violence:     Fear of Current or Ex-Partner:     Emotionally Abused:     Physically Abused:     Sexually Abused:        Review of Systems   Unable to perform ROS: Dementia       Physical Exam  Vitals and nursing note reviewed. Constitutional:       General: He is not in acute distress. Appearance: He is well-developed. He is not diaphoretic. HENT:      Head: Normocephalic and atraumatic.       Right Ear: External ear normal.      Left Ear: External ear normal.   Eyes:      General: Lids are normal.         Right eye: No discharge. Left eye: No discharge. Conjunctiva/sclera: Conjunctivae normal.      Pupils: Pupils are equal, round, and reactive to light. Neck:      Trachea: No tracheal deviation. Cardiovascular:      Rate and Rhythm: Normal rate and regular rhythm. Heart sounds: Normal heart sounds. No murmur heard. No friction rub. No gallop. Pulmonary:      Effort: Pulmonary effort is normal. No accessory muscle usage or respiratory distress. Breath sounds: Normal breath sounds. No wheezing or rales. Abdominal:      General: Bowel sounds are normal. There is no distension. Palpations: Abdomen is soft. Abdomen is not rigid. Tenderness: There is no abdominal tenderness. Musculoskeletal:         General: Normal range of motion. Cervical back: Normal range of motion and neck supple. No edema or erythema. Skin:     General: Skin is warm and dry. Capillary Refill: Capillary refill takes less than 2 seconds. Coloration: Skin is not pale. Findings: Bruising present. No erythema or rash. Neurological:      General: No focal deficit present. Mental Status: He is alert. Mental status is at baseline. Cranial Nerves: No cranial nerve deficit. Motor: No weakness. Coordination: Coordination normal.   Psychiatric:         Mood and Affect: Mood normal.         Behavior: Behavior normal.         Vital Signs: Temperature 97.9 °F, blood pressure 124/62, pulse 84, respirations 20, SPO2 92% on room air    Assessment:  1. Ecchymosis  2. Vascular dementia without behavioral disturbance (HCC)  3. Paroxysmal atrial fibrillation (HCC)  No signs of cellulitis. No erythremia. No excessive warmth. His areas noted from brief being too tight of bruising. They are to monitor closely for the next 48 hours. He does have a CBC ordered in the a.m. Plan:  As noted above. Follow up for routine visit. Call sooner with concerns prior.     Electronically signed by SHAMEKA Taylor CNP on 6/21/2021 at 9:19 AM

## 2021-06-28 NOTE — PROGRESS NOTES
DR. Naty Williamson - [facility] VISIT    DATE OF SERVICE: 5/31/21    NURSING HOME: [facility] [place]    CHIEF COMPLAINT/HISTORY OF CHIEF COMPLAINT: This patient is being seen for ongoing evaluation and management of his diabetes mellitus type 2 with nephropathy and neuropathy, end-stage renal disease on hemodialysis with anemia, depression, severe aortic and mitral stenosis, hypertension, hyperlipidemia, obstructive sleep apnea, and morbid obesity. His diabetes has remained fairly well controlled. He is on Eliquis for atrial fibrillation. He is on Zoloft for depression. He continues to do well with his dialysis, which he gets for end-stage renal disease. There are no new complaints. ALLERGIES:   Allergies   Allergen Reactions    Percocet [Oxycodone-Acetaminophen] Itching and Rash     Also causes shaking    Ampicillin Rash        MEDICATIONS: As noted on the [facility] MAR, referenced and incorporated herein.     PAST MEDICAL HISTORY:   Past Medical History:   Diagnosis Date    Acute blood loss anemia 3/11/2019    Acute on chronic diastolic CHF (congestive heart failure) ([address] 12/2/2019    Anemia in chronic kidney disease (CKD) 4/27/2016    Arthritis     Bacteremia 11/11/2016    Benign prostatic hyperplasia without lower urinary tract symptoms     Bleeding from wound 4/2/2019    BMI 40.0-44.9, adult ([address] 3/19/2018    Cellulitis 3/21/2019    Cellulitis of left lower extremity     Chronic cerebral ischemia 1/3/2017    Closed fracture of forearm 8/13/2013    Broken lft forearm     Controlled type 2 diabetes mellitus with chronic kidney disease on chronic dialysis, with long-term current use of insulin ([address]     Controlled type 2 diabetes mellitus with diabetic polyneuropathy, with long-term current use of insulin ([address] 10/7/2018    Decubital ulcer     NON OPEN BUTTOCKS    Dementia ([address]     memory problems    Dialysis patient ([address] 01/03/2017    Jayden Solorio, Sat in [place] Difficult intravenous access     HAS NEEDED PICC TEAM IN THE PAST    Difficulty walking     WHEELCHAIR BOUND-PIVOTS WITH ASSIST O F 2    DJD (degenerative joint disease) of knee     Elbow, forearm, and wrist, abrasion or friction burn, without mention of infection 8/13/2013    Abrasions lft forearm     Encephalopathy acute 4/27/2016    Encounter regarding vascular access for dialysis for ESRD ([address] 2/2/2017    ESRD (end stage renal disease) on dialysis ([address] 1/17/2017    Forgetfulness     HAS SOME SHORT TERM MEMORY LOSS, QUYEN-WIFE IS LEGAL GUARDIAN    H/O transesophageal echocardiography ([facility]) for monitoring     Hemodialysis patient ([address] 11/11/2016    [place].  TUNNELED CATHETER RT UPPER CHEST    Hyperlipidemia 1990    on Meds    Hypertension 1990    on Meds    Intercritical gout     on Meds    Joint pain, knee 01/13/2017    baldo knee synvisc-1 injections    Long-term insulin use ([address] 1/17/2017    Major depressive disorder with single episode, in partial remission ([address] 1/3/2017    Mobility impaired     Morbid obesity ([address]     Muscle weakness     GRNERALIZED    Obesity     Obstructive sleep apnea     HEATHER on CPAP     On CPAP-TO BRING DOS    Paroxysmal atrial fibrillation ([address] 9/6/2017    Pneumonia 12/2/2019    Respiratory failure ([address] 03/2016    with open heart surgery, trached x 5 months    S/P cardiac cath     Seborrhea     Severe aortic stenosis 3/9/2016    Severe mitral valve stenosis 3/9/2016    Severe sepsis ([address] 4/3/2016    Skin rash     ABD FOLDS    Stasis dermatitis     Thyroid disease     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 lft thumb     Traumatic hemorrhagic shock ([address] 3/13/2019    Venous stasis dermatitis     Wears glasses     Wheelchair bound     pivots with 2 assists       PAST SURGICAL HISTORY:   Past Surgical History:   Procedure Laterality Date    AORTIC VALVE REPLACEMENT  03/18/2016    bioprosthetic    ARM SURGERY 2.00    Years: 25.00    Pack years: 50.00    Types: Cigarettes    Start date: 3/17/1955   [place] Quit date: 1980    Years since quittin.5   Smokeless Tobacco Never Used     Alcohol:   Social History     Substance and Sexual Activity   Alcohol Use No    Alcohol/week: 0.0 standard drinks    Comment: 1-2 drinks per year     Drugs:   Social History     Substance and Sexual Activity   Drug Use No       FAMILY HISTORY: family history includes Alzheimer's Disease in his father; Diabetes in his maternal grandmother and mother; High Blood Pressure in his sister; Mabel Dyer in his mother. REVIEW OF SYSTEMS:     Please see history of chief complaint above; otherwise no new problems with respect to General, HEENT, Cardiovascular, Respiratory, Gastrointestinal, Genitourinary, Endocrinologic, Musculoskeletal, or Neuropsychiatric complaints. PHYSICAL EXAMINATION:    Vitals: Temp: 98.0 deg F. Pulse: 72. Resp: 18. BP: 136/66. General: A 67 y.o.  male. Alert and oriented to person and place but not time. He does not appear to be in any acute distress. Skin: Skin color, texture, turgor normal. No rashes or lesions. HEENT/Neck: Essentially unremarkable  Chest: There was a chest catheter in place  Lungs: Normal - CTA without rales, rhonchi, or wheezing. Heart: regular rate and rhythm, S1, S2 normal, no murmur, click, rub or gallop No S3 or S4. Abdomen: Obese, soft, slightly distended, non-tender, normal active bowel sounds, no masses palpated and no hepatosplenomegaly. There was a moderate-sized epigastric hernia present  Extremities: No clubbing, cyanosis, edema  Neurologic: cranial nerves II-XII are grossly intact    ASSESSMENT:     Diagnosis Orders   1. Controlled type 2 diabetes mellitus with chronic kidney disease on chronic dialysis, with long-term current use of insulin ([address]     2.  Controlled type 2 diabetes mellitus with diabetic polyneuropathy, with long-term current use of insulin ([address] 3. Long-term insulin use ([facility]     4. Essential hypertension     5. Mixed hyperlipidemia     6. ESRD (end stage renal disease) on dialysis ([address]     7. Dialysis patient ([address]     8. Secondary hyperparathyroidism of renal origin ([address]     9. Anemia in chronic kidney disease, on chronic dialysis ([address]     10. Paroxysmal atrial fibrillation (HCC)     11. Severe aortic stenosis     12. Severe mitral valve stenosis     13. Vascular dementia without behavioral disturbance ([facility]     14. Obstructive sleep apnea     15. Major depressive disorder with single episode, in partial remission ([facility]     16. Traumatic amputation of thumb, left, sequela (HCC)     17. Class 1 obesity with serious comorbidity and body mass index (BMI) of 33.0 to 33.9 in adult, unspecified obesity type           PLAN:    1. Continue current treatment  2. Nursing home record reviewed and updates summarized and entered into electronic record  3. Nursing home blood sugar logs and diabetic medication administration reviewed. No changes. 4. See nursing home orders and MAR.         Electronically signed by Jean Garcia DO on 6/28/2021 at 1:27 AM  Internal Medicine

## 2021-06-29 NOTE — PROGRESS NOTES
DR. 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 His diabetes has remained fairly well controlled. He is on Eliquis for atrial fibrillation. He is on Zoloft for depression. He continues to do well with his dialysis, which he gets for end-stage renal disease. Nursing home staff are concerned about a rash on the left side of his abdomen. There are no other complaints. ALLERGIES:   Allergies   Allergen Reactions    Percocet [Oxycodone-Acetaminophen] Itching and Rash     Also causes shaking    Ampicillin Rash        MEDICATIONS: As noted on the [facility] MAR, referenced and incorporated herein.     PAST MEDICAL HISTORY:   Past Medical History:   Diagnosis Date    Acute blood loss anemia 3/11/2019    Acute on chronic diastolic CHF (congestive heart failure) ([address] 12/2/2019    Anemia in chronic kidney disease (CKD) 4/27/2016    Arthritis     Bacteremia 11/11/2016    Benign prostatic hyperplasia without lower urinary tract symptoms     Bleeding from wound 4/2/2019    BMI 40.0-44.9, adult ([address] 3/19/2018    Cellulitis 3/21/2019    Cellulitis of left lower extremity     Chronic cerebral ischemia 1/3/2017    Closed fracture of forearm 8/13/2013    Broken lft forearm     Controlled type 2 diabetes mellitus with chronic kidney disease on chronic dialysis, with long-term current use of insulin ([address]     Controlled type 2 diabetes mellitus with diabetic polyneuropathy, with long-term current use of insulin ([address] 10/7/2018    Decubital ulcer     NON OPEN BUTTOCKS    Dementia ([address]     memory Laterality Date    AORTIC VALVE REPLACEMENT  03/18/2016    bioprosthetic    ARM SURGERY Left 1990    CARDIAC CATHETERIZATION      CENTRAL VENOUS CATHETER Right 02/21/2018    DIALYSIS CATHETER Dr Mare Zelaya    COLONOSCOPY  11/19/09    COLONOSCOPY N/A 10/12/2018    COLONOSCOPY WITH BIOPSY performed by Juanita Frye DO at [address] [facility] COLONOSCOPY N/A 6/15/2021    COLONOSCOPY FLEXIBLE W/ DECOMPRESSION performed by Ventura Brown MD at [address] [facility] Right 3/10/2019    WASHOUT RIGHT LOWER EXTREMITY WITH WOUND VAC EXCHANGE performed by Vicky Crisostomo MD at [address] Right 3/8/2019    RIGHT LOWER EXTREMITY EXPLORATION, HEMATOMA EVACUATION, WOUND VAC PLACEMENT performed by Vicky Crisostomo MD at [address] (x10-12 surgeries)    several surgeries on left arm    IR NONTUNNELED VASCULAR CATHETER  8/5/2020    IR NONTUNNELED VASCULAR CATHETER 8/5/2020 Katalina Barba MD [facility] SPECIAL PROCEDURES    KNEE ARTHROSCOPY Left 09/17/99    MITRAL VALVE REPLACEMENT  03/18/2016    bioprosthetic     NASAL SEPTUM SURGERY      OTHER SURGICAL HISTORY  3/18/16    Aortic root Enlargement    OTHER SURGICAL HISTORY  3/18/16    ASD Closure    OTHER SURGICAL HISTORY Right 01/09/2017    AV fistula creation, wrist    OTHER SURGICAL HISTORY Right 08/29/2017    ligation of collateral branch of av fistula right wrist    OTHER SURGICAL HISTORY  04/05/2018    FISTULAGRAM WITH DR. Billie Baumgarten    [place] EGD TRANSORAL BIOPSY SINGLE/MULTIPLE N/A 10/17/2017    EGD BIOPSY performed by Lolly Gavin MD at [address] [facility] [address] ANGIOACCESS AV FISTULA Right 8/29/2017     RIGHT  LOWER ARM AV FISTULA  LIGATION OF AQUIRED BRANCHES X2 performed by Amena Campuzano DO at [address]  3/18/16    median    VASCULAR SURGERY  12/06/2018    Right arm fistulagram,  PTA cephalic vein stenosis  /  DR Ashley Thomas       SOCIAL HISTORY: Tobacco:   Social History     Tobacco Use   Smoking Status Former Smoker    Packs/day: 2.00    Years: 25.00    Pack years: 50.00    Types: Cigarettes    Start date: 3/17/1955   [facility] Quit date: 1980    Years since quittin.5   Smokeless Tobacco Never Used     Alcohol:   Social History     Substance and Sexual Activity   Alcohol Use No    Alcohol/week: 0.0 standard drinks    Comment: 1-2 drinks per year     Drugs:   Social History     Substance and Sexual Activity   Drug Use No       FAMILY HISTORY: family history includes Alzheimer's Disease in his father; Diabetes in his maternal grandmother and mother; High Blood Pressure in his sister; Suman Banerjee in his mother. REVIEW OF SYSTEMS:     Please see history of chief complaint above; otherwise no new problems with respect to General, HEENT, Cardiovascular, Respiratory, Gastrointestinal, Genitourinary, Endocrinologic, Musculoskeletal, or Neuropsychiatric complaints. PHYSICAL EXAMINATION:    Vitals: Temp: 98.3 deg F. Pulse: 84. Resp: 18. BP: 134/61. General: A 67 y.o.  male. Alert and oriented to person and place but not time. He does not appear to be in any acute distress. Skin: Skin color, texture, turgor normal. No lesions. There was an erythematous rash on the left side of his abdomen. HEENT/Neck: Essentially unremarkable  Chest: There was a chest catheter in place  Lungs: Normal - CTA without rales, rhonchi, or wheezing. Heart: regular rate and rhythm, S1, S2 normal, no murmur, click, rub or gallop No S3 or S4. Abdomen: Obese, soft, slightly distended, non-tender, normal active bowel sounds, no masses palpated and no hepatosplenomegaly. There was a moderate-sized epigastric hernia present  Extremities: No clubbing, cyanosis, edema  Neurologic: cranial nerves II-XII are grossly intact    ASSESSMENT:     Diagnosis Orders   1. Rash, skin     2.  Controlled type 2 diabetes mellitus with chronic kidney disease on chronic dialysis, with long-term current use of insulin ([address]     3. Controlled type 2 diabetes mellitus with diabetic polyneuropathy, with long-term current use of insulin ([address]     4. Long-term insulin use ([address]     5. Essential hypertension     6. Mixed hyperlipidemia     7. ESRD (end stage renal disease) on dialysis ([address]     8. Dialysis patient ([address]     9. Secondary hyperparathyroidism of renal origin ([address]     10. Anemia in chronic kidney disease, on chronic dialysis ([address]     11. Paroxysmal atrial fibrillation (HCC)     12. Severe aortic stenosis     13. Severe mitral valve stenosis     14. Vascular dementia without behavioral disturbance ([address]     15. Obstructive sleep apnea     16. Major depressive disorder with single episode, in partial remission ([address]     17. Traumatic amputation of thumb, left, sequela ([address]     18. Class 1 obesity with serious comorbidity and body mass index (BMI) of 33.0 to 33.9 in adult, unspecified obesity type           PLAN:    1. Continue current treatment  2. Nursing home record reviewed and updates summarized and entered into electronic record  3. Nursing home blood sugar logs and diabetic medication administration reviewed. No changes. 4. Triamcinolone 0.1% cream, apply to rash twice daily for 10 days  5. See nursing home orders and MAR.         Electronically signed by Stefano Levy DO on 6/29/2021 at 2:30 AM  Internal Medicine

## 2021-07-15 NOTE — PROGRESS NOTES
07/15/21  Mauro Andujar  1945    Patient Resident of [facility]    Chief Complaint  1. Controlled type 2 diabetes mellitus with chronic kidney disease on chronic dialysis, with long-term current use of insulin ([address]    2. Essential hypertension    3. Anxiety        HPI:  58-year-old patient with progressive dementia, hypertension, anxiety, hypertension, chronic kidney disease, on dialysis being seen for concerns of increased anxiety. Patient previously was on Xanax prior to previous hospitalization nursing staff asking to reinitiate. States he has been more anxious recently. Also noted that his blood sugars have been running low. Occasionally getting down into the 50s. Review of blood sugar show significant lows in the morning. He has been getting snacks at night.   He does continue on Lantus along with a NovoLog sliding scale      Allergies   Allergen Reactions    Percocet [Oxycodone-Acetaminophen] Itching and Rash     Also causes shaking    Ampicillin Rash       Past Medical History:   Diagnosis Date    Acute blood loss anemia 3/11/2019    Acute on chronic diastolic CHF (congestive heart failure) ([address] 12/2/2019    Anemia in chronic kidney disease (CKD) 4/27/2016    Arthritis     Bacteremia 11/11/2016    Benign prostatic hyperplasia without lower urinary tract symptoms     Bleeding from wound 4/2/2019    BMI 40.0-44.9, adult ([address] 3/19/2018    Cellulitis 3/21/2019    Cellulitis of left lower extremity     Chronic cerebral ischemia 1/3/2017    Closed fracture of forearm 8/13/2013    Broken lft forearm     Controlled type 2 diabetes mellitus with chronic kidney disease on chronic dialysis, with long-term current use of insulin ([address]     Controlled type 2 diabetes mellitus with diabetic polyneuropathy, with long-term current use of insulin ([address] 10/7/2018    Decubital ulcer     NON OPEN BUTTOCKS    Dementia ([address]     memory problems    Dialysis patient ([address] 01/03/2017    Goes [facility], Sat in [place] Difficult intravenous access     HAS NEEDED PICC TEAM IN THE PAST    Difficulty walking     WHEELCHAIR BOUND-PIVOTS WITH ASSIST O F 2    DJD (degenerative joint disease) of knee     Elbow, forearm, and wrist, abrasion or friction burn, without mention of infection 8/13/2013    Abrasions lft forearm     Encephalopathy acute 4/27/2016    Encounter regarding vascular access for dialysis for ESRD ([address] 2/2/2017    ESRD (end stage renal disease) on dialysis ([address] 1/17/2017    Forgetfulness     HAS SOME SHORT TERM MEMORY LOSS, QUYEN-WIFE IS LEGAL GUARDIAN    H/O transesophageal echocardiography ([facility]) for monitoring     Hemodialysis patient ([address] 11/11/2016    tues-thurs-sat [place]---[facility]ESEMIUS.  TUNNELED CATHETER RT UPPER CHEST    Hyperlipidemia 1990    on Meds    Hypertension 1990    on Meds    Intercritical gout     on Meds    Joint pain, knee 01/13/2017    baldo knee synvisc-1 injections    Long-term insulin use ([address] 1/17/2017    Major depressive disorder with single episode, in partial remission ([address] 1/3/2017    Mobility impaired     Morbid obesity ([address]     Muscle weakness     GRNERALIZED    Obesity     Obstructive sleep apnea     HEATHER on CPAP     On CPAP-TO BRING DOS    Paroxysmal atrial fibrillation ([address] 9/6/2017    Pneumonia 12/2/2019    Respiratory failure ([address] 03/2016    with open heart surgery, trached x 5 months    S/P cardiac cath     Seborrhea     Severe aortic stenosis 3/9/2016    Severe mitral valve stenosis 3/9/2016    Severe sepsis ([address] 4/3/2016    Skin rash     ABD FOLDS    Stasis dermatitis     Thyroid disease     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 lft thumb     Traumatic hemorrhagic shock ([address] 3/13/2019    Venous stasis dermatitis     Wears glasses     Wheelchair bound     pivots with 2 assists       Past Surgical History:   Procedure Laterality Date    AORTIC VALVE REPLACEMENT  03/18/2016    bioprosthetic    ARM SURGERY Left 1990 [address] VENOUS CATHETER Right 02/21/2018    DIALYSIS CATHETER Dr Tess Parra    COLONOSCOPY  11/19/09    COLONOSCOPY N/A 10/12/2018    COLONOSCOPY WITH BIOPSY performed by Papo Hernandez DO at [address] [facility] COLONOSCOPY N/A 6/15/2021    COLONOSCOPY FLEXIBLE W/ DECOMPRESSION performed by Alok Bell MD at [address] [facility] Right 3/10/2019    WASHOUT RIGHT LOWER EXTREMITY WITH WOUND VAC EXCHANGE performed by Bella Metz MD at [address] Right 3/8/2019    RIGHT LOWER EXTREMITY EXPLORATION, HEMATOMA EVACUATION, WOUND VAC PLACEMENT performed by Bella Metz MD at [address] (x10-12 surgeries)    several surgeries on left arm    IR NONTUNNELED VASCULAR CATHETER  8/5/2020    IR NONTUNNELED VASCULAR CATHETER 8/5/2020 Estiven Jacobson MD [facility] SPECIAL PROCEDURES    KNEE ARTHROSCOPY Left 09/17/99    MITRAL VALVE REPLACEMENT  03/18/2016    bioprosthetic     NASAL SEPTUM SURGERY      OTHER SURGICAL HISTORY  3/18/16    Aortic root Enlargement    OTHER SURGICAL HISTORY  3/18/16    ASD Closure    OTHER SURGICAL HISTORY Right 01/09/2017    AV fistula creation, wrist    OTHER SURGICAL HISTORY Right 08/29/2017    ligation of collateral branch of av fistula right wrist    OTHER SURGICAL HISTORY  04/05/2018    FISTULAGRAM WITH DR. Noel Hidden    [place] EGD TRANSORAL BIOPSY SINGLE/MULTIPLE N/A 10/17/2017    EGD BIOPSY performed by Gonzalez Rosenberg MD at [address] [facility] [address] ANGIOACCESS AV FISTULA Right 8/29/2017     RIGHT  LOWER ARM AV FISTULA  LIGATION OF AQUIRED BRANCHES X2 performed by Megha Doty DO at [address]  3/18/16    median    VASCULAR SURGERY  12/06/2018    Right arm fistulagram,  PTA cephalic vein stenosis  /  [facility] [place]       Medications as per [facility] Chart /reviewed     Social History     Socioeconomic History    Marital status:      Spouse name: Bhupinder Booth Number of children: 2    Years of education: Not on file    Highest education level: Not on file   Occupational History    Occupation: Retired      Employer: [employer] Use    Smoking status: Former Smoker     Packs/day: 2.00     Years: 25.00     Pack years: 50.00     Types: Cigarettes     Start date: 3/17/1955     Quit date: 1980     Years since quittin.5    Smokeless tobacco: Never Used   Vaping Use    Vaping Use: Never used   Substance and Sexual Activity    Alcohol use: No     Alcohol/week: 0.0 standard drinks     Comment: 1-2 drinks per year    Drug use: No    Sexual activity: Never   Other Topics Concern    Not on file   Social History Narrative    Not on file     Social Determinants of Health     Financial Resource Strain:     Difficulty of Paying Living Expenses:    Food Insecurity:     Worried About Running Out of Food in the Last Year:     [address] in the Last Year:    Transportation Needs:     Lack of Transportation (Medical):  Lack of Transportation (Non-Medical):    Physical Activity:     Days of Exercise per Week:     Minutes of Exercise per Session:    Stress:     Feeling of Stress :    Social Connections:     Frequency of Communication with Friends and Family:     Frequency of Social Gatherings with Friends and Family:     Attends Sabianism Services:     Active Member of Clubs or Organizations:     Attends Club or Organization Meetings:     Marital Status:    Intimate Partner Violence:     Fear of Current or Ex-Partner:     Emotionally Abused:     Physically Abused:     Sexually Abused:        Review of Systems   Unable to perform ROS: Dementia       Physical Exam  Vitals and nursing note reviewed. Constitutional:       General: He is not in acute distress. Appearance: He is well-developed. He is not diaphoretic.    HENT:      Head: Normocephalic and atraumatic. Right Ear: External ear normal.      Left Ear: External ear normal.   Eyes:      General: Lids are normal.         Right eye: No discharge. Left eye: No discharge. Conjunctiva/sclera: Conjunctivae normal.      Pupils: Pupils are equal, round, and reactive to light. Neck:      Trachea: No tracheal deviation. Cardiovascular:      Rate and Rhythm: Normal rate and regular rhythm. Heart sounds: Normal heart sounds. No murmur heard. No friction rub. No gallop. Pulmonary:      Effort: Pulmonary effort is normal. No accessory muscle usage or respiratory distress. Breath sounds: Normal breath sounds. No wheezing or rales. Abdominal:      General: Bowel sounds are normal. There is no distension. Palpations: Abdomen is soft. Abdomen is not rigid. Tenderness: There is no abdominal tenderness. Musculoskeletal:         General: Normal range of motion. Cervical back: Normal range of motion and neck supple. No edema or erythema. Skin:     General: Skin is warm and dry. Capillary Refill: Capillary refill takes less than 2 seconds. Coloration: Skin is not pale. Findings: No erythema or rash. Neurological:      General: No focal deficit present. Mental Status: He is alert. Mental status is at baseline. Cranial Nerves: No cranial nerve deficit. Motor: No weakness. Coordination: Coordination normal.   Psychiatric:         Mood and Affect: Mood normal.         Behavior: Behavior normal.         Vital Signs: Temperature 97.5 °F, blood pressure 146/76, pulse 83, respirations 18, SPO2 98% on room air    Assessment:  1. Controlled type 2 diabetes mellitus with chronic kidney disease on chronic dialysis, with long-term current use of insulin (HCC)  We will have them decrease his Lantus down to 20 units nightly continue on the NovoLog sliding scale.   Make sure he is getting a protein snack at at bedtime and report his blood sugars in 1 week.  Sooner with concerns prior    2. Essential hypertension  Mildly elevated today. Followed by nephrology    3. Anxiety  Reinstate his Xanax twice daily        Plan:  As noted above. Follow up for routine visit. Call sooner with concerns prior.     Electronically signed by SHAMEKA Osborne CNP on 7/15/2021 at 2:33 PM

## 2021-07-19 NOTE — PROGRESS NOTES
Patient's case discussed with nurse Buck Maddox. States this morning she thought she seen a tinge of blood in his stool. Patient was recently admitted to [facility] with a GI bleed June 2021. Patient underwent CT of abdomen which showed large amount of stool. Underwent colonoscopy which showed small area of ulcerated mucosa near diverticuli in the sigmoid colon with large blood clot which was felt likely the source of the bleeding. Patient had his Eliquis on hold for 5 days. That has since been resumed. Diet was advanced. Per note it was recommended he follow-up with GI in 1 to 2 weeks which has not been completed along with a sigmoidoscopy in 2 months. We will have them hold his Eliquis for the next 3 days stool for occult blood, most recent hemoglobin stable at 10 from the 15th. Patient is currently at dialysis unable to assess. There are to call over to dialysis to see if they can run a CBC on him while he is there. They are going to get him set back up with gastroenterology.

## 2021-08-11 NOTE — TELEPHONE ENCOUNTER
Jaylan sent a fax stating that Drizalma sprinkles are on back order. Needs a substitute for this medication.     Tonya Keyes was called and substitutions available are the following: Cymbalta

## 2021-08-19 NOTE — PROGRESS NOTES
[address]  [facility] (formerly [facility]) [address]  Dept: [phone]  Loc: [phone]    Subjective: The patient is a 67y.o. year old, , male is in the office for a follow up with history of aortic valve replacement mitral valve repair and ASD repair with Dacron patch. Patient is from nursing home on [facility]. According to the wife patient does not do any activity tear for long time. Patient is sleepy. He viridiana any chest pain or discomfort. No orthopnea or PND. Viridiana any palpitation, dizziness or syncope. He is completely asymptomatic from cardiac stand point.     Past Medical History:   has a past medical history of Acute blood loss anemia, Acute on chronic diastolic CHF (congestive heart failure) ([address] Anemia in chronic kidney disease (CKD), Arthritis, Bacteremia, Benign prostatic hyperplasia without lower urinary tract symptoms, Bleeding from wound, BMI 40.0-44.9, adult ([address] Cellulitis, Cellulitis of left lower extremity, Chronic cerebral ischemia, Closed fracture of forearm, Controlled type 2 diabetes mellitus with chronic kidney disease on chronic dialysis, with long-term current use of insulin ([address] Controlled type 2 diabetes mellitus with diabetic polyneuropathy, with long-term current use of insulin ([address] Decubital ulcer, Dementia ([address] Dialysis patient ([address] Difficult intravenous access, Difficulty walking, DJD (degenerative joint disease) of knee, Elbow, forearm, and wrist, abrasion or friction burn, without mention of infection, Encephalopathy acute, Encounter regarding vascular access for dialysis for ESRD ([address] ESRD (end stage renal disease) on dialysis ([address] Forgetfulness, H/O transesophageal echocardiography (AMADEO) for monitoring, Hemodialysis patient ([address] Hyperlipidemia, Hypertension, Intercritical gout, Joint pain, knee, Long-term insulin use ([address] Major depressive disorder with single episode, in partial remission ([address] Mobility impaired, Morbid obesity ([address] Muscle weakness, Obesity, Obstructive sleep apnea, HEATHER on CPAP, Paroxysmal atrial fibrillation ([address] Pneumonia, Respiratory failure ([address] S/P cardiac cath, Seborrhea, Severe aortic stenosis, Severe mitral valve stenosis, Severe sepsis (HCC), Skin rash, Stasis dermatitis, Thyroid disease, Traumatic amputation of thumb, Traumatic hemorrhagic shock ([address] Venous stasis dermatitis, Wears glasses, and Wheelchair bound. Past Surgical History:   has a past surgical history that includes Colonoscopy (11/19/09); Knee arthroscopy (Left, 09/17/99); Hand surgery (Left, 1990 (x10-12 surgeries)); Arm Surgery (Left, 1990); Cardiac catheterization; Nasal septum surgery; Sternotomy (3/18/16); Aortic valve replacement (03/18/2016); Mitral valve replacement (03/18/2016); other surgical history (3/18/16); other surgical history (3/18/16); other surgical history (Right, 01/09/2017); other surgical history (Right, 08/29/2017); pr ligatn angioaccess av fistula (Right, 8/29/2017); pr egd transoral biopsy single/multiple (N/A, 10/17/2017); central venous catheter (Right, 02/21/2018); Colonoscopy (N/A, 10/12/2018); other surgical history (04/05/2018); vascular surgery (12/06/2018); EXPLORATION OF WOUND OF EXTREMITY (Right, 3/10/2019); EXPLORATION OF WOUND OF EXTREMITY (Right, 3/8/2019); IR NONTUNNELED VASCULAR CATHETER > 5 YEARS (8/5/2020); and Colonoscopy (N/A, 6/15/2021). Home Medications:  Prior to Admission medications    Medication Sig Start Date End Date Taking? Authorizing Provider   ALPTEEZowin Orta) 0.5 MG tablet Take 0.5 mg by mouth 2 times daily.   8/7/21  Yes Historical Provider, MD   DULoxetine (CYMBALTA) 60 MG extended release capsule Take 1 capsule by mouth daily 8/11/21  Yes Octavio Mcarthur DO   apixaban (ELIQUIS) 5 MG TABS tablet Take 1 tablet by mouth 2 times daily Resume taking medication on 6/20/2021 6/16/21  Yes Ruba Mcguire MD day).   Yes Historical Provider, MD   aspirin 81 MG tablet Take 1 tablet by mouth daily 4/8/19  Yes Roxane Anderson MD   Multiple Vitamins-Minerals (THERAPEUTIC MULTIVITAMIN-MINERALS) tablet Take 1 tablet by mouth daily   Yes Historical Provider, MD   atorvastatin (LIPITOR) 40 MG tablet Take 1 tablet by mouth nightly 3/24/19  Yes Tremaine Fulton MD   midodrine (PROAMATINE) 10 MG tablet Take 1 tablet by mouth 3 times daily  Patient taking differently: Take 10 mg by mouth 3 times daily Hold if SBP over 150 3/19/19  Yes Tremaine Fulton MD   folic acid (FOLVITE) 1 MG tablet Take 1 tablet by mouth daily 3/19/19  Yes Tremaine Fulton MD   insulin glargine (LANTUS) 100 UNIT/ML injection vial Inject 25 Units into the skin 2 times daily 3/19/19  Yes Tremaine Fulton MD   Misc. Devices [facility]) MISC Use as directed 12/12/18  Yes Octavio Mcarthur DO   Amino Acids-Protein Hydrolys (PRO-STAT PO) Take 30 mLs by mouth 3 times daily (Prostat SF) for wound healing. Yes Historical Provider, MD   donepezil (ARICEPT) 5 MG tablet Take 5 mg by mouth every evening 10/26/16  Yes Historical Provider, MD   levothyroxine (SYNTHROID) 25 MCG tablet Take 1 tablet by mouth Daily 10/26/16  Yes SHAMEKA Mcghee CNP   fluticasone (FLONASE) 50 MCG/ACT nasal spray 2 sprays by Nasal route daily 10/24/16  Yes Octavio Mcarthur DO   magnesium hydroxide (MILK OF MAGNESIA) 400 MG/5ML suspension Take 30 mLs by mouth daily as needed for Constipation 3/29/16  Yes Cosme Kirkland MD       Allergies:  Percocet [oxycodone-acetaminophen] and Ampicillin    Social History:   reports that he quit smoking about 41 years ago. His smoking use included cigarettes. He started smoking about 66 years ago. He has a 50.00 pack-year smoking history. He has never used smokeless tobacco. He reports that he does not drink alcohol and does not use drugs. Review of Systems:  · Constitutional: there has been no unanticipated weight loss. There's been No change in energy level, No change in activity level. · Eyes: No visual changes or diplopia. No scleral icterus. · ENT: No Headaches, hearing loss or vertigo. No mouth sores or sore throat. · Cardiovascular: As above. · Respiratory: No SOB, cough or hemoptysis. · Gastrointestinal: No abdominal pain, appetite loss, blood in stools. No change in bowel or bladder habits. · Genitourinary: No dysuria, trouble voiding, or hematuria. · Musculoskeletal:  No gait disturbance, No weakness or joint complaints. · Integumentary: No rash or pruritis. · Psychiatric: No anxiety, or depression. · Hematologic/Lymphatic: No abnormal bruising or bleeding, blood clots or swollen lymph nodes. · Allergic/Immunologic: No nasal congestion or hives. Physical Exam:  BP (!) 146/64   Pulse 62   Resp 16   SpO2 100%     Constitutional and General Appearance: alert, cooperative, no distress and appears stated age  HEENT: PERRL, no cervical lymphadenopathy. No masses palpable. Normal oral mucosa  Respiratory:  · Normal excursion and expansion without use of accessory muscles  · Resp Auscultation: Good respiratory effort. No for increased work of breathing. On auscultation: clear to auscultation bilaterally  Cardiovascular:  · The apical impulse is not displaced  · Heart tones are crisp and normal. regular S1 and S2.  · Jugular venous pulsation Normal  · The carotid upstroke is normal in amplitude and contour without delay or bruit  · Peripheral pulses are symmetrical and full   Abdomen:   · No masses or tenderness  · Bowel sounds present  Extremities:  ·  No Cyanosis or Clubbing  ·  Lower extremity edema: No  ·  Skin: Warm and dry    Cardiac Data:  EKG: NSR ist HB     Labs:     CBC: No results for input(s): WBC, HGB, HCT, PLT in the last 72 hours. BMP: No results for input(s): NA, K, CO2, BUN, CREATININE, LABGLOM, GLUCOSE in the last 72 hours.   PT/INR: No results for input(s): PROTIME, INR in the last 72 ON STATIN, NEEDS TO WATCH LIPID PROFILE AND LFT'S    CKD on 3 days dialysis. DISCUSSED IN DETAILS ABOUT RISK MODIFICATION    RETURN VISIT IN 6 MONTHS, IF ANY SYMPTOM CHANGE PATIENT ADVISED TO GO TO THE EMERGENCY ROOM.           Giancarlo Webster MD, MD  [facility]           [phone]

## 2021-08-24 NOTE — TELEPHONE ENCOUNTER
Last appt: Visit date not found  Next appt: 8/26/2021    Nurse Priyank Turner called in asking for an order to top Dialysis and put patient on Hospice. They will also need an order for Morphine since the patient will be in a lot of pain.   Fax the order to [facility]

## 2021-08-24 NOTE — TELEPHONE ENCOUNTER
Please clarify - is patient/family/POA requesting hospice? I have not seen this patient before, would need additional details regarding his status/decline.  Can have hospice consult at this time, but would advise to discuss with Dr. Laura Sierra or Apoorva Kolb regarding stopping dialysis as they are more familiar with patient

## 2021-08-29 NOTE — PROGRESS NOTES
08/26/21  Kelton Hippo  1945    Patient Resident of [facility]    Chief Complaint  1. ESRD (end stage renal disease) on dialysis ([facility]    2. Dementia without behavioral disturbance, unspecified dementia type ([address]    3. Gastrointestinal hemorrhage, unspecified gastrointestinal hemorrhage type        HPI:  48-year-old patient with progressive dementia end-stage renal failure and a GI hemorrhage being seen at the request of the wife. Patient has had a progressive decline. Did have a long discussion yesterday with the wife regarding end-of-life care versus hospice. States she has reached out to the sons and are in agreement of meeting with hospice. Currently is doing dialysis 3 days a week. Has noted significant mentation decline along with ongoing decline in his H&H. Patient continues to have intermittent bloody stools. Previously recommended to have a follow-up colonoscopy versus sigmoidoscopy as he had rectal bleeding however wife has declined any further scopes.       Allergies   Allergen Reactions    Percocet [Oxycodone-Acetaminophen] Itching and Rash     Also causes shaking    Ampicillin Rash       Past Medical History:   Diagnosis Date    Acute blood loss anemia 3/11/2019    Acute on chronic diastolic CHF (congestive heart failure) ([facility] 12/2/2019    Anemia in chronic kidney disease (CKD) 4/27/2016    Arthritis     Bacteremia 11/11/2016    Benign prostatic hyperplasia without lower urinary tract symptoms     Bleeding from wound 4/2/2019    BMI 40.0-44.9, adult ([address] 3/19/2018    Cellulitis 3/21/2019    Cellulitis of left lower extremity     Chronic cerebral ischemia 1/3/2017    Closed fracture of forearm 8/13/2013    Broken lft forearm     Controlled type 2 diabetes mellitus with chronic kidney disease on chronic dialysis, with long-term current use of insulin ([facility]     Controlled type 2 diabetes mellitus with diabetic polyneuropathy, with long-term current use of insulin ([address] 10/7/2018    Decubital ulcer     NON OPEN BUTTOCKS    Dementia ([address]     memory problems    Dialysis patient ([address] 01/03/2017    Goes Tue, [place], Sat in [place] Difficult intravenous access     HAS NEEDED PICC TEAM IN THE PAST    Difficulty walking     WHEELCHAIR BOUND-PIVOTS WITH ASSIST O F 2    DJD (degenerative joint disease) of knee     Elbow, forearm, and wrist, abrasion or friction burn, without mention of infection 8/13/2013    Abrasions lft forearm     Encephalopathy acute 4/27/2016    Encounter regarding vascular access for dialysis for ESRD ([address] 2/2/2017    ESRD (end stage renal disease) on dialysis ([address] 1/17/2017    Forgetfulness     HAS SOME SHORT TERM MEMORY LOSS, QUYEN-WIFE IS LEGAL GUARDIAN    H/O transesophageal echocardiography (AMADEO) for monitoring     Hemodialysis patient ([address] 11/11/2016    tues-thurs-sat [place]---[facility].  TUNNELED CATHETER RT UPPER CHEST    Hyperlipidemia 1990    on Meds    Hypertension 1990    on Meds    Intercritical gout     on Meds    Joint pain, knee 01/13/2017    baldo knee synvisc-1 injections    Long-term insulin use ([address] 1/17/2017    Major depressive disorder with single episode, in partial remission ([address] 1/3/2017    Mobility impaired     Morbid obesity ([address]     Muscle weakness     GRNERALIZED    Obesity     Obstructive sleep apnea     HEATHER on CPAP     On CPAP-TO BRING DOS    Paroxysmal atrial fibrillation ([address] 9/6/2017    Pneumonia 12/2/2019    Respiratory failure ([address] 03/2016    with open heart surgery, trached x 5 months    S/P cardiac cath     Seborrhea     Severe aortic stenosis 3/9/2016    Severe mitral valve stenosis 3/9/2016    Severe sepsis ([address] 4/3/2016    Skin rash     ABD FOLDS    Stasis dermatitis     Thyroid disease     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 lft thumb     Traumatic hemorrhagic shock ([address] 3/13/2019    Venous stasis dermatitis     Wears glasses     Wheelchair bound pivots with 2 assists       Past Surgical History:   Procedure Laterality Date    AORTIC VALVE REPLACEMENT  03/18/2016    bioprosthetic    ARM SURGERY Left 1990    CARDIAC CATHETERIZATION      CENTRAL VENOUS CATHETER Right 02/21/2018    DIALYSIS CATHETER Dr Maribeth Allen    COLONOSCOPY  11/19/09    COLONOSCOPY N/A 10/12/2018    COLONOSCOPY WITH BIOPSY performed by Tatyana Bruce DO at [address] [facility] COLONOSCOPY N/A 6/15/2021    COLONOSCOPY FLEXIBLE W/ DECOMPRESSION performed by Iman Biggs MD at [address] [facility] Right 3/10/2019    WASHOUT RIGHT LOWER EXTREMITY WITH WOUND VAC EXCHANGE performed by Korey Mello MD at [address] Right 3/8/2019    RIGHT LOWER EXTREMITY EXPLORATION, HEMATOMA EVACUATION, WOUND VAC PLACEMENT performed by Korey Mello MD at [address] (x10-12 surgeries)    several surgeries on left arm    IR NONTUNNELED VASCULAR CATHETER  8/5/2020    IR NONTUNNELED VASCULAR CATHETER 8/5/2020 Grace Yee MD [facility]    KNEE ARTHROSCOPY Left 09/17/99    MITRAL VALVE REPLACEMENT  03/18/2016    bioprosthetic     NASAL SEPTUM SURGERY      OTHER SURGICAL HISTORY  3/18/16    Aortic root Enlargement    OTHER SURGICAL HISTORY  3/18/16    ASD Closure    OTHER SURGICAL HISTORY Right 01/09/2017    AV fistula creation, wrist    OTHER SURGICAL HISTORY Right 08/29/2017    ligation of collateral branch of av fistula right wrist    OTHER SURGICAL HISTORY  04/05/2018    FISTULAGRAM WITH DR. Janae Samuels    [place] EGD TRANSORAL BIOPSY SINGLE/MULTIPLE N/A 10/17/2017    EGD BIOPSY performed by Abdulaziz Cesar MD at [address] [facility] [address] ANGIOACCESS AV FISTULA Right 8/29/2017     RIGHT  LOWER ARM AV FISTULA  LIGATION OF AQUIRED BRANCHES X2 performed by Torres Tracy DO at [address]  3/18/16    median    VASCULAR SURGERY  12/06/2018    Right arm fistulagram,  PTA cephalic vein stenosis  /  DR Lynn Hendrickson       Medications [facility]reviewed     Social History     Socioeconomic History    Marital status:      Spouse name: Michelle Boggs Number of children: 2    Years of education: Not on file    Highest education level: Not on file   Occupational History    Occupation: Retired      Employer: [employer]   Tobacco Use    Smoking status: Former Smoker     Packs/day: 2.00     Years: 25.00     Pack years: 50.00     Types: Cigarettes     Start date: 3/17/1955     Quit date: 1980     Years since quittin.6    Smokeless tobacco: Never Used   Vaping Use    Vaping Use: Never used   Substance and Sexual Activity    Alcohol use: No     Alcohol/week: 0.0 standard drinks     Comment: 1-2 drinks per year    Drug use: No    Sexual activity: Never   Other Topics Concern    Not on file   Social History Narrative    Not on file     Social Determinants of Health     Financial Resource Strain:     Difficulty of Paying Living Expenses:    Food Insecurity:     Worried About Running Out of Food in the Last Year:     [address] in the Last Year:    Transportation Needs:     Lack of Transportation (Medical):  Lack of Transportation (Non-Medical):    Physical Activity:     Days of Exercise per Week:     Minutes of Exercise per Session:    Stress:     Feeling of Stress :    Social Connections:     Frequency of Communication with Friends and Family:     Frequency of Social Gatherings with Friends and Family:     Attends Taoism Services:     Active Member of Clubs or Organizations:     Attends Club or Organization Meetings:     Marital Status:    Intimate Partner Violence:     Fear of Current or Ex-Partner:     Emotionally Abused:     Physically Abused:     Sexually Abused:        Review of Systems   Unable to perform ROS: Dementia       Physical Exam  Vitals and nursing note reviewed.    Constitutional: General: He is not in acute distress. Appearance: He is well-developed. He is not diaphoretic. HENT:      Head: Normocephalic and atraumatic. Right Ear: External ear normal.      Left Ear: External ear normal.   Eyes:      General: Lids are normal.         Right eye: No discharge. Left eye: No discharge. Conjunctiva/sclera: Conjunctivae normal.      Pupils: Pupils are equal, round, and reactive to light. Neck:      Trachea: No tracheal deviation. Cardiovascular:      Rate and Rhythm: Normal rate. Rhythm irregular. Heart sounds: Normal heart sounds. No murmur heard. No friction rub. No gallop. Pulmonary:      Effort: Pulmonary effort is normal. No accessory muscle usage or respiratory distress. Breath sounds: Normal breath sounds. No wheezing or rales. Abdominal:      General: Bowel sounds are normal. There is no distension. Palpations: Abdomen is soft. Abdomen is not rigid. Tenderness: There is no abdominal tenderness. Comments: Obese   Musculoskeletal:         General: Normal range of motion. Cervical back: Normal range of motion and neck supple. No edema or erythema. Right lower leg: No edema. Left lower leg: No edema. Skin:     General: Skin is warm and dry. Capillary Refill: Capillary refill takes less than 2 seconds. Coloration: Skin is not pale. Findings: No erythema or rash. Neurological:      General: No focal deficit present. Mental Status: He is alert. Mental status is at baseline. Cranial Nerves: No cranial nerve deficit. Motor: No weakness. Coordination: Coordination normal.      Comments: Patient asleep quietly in bed. Does open his eyes to verbal stimuli.   Nonsensible communication   Psychiatric:         Mood and Affect: Mood normal.         Behavior: Behavior normal.         Vital Signs: Temperature 98.3 °F, blood pressure 138/70, pulse 74, respirations 16, SPO2 96% on room air    Assessment:  1. ESRD (end stage renal disease) on dialysis ([facility]  2. Dementia without behavioral disturbance, unspecified dementia type ([facility]  3. Gastrointestinal hemorrhage, unspecified gastrointestinal hemorrhage type  Patient with overall physical and mental decline. Patient with recurrent bloody stools. Wife, Shania Benoit declining any further scopes or surgical procedures. Long discussion held with wife questions answered. Wishing for hospice consult. Orders were provided for nursing staff. Hospice to meet with wife and sons later on today        Plan:  As noted above. Follow up for routine visit. Call sooner with concerns prior.     Electronically signed by SHAMEKA Cuadra CNP on 8/29/2021 at 2:01 PM

## 2021-08-31 NOTE — PROGRESS NOTES
Dialysis patient ([address] 01/03/2017    Goes Tue, Thurs, Sat in [place] Difficult intravenous access     HAS NEEDED PICC TEAM IN THE PAST    Difficulty walking     WHEELCHAIR BOUND-PIVOTS WITH ASSIST O F 2    DJD (degenerative joint disease) of knee     Elbow, forearm, and wrist, abrasion or friction burn, without mention of infection 8/13/2013    Abrasions lft forearm     Encephalopathy acute 4/27/2016    Encounter regarding vascular access for dialysis for ESRD ([address] 2/2/2017    ESRD (end stage renal disease) on dialysis ([address] 1/17/2017    Forgetfulness     HAS SOME SHORT TERM MEMORY LOSS, QUYEN-WIFE IS LEGAL GUARDIAN    H/O transesophageal echocardiography (AMADEO) for monitoring     Hemodialysis patient ([address] 11/11/2016    [place]ues-thurs-[place].  TUNNELED CATHETER RT UPPER CHEST    Hyperlipidemia 1990    on Meds    Hypertension 1990    on Meds    Intercritical gout     on Meds    Joint pain, knee 01/13/2017    baldo knee synvisc-1 injections    Long-term insulin use ([address] 1/17/2017    Major depressive disorder with single episode, in partial remission ([address] 1/3/2017    Mobility impaired     Morbid obesity ([address]     Muscle weakness     GRNERALIZED    Obesity     Obstructive sleep apnea     HEATHER on CPAP     On CPAP-TO BRING DOS    Paroxysmal atrial fibrillation ([address] 9/6/2017    Pneumonia 12/2/2019    Respiratory failure ([address] 03/2016    with open heart surgery, trached x 5 months    S/P cardiac cath     Seborrhea     Severe aortic stenosis 3/9/2016    Severe mitral valve stenosis 3/9/2016    Severe sepsis ([address] 4/3/2016    Skin rash     ABD FOLDS    Stasis dermatitis     Thyroid disease     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 lft thumb     Traumatic hemorrhagic shock ([address] 3/13/2019    Venous stasis dermatitis     Wears glasses     Wheelchair bound     pivots with 2 assists       PAST SURGICAL HISTORY:   Past Surgical History:   Procedure Laterality Date  AORTIC VALVE REPLACEMENT  03/18/2016    bioprosthetic    ARM SURGERY Left 1990    CARDIAC CATHETERIZATION      CENTRAL VENOUS CATHETER Right 02/21/2018    DIALYSIS CATHETER Dr Addie Guo COLONOSCOPY  11/19/09    COLONOSCOPY N/A 10/12/2018    COLONOSCOPY WITH BIOPSY performed by Lillian Dailey DO at [facility] COLONOSCOPY N/A 6/15/2021    COLONOSCOPY FLEXIBLE W/ DECOMPRESSION performed by Mahin Horner MD at [address] [facility] [facility] Right 3/10/2019    WASHOUT RIGHT LOWER EXTREMITY WITH WOUND VAC EXCHANGE performed by Sonia Love MD at [address] Right 3/8/2019    RIGHT LOWER EXTREMITY EXPLORATION, HEMATOMA EVACUATION, WOUND VAC PLACEMENT performed by Sonia Love MD at [address] (x10-12 surgeries)    several surgeries on left arm    IR NONTUNNELED VASCULAR CATHETER  8/5/2020    IR NONTUNNELED VASCULAR CATHETER 8/5/2020 Ofelia Kan MD [facility]    KNEE ARTHROSCOPY Left 09/17/99    MITRAL VALVE REPLACEMENT  03/18/2016    bioprosthetic     NASAL SEPTUM SURGERY      OTHER SURGICAL HISTORY  3/18/16    Aortic root Enlargement    OTHER SURGICAL HISTORY  3/18/16    ASD Closure    OTHER SURGICAL HISTORY Right 01/09/2017    AV fistula creation, wrist    OTHER SURGICAL HISTORY Right 08/29/2017    ligation of collateral branch of av fistula right wrist    OTHER SURGICAL HISTORY  04/05/2018    FISTULAGRAM WITH DR. Sheila Cross    [place] EGD TRANSORAL BIOPSY SINGLE/MULTIPLE N/A 10/17/2017    EGD BIOPSY performed by Lazarus Dover, MD at [address] [facility] [address] ANGIOACCESS AV FISTULA Right 8/29/2017     RIGHT  LOWER ARM AV FISTULA  LIGATION OF AQUIRED BRANCHES X2 performed by Bisi Kelley DO at [address]  3/18/16    median    VASCULAR SURGERY  12/06/2018    Right arm fistulagram,  PTA cephalic vein stenosis  /  DR Edi Curtis       SOCIAL HISTORY:     Tobacco: Social History     Tobacco Use   Smoking Status Former Smoker    Packs/day: 2.00    Years: 25.00    Pack years: 50.00    Types: Cigarettes    Start date: 3/17/1955   Pankaj Love Quit date: 1980    Years since quittin.6   Smokeless Tobacco Never Used     Alcohol:   Social History     Substance and Sexual Activity   Alcohol Use No    Alcohol/week: 0.0 standard drinks    Comment: 1-2 drinks per year     Drugs:   Social History     Substance and Sexual Activity   Drug Use No       FAMILY HISTORY: family history includes Alzheimer's Disease in his father; Diabetes in his maternal grandmother and mother; High Blood Pressure in his sister; Dayanara Kendall in his mother. REVIEW OF SYSTEMS:     Please see history of chief complaint above; otherwise no new problems with respect to General, HEENT, Cardiovascular, Respiratory, Gastrointestinal, Genitourinary, Endocrinologic, Musculoskeletal, or Neuropsychiatric complaints. PHYSICAL EXAMINATION:    Vitals: Temp: 98.0 deg F. Pulse: 76. Resp: 18. BP: 156/93. General: A 67 y.o.  male. Alert and oriented to person and place but not time. He does not appear to be in any acute distress. Skin: Skin color, texture, turgor normal. No lesions. HEENT/Neck: Essentially unremarkable  Chest: There was a chest catheter in place  Lungs: Normal - CTA without rales, rhonchi, or wheezing. Heart: regular rate and rhythm, S1, S2 normal, no murmur, click, rub or gallop No S3 or S4. Abdomen: Obese, soft, slightly distended, non-tender, normal active bowel sounds, no masses palpated and no hepatosplenomegaly. There was a moderate-sized epigastric hernia present  Extremities: No clubbing, cyanosis, edema  Neurologic: cranial nerves II-XII are grossly intact    ASSESSMENT:     Diagnosis Orders   1. Controlled type 2 diabetes mellitus with chronic kidney disease on chronic dialysis, with long-term current use of insulin ([facility]     2.  Controlled type 2 diabetes mellitus with diabetic polyneuropathy, with long-term current use of insulin (HCC)     3. Long-term insulin use ([address]     4. Obstructive sleep apnea     5. Essential hypertension     6. Mixed hyperlipidemia     7. ESRD (end stage renal disease) on dialysis ([address]     8. Dialysis patient ([address]     9. Secondary hyperparathyroidism of renal origin ([address]     10. Anemia in chronic kidney disease, on chronic dialysis ([address]     11. Paroxysmal atrial fibrillation (HCC)     12. Severe aortic stenosis     13. Severe mitral valve stenosis     14. Vascular dementia without behavioral disturbance ([address]     15. Obstructive sleep apnea     16. Major depressive disorder with single episode, in partial remission ([address]     17. Traumatic amputation of thumb, left, sequela ([address]     18. Class 1 obesity with serious comorbidity and body mass index (BMI) of 33.0 to 33.9 in adult, unspecified obesity type           PLAN:    1. Continue current treatment  2. Nursing home record reviewed and updates summarized and entered into electronic record  3. Nursing home blood sugar logs and diabetic medication administration reviewed. No changes. 4. Will remind nursing staff that they are to be putting his CPAP machine on him every night. 5. See nursing home orders and MAR.         Electronically signed by Indira Cueto DO on 8/30/2021 at 11:47 PM  Internal Medicine

## 2021-09-07 ENCOUNTER — TELEPHONE (OUTPATIENT)
Dept: INTERNAL MEDICINE | Age: 76
End: 2021-09-07

## 2021-10-25 ENCOUNTER — OUTSIDE SERVICES (OUTPATIENT)
Dept: INTERNAL MEDICINE | Age: 76
End: 2021-10-25
Payer: MEDICARE

## 2021-10-25 DIAGNOSIS — E11.42 CONTROLLED TYPE 2 DIABETES MELLITUS WITH DIABETIC POLYNEUROPATHY, WITH LONG-TERM CURRENT USE OF INSULIN (HCC): ICD-10-CM

## 2021-10-25 DIAGNOSIS — Z51.5 HOSPICE CARE PATIENT: ICD-10-CM

## 2021-10-25 DIAGNOSIS — Z79.4 CONTROLLED TYPE 2 DIABETES MELLITUS WITH DIABETIC POLYNEUROPATHY, WITH LONG-TERM CURRENT USE OF INSULIN (HCC): ICD-10-CM

## 2021-10-25 DIAGNOSIS — Z99.2 ANEMIA IN CHRONIC KIDNEY DISEASE, ON CHRONIC DIALYSIS (HCC): ICD-10-CM

## 2021-10-25 DIAGNOSIS — S68.012S: ICD-10-CM

## 2021-10-25 DIAGNOSIS — N18.6 ESRD (END STAGE RENAL DISEASE) ON DIALYSIS (HCC): ICD-10-CM

## 2021-10-25 DIAGNOSIS — N25.81 SECONDARY HYPERPARATHYROIDISM OF RENAL ORIGIN (HCC): ICD-10-CM

## 2021-10-25 DIAGNOSIS — I35.0 SEVERE AORTIC STENOSIS: ICD-10-CM

## 2021-10-25 DIAGNOSIS — E78.2 MIXED HYPERLIPIDEMIA: ICD-10-CM

## 2021-10-25 DIAGNOSIS — N18.6 CONTROLLED TYPE 2 DIABETES MELLITUS WITH CHRONIC KIDNEY DISEASE ON CHRONIC DIALYSIS, WITH LONG-TERM CURRENT USE OF INSULIN (HCC): Primary | ICD-10-CM

## 2021-10-25 DIAGNOSIS — Z79.4 CONTROLLED TYPE 2 DIABETES MELLITUS WITH CHRONIC KIDNEY DISEASE ON CHRONIC DIALYSIS, WITH LONG-TERM CURRENT USE OF INSULIN (HCC): Primary | ICD-10-CM

## 2021-10-25 DIAGNOSIS — G47.33 OBSTRUCTIVE SLEEP APNEA SYNDROME: ICD-10-CM

## 2021-10-25 DIAGNOSIS — D63.1 ANEMIA IN CHRONIC KIDNEY DISEASE, ON CHRONIC DIALYSIS (HCC): ICD-10-CM

## 2021-10-25 DIAGNOSIS — G47.33 OBSTRUCTIVE SLEEP APNEA: ICD-10-CM

## 2021-10-25 DIAGNOSIS — E66.9 CLASS 1 OBESITY WITH SERIOUS COMORBIDITY AND BODY MASS INDEX (BMI) OF 33.0 TO 33.9 IN ADULT, UNSPECIFIED OBESITY TYPE: ICD-10-CM

## 2021-10-25 DIAGNOSIS — N18.6 ANEMIA IN CHRONIC KIDNEY DISEASE, ON CHRONIC DIALYSIS (HCC): ICD-10-CM

## 2021-10-25 DIAGNOSIS — Z99.2 CONTROLLED TYPE 2 DIABETES MELLITUS WITH CHRONIC KIDNEY DISEASE ON CHRONIC DIALYSIS, WITH LONG-TERM CURRENT USE OF INSULIN (HCC): Primary | ICD-10-CM

## 2021-10-25 DIAGNOSIS — E11.22 CONTROLLED TYPE 2 DIABETES MELLITUS WITH CHRONIC KIDNEY DISEASE ON CHRONIC DIALYSIS, WITH LONG-TERM CURRENT USE OF INSULIN (HCC): Primary | ICD-10-CM

## 2021-10-25 DIAGNOSIS — I05.0 SEVERE MITRAL VALVE STENOSIS: ICD-10-CM

## 2021-10-25 DIAGNOSIS — Z79.4 LONG-TERM INSULIN USE (HCC): ICD-10-CM

## 2021-10-25 DIAGNOSIS — F32.4 MAJOR DEPRESSIVE DISORDER WITH SINGLE EPISODE, IN PARTIAL REMISSION (HCC): ICD-10-CM

## 2021-10-25 DIAGNOSIS — I10 ESSENTIAL HYPERTENSION: ICD-10-CM

## 2021-10-25 DIAGNOSIS — I48.0 PAROXYSMAL ATRIAL FIBRILLATION (HCC): ICD-10-CM

## 2021-10-25 DIAGNOSIS — F01.50 VASCULAR DEMENTIA WITHOUT BEHAVIORAL DISTURBANCE (HCC): ICD-10-CM

## 2021-10-25 DIAGNOSIS — Z99.2 ESRD (END STAGE RENAL DISEASE) ON DIALYSIS (HCC): ICD-10-CM

## 2021-10-25 NOTE — PROGRESS NOTES
DR. Larisa Mcpherson - [facility] VISIT    DATE OF SERVICE: 8/29/21    NURSING HOME: [facility]    CHIEF COMPLAINT/HISTORY OF CHIEF COMPLAINT: This patient is being seen for ongoing evaluation and management of his diabetes mellitus type 2 with nephropathy and neuropathy, end-stage renal disease on hemodialysis with anemia, depression, severe aortic and mitral stenosis, hypertension, hyperlipidemia, obstructive sleep apnea, and morbid obesity. His diabetes has remained fairly well controlled. He is on Eliquis for atrial fibrillation. He is on Zoloft for depression. He was getting dialysis for end-stage renal disease. His condition has declined further and he is now on Hospice. There are no other complaints. ALLERGIES:   Allergies   Allergen Reactions    Percocet [Oxycodone-Acetaminophen] Itching and Rash     Also causes shaking    Ampicillin Rash        MEDICATIONS: As noted on the [facility] MAR, referenced and incorporated herein.     PAST MEDICAL HISTORY:   Past Medical History:   Diagnosis Date    Acute blood loss anemia 3/11/2019    Acute on chronic diastolic CHF (congestive heart failure) ([address] 12/2/2019    Anemia in chronic kidney disease (CKD) 4/27/2016    Arthritis     Bacteremia 11/11/2016    Benign prostatic hyperplasia without lower urinary tract symptoms     Bleeding from wound 4/2/2019    BMI 40.0-44.9, adult ([address] 3/19/2018    Cellulitis 3/21/2019    Cellulitis of left lower extremity     Chronic cerebral ischemia 1/3/2017    Closed fracture of forearm 8/13/2013    Broken lft forearm     Controlled type 2 diabetes mellitus with chronic kidney disease on chronic dialysis, with long-term current use of insulin ([address]     Controlled type 2 diabetes mellitus with diabetic polyneuropathy, with long-term current use of insulin ([address] 10/7/2018    Decubital ulcer     NON OPEN BUTTOCKS    Dementia ([address]     memory problems    Dialysis patient ([address] 01/03/2017    Goes [facility], Sat in [place] Difficult intravenous access     HAS NEEDED PICC TEAM IN THE PAST    Difficulty walking     WHEELCHAIR BOUND-PIVOTS WITH ASSIST O F 2    DJD (degenerative joint disease) of knee     Elbow, forearm, and wrist, abrasion or friction burn, without mention of infection 8/13/2013    Abrasions lft forearm     Encephalopathy acute 4/27/2016    Encounter regarding vascular access for dialysis for ESRD ([address] 2/2/2017    ESRD (end stage renal disease) on dialysis ([address] 1/17/2017    Forgetfulness     HAS SOME SHORT TERM MEMORY LOSS, QUYEN-WIFE IS LEGAL GUARDIAN    H/O transesophageal echocardiography ([facility]O) for monitoring     Hemodialysis patient ([address] 11/11/2016    [place].  TUNNELED CATHETER RT UPPER CHEST    Hyperlipidemia 1990    on Meds    Hypertension 1990    on Meds    Intercritical gout     on Meds    Joint pain, knee 01/13/2017    baldo knee synvisc-1 injections    Long-term insulin use ([address] 1/17/2017    Major depressive disorder with single episode, in partial remission ([address] 1/3/2017    Mobility impaired     Morbid obesity ([address]     Muscle weakness     GRNERALIZED    Obesity     Obstructive sleep apnea     HEATHER on CPAP     On CPAP-TO BRING DOS    Paroxysmal atrial fibrillation ([address] 9/6/2017    Pneumonia 12/2/2019    Respiratory failure ([address] 03/2016    with open heart surgery, trached x 5 months    S/P cardiac cath     Seborrhea     Severe aortic stenosis 3/9/2016    Severe mitral valve stenosis 3/9/2016    Severe sepsis ([address] 4/3/2016    Skin rash     ABD FOLDS    Stasis dermatitis     Thyroid disease     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 lft thumb     Traumatic hemorrhagic shock ([address] 3/13/2019    Venous stasis dermatitis     Wears glasses     Wheelchair bound     pivots with 2 assists       PAST SURGICAL HISTORY:   Past Surgical History:   Procedure Laterality Date    AORTIC VALVE REPLACEMENT  03/18/2016 bioprosthetic    ARM SURGERY Left 1990    CARDIAC CATHETERIZATION      CENTRAL VENOUS CATHETER Right 02/21/2018    DIALYSIS CATHETER Dr Diego Liang COLONOSCOPY  11/19/09    COLONOSCOPY N/A 10/12/2018    COLONOSCOPY WITH BIOPSY performed by Blanca Simon DO at [facility] COLONOSCOPY N/A 6/15/2021    COLONOSCOPY FLEXIBLE W/ DECOMPRESSION performed by Celso Machuca MD at [address] [facility] Right 3/10/2019    WASHOUT RIGHT LOWER EXTREMITY WITH WOUND VAC EXCHANGE performed by Arlette Rock MD at [address] Right 3/8/2019    RIGHT LOWER EXTREMITY EXPLORATION, HEMATOMA EVACUATION, WOUND VAC PLACEMENT performed by Arlette Rock MD at [address] (x10-12 surgeries)    several surgeries on left arm    IR NONTUNNELED VASCULAR CATHETER  8/5/2020    IR NONTUNNELED VASCULAR CATHETER 8/5/2020 Geronimo Null MD [facility]    KNEE ARTHROSCOPY Left 09/17/99    MITRAL VALVE REPLACEMENT  03/18/2016    bioprosthetic     NASAL SEPTUM SURGERY      OTHER SURGICAL HISTORY  3/18/16    Aortic root Enlargement    OTHER SURGICAL HISTORY  3/18/16    ASD Closure    OTHER SURGICAL HISTORY Right 01/09/2017    AV fistula creation, wrist    OTHER SURGICAL HISTORY Right 08/29/2017    ligation of collateral branch of av fistula right wrist    OTHER SURGICAL HISTORY  04/05/2018    FISTULAGRAM WITH DR. Francisca Cox    [place] EGD TRANSORAL BIOPSY SINGLE/MULTIPLE N/A 10/17/2017    EGD BIOPSY performed by Mandy Cordero MD at [address] [facility] [address] ANGIOACCESS AV FISTULA Right 8/29/2017     RIGHT  LOWER ARM AV FISTULA  LIGATION OF AQUIRED BRANCHES X2 performed by Elly Figueroa DO at [address]  3/18/16    median    VASCULAR SURGERY  12/06/2018    Right arm fistulagram,  PTA cephalic vein stenosis  /  DR Francisco Garza       SOCIAL HISTORY:     Tobacco:   Social History     Tobacco Use   Smoking Status Former Smoker    Packs/day: 2.00    Years: 25.00    Pack years: 50.00    Types: Cigarettes    Start date: 3/17/1955   [facility] Quit date: 1980    Years since quittin.8   Smokeless Tobacco Never Used     Alcohol:   Social History     Substance and Sexual Activity   Alcohol Use No    Alcohol/week: 0.0 standard drinks    Comment: 1-2 drinks per year     Drugs:   Social History     Substance and Sexual Activity   Drug Use No       FAMILY HISTORY: family history includes Alzheimer's Disease in his father; Diabetes in his maternal grandmother and mother; High Blood Pressure in his sister; Surya Silvestreand in his mother. REVIEW OF SYSTEMS:     Please see history of chief complaint above; otherwise no new problems with respect to General, HEENT, Cardiovascular, Respiratory, Gastrointestinal, Genitourinary, Endocrinologic, Musculoskeletal, or Neuropsychiatric complaints. PHYSICAL EXAMINATION:    Vitals: Temp: 98.3 deg F. Pulse: 74. Resp: 16. BP: 126/60. General: A 67 y.o.  male. Alert. Unable to assess orientation. He does not appear to be in any acute distress. Nonverbal.  Skin: Skin color, texture, turgor normal. No lesions. HEENT/Neck: Essentially unremarkable  Chest: There was a chest catheter in place  Lungs: Normal - CTA without rales, rhonchi, or wheezing. Heart: regular rate and rhythm, S1, S2 normal, no murmur, click, rub or gallop No S3 or S4. Abdomen: Obese, soft, slightly distended, non-tender, normal active bowel sounds, no masses palpated and no hepatosplenomegaly. There was a moderate-sized epigastric hernia present  Extremities: No clubbing, cyanosis, edema  Neurologic: cranial nerves II-XII are grossly intact    ASSESSMENT:     Diagnosis Orders   1. Controlled type 2 diabetes mellitus with chronic kidney disease on chronic dialysis, with long-term current use of insulin ([facility]     2.  Controlled type 2 diabetes mellitus with diabetic polyneuropathy, with long-term current use of insulin ([address]     3. Long-term insulin use ([address]     4. Obstructive sleep apnea     5. Essential hypertension     6. Mixed hyperlipidemia     7. ESRD (end stage renal disease) on dialysis ([address]     8. Secondary hyperparathyroidism of renal origin ([address]     9. Anemia in chronic kidney disease, on chronic dialysis ([address]     10. Paroxysmal atrial fibrillation (HCC)     11. Severe aortic stenosis     12. Severe mitral valve stenosis     13. Vascular dementia without behavioral disturbance ([address]     14. Obstructive sleep apnea     15. Major depressive disorder with single episode, in partial remission ([address]     16. Traumatic amputation of thumb, left, sequela (HCC)     17. Class 1 obesity with serious comorbidity and body mass index (BMI) of 33.0 to 33.9 in adult, unspecified obesity type     18. Hospice care patient           PLAN:    1. Continue current treatment  2. Nursing home record reviewed and updates summarized and entered into electronic record  3. Nursing home blood sugar logs and diabetic medication administration reviewed. No changes. 4. Will continue to follow with Hospice  5. See nursing home orders and MAR.         Electronically signed by Yoko Dubois DO on 10/25/2021 at 1:12 AM  Internal Medicine

## 2022-04-19 NOTE — TELEPHONE ENCOUNTER
Patients wife was here. Wants generic prescription for Renvela ordered for Dorian Barajas for insurance purposes. Please call to [facility].
Prescription sent.
Cardiac
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
## 2022-09-07 NOTE — PROGRESS NOTES
This is a 75 y.o. male with past medical history of ESRD(Monday Wednesday Friday), atrial fibrillation, diabetes mellitus type 2, morbid obesity, HEATHER admitted 8/5/2020 for vascular access problem. Patient is a poor historian and denied any complaints as such. His last dialysis was 1 week before. See H&P of admitting/intern resident for more details. Upon admission patient was AAOx1,(baseline dementia),  Hypertensive initally  and a/ febrile. On evaluation left forearm warm, multiple bruises present, bruit heard, crackles present in chest b/l , B/L LE 2+edema present, LLE stasis dermatitis present, no evidence of cellulitis. IR guided non-tunneled catheter was placed and patient received hemodialysis with net removal of 3.4 L. BMP:   Recent Labs     08/05/20  1415   *   K 4.8   CL 88*   CO2 22   *   CREATININE 9.90*   GLUCOSE 133*     CBC: )  Recent Labs     08/05/20  1415   WBC 7.2   HGB 10.1*   HCT 31.3*             Assessment    Active Problems:    AVF (arteriovenous fistula) ([facility])  Resolved Problems:    * No resolved hospital problems. *        Plan     -Vascular Access problem-non-tunneled dialysis catheter placed by IR. Receives dialysis with net removal of 3.4 L. Nephrology recommendations noted. Plan for fistulogram in morning. Patient n.p.o. after midnight.    -Volume overload from missed hemodialysis sessions-received hemodialysis session. Nephrology recommendations noted    -ESRD on hemodialysis Monday Wednesday Friday-dialysis sessions per nephrology    -Diabetes mellitus type 2-we will start Lantus 25 units for now. Resume as appropriate. Medium dose insulin sliding scale. Hypoglycemia protocol.    -Essential hypertension-currently borderline blood pressure. Hold blood pressure medications for now. -HEATHER-CPAP at night    -Morbid obesity class 2    -Bioprosthetic mitral and aortic valve-stable changes.     -paroxysmal A. Fib-on Eliquis 5 twice daily    -Hypothyroidism-Synthroid 25 mcg daily.    -Anemia of chronic disease secondary to ESRD-stable    -Lower extremity venous stasis-wound care consulted.     -ASD repair with Dacron patch 2016-stable    Ovidio Chapin MD            Department of [address]         8/5/2020, 7:28 PM with patient

## 2023-11-10 NOTE — PROGRESS NOTES
Problem: Pain  Goal: #Acceptable pain level achieved/maintained at rest using NRS/Faces  Description: This goal is used for patients who can self-report.  Acceptable means the level is at or below the identified comfort/function goal.  Outcome: Outcome Met, Continue evaluating goal progress toward completion     Problem: Diabetes  Goal: Achieves glycemic balance  Description: Goal is to maintain blood sugar within range with no episodes of hypoglycemia  Outcome: Outcome Met, Continue evaluating goal progress toward completion     Problem: Pressure Injury, Risk for  Goal: # Skin remains intact  Outcome: Outcome Met, Continue evaluating goal progress toward completion     Problem: Postoperative Care  Goal: Vital signs are maintained within parameters  Outcome: Outcome Met, Continue evaluating goal progress toward completion      Physician Progress Note      PATIENT:               Marcelo Garza  CSN #:                  [id number]  :                       1945  ADMIT DATE:       2021 12:39 AM  DISCH DATE:  RESPONDING  PROVIDER #:        IVY [id number]          QUERY TEXT:    Pt admitted with rt upper thigh  cellulitis. Pt noted to have DM type 2 . If   possible, please document in progress notes and discharge summary the   relationship, if any, between cellulitis and DM. The medical record reflects the following:  Risk Factors: history of DM type 2, ESRD, from ECF , age  Clinical Indicators: per lab at [place] blood sugar 273 and 145. noted   history of type 2 DM and admitted with cellulitis    Treatment: IV vanco    thank you Juanita Reynolds RN BSN CCDS  [phone]  Options provided:  -- RT upper thigh  cellulitis associated with Diabetes  -- RT upper thigh  cellulitis unrelated to Diabetes  -- Other - I will add my own diagnosis  -- Disagree - Not applicable / Not valid  -- Disagree - Clinically unable to determine / Unknown  -- Refer to Clinical Documentation Reviewer    PROVIDER RESPONSE TEXT:    RT upper thigh cellulitis unrelated to Diabetes. Query created by:  Augusto Goel on 2021 9:30 AM      Electronically signed by:  Hien Herman 2021 7:58 AM

## 2024-07-27 NOTE — TELEPHONE ENCOUNTER
Dr Jeanne Raygoza office called to ask about the eliquis. Informed the pt could hold the eliquis for 2 days and restart. They  Would like to have a letter sent over stating the hold is ok. Fax [phone] for the [place] office.    They are only there today Opt out

## (undated) DEVICE — LINE SAMP O2 6.5FT W/FEMALE CONN F/ADULT CAPNOLINE PLUS

## (undated) DEVICE — SKIN AFFIX SURG ADHESIVE 72/CS 0.55ML: Brand: MEDLINE

## (undated) DEVICE — GLOVE ORTHO 8   MSG9480

## (undated) DEVICE — ORTHO EXT PK

## (undated) DEVICE — GAUZE,SPONGE,FLUFF,6"X6.75",STRL,5/TRAY: Brand: MEDLINE

## (undated) DEVICE — GOWN,AURORA,NONRNF,XL,30/CS: Brand: MEDLINE

## (undated) DEVICE — FORCEPS BX L240CM WRK CHN 2.8MM STD CAP W/ NDL MIC MESH

## (undated) DEVICE — GLOVE SURG SZ 65 THK91MIL LTX FREE SYN POLYISOPRENE

## (undated) DEVICE — SUTURE ETHBND EXCEL SZ 2-0 L30IN NONABSORBABLE GRN CT1 X423H

## (undated) DEVICE — CHLORAPREP 26ML ORANGE

## (undated) DEVICE — TUBE IRRIG HNDPC HI FLO TP INTRPULS W/SUCTION TUBE

## (undated) DEVICE — DRESSING NEG PRESSURE WND VAC

## (undated) DEVICE — COLONOSCOPE ENDOSCP ABSORBENT SM PEDIATRIC 104 MM VISION

## (undated) DEVICE — SPONGE LAP W18XL18IN WHT COT 4 PLY FLD STRUNG RADPQ DISP ST

## (undated) DEVICE — DRESSING NEG PRSS L W15XH3.3XL26CM BLK POLYUR DRP PD

## (undated) DEVICE — AGENT HEMSTAT 5GM ARISTA AH

## (undated) DEVICE — TOWEL,OR,DSP,ST,NATURAL,DLX,4/PK,20PK/CS: Brand: MEDLINE

## (undated) DEVICE — GLOVE SURG SZ 7 L12IN FNGR THK87MIL WHT LTX FREE

## (undated) DEVICE — 60 ML SYRINGE REGULAR TIP: Brand: MONOJECT

## (undated) DEVICE — CONNECTOR TBNG AUX H2O JET DISP FOR OLY 160/180 SER

## (undated) DEVICE — SUTURE ETHLN SZ 3-0 L18IN NONABSORBABLE BLK L24MM PS-1 3/8 1663G

## (undated) DEVICE — Device

## (undated) DEVICE — GLOVE ORANGE PI 8   MSG9080

## (undated) DEVICE — DISCONTINUED USE 405792 GLOVE SURG SENSICARE ALOE LT LF PF ST GRN SZ 7

## (undated) DEVICE — CANISTER NEG PRSS 500ML WND THER W/ TBNG NO PRSS RANG W/

## (undated) DEVICE — GOWN,AURORA,NONREINFORCED,LARGE: Brand: MEDLINE

## (undated) DEVICE — 1200CC GUARDIAN II: Brand: GUARDIAN

## (undated) DEVICE — GLOVE ORANGE PI 7 1/2   MSG9075

## (undated) DEVICE — FORCEPS BX L240CM JAW DIA2.4MM ORNG L CAP W/ NDL DISP RAD

## (undated) DEVICE — COVER XR CASS W24XL25IN CLR POLY FLM DRAPEABLE W REL LNR

## (undated) DEVICE — TOTAL TRAY, 16FR 10ML SIL FOLEY, URN: Brand: MEDLINE

## (undated) DEVICE — GLOVE SURG SZ 65 L12IN FNGR THK87MIL WHT LTX FREE

## (undated) DEVICE — PAD GEN USE BORDERED ADH 14IN 2IN AND 12IN 4IN GZ UNIV ST

## (undated) DEVICE — SUTURE VCRL + SZ 3-0 L27IN ABSRB UD L26MM SH 1/2 CIR VCP416H

## (undated) DEVICE — SUTURE MCRYL SZ 4-0 L18IN ABSRB UD L19MM PS-2 3/8 CIR PRIM Y496G

## (undated) DEVICE — BANDAGE,GAUZE,BULKEE II,4.5"X4.1YD,STRL: Brand: MEDLINE

## (undated) DEVICE — PACK SURG ABD SVMMC

## (undated) DEVICE — DRESSING,GAUZE,XEROFORM,CURAD,5"X9",ST: Brand: CURAD

## (undated) DEVICE — PREP SOL PVP IODINE 4%  4 OZ/BTL

## (undated) DEVICE — GLOVE ORANGE PI 7   MSG9070

## (undated) DEVICE — SUTURE NONABSORBABLE MONOFILAMENT 6-0 C-1 1X30 IN PROLENE 8706H

## (undated) DEVICE — APPLICATOR SURG L14CM ARISTA AH FLEXTIP

## (undated) DEVICE — Z DISCONTINUED BY MEDLINE USE 2711682 TRAY SKIN PREP DRY W/ PREM GLV

## (undated) DEVICE — AGENT HEMSTAT W2XL4IN OXIDIZED REGENERATED CELOS ABSRB

## (undated) DEVICE — MARKER,SKIN,WI/RULER AND LABELS: Brand: MEDLINE